# Patient Record
Sex: FEMALE | Race: WHITE | NOT HISPANIC OR LATINO | Employment: OTHER | URBAN - METROPOLITAN AREA
[De-identification: names, ages, dates, MRNs, and addresses within clinical notes are randomized per-mention and may not be internally consistent; named-entity substitution may affect disease eponyms.]

---

## 2017-06-28 ENCOUNTER — HOSPITAL ENCOUNTER (EMERGENCY)
Facility: HOSPITAL | Age: 62
Discharge: HOME | End: 2017-06-28
Attending: EMERGENCY MEDICINE | Admitting: EMERGENCY MEDICINE
Payer: COMMERCIAL

## 2017-06-28 ENCOUNTER — APPOINTMENT (EMERGENCY)
Dept: ULTRASOUND IMAGING | Facility: HOSPITAL | Age: 62
End: 2017-06-28
Payer: COMMERCIAL

## 2017-06-28 VITALS
HEART RATE: 83 BPM | SYSTOLIC BLOOD PRESSURE: 163 MMHG | BODY MASS INDEX: 22.59 KG/M2 | DIASTOLIC BLOOD PRESSURE: 79 MMHG | OXYGEN SATURATION: 98 % | TEMPERATURE: 97.9 F | WEIGHT: 131.6 LBS | RESPIRATION RATE: 18 BRPM

## 2017-06-28 DIAGNOSIS — I77.6 VASCULITIS (HCC): Primary | ICD-10-CM

## 2017-06-28 DIAGNOSIS — D61.818 PANCYTOPENIA (HCC): ICD-10-CM

## 2017-06-28 LAB
ALBUMIN SERPL BCP-MCNC: 3.5 G/DL (ref 3.5–5)
ALP SERPL-CCNC: 104 U/L (ref 46–116)
ALT SERPL W P-5'-P-CCNC: 43 U/L (ref 12–78)
ANION GAP SERPL CALCULATED.3IONS-SCNC: 11 MMOL/L (ref 4–13)
AST SERPL W P-5'-P-CCNC: 89 U/L (ref 5–45)
BASOPHILS # BLD AUTO: 0.02 THOUSANDS/ΜL (ref 0–0.1)
BASOPHILS NFR BLD AUTO: 1 % (ref 0–1)
BILIRUB SERPL-MCNC: 0.6 MG/DL (ref 0.2–1)
BUN SERPL-MCNC: 7 MG/DL (ref 5–25)
CALCIUM SERPL-MCNC: 8.6 MG/DL (ref 8.3–10.1)
CHLORIDE SERPL-SCNC: 97 MMOL/L (ref 100–108)
CO2 SERPL-SCNC: 26 MMOL/L (ref 21–32)
CREAT SERPL-MCNC: 0.71 MG/DL (ref 0.6–1.3)
EOSINOPHIL # BLD AUTO: 0.03 THOUSAND/ΜL (ref 0–0.61)
EOSINOPHIL NFR BLD AUTO: 1 % (ref 0–6)
ERYTHROCYTE [DISTWIDTH] IN BLOOD BY AUTOMATED COUNT: 12.5 % (ref 11.6–15.1)
GFR SERPL CREATININE-BSD FRML MDRD: >60 ML/MIN/1.73SQ M
GLUCOSE SERPL-MCNC: 139 MG/DL (ref 65–140)
HCT VFR BLD AUTO: 33 % (ref 34.8–46.1)
HGB BLD-MCNC: 11.3 G/DL (ref 11.5–15.4)
LYMPHOCYTES # BLD AUTO: 1.87 THOUSANDS/ΜL (ref 0.6–4.47)
LYMPHOCYTES NFR BLD AUTO: 57 % (ref 14–44)
MCH RBC QN AUTO: 31.3 PG (ref 26.8–34.3)
MCHC RBC AUTO-ENTMCNC: 34.2 G/DL (ref 31.4–37.4)
MCV RBC AUTO: 91 FL (ref 82–98)
MONOCYTES # BLD AUTO: 0.24 THOUSAND/ΜL (ref 0.17–1.22)
MONOCYTES NFR BLD AUTO: 8 % (ref 4–12)
NEUTROPHILS # BLD AUTO: 1.05 THOUSANDS/ΜL (ref 1.85–7.62)
NEUTS SEG NFR BLD AUTO: 33 % (ref 43–75)
PLATELET # BLD AUTO: 117 THOUSANDS/UL (ref 149–390)
PMV BLD AUTO: 8.7 FL (ref 8.9–12.7)
POTASSIUM SERPL-SCNC: 3.5 MMOL/L (ref 3.5–5.3)
PROT SERPL-MCNC: 7.7 G/DL (ref 6.4–8.2)
RBC # BLD AUTO: 3.61 MILLION/UL (ref 3.81–5.12)
SODIUM SERPL-SCNC: 134 MMOL/L (ref 136–145)
TROPONIN I SERPL-MCNC: <0.02 NG/ML
WBC # BLD AUTO: 3.21 THOUSAND/UL (ref 4.31–10.16)

## 2017-06-28 PROCEDURE — 99284 EMERGENCY DEPT VISIT MOD MDM: CPT

## 2017-06-28 PROCEDURE — 80053 COMPREHEN METABOLIC PANEL: CPT | Performed by: EMERGENCY MEDICINE

## 2017-06-28 PROCEDURE — 85025 COMPLETE CBC W/AUTO DIFF WBC: CPT | Performed by: EMERGENCY MEDICINE

## 2017-06-28 PROCEDURE — 36415 COLL VENOUS BLD VENIPUNCTURE: CPT | Performed by: EMERGENCY MEDICINE

## 2017-06-28 PROCEDURE — 93005 ELECTROCARDIOGRAM TRACING: CPT | Performed by: EMERGENCY MEDICINE

## 2017-06-28 PROCEDURE — 93971 EXTREMITY STUDY: CPT

## 2017-06-28 PROCEDURE — 84484 ASSAY OF TROPONIN QUANT: CPT | Performed by: EMERGENCY MEDICINE

## 2017-06-28 RX ORDER — METHYLPREDNISOLONE 4 MG/1
TABLET ORAL
Qty: 21 TABLET | Refills: 0 | Status: SHIPPED | OUTPATIENT
Start: 2017-06-28 | End: 2018-11-21 | Stop reason: HOSPADM

## 2017-06-29 LAB
ATRIAL RATE: 83 BPM
P AXIS: 68 DEGREES
PR INTERVAL: 160 MS
QRS AXIS: 68 DEGREES
QRSD INTERVAL: 78 MS
QT INTERVAL: 350 MS
QTC INTERVAL: 411 MS
T WAVE AXIS: 65 DEGREES
VENTRICULAR RATE: 83 BPM

## 2017-06-30 ENCOUNTER — TRANSCRIBE ORDERS (OUTPATIENT)
Dept: LAB | Facility: CLINIC | Age: 62
End: 2017-06-30

## 2017-06-30 ENCOUNTER — APPOINTMENT (OUTPATIENT)
Dept: LAB | Facility: CLINIC | Age: 62
End: 2017-06-30
Payer: COMMERCIAL

## 2017-06-30 DIAGNOSIS — R31.9 HEMATURIA SYNDROME: Primary | ICD-10-CM

## 2017-06-30 LAB
BACTERIA UR QL AUTO: ABNORMAL /HPF
BILIRUB UR QL STRIP: ABNORMAL
CLARITY UR: ABNORMAL
COLOR UR: ABNORMAL
GLUCOSE UR STRIP-MCNC: ABNORMAL MG/DL
HGB UR QL STRIP.AUTO: ABNORMAL
KETONES UR STRIP-MCNC: ABNORMAL MG/DL
LEUKOCYTE ESTERASE UR QL STRIP: ABNORMAL
NITRITE UR QL STRIP: ABNORMAL
NON-SQ EPI CELLS URNS QL MICRO: ABNORMAL /HPF
PROT UR STRIP-MCNC: ABNORMAL MG/DL
RBC #/AREA URNS AUTO: ABNORMAL /HPF
SP GR UR STRIP.AUTO: 1.01 (ref 1–1.03)
UROBILINOGEN UR QL STRIP.AUTO: ABNORMAL E.U./DL
WBC #/AREA URNS AUTO: ABNORMAL /HPF

## 2017-06-30 PROCEDURE — 87077 CULTURE AEROBIC IDENTIFY: CPT

## 2017-06-30 PROCEDURE — 87086 URINE CULTURE/COLONY COUNT: CPT

## 2017-06-30 PROCEDURE — 81001 URINALYSIS AUTO W/SCOPE: CPT

## 2017-06-30 PROCEDURE — 87186 SC STD MICRODIL/AGAR DIL: CPT

## 2017-07-02 LAB — BACTERIA UR CULT: ABNORMAL

## 2017-07-31 ENCOUNTER — TRANSCRIBE ORDERS (OUTPATIENT)
Dept: ADMINISTRATIVE | Facility: HOSPITAL | Age: 62
End: 2017-07-31

## 2017-07-31 DIAGNOSIS — R60.0 LOCALIZED EDEMA: Primary | ICD-10-CM

## 2017-07-31 DIAGNOSIS — R60.9 EDEMA, UNSPECIFIED TYPE: Primary | ICD-10-CM

## 2017-08-07 ENCOUNTER — HOSPITAL ENCOUNTER (OUTPATIENT)
Dept: ULTRASOUND IMAGING | Facility: HOSPITAL | Age: 62
Discharge: HOME | End: 2017-08-07
Payer: COMMERCIAL

## 2017-08-07 DIAGNOSIS — R60.0 LOCALIZED EDEMA: ICD-10-CM

## 2017-08-07 PROCEDURE — 76830 TRANSVAGINAL US NON-OB: CPT

## 2017-08-07 PROCEDURE — 76856 US EXAM PELVIC COMPLETE: CPT

## 2017-08-07 PROCEDURE — 76705 ECHO EXAM OF ABDOMEN: CPT

## 2017-08-09 ENCOUNTER — TRANSCRIBE ORDERS (OUTPATIENT)
Dept: ADMINISTRATIVE | Facility: HOSPITAL | Age: 62
End: 2017-08-09

## 2017-08-09 DIAGNOSIS — K80.50 CALCULUS OF BILE DUCT WITHOUT MENTION OF CHOLECYSTITIS OR OBSTRUCTION: Primary | ICD-10-CM

## 2017-08-18 ENCOUNTER — HOSPITAL ENCOUNTER (OUTPATIENT)
Dept: RADIOLOGY | Facility: HOSPITAL | Age: 62
Discharge: HOME | End: 2017-08-18
Payer: COMMERCIAL

## 2017-08-18 DIAGNOSIS — K80.50 CALCULUS OF BILE DUCT WITHOUT MENTION OF CHOLECYSTITIS OR OBSTRUCTION: ICD-10-CM

## 2017-08-18 PROCEDURE — 74183 MRI ABD W/O CNTR FLWD CNTR: CPT

## 2017-08-18 PROCEDURE — A9585 GADOBUTROL INJECTION: HCPCS | Performed by: RADIOLOGY

## 2017-08-18 RX ADMIN — GADOBUTROL 6 ML: 604.72 INJECTION INTRAVENOUS at 20:14

## 2018-01-30 ENCOUNTER — APPOINTMENT (OUTPATIENT)
Dept: LAB | Facility: CLINIC | Age: 63
End: 2018-01-30
Payer: COMMERCIAL

## 2018-01-30 ENCOUNTER — TRANSCRIBE ORDERS (OUTPATIENT)
Dept: LAB | Facility: CLINIC | Age: 63
End: 2018-01-30

## 2018-01-30 DIAGNOSIS — I10 ESSENTIAL HYPERTENSION, MALIGNANT: ICD-10-CM

## 2018-01-30 DIAGNOSIS — R22.31 MASS OF FINGER OF RIGHT HAND: Primary | ICD-10-CM

## 2018-01-30 LAB
BASOPHILS # BLD AUTO: 0.01 THOUSANDS/ΜL (ref 0–0.1)
BASOPHILS NFR BLD AUTO: 0 % (ref 0–1)
CRP SERPL QL: 23.3 MG/L
EOSINOPHIL # BLD AUTO: 0.04 THOUSAND/ΜL (ref 0–0.61)
EOSINOPHIL NFR BLD AUTO: 1 % (ref 0–6)
ERYTHROCYTE [DISTWIDTH] IN BLOOD BY AUTOMATED COUNT: 12.4 % (ref 11.6–15.1)
ERYTHROCYTE [SEDIMENTATION RATE] IN BLOOD: 34 MM/HOUR (ref 0–20)
HCT VFR BLD AUTO: 33.3 % (ref 34.8–46.1)
HGB BLD-MCNC: 12 G/DL (ref 11.5–15.4)
LYMPHOCYTES # BLD AUTO: 1.93 THOUSANDS/ΜL (ref 0.6–4.47)
LYMPHOCYTES NFR BLD AUTO: 26 % (ref 14–44)
MCH RBC QN AUTO: 31.7 PG (ref 26.8–34.3)
MCHC RBC AUTO-ENTMCNC: 36 G/DL (ref 31.4–37.4)
MCV RBC AUTO: 88 FL (ref 82–98)
MONOCYTES # BLD AUTO: 0.94 THOUSAND/ΜL (ref 0.17–1.22)
MONOCYTES NFR BLD AUTO: 13 % (ref 4–12)
NEUTROPHILS # BLD AUTO: 4.61 THOUSANDS/ΜL (ref 1.85–7.62)
NEUTS SEG NFR BLD AUTO: 60 % (ref 43–75)
NRBC BLD AUTO-RTO: 0 /100 WBCS
PLATELET # BLD AUTO: 203 THOUSANDS/UL (ref 149–390)
PMV BLD AUTO: 9.9 FL (ref 8.9–12.7)
RBC # BLD AUTO: 3.78 MILLION/UL (ref 3.81–5.12)
URATE SERPL-MCNC: 3 MG/DL (ref 2–6.8)
WBC # BLD AUTO: 7.55 THOUSAND/UL (ref 4.31–10.16)

## 2018-01-30 PROCEDURE — 85025 COMPLETE CBC W/AUTO DIFF WBC: CPT

## 2018-01-30 PROCEDURE — 85652 RBC SED RATE AUTOMATED: CPT

## 2018-01-30 PROCEDURE — 36415 COLL VENOUS BLD VENIPUNCTURE: CPT

## 2018-01-30 PROCEDURE — 86430 RHEUMATOID FACTOR TEST QUAL: CPT

## 2018-01-30 PROCEDURE — 86140 C-REACTIVE PROTEIN: CPT

## 2018-01-30 PROCEDURE — 86038 ANTINUCLEAR ANTIBODIES: CPT

## 2018-01-30 PROCEDURE — 86617 LYME DISEASE ANTIBODY: CPT

## 2018-01-30 PROCEDURE — 86618 LYME DISEASE ANTIBODY: CPT

## 2018-01-30 PROCEDURE — 84550 ASSAY OF BLOOD/URIC ACID: CPT

## 2018-01-31 LAB
B BURGDOR IGG SER IA-ACNC: 1.55
B BURGDOR IGM SER IA-ACNC: 0.21
RHEUMATOID FACT SER QL LA: NEGATIVE
RYE IGE QN: NEGATIVE

## 2018-02-02 LAB
B BURGDOR IGG PATRN SER IB-IMP: NEGATIVE
B BURGDOR IGM PATRN SER IB-IMP: NEGATIVE
B BURGDOR18KD IGG SER QL IB: ABNORMAL
B BURGDOR23KD IGG SER QL IB: ABNORMAL
B BURGDOR23KD IGM SER QL IB: PRESENT
B BURGDOR28KD IGG SER QL IB: ABNORMAL
B BURGDOR30KD IGG SER QL IB: ABNORMAL
B BURGDOR39KD IGG SER QL IB: ABNORMAL
B BURGDOR39KD IGM SER QL IB: ABNORMAL
B BURGDOR41KD IGG SER QL IB: PRESENT
B BURGDOR41KD IGM SER QL IB: ABNORMAL
B BURGDOR45KD IGG SER QL IB: PRESENT
B BURGDOR58KD IGG SER QL IB: ABNORMAL
B BURGDOR66KD IGG SER QL IB: ABNORMAL
B BURGDOR93KD IGG SER QL IB: ABNORMAL

## 2018-11-15 ENCOUNTER — HOSPITAL ENCOUNTER (INPATIENT)
Facility: HOSPITAL | Age: 63
LOS: 6 days | Discharge: NURSING, REHAB OR LONG-TERM CARE | DRG: 481 | End: 2018-11-21
Attending: INTERNAL MEDICINE | Admitting: INTERNAL MEDICINE
Payer: COMMERCIAL

## 2018-11-15 ENCOUNTER — APPOINTMENT (EMERGENCY)
Dept: RADIOLOGY | Facility: HOSPITAL | Age: 63
DRG: 481 | End: 2018-11-15
Payer: COMMERCIAL
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
## 2018-11-15 DIAGNOSIS — E87.8 LOW BICARBONATE: ICD-10-CM

## 2018-11-15 DIAGNOSIS — S72.142A CLOSED INTERTROCHANTERIC FRACTURE, LEFT, INITIAL ENCOUNTER (HCC): Primary | ICD-10-CM

## 2018-11-15 DIAGNOSIS — E87.1 HYPONATREMIA: ICD-10-CM

## 2018-11-15 DIAGNOSIS — S72.092A: ICD-10-CM

## 2018-11-15 PROBLEM — I10 ESSENTIAL HYPERTENSION: Chronic | Status: ACTIVE | Noted: 2018-11-15

## 2018-11-15 LAB
ANION GAP SERPL CALCULATED.3IONS-SCNC: 12 MMOL/L (ref 4–13)
APTT PPP: 33 SECONDS (ref 26–38)
BASOPHILS # BLD AUTO: 0.04 THOUSANDS/ΜL (ref 0–0.1)
BASOPHILS NFR BLD AUTO: 1 % (ref 0–1)
BUN SERPL-MCNC: 6 MG/DL (ref 5–25)
CALCIUM SERPL-MCNC: 8.6 MG/DL (ref 8.3–10.1)
CHLORIDE SERPL-SCNC: 91 MMOL/L (ref 100–108)
CO2 SERPL-SCNC: 24 MMOL/L (ref 21–32)
CREAT SERPL-MCNC: 0.68 MG/DL (ref 0.6–1.3)
EOSINOPHIL # BLD AUTO: 0.08 THOUSAND/ΜL (ref 0–0.61)
EOSINOPHIL NFR BLD AUTO: 2 % (ref 0–6)
ERYTHROCYTE [DISTWIDTH] IN BLOOD BY AUTOMATED COUNT: 12.7 % (ref 11.6–15.1)
GFR SERPL CREATININE-BSD FRML MDRD: 93 ML/MIN/1.73SQ M
GLUCOSE SERPL-MCNC: 122 MG/DL (ref 65–140)
HCT VFR BLD AUTO: 31.8 % (ref 34.8–46.1)
HGB BLD-MCNC: 11.1 G/DL (ref 11.5–15.4)
IMM GRANULOCYTES # BLD AUTO: 0.02 THOUSAND/UL (ref 0–0.2)
IMM GRANULOCYTES NFR BLD AUTO: 0 % (ref 0–2)
INR PPP: 1.15 (ref 0.86–1.17)
LYMPHOCYTES # BLD AUTO: 2.55 THOUSANDS/ΜL (ref 0.6–4.47)
LYMPHOCYTES NFR BLD AUTO: 46 % (ref 14–44)
MCH RBC QN AUTO: 30.9 PG (ref 26.8–34.3)
MCHC RBC AUTO-ENTMCNC: 34.9 G/DL (ref 31.4–37.4)
MCV RBC AUTO: 89 FL (ref 82–98)
MONOCYTES # BLD AUTO: 0.36 THOUSAND/ΜL (ref 0.17–1.22)
MONOCYTES NFR BLD AUTO: 7 % (ref 4–12)
NEUTROPHILS # BLD AUTO: 2.38 THOUSANDS/ΜL (ref 1.85–7.62)
NEUTS SEG NFR BLD AUTO: 44 % (ref 43–75)
NRBC BLD AUTO-RTO: 0 /100 WBCS
PLATELET # BLD AUTO: 122 THOUSANDS/UL (ref 149–390)
PLATELET # BLD AUTO: 144 THOUSANDS/UL (ref 149–390)
PMV BLD AUTO: 9.1 FL (ref 8.9–12.7)
PMV BLD AUTO: 9.4 FL (ref 8.9–12.7)
POTASSIUM SERPL-SCNC: 3.6 MMOL/L (ref 3.5–5.3)
PROTHROMBIN TIME: 14.4 SECONDS (ref 11.8–14.2)
RBC # BLD AUTO: 3.59 MILLION/UL (ref 3.81–5.12)
SODIUM SERPL-SCNC: 127 MMOL/L (ref 136–145)
WBC # BLD AUTO: 5.43 THOUSAND/UL (ref 4.31–10.16)

## 2018-11-15 PROCEDURE — 99223 1ST HOSP IP/OBS HIGH 75: CPT | Performed by: PHYSICIAN ASSISTANT

## 2018-11-15 PROCEDURE — 85730 THROMBOPLASTIN TIME PARTIAL: CPT | Performed by: PHYSICIAN ASSISTANT

## 2018-11-15 PROCEDURE — 84300 ASSAY OF URINE SODIUM: CPT | Performed by: PHYSICIAN ASSISTANT

## 2018-11-15 PROCEDURE — 85025 COMPLETE CBC W/AUTO DIFF WBC: CPT | Performed by: PHYSICIAN ASSISTANT

## 2018-11-15 PROCEDURE — 36415 COLL VENOUS BLD VENIPUNCTURE: CPT | Performed by: PHYSICIAN ASSISTANT

## 2018-11-15 PROCEDURE — 96374 THER/PROPH/DIAG INJ IV PUSH: CPT

## 2018-11-15 PROCEDURE — 85610 PROTHROMBIN TIME: CPT | Performed by: PHYSICIAN ASSISTANT

## 2018-11-15 PROCEDURE — 73502 X-RAY EXAM HIP UNI 2-3 VIEWS: CPT

## 2018-11-15 PROCEDURE — 99285 EMERGENCY DEPT VISIT HI MDM: CPT

## 2018-11-15 PROCEDURE — 93005 ELECTROCARDIOGRAM TRACING: CPT

## 2018-11-15 PROCEDURE — 80048 BASIC METABOLIC PNL TOTAL CA: CPT | Performed by: PHYSICIAN ASSISTANT

## 2018-11-15 PROCEDURE — 71045 X-RAY EXAM CHEST 1 VIEW: CPT

## 2018-11-15 PROCEDURE — 85049 AUTOMATED PLATELET COUNT: CPT | Performed by: PHYSICIAN ASSISTANT

## 2018-11-15 PROCEDURE — 83935 ASSAY OF URINE OSMOLALITY: CPT | Performed by: PHYSICIAN ASSISTANT

## 2018-11-15 PROCEDURE — 83930 ASSAY OF BLOOD OSMOLALITY: CPT | Performed by: PHYSICIAN ASSISTANT

## 2018-11-15 RX ORDER — TRAMADOL HYDROCHLORIDE 50 MG/1
50 TABLET ORAL EVERY 6 HOURS PRN
Status: DISCONTINUED | OUTPATIENT
Start: 2018-11-15 | End: 2018-11-21 | Stop reason: HOSPADM

## 2018-11-15 RX ORDER — SODIUM CHLORIDE 9 MG/ML
125 INJECTION, SOLUTION INTRAVENOUS CONTINUOUS
Status: DISCONTINUED | OUTPATIENT
Start: 2018-11-15 | End: 2018-11-16

## 2018-11-15 RX ORDER — FENTANYL CITRATE 50 UG/ML
25 INJECTION, SOLUTION INTRAMUSCULAR; INTRAVENOUS ONCE
Status: COMPLETED | OUTPATIENT
Start: 2018-11-15 | End: 2018-11-15

## 2018-11-15 RX ORDER — MORPHINE SULFATE 4 MG/ML
4 INJECTION, SOLUTION INTRAMUSCULAR; INTRAVENOUS EVERY 4 HOURS PRN
Status: DISCONTINUED | OUTPATIENT
Start: 2018-11-15 | End: 2018-11-21 | Stop reason: HOSPADM

## 2018-11-15 RX ORDER — MORPHINE SULFATE 15 MG/1
15 TABLET ORAL EVERY 4 HOURS PRN
Status: DISCONTINUED | OUTPATIENT
Start: 2018-11-15 | End: 2018-11-21 | Stop reason: HOSPADM

## 2018-11-15 RX ORDER — ONDANSETRON 2 MG/ML
4 INJECTION INTRAMUSCULAR; INTRAVENOUS EVERY 6 HOURS PRN
Status: DISCONTINUED | OUTPATIENT
Start: 2018-11-15 | End: 2018-11-17

## 2018-11-15 RX ORDER — LIDOCAINE 50 MG/G
1 PATCH TOPICAL DAILY
Status: DISCONTINUED | OUTPATIENT
Start: 2018-11-16 | End: 2018-11-21 | Stop reason: HOSPADM

## 2018-11-15 RX ORDER — METOPROLOL TARTRATE 50 MG/1
50 TABLET, FILM COATED ORAL 2 TIMES DAILY
Status: DISCONTINUED | OUTPATIENT
Start: 2018-11-15 | End: 2018-11-21 | Stop reason: HOSPADM

## 2018-11-15 RX ORDER — ACETAMINOPHEN 325 MG/1
650 TABLET ORAL EVERY 8 HOURS SCHEDULED
Status: DISCONTINUED | OUTPATIENT
Start: 2018-11-15 | End: 2018-11-21 | Stop reason: HOSPADM

## 2018-11-15 RX ORDER — THIAMINE MONONITRATE (VIT B1) 100 MG
100 TABLET ORAL DAILY
Status: DISCONTINUED | OUTPATIENT
Start: 2018-11-16 | End: 2018-11-21 | Stop reason: HOSPADM

## 2018-11-15 RX ORDER — METHOCARBAMOL 500 MG/1
500 TABLET, FILM COATED ORAL EVERY 6 HOURS PRN
Status: DISCONTINUED | OUTPATIENT
Start: 2018-11-15 | End: 2018-11-21 | Stop reason: HOSPADM

## 2018-11-15 RX ORDER — LIDOCAINE 50 MG/G
1 PATCH TOPICAL ONCE
Status: COMPLETED | OUTPATIENT
Start: 2018-11-15 | End: 2018-11-16

## 2018-11-15 RX ADMIN — SODIUM CHLORIDE 125 ML/HR: 0.9 INJECTION, SOLUTION INTRAVENOUS at 20:30

## 2018-11-15 RX ADMIN — FENTANYL CITRATE 25 MCG: 50 INJECTION, SOLUTION INTRAMUSCULAR; INTRAVENOUS at 19:10

## 2018-11-15 RX ADMIN — SODIUM CHLORIDE 1000 ML: 0.9 INJECTION, SOLUTION INTRAVENOUS at 19:43

## 2018-11-15 RX ADMIN — MORPHINE SULFATE 15 MG: 15 TABLET ORAL at 21:47

## 2018-11-15 RX ADMIN — LIDOCAINE 1 PATCH: 50 PATCH CUTANEOUS at 19:10

## 2018-11-15 RX ADMIN — METOPROLOL TARTRATE 50 MG: 50 TABLET, FILM COATED ORAL at 21:53

## 2018-11-15 RX ADMIN — FENTANYL CITRATE 25 MCG: 50 INJECTION, SOLUTION INTRAMUSCULAR; INTRAVENOUS at 19:42

## 2018-11-15 RX ADMIN — METHOCARBAMOL TABLETS 500 MG: 500 TABLET, COATED ORAL at 21:48

## 2018-11-15 RX ADMIN — ACETAMINOPHEN 650 MG: 325 TABLET ORAL at 21:47

## 2018-11-16 ENCOUNTER — APPOINTMENT (INPATIENT)
Dept: RADIOLOGY | Facility: HOSPITAL | Age: 63
DRG: 481 | End: 2018-11-16
Payer: COMMERCIAL

## 2018-11-16 ENCOUNTER — ANESTHESIA EVENT (INPATIENT)
Dept: PERIOP | Facility: HOSPITAL | Age: 63
DRG: 481 | End: 2018-11-16
Payer: COMMERCIAL

## 2018-11-16 PROBLEM — E87.8 LOW BICARBONATE: Status: ACTIVE | Noted: 2018-11-16

## 2018-11-16 PROBLEM — D64.9 ANEMIA: Chronic | Status: ACTIVE | Noted: 2018-11-16

## 2018-11-16 PROBLEM — S72.142A CLOSED INTERTROCHANTERIC FRACTURE, LEFT, INITIAL ENCOUNTER (HCC): Status: ACTIVE | Noted: 2018-11-15

## 2018-11-16 LAB
ABO GROUP BLD: NORMAL
ANION GAP SERPL CALCULATED.3IONS-SCNC: 11 MMOL/L (ref 4–13)
ANION GAP SERPL CALCULATED.3IONS-SCNC: 12 MMOL/L (ref 4–13)
ANION GAP SERPL CALCULATED.3IONS-SCNC: 12 MMOL/L (ref 4–13)
ATRIAL RATE: 138 BPM
BACTERIA UR QL AUTO: ABNORMAL /HPF
BILIRUB UR QL STRIP: NEGATIVE
BLD GP AB SCN SERPL QL: NEGATIVE
BUN SERPL-MCNC: 4 MG/DL (ref 5–25)
BUN SERPL-MCNC: 5 MG/DL (ref 5–25)
BUN SERPL-MCNC: 5 MG/DL (ref 5–25)
CALCIUM SERPL-MCNC: 7.8 MG/DL (ref 8.3–10.1)
CALCIUM SERPL-MCNC: 8 MG/DL (ref 8.3–10.1)
CALCIUM SERPL-MCNC: 8 MG/DL (ref 8.3–10.1)
CHLORIDE SERPL-SCNC: 93 MMOL/L (ref 100–108)
CHLORIDE SERPL-SCNC: 94 MMOL/L (ref 100–108)
CHLORIDE SERPL-SCNC: 95 MMOL/L (ref 100–108)
CLARITY UR: ABNORMAL
CO2 SERPL-SCNC: 18 MMOL/L (ref 21–32)
CO2 SERPL-SCNC: 20 MMOL/L (ref 21–32)
CO2 SERPL-SCNC: 24 MMOL/L (ref 21–32)
COLOR UR: ABNORMAL
CREAT SERPL-MCNC: 0.49 MG/DL (ref 0.6–1.3)
CREAT SERPL-MCNC: 0.56 MG/DL (ref 0.6–1.3)
CREAT SERPL-MCNC: 0.59 MG/DL (ref 0.6–1.3)
ERYTHROCYTE [DISTWIDTH] IN BLOOD BY AUTOMATED COUNT: 13 % (ref 11.6–15.1)
GFR SERPL CREATININE-BSD FRML MDRD: 100 ML/MIN/1.73SQ M
GFR SERPL CREATININE-BSD FRML MDRD: 104 ML/MIN/1.73SQ M
GFR SERPL CREATININE-BSD FRML MDRD: 98 ML/MIN/1.73SQ M
GLUCOSE SERPL-MCNC: 82 MG/DL (ref 65–140)
GLUCOSE SERPL-MCNC: 92 MG/DL (ref 65–140)
GLUCOSE SERPL-MCNC: 96 MG/DL (ref 65–140)
GLUCOSE UR STRIP-MCNC: NEGATIVE MG/DL
HCT VFR BLD AUTO: 25.9 % (ref 34.8–46.1)
HCT VFR BLD AUTO: 26.1 % (ref 34.8–46.1)
HGB BLD-MCNC: 9 G/DL (ref 11.5–15.4)
HGB BLD-MCNC: 9.2 G/DL (ref 11.5–15.4)
HGB UR QL STRIP.AUTO: ABNORMAL
KETONES UR STRIP-MCNC: NEGATIVE MG/DL
LEUKOCYTE ESTERASE UR QL STRIP: ABNORMAL
MCH RBC QN AUTO: 31.5 PG (ref 26.8–34.3)
MCHC RBC AUTO-ENTMCNC: 35.2 G/DL (ref 31.4–37.4)
MCV RBC AUTO: 89 FL (ref 82–98)
NITRITE UR QL STRIP: POSITIVE
NON-SQ EPI CELLS URNS QL MICRO: ABNORMAL /HPF
OSMOLALITY UR/SERPL-RTO: 315 MMOL/KG (ref 282–298)
OSMOLALITY UR: 393 MMOL/KG
P AXIS: 71 DEGREES
PH UR STRIP.AUTO: 5.5 [PH] (ref 4.5–8)
PLATELET # BLD AUTO: 115 THOUSANDS/UL (ref 149–390)
PMV BLD AUTO: 9.3 FL (ref 8.9–12.7)
POTASSIUM SERPL-SCNC: 3.7 MMOL/L (ref 3.5–5.3)
POTASSIUM SERPL-SCNC: 3.9 MMOL/L (ref 3.5–5.3)
POTASSIUM SERPL-SCNC: 3.9 MMOL/L (ref 3.5–5.3)
PROT UR STRIP-MCNC: NEGATIVE MG/DL
QRS AXIS: 66 DEGREES
QRSD INTERVAL: 82 MS
QT INTERVAL: 376 MS
QTC INTERVAL: 411 MS
RBC # BLD AUTO: 2.92 MILLION/UL (ref 3.81–5.12)
RBC #/AREA URNS AUTO: ABNORMAL /HPF
RH BLD: POSITIVE
SODIUM 24H UR-SCNC: 17 MOL/L
SODIUM SERPL-SCNC: 125 MMOL/L (ref 136–145)
SODIUM SERPL-SCNC: 125 MMOL/L (ref 136–145)
SODIUM SERPL-SCNC: 129 MMOL/L (ref 136–145)
SP GR UR STRIP.AUTO: 1.01 (ref 1–1.03)
SPECIMEN EXPIRATION DATE: NORMAL
T WAVE AXIS: 64 DEGREES
UROBILINOGEN UR QL STRIP.AUTO: 0.2 E.U./DL
VENTRICULAR RATE: 72 BPM
WBC # BLD AUTO: 4.93 THOUSAND/UL (ref 4.31–10.16)
WBC #/AREA URNS AUTO: ABNORMAL /HPF

## 2018-11-16 PROCEDURE — P9021 RED BLOOD CELLS UNIT: HCPCS

## 2018-11-16 PROCEDURE — 80048 BASIC METABOLIC PNL TOTAL CA: CPT | Performed by: PHYSICIAN ASSISTANT

## 2018-11-16 PROCEDURE — 30233N1 TRANSFUSION OF NONAUTOLOGOUS RED BLOOD CELLS INTO PERIPHERAL VEIN, PERCUTANEOUS APPROACH: ICD-10-PCS | Performed by: ORTHOPAEDIC SURGERY

## 2018-11-16 PROCEDURE — 86900 BLOOD TYPING SEROLOGIC ABO: CPT | Performed by: PHYSICIAN ASSISTANT

## 2018-11-16 PROCEDURE — 86850 RBC ANTIBODY SCREEN: CPT | Performed by: PHYSICIAN ASSISTANT

## 2018-11-16 PROCEDURE — 86920 COMPATIBILITY TEST SPIN: CPT

## 2018-11-16 PROCEDURE — 80048 BASIC METABOLIC PNL TOTAL CA: CPT | Performed by: INTERNAL MEDICINE

## 2018-11-16 PROCEDURE — 93010 ELECTROCARDIOGRAM REPORT: CPT | Performed by: INTERNAL MEDICINE

## 2018-11-16 PROCEDURE — 99254 IP/OBS CNSLTJ NEW/EST MOD 60: CPT | Performed by: INTERNAL MEDICINE

## 2018-11-16 PROCEDURE — 85014 HEMATOCRIT: CPT | Performed by: PHYSICIAN ASSISTANT

## 2018-11-16 PROCEDURE — 85018 HEMOGLOBIN: CPT | Performed by: PHYSICIAN ASSISTANT

## 2018-11-16 PROCEDURE — 73552 X-RAY EXAM OF FEMUR 2/>: CPT

## 2018-11-16 PROCEDURE — 99232 SBSQ HOSP IP/OBS MODERATE 35: CPT | Performed by: INTERNAL MEDICINE

## 2018-11-16 PROCEDURE — 81001 URINALYSIS AUTO W/SCOPE: CPT | Performed by: NURSE PRACTITIONER

## 2018-11-16 PROCEDURE — 99255 IP/OBS CONSLTJ NEW/EST HI 80: CPT | Performed by: ORTHOPAEDIC SURGERY

## 2018-11-16 PROCEDURE — 85027 COMPLETE CBC AUTOMATED: CPT | Performed by: PHYSICIAN ASSISTANT

## 2018-11-16 PROCEDURE — 86901 BLOOD TYPING SEROLOGIC RH(D): CPT | Performed by: PHYSICIAN ASSISTANT

## 2018-11-16 RX ORDER — FUROSEMIDE 10 MG/ML
20 INJECTION INTRAMUSCULAR; INTRAVENOUS ONCE
Status: COMPLETED | OUTPATIENT
Start: 2018-11-16 | End: 2018-11-16

## 2018-11-16 RX ORDER — SODIUM CHLORIDE 1000 MG
2 TABLET, SOLUBLE MISCELLANEOUS ONCE
Status: DISCONTINUED | OUTPATIENT
Start: 2018-11-16 | End: 2018-11-16

## 2018-11-16 RX ORDER — SODIUM BICARBONATE 650 MG/1
1300 TABLET ORAL ONCE
Status: COMPLETED | OUTPATIENT
Start: 2018-11-16 | End: 2018-11-16

## 2018-11-16 RX ADMIN — METOPROLOL TARTRATE 50 MG: 50 TABLET, FILM COATED ORAL at 19:09

## 2018-11-16 RX ADMIN — LIDOCAINE 1 PATCH: 50 PATCH CUTANEOUS at 08:03

## 2018-11-16 RX ADMIN — ONDANSETRON 4 MG: 2 INJECTION INTRAMUSCULAR; INTRAVENOUS at 16:53

## 2018-11-16 RX ADMIN — ACETAMINOPHEN 650 MG: 325 TABLET ORAL at 13:14

## 2018-11-16 RX ADMIN — ACETAMINOPHEN 650 MG: 325 TABLET ORAL at 05:57

## 2018-11-16 RX ADMIN — METHOCARBAMOL TABLETS 500 MG: 500 TABLET, COATED ORAL at 21:10

## 2018-11-16 RX ADMIN — METOPROLOL TARTRATE 50 MG: 50 TABLET, FILM COATED ORAL at 08:03

## 2018-11-16 RX ADMIN — ACETAMINOPHEN 650 MG: 325 TABLET ORAL at 21:10

## 2018-11-16 RX ADMIN — SODIUM BICARBONATE 650 MG TABLET 1300 MG: at 11:14

## 2018-11-16 RX ADMIN — Medication 100 MG: at 08:03

## 2018-11-16 RX ADMIN — MORPHINE SULFATE 4 MG: 4 INJECTION INTRAVENOUS at 08:56

## 2018-11-16 RX ADMIN — MORPHINE SULFATE 15 MG: 15 TABLET ORAL at 16:50

## 2018-11-16 RX ADMIN — FUROSEMIDE 20 MG: 10 INJECTION, SOLUTION INTRAMUSCULAR; INTRAVENOUS at 11:15

## 2018-11-16 RX ADMIN — MORPHINE SULFATE 4 MG: 4 INJECTION INTRAVENOUS at 19:21

## 2018-11-16 NOTE — CONSULTS
Inpatient Consultation - Nephrology   Chuck Toledo 61 y o  female MRN: [id number]  Unit/Bed#: -01 Encounter: [id number]    ASSESSMENT and PLAN:  1  Hyponatremia:  Likely SIADH vs reset Osmostat  Patient is not on any medications that can cause hyponatremia  Sodium level declined slightly with IV fluids  Generally in the low 130s  Although her last outpatient sodium level was 127 which was on 11/13/2018  Kriss Antony Patient follows with Dr Chris Lara from [facility]  · Non hypo osmolar, serum osmolality 315, urine osmolality 393, urine sodium 17  · Patient NPO for possible surgery  · Previous imaging in August shows 8 mm subpleural lung nodule  · According the patient she is on a fluid restriction at home but does not take salt tablets or diuretic  She was on salt tablets at 1 point but became hypertensive  · Plan:  Due to low bicarbonate will give patient a dose of sodium bicarbonate rather than sodium chloride  Will also give 1 dose of Lasix 20 mg IV now since urine osmolality is 393 indicating a diluting defect  Recheck sodium level in 3 hours  2  Left intertrochanteric fracture:  Orthopedics following  Patient will need surgical repair  3  Low bicarbonate: Will give patient 2 tablets of sodium bicarbonate instead of sodium chloride  4  History of hematuria:  Follows with Urology  Previous bladder biopsy showed inflammation  Check urinalysis  5  Hypertension:  Blood pressure acceptable  On metoprolol    HISTORY OF PRESENT ILLNESS:  Requesting Physician: Zeus Reyes MD  Reason for Consult:  Hyponatremia    Chuck Toledo is a 61 y o  female with a history of hyponatremia for the past several years at least who follows with Dr Chris Lara at [facility]  The patient relates that she is on a fluid restriction but does not take salt tablets anymore  She previously took salt tablets which made her hypertensive  She had a sodium level checked several days before admission  It was 127  The patient had a mechanical fall at home injuring and fracturing her left hip  At this time she is NPO  She has had several episodes of vomiting today which she believes is due to having an empty stomach and being on pain medication  She felt better after vomiting  She denies any urinary symptoms such as burning with urination although she states that she urinates on a frequent basis  Her urine is cloudy right now  She has an indwelling Davies catheter in place  She reports no fevers or chills  She does follow with Urology regarding hematuria  On admission sodium level 127 dropping to 125 after fluid administration  A renal consultation is requested today for assistance in the management of hyponatremia  PAST MEDICAL HISTORY:  Past Medical History:   Diagnosis Date    Hypertension     Hyponatremia        PAST SURGICAL HISTORY:  History reviewed  No pertinent surgical history  ALLERGIES:  Allergies   Allergen Reactions    Demerol [Meperidine] Other (See Comments)     hallucinations    Diphenhydramine Other (See Comments)     hallucinations    Prednisone Tremor    Sulfa Antibiotics Other (See Comments)     hallucinations    Percocet [Oxycodone-Acetaminophen] Palpitations       SOCIAL HISTORY:  History   Alcohol Use    0 6 oz/week    1 Cans of beer per week     Comment: 2-3 times per week   Drinks heavier before     History   Drug Use No     History   Smoking Status    Former Smoker   Smokeless Tobacco    Never Used       FAMILY HISTORY:  Family History   Problem Relation Age of Onset    Heart disease Mother     Heart disease Father        MEDICATIONS:    Current Facility-Administered Medications:     acetaminophen (TYLENOL) tablet 650 mg, 650 mg, Oral, Q8H [address], Julio Rutledge PA-C, 650 mg at 11/16/18 0557    enoxaparin (LOVENOX) subcutaneous injection 40 mg, 40 mg, Subcutaneous, Daily, Julio Rutledge PA-C    lidocaine (LIDODERM) 5 % patch 1 patch, 1 patch, Topical, Daily, Julio OROPEZA Dea Carballo PA-C, 1 patch at 11/16/18 0803    methocarbamol (ROBAXIN) tablet 500 mg, 500 mg, Oral, Q6H PRN, NAVJOT Zavala-C, 500 mg at 11/15/18 2148    metoprolol tartrate (LOPRESSOR) tablet 50 mg, 50 mg, Oral, BID, NAVJOT Bronson-C, 50 mg at 11/16/18 0803    morphine (MSIR) IR tablet 15 mg, 15 mg, Oral, Q4H PRN, NAVJOT Zavala-C, 15 mg at 11/15/18 2147    morphine (PF) 4 mg/mL injection 4 mg, 4 mg, Intravenous, Q4H PRN, NAVJOT Bronson-SAWYER, 4 mg at 11/16/18 0856    ondansetron (ZOFRAN) injection 4 mg, 4 mg, Intravenous, Q6H PRN, Tammi Stephenson PA-C    sodium bicarbonate tablet 1,300 mg, 1,300 mg, Oral, Once, Jearlean Bosworth, CRNP    thiamine (VITAMIN B1) tablet 100 mg, 100 mg, Oral, Daily, Julio Rutledge PA-C, 100 mg at 11/16/18 0803    traMADol (ULTRAM) tablet 50 mg, 50 mg, Oral, Q6H PRN, Tammi Stephenson PA-C    REVIEW OF SYSTEMS:  Review of systems negative except for those positives and negatives in HPI  Patient was in her usual state of health before for falling and fracturing her hip  All the systems were reviewed and were negative except as documented on the HPI  PHYSICAL EXAM:  Current Weight: Weight - Scale: 64 8 kg (142 lb 13 7 oz)  First Weight: Weight - Scale: 63 kg (138 lb 14 2 oz)  Vitals:    11/15/18 2028 11/15/18 2153 11/15/18 2213 11/16/18 0704   BP: 126/58 128/60 134/59 129/61   BP Location:   Left arm    Pulse: 92 72 85 61   Resp: 20  18 20   Temp: 97 6 °F (36 4 °C)  98 °F (36 7 °C) 98 6 °F (37 °C)   TempSrc: Oral  Oral Oral   SpO2: 99%  98% 97%   Weight: 64 8 kg (142 lb 13 7 oz)      Height: 5' 4" (1 626 m)          Intake/Output Summary (Last 24 hours) at 11/16/18 0955  Last data filed at 11/16/18 0654   Gross per 24 hour   Intake                0 ml   Output             1050 ml   Net            -1050 ml     Physical Exam   Constitutional: She appears well-developed and well-nourished  No distress  HENT:   Head: Normocephalic and atraumatic     Mouth/Throat: Oropharynx is clear and moist    Eyes: Conjunctivae are normal  Right eye exhibits no discharge  Left eye exhibits no discharge  No scleral icterus  Neck: Neck supple  No JVD present  Cardiovascular: Normal rate, regular rhythm and normal heart sounds  Exam reveals no gallop and no friction rub  No murmur heard  Pulmonary/Chest: Effort normal and breath sounds normal  No respiratory distress  She has no wheezes  She has no rales  Abdominal: Soft  Bowel sounds are normal  She exhibits no distension  There is no tenderness  There is no rebound and no guarding  Musculoskeletal: She exhibits no edema  Skin: She is not diaphoretic  Invasive Devices:   Urethral Catheter Non-latex 16 Fr   (Active)   Site Assessment Clean;Skin intact 11/15/2018  8:04 PM   Collection Container Standard drainage bag 11/15/2018  8:04 PM   Securement Method Securing device (Describe) 11/15/2018  8:04 PM   Output (mL) 350 mL 11/16/2018  6:54 AM     Lab Results:     Results from last 7 days  Lab Units 11/16/18  0621 11/16/18  0259 11/15/18  2101 11/15/18  1907   WBC Thousand/uL 4 93  --   --  5 43   HEMOGLOBIN g/dL 9 2*  --   --  11 1*   HEMATOCRIT % 26 1*  --   --  31 8*   PLATELETS Thousands/uL 115*  --  122* 144*   POTASSIUM mmol/L 3 9 3 9  --  3 6   CHLORIDE mmol/L 95* 93*  --  91*   CO2 mmol/L 18* 20*  --  24   BUN mg/dL 4* 5  --  6   CREATININE mg/dL 0 56* 0 49*  --  0 68   CALCIUM mg/dL 8 0* 7 8*  --  8 6     Other Studies:

## 2018-11-16 NOTE — ASSESSMENT & PLAN NOTE
· From my review of patient's previous CBC, likely chronic  · Drop today may be hemodilution from IV fluids  · No active bleeding at this point  · Monitor  · For further workup management if this worsens significantly

## 2018-11-16 NOTE — UTILIZATION REVIEW
Please Note that Let[facility] would not allow me to access the portal to start this auth  Please use this notification and clinical to start a case for us  Thank you  Notification of Inpatient Admission/Inpatient Authorization Request  This is a Notification of Inpatient Admission/Request for Inpatient Authorization for our facility [address] [place] [address],[address]  Be advised that this patient was admitted to our facility under Inpatient Status  Please contact the Utilization Review Department where the patient is receiving care services for additional admission information  Place of Service Code: 24   [facility]  Presentation Date & Time: 11/15/2018  6:25 PM  Inpatient Admission Date & Time: 11/15/18 1930  Discharge Date & Time: No discharge date for patient encounter  Discharge Disposition (if discharged): Home/Self Care(Disregard as member is still in house)  Attending Physician:  SHIMON Kingston  [facility] Practioner ID- [id number]  [address] [place] [address]  Phone [phone]: [phone]  Fax: [phone]  Admission Orders     Ordered        11/15/18 1930  Inpatient Admission (expected length of stay for this patient is greater than two midnights)  Once               Facility: [address] [place] [address],[address]  Address: Evelina [address] [place] [address]    Phone: [phone]  Tax ID [id number]  NPI [id number]  Medicare: [id number]  [facility]  Utilization Review Department  Phone: [phone]; Fax [phone]  ATTENTION: Please call with any questions or concerns to [phone]  and carefully listen to the prompts so that you are directed to the right person  Send all requests for admission clinical reviews, approved or denied determinations and any other requests to fax [phone]   All voicemails are confidential

## 2018-11-16 NOTE — ASSESSMENT & PLAN NOTE
· POA, secondary to mechanical fall today  Neurovascularly intact at this time  Ortho aware of case   EKG and CXR normal   · Admit patient to med/surg under inpatient status  · Consult ortho for surgical repair  · Will need PT and OT eval status post   · Analgesia  · Tylenol 975 mg PO Q8   · Robaxin 500 mg PO Q6 PRN  · Lidoderm  · Ice  · Tramadol 50 mg PO Q6 PRN moderate pain   · Morphine 15 mg PO Q4 PRN severe pain  · Morphine 4 mg IV Q4 PRN breakthrough pain   · NPO after midnight

## 2018-11-16 NOTE — UTILIZATION REVIEW
Initial Clinical Review    Admission: Date/Time/Statement: 11/15/18 @ 1930     Orders Placed This Encounter   Procedures    Inpatient Admission (expected length of stay for this patient is greater than two midnights)     Standing Status:   Standing     Number of Occurrences:   1     Order Specific Question:   Admitting Physician     Answer:   Polo Sabillon     Order Specific Question:   Level of Care     Answer:   Med Surg [16]     Order Specific Question:   Estimated length of stay     Answer:   More than 2 Midnights     Order Specific Question:   Certification     Answer:   I certify that inpatient services are medically necessary for this patient for a duration of greater than two midnights  See H&P and MD Progress Notes for additional information about the patient's course of treatment  ED: Date/Time/Mode of Arrival:   ED Arrival Information     Expected Arrival Acuity Means of Arrival Escorted By Service Admission Type    - 11/15/2018 18:25 Urgent Ambulance [facility] Hospitalist Urgent    Arrival Complaint    FALL HIP INJURY          Chief Complaint:   Chief Complaint   Patient presents with    Fall     pt brought in by ems, per pt she slipped going in the house after being out in the snow  pt fell to her left side injurying left leg/hip  slight shortening of LLE noted  History of Illness:  61 y o  female presents with a left hip fracture  Patient reports that she was taking off her snow boots today and that she slipped and fell on her left side  She denies hitting her head or losing consciousness  She states that her hip will hurt if she tries to move it or bear weight      ED Vital Signs:   ED Triage Vitals   Temperature Pulse Respirations Blood Pressure SpO2   11/15/18 1832 11/15/18 1900 11/15/18 1900 11/15/18 1832 11/15/18 1900   97 5 °F (36 4 °C) 87 16 (!) 177/81 98 %      Temp Source Heart Rate Source Patient Position - Orthostatic VS BP Location FiO2 (%) 11/15/18 1832 11/15/18 1900 11/15/18 1900 11/15/18 1900 --   Oral Monitor Lying Right arm       Pain Score       11/15/18 2000       6        Wt Readings from Last 1 Encounters:   11/15/18 64 8 kg (142 lb 13 7 oz)       Vital Signs (abnormal):   Date and Time Temp Pulse SpO2 Resp BP   11/15/18 1900 -- 87 98 % 16 157/63     Abnormal Labs/Diagnostic Test Results: Na 127, Cl 91, H/H 11 1/31 8, Platelets 630, Lymphs 46%    Left Hip Xray: Comminuted intratrochanteric fracture of the left femur  ED Treatment:   Medication Administration from 11/15/2018 1825 to 11/15/2018 1959       Date/Time Order Dose Route Action Action by Comments     11/15/2018 1910 fentanyl citrate (PF) 100 MCG/2ML 25 mcg 25 mcg Intravenous Given Otilia Castle RN      11/15/2018 1910 lidocaine (LIDODERM) 5 % patch 1 patch 1 patch Topical Medication Applied Otilia Castle RN      11/15/2018 1943 sodium chloride 0 9 % bolus 1,000 mL 1,000 mL Intravenous New Bag Otilia Castle RN      11/15/2018 1942 fentanyl citrate (PF) 100 MCG/2ML 25 mcg 25 mcg Intravenous Given Otilia Castle RN           Past Medical/Surgical History:   Diagnosis    Hypertension    Hyponatremia       Admitting Diagnosis: Hyponatremia [E87 1]  Hip injury [S79 919A]  Fall, initial encounter [W19  XXXA]  Closed intertrochanteric fracture, left, initial encounter ([facility] ) Terral Essex    Age/Sex: 61 y o  female    Assessment/Plan:     * Closed comminuted fracture of hip, left, initial encounter ([facility] )   Assessment & Plan     · POA, secondary to mechanical fall today  Neurovascularly intact at this time  Ortho aware of case  EKG and CXR normal   ? Admit patient to med/surg under inpatient status  ? Consult ortho for surgical repair  ?  Will need PT and OT eval status post   ? Analgesia  § Tylenol 975 mg PO Q8   § Robaxin 500 mg PO Q6 PRN  § Lidoderm  § Ice  § Tramadol 50 mg PO Q6 PRN moderate pain   § Morphine 15 mg PO Q4 PRN severe pain  § Morphine 4 mg IV Q4 PRN breakthrough pain   ? NPO after midnight      Hyponatremia   Assessment & Plan     · POA, noted to be 127  Unclear etiology, possibly due to poor intake versus SIADH from pain response due to hip fracture  Has been hyponatremic in the past  ? Start fluids  cc/hr  ? Check Na at 0300 and in AM   ? Check urine and serum osmolality   ? Check urine Na+  ? May need to consider nephrology consultation and salt tablets if fails to correct appropriately          VTE Prophylaxis: Enoxaparin (Lovenox)  / sequential compression device   Code Status: Full Code   POLST: POLST form is not discussed and not completed at this time  Discussion with family: Patient will update family      Anticipated Length of Stay:  Patient will be admitted on an Inpatient basis with an anticipated length of stay of  Greater than 2 midnights     Justification for Hospital Stay: Left hip fracture needing surgical repair, hyponatremia      Admission Orders:  Inpatient/MedSurg  Consult Ortho r/e left hip fracture  Consult Renal r/e hyponatremia   Bilateral Sequential Compression Device  Repeat CBC in AM  Pre - Op Hgb less than 10  (9 /25 9)  Transfusion 1 unit PRBCs  OT/PT Evaluations  NPO for Surgery    Scheduled Meds:   Current Facility-Administered Medications:  acetaminophen 650 mg Oral Q8H HAJA   enoxaparin 40 mg Subcutaneous Daily   lidocaine 1 patch Topical Daily   metoprolol tartrate 50 mg Oral BID   thiamine 100 mg Oral Daily     PRN Meds:  Robaxin 500 mg po x 1 thus far  Morphine 15 mg po x 1 thus far  IV Morphine 4 mg x 1 thus far

## 2018-11-16 NOTE — ASSESSMENT & PLAN NOTE
· Patient has acute on chronic hyponatremia  Patient follows with [facility] from [address]  · According to nephrologist, 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  · Patient's sodium further decrease with IV fluids with normal saline  · Nephrologist now on board  · Patient's for fluid restriction  · Lasix 20 mg IV x1 will be given  · Sodium bicarbonate given  · Monitor BMP

## 2018-11-16 NOTE — ASSESSMENT & PLAN NOTE
· POA, secondary to mechanical fall today  Neurovascularly intact at this time  Ortho aware of case  EKG and CXR normal   · Orthopedic surgery on board  · For intramedullary nail fixation  · From my discussion with the patient, she told me that she does not have any history of coronary artery disease, congestive heart failure, or any heart attacks; patient also denies any history of strokes or any history of lung disease such as asthma or COPD  Patient also denies any peripheral arterial disease  From my discussion with the patient, she told me that she is able to walk several blocks without getting short of breath or chest pains  She can also climb flight of stairs without getting any chest pains or being short of breath  Thus, cardiovascular and pulmonary wise, patient is at acceptable risk for the intermediate risk procedure  However, patient needs her sodium and bicarbonate level to be optimized 1st prior to the procedure  Thus, patient needs a preoperative nephrology evaluation 1st prior to the surgery  This was explained to the patient and she is okay with these plans  · Pain control  · Eventual PT/OT evaluation and management

## 2018-11-16 NOTE — CONSULTS
[address] 61 y o  female MRN: [id number]  Unit/Bed#: -01      Chief Complaint:   Left hip pain    HPI:   61 y  o female seen in consultation by orthopedics for evaluation of patient's left hip  Patient states she sustained mechanical fall while taking of her snow boots yesterday  She states she fell onto her left hip  She denies hitting her head or loss of consciousness  She noted severe left hip pain and inability to bear weight on the left lower extremity  She reported to the emergency department where x-rays revealed an intertrochanteric fracture  Currently her pain is well controlled while at rest   Pain is worse with movement  She denies numbness and tingling  No fevers or chills  Prior to her injury she was not utilizing an assistive device to ambulate  Review Of Systems:   · Skin:   See HPI  · Neuro: See HPI  · Musculoskeletal: See HPI  · 14 point review of systems negative except as stated above     Past Medical History:   Past Medical History:   Diagnosis Date    Hypertension     Hyponatremia        Past Surgical History:   History reviewed  No pertinent surgical history  Family History:  Family history reviewed and non-contributory  Family History   Problem Relation Age of Onset    Heart disease Mother     Heart disease Father        Social History:  Social History     Social History    Marital status: /Civil Union     Spouse name: N/A    Number of children: N/A    Years of education: N/A     Social History Main Topics    Smoking status: Former Smoker    Smokeless tobacco: Never Used    Alcohol use 0 6 oz/week     1 Cans of beer per week      Comment: 2-3 times per week  Drinks heavier before    Drug use: No    Sexual activity: Not Asked     Other Topics Concern    None     Social History Narrative    None       Allergies:    Allergies   Allergen Reactions    Demerol [Meperidine] Other (See Comments)     hallucinations    Diphenhydramine Other (See Comments)     hallucinations    Prednisone Tremor    Sulfa Antibiotics Other (See Comments)     hallucinations    Percocet [Oxycodone-Acetaminophen] Palpitations           Labs:    0  Lab Value Date/Time   HCT 26 1 (L) 11/16/2018 0621   HCT 31 8 (L) 11/15/2018 1907   HCT 33 3 (L) 01/30/2018 0945   HGB 9 2 (L) 11/16/2018 0621   HGB 11 1 (L) 11/15/2018 1907   HGB 12 0 01/30/2018 0945   INR 1 15 11/15/2018 1907   WBC 4 93 11/16/2018 0621   WBC 5 43 11/15/2018 1907   WBC 7 55 01/30/2018 0945   ESR 34 (H) 01/30/2018 0945   CRP 23 3 (H) 01/30/2018 0945       Meds:    Current Facility-Administered Medications:     acetaminophen (TYLENOL) tablet 650 mg, 650 mg, Oral, Q8H [facility], Julio Rutledge PA-C, 650 mg at 11/16/18 0557    enoxaparin (LOVENOX) subcutaneous injection 40 mg, 40 mg, Subcutaneous, Daily, Julio Rutledge PA-C    lidocaine (LIDODERM) 5 % patch 1 patch, 1 patch, Topical, Daily, Julio Rutledge PA-C    methocarbamol (ROBAXIN) tablet 500 mg, 500 mg, Oral, Q6H PRN, Dulce Calvillo PA-C, 500 mg at 11/15/18 2148    metoprolol tartrate (LOPRESSOR) tablet 50 mg, 50 mg, Oral, BID, Julio Rutledge PA-C, 50 mg at 11/15/18 2153    morphine (MSIR) IR tablet 15 mg, 15 mg, Oral, Q4H PRN, Dulce Calvillo PA-C, 15 mg at 11/15/18 2147    morphine (PF) 4 mg/mL injection 4 mg, 4 mg, Intravenous, Q4H PRN, Julio Rutledge PA-C    ondansetron (ZOFRAN) injection 4 mg, 4 mg, Intravenous, Q6H PRN, Julio Wall PA-C    thiamine (VITAMIN B1) tablet 100 mg, 100 mg, Oral, Daily, Julio Rutledge PA-C    traMADol (ULTRAM) tablet 50 mg, 50 mg, Oral, Q6H PRN, Dulce Calvillo PA-C    Physical Exam:   /61   Pulse 61   Temp 98 6 °F (37 °C) (Oral)   Resp 20   Ht 5' 4" (1 626 m)   Wt 64 8 kg (142 lb 13 7 oz)   SpO2 97%   BMI 24 52 kg/m²   Gen: Alert and oriented to person, place, time  HEENT: EOMI, eyes clear, moist mucus membranes, hearing intact  Respiratory: Bilateral chest rise   No audible wheezing found  Cardiovascular: Regular Rate and Rhythm  Abdomen: soft nontender/nondistended  Musculoskeletal:  Left hip:  · Skin intact  No erythema ecchymosis or swelling  · Left lower extremity shortened and externally rotated  · Tenderness to palpation greater trochanter  · Positive logroll test   · Motor and sensation intact left lower extremity  · Palpable DP pulse  Radiology:   I personally reviewed the films  X-rays left hip reveals intertrochanteric fracture     _*_*_*_*_*_*_*_*_*_*_*_*_*_*_*_*_*_*_*_*_*_*_*_*_*_*_*_*_*_*_*_*_*_*_*_*_*_*_*_*_*    Assessment:  61 y  o female with left hip intertrochanteric fracture after mechanical fall    Plan:   · Pain Control  · NWB LLE/Bedrest  · Discussed treatment options with the patient including surgical intervention consisting of intramedullary nail fixation  Explained the procedure in detail as well as risks and benefits  She has elected to proceed  · Patient will need medical clearance  Patient is currently hyponatremic  This will need to be corrected prior to surgery  · Type and screen ordered  · NPO for now      Polly Hanna PA-C

## 2018-11-16 NOTE — ED PROVIDER NOTES
History  Chief Complaint   Patient presents with   Jason Thomas Fall     pt brought in by ems, per pt she slipped going in the house after being out in the snow  pt fell to her left side injurying left leg/hip  slight shortening of LLE noted  Thaddeus Lucio is a 61 y o  female with past medical history of hypertension, hyponatremia, and NSTEMI who presents to the ED with complaints of left hip pain status post fall which occurred approximately 30 minutes prior to arrival   Patient states she had just come in from being outside and slipped on her tile floor landing directly on her left hip  Patient states immediately after the accident she cannot weightbear and could not get herself up  Patient called EMS at this time  Patient describes the pain as throbbing and radiating to the left groin  Patient denies numbness, tingling, weakness, erythema, edema, back pain, head injury, loss of consciousness, chest pain, syncope, dizziness, shortness of breath, fever, chills  History provided by:  Patient  Fall   Mechanism of injury: fall    Injury location:  Leg  Leg injury location:  L hip  Incident location:  Home  Time since incident:  30 minutes  Arrived directly from scene: yes    Fall:     Fall occurred:  Standing    Impact surface:  Hard floor    Point of impact: Left Hip  Prior to arrival data:     Loss of consciousness: no      Amnesic to event: no    Associated symptoms: no abdominal pain, no back pain, no blindness, no chest pain, no difficulty breathing, no headaches, no hearing loss, no loss of consciousness, no nausea, no neck pain, no seizures and no vomiting    Risk factors: no anticoagulation therapy        Prior to Admission Medications   Prescriptions Last Dose Informant Patient Reported? Taking? Calcium Carb-Cholecalciferol (CALCIUM + D3 PO)   Yes Yes   Sig: Take 200 Units by mouth daily  Cholecalciferol (VITAMIN D3) 3000 UNITS TABS   Yes Yes   Sig: Take 100 Units by mouth daily     Omega-3 Fatty Acids (FISH OIL) 1,000 mg   Yes Yes   Sig: Take 1,000 mg by mouth daily  magnesium 30 MG tablet   Yes Yes   Sig: Take 800 mg by mouth daily  methylprednisolone (MEDROL) 4 mg tablet   No No   Sig: Dosepack (follow instructions on packaging)  All doses PO, 6 tabs/day1, 5 tabs/day 2, 4 tabs/day3, 3 tabs/day4, 2 tabs/day5, 1 tab/day6   metoprolol tartrate (LOPRESSOR) 25 mg tablet   Yes Yes   Sig: Take 50 mg by mouth 2 (two) times a day     thiamine 100 MG tablet   Yes No   Sig: Take 100 mg by mouth daily  Facility-Administered Medications: None       Past Medical History:   Diagnosis Date    Hypertension     Hyponatremia        History reviewed  No pertinent surgical history  Family History   Problem Relation Age of Onset    Heart disease Mother     Heart disease Father      I have reviewed and agree with the history as documented  Social History   Substance Use Topics    Smoking status: Former Smoker    Smokeless tobacco: Never Used    Alcohol use 0 6 oz/week     1 Cans of beer per week      Comment: 2-3 times per week  Drinks heavier before        Review of Systems   Constitutional: Negative for appetite change, chills, fever and unexpected weight change  HENT: Negative for congestion, drooling, ear pain, hearing loss, rhinorrhea, sore throat, trouble swallowing and voice change  Eyes: Negative for blindness, pain, discharge, redness and visual disturbance  Respiratory: Negative for cough, shortness of breath, wheezing and stridor  Cardiovascular: Negative for chest pain, palpitations and leg swelling  Gastrointestinal: Negative for abdominal pain, blood in stool, constipation, diarrhea, nausea and vomiting  Genitourinary: Negative for dysuria, flank pain, frequency, hematuria and urgency  Musculoskeletal: Positive for arthralgias (Left Hip)  Negative for back pain, gait problem, joint swelling, myalgias, neck pain and neck stiffness  Skin: Negative for color change and rash  Neurological: Negative for dizziness, seizures, loss of consciousness, light-headedness and headaches  Physical Exam  Physical Exam   Constitutional: She is oriented to person, place, and time  Vital signs are normal  She appears well-developed and well-nourished  She is cooperative  Non-toxic appearance  She appears distressed  HENT:   Head: Normocephalic and atraumatic  Nose: Nose normal    Mouth/Throat: Uvula is midline, oropharynx is clear and moist and mucous membranes are normal    Eyes: Pupils are equal, round, and reactive to light  Conjunctivae, EOM and lids are normal    Neck: Trachea normal, normal range of motion, full passive range of motion without pain and phonation normal  Neck supple  No spinous process tenderness and no muscular tenderness present  Cardiovascular: Normal rate, regular rhythm, intact distal pulses and normal pulses  Pulmonary/Chest: Effort normal and breath sounds normal  She has no wheezes  She has no rales  Musculoskeletal:        Left hip: She exhibits decreased range of motion, decreased strength and tenderness  TTP to the lateral left hip, no erythema or edema, no ecchymosis  Pain elicited with flexion and extension of the left hip  LLE appears mildly shortened and in external rotation at rest  Sensation intact  Neurological: She is alert and oriented to person, place, and time  She has normal reflexes  No cranial nerve deficit or sensory deficit  GCS eye subscore is 4  GCS verbal subscore is 5  GCS motor subscore is 6  Skin: Skin is warm and dry  Capillary refill takes less than 2 seconds  Psychiatric: She has a normal mood and affect  Nursing note and vitals reviewed        Vital Signs  ED Triage Vitals   Temperature Pulse Respirations Blood Pressure SpO2   11/15/18 1832 11/15/18 1900 11/15/18 1900 11/15/18 1832 11/15/18 1900   97 5 °F (36 4 °C) 87 16 (!) 177/81 98 %      Temp Source Heart Rate Source Patient Position - Orthostatic VS BP Location FiO2 (%)   11/15/18 1832 11/15/18 1900 11/15/18 1900 11/15/18 1900 --   Oral Monitor Lying Right arm       Pain Score       11/15/18 2000       6           Vitals:    11/15/18 1900 11/15/18 2028 11/15/18 2153 11/15/18 2213   BP: 157/63 126/58 128/60 134/59   Pulse: 87 92 72 85   Patient Position - Orthostatic VS: Lying Lying  Lying       Visual Acuity      ED Medications  Medications   lidocaine (LIDODERM) 5 % patch 1 patch (1 patch Topical Medication Applied 11/15/18 1910)   metoprolol tartrate (LOPRESSOR) tablet 50 mg (50 mg Oral Given 11/15/18 2153)   thiamine (VITAMIN B1) tablet 100 mg (not administered)   sodium chloride 0 9 % infusion (125 mL/hr Intravenous New Bag 11/15/18 2030)   ondansetron (ZOFRAN) injection 4 mg (not administered)   enoxaparin (LOVENOX) subcutaneous injection 40 mg (not administered)   acetaminophen (TYLENOL) tablet 650 mg (650 mg Oral Given 11/15/18 2147)   traMADol (ULTRAM) tablet 50 mg (not administered)   morphine (MSIR) IR tablet 15 mg (15 mg Oral Given 11/15/18 2147)   morphine (PF) 4 mg/mL injection 4 mg (not administered)   lidocaine (LIDODERM) 5 % patch 1 patch (not administered)   methocarbamol (ROBAXIN) tablet 500 mg (500 mg Oral Given 11/15/18 2148)   fentanyl citrate (PF) 100 MCG/2ML 25 mcg (25 mcg Intravenous Given 11/15/18 1910)   sodium chloride 0 9 % bolus 1,000 mL (1,000 mL Intravenous New Bag 11/15/18 1943)   fentanyl citrate (PF) 100 MCG/2ML 25 mcg (25 mcg Intravenous Given 11/15/18 1942)       Diagnostic Studies  Results Reviewed     Procedure Component Value Units Date/Time    Protime-INR [[id number]]  (Abnormal) Collected:  11/15/18 1907    Lab Status:  Final result Specimen:  Blood from Arm, Right Updated:  11/15/18 1925     Protime 14 4 (H) seconds      INR 1 15    APTT [[id number]]  (Normal) Collected:  11/15/18 1907    Lab Status:  Final result Specimen:  Blood from Arm, Right Updated:  11/15/18 1925     PTT 33 seconds     Basic metabolic panel [[id number]] (Abnormal) Collected:  11/15/18 1907    Lab Status:  Final result Specimen:  Blood from Arm, Right Updated:  11/15/18 1922     Sodium 127 (L) mmol/L      Potassium 3 6 mmol/L      Chloride 91 (L) mmol/L      CO2 24 mmol/L      ANION GAP 12 mmol/L      BUN 6 mg/dL      Creatinine 0 68 mg/dL      Glucose 122 mg/dL      Calcium 8 6 mg/dL      eGFR 93 ml/min/1 73sq m     Narrative:         National Kidney Disease Education Program recommendations are as follows:  GFR calculation is accurate only with a steady state creatinine  Chronic Kidney disease less than 60 ml/min/1 73 sq  meters  Kidney failure less than 15 ml/min/1 73 sq  meters  CBC and differential [[id number]]  (Abnormal) Collected:  11/15/18 1907    Lab Status:  Final result Specimen:  Blood from Arm, Right Updated:  11/15/18 1913     WBC 5 43 Thousand/uL      RBC 3 59 (L) Million/uL      Hemoglobin 11 1 (L) g/dL      Hematocrit 31 8 (L) %      MCV 89 fL      MCH 30 9 pg      MCHC 34 9 g/dL      RDW 12 7 %      MPV 9 4 fL      Platelets 195 (L) Thousands/uL      nRBC 0 /100 WBCs      Neutrophils Relative 44 %      Immat GRANS % 0 %      Lymphocytes Relative 46 (H) %      Monocytes Relative 7 %      Eosinophils Relative 2 %      Basophils Relative 1 %      Neutrophils Absolute 2 38 Thousands/µL      Immature Grans Absolute 0 02 Thousand/uL      Lymphocytes Absolute 2 55 Thousands/µL      Monocytes Absolute 0 36 Thousand/µL      Eosinophils Absolute 0 08 Thousand/µL      Basophils Absolute 0 04 Thousands/µL                  XR chest 1 view   Final Result by Veronica Cobb MD (11/15 1905)      No active disease in the chest             Workstation performed: [id number]         XR hip/pelv 2-3 vws left   Final Result by Veronica Cobb MD (11/15 1904)      Comminuted intratrochanteric fracture of the left femur              Workstation performed: [id number]                    Procedures  ECG 12 Lead Documentation  Date/Time: 11/15/2018 7:40 PM  Performed by: Jossie Brar  Authorized by: Jossie Brar     Indications / Diagnosis:  Pre-Operative  ECG reviewed by me, the ED Provider: yes    Patient location:  ED  Previous ECG:     Previous ECG:  Compared to current    Comparison ECG info:  06/24/17    Similarity:  No change    Comparison to cardiac monitor: Yes    Interpretation:     Interpretation: normal    Quality:     Tracing quality:  Limited by artifact  Rate:     ECG rate:  72    ECG rate assessment: normal    Rhythm:     Rhythm: sinus rhythm    Ectopy:     Ectopy: none    QRS:     QRS axis:  Normal    QRS intervals:  Normal  Conduction:     Conduction: normal    ST segments:     ST segments:  Normal  T waves:     T waves: normal    Q waves:     Q waves:  V2 and aVL           Phone Contacts  ED Phone Contact    ED Course  ED Course as of Nov 15 2238   Thu Nov 15, 2018   1913 Paged Orthopedics at this time  5 Spoke with Dr Juan Childs who will be consult  Plan for NPO after midnight  Requested sher catheter placement at this time  1 Spoke with Dr Alex Graves who will accept patient at this time  0570 Patient is agreeable to discharge and sher placement at this time                                   MDM  Number of Diagnoses or Management Options  Closed intertrochanteric fracture, left, initial encounter [facility]): new and does not require workup  Fall, initial encounter: new and does not require workup  Hyponatremia: new and does not require workup  Diagnosis management comments: Differential diagnosis included but not limited to:  Left femur fracture, pelvic fracture, fall, hyponatremia    Patient Progress  Patient progress: improved    CritCare Time    Disposition  Final diagnoses:   Closed intertrochanteric fracture, left, initial encounter ([facility] [address] )   Fall, initial encounter   Hyponatremia     Time reflects when diagnosis was documented in both MDM as applicable and the Disposition within this note     Time User Action Codes Description Comment 11/15/2018  7:10 PM Dane Wallace Add [S72 142A] Closed intertrochanteric fracture, left, initial encounter ([facility] [address] )     11/15/2018  7:10 PM Dane Wallace Add [W19  RQDG] Fall, initial encounter     11/15/2018  7:38 PM Cristina Chand Add [E87 1] Hyponatremia       ED Disposition     ED Disposition Condition Comment    Admit  Case was discussed with Dr Haroon Collins and the patient's admission status was agreed to be Admission Status: inpatient status to the service of Dr Haroon Collins   Follow-up Information    None         Current Discharge Medication List      CONTINUE these medications which have NOT CHANGED    Details   Calcium Carb-Cholecalciferol (CALCIUM + D3 PO) Take 200 Units by mouth daily  Cholecalciferol (VITAMIN D3) 3000 UNITS TABS Take 100 Units by mouth daily  magnesium 30 MG tablet Take 800 mg by mouth daily  metoprolol tartrate (LOPRESSOR) 25 mg tablet Take 50 mg by mouth 2 (two) times a day        Omega-3 Fatty Acids (FISH OIL) 1,000 mg Take 1,000 mg by mouth daily  methylprednisolone (MEDROL) 4 mg tablet Dosepack (follow instructions on packaging)  All doses PO, 6 tabs/day1, 5 tabs/day 2, 4 tabs/day3, 3 tabs/day4, 2 tabs/day5, 1 tab/day6  Qty: 21 tablet, Refills: 0      thiamine 100 MG tablet Take 100 mg by mouth daily  No discharge procedures on file      ED Provider  Electronically Signed by           France Olmedo PA-C  11/15/18 2209

## 2018-11-16 NOTE — PROGRESS NOTES
Progress Note - Husam Enriquez 1955, 61 y o  female MRN: [id number]    Unit/Bed#: -01 Encounter: [id number]    Primary Care Provider: Kin Chance MD   Date and time admitted to hospital: 11/15/2018  6:25 PM        * Closed comminuted fracture of hip, left, initial encounter [facility])   Assessment & Plan    · POA, secondary to mechanical fall today  Neurovascularly intact at this time  Ortho aware of case  EKG and CXR normal   · Orthopedic surgery on board  · For intramedullary nail fixation  · From my discussion with the patient, she told me that she does not have any history of coronary artery disease, congestive heart failure, or any heart attacks; patient also denies any history of strokes or any history of lung disease such as asthma or COPD  Patient also denies any peripheral arterial disease  From my discussion with the patient, she told me that she is able to walk several blocks without getting short of breath or chest pains  She can also climb flight of stairs without getting any chest pains or being short of breath  Thus, cardiovascular and pulmonary wise, patient is at acceptable risk for the intermediate risk procedure  However, patient needs her sodium and bicarbonate level to be optimized 1st prior to the procedure  Thus, patient needs a preoperative nephrology evaluation 1st prior to the surgery  This was explained to the patient and she is okay with these plans  · Pain control  · Eventual PT/OT evaluation and management  Low bicarbonate   Assessment & Plan    · A dose of sodium bicarb was given by Nephrology  · Monitor  Hyponatremia   Assessment & Plan    · Patient has acute on chronic hyponatremia  Patient follows with [facility] from [address]  · According to nephrologist, 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  · Patient's sodium further decrease with IV fluids with normal saline  · Nephrologist now on board  · Patient's for fluid restriction    · Lasix 20 mg IV x1 will be given  · Sodium bicarbonate given  · Monitor BMP  Anemia   Assessment & Plan    · From my review of patient's previous CBC, likely chronic  · Drop today may be hemodilution from IV fluids  · No active bleeding at this point  · Monitor  · For further workup management if this worsens significantly  Essential hypertension   Assessment & Plan    · BP acceptable   · Continue metoprolol            VTE Pharmacologic Prophylaxis:   Pharmacologic: Enoxaparin (Lovenox)  Mechanical VTE Prophylaxis in Place: Yes    Patient Centered Rounds: I have performed bedside rounds with nursing staff today  Discussions with Specialists or Other Care Team Provider:  Case management  Nephrologist     Education and Discussions with Family / Patient:  Patient  I spoke to the patient's stepdaughter and discussed with her our findings and plans  I answered questions and concerns  According to my discussion with the patient's Gabriela Lopez, our physician assistant, patient has chronic history of tremors due to history of alcoholism  Time Spent for Care: 30 minutes  More than 50% of total time spent on counseling and coordination of care as described above  Current Length of Stay: 1 day(s)    Current Patient Status: Inpatient   Certification Statement: The patient will continue to require additional inpatient hospital stay due to Above findings and plans  Discharge Plan:  None yet  Code Status: Level 1 - Full Code      Subjective:   Patient is doing fine  No complaints except for pains on her left hip  No other pains  No shortness of breath  Objective:     Vitals:   Temp (24hrs), Av °F (36 7 °C), Min:97 5 °F (36 4 °C), Max:98 6 °F (37 °C)    Temp:  [97 5 °F (36 4 °C)-98 6 °F (37 °C)] 98 6 °F (37 °C)  HR:  [61-92] 69  Resp:  [16-20] 18  BP: (103-177)/(58-81) 140/68  SpO2:  [97 %-99 %] 98 %  Body mass index is 24 52 kg/m²       Input and Output Summary (last 24 hours): Intake/Output Summary (Last 24 hours) at 11/16/18 1705  Last data filed at 11/16/18 0654   Gross per 24 hour   Intake                0 ml   Output             1050 ml   Net            -1050 ml       Physical Exam:     Physical Exam   Constitutional: No distress  HENT:   Head: Normocephalic and atraumatic  Eyes: Right eye exhibits no discharge  Left eye exhibits no discharge  No scleral icterus  Neck: No JVD present  No tracheal deviation present  Cardiovascular: Normal rate, regular rhythm and normal heart sounds  Exam reveals no gallop and no friction rub  No murmur heard  Pulmonary/Chest: Effort normal and breath sounds normal  No stridor  No respiratory distress  She has no wheezes  She has no rales  Abdominal: Soft  Bowel sounds are normal  She exhibits no distension  There is no tenderness  There is no rebound and no guarding  Musculoskeletal: She exhibits tenderness  She exhibits no edema  Left leg is shortened and externally rotated  Positive for tenderness at the left hip area  No surrounding hematoma or ecchymosis noted  Neurological: She is alert  No cranial nerve deficit  Skin: Skin is warm  No rash noted  She is not diaphoretic  No erythema  No pallor  Psychiatric: She has a normal mood and affect  Her behavior is normal  Thought content normal    Vitals reviewed  Additional Data:     Labs:      Results from last 7 days  Lab Units 11/16/18  1017 11/16/18  0621  11/15/18  1907   WBC Thousand/uL  --  4 93  --  5 43   HEMOGLOBIN g/dL 9 0* 9 2*  --  11 1*   HEMATOCRIT % 25 9* 26 1*  --  31 8*   PLATELETS Thousands/uL  --  115*  < > 144*   NEUTROS PCT %  --   --   --  44   LYMPHS PCT %  --   --   --  46*   MONOS PCT %  --   --   --  7   EOS PCT %  --   --   --  2   < > = values in this interval not displayed      Results from last 7 days  Lab Units 11/16/18  1344   POTASSIUM mmol/L 3 7   CHLORIDE mmol/L 94*   CO2 mmol/L 24   BUN mg/dL 5   CREATININE mg/dL 0 59* CALCIUM mg/dL 8 0*       Results from last 7 days  Lab Units 11/15/18  1907   INR  1 15       * I Have Reviewed All Lab Data Listed Above  * Additional Pertinent Lab Tests Reviewed: Chloe 66 Admission Reviewed    Imaging:    Imaging Reports Reviewed Today Include:  Diagnostic imaging studies that were done on this admission  Imaging Personally Reviewed by Myself Includes:  None  Recent Cultures (last 7 days):           Last 24 Hours Medication List:     Current Facility-Administered Medications:  acetaminophen 650 mg Oral Q8H [address] Julio SANDIP Rutledge, PA-SAWYER   enoxaparin 40 mg Subcutaneous Daily Julio Garcia, PA-C   lidocaine 1 patch Topical Daily Julio Garcia, PA-C   methocarbamol 500 mg Oral Q6H PRN Julio Garcia, PA-C   metoprolol tartrate 50 mg Oral BID Julio Garcia, PA-C   morphine 15 mg Oral Q4H PRN Julio Garcia, PA-C   morphine injection 4 mg Intravenous Q4H PRN NAVJOT Bronson-C   ondansetron 4 mg Intravenous Q6H PRN Julio Garcia, PA-C   thiamine 100 mg Oral Daily Julio Rutledge, PA-C   traMADol 50 mg Oral Q6H PRN NAVJOT Lua-SAWYER        Today, Patient Was Seen By: Michelle Medley MD    ** Please Note: Dragon 360 Dictation voice to text software may have been used in the creation of this document   **

## 2018-11-16 NOTE — H&P
[facility]  H&P- [place] 1955, 61 y o  female MRN: [id number]    Unit/Bed#: -01 Encounter: [id number]    Primary Care Provider: Citlali Epps MD   Date and time admitted to hospital: 11/15/2018  6:25 PM        * Closed comminuted fracture of hip, left, initial encounter [facility])   Assessment & Plan    · POA, secondary to mechanical fall today  Neurovascularly intact at this time  Ortho aware of case  EKG and CXR normal   · Admit patient to med/surg under inpatient status  · Consult ortho for surgical repair  · Will need PT and OT eval status post   · Analgesia  · Tylenol 975 mg PO Q8   · Robaxin 500 mg PO Q6 PRN  · Lidoderm  · Ice  · Tramadol 50 mg PO Q6 PRN moderate pain   · Morphine 15 mg PO Q4 PRN severe pain  · Morphine 4 mg IV Q4 PRN breakthrough pain   · NPO after midnight     Hyponatremia   Assessment & Plan    · POA, noted to be 127  Unclear etiology, possibly due to poor intake versus SIADH from pain response due to hip fracture  Has been hyponatremic in the past  · Start fluids  cc/hr  · Check Na at 0300 and in AM   · Check urine and serum osmolality   · Check urine Na+  · May need to consider nephrology consultation and salt tablets if fails to correct appropriately      Essential hypertension   Assessment & Plan    · BP acceptable   · Continue metoprolol        VTE Prophylaxis: Enoxaparin (Lovenox)  / sequential compression device   Code Status: Full Code   POLST: POLST form is not discussed and not completed at this time  Discussion with family: Patient will update family     Anticipated Length of Stay:  Patient will be admitted on an Inpatient basis with an anticipated length of stay of  Greater than 2 midnights  Justification for Hospital Stay: Left hip fracture needing surgical repair, hyponatremia    Total Time for Visit, including Counseling / Coordination of Care: 1 hour    Greater than 50% of this total time spent on direct patient counseling and coordination of care  Chief Complaint:   Fall    History of Present Illness:    Avery Valdes is a 61 y o  female with a history of HTN, hyponatremia who presents with a left hip fracture  Patient reports that she was taking off her snow boots today and that she slipped and fell on her left side  She denies hitting her head or losing consciousness  She reports that she didn't feel a crack or pop, but states that she was unable to move after it happened  She states that her hip will hurt if she tries to move it or bear weight  She denies numbness, tingling, or weakness  Denies taking blood thinners  Denies recent alcohol consumption  Denies changes in amount of food or fluids she is taking in  Review of Systems:    Review of Systems   Constitutional: Negative for appetite change, chills, diaphoresis, fatigue and fever  HENT: Negative for congestion, rhinorrhea and sore throat  Eyes: Negative for visual disturbance  Respiratory: Negative for cough, chest tightness, shortness of breath and wheezing  Cardiovascular: Negative for chest pain, palpitations and leg swelling  Gastrointestinal: Negative for abdominal pain, constipation, diarrhea, nausea and vomiting  Genitourinary: Negative for dysuria  Musculoskeletal: Positive for arthralgias and gait problem  Negative for myalgias  Neurological: Negative for dizziness, syncope, weakness, light-headedness, numbness and headaches  All other systems reviewed and are negative  Past Medical and Surgical History:     Past Medical History:   Diagnosis Date    Hypertension     Hyponatremia        History reviewed  No pertinent surgical history  Meds/Allergies:    Prior to Admission medications    Medication Sig Start Date End Date Taking? Authorizing Provider   Calcium Carb-Cholecalciferol (CALCIUM + D3 PO) Take 200 Units by mouth daily     Yes Historical Provider, MD   Cholecalciferol (VITAMIN D3) 3000 UNITS TABS Take 100 Units by mouth daily  Yes Historical Provider, MD   magnesium 30 MG tablet Take 800 mg by mouth daily  Yes Historical Provider, MD   metoprolol tartrate (LOPRESSOR) 25 mg tablet Take 50 mg by mouth 2 (two) times a day     Yes Historical Provider, MD   Omega-3 Fatty Acids (FISH OIL) 1,000 mg Take 1,000 mg by mouth daily  Yes Historical Provider, MD   methylprednisolone (MEDROL) 4 mg tablet Dosepack (follow instructions on packaging)  All doses PO, 6 tabs/day1, 5 tabs/day 2, 4 tabs/day3, 3 tabs/day4, 2 tabs/day5, 1 tab/day6 6/28/17   Grant Boucher MD   thiamine 100 MG tablet Take 100 mg by mouth daily  Historical Provider, MD     I have reviewed home medications with patient personally  Allergies: Allergies   Allergen Reactions    Demerol [Meperidine] Other (See Comments)     hallucinations    Diphenhydramine Other (See Comments)     hallucinations    Sulfa Antibiotics Other (See Comments)     hallucinations    Percocet [Oxycodone-Acetaminophen] Palpitations       Social History:     Marital Status: /Civil Union   Occupation: Noncontributory   Patient Pre-hospital Living Situation: Home with   Patient Pre-hospital Level of Mobility: Full  Patient Pre-hospital Diet Restrictions: None  Substance Use History:   History   Alcohol Use    0 6 oz/week    1 Cans of beer per week     Comment: 2-3 times per week   Drinks heavier before     History   Smoking Status    Former Smoker   Smokeless Tobacco    Never Used     History   Drug Use No       Family History:    Family History   Problem Relation Age of Onset    Heart disease Mother     Heart disease Father        Physical Exam:     Vitals:   Blood Pressure: 157/63 (11/15/18 1900)  Pulse: 87 (11/15/18 1900)  Temperature: 97 5 °F (36 4 °C) (11/15/18 1832)  Temp Source: Oral (11/15/18 1832)  Respirations: 16 (11/15/18 1900)  Weight - Scale: 63 kg (138 lb 14 2 oz) (11/15/18 1832)  SpO2: 98 % (11/15/18 1900)    Physical Exam   Constitutional: She is oriented to person, place, and time  Vital signs are normal  She appears well-developed and well-nourished  Non-toxic appearance  She appears distressed (Mild)  HENT:   Head: Normocephalic and atraumatic  Mouth/Throat: Mucous membranes are not dry  Eyes: Pupils are equal, round, and reactive to light  Conjunctivae and EOM are normal  No scleral icterus  Pupils are equal    Neck: Neck supple  Cardiovascular: Normal rate, regular rhythm, S1 normal, S2 normal, normal heart sounds and intact distal pulses  Exam reveals no S3 and no S4  No murmur heard  Pulses:       Dorsalis pedis pulses are 2+ on the right side, and 2+ on the left side  Pulmonary/Chest: Effort normal and breath sounds normal  No accessory muscle usage  No respiratory distress  She has no decreased breath sounds  She has no wheezes  She has no rhonchi  She has no rales  She exhibits no tenderness  Abdominal: Soft  Bowel sounds are normal  She exhibits no distension and no mass  There is no tenderness  There is no rigidity, no rebound and no guarding  Musculoskeletal:        Left hip: She exhibits decreased range of motion and deformity  Left leg is shortened and externally rotated     Neurological: She is alert and oriented to person, place, and time  She is not disoriented  GCS eye subscore is 4  GCS verbal subscore is 5  GCS motor subscore is 6  Skin: Skin is warm and dry  (     Additional Data:     Lab Results: I have personally reviewed pertinent reports          Results from last 7 days  Lab Units 11/15/18  1907   WBC Thousand/uL 5 43   HEMOGLOBIN g/dL 11 1*   HEMATOCRIT % 31 8*   PLATELETS Thousands/uL 144*   NEUTROS PCT % 44   LYMPHS PCT % 46*   MONOS PCT % 7   EOS PCT % 2       Results from last 7 days  Lab Units 11/15/18  1907   SODIUM mmol/L 127*   POTASSIUM mmol/L 3 6   CHLORIDE mmol/L 91*   CO2 mmol/L 24   BUN mg/dL 6   CREATININE mg/dL 0 68   ANION GAP mmol/L 12   CALCIUM mg/dL 8 6   GLUCOSE RANDOM mg/dL 122 Results from last 7 days  Lab Units 11/15/18  1907   INR  1 15                   Imaging: I have personally reviewed pertinent reports  XR chest 1 view   Final Result by Bertha Powell MD (11/15 1905)      No active disease in the chest             Workstation performed: [id number]         XR hip/pelv 2-3 vws left   Final Result by Bertha Powell MD (11/15 1904)      Comminuted intratrochanteric fracture of the left femur  Workstation performed: [id number]             EKG, Pathology, and Other Studies Reviewed on Admission:   · EKG: NSR, 72 BPM  · CXR: No acute pulmonary disease   · XR hip/pelvis left: comminuted intratrochanteric fracture of the left femur     Allscripts / Epic Records Reviewed: Yes     ** Please Note: This note has been constructed using a voice recognition system   **

## 2018-11-16 NOTE — ASSESSMENT & PLAN NOTE
· POA, noted to be 127  Unclear etiology, possibly due to poor intake versus SIADH from pain response due to hip fracture   Has been hyponatremic in the past  · Start fluids  cc/hr  · Check Na at 0300 and in AM   · Check urine and serum osmolality   · Check urine Na+  · May need to consider nephrology consultation and salt tablets if fails to correct appropriately

## 2018-11-17 ENCOUNTER — ANESTHESIA (INPATIENT)
Dept: PERIOP | Facility: HOSPITAL | Age: 63
DRG: 481 | End: 2018-11-17
Payer: COMMERCIAL

## 2018-11-17 ENCOUNTER — APPOINTMENT (INPATIENT)
Dept: RADIOLOGY | Facility: HOSPITAL | Age: 63
DRG: 481 | End: 2018-11-17
Payer: COMMERCIAL

## 2018-11-17 PROBLEM — D69.6 THROMBOCYTOPENIA (HCC): Chronic | Status: ACTIVE | Noted: 2018-11-17

## 2018-11-17 LAB
ABO GROUP BLD BPU: NORMAL
ANION GAP SERPL CALCULATED.3IONS-SCNC: 8 MMOL/L (ref 4–13)
BPU ID: NORMAL
BUN SERPL-MCNC: 6 MG/DL (ref 5–25)
CALCIUM SERPL-MCNC: 8.6 MG/DL (ref 8.3–10.1)
CHLORIDE SERPL-SCNC: 95 MMOL/L (ref 100–108)
CO2 SERPL-SCNC: 28 MMOL/L (ref 21–32)
CREAT SERPL-MCNC: 0.79 MG/DL (ref 0.6–1.3)
CROSSMATCH: NORMAL
ERYTHROCYTE [DISTWIDTH] IN BLOOD BY AUTOMATED COUNT: 14.4 % (ref 11.6–15.1)
GFR SERPL CREATININE-BSD FRML MDRD: 80 ML/MIN/1.73SQ M
GLUCOSE SERPL-MCNC: 89 MG/DL (ref 65–140)
HCT VFR BLD AUTO: 31.9 % (ref 34.8–46.1)
HGB BLD-MCNC: 11.1 G/DL (ref 11.5–15.4)
MCH RBC QN AUTO: 30.4 PG (ref 26.8–34.3)
MCHC RBC AUTO-ENTMCNC: 34.8 G/DL (ref 31.4–37.4)
MCV RBC AUTO: 87 FL (ref 82–98)
PLATELET # BLD AUTO: 106 THOUSANDS/UL (ref 149–390)
PMV BLD AUTO: 9.5 FL (ref 8.9–12.7)
POTASSIUM SERPL-SCNC: 4.3 MMOL/L (ref 3.5–5.3)
RBC # BLD AUTO: 3.65 MILLION/UL (ref 3.81–5.12)
SODIUM SERPL-SCNC: 131 MMOL/L (ref 136–145)
UNIT DISPENSE STATUS: NORMAL
UNIT PRODUCT CODE: NORMAL
UNIT RH: NORMAL
WBC # BLD AUTO: 4.91 THOUSAND/UL (ref 4.31–10.16)

## 2018-11-17 PROCEDURE — C1713 ANCHOR/SCREW BN/BN,TIS/BN: HCPCS | Performed by: SURGERY

## 2018-11-17 PROCEDURE — 73552 X-RAY EXAM OF FEMUR 2/>: CPT

## 2018-11-17 PROCEDURE — C1769 GUIDE WIRE: HCPCS | Performed by: SURGERY

## 2018-11-17 PROCEDURE — 85027 COMPLETE CBC AUTOMATED: CPT | Performed by: INTERNAL MEDICINE

## 2018-11-17 PROCEDURE — 99232 SBSQ HOSP IP/OBS MODERATE 35: CPT | Performed by: INTERNAL MEDICINE

## 2018-11-17 PROCEDURE — 80048 BASIC METABOLIC PNL TOTAL CA: CPT | Performed by: INTERNAL MEDICINE

## 2018-11-17 PROCEDURE — 27245 TREAT THIGH FRACTURE: CPT | Performed by: PHYSICIAN ASSISTANT

## 2018-11-17 PROCEDURE — 27245 TREAT THIGH FRACTURE: CPT | Performed by: SURGERY

## 2018-11-17 PROCEDURE — 0QS736Z REPOSITION LEFT UPPER FEMUR WITH INTRAMEDULLARY INTERNAL FIXATION DEVICE, PERCUTANEOUS APPROACH: ICD-10-PCS | Performed by: SURGERY

## 2018-11-17 DEVICE — 11MM/130 DEG TI CANN TROCH FIXATION NAIL 340MM/LEFT-STER: Type: IMPLANTABLE DEVICE | Site: LEG | Status: FUNCTIONAL

## 2018-11-17 DEVICE — 11.0MM TI HELICAL BLADE 100MM-STERILE: Type: IMPLANTABLE DEVICE | Site: LEG | Status: FUNCTIONAL

## 2018-11-17 RX ORDER — EPHEDRINE SULFATE 50 MG/ML
INJECTION, SOLUTION INTRAVENOUS AS NEEDED
Status: DISCONTINUED | OUTPATIENT
Start: 2018-11-17 | End: 2018-11-17 | Stop reason: SURG

## 2018-11-17 RX ORDER — ROCURONIUM BROMIDE 10 MG/ML
INJECTION, SOLUTION INTRAVENOUS AS NEEDED
Status: DISCONTINUED | OUTPATIENT
Start: 2018-11-17 | End: 2018-11-17 | Stop reason: SURG

## 2018-11-17 RX ORDER — CALCIUM CARBONATE 200(500)MG
1000 TABLET,CHEWABLE ORAL DAILY PRN
Status: DISCONTINUED | OUTPATIENT
Start: 2018-11-17 | End: 2018-11-21 | Stop reason: HOSPADM

## 2018-11-17 RX ORDER — MAGNESIUM HYDROXIDE 1200 MG/15ML
LIQUID ORAL AS NEEDED
Status: DISCONTINUED | OUTPATIENT
Start: 2018-11-17 | End: 2018-11-17 | Stop reason: HOSPADM

## 2018-11-17 RX ORDER — ACETAMINOPHEN 325 MG/1
650 TABLET ORAL EVERY 6 HOURS PRN
Status: DISCONTINUED | OUTPATIENT
Start: 2018-11-17 | End: 2018-11-17 | Stop reason: SDUPTHER

## 2018-11-17 RX ORDER — FENTANYL CITRATE/PF 50 MCG/ML
50 SYRINGE (ML) INJECTION
Status: DISCONTINUED | OUTPATIENT
Start: 2018-11-17 | End: 2018-11-17 | Stop reason: HOSPADM

## 2018-11-17 RX ORDER — ONDANSETRON 2 MG/ML
4 INJECTION INTRAMUSCULAR; INTRAVENOUS EVERY 6 HOURS PRN
Status: DISCONTINUED | OUTPATIENT
Start: 2018-11-17 | End: 2018-11-21 | Stop reason: HOSPADM

## 2018-11-17 RX ORDER — SENNOSIDES 8.6 MG
1 TABLET ORAL DAILY
Status: DISCONTINUED | OUTPATIENT
Start: 2018-11-18 | End: 2018-11-21 | Stop reason: HOSPADM

## 2018-11-17 RX ORDER — CEFAZOLIN SODIUM 1 G/3ML
INJECTION, POWDER, FOR SOLUTION INTRAMUSCULAR; INTRAVENOUS AS NEEDED
Status: DISCONTINUED | OUTPATIENT
Start: 2018-11-17 | End: 2018-11-17 | Stop reason: SURG

## 2018-11-17 RX ORDER — PROMETHAZINE HYDROCHLORIDE 25 MG/ML
6.25 INJECTION, SOLUTION INTRAMUSCULAR; INTRAVENOUS AS NEEDED
Status: DISCONTINUED | OUTPATIENT
Start: 2018-11-17 | End: 2018-11-17 | Stop reason: HOSPADM

## 2018-11-17 RX ORDER — PROPOFOL 10 MG/ML
INJECTION, EMULSION INTRAVENOUS AS NEEDED
Status: DISCONTINUED | OUTPATIENT
Start: 2018-11-17 | End: 2018-11-17 | Stop reason: SURG

## 2018-11-17 RX ORDER — PANTOPRAZOLE SODIUM 40 MG/1
40 TABLET, DELAYED RELEASE ORAL DAILY
Status: DISCONTINUED | OUTPATIENT
Start: 2018-11-18 | End: 2018-11-21 | Stop reason: HOSPADM

## 2018-11-17 RX ORDER — SODIUM CHLORIDE, SODIUM LACTATE, POTASSIUM CHLORIDE, CALCIUM CHLORIDE 600; 310; 30; 20 MG/100ML; MG/100ML; MG/100ML; MG/100ML
INJECTION, SOLUTION INTRAVENOUS CONTINUOUS PRN
Status: DISCONTINUED | OUTPATIENT
Start: 2018-11-17 | End: 2018-11-17 | Stop reason: SURG

## 2018-11-17 RX ORDER — ALBUMIN, HUMAN INJ 5% 5 %
SOLUTION INTRAVENOUS CONTINUOUS PRN
Status: DISCONTINUED | OUTPATIENT
Start: 2018-11-17 | End: 2018-11-17 | Stop reason: SURG

## 2018-11-17 RX ORDER — LIDOCAINE HYDROCHLORIDE 10 MG/ML
INJECTION, SOLUTION INFILTRATION; PERINEURAL AS NEEDED
Status: DISCONTINUED | OUTPATIENT
Start: 2018-11-17 | End: 2018-11-17 | Stop reason: SURG

## 2018-11-17 RX ORDER — SODIUM CHLORIDE, SODIUM LACTATE, POTASSIUM CHLORIDE, CALCIUM CHLORIDE 600; 310; 30; 20 MG/100ML; MG/100ML; MG/100ML; MG/100ML
75 INJECTION, SOLUTION INTRAVENOUS CONTINUOUS
Status: DISCONTINUED | OUTPATIENT
Start: 2018-11-17 | End: 2018-11-18

## 2018-11-17 RX ORDER — ONDANSETRON 2 MG/ML
INJECTION INTRAMUSCULAR; INTRAVENOUS AS NEEDED
Status: DISCONTINUED | OUTPATIENT
Start: 2018-11-17 | End: 2018-11-17 | Stop reason: SURG

## 2018-11-17 RX ORDER — METOCLOPRAMIDE HYDROCHLORIDE 5 MG/ML
10 INJECTION INTRAMUSCULAR; INTRAVENOUS ONCE AS NEEDED
Status: DISCONTINUED | OUTPATIENT
Start: 2018-11-17 | End: 2018-11-17 | Stop reason: HOSPADM

## 2018-11-17 RX ORDER — HYDROMORPHONE HCL/PF 1 MG/ML
0.25 SYRINGE (ML) INJECTION
Status: DISCONTINUED | OUTPATIENT
Start: 2018-11-17 | End: 2018-11-17 | Stop reason: HOSPADM

## 2018-11-17 RX ORDER — FENTANYL CITRATE 50 UG/ML
INJECTION, SOLUTION INTRAMUSCULAR; INTRAVENOUS AS NEEDED
Status: DISCONTINUED | OUTPATIENT
Start: 2018-11-17 | End: 2018-11-17 | Stop reason: SURG

## 2018-11-17 RX ORDER — SODIUM CHLORIDE 9 MG/ML
70 INJECTION, SOLUTION INTRAVENOUS CONTINUOUS
Status: DISCONTINUED | OUTPATIENT
Start: 2018-11-17 | End: 2018-11-18

## 2018-11-17 RX ORDER — DOCUSATE SODIUM 100 MG/1
100 CAPSULE, LIQUID FILLED ORAL 2 TIMES DAILY
Status: DISCONTINUED | OUTPATIENT
Start: 2018-11-17 | End: 2018-11-21 | Stop reason: HOSPADM

## 2018-11-17 RX ORDER — GLYCOPYRROLATE 0.2 MG/ML
INJECTION INTRAMUSCULAR; INTRAVENOUS AS NEEDED
Status: DISCONTINUED | OUTPATIENT
Start: 2018-11-17 | End: 2018-11-17 | Stop reason: SURG

## 2018-11-17 RX ADMIN — EPHEDRINE SULFATE 5 MG: 50 INJECTION, SOLUTION INTRAMUSCULAR; INTRAVENOUS; SUBCUTANEOUS at 17:32

## 2018-11-17 RX ADMIN — LIDOCAINE 1 PATCH: 50 PATCH CUTANEOUS at 08:40

## 2018-11-17 RX ADMIN — NEOSTIGMINE METHYLSULFATE 1 MG: 1 INJECTION, SOLUTION INTRAMUSCULAR; INTRAVENOUS; SUBCUTANEOUS at 19:01

## 2018-11-17 RX ADMIN — EPHEDRINE SULFATE 5 MG: 50 INJECTION, SOLUTION INTRAMUSCULAR; INTRAVENOUS; SUBCUTANEOUS at 18:35

## 2018-11-17 RX ADMIN — SODIUM CHLORIDE 70 ML/HR: 0.9 INJECTION, SOLUTION INTRAVENOUS at 14:02

## 2018-11-17 RX ADMIN — EPHEDRINE SULFATE 5 MG: 50 INJECTION, SOLUTION INTRAMUSCULAR; INTRAVENOUS; SUBCUTANEOUS at 18:57

## 2018-11-17 RX ADMIN — METOPROLOL TARTRATE 50 MG: 50 TABLET, FILM COATED ORAL at 08:40

## 2018-11-17 RX ADMIN — FENTANYL CITRATE 25 MCG: 50 INJECTION INTRAMUSCULAR; INTRAVENOUS at 18:57

## 2018-11-17 RX ADMIN — ONDANSETRON 4 MG: 2 INJECTION INTRAMUSCULAR; INTRAVENOUS at 13:54

## 2018-11-17 RX ADMIN — SODIUM CHLORIDE, SODIUM LACTATE, POTASSIUM CHLORIDE, AND CALCIUM CHLORIDE: .6; .31; .03; .02 INJECTION, SOLUTION INTRAVENOUS at 16:19

## 2018-11-17 RX ADMIN — ROCURONIUM BROMIDE 40 MG: 10 INJECTION INTRAVENOUS at 16:23

## 2018-11-17 RX ADMIN — LIDOCAINE HYDROCHLORIDE 50 MG: 10 INJECTION, SOLUTION INFILTRATION; PERINEURAL at 16:23

## 2018-11-17 RX ADMIN — TRAMADOL HYDROCHLORIDE 50 MG: 50 TABLET, COATED ORAL at 14:43

## 2018-11-17 RX ADMIN — Medication 100 MG: at 08:40

## 2018-11-17 RX ADMIN — ALBUMIN (HUMAN): 12.5 SOLUTION INTRAVENOUS at 18:37

## 2018-11-17 RX ADMIN — SODIUM CHLORIDE, SODIUM LACTATE, POTASSIUM CHLORIDE, AND CALCIUM CHLORIDE: .6; .31; .03; .02 INJECTION, SOLUTION INTRAVENOUS at 18:37

## 2018-11-17 RX ADMIN — FENTANYL CITRATE 25 MCG: 50 INJECTION INTRAMUSCULAR; INTRAVENOUS at 19:01

## 2018-11-17 RX ADMIN — GLYCOPYRROLATE 0.1 MG: 0.2 INJECTION, SOLUTION INTRAMUSCULAR; INTRAVENOUS at 19:01

## 2018-11-17 RX ADMIN — PROPOFOL 120 MG: 10 INJECTION, EMULSION INTRAVENOUS at 16:23

## 2018-11-17 RX ADMIN — MORPHINE SULFATE 4 MG: 4 INJECTION INTRAVENOUS at 13:54

## 2018-11-17 RX ADMIN — ACETAMINOPHEN 650 MG: 325 TABLET ORAL at 13:52

## 2018-11-17 RX ADMIN — DOCUSATE SODIUM 100 MG: 100 CAPSULE, LIQUID FILLED ORAL at 21:20

## 2018-11-17 RX ADMIN — ONDANSETRON 4 MG: 2 INJECTION INTRAMUSCULAR; INTRAVENOUS at 18:54

## 2018-11-17 RX ADMIN — FENTANYL CITRATE 50 MCG: 50 INJECTION INTRAMUSCULAR; INTRAVENOUS at 16:21

## 2018-11-17 RX ADMIN — CEFAZOLIN SODIUM 2000 MG: 1 INJECTION, POWDER, FOR SOLUTION INTRAMUSCULAR; INTRAVENOUS at 16:32

## 2018-11-17 RX ADMIN — ACETAMINOPHEN 650 MG: 325 TABLET ORAL at 21:20

## 2018-11-17 RX ADMIN — EPHEDRINE SULFATE 5 MG: 50 INJECTION, SOLUTION INTRAMUSCULAR; INTRAVENOUS; SUBCUTANEOUS at 18:26

## 2018-11-17 NOTE — ANESTHESIA PREPROCEDURE EVALUATION
Review of Systems/Medical History  Patient summary reviewed  Chart reviewed  No history of anesthetic complications     Cardiovascular  Exercise tolerance (METS): >4,  Hypertension ,    Pulmonary  Negative pulmonary ROS        GI/Hepatic  Negative GI/hepatic ROS          Negative  ROS        Endo/Other    Comment: Chronic hyponatremia; corrected to 131 this am    GYN  Negative gynecology ROS          Hematology  Anemia (received 1 unit RBCs yesterday) ,  Thrombocytopenia,    Musculoskeletal    Comment: Left femur fracture after mechanical fall      Neurology  Negative neurology ROS      Psychology   Negative psychology ROS            Physical Exam    Airway    Mallampati score: II  TM Distance: >3 FB  Neck ROM: full     Dental   No notable dental hx     Cardiovascular      Pulmonary      Other Findings       Lab Results   Component Value Date    WBC 4 91 11/17/2018    HGB 11 1 (L) 11/17/2018     (L) 11/17/2018     Lab Results   Component Value Date    K 4 3 11/17/2018    BUN 6 11/17/2018    CREATININE 0 79 11/17/2018    GLUCOSE 96 03/08/2016    this am    Lab Results   Component Value Date    PTT 33 11/15/2018      Lab Results   Component Value Date    INR 1 15 11/15/2018     Blood type O+/antibody neg  T&C x 1 unit ordered    Lab Results   Component Value Date    HGBA1C 5 4 10/26/2016     Anesthesia Plan  ASA Score- 2     Anesthesia Type- general with ASA Monitors  Additional Monitors:   Airway Plan: ETT  Comment: Possible transfusion discussed  Plan Factors-    Induction- intravenous  Postoperative Plan-     Informed Consent- Anesthetic plan and risks discussed with patient  I personally reviewed this patient with the CRNA  Discussed and agreed on the Anesthesia Plan with the CRNA  Dilip Clark

## 2018-11-17 NOTE — PROGRESS NOTES
NEPHROLOGY PROGRESS NOTE    Janette Booth 61 y o  female MRN: [id number]  Unit/Bed#: -01 Encounter: [id number]  Reason for Consult:  Hyponatremia    The patient is awake and alert feels okay clinically except she has some pain in her hip region  She is anxious about surgery as well but other than that offers no complaints  ASSESSMENT/PLAN:  1  Hyponatremia    The patient apparently has a history of hyponatremia was admitted to the hospital with a left inter trochanteric fracture  Surgery was held for hyponatremia in the level has improved  At this point patient has clearance to undergo surgery and is scheduled for this afternoon  Creatinine is normal     Will start normal saline 75 cc/hour  Monitor urine output and electrolytes  Follow BMP for worsening of hyponatremia if it is related to SIADH  If that is the case will dose with Lasix postoperatively  Stable to undergo surgery from renal standpoint  SUBJECTIVE:  Review of Systems   Constitution: Negative for chills and fever  HENT: Negative  Eyes: Negative  Cardiovascular: Negative  Negative for chest pain, leg swelling and orthopnea  Musculoskeletal:        Pain in left hip region  Gastrointestinal: Negative  Negative for bloating, abdominal pain, diarrhea and vomiting  Genitourinary: Davies catheter in  Neurological: Negative  OBJECTIVE:  Current Weight: Weight - Scale: 64 8 kg (142 lb 13 7 oz)  Vitals:Temp (24hrs), Av 3 °F (36 8 °C), Min:97 9 °F (36 6 °C), Max:98 7 °F (37 1 °C)  Current: Temperature: 98 7 °F (37 1 °C)   Blood pressure 158/72, pulse 74, temperature 98 7 °F (37 1 °C), temperature source Oral, resp  rate 16, height 5' 4" (1 626 m), weight 64 8 kg (142 lb 13 7 oz), SpO2 97 %  , Body mass index is 24 52 kg/m²        Intake/Output Summary (Last 24 hours) at 18 1145  Last data filed at 18 0436   Gross per 24 hour   Intake                0 ml   Output              650 ml   Net -650 ml       Physical Exam: /72 (BP Location: Right arm)   Pulse 74   Temp 98 7 °F (37 1 °C) (Oral)   Resp 16   Ht 5' 4" (1 626 m)   Wt 64 8 kg (142 lb 13 7 oz)   SpO2 97%   BMI 24 52 kg/m²   Physical Exam   Constitutional: She is oriented to person, place, and time  No distress  HENT:   Mouth/Throat: No oropharyngeal exudate  Eyes: No scleral icterus  Neck: Neck supple  No JVD present  Cardiovascular: Normal rate and regular rhythm  Exam reveals no friction rub  Pulmonary/Chest: Effort normal and breath sounds normal  No respiratory distress  She has no wheezes  She has no rales  Abdominal: Soft  Bowel sounds are normal  She exhibits no distension  There is no tenderness  There is no rebound  Neurological: She is alert and oriented to person, place, and time         Medications:    Current Facility-Administered Medications:     acetaminophen (TYLENOL) tablet 650 mg, 650 mg, Oral, Q8H [address], NAVJOT Bronson-C, 650 mg at 11/16/18 2110    enoxaparin (LOVENOX) subcutaneous injection 40 mg, 40 mg, Subcutaneous, Daily, NAVJOT Bronson-C, Stopped at 11/17/18 0842    lidocaine (LIDODERM) 5 % patch 1 patch, 1 patch, Topical, Daily, Julio Rutledge PA-C, 1 patch at 11/17/18 0840    methocarbamol (ROBAXIN) tablet 500 mg, 500 mg, Oral, Q6H PRN, Shant Montes, PA-C, 500 mg at 11/16/18 2110    metoprolol tartrate (LOPRESSOR) tablet 50 mg, 50 mg, Oral, BID, Julio Rutledge PA-C, 50 mg at 11/17/18 0840    morphine (MSIR) IR tablet 15 mg, 15 mg, Oral, Q4H PRN, Julio Rutledge PA-C, 15 mg at 11/16/18 1650    morphine (PF) 4 mg/mL injection 4 mg, 4 mg, Intravenous, Q4H PRN, Julio Rutledge PA-C, 4 mg at 11/16/18 1921    ondansetron (ZOFRAN) injection 4 mg, 4 mg, Intravenous, Q6H PRN, Julio Rutledge PA-C, 4 mg at 11/16/18 1653    thiamine (VITAMIN B1) tablet 100 mg, 100 mg, Oral, Daily, Julio Rutledge PA-C, 100 mg at 11/17/18 0840    traMADol (ULTRAM) tablet 50 mg, 50 mg, Oral, Q6H PRN, Julio OROPEZA Tino Darden PA-C    Laboratory Results:  Lab Results   Component Value Date    WBC 4 91 11/17/2018    HGB 11 1 (L) 11/17/2018    HCT 31 9 (L) 11/17/2018    MCV 87 11/17/2018     (L) 11/17/2018     Lab Results   Component Value Date    GLUCOSE 96 03/08/2016    CALCIUM 8 6 11/17/2018    K 4 3 11/17/2018    CO2 28 11/17/2018    CL 95 (L) 11/17/2018    BUN 6 11/17/2018    CREATININE 0 79 11/17/2018     Lab Results   Component Value Date    CALCIUM 8 6 11/17/2018    PHOS 3 1 03/11/2016     No results found for: LABPROT

## 2018-11-17 NOTE — ASSESSMENT & PLAN NOTE
· Improved significantly (back at her baseline at 131)  · Patient has acute on chronic hyponatremia  Patient follows with [facility], from [address]  · According to nephrologist, 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  · Patient's sodium further decrease with IV fluids with normal saline  · Nephrologist on board  He is okay with the surgery procedure today  · Patient's for fluid restriction  · Lasix 20 mg IV x1 was given  · Sodium bicarbonate given  · Monitor BMP

## 2018-11-17 NOTE — ASSESSMENT & PLAN NOTE
· Improving and stable  · From my review of patient's previous CBC, likely chronic  · Drop today may be hemodilution from IV fluids  · No active bleeding at this point  · Monitor  · For further workup management if this worsens significantly

## 2018-11-17 NOTE — H&P (VIEW-ONLY)
[address] 61 y o  female MRN: [id number]  Unit/Bed#: -01      Chief Complaint:   Left hip pain    HPI:   61 y  o female seen in consultation by orthopedics for evaluation of patient's left hip  Patient states she sustained mechanical fall while taking of her snow boots yesterday  She states she fell onto her left hip  She denies hitting her head or loss of consciousness  She noted severe left hip pain and inability to bear weight on the left lower extremity  She reported to the emergency department where x-rays revealed an intertrochanteric fracture  Currently her pain is well controlled while at rest   Pain is worse with movement  She denies numbness and tingling  No fevers or chills  Prior to her injury she was not utilizing an assistive device to ambulate  Review Of Systems:   · Skin:   See HPI  · Neuro: See HPI  · Musculoskeletal: See HPI  · 14 point review of systems negative except as stated above     Past Medical History:   Past Medical History:   Diagnosis Date    Hypertension     Hyponatremia        Past Surgical History:   History reviewed  No pertinent surgical history  Family History:  Family history reviewed and non-contributory  Family History   Problem Relation Age of Onset    Heart disease Mother     Heart disease Father        Social History:  Social History     Social History    Marital status: /Civil Union     Spouse name: N/A    Number of children: N/A    Years of education: N/A     Social History Main Topics    Smoking status: Former Smoker    Smokeless tobacco: Never Used    Alcohol use 0 6 oz/week     1 Cans of beer per week      Comment: 2-3 times per week  Drinks heavier before    Drug use: No    Sexual activity: Not Asked     Other Topics Concern    None     Social History Narrative    None       Allergies:    Allergies   Allergen Reactions    Demerol [Meperidine] Other (See Comments)     hallucinations    Diphenhydramine Other (See Comments)     hallucinations    Prednisone Tremor    Sulfa Antibiotics Other (See Comments)     hallucinations    Percocet [Oxycodone-Acetaminophen] Palpitations           Labs:    0  Lab Value Date/Time   HCT 26 1 (L) 11/16/2018 0621   HCT 31 8 (L) 11/15/2018 1907   HCT 33 3 (L) 01/30/2018 0945   HGB 9 2 (L) 11/16/2018 0621   HGB 11 1 (L) 11/15/2018 1907   HGB 12 0 01/30/2018 0945   INR 1 15 11/15/2018 1907   WBC 4 93 11/16/2018 0621   WBC 5 43 11/15/2018 1907   WBC 7 55 01/30/2018 0945   ESR 34 (H) 01/30/2018 0945   CRP 23 3 (H) 01/30/2018 0945       Meds:    Current Facility-Administered Medications:     acetaminophen (TYLENOL) tablet 650 mg, 650 mg, Oral, Q8H [address], NAVJOT Bronson-SAWYER, 650 mg at 11/16/18 0557    enoxaparin (LOVENOX) subcutaneous injection 40 mg, 40 mg, Subcutaneous, Daily, Julio Rutledge PA-C    lidocaine (LIDODERM) 5 % patch 1 patch, 1 patch, Topical, Daily, Julio Rutledge PA-C    methocarbamol (ROBAXIN) tablet 500 mg, 500 mg, Oral, Q6H PRN, NAVJOT Stallings-C, 500 mg at 11/15/18 2148    metoprolol tartrate (LOPRESSOR) tablet 50 mg, 50 mg, Oral, BID, NAVJOT Bronson-SAWYER, 50 mg at 11/15/18 2153    morphine (MSIR) IR tablet 15 mg, 15 mg, Oral, Q4H PRN, Zully Balderrama PA-C, 15 mg at 11/15/18 2147    morphine (PF) 4 mg/mL injection 4 mg, 4 mg, Intravenous, Q4H PRN, Julio Rutledge PA-C    ondansetron (ZOFRAN) injection 4 mg, 4 mg, Intravenous, Q6H PRN, Julio Valenzuela PA-C    thiamine (VITAMIN B1) tablet 100 mg, 100 mg, Oral, Daily, Julio Rutledge PA-C    traMADol (ULTRAM) tablet 50 mg, 50 mg, Oral, Q6H PRN, Zully Balderrama PA-C    Physical Exam:   /61   Pulse 61   Temp 98 6 °F (37 °C) (Oral)   Resp 20   Ht 5' 4" (1 626 m)   Wt 64 8 kg (142 lb 13 7 oz)   SpO2 97%   BMI 24 52 kg/m²   Gen: Alert and oriented to person, place, time  HEENT: EOMI, eyes clear, moist mucus membranes, hearing intact  Respiratory: Bilateral chest rise   No audible wheezing found  Cardiovascular: Regular Rate and Rhythm  Abdomen: soft nontender/nondistended  Musculoskeletal:  Left hip:  · Skin intact  No erythema ecchymosis or swelling  · Left lower extremity shortened and externally rotated  · Tenderness to palpation greater trochanter  · Positive logroll test   · Motor and sensation intact left lower extremity  · Palpable DP pulse  Radiology:   I personally reviewed the films  X-rays left hip reveals intertrochanteric fracture     _*_*_*_*_*_*_*_*_*_*_*_*_*_*_*_*_*_*_*_*_*_*_*_*_*_*_*_*_*_*_*_*_*_*_*_*_*_*_*_*_*    Assessment:  61 y  o female with left hip intertrochanteric fracture after mechanical fall    Plan:   · Pain Control  · NWB LLE/Bedrest  · Discussed treatment options with the patient including surgical intervention consisting of intramedullary nail fixation  Explained the procedure in detail as well as risks and benefits  She has elected to proceed  · Patient will need medical clearance  Patient is currently hyponatremic  This will need to be corrected prior to surgery  · Type and screen ordered  · NPO for now      Jorje Romeo PA-C

## 2018-11-17 NOTE — ASSESSMENT & PLAN NOTE
· Likely chronic from a review of patient's previous CBCs  · Likely related to patient's history of alcohol use  · Monitor

## 2018-11-17 NOTE — ASSESSMENT & PLAN NOTE
· POA, secondary to mechanical fall  Neurovascularly intact at this time  Ortho aware of case  EKG and CXR normal   · Orthopedic surgery on board  · For intramedullary nail fixation  · From my discussion with the patient, she told me that she does not have any history of coronary artery disease, congestive heart failure, or any heart attacks; patient also denies any history of strokes or any history of lung disease such as asthma or COPD  Patient also denies any peripheral arterial disease  From my discussion with the patient, she told me that she is able to walk several blocks without getting short of breath or chest pains  She can also climb flight of stairs without getting any chest pains or being short of breath  Thus, cardiovascular and pulmonary wise, patient is at acceptable risk for the intermediate risk procedure  According to the notes by the nephrologist who has been managing patient's hyponatremia and metabolic acidosis, from his standpoint, patient may have surgery today  · Pain control  · Eventual PT/OT evaluation and management

## 2018-11-17 NOTE — PROGRESS NOTES
Progress Note - Chris Fulton 1955, 61 y o  female MRN: [id number]    Unit/Bed#: [facility] Encounter: [id number]    Primary Care Provider: Pascual Lombard, MD   Date and time admitted to hospital: 11/15/2018  6:25 PM        * Closed comminuted fracture of hip, left, initial encounter [facility])   Assessment & Plan    · POA, secondary to mechanical fall  Neurovascularly intact at this time  Ortho aware of case  EKG and CXR normal   · Orthopedic surgery on board  · For intramedullary nail fixation  · From my discussion with the patient, she told me that she does not have any history of coronary artery disease, congestive heart failure, or any heart attacks; patient also denies any history of strokes or any history of lung disease such as asthma or COPD  Patient also denies any peripheral arterial disease  From my discussion with the patient, she told me that she is able to walk several blocks without getting short of breath or chest pains  She can also climb flight of stairs without getting any chest pains or being short of breath  Thus, cardiovascular and pulmonary wise, patient is at acceptable risk for the intermediate risk procedure  According to the notes by the nephrologist who has been managing patient's hyponatremia and metabolic acidosis, from his standpoint, patient may have surgery today  · Pain control  · Eventual PT/OT evaluation and management  Low bicarbonate   Assessment & Plan    · Resolved  · A dose of sodium bicarb was given by Nephrology  · Nephrologist on board  · Monitor  Hyponatremia   Assessment & Plan    · Improved significantly (back at her baseline at 131)  · Patient has acute on chronic hyponatremia  Patient follows with [facility] from [address]  · According to nephrologist, 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  · Patient's sodium further decrease with IV fluids with normal saline  · Nephrologist on board  He is okay with the surgery procedure today    · Patient's for fluid restriction  · Lasix 20 mg IV x1 was given  · Sodium bicarbonate given  · Monitor BMP  Thrombocytopenia ([address] )   Assessment & Plan    · Likely chronic from a review of patient's previous CBCs  · Likely related to patient's history of alcohol use  · Monitor  Anemia   Assessment & Plan    · Improving and stable  · From my review of patient's previous CBC, likely chronic  · Drop today may be hemodilution from IV fluids  · No active bleeding at this point  · Monitor  · For further workup management if this worsens significantly  Essential hypertension   Assessment & Plan    · BP acceptable   · Continue metoprolol            VTE Pharmacologic Prophylaxis:   Pharmacologic: Enoxaparin (Lovenox)  Mechanical VTE Prophylaxis in Place: Yes    Patient Centered Rounds: I have performed bedside rounds with nursing staff today  Discussions with Specialists or Other Care Team Provider:  Case management  Education and Discussions with Family / Patient:  Patient  I offered to call patient's family particularly patient's , but patient declined  Time Spent for Care: 30 minutes  More than 50% of total time spent on counseling and coordination of care as described above  Current Length of Stay: 2 day(s)    Current Patient Status: Inpatient   Certification Statement: The patient will continue to require additional inpatient hospital stay due to Above findings and plans  Discharge Plan:  None yet    Code Status: Level 1 - Full Code      Subjective:   Patient is doing fine and feels better  Patient has occasional pains on the left hip, otherwise no other pains and no other complaints    No shortness of breath      Objective:     Vitals:   Temp (24hrs), Av 8 °F (37 1 °C), Min:98 5 °F (36 9 °C), Max:99 1 °F (37 3 °C)    Temp:  [98 5 °F (36 9 °C)-99 1 °F (37 3 °C)] 99 1 °F (37 3 °C)  HR:  [67-75] 75  Resp:  [16-18] 18  BP: (126-166)/(66-73) 138/66  SpO2:  [97 %-98 %] 98 %  Body mass index is 24 52 kg/m²  Input and Output Summary (last 24 hours): Intake/Output Summary (Last 24 hours) at 11/17/18 1652  Last data filed at 11/17/18 1639   Gross per 24 hour   Intake                0 ml   Output              900 ml   Net             -900 ml       Physical Exam:     Physical Exam   Constitutional: No distress  HENT:   Head: Normocephalic and atraumatic  Eyes: Right eye exhibits no discharge  Left eye exhibits no discharge  No scleral icterus  Neck: No JVD present  No tracheal deviation present  Cardiovascular: Normal rate, regular rhythm and normal heart sounds  Exam reveals no gallop and no friction rub  No murmur heard  Pulmonary/Chest: Effort normal and breath sounds normal  No stridor  No respiratory distress  She has no wheezes  She has no rales  Abdominal: Soft  Bowel sounds are normal  She exhibits no distension  There is no tenderness  There is no rebound and no guarding  Musculoskeletal: She exhibits tenderness  She exhibits no edema  Left leg is shortened and externally rotated  Positive for tenderness at the left hip area  No surrounding hematoma or ecchymosis noted  Neurological: She is alert  No cranial nerve deficit  Skin: Skin is warm  No rash noted  She is not diaphoretic  No erythema  No pallor  Psychiatric: She has a normal mood and affect  Her behavior is normal  Thought content normal    Vitals reviewed  Additional Data:     Labs:      Results from last 7 days  Lab Units 11/17/18  0435  11/15/18  1907   WBC Thousand/uL 4 91  < > 5 43   HEMOGLOBIN g/dL 11 1*  < > 11 1*   HEMATOCRIT % 31 9*  < > 31 8*   PLATELETS Thousands/uL 106*  < > 144*   NEUTROS PCT %  --   --  44   LYMPHS PCT %  --   --  46*   MONOS PCT %  --   --  7   EOS PCT %  --   --  2   < > = values in this interval not displayed      Results from last 7 days  Lab Units 11/17/18  0435   POTASSIUM mmol/L 4 3   CHLORIDE mmol/L 95*   CO2 mmol/L 28   BUN mg/dL 6   CREATININE mg/dL 0 79 CALCIUM mg/dL 8 6       Results from last 7 days  Lab Units 11/15/18  1907   INR  1 15       * I Have Reviewed All Lab Data Listed Above  * Additional Pertinent Lab Tests Reviewed: Chloe 66 Admission Reviewed    Imaging:    Imaging Reports Reviewed Today Include:  Diagnostic imaging studies that were done on this admission  Imaging Personally Reviewed by Myself Includes:  None  Recent Cultures (last 7 days):           Last 24 Hours Medication List:     Current Facility-Administered Medications:  acetaminophen 650 mg Oral Q8H [address] Julio SANDIP Rutledge, PA-C    enoxaparin 40 mg Subcutaneous Daily Julio Armendariz, PA-C    lidocaine 1 patch Topical Daily Julio Armendariz, PA-C    methocarbamol 500 mg Oral Q6H PRN Julio Armendariz, PA-C    metoprolol tartrate 50 mg Oral BID Julio Armendariz, PA-C    morphine 15 mg Oral Q4H PRN Julio Armendariz, PA-C    morphine injection 4 mg Intravenous Q4H PRN Julio Rutledge, PA-C    ondansetron 4 mg Intravenous Q6H PRN Julio Armendariz, PA-C    sodium chloride 70 mL/hr Intravenous Continuous Hermilo Shin MD Last Rate: 70 mL/hr (11/17/18 1402)   thiamine 100 mg Oral Daily Julio Rutledge, PA-C    traMADol 50 mg Oral Q6H PRN Julio Armendariz, PA-C      Facility-Administered Medications Ordered in Other Encounters:  ceFAZolin  Intravenous PRN Velta Mila, CRNA   fentanyl citrate (PF)  Intravenous PRN Velta Mila, CRNA   lactated ringers   Continuous PRN Velta Mila, CRNA   lidocaine   PRN Velta Mila, CRNA   phenlyephrine   PRN Velta Mila, CRNA   propofol  Intravenous PRN Velta Mila, CRNA   rocuronium   PRN Velta Mila, CRNA        Today, Patient Was Seen By: Huber Villarreal MD    ** Please Note: Dragon 360 Dictation voice to text software may have been used in the creation of this document   **

## 2018-11-18 ENCOUNTER — APPOINTMENT (INPATIENT)
Dept: RADIOLOGY | Facility: HOSPITAL | Age: 63
DRG: 481 | End: 2018-11-18
Payer: COMMERCIAL

## 2018-11-18 LAB
ANION GAP SERPL CALCULATED.3IONS-SCNC: 10 MMOL/L (ref 4–13)
BUN SERPL-MCNC: 13 MG/DL (ref 5–25)
CALCIUM SERPL-MCNC: 7.9 MG/DL (ref 8.3–10.1)
CHLORIDE SERPL-SCNC: 93 MMOL/L (ref 100–108)
CO2 SERPL-SCNC: 25 MMOL/L (ref 21–32)
CREAT SERPL-MCNC: 0.99 MG/DL (ref 0.6–1.3)
ERYTHROCYTE [DISTWIDTH] IN BLOOD BY AUTOMATED COUNT: 14.2 % (ref 11.6–15.1)
GFR SERPL CREATININE-BSD FRML MDRD: 61 ML/MIN/1.73SQ M
GLUCOSE SERPL-MCNC: 177 MG/DL (ref 65–140)
HCT VFR BLD AUTO: 21.2 % (ref 34.8–46.1)
HGB BLD-MCNC: 7.2 G/DL (ref 11.5–15.4)
MCH RBC QN AUTO: 30.4 PG (ref 26.8–34.3)
MCHC RBC AUTO-ENTMCNC: 34 G/DL (ref 31.4–37.4)
MCV RBC AUTO: 90 FL (ref 82–98)
OSMOLALITY UR/SERPL-RTO: 275 MMOL/KG (ref 282–298)
PLATELET # BLD AUTO: 126 THOUSANDS/UL (ref 149–390)
PMV BLD AUTO: 10.1 FL (ref 8.9–12.7)
POTASSIUM SERPL-SCNC: 4.7 MMOL/L (ref 3.5–5.3)
RBC # BLD AUTO: 2.37 MILLION/UL (ref 3.81–5.12)
SODIUM SERPL-SCNC: 128 MMOL/L (ref 136–145)
WBC # BLD AUTO: 6.58 THOUSAND/UL (ref 4.31–10.16)

## 2018-11-18 PROCEDURE — 99233 SBSQ HOSP IP/OBS HIGH 50: CPT | Performed by: INTERNAL MEDICINE

## 2018-11-18 PROCEDURE — 73552 X-RAY EXAM OF FEMUR 2/>: CPT

## 2018-11-18 PROCEDURE — P9021 RED BLOOD CELLS UNIT: HCPCS

## 2018-11-18 PROCEDURE — 83930 ASSAY OF BLOOD OSMOLALITY: CPT | Performed by: INTERNAL MEDICINE

## 2018-11-18 PROCEDURE — 97530 THERAPEUTIC ACTIVITIES: CPT

## 2018-11-18 PROCEDURE — G8979 MOBILITY GOAL STATUS: HCPCS

## 2018-11-18 PROCEDURE — 99024 POSTOP FOLLOW-UP VISIT: CPT | Performed by: SURGERY

## 2018-11-18 PROCEDURE — 85027 COMPLETE CBC AUTOMATED: CPT | Performed by: INTERNAL MEDICINE

## 2018-11-18 PROCEDURE — G8978 MOBILITY CURRENT STATUS: HCPCS

## 2018-11-18 PROCEDURE — 80048 BASIC METABOLIC PNL TOTAL CA: CPT | Performed by: INTERNAL MEDICINE

## 2018-11-18 PROCEDURE — 99232 SBSQ HOSP IP/OBS MODERATE 35: CPT | Performed by: INTERNAL MEDICINE

## 2018-11-18 PROCEDURE — 97163 PT EVAL HIGH COMPLEX 45 MIN: CPT

## 2018-11-18 PROCEDURE — 72170 X-RAY EXAM OF PELVIS: CPT

## 2018-11-18 RX ORDER — FUROSEMIDE 10 MG/ML
20 INJECTION INTRAMUSCULAR; INTRAVENOUS ONCE
Status: DISCONTINUED | OUTPATIENT
Start: 2018-11-18 | End: 2018-11-19

## 2018-11-18 RX ADMIN — METOPROLOL TARTRATE 50 MG: 50 TABLET, FILM COATED ORAL at 10:00

## 2018-11-18 RX ADMIN — Medication 100 MG: at 10:00

## 2018-11-18 RX ADMIN — LIDOCAINE 1 PATCH: 50 PATCH CUTANEOUS at 10:00

## 2018-11-18 RX ADMIN — DOCUSATE SODIUM 100 MG: 100 CAPSULE, LIQUID FILLED ORAL at 10:00

## 2018-11-18 RX ADMIN — ENOXAPARIN SODIUM 40 MG: 40 INJECTION SUBCUTANEOUS at 10:00

## 2018-11-18 RX ADMIN — ACETAMINOPHEN 650 MG: 325 TABLET ORAL at 14:30

## 2018-11-18 RX ADMIN — CEFAZOLIN SODIUM 1000 MG: 1 SOLUTION INTRAVENOUS at 00:25

## 2018-11-18 RX ADMIN — PANTOPRAZOLE SODIUM 40 MG: 40 TABLET, DELAYED RELEASE ORAL at 10:00

## 2018-11-18 RX ADMIN — ACETAMINOPHEN 650 MG: 325 TABLET ORAL at 05:53

## 2018-11-18 RX ADMIN — ACETAMINOPHEN 650 MG: 325 TABLET ORAL at 21:25

## 2018-11-18 RX ADMIN — METOPROLOL TARTRATE 50 MG: 50 TABLET, FILM COATED ORAL at 18:12

## 2018-11-18 RX ADMIN — SENNOSIDES 8.6 MG: 8.6 TABLET, FILM COATED ORAL at 10:00

## 2018-11-18 NOTE — ASSESSMENT & PLAN NOTE
· POA, secondary to mechanical fall  Neurovascularly intact at this time  Ortho aware of case  EKG and CXR normal   · Orthopedic surgery on board  · Status post intramedullary nail fixation, 11/17  · Pain control  · PT/OT evaluation and management:  Patient has significant orthostasis during session

## 2018-11-18 NOTE — PROGRESS NOTES
NEPHROLOGY PROGRESS NOTE    Jose Haywood 61 y o  female MRN: [id number]  Unit/Bed#: -01 Encounter: [id number]  Reason for Consult:  Hyponatremia    Patient is awake and alert underwent her hip surgery yesterday and feels relatively well today  ASSESSMENT/PLAN:  1  Hyponatremia    The patient has chronic hyponatremia is followed by another nephrologist in another group  When she presented it had worsened and then with loop diuretic it improved preoperatively  With I so tonic IV fluids during surgery and postoperatively it has decreased again slightly  These findings do suggest SIADH  One thing that is confusing is that she had a serum osmolality that was actually elevated with no real apparent reason  I reviewed some old records and she had low serum osmolality through her other nephrologist so it was likely a lab error here  What I can do is just repeat the serum osmolality to make sure it is truly hypo osmolar hyponatremia  Add on serum osmolality to ensure its hypo osmolality  Lasix 20 mg x1  Discontinue IV fluids  Monitor BMP    2  Status post inter trochanteric fracture status post repair by Orthopedic surgery  SUBJECTIVE:  Review of Systems   Constitution: Negative for chills and fever  HENT: Negative  Eyes: Negative  Cardiovascular: Negative  Negative for chest pain and orthopnea  Respiratory: Negative  Negative for shortness of breath  Gastrointestinal: Negative  Negative for bloating, abdominal pain, diarrhea and vomiting  Genitourinary: Negative  Neurological: Negative  OBJECTIVE:  Current Weight: Weight - Scale: 64 8 kg (142 lb 13 7 oz)  Vitals:Temp (24hrs), Av °F (36 7 °C), Min:97 5 °F (36 4 °C), Max:99 1 °F (37 3 °C)  Current: Temperature: 97 9 °F (36 6 °C)   Blood pressure 146/67, pulse 95, temperature 97 9 °F (36 6 °C), temperature source Oral, resp  rate 18, height 5' 4" (1 626 m), weight 64 8 kg (142 lb 13 7 oz), SpO2 99 %   , Body mass index is 24 52 kg/m²  Intake/Output Summary (Last 24 hours) at 11/18/18 1228  Last data filed at 11/18/18 6134   Gross per 24 hour   Intake           2704 5 ml   Output             1300 ml   Net           1404 5 ml       Physical Exam: /67 (BP Location: Left arm)   Pulse 95   Temp 97 9 °F (36 6 °C) (Oral)   Resp 18   Ht 5' 4" (1 626 m)   Wt 64 8 kg (142 lb 13 7 oz)   SpO2 99%   BMI 24 52 kg/m²   Physical Exam   Constitutional: She is oriented to person, place, and time  She appears well-nourished  No distress  HENT:   Head: Atraumatic  Mouth/Throat: No oropharyngeal exudate  Eyes: No scleral icterus  Neck: Neck supple  No JVD present  Cardiovascular: Normal rate and regular rhythm  Exam reveals no friction rub  Pulmonary/Chest: Effort normal and breath sounds normal  No respiratory distress  She has no wheezes  She has no rales  Abdominal: Soft  Bowel sounds are normal  She exhibits no distension  There is no tenderness  There is no rebound  Neurological: She is alert and oriented to person, place, and time         Medications:    Current Facility-Administered Medications:     acetaminophen (TYLENOL) tablet 650 mg, 650 mg, Oral, Q8H [address], Julio Rutledge PA-C, 650 mg at 11/18/18 0553    calcium carbonate (TUMS) chewable tablet 1,000 mg, 1,000 mg, Oral, Daily PRN, Micky Morin PA-C    docusate sodium (COLACE) capsule 100 mg, 100 mg, Oral, BID, Micky Morin PA-C, 100 mg at 11/18/18 1000    enoxaparin (LOVENOX) subcutaneous injection 40 mg, 40 mg, Subcutaneous, Daily, Julio Rutledge PA-C, 40 mg at 11/18/18 1000    lidocaine (LIDODERM) 5 % patch 1 patch, 1 patch, Topical, Daily, Julio Rutledge PA-C, 1 patch at 11/18/18 1000    methocarbamol (ROBAXIN) tablet 500 mg, 500 mg, Oral, Q6H PRN, Heidi Antony PA-C, 500 mg at 11/16/18 2110    metoprolol tartrate (LOPRESSOR) tablet 50 mg, 50 mg, Oral, BID, Julio Rutledge PA-C, 50 mg at 11/18/18 1000    morphine (MSIR) IR tablet 15 mg, 15 mg, Oral, Q4H PRN, Julio Rutledge, PA-C, 15 mg at 11/16/18 1650    morphine (PF) 4 mg/mL injection 4 mg, 4 mg, Intravenous, Q4H PRN, Julio Rutledge, PA-C, 4 mg at 11/17/18 1354    ondansetron (ZOFRAN) injection 4 mg, 4 mg, Intravenous, Q6H PRN, Justo Renu, PA-C    pantoprazole (PROTONIX) EC tablet 40 mg, 40 mg, Oral, Daily, [place] Shear, PA-C, 40 mg at 11/18/18 1000    senna (SENOKOT) tablet 8 6 mg, 1 tablet, Oral, Daily, [place] Shear, PA-C, 8 6 mg at 11/18/18 1000    thiamine (VITAMIN B1) tablet 100 mg, 100 mg, Oral, Daily, Julio Rutledge, PA-C, 100 mg at 11/18/18 1000    traMADol (ULTRAM) tablet 50 mg, 50 mg, Oral, Q6H PRN, L[place] Sill, PA-C, 50 mg at 11/17/18 1443    Laboratory Results:  Lab Results   Component Value Date    WBC 6 58 11/18/2018    HGB 7 2 (L) 11/18/2018    HCT 21 2 (L) 11/18/2018    MCV 90 11/18/2018     (L) 11/18/2018     Lab Results   Component Value Date    GLUCOSE 96 03/08/2016    CALCIUM 7 9 (L) 11/18/2018    K 4 7 11/18/2018    CO2 25 11/18/2018    CL 93 (L) 11/18/2018    BUN 13 11/18/2018    CREATININE 0 99 11/18/2018     Lab Results   Component Value Date    CALCIUM 7 9 (L) 11/18/2018    PHOS 3 1 03/11/2016     No results found for: LABPROT

## 2018-11-18 NOTE — PLAN OF CARE
Problem: PHYSICAL THERAPY ADULT  Goal: Performs mobility at highest level of function for planned discharge setting  See evaluation for individualized goals  Treatment/Interventions: Functional transfer training, LE strengthening/ROM, Therapeutic exercise, Endurance training, Patient/family training, Equipment eval/education, Bed mobility, Gait training, Compensatory technique education, Spoke to nursing, Spoke to MD, Family  Equipment Recommended: Other (Comment) (Rolling walker)       See flowsheet documentation for full assessment, interventions and recommendations  Prognosis: Excellent  Problem List: Decreased strength, Decreased range of motion, Decreased endurance, Decreased mobility, Orthopedic restrictions, Pain  Assessment:  Pt is a 61 y o  female seen for PT evaluation s/p admit to [address] [place] [address] on 11/15/2018 w/ Closed comminuted fracture of hip, left, initial encounter ([facility] [address] )  Xiomara Harrison was returning in the home after shoveling snow and fell on her left hip  She presented to the ER vis EMS with left intertrochanteric fracture and was surgically repaired after sodium levels stabilized on 11/17/18 for left femur long intramedullary nailing  Now WBAT LLE  Order placed for PT  Prior to admission, pt was independent w/ all functional mobility w/ no device  Upon evaluation: Pt requires maximum assistance for bed mobility and transfers and unable to tolerate sitting due to symptoms of orthostasis, symptoms of orhotostasis began immediately upon change of position from supine to sitting  BP supine 158/67, unable to get a BP in sitting due to symptoms progressing to LOC, returned to supine and BP immediately taken at 134/58; HR supine 90, post episode   Brandon Pickettbhavana   Pt's clinical presentation is currently unstable/unpredictable given the functional mobility deficits above, especially weakness, decreased ROM, decreased endurance, pain, decreased activity tolerance and orthopedic restrictions, coupled with fall risks including impaired balance, and combined with medical complications of hypotension, tachycardia, abnormal renal lab values, abnormal H&H and abnormal sodium values  Pt to benefit from continued skilled PT tx while in hospital and upon DC to address deficits as defined above and maximize level of functional mobility  From PT/mobility standpoint, recommendation at time of d/c would be inpatient rehab vs  Home PT pending progress in order to maximize pt's functional independence and consistency w/ mobility in order to facilitate return to PLOF  Recommend ther ex next 1-2 sessions, OT consult and progressive mobilization to sitting and walking as medically stable             Recommendation: Defer at this time, Other (Comment) (Will continue to assess abilities home vs rehab)          See flowsheet documentation for full assessment

## 2018-11-18 NOTE — PHYSICAL THERAPY NOTE
PHYSICAL THERAPY NOTE          Patient Name: Allen JORDAN Date: 11/18/2018 11/18/18 1446   Pain Assessment   Pain Assessment 0-10   Pain Score 4   Pain Location Incision   Pain Orientation Left   Patient's Stated Pain Goal No pain   Hospital Pain Intervention(s) Medication (See MAR)   Restrictions/Precautions   Weight Bearing Precautions Per Order Yes   LLE Weight Bearing Per Order WBAT   Other Precautions Bed Alarm; Fall Risk;Pain  (Orthostatic)   General   Chart Reviewed Yes   Additional Pertinent History Low BP prior to session   Family/Caregiver Present Yes  (sister)   Cognition   Overall Cognitive Status WFL   Arousal/Participation Alert   Attention Within functional limits   Orientation Level Oriented X4   Memory Within functional limits   Following Commands Follows all commands and directions without difficulty   Subjective   Subjective I ate breakfast and lunch and I feel good  Patient Identifiers: Name and birth date   Bed Mobility   Rolling L 4  Minimal assistance   Additional items Bedrails;Assist x 1;LE management   Supine to Sit 3  Moderate assistance   Additional items Assist x 2; Increased time required;Verbal cues;LE management   Sit to Supine 2  Maximal assistance   Additional items Assist x 2;LE management   Additional Comments Patient remained in bed post sitting attempts with bed alarm active an vena dyne donned on the RLE with all needs in reach     Transfers   Sit to Stand Unable to assess   Additional items Other  (Unable to assess due to low BP and not tolerating sitting )   Ambulation/Elevation   Gait Assistance Not tested   Balance   Static Sitting Fair  (Tolerated sitting 60 seconds prior to dizziness)   Endurance Deficit   Endurance Deficit Yes   Endurance Deficit Description Patient with abnormal BP response to activity with BP supine 148/65 and s/p sitting 60 seconds 101/50   Activity Tolerance   Activity Tolerance Treatment limited secondary to medical complications (Comment)   Nurse Made Aware Yes Young Bolaños RN present for session   Exercises   Ankle Pumps 20 reps   Assessment   Prognosis Excellent   Problem List Decreased strength;Decreased range of motion;Decreased endurance;Decreased mobility;Pain   Assessment Cassidy received at the bedside with visitors with [facility] elevated  She reported she ate both breakfast and lunch and was ready to retry sitting  Performed supine to sit to the right side of the bed to see if functional mobility improved, which she had greater independence of movement of the RLE, but still required Max A to manage the LLE but of only 1  Upon sitting, able tolerate the position longer than this AM, but still became symptomatic after short duration (60 seconds) and was able to get a BP during episode this PM at 101/50  Upon return to supine she required 3 minutes to begin to recover BP to 125/57  Unable to tolerate standing or OOB transfer due to low BP's and significant symptoms  Will follow and progress OOB mobility as patient able to tolerate  Goals   Patient Goals I want to go home  STG Expiration Date 12/01/18   Treatment Day 2   Plan   Treatment/Interventions Functional transfer training; Therapeutic exercise; Endurance training;Patient/family training;Bed mobility   Recommendation   Recommendation Defer at this time   Additional Comments U[place]ble to progress mobility due to low BP     Gar Ala, MPT, GCS

## 2018-11-18 NOTE — ANESTHESIA POSTPROCEDURE EVALUATION
Post-Op Assessment Note      CV Status:  Stable    Mental Status:  Alert and awake    Hydration Status:  Euvolemic    PONV Controlled:  Controlled    Airway Patency:  Patent    Post Op Vitals Reviewed: Yes          Staff: Anesthesiologist, with CRNAs       BP (P) 141/63 (11/17/18 1919)    Temp (P) 98 3 °F (36 8 °C) (11/17/18 1919)    Pulse (P) 86 (11/17/18 1919)   Resp (P) 16 (11/17/18 1919)    SpO2 (P) 100 % (11/17/18 1919)

## 2018-11-18 NOTE — PHYSICAL THERAPY NOTE
PHYSICAL THERAPY EVALUATION  NAME:  Maral Noble  DATE: 11/18/18    AGE:   61 y o  Mrn:   [id number]  ADMIT DX:  Hyponatremia [E87 1]  Hip injury [S79 919A]  Fall, initial encounter [W19  XXXA]  Closed intertrochanteric fracture, left, initial encounter ([facility] [address] ) [S72 142A]     Time In: 8:58 am  Time Out: 9:20 am    Past Medical History:   Diagnosis Date    Hypertension     Hyponatremia      Length Of Stay: 3  Performed at least 2 patient identifiers during session: Name and Birthday    PHYSICAL THERAPY EVALUATION :    11/18/18 0858   Note Type   Note type Eval/Treat   Pain Assessment   Pain Assessment 0-10   Pain Score 1   Pain Location Incision   Patient's Stated Pain Goal No pain   Hospital Pain Intervention(s) Medication (See MAR)   Home Living   Type of [address] [facility] One level;Stairs to enter with rails  (3 JAMIE in the front and kitchen)   Bathroom Shower/Tub Walk-in shower   [address] Other (Comment)  (None)   Prior Function   Level of [place] Independent with ADLs and functional mobility   Lives With Spouse   ADL Assistance Independent   IADLs Independent   Falls in the last 6 months 0   Vocational Retired   Restrictions/Precautions   Wells Nikolas Bearing Precautions Per Order Yes   LLE Weight Bearing Per Order WBAT   Other Precautions Bed Alarm; Fall Risk;Pain;WBS   General   Additional Pertinent History Hgb 7 2   Family/Caregiver Present Yes  ()   Cognition   Overall Cognitive Status WFL   Arousal/Participation Alert   Orientation Level Oriented X4   Memory Within functional limits   Following Commands Follows multistep commands with increased time or repetition   RUE Assessment   RUE Assessment WFL   LUE Assessment   LUE Assessment WFL   RLE Assessment   RLE Assessment WFL   LLE Assessment   LLE Assessment X   LLE Overall PROM   L Hip Flexion Reduced with empty end feel to 80 degrees   L Knee Flexion Reduced with empty endfeel at 40 degrees   L Ankle Dorsiflexion WFL   Strength LLE   LLE Overall Strength 3-/5   Coordination   Movements are Fluid and Coordinated 1   Sensation WFL   Bed Mobility   Rolling L 2  Maximal assistance   Additional items Assist x 1;Verbal cues   Supine to Sit 2  Maximal assistance   Additional items Assist x 2;LE management;Verbal cues   Sit to Supine 2  Maximal assistance   Additional items Assist x 2;Bedrails;LE management;Verbal cues   Transfers   Sit to Stand Unable to assess   Ambulation/Elevation   Gait Assistance Not tested   Balance   Static Sitting Poor   Endurance Deficit   Endurance Deficit Yes   Endurance Deficit Description Patient with demonstrated orthostasis upon change from supine to sitting (BP not obtained in sitting due to symptoms within 15 second of sitting)  (BP supine: 158/67;  Return to supine: 134/58)   Activity Tolerance   Activity Tolerance Treatment limited secondary to medical complications (Comment); Other (Comment)  (Reported feeling very dizzy in sitting with decreased verbal)   Nurse Made Aware Spoke to Stephane Ng RN and made aware of orhtostasis symptoms   Assessment   Prognosis Excellent   Problem List Decreased strength;Decreased range of motion;Decreased endurance;Decreased mobility;Orthopedic restrictions;Pain   Goals   Patient Goals I want to go home not rehab if possible  STG Expiration Date 12/01/18   Treatment Day 1   Plan   Treatment/Interventions Functional transfer training;LE strengthening/ROM; Therapeutic exercise; Endurance training;Patient/family training;Equipment eval/education; Bed mobility;Gait training; Compensatory technique education;Spoke to nursing;Spoke to MD;Family   PT Frequency 7x/wk; Weekend; Twice a day   Recommendation   Recommendation Defer at this time; Other (Comment)  (Will continue to assess abilities home vs rehab)   Equipment Recommended Other (Comment)  (Rolling walker)   Barthel Index   Feeding 10   Bathing 0   Grooming Score 0   Dressing Score 0   Bladder Score 0   Bowels Score 10   Toilet Use Score 0   Transfers (Bed/Chair) Score 5   Mobility (Level Surface) Score 0   Stairs Score 0   Barthel Index Score 25       (Please find full objective findings from PT assessment regarding body systems outlined above)  Assessment: Pt is a 61 y o  female seen for PT evaluation s/p admit to [facility] on 11/15/2018 w/ Closed comminuted fracture of hip, left, initial encounter ([facility] [address] )  Julián Ritchie was returning in the home after shoveling snow and fell on her left hip  She presented to the ER vis EMS with left intertrochanteric fracture and was surgically repaired after sodium levels stabilized on 11/17/18 for left femur long intramedullary nailing  Now WBAT LLE  Order placed for PT  Prior to admission, pt was independent w/ all functional mobility w/ no device  Upon evaluation: Pt requires maximum assistance for bed mobility and transfers and unable to tolerate sitting due to symptoms of orthostasis, symptoms of orhotostasis began immediately upon change of position from supine to sitting  BP supine 158/67, unable to get a BP in sitting due to symptoms progressing to LOC, returned to supine and BP immediately taken at 134/58; HR supine 90, post episode   Stephanie Bertrand Pt's clinical presentation is currently unstable/unpredictable given the functional mobility deficits above, especially weakness, decreased ROM, decreased endurance, pain, decreased activity tolerance and orthopedic restrictions, coupled with fall risks including impaired balance, and combined with medical complications of hypotension, tachycardia, abnormal renal lab values, abnormal H&H and abnormal sodium values  Pt to benefit from continued skilled PT tx while in hospital and upon DC to address deficits as defined above and maximize level of functional mobility   From PT/mobility standpoint, recommendation at time of d/c would be inpatient rehab vs  Home PT pending progress in order to maximize pt's functional independence and consistency w/ mobility in order to facilitate return to PLOF  Recommend ther ex next 1-2 sessions, OT consult and progressive mobilization to sitting and walking as medically stable         Goals: In the next 7-10 days pt will: Perform bed mobility tasks to A Of 1 to decrease burden of care  Perform transfers to A Of 1 to improve ease of transfers  Perform ambulation with rolling wlaker for 20' to  A Of 1  to return to home with JAMIE  Increase Barthel score 60/100; Increase muscle strength in LLE by 1/2 grade; TUG score of 30 seconds with Rolling walker and CG of 1; Increase FIST score to 40/56  The following objective measures were performed on IE: Barthel Index 25/100; Function in Sitting Test: 0/56  Comorbidities affecting pt's physical performance at time of assessment include: HTN and orthopedic surgery  Personal factors affecting pt at time of IE include: steps to enter environment, inability to perform ADLs, inability to ambulate household distances, inability to navigate community distances and recent fall(s)  True Castillo, PT, GCS      Physical Therapy Treatment Session Note    Time In: 9:21 am  Time out: 9:33 am    S: I want to get moving, but I feel dizzy with rolling  O: Rolling with Max A of 2 to the left and right  Bed mobility Max A of 2 to boost   Provided instruction in bedside program with  present to maintain [facility] elevated as much as possible to encourage challenge to cardiopulmonary system due to episode of orthostatic hypotension during evaluation with symptoms immediately upon sitting and with sitting 20 seconds symptoms progressed to patient with decreased alertness and altered responses to verbal questions and immediately returned to supine  Discussed with Marga Gold and her  progression of mobility and what options for rehab would be if return to PLOF is delayed  A: Session limited due to low BP and dizziness   Able to tolerate rolling and sitting with HOB elevated with no symptoms  Remained sitting with HOB elevated at completion of session with vena rioe donned on the RLE, bed alarm activated, and all needs in reach  P: Will be seen this afternoon for BID session to reattempt sitting on EOB      Kip Lofton, MPT, GCS

## 2018-11-18 NOTE — ASSESSMENT & PLAN NOTE
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

## 2018-11-18 NOTE — PROGRESS NOTES
Progress Note - [facility] 1955, 61 y o  female MRN: [id number]    Unit/Bed#: -01 Encounter: [id number]    Primary Care Provider: Elliott Yates MD   Date and time admitted to hospital: 11/15/2018  6:25 PM        Anemia   Assessment & Plan    · Worsened significantly with hemoglobin at 7 2  Likely due to acute blood loss anemia from surgery  · From my review of patient's previous CBC, likely chronic  · Contributory factor:  hemodilution from IV fluids  · No active bleeding at this point  · Monitor  · With significant orthostasis awhile ago during physical therapy, I will transfuse the patient with 1 unit of packed RBCs  * Closed comminuted fracture of hip, left, initial encounter [facility])   Assessment & Plan    · POA, secondary to mechanical fall  Neurovascularly intact at this time  Ortho aware of case  EKG and CXR normal   · Orthopedic surgery on board  · Status post intramedullary nail fixation, 11/17  · Pain control  · PT/OT evaluation and management:  Patient has significant orthostasis during session  Low bicarbonate   Assessment & Plan    · Resolved  · A dose of sodium bicarb was given by Nephrology  · Nephrologist on board  · Monitor  Hyponatremia   Assessment & Plan    · Improving, however, went down again today  · Patient has acute on chronic hyponatremia  Patient follows with Padilla, from [address]  · According to nephrologist, 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  · Patient's sodium further decrease with IV fluids with normal saline  · Nephrologist on board  · Patient's on fluid restriction  · Lasix 20 mg IV x1 was given  · Sodium bicarbonate given  · Monitor BMP  · As per Nephrology, repeat serum osmolality  · IV fluids were discontinued  Patient will be given again a dose of IV Lasix       Thrombocytopenia (HCC)   Assessment & Plan    · Stable and improving    · Likely chronic from a review of patient's previous CBCs  · Likely related to patient's history of alcohol use  · Monitor  Essential hypertension   Assessment & Plan    · BP acceptable   · Continue metoprolol            VTE Pharmacologic Prophylaxis:   Pharmacologic: Enoxaparin (Lovenox)  Mechanical VTE Prophylaxis in Place: Yes    Patient Centered Rounds: I have performed bedside rounds with nursing staff today  Discussions with Specialists or Other Care Team Provider:  Case management  Education and Discussions with Family / Patient:  Patient  Patient's daughter at bedside  I offered to call patient's , but patient declined    Time Spent for Care: 30 minutes  More than 50% of total time spent on counseling and coordination of care as described above  Current Length of Stay: 3 day(s)    Current Patient Status: Inpatient   Certification Statement: The patient will continue to require additional inpatient hospital stay due to Above findings and plans  Discharge Plan:  None yet    Code Status: Level 1 - Full Code      Subjective:   Patient is doing fine  Patient denies any shortness of breath or any chest pains  Patient still has occasional pains on the left hip, but not as bad as before  However, during physical therapy today, patient had significant orthostasis and dizziness  Otherwise no other complaints  Objective:     Vitals:   Temp (24hrs), Av °F (36 7 °C), Min:97 5 °F (36 4 °C), Max:99 1 °F (37 3 °C)    Temp:  [97 5 °F (36 4 °C)-99 1 °F (37 3 °C)] 99 1 °F (37 3 °C)  HR:  [80-95] 81  Resp:  [16-20] 20  BP: ()/(50-76) 125/57  SpO2:  [95 %-100 %] 100 %  Body mass index is 24 52 kg/m²  Input and Output Summary (last 24 hours): Intake/Output Summary (Last 24 hours) at 18 1553  Last data filed at 18 1357   Gross per 24 hour   Intake           2704 5 ml   Output             1550 ml   Net           1154 5 ml       Physical Exam:     Physical Exam   Constitutional: No distress  HENT:   Head: Normocephalic and atraumatic     Eyes: Right eye exhibits no discharge  Left eye exhibits no discharge  No scleral icterus  Neck: No JVD present  No tracheal deviation present  Cardiovascular: Normal rate, regular rhythm and normal heart sounds  Exam reveals no gallop and no friction rub  No murmur heard  Pulmonary/Chest: Effort normal and breath sounds normal  No stridor  No respiratory distress  She has no wheezes  She has no rales  Abdominal: Soft  Bowel sounds are normal  She exhibits no distension  There is no tenderness  There is no rebound and no guarding  Musculoskeletal: She exhibits tenderness  She exhibits no edema or deformity  Positive for tenderness at the left hip area  Wound dressing inspected, no active bleeding; they were clean, dry and intact   Neurological: She is alert  No cranial nerve deficit  Skin: Skin is warm  No rash noted  She is not diaphoretic  No erythema  No pallor  Psychiatric: She has a normal mood and affect  Her behavior is normal  Thought content normal    Vitals reviewed  Additional Data:     Labs:      Results from last 7 days  Lab Units 11/18/18  0619  11/15/18  1907   WBC Thousand/uL 6 58  < > 5 43   HEMOGLOBIN g/dL 7 2*  < > 11 1*   HEMATOCRIT % 21 2*  < > 31 8*   PLATELETS Thousands/uL 126*  < > 144*   NEUTROS PCT %  --   --  44   LYMPHS PCT %  --   --  46*   MONOS PCT %  --   --  7   EOS PCT %  --   --  2   < > = values in this interval not displayed  Results from last 7 days  Lab Units 11/18/18 0619   POTASSIUM mmol/L 4 7   CHLORIDE mmol/L 93*   CO2 mmol/L 25   BUN mg/dL 13   CREATININE mg/dL 0 99   CALCIUM mg/dL 7 9*       Results from last 7 days  Lab Units 11/15/18  1907   INR  1 15       * I Have Reviewed All Lab Data Listed Above  * Additional Pertinent Lab Tests Reviewed: Chloe 66 Admission Reviewed    Imaging:    Imaging Reports Reviewed Today Include:  Diagnostic imaging studies that were done on this admission    Imaging Personally Reviewed by Myself Includes:  None    Recent Cultures (last 7 days):           Last 24 Hours Medication List:     Current Facility-Administered Medications:  acetaminophen 650 mg Oral Q8H [address] Julio SANDIP Rutledge, LIZETTE   calcium carbonate 1,000 mg Oral Daily PRN Alric Prose, PA-C   docusate sodium 100 mg Oral BID Alric Prose, PA-C   enoxaparin 40 mg Subcutaneous Daily Julio Rutledge, PA-C   furosemide 20 mg Intravenous Once Lashaun Álvarez MD   lidocaine 1 patch Topical Daily Julio Didi Crutch, PA-C   methocarbamol 500 mg Oral Q6H PRN Julio Didi Crutch, PA-C   metoprolol tartrate 50 mg Oral BID Julio Didi Crutch, PA-C   morphine 15 mg Oral Q4H PRN Julio Didi Crutch, PA-C   morphine injection 4 mg Intravenous Q4H PRN Julio Didi Crutch, PA-C   ondansetron 4 mg Intravenous Q6H PRN Alric Prose, PA-C   pantoprazole 40 mg Oral Daily Alric Prose, PA-C   senna 1 tablet Oral Daily Alric Prose, PA-C   thiamine 100 mg Oral Daily Julio Rutledge, PA-C   traMADol 50 mg Oral Q6H PRN Julio Tolbert Crgatito, LIZETTE        Today, Patient Was Seen By: Stefany Dave MD    ** Please Note: Dragon 360 Dictation voice to text software may have been used in the creation of this document   **

## 2018-11-18 NOTE — OP NOTE
OPERATIVE REPORT  PATIENT NAME: Marquis Terry  :  1955  MRN: [id number]  Pt Location: [facility] Main OR    SURGERY DATE: 18    Surgeon(s) and Role:     * Rigoberto Kaur MD - Primary     * Rabia Irwin PA-C - Assisting    Pre-Op Diagnosis:  Closed intertrochanteric fracture, left, initial encounter ([facility] ) Santana Reining    Post-Op Diagnosis Codes:     * Closed intertrochanteric fracture, left, initial encounter ([facility] ) Santana Reining    Procedure(s):  Intramedullary nail fixation left hip fracture (Left)    Specimen(s):  * No orders in the log *    Estimated Blood Loss:   600 mL      Anesthesia Type:   General    Operative Indications: The patient has a history of left intertrochanteric fracture  that was Closed and Displaced  The decision was made to bring the patient to the operating room for intramedullary nail fixation  Risks of the procedure were explained which include, but are not limited to bleeding; infection; damage to nerves, arteries,veins, tendons; scar; pain; need for reoperation; failure to give desired result; delayed union, malunion, nonunion; and risks of anaesthesia  All questions were answered to satisfaction and they were willing to proceed  Operative Findings:  Comminution of the greater and lesser trochanter     Complications:   None    Procedure and Technique:  After the patient, site, and procedure were identified, the patient was brought into the operating room  After initiation of general anesthesia,  the patient was placed on a traction table, the well leg was well padded and it was ensured that there was no tension  The operative lower extremity was pre wrapped in webril and placed in a boot and placed in traction  Prior to initiation of the case a reduction was performed using traction and internal rotation  Adequate reduction was noted on orthogonal fluoroscopic images   The  left  lower extremity was then prepped and drapped in a normal, sterile, orthopedic fashion        Next Using a guide wire and the fluoroscopic C-arm, an incision for introducing the guidewire was planned  This incision, four finger breadths immediately superior to the greater trochanter was then made and dissection was carried sharply through the fascia mendel  The interval between gluteus medius was developed digitally  A threaded guidewire was placed against the tip of the greater trochanter  Fluoroscopy was used to determine the ideal starting point in orthogonal views  Th guide wire was then advanced using a drill into the proximal femur to the level of the lesser trochanter  Once the guidewire was determined to be well positioned fluoroscopically, the greater trochanter was opened using the entry reamer  Once the proximal femur had been opened,  ball-tipped guide perez was introduced into the proximal femur and test distally across the fracture site  The depth of the ball point guidewire was evaluated with fluoroscopic images  Sequential  reaming was done starting with an 8 5 mm Reamer and working our way up to a 12 5 mm Reamer which created chatter  Next a measurement was made of the length of the femur which was found to be approximately 340  mm in length  A 340x11 mm nail was inserted and verified on fluoroscopic images  Once the nail was advanced to the desired depth, the guide for the head helical blade was assembled and placed in an appropriate location based on fluoroscopic images  A guidewire was advanced into the femoral head under fluoroscopic guidance in orthogonal views  Once the tip of the guidewire was well centered and deeply advanced, advancement of the guidewire was stopped  The measuring device was used to determine a head screw length of 95 mm       Next the outer cortex was drilled, and the locking helical blade reamed to a 100 mm length   A 81RM  helical blade was then advanced under fluoroscopic guidance until appropriately positioned, and the set screw was tightened  Next we turned our attention to the distal aspect of the nail where utilizing perfect [place] technique the static locking hole was identified  The oblong hole hole was drilled in the static position measured and the sequentially filled with a 44mm screw  The insertional jig was removed and final fluoroscopic images demonstrated a well-aligned fracture with good hardware position  The wound was copiously irrigated  The fascia was closed with 0 Vicryl  The subcutaneous layer was closed with 2-0 Vicryl  Skin was closed with staples  Sterile dressings were applied  All counts were correct at the end of the case  At the completion of the procedure, hemostasis was obtained with cautery and direct pressure  The wounds were copiously irrigated with sterile solution  The subcutaneous layer was closed with 2-0 Vicryl  Skin was closed with staples  Sterile Mepilex dressings were applied  Please note, all sponge, needle, and instrument counts were correct prior to closure  The patient tolerated the procedure well       I was present for the entire procedure, A qualified resident physician was not available and A physician assistant was required during the procedure for retraction tissue handling,dissection and suturing    Patient Disposition:  PACU     SIGNATURE: Luis Diaz MD  DATE: 11/17/18  TIME: 7:28 PM

## 2018-11-18 NOTE — PLAN OF CARE
Problem: PHYSICAL THERAPY ADULT  Goal: Performs mobility at highest level of function for planned discharge setting  See evaluation for individualized goals  Treatment/Interventions: Functional transfer training, LE strengthening/ROM, Therapeutic exercise, Endurance training, Patient/family training, Equipment eval/education, Bed mobility, Gait training, Compensatory technique education, Spoke to nursing, Spoke to MD, Family  Equipment Recommended: Other (Comment) (Rolling walker)       See flowsheet documentation for full assessment, interventions and recommendations  Outcome: Not Progressing  Prognosis: Excellent  Problem List: Decreased strength, Decreased range of motion, Decreased endurance, Decreased mobility, Pain  Assessment: Lg Tucker received at the bedside with visitors with [facility] elevated  She reported she ate both breakfast and lunch and was ready to retry sitting  Performed supine to sit to the right side of the bed to see if functional mobility improved, which she had greater independence of movement of the RLE, but still required Max A to manage the LLE but of only 1  Upon sitting, able tolerate the position longer than this AM, but still became symptomatic after short duration (60 seconds) and was able to get a BP during episode this PM at 101/50  Upon return to supine she required 3 minutes to begin to recover BP to 125/57  Unable to tolerate standing or OOB transfer due to low BP's and significant symptoms  Will follow and progress OOB mobility as patient able to tolerate  Recommendation: Defer at this time          See flowsheet documentation for full assessment

## 2018-11-18 NOTE — PROGRESS NOTES
Progress Note - Orthopedics   Rachel Springer 61 y o  female MRN: [id number]  Unit/Bed#: -01 Encounter: [id number]    Assessment:  71-year-old female postoperative day 1 status post left femur long intramedullary nailing, overall doing well  Some orthostatic hypotension when trying to get up with PT    Plan:  Out of bed with PT  Weightbearing as tolerated  Lovenox 40 subq daily for 30 days  Fully management per primary team    Weight bearing:   Weightbearing as tolerated left lower extremity    VTE Pharmacologic Prophylaxis: Enoxaparin (Lovenox)  VTE Mechanical Prophylaxis: sequential compression device    Subjective:   71-year-old female postop day 1 status post left long intramedullary nailing for intertrochanteric fracture overall doing  Well  No significant events overnight  Pain well controlled  Some orthostatic hypotension will try get up with physical therapy today    Vitals: Blood pressure 115/76, pulse 94, temperature 97 6 °F (36 4 °C), temperature source Oral, resp  rate 16, height 5' 4" (1 626 m), weight 64 8 kg (142 lb 13 7 oz), SpO2 100 %  ,Body mass index is 24 52 kg/m²        Intake/Output Summary (Last 24 hours) at 11/18/18 0932  Last data filed at 11/18/18 9866   Gross per 24 hour   Intake           2704 5 ml   Output             1300 ml   Net           1404 5 ml       Invasive Devices     Peripheral Intravenous Line            Peripheral IV 11/15/18 Right Antecubital 2 days          Drain            Urethral Catheter Non-latex 16 Fr  2 days                Physical Exam: General: well developed and well nourished, alert, oriented times 3 and appears comfortable  Psychiatric: Normal  HEENT: Normocephalic, Atraumatic Trachea Midline, No torticollis  Pulmonary: No audible wheezing or respiratory distress   Cardiovascular: No pitting edema, 2+ radial pulse   Skin:   Mild edema around the surgical site no significant ecchymosis  Neurovascular:   Sensation intact to deep peroneal superficial peroneal and tibial  Musculoskeletal: Normal, except as noted in detailed exam and in HPI  Ortho Exam: Hip    Surgical dressings intact without significant drainage small spotting of sanguinous drainage on the most proximal dressing  Thigh soft without significant swelling  No significant ecchymosis  Positive EHL FHL TIB Ant, Gastoc   SILT DP, SP, Tib nerve   BCR all toes     Lab, Imaging and other studies:   I personally reviewed AP lateral radiographs of the left humerus as well as an AP pelvis which demonstrates a well-reduced intertrochanteric fracture with hardware in good position

## 2018-11-18 NOTE — ASSESSMENT & PLAN NOTE
· Worsened significantly with hemoglobin at 7 2  Likely due to acute blood loss anemia from surgery  · From my review of patient's previous CBC, likely chronic  · Contributory factor:  hemodilution from IV fluids  · No active bleeding at this point  · Monitor  · With significant orthostasis awhile ago during physical therapy, I will transfuse the patient with 1 unit of packed RBCs

## 2018-11-18 NOTE — ASSESSMENT & PLAN NOTE
· Stable and improving  · Likely chronic from a review of patient's previous CBCs  · Likely related to patient's history of alcohol use  · Monitor

## 2018-11-19 ENCOUNTER — APPOINTMENT (INPATIENT)
Dept: RADIOLOGY | Facility: HOSPITAL | Age: 63
DRG: 481 | End: 2018-11-19
Payer: COMMERCIAL

## 2018-11-19 LAB
ABO GROUP BLD BPU: NORMAL
ANION GAP SERPL CALCULATED.3IONS-SCNC: 6 MMOL/L (ref 4–13)
BPU ID: NORMAL
BUN SERPL-MCNC: 10 MG/DL (ref 5–25)
CALCIUM SERPL-MCNC: 7.9 MG/DL (ref 8.3–10.1)
CHLORIDE SERPL-SCNC: 96 MMOL/L (ref 100–108)
CO2 SERPL-SCNC: 27 MMOL/L (ref 21–32)
CREAT SERPL-MCNC: 0.67 MG/DL (ref 0.6–1.3)
CROSSMATCH: NORMAL
ERYTHROCYTE [DISTWIDTH] IN BLOOD BY AUTOMATED COUNT: 14.3 % (ref 11.6–15.1)
GFR SERPL CREATININE-BSD FRML MDRD: 94 ML/MIN/1.73SQ M
GLUCOSE SERPL-MCNC: 117 MG/DL (ref 65–140)
HCT VFR BLD AUTO: 21.6 % (ref 34.8–46.1)
HCT VFR BLD AUTO: 26.1 % (ref 34.8–46.1)
HGB BLD-MCNC: 7.4 G/DL (ref 11.5–15.4)
HGB BLD-MCNC: 9.1 G/DL (ref 11.5–15.4)
MCH RBC QN AUTO: 29.8 PG (ref 26.8–34.3)
MCHC RBC AUTO-ENTMCNC: 34.3 G/DL (ref 31.4–37.4)
MCV RBC AUTO: 87 FL (ref 82–98)
PLATELET # BLD AUTO: 107 THOUSANDS/UL (ref 149–390)
PMV BLD AUTO: 10.1 FL (ref 8.9–12.7)
POTASSIUM SERPL-SCNC: 3.6 MMOL/L (ref 3.5–5.3)
RBC # BLD AUTO: 2.48 MILLION/UL (ref 3.81–5.12)
SODIUM SERPL-SCNC: 129 MMOL/L (ref 136–145)
UNIT DISPENSE STATUS: NORMAL
UNIT PRODUCT CODE: NORMAL
UNIT RH: NORMAL
WBC # BLD AUTO: 5.1 THOUSAND/UL (ref 4.31–10.16)

## 2018-11-19 PROCEDURE — 85014 HEMATOCRIT: CPT | Performed by: INTERNAL MEDICINE

## 2018-11-19 PROCEDURE — 99232 SBSQ HOSP IP/OBS MODERATE 35: CPT | Performed by: INTERNAL MEDICINE

## 2018-11-19 PROCEDURE — 99024 POSTOP FOLLOW-UP VISIT: CPT | Performed by: SURGERY

## 2018-11-19 PROCEDURE — P9021 RED BLOOD CELLS UNIT: HCPCS

## 2018-11-19 PROCEDURE — 80048 BASIC METABOLIC PNL TOTAL CA: CPT | Performed by: INTERNAL MEDICINE

## 2018-11-19 PROCEDURE — 85027 COMPLETE CBC AUTOMATED: CPT | Performed by: INTERNAL MEDICINE

## 2018-11-19 PROCEDURE — 85018 HEMOGLOBIN: CPT | Performed by: INTERNAL MEDICINE

## 2018-11-19 PROCEDURE — 73551 X-RAY EXAM OF FEMUR 1: CPT

## 2018-11-19 RX ORDER — FUROSEMIDE 10 MG/ML
10 INJECTION INTRAMUSCULAR; INTRAVENOUS ONCE
Status: DISCONTINUED | OUTPATIENT
Start: 2018-11-19 | End: 2018-11-19

## 2018-11-19 RX ADMIN — PANTOPRAZOLE SODIUM 40 MG: 40 TABLET, DELAYED RELEASE ORAL at 08:56

## 2018-11-19 RX ADMIN — DOCUSATE SODIUM 100 MG: 100 CAPSULE, LIQUID FILLED ORAL at 08:55

## 2018-11-19 RX ADMIN — ENOXAPARIN SODIUM 40 MG: 40 INJECTION SUBCUTANEOUS at 08:54

## 2018-11-19 RX ADMIN — LIDOCAINE 1 PATCH: 50 PATCH CUTANEOUS at 08:56

## 2018-11-19 RX ADMIN — ACETAMINOPHEN 650 MG: 325 TABLET ORAL at 21:24

## 2018-11-19 RX ADMIN — Medication 100 MG: at 08:56

## 2018-11-19 RX ADMIN — MORPHINE SULFATE 15 MG: 15 TABLET ORAL at 03:53

## 2018-11-19 RX ADMIN — MORPHINE SULFATE 15 MG: 15 TABLET ORAL at 18:10

## 2018-11-19 RX ADMIN — ACETAMINOPHEN 650 MG: 325 TABLET ORAL at 13:21

## 2018-11-19 RX ADMIN — METOPROLOL TARTRATE 50 MG: 50 TABLET, FILM COATED ORAL at 08:55

## 2018-11-19 RX ADMIN — ACETAMINOPHEN 650 MG: 325 TABLET ORAL at 06:40

## 2018-11-19 RX ADMIN — METOPROLOL TARTRATE 50 MG: 50 TABLET, FILM COATED ORAL at 18:56

## 2018-11-19 RX ADMIN — SENNOSIDES 8.6 MG: 8.6 TABLET, FILM COATED ORAL at 08:55

## 2018-11-19 RX ADMIN — METOPROLOL TARTRATE 50 MG: 50 TABLET, FILM COATED ORAL at 09:05

## 2018-11-19 NOTE — PROGRESS NOTES
Progress Note - Cristina Travis 1955, 61 y o  female MRN: [id number]    Unit/Bed#: -01 Encounter: [id number]    Primary Care Provider: Citlali Epps MD   Date and time admitted to hospital: 11/15/2018  6:25 PM        Anemia   Assessment & Plan    · Worsened  · Likely due to acute blood loss anemia from surgery  · From my review of patient's previous CBC, likely history of chronic anemia  · Contributory factor:  hemodilution from IV fluids  · No active bleeding at this point  · Monitor  · With significant orthostasis during physical therapy, patient was transfused with packed RBCs, 11/18 and 11/19  * Closed comminuted fracture of hip, left, initial encounter [facility])   Assessment & Plan    · POA, secondary to mechanical fall  Neurovascularly intact at this time  Ortho aware of case  EKG and CXR normal   · Orthopedic surgery on board  · Status post intramedullary nail fixation, 11/17  · Pain control  · PT/OT evaluation and management  Low bicarbonate   Assessment & Plan    · Resolved  · A dose of sodium bicarb was given by Nephrology  · Nephrologist on board  · Monitor  Hyponatremia   Assessment & Plan    · Improving  · Patient has acute on chronic hyponatremia  Patient [facility], [facility] [address]  · According to nephrologist, 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  · Patient's sodium further decreased with IV fluids with normal saline  · Nephrologist on board  · Patient's on fluid restriction  · Status post Lasix doses  · Sodium bicarbonate given  · Monitor BMP  Thrombocytopenia ([address] )   Assessment & Plan    · Stable  · Likely chronic from a review of patient's previous CBCs  · Likely related to patient's history of alcohol use  · Monitor       Essential hypertension   Assessment & Plan    · BP acceptable   · Continue metoprolol            VTE Pharmacologic Prophylaxis:   Pharmacologic: Enoxaparin (Lovenox)  Mechanical VTE Prophylaxis in Place: Yes    Patient Centered Rounds: I have performed bedside rounds with nursing staff today  Discussions with Specialists or Other Care Team Provider:  Case management  Education and Discussions with Family / Patient:  Patient  I offered to call patient's family/, but patient declined    Time Spent for Care: 30 minutes  More than 50% of total time spent on counseling and coordination of care as described above  Current Length of Stay: 4 day(s)    Current Patient Status: Inpatient   Certification Statement: The patient will continue to require additional inpatient hospital stay due to Above findings and plans  Discharge Plan:  None yet  Code Status: Level 1 - Full Code      Subjective:   Patient is doing fine  Has occasional pains at the surgical site  Otherwise no other complaints  No shortness of breath  No other pains  At this point in time, the patient does not know if she still has dizziness she has not have physical therapy yet (yesterday, patient had significant orthostasis and dizziness while having physical therapy)  Objective:     Vitals:   Temp (24hrs), Av °F (37 2 °C), Min:98 4 °F (36 9 °C), Max:100 6 °F (38 1 °C)    Temp:  [98 4 °F (36 9 °C)-100 6 °F (38 1 °C)] 99 °F (37 2 °C)  HR:  [73-81] 79  Resp:  [18] 18  BP: ()/(55-65) 131/59  SpO2:  [97 %-99 %] 99 %  Body mass index is 24 52 kg/m²  Input and Output Summary (last 24 hours): Intake/Output Summary (Last 24 hours) at 18 2105  Last data filed at 18 1810   Gross per 24 hour   Intake              680 ml   Output              575 ml   Net              105 ml       Physical Exam:     Physical Exam   Constitutional: No distress  HENT:   Head: Normocephalic and atraumatic  Eyes: Right eye exhibits no discharge  Left eye exhibits no discharge  No scleral icterus  Neck: No JVD present  No tracheal deviation present  Cardiovascular: Normal rate, regular rhythm and normal heart sounds    Exam reveals no gallop and no friction rub  No murmur heard  Pulmonary/Chest: Effort normal and breath sounds normal  No stridor  No respiratory distress  She has no wheezes  She has no rales  Abdominal: Soft  She exhibits no distension  There is no tenderness  There is no rebound and no guarding  Musculoskeletal: She exhibits no edema, tenderness or deformity  Positive for wound dressing on patient's surgical wounds; clean, dry and intact, no evidence of an active bleeding  Neurological: She is alert  No cranial nerve deficit  Skin: Skin is warm  No rash noted  She is not diaphoretic  No erythema  No pallor  Psychiatric: She has a normal mood and affect  Her behavior is normal  Thought content normal    Vitals reviewed  Additional Data:     Labs:      Results from last 7 days  Lab Units 11/19/18  1735 11/19/18  0449  11/15/18  1907   WBC Thousand/uL  --  5 10  < > 5 43   HEMOGLOBIN g/dL 9 1* 7 4*  < > 11 1*   HEMATOCRIT % 26 1* 21 6*  < > 31 8*   PLATELETS Thousands/uL  --  107*  < > 144*   NEUTROS PCT %  --   --   --  44   LYMPHS PCT %  --   --   --  46*   MONOS PCT %  --   --   --  7   EOS PCT %  --   --   --  2   < > = values in this interval not displayed  Results from last 7 days  Lab Units 11/19/18  0449   POTASSIUM mmol/L 3 6   CHLORIDE mmol/L 96*   CO2 mmol/L 27   BUN mg/dL 10   CREATININE mg/dL 0 67   CALCIUM mg/dL 7 9*       Results from last 7 days  Lab Units 11/15/18  1907   INR  1 15       * I Have Reviewed All Lab Data Listed Above  * Additional Pertinent Lab Tests Reviewed: Chloe 66 Admission Reviewed    Imaging:    Imaging Reports Reviewed Today Include:  Diagnostic imaging studies that were done on this admission  Imaging Personally Reviewed by Myself Includes:  None      Recent Cultures (last 7 days):           Last 24 Hours Medication List:     Current Facility-Administered Medications:  acetaminophen 650 mg Oral Q8H [facility] Julio Rutledge PA-C   calcium carbonate 1,000 mg Oral Daily PRN [facility], PA-C   docusate sodium 100 mg Oral BID [facility], PA-C   enoxaparin 40 mg Subcutaneous Daily Julio Allie Mini, PA-C   lidocaine 1 patch Topical Daily Julio Allie Minium, PA-C   methocarbamol 500 mg Oral Q6H PRN Julio Allie Minium, PA-C   metoprolol tartrate 50 mg Oral BID Julio Allie Minium, PA-C   morphine 15 mg Oral Q4H PRN Julio Allie Minium, PA-C   morphine injection 4 mg Intravenous Q4H PRN [facility] Allie Minium, PA-C   ondansetron 4 mg Intravenous Q6H PRN [facility], PA-C   pantoprazole 40 mg Oral Daily [facility], PA-C   senna 1 tablet Oral Daily [facility], PA-C   thiamine 100 mg Oral Daily Juliorolan Rutledge, PA-C   traMADol 50 mg Oral Q6H PRN [facility] Mini, PA-C        Today, Patient Was Seen By: Pepito Rodas MD    ** Please Note: Dragon 360 Dictation voice to text software may have been used in the creation of this document   **

## 2018-11-19 NOTE — PLAN OF CARE
Problem: DISCHARGE PLANNING - CARE MANAGEMENT  Goal: Discharge to post-acute care or home with appropriate resources  INTERVENTIONS:  - Conduct assessment to determine patient/family and health care team treatment goals, and need for post-acute services based on payer coverage, community resources, and patient preferences, and barriers to discharge  - Address psychosocial, clinical, and financial barriers to discharge as identified in assessment in conjunction with the patient/family and health care team  - Arrange appropriate level of post-acute services according to patients   needs and preference and payer coverage in collaboration with the physician and health care team  - Communicate with and update the patient/family, physician, and health care team regarding progress on the discharge plan  - Arrange appropriate transportation to post-acute venues  Outcome: Progressing  LOS 4 DAYS  GMLOS 3 DAYS  PATIENT IS NOT A BUNDLE  PATIENT IS NOT A READMISSION  Patient reported to  that she lives in ranch style home with 3 steps into the house  Patient denies VNA or rehab  Patient has reported that her  would make medical decisions for her as needed  Patient reported to  that she drives but not often  Patient denies POA and reported that her  is HCR  Patient currently receiving a unit of blood  Patient will be seen by PT and OT to determined level of care needed at discharge  Patient was provided rehab list and business card  If patient decides to go home with VNA she would prefer [facility]  At this time, patient will need to remain until PT and OT see patient and plan is determined  CM will continue to follow patient through discharge

## 2018-11-19 NOTE — PROGRESS NOTES
[address] [facility] PROGRESS NOTE   Allen Stanley 61 y o  female MRN: [id number]  Unit/Bed#: -01 Encounter: [id number]  Reason for Consult: hyponatremia    ASSESSMENT and PLAN:    27-year-old female with a past medical history of hyponatremia controlled with fluid restriction, was taken off of salt tablets slightly a few months ago due to hypertension, who presents on 11/15 with fall with a fracture of the left hip  Nephrology is consulted for hyponatremia    1) hyponatremia    Hypo osmolar     - patient's sodium level is 77797/19  -continue fluid restriction  -hold salt tablets for now  -patient was given low-dose Lasix 4 days ago  Lasix was ordered yesterday, but patient's parameters were not met and therefore was not given  Therefore sodium level is improving on its own   -fluid restriction  -BMP in the a m   -replete potassium with the lowest further tomorrow    2) renal function-stable    3) anemia    - pt receiving pRBC currently  - give furosemide 20 mg IV if needed    4) hip fracture    - repeat xray pending  Pt heard crack during turning today    5) orthostatic hypotension    - recheck after pRBC    SUBJECTIVE / INTERVAL HISTORY:    Patient denies complaints currently  [place] crack earlier in the left hip site  X-ray pending      OBJECTIVE:  Current Weight: Weight - Scale: 64 8 kg (142 lb 13 7 oz)  Vitals:    11/19/18 0400 11/19/18 0700 11/19/18 1118 11/19/18 1133   BP: 119/59 128/56 98/55 126/56   BP Location: Right arm Left arm Right arm Right arm   Pulse: 75 81 74 73   Resp: 18 18 18 18   Temp: 98 4 °F (36 9 °C) 98 7 °F (37 1 °C) 98 5 °F (36 9 °C) (!) 100 6 °F (38 1 °C)   TempSrc: Oral Oral Oral Oral   SpO2: 97% 97%  99%   Weight:       Height:           Intake/Output Summary (Last 24 hours) at 11/19/18 1536  Last data filed at 11/19/18 1300   Gross per 24 hour   Intake              830 ml   Output              575 ml   Net              255 ml     General: NAD  Skin: no rash  Eyes: anicteric sclera  ENT: moist mucous membrane  Neck: supple  Chest: CTA b/l  CVS: s1s2  Abdomen: soft, nontender  Extremities: LLE hip site non tender, no erythema, 2+ DP pulses  : no sher  Neuro: AAOX3  Psych: normal affect      Medications:    Current Facility-Administered Medications:     acetaminophen (TYLENOL) tablet 650 mg, 650 mg, Oral, Q8H [address], Julio P Rutledge, PA-C, 650 mg at 11/19/18 1321    calcium carbonate (TUMS) chewable tablet 1,000 mg, 1,000 mg, Oral, Daily PRN, Cristy Diss, PA-C    docusate sodium (COLACE) capsule 100 mg, 100 mg, Oral, BID, Cristy Diss, PA-C, 100 mg at 11/19/18 0855    enoxaparin (LOVENOX) subcutaneous injection 40 mg, 40 mg, Subcutaneous, Daily, Julio P Rutledge, PA-C, 40 mg at 11/19/18 0854    furosemide (LASIX) injection 20 mg, 20 mg, Intravenous, Once, Carson Valera MD    lidocaine (LIDODERM) 5 % patch 1 patch, 1 patch, Topical, Daily, Julio OROPEZA Rutledge, PA-C, 1 patch at 11/19/18 0856    methocarbamol (ROBAXIN) tablet 500 mg, 500 mg, Oral, Q6H PRN, Alesia Moder, PA-C, 500 mg at 11/16/18 2110    metoprolol tartrate (LOPRESSOR) tablet 50 mg, 50 mg, Oral, BID, Julio P Rutledge, PA-C, 50 mg at 11/19/18 0905    morphine (MSIR) IR tablet 15 mg, 15 mg, Oral, Q4H PRN, Julio OROPEZA Rutledge, PA-C, 15 mg at 11/19/18 0353    morphine (PF) 4 mg/mL injection 4 mg, 4 mg, Intravenous, Q4H PRN, Julio P Rutledge, PA-C, 4 mg at 11/17/18 1354    ondansetron (ZOFRAN) injection 4 mg, 4 mg, Intravenous, Q6H PRN, Cristy Diss, PA-C    pantoprazole (PROTONIX) EC tablet 40 mg, 40 mg, Oral, Daily, Cristy Diss, PA-C, 40 mg at 11/19/18 0856    senna (SENOKOT) tablet 8 6 mg, 1 tablet, Oral, Daily, Cristy Diss, PA-C, 8 6 mg at 11/19/18 0855    thiamine (VITAMIN B1) tablet 100 mg, 100 mg, Oral, Daily, Julio Rutledge PA-C, 100 mg at 11/19/18 0856    traMADol (ULTRAM) tablet 50 mg, 50 mg, Oral, Q6H PRN, Julio Rutledge PA-C, 50 mg at 11/17/18 1443    Laboratory Results:    Results from last 7 days  Lab Units 11/19/18  0449 11/18/18  0619 11/17/18  0435 11/16/18  1344 11/16/18  1017 11/16/18  0621 11/16/18  0259 11/15/18  2101 11/15/18  1907   WBC Thousand/uL 5 10 6 58 4 91  --   --  4 93  --   --  5 43   HEMOGLOBIN g/dL 7 4* 7 2* 11 1*  --  9 0* 9 2*  --   --  11 1*   HEMATOCRIT % 21 6* 21 2* 31 9*  --  25 9* 26 1*  --   --  31 8*   PLATELETS Thousands/uL 107* 126* 106*  --   --  115*  --  122* 144*   POTASSIUM mmol/L 3 6 4 7 4 3 3 7  --  3 9 3 9  --  3 6   CHLORIDE mmol/L 96* 93* 95* 94*  --  95* 93*  --  91*   CO2 mmol/L 27 25 28 24  --  18* 20*  --  24   BUN mg/dL 10 13 6 5  --  4* 5  --  6   CREATININE mg/dL 0 67 0 99 0 79 0 59*  --  0 56* 0 49*  --  0 68   CALCIUM mg/dL 7 9* 7 9* 8 6 8 0*  --  8 0* 7 8*  --  8 6

## 2018-11-19 NOTE — OCCUPATIONAL THERAPY NOTE
Occupational Therapy Cancel Note        Patient Name: Thaddeus Lucio  YBRDG'D Date: 11/19/2018    OT orders received and chart review completed  Attempted to see pt for OT eval this AM  Per RN, [employer] pt is getting blood and not appropriate for OT eval this AM  Attempted to see pt this afternoon following blood transfusion but pt is pending xray  Will continue to follow pt to complete eval as appropriate and schedule allows      CARLEEN Woody/SUZIE

## 2018-11-19 NOTE — PHYSICAL THERAPY NOTE
Physical Therapy Cancellation Note      Pt cancelled this AM as per RN, pt is currently receiving blood due to low hgb  Will continue to follow and re-attempt this afternoon     Clovis Horta, PT,DPT

## 2018-11-19 NOTE — PROGRESS NOTES
[address] 61 y o  female MRN: [id number]  Unit/Bed#: -01      Subjective:  Pt seen this am  Notes left hip soreness  No numbness/tingling  No fever/chills      Labs:    0  Lab Value Date/Time   HCT 21 6 (L) 11/19/2018 0449   HCT 21 2 (L) 11/18/2018 0619   HCT 31 9 (L) 11/17/2018 0435   HGB 7 4 (L) 11/19/2018 0449   HGB 7 2 (L) 11/18/2018 0619   HGB 11 1 (L) 11/17/2018 0435   INR 1 15 11/15/2018 1907   WBC 5 10 11/19/2018 0449   WBC 6 58 11/18/2018 0619   WBC 4 91 11/17/2018 0435   ESR 34 (H) 01/30/2018 0945   CRP 23 3 (H) 01/30/2018 0945       Meds:    Current Facility-Administered Medications:     acetaminophen (TYLENOL) tablet 650 mg, 650 mg, Oral, Q8H [address], Julio Rutledge PA-C, 650 mg at 11/19/18 0640    calcium carbonate (TUMS) chewable tablet 1,000 mg, 1,000 mg, Oral, Daily PRN, Ron Zafar PA-C    docusate sodium (COLACE) capsule 100 mg, 100 mg, Oral, BID, Ron Zafar PA-C, 100 mg at 11/18/18 1000    enoxaparin (LOVENOX) subcutaneous injection 40 mg, 40 mg, Subcutaneous, Daily, Julio Rutledge PA-C, 40 mg at 11/18/18 1000    furosemide (LASIX) injection 20 mg, 20 mg, Intravenous, Once, Fidel Bustillo MD    lidocaine (LIDODERM) 5 % patch 1 patch, 1 patch, Topical, Daily, Julio Rutledge PA-C, 1 patch at 11/18/18 1000    methocarbamol (ROBAXIN) tablet 500 mg, 500 mg, Oral, Q6H PRN, Jose Tobias PA-C, 500 mg at 11/16/18 2110    metoprolol tartrate (LOPRESSOR) tablet 50 mg, 50 mg, Oral, BID, Julio Rutledge PA-C, 50 mg at 11/18/18 1812    morphine (MSIR) IR tablet 15 mg, 15 mg, Oral, Q4H PRN, Julio Rutledge PA-C, 15 mg at 11/19/18 0353    morphine (PF) 4 mg/mL injection 4 mg, 4 mg, Intravenous, Q4H PRN, Julio Rutledge PA-C, 4 mg at 11/17/18 1354    ondansetron (ZOFRAN) injection 4 mg, 4 mg, Intravenous, Q6H PRN, Ron Zafar PA-C    pantoprazole (PROTONIX) EC tablet 40 mg, 40 mg, Oral, Daily, Ron Zafar PA-C, 40 mg at 11/18/18 1000    senna (SENOKOT) tablet 8 6 mg, 1 tablet, Oral, Daily, Clarice Runner, PA-C, 8 6 mg at 11/18/18 1000    thiamine (VITAMIN B1) tablet 100 mg, 100 mg, Oral, Daily, Julio Rutledge PA-C, 100 mg at 11/18/18 1000    traMADol (ULTRAM) tablet 50 mg, 50 mg, Oral, Q6H PRN, Julio Rutledge PA-C, 50 mg at 11/17/18 1443    Physical exam:  Vitals:    11/19/18 0400   BP: 119/59   Pulse: 75   Resp: 18   Temp: 98 4 °F (36 9 °C)   SpO2: 97%     Left Hip:  · Dressings C/D/I  · Thigh compartments soft  No excessive soft tissue swelling  · Motor/sensation intact LLE  · Palpable DP Pulse    _*_*_*_*_*_*_*_*_*_*_*_*_*_*_*_*_*_*_*_*_*_*_*_*_*_*_*_*_*_*_*_*_*_*_*_*_*_*_*_*_*    Assessment: 61 y  o female POD 2 s/p Left Hip Long TFN for IT fracture    Plan:  Pain Control  WBAT LLE  PT/OT  DVT proph with Lovenox for 30 days post op    ABLA, Hgb 7 4 this am  Transfusion per primary team     Maddy Shi PA-C

## 2018-11-19 NOTE — PHYSICAL THERAPY NOTE
Physical Therapy Cancellation Note      Per chart, pt heard a crack this AM after rolling on her side and is currently pending an X-ray  Will hold PT treatment until X-ray results read      Rose Santiago, PT,DPT

## 2018-11-19 NOTE — SOCIAL WORK
LOS 4 DAYS  GMLOS 3 DAYS  PATIENT IS NOT A BUNDLE  PATIENT IS NOT A READMISSION  Patient reported to CM that she lives in ranch style home with 3 steps into the house  Patient denies VNA or rehab  Patient has reported that her  would make medical decisions for her as needed  Patient reported to CM that she drives but not often  Patient denies POA and reported that her  is HCR  Patient currently receiving a unit of blood  Patient will be seen by PT and OT to determined level of care needed at discharge  Patient was provided rehab list and business card  If patient decides to go home with VNA she would prefer [facility]  At this time, patient will need to remain until PT and OT see patient and plan is determined  CM will continue to follow patient through discharge

## 2018-11-20 PROBLEM — E87.8 LOW BICARBONATE: Status: RESOLVED | Noted: 2018-11-16 | Resolved: 2018-11-20

## 2018-11-20 LAB
ABO GROUP BLD BPU: NORMAL
ANION GAP SERPL CALCULATED.3IONS-SCNC: 8 MMOL/L (ref 4–13)
BPU ID: NORMAL
BUN SERPL-MCNC: 8 MG/DL (ref 5–25)
CALCIUM SERPL-MCNC: 8.6 MG/DL (ref 8.3–10.1)
CHLORIDE SERPL-SCNC: 95 MMOL/L (ref 100–108)
CO2 SERPL-SCNC: 25 MMOL/L (ref 21–32)
CREAT SERPL-MCNC: 0.66 MG/DL (ref 0.6–1.3)
CROSSMATCH: NORMAL
ERYTHROCYTE [DISTWIDTH] IN BLOOD BY AUTOMATED COUNT: 14.6 % (ref 11.6–15.1)
GFR SERPL CREATININE-BSD FRML MDRD: 94 ML/MIN/1.73SQ M
GLUCOSE SERPL-MCNC: 96 MG/DL (ref 65–140)
HCT VFR BLD AUTO: 25.7 % (ref 34.8–46.1)
HGB BLD-MCNC: 9 G/DL (ref 11.5–15.4)
MCH RBC QN AUTO: 30 PG (ref 26.8–34.3)
MCHC RBC AUTO-ENTMCNC: 35 G/DL (ref 31.4–37.4)
MCV RBC AUTO: 86 FL (ref 82–98)
PLATELET # BLD AUTO: 109 THOUSANDS/UL (ref 149–390)
PMV BLD AUTO: 9.2 FL (ref 8.9–12.7)
POTASSIUM SERPL-SCNC: 3.6 MMOL/L (ref 3.5–5.3)
RBC # BLD AUTO: 3 MILLION/UL (ref 3.81–5.12)
SODIUM SERPL-SCNC: 128 MMOL/L (ref 136–145)
UNIT DISPENSE STATUS: NORMAL
UNIT PRODUCT CODE: NORMAL
UNIT RH: NORMAL
WBC # BLD AUTO: 5.22 THOUSAND/UL (ref 4.31–10.16)

## 2018-11-20 PROCEDURE — G8988 SELF CARE GOAL STATUS: HCPCS

## 2018-11-20 PROCEDURE — 85027 COMPLETE CBC AUTOMATED: CPT | Performed by: INTERNAL MEDICINE

## 2018-11-20 PROCEDURE — 97116 GAIT TRAINING THERAPY: CPT

## 2018-11-20 PROCEDURE — 97530 THERAPEUTIC ACTIVITIES: CPT

## 2018-11-20 PROCEDURE — 99232 SBSQ HOSP IP/OBS MODERATE 35: CPT | Performed by: INTERNAL MEDICINE

## 2018-11-20 PROCEDURE — 99024 POSTOP FOLLOW-UP VISIT: CPT | Performed by: PHYSICIAN ASSISTANT

## 2018-11-20 PROCEDURE — G8987 SELF CARE CURRENT STATUS: HCPCS

## 2018-11-20 PROCEDURE — 97110 THERAPEUTIC EXERCISES: CPT

## 2018-11-20 PROCEDURE — 97167 OT EVAL HIGH COMPLEX 60 MIN: CPT

## 2018-11-20 PROCEDURE — 80048 BASIC METABOLIC PNL TOTAL CA: CPT | Performed by: INTERNAL MEDICINE

## 2018-11-20 RX ORDER — TORSEMIDE 10 MG/1
5 TABLET ORAL DAILY
Status: DISCONTINUED | OUTPATIENT
Start: 2018-11-20 | End: 2018-11-20

## 2018-11-20 RX ORDER — TORSEMIDE 10 MG/1
5 TABLET ORAL EVERY OTHER DAY
Status: DISCONTINUED | OUTPATIENT
Start: 2018-11-20 | End: 2018-11-21 | Stop reason: HOSPADM

## 2018-11-20 RX ORDER — POTASSIUM CHLORIDE 20 MEQ/1
20 TABLET, EXTENDED RELEASE ORAL ONCE
Status: COMPLETED | OUTPATIENT
Start: 2018-11-20 | End: 2018-11-20

## 2018-11-20 RX ORDER — SODIUM CHLORIDE 1000 MG
1 TABLET, SOLUBLE MISCELLANEOUS 2 TIMES DAILY WITH MEALS
Status: DISCONTINUED | OUTPATIENT
Start: 2018-11-20 | End: 2018-11-21 | Stop reason: HOSPADM

## 2018-11-20 RX ADMIN — ENOXAPARIN SODIUM 40 MG: 40 INJECTION SUBCUTANEOUS at 09:14

## 2018-11-20 RX ADMIN — ACETAMINOPHEN 650 MG: 325 TABLET ORAL at 13:12

## 2018-11-20 RX ADMIN — Medication 100 MG: at 09:15

## 2018-11-20 RX ADMIN — METOPROLOL TARTRATE 50 MG: 50 TABLET, FILM COATED ORAL at 17:09

## 2018-11-20 RX ADMIN — ACETAMINOPHEN 650 MG: 325 TABLET ORAL at 21:35

## 2018-11-20 RX ADMIN — SODIUM CHLORIDE TAB 1 GM 1 G: 1 TAB at 09:16

## 2018-11-20 RX ADMIN — LIDOCAINE 1 PATCH: 50 PATCH CUTANEOUS at 09:22

## 2018-11-20 RX ADMIN — SODIUM CHLORIDE TAB 1 GM 1 G: 1 TAB at 17:09

## 2018-11-20 RX ADMIN — DOCUSATE SODIUM 100 MG: 100 CAPSULE, LIQUID FILLED ORAL at 17:09

## 2018-11-20 RX ADMIN — SENNOSIDES 8.6 MG: 8.6 TABLET, FILM COATED ORAL at 09:15

## 2018-11-20 RX ADMIN — DOCUSATE SODIUM 100 MG: 100 CAPSULE, LIQUID FILLED ORAL at 09:15

## 2018-11-20 RX ADMIN — PANTOPRAZOLE SODIUM 40 MG: 40 TABLET, DELAYED RELEASE ORAL at 09:16

## 2018-11-20 RX ADMIN — ACETAMINOPHEN 650 MG: 325 TABLET ORAL at 06:04

## 2018-11-20 RX ADMIN — POTASSIUM CHLORIDE 20 MEQ: 1500 TABLET, EXTENDED RELEASE ORAL at 09:16

## 2018-11-20 NOTE — PLAN OF CARE
Problem: PHYSICAL THERAPY ADULT  Goal: Performs mobility at highest level of function for planned discharge setting  See evaluation for individualized goals  Treatment/Interventions: Functional transfer training, LE strengthening/ROM, Therapeutic exercise, Endurance training, Patient/family training, Equipment eval/education, Bed mobility, Gait training, Compensatory technique education, Spoke to nursing, Spoke to MD, Family  Equipment Recommended: Other (Comment) (Rolling walker)       See flowsheet documentation for full assessment, interventions and recommendations  Outcome: Progressing  Prognosis: Good  Problem List: Decreased strength, Decreased range of motion, Decreased endurance, Decreased mobility, Pain  Assessment: Pt tolerated treatment well and is progressing with overall functional mobility  BP remained stable during transfers and ambulation with RW  Pt was able to use bedside commode and ambulate short distances with RW and verbal cues for RW management  Educated on HEP and made recommendations for continued completion throughout the day  Pt reports feeling diaphoretic, however also reports feeling great that she is able to move her legs again  Pt left OOB in chair with all needs met and in reach  Will continue to benefit from ongoing skilled PT to maximize her functional mobility and increase her level of independence  Recommending rehab at time of discharge pending progress  Recommendation: Post acute IP rehab          See flowsheet documentation for full assessment

## 2018-11-20 NOTE — UTILIZATION REVIEW
Continued Stay Review    Date: 11/20/2018    Vital Signs: /54 (BP Location: Right arm)   Pulse 74   Temp 97 7 °F (36 5 °C) (Oral)   Resp 20   Ht 5' 4" (1 626 m)   Wt 64 8 kg (142 lb 13 7 oz)   SpO2 100%   BMI 24 52 kg/m²     Medications:   Scheduled Meds:   Current Facility-Administered Medications:  acetaminophen 650 mg Oral Q8H [facility]   calcium carbonate 1,000 mg Oral Daily PRN   docusate sodium 100 mg Oral BID   enoxaparin 40 mg Subcutaneous Daily   lidocaine 1 patch Topical Daily   methocarbamol 500 mg Oral Q6H PRN   metoprolol tartrate 50 mg Oral BID   morphine 15 mg Oral Q4H PRN   morphine injection 4 mg Intravenous Q4H PRN   ondansetron 4 mg Intravenous Q6H PRN   pantoprazole 40 mg Oral Daily   senna 1 tablet Oral Daily   sodium chloride 1 g Oral BID With Meals   thiamine 100 mg Oral Daily   torsemide 5 mg Oral Every Other Day   traMADol 50 mg Oral Q6H PRN     Continuous Infusions:    PRN Meds:not used  Abnormal Labs/Diagnostic Results:   Na 128  Cl 95  Wbc 5 22, hgb 9, hct 25 7  ( s/p 2 units PRBC on 11/18 and one unit 11/19)    Procedure 11/17- Intramedullary nail fixation left hip fracture (Left)    Age/Sex: 61 y o  female     Assessment/Plan:  61 y  o female POD # s/p Left Hip Long TFN for IT fracture     Plan:  · Pain Control  · WBAT LLE  · PT/OT  · DVT proph with Lovenox for a total of 30 days post op  ABLA, s/p transfusion PRBCs yesterday  Hgb 9 0 this am    Per renal - hyponatremia     Hypo osmolar      - patient's sodium level is slightly lower to 128 on 11/20  -continue fluid restriction  -agree with starting torsemide every other day; salt tab twice daily; K repletion today  -fluid restriction  -BMP in the a m  Discharge Plan: PT recommending IP rehab              [address] Utilization Review Department  Phone: [phone]; Fax [phone]  [email or web address]  ATTENTION: Please call with any questions or concerns to [phone]  and carefully listen to the prompts so that you are directed to the right person  Send all requests for admission clinical reviews, approved or denied determinations and any other requests to fax [phone]   All voicemails are confidential

## 2018-11-20 NOTE — OCCUPATIONAL THERAPY NOTE
[address]  Evaluation     Patient Name: Praful Mullen  FJRSU'X Date: 11/20/2018  Problem List  Patient Active Problem List   Diagnosis    Pneumonia    Closed comminuted fracture of hip, left, initial encounter ([facility] [address] )    Essential hypertension    Hyponatremia    Closed intertrochanteric fracture, left, initial encounter ([facility] [address] )    Low bicarbonate    Anemia    Thrombocytopenia (HCC)     Past Medical History  Past Medical History:   Diagnosis Date    Hypertension     Hyponatremia      Past Surgical History  Past Surgical History:   Procedure Laterality Date    MD OPEN RX FEMUR FX+INTRAMED SHAY Left 11/17/2018    Procedure: Intramedullary nail fixation left hip fracture;  Surgeon: Jc Gray MD;  Location: [facility];  Service: Orthopedics      11/20/18 0845   Note Type   Note type Eval/Treat   Restrictions/Precautions   Weight Bearing Precautions Per Order Yes   LLE Weight Bearing Per Order WBAT   Other Precautions Chair Alarm; Bed Alarm;Pain; Fall Risk;WBS   Pain Assessment   Pain Assessment 0-10   Pain Score 3   Pain Type Acute pain;Surgical pain   Pain Location Leg;Hip   Pain Orientation Left   Effect of Pain on Daily Activities limits standing tolerance and I w/ LB ADL   Patient's Stated Pain Goal No pain   Hospital Pain Intervention(s) Repositioned; Ambulation/increased activity; Emotional support   Response to Interventions tolerated, OOB in chair w/ breakfast tray    Home Living   Type of [address] One level; Other (Comment); Able to live on main level with bedroom/bathroom  (3 JAMIE)   Bathroom Shower/Tub Walk-in shower   Bathroom Toilet Raised   Bathroom Equipment Other (Comment)  (no DME or grab bars)   [address] Other (Comment)  (no AD PTA)   Additional Comments Pt reports living w/ her  in one story ranch house w/ 3 J[address]IE   Prior Function   Level of [place] Independent with ADLs and functional mobility   Lives With Spouse   ADL Assistance Independent   IADLs Independent   Falls in the last 6 months 1 to 4  (pt reports 1)   Vocational Retired   Comments Pt reports I w/ ADL/ IADL PTA and no AD for functional mobility   Lifestyle   Autonomy Pt reports I w/ ADL/ IADl PTA    Reciprocal Relationships Pt reports supportive  who is retired and able to assist as needed   Service to Others Pt reports retired and added that she never worked outside home much   [address] Pt reports enjoying walking her dog   ADL   Eating Assistance 6  Modified independent   Eating Deficit Setup   Grooming Assistance 6  Modified Independent  (seated in chair at tray table)   Grooming Deficit [address] Unable to assess   LB [address] 3  Moderate Assistance   LB Bathing Deficit Supervision/safety;Setup; Increased time to complete   UB Dressing Assistance 5  Supervision/Setup   UB Dressing Deficit Setup; Increased time to complete;Supervision/safety   LB Dressing Assistance 2  Maximal Assistance   LB Dressing Deficit Setup;Don/doff R sock; Don/doff L sock; Increased time to complete;Supervision/safety;Verbal cueing; Requires assistive device for steadying   Toileting Assistance  4  Minimal Assistance   Toileting Deficit Bedside commode;Setup;Supervison/safety; Increased time to complete;Verbal cueing   Bed Mobility   Supine to Sit 3  Moderate assistance   Additional items Assist x 2; Increased time required;HOB elevated;Verbal cues;LE management; Bedrails   Sit to Supine Unable to assess   Additional Comments Pt seated OOB in chair post eval w/ call bell in reach, today's newspaper and PT, Vee Parker present   Transfers   Sit to Stand 3  Moderate assistance   Additional items Assist x 1; Armrests; Increased time required;Verbal cues   Stand to Sit 4  Minimal assistance   Additional items Assist x 1; Increased time required;Armrests; Verbal cues   Toilet transfer 3  Moderate assistance   Additional items Assist x 1; Increased time required;Commode;Verbal cues  (using RW)   Additional Comments Pt requires mod A sit to stand and min A stand to sit  Pt seen w/ PTEstelle this AM due to anticipated level of assistance and monitoring vitals  Functional Mobility   Functional Mobility 3  Moderate assistance  (min <> mod A)   Additional Comments increased time and cues for sequencing, walker mgmt, walker placement   Additional items Rolling walker   Balance   Static Sitting Fair +   Dynamic Sitting Poor +   Static Standing Poor +   Activity Tolerance   Activity Tolerance Patient limited by fatigue;Patient limited by pain   Medical Staff Made Aware spoke to PT, [address] spoke to RNAnita/Kristie   RUMAGGIE Assessment   RUE Assessment WFL   RUE Strength   RUE Overall Strength Within Functional Limits - able to perform ADL tasks with strength   LUE Assessment   LUE Assessment WFL   LUE Strength   LUE Overall Strength Within Functional Limits - able to perform ADL tasks with strength   Hand Function   Gross Motor Coordination Functional   Fine Motor Coordination Functional   Sensation   Light Touch No apparent deficits  (B UE)   Sharp/Dull Not tested   Vision-Basic Assessment   Current Vision Wears glasses all the time   Cognition   Overall Cognitive Status [facility]   Arousal/Participation Alert; Cooperative   Attention Within functional limits   Orientation Level Oriented to person;Oriented to place;Oriented to time;Oriented to situation   Memory Within functional limits   Following Commands Follows multistep commands with increased time or repetition   Comments Identified pt by full name and birthdate  Pt able to participate in conversation and provide social history  Alert and cooperative, benefits from cues for hand placement and walker mgmt during functional transfers   Assessment   Limitation Decreased ADL status; Decreased endurance;Decreased self-care trans;Decreased high-level ADLs   Assessment Pt is a 59yo female [facility] on 11/15/2018  Pt presents w/ closed communited fracture of left hip and signficant PMH impacting her occupational performancei ncluding HTN  Pt presents s/p IM nail 11/17 and WBAT L LE  Pt reports living w/  in [address] PTA and I w/ ADL/ IADL w  out use of AD  Upon evaluation, pt able to participate in conversation appropriately to provide social history  Pt required mod A x 2 to complete bed mobiltiy supine to sit at EOB  Pt required mod A sit to stand and min A stand to sit w/ increased time and cues for hand placement, tech  Pt completed LBD w/ max A, and toileting w/ min A and increased time  Pt presents w/ decreased standing tolerance, decreased standing balance, decreased activity tolerance, increased pain, decreased L LE ROM / strength, decreased sitting balance impacting her I w/ dressing, bathing, functional mobility, functional transfers, food pre / clean up, clothing mgmt, activity engagement,and community mobility  Pt would benefit from OT while in acute care to address deficit  From an OT perspective, pt would benefit from post acute rehab when medically stable for discharge from acute care to return to baseline level of I to return to PLOF w/ spouse  Will continue to follow   Goals   Patient Goals Pt stated that she would like to go home   Plan   Treatment Interventions ADL retraining;Functional transfer training;Patient/family training;Equipment evaluation/education;Continued evaluation; Activityengagement   Goal Expiration Date 11/27/18   OT Frequency 3-5x/wk   Recommendation   OT Discharge Recommendation Other (Comment)  (to post acute rehab)   Equipment Recommended Tub seat with back; Bedside commode   OT - OK to Discharge (to post acute rehab)   Barthel Index   Feeding 10   Bathing 0   Grooming Score 5   Dressing Score 5   Bladder Score 10   Bowels Score 10   Toilet Use Score 5   Transfers (Bed/Chair) Score 10   Mobility (Level Surface) Score 0   Stairs Score 0   Barthel Index Score 55   Modified [place] Scale   Modified [place] Scale 4   Pt goals to be met in 7 days:  1  Pt will complete bed mobiltiy supine <> sit w/ min A x 1 to max I w/ ADL performance  2  Pt will complete functional transfers to bed, chair, and commode using AD w/ S  3  Pt will complete functional transfer to shower using DME as needed and AD w/ S to max I and safety w  Bathing  4  Pt will demonstrate good attention and verbalize understanding of safety precautions during ADL performance  5  Pt will complete UBD w/ mod I after set- up  6  Pt will complete LBD w/ S after set- up using AE as needed to max I w/ ADL performance  7  Pt will demonstrate increased functional standing tolerance for at least 15 minutes using AD w/ S while engaged in oral hygiene standing at sink to max I w/ functional / community mobility to return to [facility] w/  and be able to walk dog  8   Pt will demonstrate good attention and participation in continued evaluation to assess for DME needs to assist in safe discharge planning    Jessica Parra, OTR/L

## 2018-11-20 NOTE — PLAN OF CARE
Problem: OCCUPATIONAL THERAPY ADULT  Goal: Performs self-care activities at highest level of function for planned discharge setting  See evaluation for individualized goals  Treatment Interventions: ADL retraining, Functional transfer training, Patient/family training, Equipment evaluation/education, Continued evaluation, Activityengagement  Equipment Recommended: Tub seat with back, Bedside commode       See flowsheet documentation for full assessment, interventions and recommendations  Limitation: Decreased ADL status, Decreased endurance, Decreased self-care trans, Decreased high-level ADLs     Assessment: Pt is a 59yo female admitted [facility] on 11/15/2018  Pt presents w/ closed communited fracture of left hip and signficant PMH impacting her occupational performancei ncluding HTN  Pt presents s/p IM nail 11/17 and WBAT L LE  Pt reports living w/  in [address] PTA and I w/ ADL/ IADL w  out use of AD  Upon evaluation, pt able to participate in conversation appropriately to provide social history  Pt required mod A x 2 to complete bed mobiltiy supine to sit at EOB  Pt required mod A sit to stand and min A stand to sit w/ increased time and cues for hand placement, tech  Pt completed LBD w/ max A, and toileting w/ min A and increased time  Pt presents w/ decreased standing tolerance, decreased standing balance, decreased activity tolerance, increased pain, decreased L LE ROM / strength, decreased sitting balance impacting her I w/ dressing, bathing, functional mobility, functional transfers, food pre / clean up, clothing mgmt, activity engagement,and community mobility  Pt would benefit from OT while in acute care to address deficit  From an OT perspective, pt would benefit from post acute rehab when medically stable for discharge from acute care to return to baseline level of I to return to PLOF w/ spouse   Will continue to follow     OT Discharge Recommendation: Other (Comment) (to post acute rehab)  OT - OK to Discharge:  (to post acute rehab)

## 2018-11-20 NOTE — PROGRESS NOTES
[address] 61 y o  female MRN: [id number]  Unit/Bed#: -01      Subjective:  Pt seen this am  Notes overall improvement with regards to her left hip pain  No numbness/tingling  No fever/chills    Labs:    0  Lab Value Date/Time   HCT 25 7 (L) 11/20/2018 0645   HCT 26 1 (L) 11/19/2018 1735   HCT 21 6 (L) 11/19/2018 0449   HGB 9 0 (L) 11/20/2018 0645   HGB 9 1 (L) 11/19/2018 1735   HGB 7 4 (L) 11/19/2018 0449   INR 1 15 11/15/2018 1907   WBC 5 22 11/20/2018 0645   WBC 5 10 11/19/2018 0449   WBC 6 58 11/18/2018 0619   ESR 34 (H) 01/30/2018 0945   CRP 23 3 (H) 01/30/2018 0945       Meds:    Current Facility-Administered Medications:     acetaminophen (TYLENOL) tablet 650 mg, 650 mg, Oral, Q8H [address], Julio Rutledge PA-C, 650 mg at 11/20/18 0604    calcium carbonate (TUMS) chewable tablet 1,000 mg, 1,000 mg, Oral, Daily PRN, Wendy Case PA-C    docusate sodium (COLACE) capsule 100 mg, 100 mg, Oral, BID, Wendy Case PA-C, 100 mg at 11/19/18 0855    enoxaparin (LOVENOX) subcutaneous injection 40 mg, 40 mg, Subcutaneous, Daily, Julio Rutledge PA-C, 40 mg at 11/19/18 0854    lidocaine (LIDODERM) 5 % patch 1 patch, 1 patch, Topical, Daily, Julio Rutledge PA-C, 1 patch at 11/19/18 0856    methocarbamol (ROBAXIN) tablet 500 mg, 500 mg, Oral, Q6H PRN, Susan Dumont PA-C, 500 mg at 11/16/18 2110    metoprolol tartrate (LOPRESSOR) tablet 50 mg, 50 mg, Oral, BID, Julio Rutledge PA-C, 50 mg at 11/19/18 1856    morphine (MSIR) IR tablet 15 mg, 15 mg, Oral, Q4H PRN, Julio Rutledge PA-C, 15 mg at 11/19/18 1810    morphine (PF) 4 mg/mL injection 4 mg, 4 mg, Intravenous, Q4H PRN, Julio Rutledge PA-C, 4 mg at 11/17/18 1354    ondansetron (ZOFRAN) injection 4 mg, 4 mg, Intravenous, Q6H PRN, Wendy Case PA-C    pantoprazole (PROTONIX) EC tablet 40 mg, 40 mg, Oral, Daily, Wendy Case PA-C, 40 mg at 11/19/18 0856    senna (SENOKOT) tablet 8 6 mg, 1 tablet, Oral, Daily, Wendy Case LIZETTE, 8 6 mg at 11/19/18 0855    thiamine (VITAMIN B1) tablet 100 mg, 100 mg, Oral, Daily, Julio Rutledge PA-C, 100 mg at 11/19/18 0856    traMADol (ULTRAM) tablet 50 mg, 50 mg, Oral, Q6H PRN, Randell Brown PA-C, 50 mg at 11/17/18 1443    Physical exam:  Vitals:    11/19/18 2219   BP: 118/55   Pulse: 75   Resp: 18   Temp: 98 8 °F (37 1 °C)   SpO2: 97%     Left Hip:  · Dressings C/D/I  · Thigh compartments soft  No excessive soft tissue swelling  · Motor/Sensation intact LLE  · Palpable DP pulse  · X-rays left femur performed yesterday with stable alignment of orthopedic hardware in satisfactory position     _*_*_*_*_*_*_*_*_*_*_*_*_*_*_*_*_*_*_*_*_*_*_*_*_*_*_*_*_*_*_*_*_*_*_*_*_*_*_*_*_*    Assessment: 61 y  o female POD # s/p Left Hip Long TFN for IT fracture    Plan:  · Pain Control  · WBAT LLE  · PT/OT  · DVT proph with Lovenox for a total of 30 days post op  · ABLA, s/p transfusion PRBCs yesterday  Hgb 9 0 this am  Improved  Continue to monitor  · Ortho stable for d/c      Nisha Teixeira PA-C

## 2018-11-20 NOTE — ASSESSMENT & PLAN NOTE
· Stable  · Likely chronic from a review of patient's previous CBCs  · Likely related to patient's history of alcohol use  · Monitor

## 2018-11-20 NOTE — ASSESSMENT & PLAN NOTE
· Has a history of SIADH and follows with Dr Isacc Logan at [facility]  · Continue oral fluid restriction at 1200ml/day  · Started on salt tablets today and plan for torsemide 5 mg every other day per Nephrology

## 2018-11-20 NOTE — PHYSICAL THERAPY NOTE
PHYSICAL THERAPY TREATMENT NOTE    Patient Name: Janette Booth  LNINF'E Date: 11/20/2018 11/20/18 0855   Pain Assessment   Pain Assessment 0-10   Pain Score 3   Pain Type Acute pain   Pain Location Leg;Hip   Pain Orientation Left   Hospital Pain Intervention(s) Ambulation/increased activity;Repositioned   Response to Interventions tolerated   Restrictions/Precautions   Weight Bearing Precautions Per Order Yes   LLE Weight Bearing Per Order WBAT   Other Precautions Chair Alarm; Bed Alarm; Fall Risk;Pain   General   Chart Reviewed Yes   Additional Pertinent History BP stable during session; imaging of LLE (-)   Response to Previous Treatment Patient with no complaints from previous session  Family/Caregiver Present No   Cognition   Overall Cognitive Status WFL   Arousal/Participation Alert; Cooperative   Attention Within functional limits   Subjective   Subjective "I feel like a person again!"   Bed Mobility   Supine to Sit 3  Moderate assistance   Additional items Assist x 2;HOB elevated; Bedrails; Increased time required;Verbal cues;LE management   Sit to Supine Unable to assess  (left OOB in chair post session with alarm intact)   Transfers   Sit to Stand 3  Moderate assistance   Additional items Assist x 1; Increased time required;Verbal cues  (hand placement)   Stand to Sit 4  Minimal assistance   Additional items Assist x 1; Increased time required;Verbal cues  (hand placement)   Stand pivot 3  Moderate assistance   Additional items Assist x 1; Increased time required;Verbal cues   Additional Comments Pt was able to perform SPT to commode with RW  Ambulation/Elevation   Gait pattern Improper Weight shift;Decreased L stance;Decreased foot clearance; Forward Flexion; Antalgic; Short stride; Step to;Excessively slow   Gait Assistance 3  Moderate assist  (min/mod A x1)   Additional items Assist x 1;Verbal cues   Assistive Device Rolling walker   Distance 3` x1 and 6` x1   Balance   Static Sitting Fair + Dynamic Sitting Poor +   Static Standing Poor +   Dynamic Standing Poor +   Ambulatory Poor   Endurance Deficit   Endurance Deficit Yes   Endurance Deficit Description limited ambulation distance  (pt reports feeling "sweaty", BP stable)   Activity Tolerance   Activity Tolerance Patient tolerated treatment well;Patient limited by pain; Patient limited by fatigue   Nurse Made Aware Per RN, pt appropriate to treat   Exercises   Glute Sets Sitting;10 reps;AROM; Bilateral  (x2)   Hip Adduction Sitting;10 reps;AROM; Bilateral  (x2)   Knee AROM Long Arc Quad Sitting;10 reps;AROM; Bilateral  (x2)   Ankle Pumps Sitting;10 reps;AROM; Bilateral  (x2)   Assessment   Prognosis Good   Problem List Decreased strength;Decreased range of motion;Decreased endurance;Decreased mobility;Pain   Assessment Pt tolerated treatment well and is progressing with overall functional mobility  BP remained stable during transfers and ambulation with RW  Pt was able to use bedside commode and ambulate short distances with RW and verbal cues for RW management  Educated on HEP and made recommendations for continued completion throughout the day  Pt reports feeling diaphoretic, however also reports feeling great that she is able to move her legs again  Pt left OOB in chair with all needs met and in reach  Will continue to benefit from ongoing skilled PT to maximize her functional mobility and increase her level of independence  Recommending rehab at time of discharge pending progress  Goals   Patient Goals Pt would like to go home  STG Expiration Date 12/01/18   Treatment Day 3   Plan   Treatment/Interventions Functional transfer training;LE strengthening/ROM; Therapeutic exercise; Endurance training;Patient/family training;Equipment eval/education; Bed mobility;Gait training   Progress Progressing toward goals   PT Frequency 7x/wk; Twice a day   Recommendation   Recommendation Post acute IP rehab   Equipment Recommended [address] [place] [address] PT,DPT

## 2018-11-20 NOTE — ASSESSMENT & PLAN NOTE
· Improving  · Patient has acute on chronic hyponatremia  Patient follows with [facility] from [address]  · According to nephrologist, 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  · Patient's sodium further decreased with IV fluids with normal saline  · Nephrologist on board  · Patient's on fluid restriction  · Status post Lasix doses  · Sodium bicarbonate given  · Monitor BMP

## 2018-11-20 NOTE — ASSESSMENT & PLAN NOTE
· POA, secondary to mechanical fall  · Status post intramedullary nail fixation, 11/17  · Pain control  · Continue PT/OT    Patient is now agreeable to rehab and currently pending placement

## 2018-11-20 NOTE — ASSESSMENT & PLAN NOTE
· Worsened  · Likely due to acute blood loss anemia from surgery  · From my review of patient's previous CBC, likely history of chronic anemia  · Contributory factor:  hemodilution from IV fluids  · No active bleeding at this point  · Monitor  · With significant orthostasis during physical therapy, patient was transfused with packed RBCs, 11/18 and 11/19

## 2018-11-20 NOTE — ASSESSMENT & PLAN NOTE
· POA, secondary to mechanical fall  Neurovascularly intact at this time  Ortho aware of case  EKG and CXR normal   · Orthopedic surgery on board  · Status post intramedullary nail fixation, 11/17  · Pain control  · PT/OT evaluation and management

## 2018-11-20 NOTE — PROGRESS NOTES
Progress Note - Kellie Valiente 1955, 61 y o  female MRN: [id number]    Unit/Bed#: -01 Encounter: [id number]    Primary Care Provider: Tracy Paris MD   Date and time admitted to hospital: 11/15/2018  6:25 PM    * Closed comminuted fracture of hip, left, initial encounter [facility])   Assessment & Plan    · POA, secondary to mechanical fall  · Status post intramedullary nail fixation, 11/17  · Pain control  · Continue PT/OT  Patient is now agreeable to rehab and currently pending placement     Essential hypertension   Assessment & Plan    · BP stable  Continue metoprolol     Anemia   Assessment & Plan    · Secondary to acute blood loss from surgery  · She is status post PRBC transfusion on 11/18 and 11/19  HGB stable at 9 today  · Continue to monitor  No active bleeding at this time     Thrombocytopenia (HCC)   Assessment & Plan    · Chronic and stable     Hyponatremia   Assessment & Plan    · Has a history of SIADH and follows with Dr Cipriano Enriquez at [address]  · Continue oral fluid restriction at 1200ml/day  · Started on salt tablets today and plan for torsemide 5 mg every other day per Nephrology       VTE Pharmacologic Prophylaxis:   Pharmacologic: Enoxaparin (Lovenox)  Mechanical VTE Prophylaxis in Place: Yes    Patient Centered Rounds: I have performed bedside rounds with nursing staff today  Discussions with Specialists or Other Care Team Provider:  Nursing/case management    Education and Discussions with Family / Patient:  Patient    Current Length of Stay: 5 day(s)    Current Patient Status: Inpatient   Certification Statement: The patient will continue to require additional inpatient hospital stay due to Awaiting rehab placement    Discharge Plan:  Anticipate discharge to rehab either later today or tomorrow    Code Status: Level 1 - Full Code      Subjective:   No new complaints  Denies any chest pain, no shortness of breath, no fever or chills  Pleasant   Still having difficulty ambulating and is now agreeable to inpatient rehab  Pain is well controlled  Objective:     Vitals:   Temp (24hrs), Av 7 °F (37 1 °C), Min:97 7 °F (36 5 °C), Max:99 5 °F (37 5 °C)    Temp:  [97 7 °F (36 5 °C)-99 5 °F (37 5 °C)] 99 5 °F (37 5 °C)  HR:  [74-92] 92  Resp:  [18-20] 18  BP: (107-131)/(54-59) 110/57  SpO2:  [97 %-100 %] 99 %  Body mass index is 24 52 kg/m²  Input and Output Summary (last 24 hours): Intake/Output Summary (Last 24 hours) at 18 1713  Last data filed at 18 1524   Gross per 24 hour   Intake             1000 ml   Output             1209 ml   Net             -209 ml       Physical Exam:  General Appearance:    Alert, cooperative, no distress, appropriately responsive    Head:    Normocephalic, without obvious abnormality, atraumatic, mucous membranes moist    Eyes:    Conjunctiva/corneas clear, EOM's intact   Neck:   Supple   Lungs:     Clear to auscultation bilaterally, respirations unlabored, no crackles or wheeze     Heart:    Regular rate and rhythm, S1 and S2    Abdomen:     Soft, non-tender, bowel sounds active all four quadrants,     no masses, no organomegaly   Extremities:   Left thigh surgical incisions intact   Neurologic:   nonfocal         Additional Data:     Labs:      Results from last 7 days  Lab Units 18  0645  11/15/18  1907   WBC Thousand/uL 5 22  < > 5 43   HEMOGLOBIN g/dL 9 0*  < > 11 1*   HEMATOCRIT % 25 7*  < > 31 8*   PLATELETS Thousands/uL 109*  < > 144*   NEUTROS PCT %  --   --  44   LYMPHS PCT %  --   --  46*   MONOS PCT %  --   --  7   EOS PCT %  --   --  2   < > = values in this interval not displayed  Results from last 7 days  Lab Units 18  0645   POTASSIUM mmol/L 3 6   CHLORIDE mmol/L 95*   CO2 mmol/L 25   BUN mg/dL 8   CREATININE mg/dL 0 66   CALCIUM mg/dL 8 6       Results from last 7 days  Lab Units 11/15/18  1907   INR  1 15       * I Have Reviewed All Lab Data Listed Above  * Additional Pertinent Lab Tests Reviewed:  All Labs For Current Hospital Admission Reviewed    Cultures:   Blood Culture:   Lab Results   Component Value Date    BLOODCX No Growth After 5 Days  03/08/2016    BLOODCX No Growth After 5 Days  03/08/2016     Urine Culture:   Lab Results   Component Value Date    URINECX >100,000 cfu/ml Klebsiella oxytoca ESBL (A) 06/30/2017     Sputum Culture: No components found for: SPUTUMCX  Wound Culture: No results found for: WOUNDCULT    Last 24 Hours Medication List:     Current Facility-Administered Medications:  acetaminophen 650 mg Oral Q8H [address] Julio P Rutledge, PA-C   calcium carbonate 1,000 mg Oral Daily PRN Cassi Blare, PA-C   docusate sodium 100 mg Oral BID Cassi Blare, PA-C   enoxaparin 40 mg Subcutaneous Daily Julio Linda Lust, PA-C   lidocaine 1 patch Topical Daily Julio P Rutledge, PA-C   methocarbamol 500 mg Oral Q6H PRN Julio Linda Lust, PA-C   metoprolol tartrate 50 mg Oral BID Julio Linda Lust, PA-C   morphine 15 mg Oral Q4H PRN Julio Linda Lust, PA-C   morphine injection 4 mg Intravenous Q4H PRN Julio P Rutledge, PA-C   ondansetron 4 mg Intravenous Q6H PRN Cassi Blare, PA-C   pantoprazole 40 mg Oral Daily Cassi Blare, PA-C   senna 1 tablet Oral Daily Cassi Blare, PA-C   sodium chloride 1 g Oral BID With Meals AQUILES Garnett   thiamine 100 mg Oral Daily Julio Linda Lust, PA-C   torsemide 5 mg Oral Every Other Day AQUILES Garnett   traMADol 50 mg Oral Q6H PRN Juliorolan Aaronst, PA-C        Today, Patient Was Seen By: Mónica Logan MD    ** Please Note: Dragon 360 Dictation voice to text software may have been used in the creation of this document   **

## 2018-11-20 NOTE — ASSESSMENT & PLAN NOTE
· Secondary to acute blood loss from surgery  · She is status post PRBC transfusion on 11/18 and 11/19  HGB stable at 9 today  · Continue to monitor    No active bleeding at this time

## 2018-11-20 NOTE — PLAN OF CARE
Problem: DISCHARGE PLANNING - CARE MANAGEMENT  Goal: Discharge to post-acute care or home with appropriate resources  INTERVENTIONS:  - Conduct assessment to determine patient/family and health care team treatment goals, and need for post-acute services based on payer coverage, community resources, and patient preferences, and barriers to discharge  - Address psychosocial, clinical, and financial barriers to discharge as identified in assessment in conjunction with the patient/family and health care team  - Arrange appropriate level of post-acute services according to patients   needs and preference and payer coverage in collaboration with the physician and health care team  - Communicate with and update the patient/family, physician, and health care team regarding progress on the discharge plan  - Arrange appropriate transportation to post-acute venues   Outcome: Progressing  Tent d c later today to [facility] pending auth  Agueda C will complete auth and make contact with CM later today  CM spoke with Victorino from [facility] who is working on obtain transport for after 5:45pm this evening  Cm review cost for [facility] with patient who was agreeable to cost  CM will wait for return call and will follow up with patient later today  CM will continue to follow patient through discharge

## 2018-11-20 NOTE — PHYSICAL THERAPY NOTE
Physical Therapy Cancellation Note      Pt approached, however with PCA and just returned to bed  Pt reports increased pain 8/10 and not currently appropriate for further mobility  Will hold PT treatment until 11/21  Pt also reports tentative discharge tonight or tomorrow  Will continue to follow as appropriate     Farhana Padron, PT,DPT

## 2018-11-20 NOTE — PROGRESS NOTES
[facility]   Haresh Wagner 61 y o  female MRN: [id number]  Unit/Bed#: -01 Encounter: [id number]  Reason for Consult:  Hyponatremia    ASSESSMENT and PLAN:  1  Hyponatremia:    · Due to SIADH  Patient follows with Dr Yris Wade from [facility]  · Hypoosmolar- serum osmolality 275, urine osmolality 393, urine sodium 17  · Previous imaging in August shows 8 mm subpleural lung nodule  · Currently on fluid restriction 1200 mL a day  · Sodium level previously improved after administration of diuretic but dropped again postoperatively  Yesterday sodium level 129  Appeared to be improving slowly with fluid restriction  Today sodium level is down to 128  · Plan:  Start salt tablets 1 g b i d  And torsemide 5 mg every other day  Check labs in the a m  Juan M Manifold Continue fluid restriction  K-Dur 20 mEq x1  (Potassium 3 6)  2  Left intertrochanteric fracture:    · Orthopedics following  · Status post repair  3  Low bicarbonate:    · Resolved, stable  4  History of hematuria:    · Follows with Urology  Previous bladder biopsy showed inflammation  · 1-2 RBCs on urinalysis  5  Hypertension:    · Blood pressure acceptable  On metoprolol  · Started salt tablets for hyponatremia  Monitor blood pressure  6  Anemia:  Received 2 units of packed cells during hospitalization  Last unit 11/19 (Hgb 7 4)  Hemoglobin improved up to 9 this morning       SUBJECTIVE / INTERVAL HISTORY:  Pain left hip particularly with movement  No other complaints      OBJECTIVE:  Current Weight: Weight - Scale: 64 8 kg (142 lb 13 7 oz)  Vitals:    11/19/18 1133 11/19/18 1542 11/19/18 1856 11/19/18 2219   BP: 126/56 141/65 131/59 118/55   BP Location: Right arm Right arm  Right arm   Pulse: 73 80 79 75   Resp: 18 18 18   Temp: (!) 100 6 °F (38 1 °C) 99 °F (37 2 °C)  98 8 °F (37 1 °C)   TempSrc: Oral Oral  Oral   SpO2: 99% 99%  97%   Weight:       Height:           Intake/Output Summary (Last 24 hours) at 11/20/18 0830  Last data filed at 11/20/18 1159   Gross per 24 hour   Intake             1040 ml   Output             1109 ml   Net              -69 ml     General:  No acute distress  Skin:  Warm and dry  Eyes:  Sclera clear  ENT:  Oropharynx moist  Neck:  Supple no JVD  Chest:  Clear bilaterally  CVS:  Regular rate and rhythm, no murmur rub or gallop appreciated  Abdomen:  Soft nondistended nontender bowel sounds present  Extremities:  Swelling left thigh    No peripheral edema noted  :  No Davies, status post removal  Neuro:  Alert and oriented x3  Psych:  Appropriate mood and affect  Medications:    Current Facility-Administered Medications:     acetaminophen (TYLENOL) tablet 650 mg, 650 mg, Oral, Q8H [address], NAVJOT Bronson-C, 650 mg at 11/20/18 0604    calcium carbonate (TUMS) chewable tablet 1,000 mg, 1,000 mg, Oral, Daily PRN, Boogie Avila PA-C    docusate sodium (COLACE) capsule 100 mg, 100 mg, Oral, BID, Boogie Avila PA-C, 100 mg at 11/19/18 0855    enoxaparin (LOVENOX) subcutaneous injection 40 mg, 40 mg, Subcutaneous, Daily, Julio Rutledge PA-C, 40 mg at 11/19/18 0854    lidocaine (LIDODERM) 5 % patch 1 patch, 1 patch, Topical, Daily, NAVJOT Bronson-C, 1 patch at 11/19/18 0856    methocarbamol (ROBAXIN) tablet 500 mg, 500 mg, Oral, Q6H PRN, NAVJOT Anna-C, 500 mg at 11/16/18 2110    metoprolol tartrate (LOPRESSOR) tablet 50 mg, 50 mg, Oral, BID, Julio Rutledge PA-C, 50 mg at 11/19/18 1856    morphine (MSIR) IR tablet 15 mg, 15 mg, Oral, Q4H PRN, Julio Rutledge PA-C, 15 mg at 11/19/18 1810    morphine (PF) 4 mg/mL injection 4 mg, 4 mg, Intravenous, Q4H PRN, Julio Rutledge PA-C, 4 mg at 11/17/18 1354    ondansetron (ZOFRAN) injection 4 mg, 4 mg, Intravenous, Q6H PRN, Boogie Avila PA-C    pantoprazole (PROTONIX) EC tablet 40 mg, 40 mg, Oral, Daily, Boogie Avila PA-C, 40 mg at 11/19/18 0856    potassium chloride (K-DUR,KLOR-CON) CR tablet 20 mEq, 20 mEq, Oral, Once, Mike Mack, AQUILES    senna (SENOKOT) tablet 8 6 mg, 1 tablet, Oral, Daily, Danielle Cabrera PA-C, 8 6 mg at 11/19/18 0855    sodium chloride tablet 1 g, 1 g, Oral, BID With Meals, AQUILES Francis    thiamine (VITAMIN B1) tablet 100 mg, 100 mg, Oral, Daily, Julio Rutledge PA-C, 100 mg at 11/19/18 0856    torsemide (DEMADEX) tablet 5 mg, 5 mg, Oral, Every Other Day, AQUIELS Francis    traMADol Nel Carbo) tablet 50 mg, 50 mg, Oral, Q6H PRN, Gregg Leblanc PA-C, 50 mg at 11/17/18 1443    Laboratory Results:    Results from last 7 days  Lab Units 11/20/18  0645 11/19/18  1735 11/19/18  0449 11/18/18  0619 11/17/18  0435 11/16/18  1344 11/16/18  1017 11/16/18  0621 11/16/18  0259 11/15/18  2101 11/15/18  1907   WBC Thousand/uL 5 22  --  5 10 6 58 4 91  --   --  4 93  --   --  5 43   HEMOGLOBIN g/dL 9 0* 9 1* 7 4* 7 2* 11 1*  --  9 0* 9 2*  --   --  11 1*   HEMATOCRIT % 25 7* 26 1* 21 6* 21 2* 31 9*  --  25 9* 26 1*  --   --  31 8*   PLATELETS Thousands/uL 109*  --  107* 126* 106*  --   --  115*  --  122* 144*   POTASSIUM mmol/L 3 6  --  3 6 4 7 4 3 3 7  --  3 9 3 9  --  3 6   CHLORIDE mmol/L 95*  --  96* 93* 95* 94*  --  95* 93*  --  91*   CO2 mmol/L 25  --  27 25 28 24  --  18* 20*  --  24   BUN mg/dL 8  --  10 13 6 5  --  4* 5  --  6   CREATININE mg/dL 0 66  --  0 67 0 99 0 79 0 59*  --  0 56* 0 49*  --  0 68   CALCIUM mg/dL 8 6  --  7 9* 7 9* 8 6 8 0*  --  8 0* 7 8*  --  8 6     Work up:

## 2018-11-20 NOTE — SOCIAL WORK
Tent d c later today to [facility] pending auth  Agueda C will complete auth and make contact with CM later today  CM spoke with Victorino from [facility] who is working on obtain transport for after 5:45pm this evening  Cm review cost for [facility] with patient who was agreeable to cost  CM will wait for return call and will follow up with patient later today  CM will continue to follow patient through discharge

## 2018-11-21 ENCOUNTER — TELEPHONE (OUTPATIENT)
Dept: OTHER | Facility: HOSPITAL | Age: 63
End: 2018-11-21

## 2018-11-21 VITALS
OXYGEN SATURATION: 98 % | SYSTOLIC BLOOD PRESSURE: 114 MMHG | HEART RATE: 85 BPM | HEIGHT: 64 IN | WEIGHT: 142.86 LBS | RESPIRATION RATE: 16 BRPM | BODY MASS INDEX: 24.39 KG/M2 | TEMPERATURE: 97.5 F | DIASTOLIC BLOOD PRESSURE: 58 MMHG

## 2018-11-21 LAB
ANION GAP SERPL CALCULATED.3IONS-SCNC: 10 MMOL/L (ref 4–13)
BUN SERPL-MCNC: 10 MG/DL (ref 5–25)
CALCIUM SERPL-MCNC: 8.8 MG/DL (ref 8.3–10.1)
CHLORIDE SERPL-SCNC: 97 MMOL/L (ref 100–108)
CO2 SERPL-SCNC: 24 MMOL/L (ref 21–32)
CREAT SERPL-MCNC: 0.64 MG/DL (ref 0.6–1.3)
GFR SERPL CREATININE-BSD FRML MDRD: 95 ML/MIN/1.73SQ M
GLUCOSE SERPL-MCNC: 91 MG/DL (ref 65–140)
POTASSIUM SERPL-SCNC: 3.8 MMOL/L (ref 3.5–5.3)
SODIUM SERPL-SCNC: 131 MMOL/L (ref 136–145)

## 2018-11-21 PROCEDURE — 80048 BASIC METABOLIC PNL TOTAL CA: CPT | Performed by: NURSE PRACTITIONER

## 2018-11-21 PROCEDURE — 97530 THERAPEUTIC ACTIVITIES: CPT

## 2018-11-21 PROCEDURE — 99024 POSTOP FOLLOW-UP VISIT: CPT | Performed by: ORTHOPAEDIC SURGERY

## 2018-11-21 PROCEDURE — 99232 SBSQ HOSP IP/OBS MODERATE 35: CPT | Performed by: INTERNAL MEDICINE

## 2018-11-21 PROCEDURE — 97116 GAIT TRAINING THERAPY: CPT

## 2018-11-21 PROCEDURE — 97110 THERAPEUTIC EXERCISES: CPT

## 2018-11-21 PROCEDURE — 99239 HOSP IP/OBS DSCHRG MGMT >30: CPT | Performed by: INTERNAL MEDICINE

## 2018-11-21 RX ORDER — ACETAMINOPHEN 325 MG/1
650 TABLET ORAL EVERY 8 HOURS
Refills: 0
Start: 2018-11-21 | End: 2018-11-26

## 2018-11-21 RX ORDER — TRAMADOL HYDROCHLORIDE 50 MG/1
50 TABLET ORAL EVERY 6 HOURS PRN
Qty: 30 TABLET | Refills: 0 | Status: SHIPPED | OUTPATIENT
Start: 2018-11-21

## 2018-11-21 RX ORDER — SODIUM CHLORIDE 1000 MG
1 TABLET, SOLUBLE MISCELLANEOUS 2 TIMES DAILY WITH MEALS
Refills: 0
Start: 2018-11-21

## 2018-11-21 RX ORDER — LIDOCAINE 50 MG/G
1 PATCH TOPICAL DAILY
Qty: 30 PATCH | Refills: 0 | Status: SHIPPED | OUTPATIENT
Start: 2018-11-22

## 2018-11-21 RX ORDER — SENNOSIDES 8.6 MG
1 TABLET ORAL DAILY
Qty: 120 EACH | Refills: 0 | Status: SHIPPED | OUTPATIENT
Start: 2018-11-22

## 2018-11-21 RX ORDER — MORPHINE SULFATE 15 MG/1
15 TABLET ORAL EVERY 4 HOURS PRN
Qty: 30 TABLET | Refills: 0 | Status: SHIPPED | OUTPATIENT
Start: 2018-11-21

## 2018-11-21 RX ORDER — DOCUSATE SODIUM 100 MG/1
100 CAPSULE, LIQUID FILLED ORAL 2 TIMES DAILY
Refills: 0
Start: 2018-11-21

## 2018-11-21 RX ORDER — TORSEMIDE 5 MG/1
5 TABLET ORAL EVERY OTHER DAY
Refills: 0
Start: 2018-11-22

## 2018-11-21 RX ADMIN — SODIUM CHLORIDE TAB 1 GM 1 G: 1 TAB at 08:13

## 2018-11-21 RX ADMIN — LIDOCAINE 1 PATCH: 50 PATCH CUTANEOUS at 08:19

## 2018-11-21 RX ADMIN — PANTOPRAZOLE SODIUM 40 MG: 40 TABLET, DELAYED RELEASE ORAL at 08:13

## 2018-11-21 RX ADMIN — METOPROLOL TARTRATE 50 MG: 50 TABLET, FILM COATED ORAL at 08:13

## 2018-11-21 RX ADMIN — ENOXAPARIN SODIUM 40 MG: 40 INJECTION SUBCUTANEOUS at 08:13

## 2018-11-21 RX ADMIN — SENNOSIDES 8.6 MG: 8.6 TABLET, FILM COATED ORAL at 08:13

## 2018-11-21 RX ADMIN — DOCUSATE SODIUM 100 MG: 100 CAPSULE, LIQUID FILLED ORAL at 08:13

## 2018-11-21 RX ADMIN — ACETAMINOPHEN 650 MG: 325 TABLET ORAL at 05:18

## 2018-11-21 RX ADMIN — MORPHINE SULFATE 15 MG: 15 TABLET ORAL at 05:18

## 2018-11-21 RX ADMIN — Medication 100 MG: at 08:13

## 2018-11-21 NOTE — PROGRESS NOTES
Orthopedics   Rowan Zhang 61 y o  female MRN: [id number]  Unit/Bed#: -01      Subjective:  61 y  o female post operative day for left femoral intramedullary nail  Pt doing well  Pain controlled      Labs:    0  Lab Value Date/Time   HCT 25 7 (L) 11/20/2018 0645   HCT 26 1 (L) 11/19/2018 1735   HCT 21 6 (L) 11/19/2018 0449   HGB 9 0 (L) 11/20/2018 0645   HGB 9 1 (L) 11/19/2018 1735   HGB 7 4 (L) 11/19/2018 0449   INR 1 15 11/15/2018 1907   WBC 5 22 11/20/2018 0645   WBC 5 10 11/19/2018 0449   WBC 6 58 11/18/2018 0619   ESR 34 (H) 01/30/2018 0945   CRP 23 3 (H) 01/30/2018 0945       Meds:    Current Facility-Administered Medications:     acetaminophen (TYLENOL) tablet 650 mg, 650 mg, Oral, Q8H [facility], NAVJOT Bronson-C, 650 mg at 11/21/18 0518    calcium carbonate (TUMS) chewable tablet 1,000 mg, 1,000 mg, Oral, Daily PRN, Rajat Menendez PA-C    docusate sodium (COLACE) capsule 100 mg, 100 mg, Oral, BID, Alric Prose, PA-C, 100 mg at 11/20/18 1709    enoxaparin (LOVENOX) subcutaneous injection 40 mg, 40 mg, Subcutaneous, Daily, NAVJOT Bronson-C, 40 mg at 11/20/18 0914    lidocaine (LIDODERM) 5 % patch 1 patch, 1 patch, Topical, Daily, NAVJOT Bronson-C, 1 patch at 11/20/18 5748    methocarbamol (ROBAXIN) tablet 500 mg, 500 mg, Oral, Q6H PRN, Renay Salmeron PA-C, 500 mg at 11/16/18 2110    metoprolol tartrate (LOPRESSOR) tablet 50 mg, 50 mg, Oral, BID, NAVJOT Bronson-C, 50 mg at 11/20/18 1709    morphine (MSIR) IR tablet 15 mg, 15 mg, Oral, Q4H PRN, Julio Rutledge PA-C, 15 mg at 11/21/18 0518    morphine (PF) 4 mg/mL injection 4 mg, 4 mg, Intravenous, Q4H PRN, Julio Rutledge PA-C, 4 mg at 11/17/18 1354    ondansetron (ZOFRAN) injection 4 mg, 4 mg, Intravenous, Q6H PRN, Rajat Menendez PA-C    pantoprazole (PROTONIX) EC tablet 40 mg, 40 mg, Oral, Daily, Alkirill Menendez PA-C, 40 mg at 11/20/18 2115    senna (SENOKOT) tablet 8 6 mg, 1 tablet, Oral, Daily, Orlando Griffin PA-C, 8 6 mg at 11/20/18 0915    sodium chloride tablet 1 g, 1 g, Oral, BID With Meals, AQUILES Markham, 1 g at 11/20/18 1709    thiamine (VITAMIN B1) tablet 100 mg, 100 mg, Oral, Daily, Julio Rutledge PA-C, 100 mg at 11/20/18 0915    torsemide (DEMADEX) tablet 5 mg, 5 mg, Oral, Every Other Day, QAUILES Markham    traMADol Dickinson Cieloman) tablet 50 mg, 50 mg, Oral, Q6H PRN, Julio Rutledge PA-C, 50 mg at 11/17/18 1443    Blood Culture:   Lab Results   Component Value Date    BLOODCX No Growth After 5 Days  03/08/2016       Wound Culture:   No results found for: WOUNDCULT    Ins and Outs:  I/O last 24 hours: In: 440 [P O :440]  Out: 525 [Urine:525]          Physical exam:  Vitals:    11/21/18 0001   BP: 143/69   Pulse: 70   Resp: 18   Temp: 99 8 °F (37 7 °C)   SpO2: 97%     left lower extremity  · Dressing clean dry intact  · Sensation intact L1-S1  · Motor intact to knee flexion/extension, EHL/FHL  · 2+ dorsalis pedis pulse    _*_*_*_*_*_*_*_*_*_*_*_*_*_*_*_*_*_*_*_*_*_*_*_*_*_*_*_*_*_*_*_*_*_*_*_*_*_*_*_*_*    Assessment: 61 y  o female post operative day for left femur IMN   Pt doing well    Plan:  · Up and out of bed  · Weightbearing as tolerated  · PT/OT  · DVT prophylaxis  · Analgesics  · Dispo: Saima Onofre for discharge from ortho perspective  · Patient transfused 2 days ago hemoglobin yesterday was 9 0 currently lab not resulted yet    Nathalia Andrade DO

## 2018-11-21 NOTE — PLAN OF CARE
Problem: Potential for Falls  Goal: Patient will remain free of falls  INTERVENTIONS:  - Assess patient frequently for physical needs  -  Identify cognitive and physical deficits and behaviors that affect risk of falls    -  [place] fall precautions as indicated by assessment   - Educate patient/family on patient safety including physical limitations  - Instruct patient to call for assistance with activity based on assessment  - Modify environment to reduce risk of injury  - Consider OT/PT consult to assist with strengthening/mobility   Outcome: Completed Date Met: 11/21/18      Problem: PAIN - ADULT  Goal: Verbalizes/displays adequate comfort level or baseline comfort level  Interventions:  - Encourage patient to monitor pain and request assistance  - Assess pain using appropriate pain scale  - Administer analgesics based on type and severity of pain and evaluate response  - Implement non-pharmacological measures as appropriate and evaluate response  - Consider cultural and social influences on pain and pain management  - Notify physician/advanced practitioner if interventions unsuccessful or patient reports new pain   Outcome: Completed Date Met: 11/21/18      Problem: SAFETY ADULT  Goal: Maintain or return to baseline ADL function  INTERVENTIONS:  -  Assess patient's ability to carry out ADLs; assess patient's baseline for ADL function and identify physical deficits which impact ability to perform ADLs (bathing, care of mouth/teeth, toileting, grooming, dressing, etc )  - Assess/evaluate cause of self-care deficits   - Assess range of motion  - Assess patient's mobility; develop plan if impaired  - Assess patient's need for assistive devices and provide as appropriate  - Encourage maximum independence but intervene and supervise when necessary  ¯ Involve family in performance of ADLs  ¯ Assess for home care needs following discharge   ¯ Request OT consult to assist with ADL evaluation and planning for discharge  ¯ Provide patient education as appropriate   Outcome: Completed Date Met: 11/21/18    Goal: Maintain or return mobility status to optimal level  INTERVENTIONS:  - Assess patient's baseline mobility status (ambulation, transfers, stairs, etc )    - Identify cognitive and physical deficits and behaviors that affect mobility  - Identify mobility aids required to assist with transfers and/or ambulation (gait belt, sit-to-stand, lift, walker, cane, etc )  - [place] fall precautions as indicated by assessment  - Record patient progress and toleration of activity level on Mobility SBAR; progress patient to next Phase/Stage  - Instruct patient to call for assistance with activity based on assessment  - Request Rehabilitation consult to assist with strengthening/weightbearing, etc    Outcome: Completed Date Met: 11/21/18      Problem: DISCHARGE PLANNING  Goal: Discharge to home or other facility with appropriate resources  INTERVENTIONS:  - Identify barriers to discharge w/patient and caregiver  - Arrange for needed discharge resources and transportation as appropriate  - Identify discharge learning needs (meds, wound care, etc )  - Arrange for interpretive services to assist at discharge as needed  - Refer to Case Management Department for coordinating discharge planning if the patient needs post-hospital services based on physician/advanced practitioner order or complex needs related to functional status, cognitive ability, or social support system   Outcome: Completed Date Met: 11/21/18      Problem: Knowledge Deficit  Goal: Patient/family/caregiver demonstrates understanding of disease process, treatment plan, medications, and discharge instructions  Complete learning assessment and assess knowledge base    Interventions:  - Provide teaching at level of understanding  - Provide teaching via preferred learning methods   Outcome: Completed Date Met: 11/21/18      Problem: Prexisting or High Potential for Compromised Skin Integrity  Goal: Skin integrity is maintained or improved  INTERVENTIONS:  - Identify patients at risk for skin breakdown  - Assess and monitor skin integrity  - Assess and monitor nutrition and hydration status  - Monitor labs (i e  albumin)  - Assess for incontinence   - Turn and reposition patient  - Assist with mobility/ambulation  - Relieve pressure over bony prominences  - Avoid friction and shearing  - Provide appropriate hygiene as needed including keeping skin clean and dry  - Evaluate need for skin moisturizer/barrier cream  - Collaborate with interdisciplinary team (i e  Nutrition, Rehabilitation, etc )   - Patient/family teaching   Outcome: Completed Date Met: 11/21/18      Problem: DISCHARGE PLANNING - CARE MANAGEMENT  Goal: Discharge to post-acute care or home with appropriate resources  INTERVENTIONS:  - Conduct assessment to determine patient/family and health care team treatment goals, and need for post-acute services based on payer coverage, community resources, and patient preferences, and barriers to discharge  - Address psychosocial, clinical, and financial barriers to discharge as identified in assessment in conjunction with the patient/family and health care team  - Arrange appropriate level of post-acute services according to patients   needs and preference and payer coverage in collaboration with the physician and health care team  - Communicate with and update the patient/family, physician, and health care team regarding progress on the discharge plan  - Arrange appropriate transportation to post-acute venues   Outcome: Completed Date Met: 11/21/18

## 2018-11-21 NOTE — PLAN OF CARE
Problem: PHYSICAL THERAPY ADULT  Goal: Performs mobility at highest level of function for planned discharge setting  See evaluation for individualized goals  Treatment/Interventions: Functional transfer training, LE strengthening/ROM, Therapeutic exercise, Endurance training, Patient/family training, Equipment eval/education, Bed mobility, Gait training, Compensatory technique education, Spoke to nursing, Spoke to MD, Family  Equipment Recommended: Other (Comment) (Rolling walker)       See flowsheet documentation for full assessment, interventions and recommendations  Outcome: Progressing  Prognosis: Good  Problem List: Decreased strength, Decreased range of motion, Decreased endurance, Decreased mobility, Orthopedic restrictions, Pain  Assessment: Patient motivated and eager to participate in therapy session  Continues to require mod a x 1 for supine to sit transfer with instruction for technique and LE positioning  Patient with consistent min a x1 for sit to stand with verbal instruction for hand placement and LE positioning however requires increased assistance from commode  Demonstrated ability to ambulate increased gait distance with roller walker and min to mod a x 1  Requires assistance for walker mangement and advancement as well as vebral instruciton for stepping sequencing and step length  Requires increased amount of time for all mobility tasks secondary to pain and expressed fear of falling  Participated in seated B LE exercise program with good understadning adn occasional cuing for form  Continue to focus on transfer and gait progression with reinforcement of walker management and techniuqe as appropriate  Recommendation: Post acute IP rehab          See flowsheet documentation for full assessment

## 2018-11-21 NOTE — DISCHARGE SUMMARY
Discharge Summary - [facility] Internal Medicine    Patient Information: Devon Malik 61 y o  female MRN: [id number]  Unit/Bed#: -01 Encounter: [id number]    Discharging Physician / Practitioner: Yasemin Mcgee MD  PCP: Khushi Her MD  Admission Date: 11/15/2018  Discharge Date: 11/21/18    Reason for Admission:  Hip fracture status post fall    Discharge Diagnoses:     Principal Problem:    Closed comminuted fracture of hip, left, initial encounter [facility])  Active Problems:    Essential hypertension    Anemia    Thrombocytopenia ([facility] [address] )    Hyponatremia    Consultations During Hospital Stay:  · Orthopedic surgery  · Nephrology    Procedures Performed:   · Intramedullary nail fixation left hip fracture (Left) on 11/17/18     Significant findings:  · X-ray hip/pelvis - Comminuted intratrochanteric fracture of the left femur  Hospital Course:   Devon Malik is a 61 y o  female patient who originally presented to the hospital on 11/15/2018 due to fall with comminuted intertrochanteric fracture of the left femur  She was also noted to be hyponatremic with sodium levels at 127 at presentation  She was seen by Orthopedic surgery and underwent IMN fixation of left hip fracture on 11/17/18  She did well postop and was recommended for rehab at discharge  Regarding hyponatremia, she was seen by nephrology, maintained on oral fluid restriction and eventually started on salt tabs as well as torsemide every other day  Etiology of hyponatremia was felt likely secondary to SIADH  She is recommended to follow up with the nephrologist at [facility] in 2-3 weeks and to have a BMP in 2 weeks  Condition at Discharge: stable     Discharge Day Visit / Exam:     Subjective:  No new complaints  Feels better today  Ambulated better/longer distance with PT this morning      Vitals: Blood Pressure: 114/58 (11/21/18 0900)  Pulse: 85 (11/21/18 0900)  Temperature: 97 5 °F (36 4 °C) (11/21/18 0900)  Temp Source: Oral (11/21/18 0900)  Respirations: 16 (11/21/18 0900)  Height: 5' 4" (162 6 cm) (11/15/18 2028)  Weight - Scale: 64 8 kg (142 lb 13 7 oz) (11/15/18 2028)  SpO2: 98 % (11/21/18 0900)    General Appearance:    Alert, cooperative, no distress, appropriately responsive    Head:    Normocephalic, without obvious abnormality, atraumatic, mucous membranes moist    Eyes:    Conjunctiva/corneas clear, EOM's intact   Neck:   Supple   Lungs:     Clear to auscultation bilaterally, respirations unlabored, no crackles or wheeze     Heart:    Regular rate and rhythm, S1 and S2    Abdomen:     Soft, non-tender, bowel sounds active all four quadrants,     no masses, no organomegaly   Extremities:   Left thigh surgical incisions intact   Neurologic:  nonfocal      Discharge instructions/Information to patient and family:   See after visit summary for information provided to patient and family  Provisions for Follow-Up Care:  See after visit summary for information related to follow-up care and any pertinent home health orders  Disposition: Short-term rehab at [facility]     Discussed with patient and spouse at the bedside  Updated on clinical status and care plan  All questions answered  Discharge Statement:  I spent >30 minutes discharging the patient  This time was spent on the day of discharge  I had direct contact with the patient on the day of discharge  Greater than 50% of the total time was spent examining patient, answering all patient questions, arranging and discussing plan of care with patient as well as directly providing post-discharge instructions  Additional time then spent on discharge activities  Discharge Medications:  See after visit summary for reconciled discharge medications provided to patient and family  ** Please Note: Dragon 360 Dictation voice to text software may have been used in the creation of this document   **

## 2018-11-21 NOTE — PLAN OF CARE
Problem: Potential for Falls  Goal: Patient will remain free of falls  INTERVENTIONS:  - Assess patient frequently for physical needs  -  Identify cognitive and physical deficits and behaviors that affect risk of falls    -  [place] fall precautions as indicated by assessment   - Educate patient/family on patient safety including physical limitations  - Instruct patient to call for assistance with activity based on assessment  - Modify environment to reduce risk of injury  - Consider OT/PT consult to assist with strengthening/mobility   Outcome: Progressing      Problem: PAIN - ADULT  Goal: Verbalizes/displays adequate comfort level or baseline comfort level  Interventions:  - Encourage patient to monitor pain and request assistance  - Assess pain using appropriate pain scale  - Administer analgesics based on type and severity of pain and evaluate response  - Implement non-pharmacological measures as appropriate and evaluate response  - Consider cultural and social influences on pain and pain management  - Notify physician/advanced practitioner if interventions unsuccessful or patient reports new pain   Outcome: Progressing      Problem: SAFETY ADULT  Goal: Maintain or return to baseline ADL function  INTERVENTIONS:  -  Assess patient's ability to carry out ADLs; assess patient's baseline for ADL function and identify physical deficits which impact ability to perform ADLs (bathing, care of mouth/teeth, toileting, grooming, dressing, etc )  - Assess/evaluate cause of self-care deficits   - Assess range of motion  - Assess patient's mobility; develop plan if impaired  - Assess patient's need for assistive devices and provide as appropriate  - Encourage maximum independence but intervene and supervise when necessary  ¯ Involve family in performance of ADLs  ¯ Assess for home care needs following discharge   ¯ Request OT consult to assist with ADL evaluation and planning for discharge  ¯ Provide patient education as appropriate   Outcome: Progressing    Goal: Maintain or return mobility status to optimal level  INTERVENTIONS:  - Assess patient's baseline mobility status (ambulation, transfers, stairs, etc )    - Identify cognitive and physical deficits and behaviors that affect mobility  - Identify mobility aids required to assist with transfers and/or ambulation (gait belt, sit-to-stand, lift, walker, cane, etc )  - [place] fall precautions as indicated by assessment  - Record patient progress and toleration of activity level on Mobility SBAR; progress patient to next Phase/Stage  - Instruct patient to call for assistance with activity based on assessment  - Request Rehabilitation consult to assist with strengthening/weightbearing, etc    Outcome: Progressing      Problem: DISCHARGE PLANNING  Goal: Discharge to home or other facility with appropriate resources  INTERVENTIONS:  - Identify barriers to discharge w/patient and caregiver  - Arrange for needed discharge resources and transportation as appropriate  - Identify discharge learning needs (meds, wound care, etc )  - Arrange for interpretive services to assist at discharge as needed  - Refer to Case Management Department for coordinating discharge planning if the patient needs post-hospital services based on physician/advanced practitioner order or complex needs related to functional status, cognitive ability, or social support system   Outcome: Progressing      Problem: Knowledge Deficit  Goal: Patient/family/caregiver demonstrates understanding of disease process, treatment plan, medications, and discharge instructions  Complete learning assessment and assess knowledge base    Interventions:  - Provide teaching at level of understanding  - Provide teaching via preferred learning methods   Outcome: Progressing      Problem: Prexisting or High Potential for Compromised Skin Integrity  Goal: Skin integrity is maintained or improved  INTERVENTIONS:  - Identify patients at risk for skin breakdown  - Assess and monitor skin integrity  - Assess and monitor nutrition and hydration status  - Monitor labs (i e  albumin)  - Assess for incontinence   - Turn and reposition patient  - Assist with mobility/ambulation  - Relieve pressure over bony prominences  - Avoid friction and shearing  - Provide appropriate hygiene as needed including keeping skin clean and dry  - Evaluate need for skin moisturizer/barrier cream  - Collaborate with interdisciplinary team (i e  Nutrition, Rehabilitation, etc )   - Patient/family teaching   Outcome: Progressing      Problem: DISCHARGE PLANNING - CARE MANAGEMENT  Goal: Discharge to post-acute care or home with appropriate resources  INTERVENTIONS:  - Conduct assessment to determine patient/family and health care team treatment goals, and need for post-acute services based on payer coverage, community resources, and patient preferences, and barriers to discharge  - Address psychosocial, clinical, and financial barriers to discharge as identified in assessment in conjunction with the patient/family and health care team  - Arrange appropriate level of post-acute services according to patients   needs and preference and payer coverage in collaboration with the physician and health care team  - Communicate with and update the patient/family, physician, and health care team regarding progress on the discharge plan  - Arrange appropriate transportation to post-acute venues   Outcome: Progressing

## 2018-11-21 NOTE — DISCHARGE INSTRUCTIONS
Care after your surgery:  · Ice and elevate the surgical site 3-4 times a day for 15 minutes   · Keep the bandages dry at all times  Remove 3 days after surgery and resume showering if the incision is dry  · Apply a clean bandage daily after showering until the staples are removed  · Ambulate with an assistive device until cleared by the physical therapist   · Weight bearing as tolerated on the operative leg  · Continue Lovenox for 4 weeks from the surgery date to prevent blood clots  · Do not drive until cleared by the surgeon  Call the office at [phone] if you have any of the following:  · Excessive redness or drainage at the surgical site  · Fever above 101 5° F   · Pain unrelieved by medication  · Any numbness, tingling, or excessive swelling of the operative extremity  Follow-Up Appointment:  · 10-14 days after surgery with Dr Kumar Jobs  Also follow up with your nephrologist for a repeat BMP in approximately 2 weeks

## 2018-11-21 NOTE — PHYSICAL THERAPY NOTE
PHYSICAL THERAPY NOTE    Patient Name: Dali Albrecht  WMGIW'L Date: 2018 0825   Pain Assessment   Pain Assessment No/denies pain   Pain Score No Pain  ("I feel nothing")   Pain Type Acute pain   Pain Location Hip;Leg   Pain Orientation Left   Restrictions/Precautions   Weight Bearing Precautions Per Order Yes   LLE Weight Bearing Per Order WBAT   Other Precautions Chair Alarm; Bed Alarm; Fall Risk;Pain;WBS   General   Family/Caregiver Present No   Subjective   Subjective Patient supine in bed and is agreeable to therapy session  Patient identifers obtained from name &   Bed Mobility   Supine to Sit 3  Moderate assistance   Additional items Assist x 1;HOB elevated; Bedrails; Increased time required;Verbal cues;LE management   Sit to Supine Unable to assess   Additional Comments Patient seated OOB in recliner post session with chair alarm engaged, call bell and belongings in reach  Transfers   Sit to Stand 4  Minimal assistance   Additional items Assist x 1; Armrests; Increased time required;Verbal cues   Stand to Sit 4  Minimal assistance   Additional items Assist x 1; Armrests; Increased time required;Verbal cues   Stand pivot 3  Moderate assistance   Additional items Assist x 1; Armrests; Increased time required;Verbal cues   Toilet transfer 3  Moderate assistance   Additional items Assist x 1;Commode; Increased time required;Verbal cues;Armrests  (using roller walker)   Ambulation/Elevation   Gait pattern Improper Weight shift;Decreased L stance;Decreased foot clearance; Forward Flexion; Antalgic; Short stride; Step to;Excessively slow   Gait Assistance 4  Minimal assist  (chair follow)   Additional items Verbal cues; Tactile cues   Assistive Device Rolling walker   Distance 3' x2, 21' x1   Balance   Static Sitting Fair +   Dynamic Sitting Fair -   Static Standing Poor +   Dynamic Standing Poor +   Ambulatory Poor + Endurance Deficit   Endurance Deficit Yes   Endurance Deficit Description limited ambulation distance   Activity Tolerance   Activity Tolerance Patient tolerated treatment well;Patient limited by fatigue;Patient limited by pain   Nurse Made Aware Spoke to GILDA Lua    Exercises   Glute Sets Sitting;10 reps;AROM; Bilateral   Knee AROM Long Arc Quad Sitting;10 reps;AROM; Bilateral   Ankle Pumps Sitting;10 reps;AROM; Bilateral   Assessment   Prognosis Good   Problem List Decreased strength;Decreased range of motion;Decreased endurance;Decreased mobility;Orthopedic restrictions;Pain   Assessment Patient motivated and eager to participate in therapy session  Continues to require mod a x 1 for supine to sit transfer with instruction for technique and LE positioning  Patient with consistent min a x1 for sit to stand with verbal instruction for hand placement and LE positioning however requires increased assistance from commode  Demonstrated ability to ambulate increased gait distance with roller walker and min to mod a x 1  Requires assistance for walker mangement and advancement as well as vebral instruciton for stepping sequencing and step length  Requires increased amount of time for all mobility tasks secondary to pain and expressed fear of falling  Participated in seated B LE exercise program with good understadning adn occasional cuing for form  Continue to focus on transfer and gait progression with reinforcement of walker management and techniuqe as appropriate  Goals   Patient Goals to get to rehab and get home   STG Expiration Date 12/01/18   Treatment Day 4   Plan   Treatment/Interventions Functional transfer training;LE strengthening/ROM; Therapeutic exercise; Endurance training;Patient/family training;Equipment eval/education; Bed mobility;Gait training   Progress Progressing toward goals   PT Frequency 7x/wk; Twice a day   Recommendation   Recommendation Post acute IP rehab   Equipment Recommended Estephania Carmen Marissa Ryder, PTA

## 2018-11-21 NOTE — PROGRESS NOTES
[facility] PROGRESS NOTE   Maral Noble 61 y o  female MRN: [id number]  Unit/Bed#: -01 Encounter: [id number]  Reason for Consult: hyponatremia    ASSESSMENT and PLAN:    79-year-old female with a past medical history of hyponatremia controlled with fluid restriction, was taken off of salt tablets slightly a few months ago due to hypertension, who presents on 11/15 with fall with a fracture of the left hip   Nephrology is consulted for hyponatremia     1) hyponatremia     Hypo osmolar      - patient's sodium level is slightly lower to 128 on 11/20  -continue fluid restriction  -agree with starting torsemide every other day; salt tab twice daily  -fluid restriction  -BMP in AM if still inpatient  - if discharged, pt needs to see her Nephrologist Dr Adam Holland in 2-3 weeks post d/c with BMP 2 weeks prior  - we will follow from [place], please call back if further issues/questions shall arise  Thank you     2) renal function-stable     3) anemia     - s/p pRBC - had low grade temp during infusion; none since        4) hip fracture     - repeat xray unrevealing  - s/p OR     5) subpleural lung nodule - will need outpatient f/u     SUBJECTIVE / INTERVAL HISTORY:    Pt denies complaints    OBJECTIVE:  Current Weight: Weight - Scale: 64 8 kg (142 lb 13 7 oz)  Vitals:    11/20/18 0841 11/20/18 1407 11/21/18 0001 11/21/18 0900   BP: 107/54 110/57 143/69 114/58   BP Location: Right arm Left arm Left arm Right arm   Pulse: 74 92 70 85   Resp: 20 18 18 16   Temp: 97 7 °F (36 5 °C) 99 5 °F (37 5 °C) 99 8 °F (37 7 °C) 97 5 °F (36 4 °C)   TempSrc: Oral Oral Oral Oral   SpO2: 100% 99% 97% 98%   Weight:       Height:           Intake/Output Summary (Last 24 hours) at 11/21/18 4654  Last data filed at 11/21/18 0600   Gross per 24 hour   Intake              320 ml   Output              525 ml   Net             -205 ml     General: NAD  Skin: no rash  Eyes: anicteric sclera  ENT: moist mucous membrane  Neck: supple  Chest: CTA b/l  CVS: s1s2  Abdomen: soft, nontender  Extremities: no edema LE  : no sher  Neuro: AAOX3  Psych: normal affect      Medications:    Current Facility-Administered Medications:     acetaminophen (TYLENOL) tablet 650 mg, 650 mg, Oral, Q8H [address], Julio Rutledge PA-C, 650 mg at 11/21/18 0518    calcium carbonate (TUMS) chewable tablet 1,000 mg, 1,000 mg, Oral, Daily PRN, Rena Quinteros PA-C    docusate sodium (COLACE) capsule 100 mg, 100 mg, Oral, BID, Rena Quinteros PA-C, 100 mg at 11/21/18 0813    enoxaparin (LOVENOX) subcutaneous injection 40 mg, 40 mg, Subcutaneous, Daily, Julio Rutledge PA-C, 40 mg at 11/21/18 0813    lidocaine (LIDODERM) 5 % patch 1 patch, 1 patch, Topical, Daily, Julio Rutledge PA-C, 1 patch at 11/21/18 0819    methocarbamol (ROBAXIN) tablet 500 mg, 500 mg, Oral, Q6H PRN, Sharon Desir PA-C, 500 mg at 11/16/18 2110    metoprolol tartrate (LOPRESSOR) tablet 50 mg, 50 mg, Oral, BID, Julio Rutledge PA-C, 50 mg at 11/21/18 0813    morphine (MSIR) IR tablet 15 mg, 15 mg, Oral, Q4H PRN, Julio Rutledge PA-C, 15 mg at 11/21/18 0518    morphine (PF) 4 mg/mL injection 4 mg, 4 mg, Intravenous, Q4H PRN, Julio Rutledge PA-C, 4 mg at 11/17/18 1354    ondansetron (ZOFRAN) injection 4 mg, 4 mg, Intravenous, Q6H PRN, Rena Quinteros PA-C    pantoprazole (PROTONIX) EC tablet 40 mg, 40 mg, Oral, Daily, Rena Quinteros PA-C, 40 mg at 11/21/18 0813    senna (SENOKOT) tablet 8 6 mg, 1 tablet, Oral, Daily, Rena Quinteros PA-C, 8 6 mg at 11/21/18 0813    sodium chloride tablet 1 g, 1 g, Oral, BID With Meals, AQUILES Ernst, 1 g at 11/21/18 0813    thiamine (VITAMIN B1) tablet 100 mg, 100 mg, Oral, Daily, Julio Rutledge PA-C, 100 mg at 11/21/18 0813    torsemide (DEMADEX) tablet 5 mg, 5 mg, Oral, Every Other Day, AQUILES Ernst    traMADol Marcial Driver) tablet 50 mg, 50 mg, Oral, Q6H PRN, Julio Rutledge PA-C, 50 mg at 11/17/18 1443    Laboratory Results:    Results from last 7 days  Lab Units 11/21/18  0443 11/20/18  0645 11/19/18  1735 11/19/18  0449 11/18/18  0619 11/17/18  0435 11/16/18  1344 11/16/18  1017 11/16/18  0621  11/15/18  2101 11/15/18  1907   WBC Thousand/uL  --  5 22  --  5 10 6 58 4 91  --   --  4 93  --   --  5 43   HEMOGLOBIN g/dL  --  9 0* 9 1* 7 4* 7 2* 11 1*  --  9 0* 9 2*  --   --  11 1*   HEMATOCRIT %  --  25 7* 26 1* 21 6* 21 2* 31 9*  --  25 9* 26 1*  --   --  31 8*   PLATELETS Thousands/uL  --  109*  --  107* 126* 106*  --   --  115*  --  122* 144*   POTASSIUM mmol/L 3 8 3 6  --  3 6 4 7 4 3 3 7  --  3 9  < >  --  3 6   CHLORIDE mmol/L 97* 95*  --  96* 93* 95* 94*  --  95*  < >  --  91*   CO2 mmol/L 24 25  --  27 25 28 24  --  18*  < >  --  24   BUN mg/dL 10 8  --  10 13 6 5  --  4*  < >  --  6   CREATININE mg/dL 0 64 0 66  --  0 67 0 99 0 79 0 59*  --  0 56*  < >  --  0 68   CALCIUM mg/dL 8 8 8 6  --  7 9* 7 9* 8 6 8 0*  --  8 0*  < >  --  8 6   < > = values in this interval not displayed

## 2018-12-05 ENCOUNTER — OFFICE VISIT (OUTPATIENT)
Dept: OBGYN CLINIC | Facility: CLINIC | Age: 63
End: 2018-12-05

## 2018-12-05 ENCOUNTER — APPOINTMENT (OUTPATIENT)
Dept: RADIOLOGY | Facility: CLINIC | Age: 63
End: 2018-12-05
Payer: COMMERCIAL

## 2018-12-05 VITALS
BODY MASS INDEX: 24.52 KG/M2 | HEIGHT: 64 IN | DIASTOLIC BLOOD PRESSURE: 81 MMHG | SYSTOLIC BLOOD PRESSURE: 155 MMHG | HEART RATE: 73 BPM

## 2018-12-05 DIAGNOSIS — Z98.890 S/P ORIF (OPEN REDUCTION INTERNAL FIXATION) FRACTURE: Primary | ICD-10-CM

## 2018-12-05 DIAGNOSIS — Z87.81 S/P ORIF (OPEN REDUCTION INTERNAL FIXATION) FRACTURE: Primary | ICD-10-CM

## 2018-12-05 DIAGNOSIS — S72.092A: ICD-10-CM

## 2018-12-05 PROCEDURE — 99024 POSTOP FOLLOW-UP VISIT: CPT | Performed by: SURGERY

## 2018-12-05 PROCEDURE — 73552 X-RAY EXAM OF FEMUR 2/>: CPT

## 2018-12-05 NOTE — PROGRESS NOTES
Assessment/Plan:  Patient ID: Marylu Paige 61 y o  female   Surgery: Intramedullary nail fixation left hip fracture - Left  Date of Surgery: 11/17/2018    Doing well 1st post-op visit 2 5 weeks s/p ORIF left intertrochanteric fracture  In about 1 week advised to apply skin lotion over her incisions, scar massage  Weight bearing and activities as tolerated  Compression stockings or elevation for swelling control  ICE/heat for comfort  Recommend taking one 325mg tablet once a day for another 10 days for DVT prophylaxis (advised to hold on taking the Aleve while taking the aspirin)  Can take Tylenol  Walker to cane progression  Follow Up:  5  week(s)    To Do Next Visit:    and X-rays of the  left  femur      CHIEF COMPLAINT:  Chief Complaint   Patient presents with    Left Hip - Post-op         SUBJECTIVE:  Marylu Paige is a 61y o  year old female who presents for follow up 1st postop visit 2 5 weeks after Intramedullary nail fixation left hip fracture - Left  Today patient has stiffness and discomfort  She presents with a rolling walker for ambulatory assistance  She has been getting home therapy  PHYSICAL EXAMINATION:  General: well developed and well nourished, alert, oriented times 3 and appears comfortable  Psychiatric: Normal    MUSCULOSKELETAL EXAMINATION:  Incisions: Clean, dry, intact  Surgery Site: Mild diffuse swelling with resolving erythema and ecchymosis at all incision areas  Mild left knee effusion  Range of Motion: As expected and And restricted  Neurovascular status: Neuro intact, good cap refill  Activity Restrictions: As tolerated  WBAT  Walker to cane progression       staples removed and Steri-Strips applied      STUDIES REVIEWED:  The attending physician has personally reviewed the pertinent films in PACS and interpretation is as follows:    Left femur x-rays taken reviewed the office today show:  Excellent reduction intertrochanteric fracture with IM nail and lag screw in acceptable position alignment, callus formation present  Distal locking screw noted  PROCEDURES PERFORMED:  Procedures  No Procedures performed today    Scribe Attestation    I,:   Patrice Allen am acting as a scribe while in the presence of the attending physician :        I,:   Aguedaorrah Hodgkins, MD personally performed the services described in this documentation    as scribed in my presence :          Portions of the record may have been created with voice recognition software   Occasional wrong word or "sound a like" substitutions may have occurred due to the inherent limitations of voice recognition software   Read the chart carefully and recognize, using context, where substitutions have occurred

## 2019-01-09 ENCOUNTER — OFFICE VISIT (OUTPATIENT)
Dept: OBGYN CLINIC | Facility: CLINIC | Age: 64
End: 2019-01-09

## 2019-01-09 ENCOUNTER — APPOINTMENT (OUTPATIENT)
Dept: RADIOLOGY | Facility: CLINIC | Age: 64
End: 2019-01-09
Payer: COMMERCIAL

## 2019-01-09 VITALS
HEART RATE: 66 BPM | BODY MASS INDEX: 24.52 KG/M2 | DIASTOLIC BLOOD PRESSURE: 84 MMHG | SYSTOLIC BLOOD PRESSURE: 158 MMHG | HEIGHT: 64 IN

## 2019-01-09 DIAGNOSIS — Z87.81 S/P ORIF (OPEN REDUCTION INTERNAL FIXATION) FRACTURE: ICD-10-CM

## 2019-01-09 DIAGNOSIS — Z98.890 S/P ORIF (OPEN REDUCTION INTERNAL FIXATION) FRACTURE: ICD-10-CM

## 2019-01-09 DIAGNOSIS — Z98.890 S/P ORIF (OPEN REDUCTION INTERNAL FIXATION) FRACTURE: Primary | ICD-10-CM

## 2019-01-09 DIAGNOSIS — Z87.81 S/P ORIF (OPEN REDUCTION INTERNAL FIXATION) FRACTURE: Primary | ICD-10-CM

## 2019-01-09 PROCEDURE — 99024 POSTOP FOLLOW-UP VISIT: CPT | Performed by: SURGERY

## 2019-01-09 PROCEDURE — 73552 X-RAY EXAM OF FEMUR 2/>: CPT

## 2019-01-09 NOTE — PROGRESS NOTES
Assessment/Plan:  Patient ID: Jose Haywood 61 y o  female   Surgery: Intramedullary nail fixation left hip fracture - Left  Date of Surgery: 11/17/2018    8 weeks s/p left intramedullary nail fixation  Start outpatient PT  We will see her back in 6 weeks time with repeat x-ray's  Follow Up:  6  week(s)    To Do Next Visit:  X-rays of the  left  femur      CHIEF COMPLAINT:  Chief Complaint   Patient presents with    Left Leg - Post-op         SUBJECTIVE:  Jose Haywood is a 61y o  year old female who presents for follow up after Intramedullary nail fixation left hip fracture - Left  Today patient has None and No Complaints  She has transitioned from ambulating with a walker to a cane  Debbie Chacho has finished home therapy         PHYSICAL EXAMINATION:  General: well developed and well nourished, alert, oriented times 3 and appears comfortable  Psychiatric: Normal    MUSCULOSKELETAL EXAMINATION:  Incision: healed  Surgery Site: no signs of infection   Range of Motion: As expected  Neurovascular status: Neuro intact, good cap refill  Activity Restrictions: No restrictions        STUDIES REVIEWED:  Images were reviewd in PACS:   Well-positioned hardware and a healed intertrochanteric hip fracture      PROCEDURES PERFORMED:  Procedures  No Procedures performed today       Scribe Attestation    I,:   Enedina Carlin am acting as a scribe while in the presence of the attending physician :        I,:   Radha Nguyen MD personally performed the services described in this documentation    as scribed in my presence :

## 2019-02-08 ENCOUNTER — EVALUATION (OUTPATIENT)
Dept: PHYSICAL THERAPY | Facility: CLINIC | Age: 64
End: 2019-02-08
Payer: COMMERCIAL

## 2019-02-08 DIAGNOSIS — Z96.7 FIXATION HARDWARE IN LEG: Primary | ICD-10-CM

## 2019-02-08 DIAGNOSIS — R26.9 ABNORMALITY OF GAIT: ICD-10-CM

## 2019-02-08 PROCEDURE — 97162 PT EVAL MOD COMPLEX 30 MIN: CPT | Performed by: PHYSICAL THERAPIST

## 2019-02-08 PROCEDURE — 97110 THERAPEUTIC EXERCISES: CPT | Performed by: PHYSICAL THERAPIST

## 2019-02-08 NOTE — PROGRESS NOTES
PT Evaluation     Today's date: 2019  Patient name: Husam Enriquez  : 1955  MRN: [id number]  Referring provider: Daryl Horne MD  Dx:   Encounter Diagnosis     ICD-10-CM    1  Fixation hardware in leg Z96 7    2  Abnormality of gait R26 9                   Assessment  Assessment details: Patient presents with signs and symptoms consistent with referring diagnosis  She has decreased ROM, weakness, altred gait and decreased functional mobility s/p L hip ORIF  Positive prognostic indicators include: Motivated to improve  Negative prognostic indicators include: (-) She presents with: pain, decreased: ROM, strength and  functional capacity  She requires skilled PT to address these deficits and restore maximal functional capacity  Thank you for this pleasant referral        Impairments: abnormal gait, abnormal or restricted ROM, activity intolerance, impaired balance, impaired physical strength, lacks appropriate home exercise program and pain with function  Understanding of Dx/Px/POC: good   Prognosis: good    Goals  ST-6 weeks  1  Patient to be independent with HEP  2   Decrease pain at least 2 subjective levels  LT-12 weeks  1    Patient to voice comfort with self management of condition  2   75% or > decreased pain  3   75% or > decreased functional deficits  4   Normalize AROM of all deficit planes  5   Normalize strength  7   Patient to voice understanding of activities/positions to avoid    9   Normalize Gait    Plan  Patient would benefit from: skilled PT  Referral necessary: No  Planned modality interventions: cryotherapy  Planned therapy interventions: IADL retraining, joint mobilization, manual therapy, motor coordination training, neuromuscular re-education, patient education, postural training, self care, strengthening, stretching, therapeutic activities, therapeutic exercise, home exercise program, flexibility, ADL training, balance and body mechanics training  Frequency: 2x week  Duration in weeks: 8  Treatment plan discussed with: patient        Subjective Evaluation    History of Present Illness  Date of onset: 11/15/2018  Date of surgery: 2018  Mechanism of injury: Chief Complaint:inability to L sidelye    History:  Pt fell on the ice onto her L hip  She suffered a L hip fracture and had surgery for ORIF 2 days later  She was hospitalized for 4 days then had a 2 week stay at [address] [place] [address]  She had home therapy until last week  She used no AD prior to fall but has been using a SPC for all mobility  She is retired  She lives in a ranch home with 3 or 2 steps to enter and a basement where her laundry  She has been using a step to pattern with stairs  She currently walks up to 1 block       PMH:     Aggravating Factors: Kneeling, sidelying, prolonged activity (> a few hours)  Relieving factors:  Rest, Heat    Functional Deficits:  Steps, Community mobility, sidelying, kneeling    Patient Goals: Walk normal, sidelye    Quality of life: good    Pain  At best pain ratin  At worst pain ratin  Location: L Lateral hip          Objective     Active Range of Motion   Left Hip   Flexion: 105 degrees   Abduction: 45 degrees     Strength/Myotome Testing     Left Hip   Planes of Motion   Flexion: 3+  Extension: 3+  Abduction: 3+  External rotation: 3+  Internal rotation: 3+    Additional Strength Details  Knee MMT 3+/5    General Comments:      Hip Comments   Gait: SPC, short step lengths  Steps: Step to with handrail leading with R LE for ascending  Able to perform reciprocal step up but poor control  (-) TTP  Knee AROM WFL  DL Squat: 80  SL Squat: NT  SLS: < 1 sec  5 Times Sit <--> Stand: 14 5 sec  TUG: SPC 15 5 sec      Flowsheet Rows      Most Recent Value   PT/OT G-Codes   Current Score  54   Projected Score  67   FOTO information reviewed  Yes          Precautions: (-)    Daily Treatment Diary     Manual              Hip flexion/Abd PROM Exercise Diary  2/8            HEP 10'                         Treadmill             Minisquats             SLS             Leg Press SL             Leg press DL             Total Gym Squats             Step up/down/lateral             Update HEP: Coming 1x/week             Standing Hip 3 way             TKE             Lunges                                                                                                            Modalities  2/8            CP prn

## 2019-02-11 ENCOUNTER — OFFICE VISIT (OUTPATIENT)
Dept: PHYSICAL THERAPY | Facility: CLINIC | Age: 64
End: 2019-02-11
Payer: COMMERCIAL

## 2019-02-11 DIAGNOSIS — Z96.7 FIXATION HARDWARE IN LEG: Primary | ICD-10-CM

## 2019-02-11 DIAGNOSIS — R26.9 ABNORMALITY OF GAIT: ICD-10-CM

## 2019-02-11 PROCEDURE — 97110 THERAPEUTIC EXERCISES: CPT

## 2019-02-11 PROCEDURE — 97112 NEUROMUSCULAR REEDUCATION: CPT

## 2019-02-11 PROCEDURE — 97116 GAIT TRAINING THERAPY: CPT

## 2019-02-11 PROCEDURE — 97140 MANUAL THERAPY 1/> REGIONS: CPT

## 2019-02-11 NOTE — PROGRESS NOTES
Daily Note     Today's date: 2019  Patient name: Allen Stanley  : 1955  MRN: [id number]  Referring provider: Jessica Jennings MD  Dx:   Encounter Diagnosis     ICD-10-CM    1  Fixation hardware in leg Z96 7    2  Abnormality of gait R26 9      1:1 with PTA 10:00- 10:55  Subjective: No changes since I E  Reports daily compliance of HEP offering no questions or concerns  Objective: See treatment diary below      Assessment: Tolerated treatment well  Patient demonstrated fatigue post treatment, exhibited good technique with therapeutic exercises and would benefit from continued PT  Introduced gait training on the TM with cues for step length and improved heel strike/push off  Plan: Continue per plan of care         Precautions: (-)     Daily Treatment Diary      Manual                     Hip flexion/Abd PROM- left    10 mins                                                                         Exercise Diary                     HEP 10'                                             Treadmill    5 mins                   Minisquats    2x10                   SLS                       Leg Press SL                       Leg press DL                       Total Gym Squats    L18  2x10                   Step up/down/lateral    6"   10 ea                    Update HEP: Coming 1x/week                       Standing Hip 3 way    2x10  B/L                   TKE                       Lunges                                                                                                                                                     Modalities                     CP prn    declined

## 2019-02-28 ENCOUNTER — APPOINTMENT (OUTPATIENT)
Dept: RADIOLOGY | Facility: CLINIC | Age: 64
End: 2019-02-28
Payer: COMMERCIAL

## 2019-02-28 ENCOUNTER — OFFICE VISIT (OUTPATIENT)
Dept: OBGYN CLINIC | Facility: CLINIC | Age: 64
End: 2019-02-28
Payer: COMMERCIAL

## 2019-02-28 VITALS
HEART RATE: 69 BPM | HEIGHT: 64 IN | DIASTOLIC BLOOD PRESSURE: 72 MMHG | BODY MASS INDEX: 24.52 KG/M2 | SYSTOLIC BLOOD PRESSURE: 120 MMHG

## 2019-02-28 DIAGNOSIS — Z98.890 S/P ORIF (OPEN REDUCTION INTERNAL FIXATION) FRACTURE: Primary | ICD-10-CM

## 2019-02-28 DIAGNOSIS — Z87.81 S/P ORIF (OPEN REDUCTION INTERNAL FIXATION) FRACTURE: Primary | ICD-10-CM

## 2019-02-28 DIAGNOSIS — Z98.890 S/P ORIF (OPEN REDUCTION INTERNAL FIXATION) FRACTURE: ICD-10-CM

## 2019-02-28 DIAGNOSIS — Z87.81 S/P ORIF (OPEN REDUCTION INTERNAL FIXATION) FRACTURE: ICD-10-CM

## 2019-02-28 DIAGNOSIS — M79.672 PAIN IN LEFT FOOT: ICD-10-CM

## 2019-02-28 PROCEDURE — 73552 X-RAY EXAM OF FEMUR 2/>: CPT

## 2019-02-28 PROCEDURE — 99213 OFFICE O/P EST LOW 20 MIN: CPT | Performed by: SURGERY

## 2019-02-28 NOTE — PROGRESS NOTES
ASSESSMENT/PLAN:      61 y o  female 15 weeks s/p left intramedullary nail fixation and left foot pain  Activities as tolerated  Ice rest and elevate left foot for the next few weeks  If her symptoms do not improve in 2 weeks, should should follow up with a sports medicine physician  Continue PT for left hip  Follow up in 6 weeks time with regards to her left hip with repeat left hip x-ray's  The patient verbalized understanding of exam findings and treatment plan  We engaged in the shared decision-making process and treatment options were discussed at length with the patient  Surgical and conservative management discussed today along with risks and benefits  Diagnoses and all orders for this visit:    S/P ORIF (open reduction internal fixation) fracture  -     XR femur 2 vw left; Future  -     Ambulatory referral to Physical Therapy; Future    Pain in left foot        Follow Up:  Return in about 6 months (around 8/28/2019)  To Do Next Visit:  Re-evaluation of current issue and X-rays of the  left  hip    ____________________________________________________________________________________________________________________________________________      CHIEF COMPLAINT:  Chief Complaint   Patient presents with    Left Leg - Post-op       SUBJECTIVE:  Arabella Mak is a 61y o  year old  female who presents to the office today 15 weeks s/p left intramedullary nail fixation performed on 11/17/18  Overall Arvind Goetz is doing well with regards to her left hip  She denies any pain today  She does state that her left foot became painful aprox  2 days ago  She notes her foot is painful with ambulating  She states that she noticed the foot pain after eating a lot of oranges  She has stopped eating oranges and it seems to be improving  Arvind Goetz was recently hospitalized for low sodium  I have personally reviewed all the relevant PMH, PSH, SH, FH, Medications and allergies       PAST MEDICAL HISTORY:  Past Medical History:   Diagnosis Date    Hypertension     Hyponatremia        PAST SURGICAL HISTORY:  Past Surgical History:   Procedure Laterality Date    [facility] OPEN RX FEMUR FX+INTRAMED SHAY Left 11/17/2018    Procedure: Intramedullary nail fixation left hip fracture;  Surgeon: Juana Arvizu MD;  Location: [facility];  Service: Orthopedics       FAMILY HISTORY:  Family History   Problem Relation Age of Onset    Heart disease Mother     Heart disease Father        SOCIAL HISTORY:  Social History     Tobacco Use    Smoking status: Former Smoker    Smokeless tobacco: Never Used   Substance Use Topics    Alcohol use: Yes     Alcohol/week: 0 6 oz     Types: 1 Cans of beer per week     Comment: 2-3 times per week  Drinks heavier before    Drug use: No       MEDICATIONS:    Current Outpatient Medications:     Calcium Carb-Cholecalciferol (CALCIUM + D3 PO), Take 200 Units by mouth daily  , Disp: , Rfl:     Cholecalciferol (VITAMIN D3) 3000 UNITS TABS, Take 100 Units by mouth daily  , Disp: , Rfl:     docusate sodium (COLACE) 100 mg capsule, Take 1 capsule (100 mg total) by mouth 2 (two) times a day, Disp: , Rfl: 0    lidocaine (LIDODERM) 5 %, Apply 1 patch topically daily Remove & Discard patch within 12 hours or as directed by MD, Disp: 30 patch, Rfl: 0    Magnesium 400 MG CAPS, Take 800 mg by mouth daily  , Disp: , Rfl:     metoprolol tartrate (LOPRESSOR) 25 mg tablet, Take 50 mg by mouth 2 (two) times a day  , Disp: , Rfl:     morphine (MSIR) 15 mg tablet, Take 1 tablet (15 mg total) by mouth every 4 (four) hours as needed for severe pain Earliest Fill Date: 11/21/18 Max Daily Amount: 90 mg, Disp: 30 tablet, Rfl: 0    Omega-3 Fatty Acids (FISH OIL) 1,000 mg, Take 1,000 mg by mouth daily  , Disp: , Rfl:     senna (SENOKOT) 8 6 mg, Take 1 tablet (8 6 mg total) by mouth daily, Disp: 120 each, Rfl: 0    sodium chloride 1 g tablet, Take 1 tablet (1 g total) by mouth 2 (two) times a day with meals, Disp: , Rfl: 0   thiamine 100 MG tablet, Take 100 mg by mouth daily  , Disp: , Rfl:     torsemide (DEMADEX) 5 MG tablet, Take 1 tablet (5 mg total) by mouth every other day, Disp: , Rfl: 0    traMADol (ULTRAM) 50 mg tablet, Take 1 tablet (50 mg total) by mouth every 6 (six) hours as needed for moderate pain, Disp: 30 tablet, Rfl: 0    enoxaparin (LOVENOX) 40 mg/0 4 mL, Inject 0 4 mL (40 mg total) under the skin daily for 28 days, Disp: , Rfl: 0    ALLERGIES:  Allergies   Allergen Reactions    Demerol [Meperidine] Other (See Comments)     hallucinations    Diphenhydramine Other (See Comments)     hallucinations    Prednisone Tremor    Sulfa Antibiotics Other (See Comments)     hallucinations    Percocet [Oxycodone-Acetaminophen] Palpitations       REVIEW OF SYSTEMS:  Review of Systems   Constitutional: Negative for chills, fever and unexpected weight change  HENT: Negative for hearing loss, nosebleeds and sore throat  Eyes: Negative for pain, redness and visual disturbance  Respiratory: Negative for cough, shortness of breath and wheezing  Cardiovascular: Negative for chest pain, palpitations and leg swelling  Gastrointestinal: Negative for abdominal pain, nausea and vomiting  Endocrine: Negative for polydipsia and polyuria  Genitourinary: Negative for difficulty urinating and hematuria  Musculoskeletal: Negative for arthralgias, joint swelling and myalgias  Skin: Negative for rash and wound  Neurological: Negative for dizziness, numbness and headaches  Psychiatric/Behavioral: Negative for decreased concentration, dysphoric mood and suicidal ideas  The patient is not nervous/anxious          VITALS:  Vitals:    02/28/19 1020   BP: 120/72   Pulse: 69       LABS:  HgA1c:   Lab Results   Component Value Date    HGBA1C 5 4 10/26/2016     BMP:   Lab Results   Component Value Date    GLUCOSE 96 03/08/2016    CALCIUM 8 8 11/21/2018    K 3 8 11/21/2018    CO2 24 11/21/2018    CL 97 (L) 11/21/2018    BUN 10 11/21/2018    CREATININE 0 64 11/21/2018       _____________________________________________________  PHYSICAL EXAMINATION:  General: well developed and well nourished, alert, oriented times 3 and appears comfortable  Psychiatric: Normal  HEENT: Normocephalic, Atraumatic Trachea Midline, No torticollis  Pulmonary: No audible wheezing or respiratory distress   Cardiovascular: No pitting edema, 2+ radial pulse   Skin: No masses, erythema, lacerations, fluctation, ulcerations  Neurovascular: Sensation Intact to the Median, Ulnar, Radial Nerve, Motor Intact to the Median, Ulnar, Radial Nerve and Pulses Intact  Musculoskeletal: Normal, except as noted in detailed exam and in HPI        MUSCULOSKELETAL EXAMINATION:    Left hip:    Ambulates with the use of a cane  Well healed surgical incision   No erythema  No ecchymosis   No edema     Left foot:    No erythema  Mild edema throughout left foot  Mid foot slightly TTP   Slightly blue toes     ___________________________________________________  STUDIES REVIEWED:  I have personally reviewed AP lateral and oblique radiographs of left femur demonstrates a healed intertrochanteric fracture with hardware in good position          PROCEDURES PERFORMED:  Procedures  No Procedures performed today    _____________________________________________________      Alexis Alan    I,:   Diana Carlin am acting as a scribe while in the presence of the attending physician :        I,:   Renaldo Estevez MD personally performed the services described in this documentation    as scribed in my presence :

## 2019-08-28 ENCOUNTER — OFFICE VISIT (OUTPATIENT)
Dept: OBGYN CLINIC | Facility: CLINIC | Age: 64
End: 2019-08-28
Payer: COMMERCIAL

## 2019-08-28 ENCOUNTER — APPOINTMENT (OUTPATIENT)
Dept: RADIOLOGY | Facility: AMBULARY SURGERY CENTER | Age: 64
End: 2019-08-28
Attending: SURGERY
Payer: COMMERCIAL

## 2019-08-28 VITALS
HEIGHT: 64 IN | BODY MASS INDEX: 22.02 KG/M2 | SYSTOLIC BLOOD PRESSURE: 169 MMHG | WEIGHT: 129 LBS | DIASTOLIC BLOOD PRESSURE: 80 MMHG | HEART RATE: 66 BPM

## 2019-08-28 DIAGNOSIS — Z87.81 S/P ORIF (OPEN REDUCTION INTERNAL FIXATION) FRACTURE: Primary | ICD-10-CM

## 2019-08-28 DIAGNOSIS — Z98.890 S/P ORIF (OPEN REDUCTION INTERNAL FIXATION) FRACTURE: ICD-10-CM

## 2019-08-28 DIAGNOSIS — Z98.890 S/P ORIF (OPEN REDUCTION INTERNAL FIXATION) FRACTURE: Primary | ICD-10-CM

## 2019-08-28 DIAGNOSIS — Z87.81 S/P ORIF (OPEN REDUCTION INTERNAL FIXATION) FRACTURE: ICD-10-CM

## 2019-08-28 PROCEDURE — 73502 X-RAY EXAM HIP UNI 2-3 VIEWS: CPT

## 2019-08-28 PROCEDURE — 99213 OFFICE O/P EST LOW 20 MIN: CPT | Performed by: SURGERY

## 2019-08-28 NOTE — PROGRESS NOTES
ASSESSMENT/PLAN:      59 y o  female 9 months s p left hip IM nail performed on 11/17/18  PT was ordered today for ROM, stretching and strengthening exercises  Diclofenac gel was prescribed today, can be used up to 4x a day with 5 refills available  I will see her back in 6 months time with repeat left hip x-ray's  The patient verbalized understanding of exam findings and treatment plan  We engaged in the shared decision-making process and treatment options were discussed at length with the patient  Surgical and conservative management discussed today along with risks and benefits  Diagnoses and all orders for this visit:    S/P ORIF (open reduction internal fixation) fracture  -     XR hip/pelv 2-3 vws left if performed; Future  -     Ambulatory referral to Physical Therapy; Future    Other orders  -     diclofenac sodium (VOLTAREN) 1 %; Apply 2 g topically 4 (four) times a day      Follow Up:  Return in about 6 months (around 2/28/2020)  To Do Next Visit:  Re-evaluation of current issue and X-rays of the  left  hip    ____________________________________________________________________________________________________________________________________________      CHIEF COMPLAINT:  Chief Complaint   Patient presents with    Left Hip - Follow-up       SUBJECTIVE:  Augustin Brennan is a 59y o  year old  female who presents to the office today for a follow up regarding a left hip IM nail that was performed on 11/17/18  Overall Viktor Lewis is doing well  She notes a clicking sensation when walking  Viktoralexx Lewis has been walking with the use of a cane, without difficulty  She notes busing/skin changes to the lateral aspect of her hip  I have personally reviewed all the relevant PMH, PSH, SH, FH, Medications and allergies       PAST MEDICAL HISTORY:  Past Medical History:   Diagnosis Date    Hypertension     Hyponatremia        PAST SURGICAL HISTORY:  Past Surgical History:   Procedure Laterality Date    [facility] OPEN RX FEMUR FX+INTRAMED SHAY Left 11/17/2018    Procedure: Intramedullary nail fixation left hip fracture;  Surgeon: Yaima Crooks MD;  Location: [facility];  Service: Orthopedics       FAMILY HISTORY:  Family History   Problem Relation Age of Onset    Heart disease Mother     Heart disease Father        SOCIAL HISTORY:  Social History     Tobacco Use    Smoking status: Former Smoker    Smokeless tobacco: Never Used   Substance Use Topics    Alcohol use: Yes     Alcohol/week: 1 0 standard drinks     Types: 1 Cans of beer per week     Comment: 2-3 times per week  Drinks heavier before    Drug use: No       MEDICATIONS:    Current Outpatient Medications:     Calcium Carb-Cholecalciferol (CALCIUM + D3 PO), Take 200 Units by mouth daily  , Disp: , Rfl:     Cholecalciferol (VITAMIN D3) 3000 UNITS TABS, Take 100 Units by mouth daily  , Disp: , Rfl:     docusate sodium (COLACE) 100 mg capsule, Take 1 capsule (100 mg total) by mouth 2 (two) times a day, Disp: , Rfl: 0    lidocaine (LIDODERM) 5 %, Apply 1 patch topically daily Remove & Discard patch within 12 hours or as directed by MD, Disp: 30 patch, Rfl: 0    Magnesium 400 MG CAPS, Take 800 mg by mouth daily  , Disp: , Rfl:     metoprolol tartrate (LOPRESSOR) 25 mg tablet, Take 50 mg by mouth 2 (two) times a day  , Disp: , Rfl:     morphine (MSIR) 15 mg tablet, Take 1 tablet (15 mg total) by mouth every 4 (four) hours as needed for severe pain Earliest Fill Date: 11/21/18 Max Daily Amount: 90 mg, Disp: 30 tablet, Rfl: 0    Omega-3 Fatty Acids (FISH OIL) 1,000 mg, Take 1,000 mg by mouth daily  , Disp: , Rfl:     senna (SENOKOT) 8 6 mg, Take 1 tablet (8 6 mg total) by mouth daily, Disp: 120 each, Rfl: 0    sodium chloride 1 g tablet, Take 1 tablet (1 g total) by mouth 2 (two) times a day with meals, Disp: , Rfl: 0    thiamine 100 MG tablet, Take 100 mg by mouth daily  , Disp: , Rfl:     torsemide (DEMADEX) 5 MG tablet, Take 1 tablet (5 mg total) by mouth every other day, Disp: , Rfl: 0    traMADol (ULTRAM) 50 mg tablet, Take 1 tablet (50 mg total) by mouth every 6 (six) hours as needed for moderate pain, Disp: 30 tablet, Rfl: 0    enoxaparin (LOVENOX) 40 mg/0 4 mL, Inject 0 4 mL (40 mg total) under the skin daily for 28 days, Disp: , Rfl: 0    ALLERGIES:  Allergies   Allergen Reactions    Demerol [Meperidine] Other (See Comments)     hallucinations    Diphenhydramine Other (See Comments)     hallucinations    Prednisone Tremor    Sulfa Antibiotics Other (See Comments)     hallucinations    Percocet [Oxycodone-Acetaminophen] Palpitations       REVIEW OF SYSTEMS:  Review of Systems   Constitutional: Negative for chills, fever and unexpected weight change  HENT: Negative for hearing loss, nosebleeds and sore throat  Eyes: Negative for pain, redness and visual disturbance  Respiratory: Negative for cough, shortness of breath and wheezing  Cardiovascular: Negative for chest pain, palpitations and leg swelling  Gastrointestinal: Negative for abdominal pain, nausea and vomiting  Endocrine: Negative for polydipsia and polyuria  Genitourinary: Negative for difficulty urinating and hematuria  Musculoskeletal: Negative for arthralgias, joint swelling and myalgias  Skin: Negative for rash and wound  Neurological: Negative for dizziness, numbness and headaches  Psychiatric/Behavioral: Negative for decreased concentration, dysphoric mood and suicidal ideas  The patient is not nervous/anxious          VITALS:  Vitals:    08/28/19 1010   BP: 169/80   Pulse: 66       LABS:  HgA1c:   Lab Results   Component Value Date    HGBA1C 5 4 10/26/2016     BMP:   Lab Results   Component Value Date    GLUCOSE 96 03/08/2016    CALCIUM 8 8 11/21/2018    K 3 8 11/21/2018    CO2 24 11/21/2018    CL 97 (L) 11/21/2018    BUN 10 11/21/2018    CREATININE 0 64 11/21/2018       _____________________________________________________  PHYSICAL EXAMINATION:  General: well developed and well nourished, alert, oriented times 3 and appears comfortable  Psychiatric: Normal  HEENT: Normocephalic, Atraumatic Trachea Midline, No torticollis  Pulmonary: No audible wheezing or respiratory distress   Cardiovascular: No pitting edema, 2+ radial pulse   Skin: No masses, erythema, lacerations, fluctation, ulcerations  Neurovascular: Sensation Intact to the Median, Ulnar, Radial Nerve, Motor Intact to the Median, Ulnar, Radial Nerve and Pulses Intact  Musculoskeletal: Normal, except as noted in detailed exam and in HPI        MUSCULOSKELETAL EXAMINATION:    Left hip:     Ambulates with the use of a cane  Well healed surgical incision  Limited ROM  No tenderness well-healed intertrochanteric hip fracture  Sensation intact   Skin warm and well perfused     ___________________________________________________  STUDIES REVIEWED:  I have personally reviewed AP lateral and oblique radiographs of left femur which demonstrates well-healed intertrochanteric hip fracture with stable hardware          PROCEDURES PERFORMED:  Procedures  No Procedures performed today    _____________________________________________________      Luis Loveless    I,:   Isaac Carlin am acting as a scribe while in the presence of the attending physician :        I,:   Shweta Marcos MD personally performed the services described in this documentation    as scribed in my presence :

## 2020-03-04 ENCOUNTER — OFFICE VISIT (OUTPATIENT)
Dept: OBGYN CLINIC | Facility: CLINIC | Age: 65
End: 2020-03-04
Payer: MEDICARE

## 2020-03-04 ENCOUNTER — APPOINTMENT (OUTPATIENT)
Dept: RADIOLOGY | Facility: AMBULARY SURGERY CENTER | Age: 65
End: 2020-03-04
Attending: SURGERY
Payer: MEDICARE

## 2020-03-04 VITALS
DIASTOLIC BLOOD PRESSURE: 78 MMHG | SYSTOLIC BLOOD PRESSURE: 166 MMHG | HEIGHT: 64 IN | HEART RATE: 83 BPM | WEIGHT: 129 LBS | BODY MASS INDEX: 22.02 KG/M2

## 2020-03-04 DIAGNOSIS — Z87.81 S/P ORIF (OPEN REDUCTION INTERNAL FIXATION) FRACTURE: ICD-10-CM

## 2020-03-04 DIAGNOSIS — Z98.890 S/P ORIF (OPEN REDUCTION INTERNAL FIXATION) FRACTURE: Primary | ICD-10-CM

## 2020-03-04 DIAGNOSIS — Z87.81 S/P ORIF (OPEN REDUCTION INTERNAL FIXATION) FRACTURE: Primary | ICD-10-CM

## 2020-03-04 DIAGNOSIS — Z98.890 S/P ORIF (OPEN REDUCTION INTERNAL FIXATION) FRACTURE: ICD-10-CM

## 2020-03-04 PROCEDURE — 73502 X-RAY EXAM HIP UNI 2-3 VIEWS: CPT

## 2020-03-04 PROCEDURE — 99213 OFFICE O/P EST LOW 20 MIN: CPT | Performed by: SURGERY

## 2020-03-04 NOTE — PROGRESS NOTES
ASSESSMENT/PLAN:      17-year-old female status post left hip IM nail 11/17/2018  I am happy to see yrn is doing well  We reviewed her x-rays today in the office which demonstrate well aligned hardware  Patient and I discussed that she will likely have residual pain to her left hip especially when lying on that side  She may continue to use Voltaren gel, for prescription for 12 refills was provided to her in the office today  She may follow up with me on an as needed basis  The patient verbalized understanding of exam findings and treatment plan  We engaged in the shared decision-making process and treatment options were discussed at length with the patient  Surgical and conservative management discussed today along with risks and benefits  Diagnoses and all orders for this visit:    S/P ORIF (open reduction internal fixation) fracture  -     XR hip/pelv 2-3 vws left if performed; Future  -     diclofenac sodium (VOLTAREN) 1 %; Apply 2 g topically 4 (four) times a day              Follow Up:  Return if symptoms worsen or fail to improve  To Do Next Visit:       ____________________________________________________________________________________________________________________________________________      CHIEF COMPLAINT:  Chief Complaint   Patient presents with    Left Hip - Follow-up       SUBJECTIVE:  Kannan Schwab is a 59y o  year old  female who presents the today status post left hip IM nail 11/17/2018  She states she is overall doing well  She does ambulate with the assistance of a cane out in public  She notes she does pain at the left hip specifically when lying on left side  She has been using Voltaren gel which provides her with significant relief  She denies any new injuries  She denies any distal paresthesias  I have personally reviewed all the relevant PMH, PSH, SH, FH, Medications and allergies       PAST MEDICAL HISTORY:  Past Medical History:   Diagnosis Date    Hypertension     Hyponatremia        PAST SURGICAL HISTORY:  Past Surgical History:   Procedure Laterality Date    MI OPEN RX FEMUR FX+INTRAMED SHAY Left 11/17/2018    Procedure: Intramedullary nail fixation left hip fracture;  Surgeon: Areli Koenig MD;  [facility];  Service: Orthopedics       FAMILY HISTORY:  Family History   Problem Relation Age of Onset    Heart disease Mother     Heart disease Father        SOCIAL HISTORY:  Social History     Tobacco Use    Smoking status: Former Smoker    Smokeless tobacco: Never Used   Substance Use Topics    Alcohol use: Yes     Alcohol/week: 1 0 standard drinks     Types: 1 Cans of beer per week     Comment: 2-3 times per week  Drinks heavier before    Drug use: No       MEDICATIONS:    Current Outpatient Medications:     Calcium Carb-Cholecalciferol (CALCIUM + D3 PO), Take 200 Units by mouth daily  , Disp: , Rfl:     Cholecalciferol (VITAMIN D3) 3000 UNITS TABS, Take 100 Units by mouth daily  , Disp: , Rfl:     diclofenac sodium (VOLTAREN) 1 %, Apply 2 g topically 4 (four) times a day, Disp: 1 Tube, Rfl: 12    docusate sodium (COLACE) 100 mg capsule, Take 1 capsule (100 mg total) by mouth 2 (two) times a day, Disp: , Rfl: 0    lidocaine (LIDODERM) 5 %, Apply 1 patch topically daily Remove & Discard patch within 12 hours or as directed by MD, Disp: 30 patch, Rfl: 0    Magnesium 400 MG CAPS, Take 800 mg by mouth daily  , Disp: , Rfl:     metoprolol tartrate (LOPRESSOR) 25 mg tablet, Take 50 mg by mouth 2 (two) times a day  , Disp: , Rfl:     morphine (MSIR) 15 mg tablet, Take 1 tablet (15 mg total) by mouth every 4 (four) hours as needed for severe pain Earliest Fill Date: 11/21/18 Max Daily Amount: 90 mg, Disp: 30 tablet, Rfl: 0    Omega-3 Fatty Acids (FISH OIL) 1,000 mg, Take 1,000 mg by mouth daily  , Disp: , Rfl:     senna (SENOKOT) 8 6 mg, Take 1 tablet (8 6 mg total) by mouth daily, Disp: 120 each, Rfl: 0    sodium chloride 1 g tablet, Take 1 tablet (1 g total) by mouth 2 (two) times a day with meals, Disp: , Rfl: 0    thiamine 100 MG tablet, Take 100 mg by mouth daily  , Disp: , Rfl:     torsemide (DEMADEX) 5 MG tablet, Take 1 tablet (5 mg total) by mouth every other day, Disp: , Rfl: 0    traMADol (ULTRAM) 50 mg tablet, Take 1 tablet (50 mg total) by mouth every 6 (six) hours as needed for moderate pain, Disp: 30 tablet, Rfl: 0    enoxaparin (LOVENOX) 40 mg/0 4 mL, Inject 0 4 mL (40 mg total) under the skin daily for 28 days, Disp: , Rfl: 0    ALLERGIES:  Allergies   Allergen Reactions    Demerol [Meperidine] Other (See Comments)     hallucinations    Diphenhydramine Other (See Comments)     hallucinations    Prednisone Tremor    Sulfa Antibiotics Other (See Comments)     hallucinations    Percocet [Oxycodone-Acetaminophen] Palpitations       REVIEW OF SYSTEMS:  Review of Systems   Constitutional: Negative for chills, fever and unexpected weight change  HENT: Negative for hearing loss, nosebleeds and sore throat  Eyes: Negative for pain, redness and visual disturbance  Respiratory: Negative for cough, shortness of breath and wheezing  Cardiovascular: Negative for chest pain, palpitations and leg swelling  Gastrointestinal: Negative for abdominal pain, nausea and vomiting  Endocrine: Negative for polydipsia and polyuria  Genitourinary: Negative for dyspareunia and hematuria  Musculoskeletal: Positive for myalgias  Negative for arthralgias and joint swelling  Skin: Negative for rash and wound  Neurological: Negative for dizziness, numbness and headaches  Psychiatric/Behavioral: Negative for decreased concentration and suicidal ideas  The patient is not nervous/anxious          VITALS:  Vitals:    03/04/20 0953   BP: 166/78   Pulse: 83       LABS:  HgA1c:   Lab Results   Component Value Date    HGBA1C 5 4 10/26/2016     BMP:   Lab Results   Component Value Date    GLUCOSE 96 03/08/2016    CALCIUM 8 8 11/21/2018    K 3 8 11/21/2018    CO2 24 11/21/2018    CL 97 (L) 11/21/2018    BUN 10 11/21/2018    CREATININE 0 64 11/21/2018       _____________________________________________________  PHYSICAL EXAMINATION:  General: well developed and well nourished, alert, oriented times 3 and appears comfortable  Psychiatric: Normal  HEENT: Normocephalic, Atraumatic Trachea Midline, No torticollis  Pulmonary: No audible wheezing or respiratory distress   Cardiovascular: No pitting edema, 2+ radial pulse   Skin: No masses, erythema, lacerations, fluctation, ulcerations  Neurovascular: Sensation Intact to the Median, Ulnar, Radial Nerve, Motor Intact to the Median, Ulnar, Radial Nerve and Pulses Intact  Musculoskeletal: Normal, except as noted in detailed exam and in HPI  MUSCULOSKELETAL EXAMINATION:  Left hip  Skin intact, well-healed incisions, hyper pigmentation noted   Mildly tender to palpation   Able to flex, abduct and adduct  Sensation intact     ___________________________________________________  STUDIES REVIEWED:  I have personally reviewed AP lateral and oblique radiographs of left hip which demonstrate well-aligned orthopedic hardware with no signs of failure and well healed fracture sites             PROCEDURES PERFORMED:  Procedures  No Procedures performed today    _____________________________________________________      Scribe Attestation    I,:   Darren Moss MA am acting as a scribe while in the presence of the attending physician :        I,:   Solis Maza MD personally performed the services described in this documentation    as scribed in my presence :

## 2020-07-22 ENCOUNTER — TELEPHONE (OUTPATIENT)
Dept: OBGYN CLINIC | Facility: CLINIC | Age: 65
End: 2020-07-22

## 2020-07-22 DIAGNOSIS — Z87.81 S/P ORIF (OPEN REDUCTION INTERNAL FIXATION) FRACTURE: ICD-10-CM

## 2020-07-22 DIAGNOSIS — Z98.890 S/P ORIF (OPEN REDUCTION INTERNAL FIXATION) FRACTURE: ICD-10-CM

## 2020-07-22 NOTE — TELEPHONE ENCOUNTER
Patient called into the office today for a refill on her medication:       Diclofenac Sodium 1%  Please send to [facility] in [facility]   This is on file       Thank You!       Please call patient once completed

## 2020-08-05 DIAGNOSIS — Z98.890 S/P ORIF (OPEN REDUCTION INTERNAL FIXATION) FRACTURE: ICD-10-CM

## 2020-08-05 DIAGNOSIS — Z87.81 S/P ORIF (OPEN REDUCTION INTERNAL FIXATION) FRACTURE: ICD-10-CM

## 2021-02-17 ENCOUNTER — PATIENT OUTREACH (OUTPATIENT)
Dept: CASE MANAGEMENT | Facility: OTHER | Age: 66
End: 2021-02-17

## 2021-02-17 NOTE — PROGRESS NOTES
Patient was referred for complex care management  They do not meet the criteria for eligibility at this time, but request assistance setting up MyChart  MyChart setup, no further needs at this time

## 2021-03-10 DIAGNOSIS — Z23 ENCOUNTER FOR IMMUNIZATION: ICD-10-CM

## 2021-03-19 ENCOUNTER — IMMUNIZATIONS (OUTPATIENT)
Dept: FAMILY MEDICINE CLINIC | Facility: HOSPITAL | Age: 66
End: 2021-03-19

## 2021-03-19 DIAGNOSIS — Z23 ENCOUNTER FOR IMMUNIZATION: Primary | ICD-10-CM

## 2021-03-19 PROCEDURE — 0001A SARS-COV-2 / COVID-19 MRNA VACCINE (PFIZER-BIONTECH) 30 MCG: CPT

## 2021-03-19 PROCEDURE — 91300 SARS-COV-2 / COVID-19 MRNA VACCINE (PFIZER-BIONTECH) 30 MCG: CPT

## 2021-04-11 ENCOUNTER — IMMUNIZATIONS (OUTPATIENT)
Dept: FAMILY MEDICINE CLINIC | Facility: HOSPITAL | Age: 66
End: 2021-04-11

## 2021-04-11 DIAGNOSIS — Z23 ENCOUNTER FOR IMMUNIZATION: Primary | ICD-10-CM

## 2021-04-11 PROCEDURE — 91300 SARS-COV-2 / COVID-19 MRNA VACCINE (PFIZER-BIONTECH) 30 MCG: CPT

## 2021-04-11 PROCEDURE — 0002A SARS-COV-2 / COVID-19 MRNA VACCINE (PFIZER-BIONTECH) 30 MCG: CPT

## 2022-08-05 ENCOUNTER — TELEPHONE (OUTPATIENT)
Dept: OBGYN CLINIC | Facility: OTHER | Age: 67
End: 2022-08-05

## 2022-08-05 NOTE — TELEPHONE ENCOUNTER
Patient called in she has been noticing some swelling to her hip to her knee for a couple months   She thinks its arthritis   She wanted to come in and see you   I wasn't sure if you wanted to see her or refer to someone else  You performed ORIF on her 11- and last saw her 08-      C/b [phone]    Thank you

## 2022-08-05 NOTE — TELEPHONE ENCOUNTER
At this point I would probably have her see either Dr Vidal Mejia or Dr Shania Villanueva, as they both do joints and are trauma docs

## 2022-08-27 DIAGNOSIS — M25.552 LEFT HIP PAIN: Primary | ICD-10-CM

## 2022-09-02 ENCOUNTER — OFFICE VISIT (OUTPATIENT)
Dept: OBGYN CLINIC | Facility: HOSPITAL | Age: 67
End: 2022-09-02
Payer: MEDICARE

## 2022-09-02 ENCOUNTER — HOSPITAL ENCOUNTER (OUTPATIENT)
Dept: RADIOLOGY | Facility: HOSPITAL | Age: 67
Discharge: HOME | End: 2022-09-02
Attending: ORTHOPAEDIC SURGERY
Payer: MEDICARE

## 2022-09-02 VITALS
DIASTOLIC BLOOD PRESSURE: 87 MMHG | SYSTOLIC BLOOD PRESSURE: 210 MMHG | WEIGHT: 132.8 LBS | HEART RATE: 67 BPM | BODY MASS INDEX: 22.67 KG/M2 | HEIGHT: 64 IN

## 2022-09-02 DIAGNOSIS — M25.552 LEFT HIP PAIN: ICD-10-CM

## 2022-09-02 DIAGNOSIS — M25.552 LEFT HIP PAIN: Primary | ICD-10-CM

## 2022-09-02 PROCEDURE — 72170 X-RAY EXAM OF PELVIS: CPT

## 2022-09-02 PROCEDURE — 99213 OFFICE O/P EST LOW 20 MIN: CPT | Performed by: ORTHOPAEDIC SURGERY

## 2022-09-02 PROCEDURE — 73552 X-RAY EXAM OF FEMUR 2/>: CPT

## 2022-09-02 NOTE — PROGRESS NOTES
Orthopaedics Office Visit - New Patient Visit    ASSESSMENT/PLAN:    Assessment:   s/p left hip IM nail by Dr Maine Brar 11/17/18  Soft tissue swelling - anterolateral thigh    Plan:   Discussed lateral swelling is normal post operative appearance, nothing to be concerned about  Activities as tolerated  Followup PRN      _____________________________________________________  CHIEF COMPLAINT:  Chief Complaint   Patient presents with    Left Hip - Pain         SUBJECTIVE:  Leticia Bloom is a 79 y o  female who presents today for evaluation of left hip pain  Patient is s/p left hip IM nail by Dr Maine Brar 11/17/18  Patient notes lateral hip swelling with occasional pain with prolonged activities  Mild hip pain but mostly has minimal tenderness  Patient denies any significant increase in size of swelling area recently  PAST MEDICAL HISTORY:  Past Medical History:   Diagnosis Date    Hypertension     Hyponatremia        PAST SURGICAL HISTORY:  Past Surgical History:   Procedure Laterality Date    IA OPEN RX FEMUR FX+INTRAMED SHAY Left 11/17/2018    Procedure: Intramedullary nail fixation left hip fracture;  Surgeon: Ginger Michael MD;  [facility];  Service: Orthopedics       FAMILY HISTORY:  Family History   Problem Relation Age of Onset    Heart disease Mother     Heart disease Father        SOCIAL HISTORY:  Social History     Tobacco Use    Smoking status: Former Smoker    Smokeless tobacco: Never Used   Substance Use Topics    Alcohol use: Yes     Alcohol/week: 1 0 standard drink     Types: 1 Cans of beer per week     Comment: 2-3 times per week  Drinks heavier before    Drug use: No       MEDICATIONS:    Current Outpatient Medications:     Calcium Carb-Cholecalciferol (CALCIUM + D3 PO), Take 200 Units by mouth daily  , Disp: , Rfl:     Cholecalciferol (VITAMIN D3) 3000 UNITS TABS, Take 100 Units by mouth daily  , Disp: , Rfl:     diclofenac sodium (VOLTAREN) 1 %, APPLY TWO GRAMS TOPICALLY 4 (FOUR) TIMES A DAY, Disp: 100 g, Rfl: 4    docusate sodium (COLACE) 100 mg capsule, Take 1 capsule (100 mg total) by mouth 2 (two) times a day, Disp: , Rfl: 0    lidocaine (LIDODERM) 5 %, Apply 1 patch topically daily Remove & Discard patch within 12 hours or as directed by MD, Disp: 30 patch, Rfl: 0    Magnesium 400 MG CAPS, Take 800 mg by mouth daily  , Disp: , Rfl:     metoprolol tartrate (LOPRESSOR) 25 mg tablet, Take 50 mg by mouth 2 (two) times a day  , Disp: , Rfl:     morphine (MSIR) 15 mg tablet, Take 1 tablet (15 mg total) by mouth every 4 (four) hours as needed for severe pain Earliest Fill Date: 11/21/18 Max Daily Amount: 90 mg, Disp: 30 tablet, Rfl: 0    Omega-3 Fatty Acids (FISH OIL) 1,000 mg, Take 1,000 mg by mouth daily  , Disp: , Rfl:     senna (SENOKOT) 8 6 mg, Take 1 tablet (8 6 mg total) by mouth daily, Disp: 120 each, Rfl: 0    sodium chloride 1 g tablet, Take 1 tablet (1 g total) by mouth 2 (two) times a day with meals, Disp: , Rfl: 0    thiamine 100 MG tablet, Take 100 mg by mouth daily  , Disp: , Rfl:     torsemide (DEMADEX) 5 MG tablet, Take 1 tablet (5 mg total) by mouth every other day, Disp: , Rfl: 0    traMADol (ULTRAM) 50 mg tablet, Take 1 tablet (50 mg total) by mouth every 6 (six) hours as needed for moderate pain, Disp: 30 tablet, Rfl: 0    enoxaparin (LOVENOX) 40 mg/0 4 mL, Inject 0 4 mL (40 mg total) under the skin daily for 28 days, Disp: , Rfl: 0    ALLERGIES:  Allergies   Allergen Reactions    Demerol [Meperidine] Other (See Comments)     hallucinations    Diphenhydramine Other (See Comments)     hallucinations    Prednisone Tremor    Sulfa Antibiotics Other (See Comments)     hallucinations    Percocet [Oxycodone-Acetaminophen] Palpitations       REVIEW OF SYSTEMS:  MSK: see below  Neuro: NVI  Pertinent items are otherwise noted in HPI  A comprehensive review of systems was otherwise negative      LABS:  HgA1c:   Lab Results   Component Value Date HGBA1C 5 5 05/19/2022     BMP:   Lab Results   Component Value Date    GLUCOSE 96 03/08/2016    CALCIUM 8 8 11/21/2018    K 3 8 11/21/2018    CO2 24 11/21/2018    CL 97 (L) 11/21/2018    BUN 10 11/21/2018    CREATININE 0 64 11/21/2018     CBC: No components found for: CBC    _____________________________________________________  PHYSICAL EXAMINATION:  Vital signs: BP (!) 210/87   Pulse 67   Ht 5' 4" (1 626 m)   Wt 60 2 kg (132 lb 12 8 oz)   BMI 22 80 kg/m²   General: No acute distress, awake and alert  Psychiatric: Mood and affect appear appropriate  HEENT: Trachea Midline, No torticollis, no apparent facial trauma  Cardiovascular: No audible murmurs; Extremities appear perfused  Pulmonary: No audible wheezing or stridor  Skin: No open lesions; see further details (if any) below    MUSCULOSKELETAL EXAMINATION:    Left Hip Exam  Alignment / Posture:  Normal resting hip posture  Inspection:  lateral swelling  No edema  No erythema  No ecchymosis  Palpation:  No tenderness  ROM:  Normal hip ROM  Strength:  5/5 hip flexors and abductors  Stability:  No objective hip instability  Tests:  No pertinent positive or negative tests  Neurovascular:  Sensation intact in DP/SP/Leon/Sa/T nerve distributions  2+ DP & PT pulses  Gait:  Steady with cane     _____________________________________________________  STUDIES REVIEWED:  I personally reviewed the images and interpretation is as follows: Left hip x-rays demonstrates healed fracture, hardware in acceptable alignment and position, moderated degenerative changes         PROCEDURES PERFORMED:  Procedures        Scribe Attestation    I,:  Skipper Child am acting as a scribe while in the presence of the attending physician :       I,:  Mando Cormier MD personally performed the services described in this documentation    as scribed in my presence :

## 2023-07-24 ENCOUNTER — APPOINTMENT (INPATIENT)
Dept: CT IMAGING | Facility: HOSPITAL | Age: 68
DRG: 378 | End: 2023-07-24
Payer: MEDICARE

## 2023-07-24 ENCOUNTER — HOSPITAL ENCOUNTER (INPATIENT)
Facility: HOSPITAL | Age: 68
LOS: 2 days | Discharge: HOME | DRG: 378 | End: 2023-07-26
Attending: EMERGENCY MEDICINE | Admitting: INTERNAL MEDICINE
Payer: MEDICARE

## 2023-07-24 DIAGNOSIS — E87.1 HYPONATREMIA: ICD-10-CM

## 2023-07-24 DIAGNOSIS — R19.7 DIARRHEA: ICD-10-CM

## 2023-07-24 DIAGNOSIS — K62.5 BLEEDING PER RECTUM: Primary | ICD-10-CM

## 2023-07-24 DIAGNOSIS — D62 ACUTE BLOOD LOSS ANEMIA (ABLA): ICD-10-CM

## 2023-07-24 DIAGNOSIS — K37 APPENDICITIS: ICD-10-CM

## 2023-07-24 PROBLEM — K92.2 ACUTE LOWER GI BLEEDING: Status: ACTIVE | Noted: 2023-07-24

## 2023-07-24 PROBLEM — R33.9 URINARY RETENTION: Status: ACTIVE | Noted: 2023-07-24

## 2023-07-24 LAB
ABO GROUP BLD: NORMAL
ALBUMIN SERPL BCP-MCNC: 3.7 G/DL (ref 3.5–5)
ALP SERPL-CCNC: 67 U/L (ref 34–104)
ALT SERPL W P-5'-P-CCNC: 47 U/L (ref 7–52)
ANION GAP SERPL CALCULATED.3IONS-SCNC: 11 MMOL/L
APTT PPP: 31 SECONDS (ref 23–37)
AST SERPL W P-5'-P-CCNC: 98 U/L (ref 13–39)
BASOPHILS # BLD AUTO: 0.02 THOUSANDS/ÂΜL (ref 0–0.1)
BASOPHILS NFR BLD AUTO: 1 % (ref 0–1)
BILIRUB SERPL-MCNC: 1.71 MG/DL (ref 0.2–1)
BILIRUB UR QL STRIP: NEGATIVE
BLD GP AB SCN SERPL QL: NEGATIVE
BUN SERPL-MCNC: 13 MG/DL (ref 5–25)
CALCIUM SERPL-MCNC: 9 MG/DL (ref 8.4–10.2)
CHLORIDE SERPL-SCNC: 98 MMOL/L (ref 96–108)
CLARITY UR: CLEAR
CO2 SERPL-SCNC: 25 MMOL/L (ref 21–32)
COLOR UR: ABNORMAL
CREAT SERPL-MCNC: 0.77 MG/DL (ref 0.6–1.3)
EOSINOPHIL # BLD AUTO: 0.07 THOUSAND/ÂΜL (ref 0–0.61)
EOSINOPHIL NFR BLD AUTO: 2 % (ref 0–6)
ERYTHROCYTE [DISTWIDTH] IN BLOOD BY AUTOMATED COUNT: 13.1 % (ref 11.6–15.1)
GFR SERPL CREATININE-BSD FRML MDRD: 79 ML/MIN/1.73SQ M
GLUCOSE SERPL-MCNC: 136 MG/DL (ref 65–140)
GLUCOSE UR STRIP-MCNC: NEGATIVE MG/DL
HCT VFR BLD AUTO: 21.5 % (ref 34.8–46.1)
HGB BLD-MCNC: 7 G/DL (ref 11.5–15.4)
HGB UR QL STRIP.AUTO: NEGATIVE
HOLD SPECIMEN: NORMAL
IMM GRANULOCYTES # BLD AUTO: 0.03 THOUSAND/UL (ref 0–0.2)
IMM GRANULOCYTES NFR BLD AUTO: 1 % (ref 0–2)
INR PPP: 1.2 (ref 0.84–1.19)
KETONES UR STRIP-MCNC: NEGATIVE MG/DL
LEUKOCYTE ESTERASE UR QL STRIP: ABNORMAL
LIPASE SERPL-CCNC: 350 U/L (ref 11–82)
LYMPHOCYTES # BLD AUTO: 0.96 THOUSANDS/ÂΜL (ref 0.6–4.47)
LYMPHOCYTES NFR BLD AUTO: 31 % (ref 14–44)
MCH RBC QN AUTO: 34.3 PG (ref 26.8–34.3)
MCHC RBC AUTO-ENTMCNC: 32.6 G/DL (ref 31.4–37.4)
MCV RBC AUTO: 105 FL (ref 82–98)
MONOCYTES # BLD AUTO: 0.23 THOUSAND/ÂΜL (ref 0.17–1.22)
MONOCYTES NFR BLD AUTO: 7 % (ref 4–12)
NEUTROPHILS # BLD AUTO: 1.79 THOUSANDS/ÂΜL (ref 1.85–7.62)
NEUTS SEG NFR BLD AUTO: 58 % (ref 43–75)
NITRITE UR QL STRIP: NEGATIVE
NRBC BLD AUTO-RTO: 0 /100 WBCS
PH UR STRIP.AUTO: 8 [PH]
PLATELET # BLD AUTO: 106 THOUSANDS/UL (ref 149–390)
PMV BLD AUTO: 10.9 FL (ref 8.9–12.7)
POTASSIUM SERPL-SCNC: 3.5 MMOL/L (ref 3.5–5.3)
PROT SERPL-MCNC: 6.9 G/DL (ref 6.4–8.4)
PROT UR STRIP-MCNC: ABNORMAL MG/DL
PROTHROMBIN TIME: 15.5 SECONDS (ref 11.6–14.5)
RBC # BLD AUTO: 2.04 MILLION/UL (ref 3.81–5.12)
RH BLD: POSITIVE
SODIUM SERPL-SCNC: 134 MMOL/L (ref 135–147)
SP GR UR STRIP.AUTO: 1.04 (ref 1–1.03)
SPECIMEN EXPIRATION DATE: NORMAL
UROBILINOGEN UR STRIP-ACNC: <2 MG/DL
WBC # BLD AUTO: 3.1 THOUSAND/UL (ref 4.31–10.16)

## 2023-07-24 PROCEDURE — 86923 COMPATIBILITY TEST ELECTRIC: CPT

## 2023-07-24 PROCEDURE — 83540 ASSAY OF IRON: CPT | Performed by: PHYSICIAN ASSISTANT

## 2023-07-24 PROCEDURE — 87186 SC STD MICRODIL/AGAR DIL: CPT | Performed by: PHYSICIAN ASSISTANT

## 2023-07-24 PROCEDURE — 87493 C DIFF AMPLIFIED PROBE: CPT | Performed by: PHYSICIAN ASSISTANT

## 2023-07-24 PROCEDURE — 86900 BLOOD TYPING SEROLOGIC ABO: CPT | Performed by: EMERGENCY MEDICINE

## 2023-07-24 PROCEDURE — 82728 ASSAY OF FERRITIN: CPT | Performed by: PHYSICIAN ASSISTANT

## 2023-07-24 PROCEDURE — 93005 ELECTROCARDIOGRAM TRACING: CPT

## 2023-07-24 PROCEDURE — 99223 1ST HOSP IP/OBS HIGH 75: CPT | Performed by: PHYSICIAN ASSISTANT

## 2023-07-24 PROCEDURE — 96374 THER/PROPH/DIAG INJ IV PUSH: CPT

## 2023-07-24 PROCEDURE — G1004 CDSM NDSC: HCPCS

## 2023-07-24 PROCEDURE — 80053 COMPREHEN METABOLIC PANEL: CPT | Performed by: EMERGENCY MEDICINE

## 2023-07-24 PROCEDURE — 85730 THROMBOPLASTIN TIME PARTIAL: CPT

## 2023-07-24 PROCEDURE — 83550 IRON BINDING TEST: CPT | Performed by: PHYSICIAN ASSISTANT

## 2023-07-24 PROCEDURE — 87086 URINE CULTURE/COLONY COUNT: CPT | Performed by: PHYSICIAN ASSISTANT

## 2023-07-24 PROCEDURE — 83690 ASSAY OF LIPASE: CPT | Performed by: EMERGENCY MEDICINE

## 2023-07-24 PROCEDURE — P9016 RBC LEUKOCYTES REDUCED: HCPCS

## 2023-07-24 PROCEDURE — 99222 1ST HOSP IP/OBS MODERATE 55: CPT | Performed by: PHYSICIAN ASSISTANT

## 2023-07-24 PROCEDURE — 85025 COMPLETE CBC W/AUTO DIFF WBC: CPT | Performed by: EMERGENCY MEDICINE

## 2023-07-24 PROCEDURE — 85610 PROTHROMBIN TIME: CPT | Performed by: EMERGENCY MEDICINE

## 2023-07-24 PROCEDURE — 74177 CT ABD & PELVIS W/CONTRAST: CPT

## 2023-07-24 PROCEDURE — 30233N1 TRANSFUSION OF NONAUTOLOGOUS RED BLOOD CELLS INTO PERIPHERAL VEIN, PERCUTANEOUS APPROACH: ICD-10-PCS | Performed by: HOSPITALIST

## 2023-07-24 PROCEDURE — C9113 INJ PANTOPRAZOLE SODIUM, VIA: HCPCS

## 2023-07-24 PROCEDURE — 86850 RBC ANTIBODY SCREEN: CPT | Performed by: EMERGENCY MEDICINE

## 2023-07-24 PROCEDURE — 81001 URINALYSIS AUTO W/SCOPE: CPT | Performed by: PHYSICIAN ASSISTANT

## 2023-07-24 PROCEDURE — 87505 NFCT AGENT DETECTION GI: CPT | Performed by: PHYSICIAN ASSISTANT

## 2023-07-24 PROCEDURE — 96361 HYDRATE IV INFUSION ADD-ON: CPT

## 2023-07-24 PROCEDURE — 99285 EMERGENCY DEPT VISIT HI MDM: CPT

## 2023-07-24 PROCEDURE — 87077 CULTURE AEROBIC IDENTIFY: CPT | Performed by: PHYSICIAN ASSISTANT

## 2023-07-24 PROCEDURE — 86901 BLOOD TYPING SEROLOGIC RH(D): CPT | Performed by: EMERGENCY MEDICINE

## 2023-07-24 PROCEDURE — 36415 COLL VENOUS BLD VENIPUNCTURE: CPT | Performed by: EMERGENCY MEDICINE

## 2023-07-24 PROCEDURE — 99223 1ST HOSP IP/OBS HIGH 75: CPT | Performed by: INTERNAL MEDICINE

## 2023-07-24 RX ORDER — LANOLIN ALCOHOL/MO/W.PET/CERES
100 CREAM (GRAM) TOPICAL DAILY
Status: DISCONTINUED | OUTPATIENT
Start: 2023-07-25 | End: 2023-07-26 | Stop reason: HOSPADM

## 2023-07-24 RX ORDER — PANTOPRAZOLE SODIUM 40 MG/10ML
40 INJECTION, POWDER, LYOPHILIZED, FOR SOLUTION INTRAVENOUS EVERY 12 HOURS
Status: DISCONTINUED | OUTPATIENT
Start: 2023-07-25 | End: 2023-07-25

## 2023-07-24 RX ORDER — TRAMADOL HYDROCHLORIDE 50 MG/1
50 TABLET ORAL EVERY 6 HOURS PRN
Status: DISCONTINUED | OUTPATIENT
Start: 2023-07-24 | End: 2023-07-26 | Stop reason: HOSPADM

## 2023-07-24 RX ORDER — PANTOPRAZOLE SODIUM 40 MG/10ML
40 INJECTION, POWDER, LYOPHILIZED, FOR SOLUTION INTRAVENOUS ONCE
Status: COMPLETED | OUTPATIENT
Start: 2023-07-24 | End: 2023-07-24

## 2023-07-24 RX ORDER — MORPHINE SULFATE 15 MG/1
15 TABLET ORAL EVERY 4 HOURS PRN
Status: DISCONTINUED | OUTPATIENT
Start: 2023-07-24 | End: 2023-07-26 | Stop reason: HOSPADM

## 2023-07-24 RX ORDER — ACETAMINOPHEN 325 MG/1
650 TABLET ORAL EVERY 6 HOURS PRN
Status: DISCONTINUED | OUTPATIENT
Start: 2023-07-24 | End: 2023-07-26 | Stop reason: HOSPADM

## 2023-07-24 RX ORDER — METOPROLOL TARTRATE 50 MG/1
50 TABLET, FILM COATED ORAL EVERY 12 HOURS SCHEDULED
Status: DISCONTINUED | OUTPATIENT
Start: 2023-07-24 | End: 2023-07-26 | Stop reason: HOSPADM

## 2023-07-24 RX ORDER — ONDANSETRON 2 MG/ML
4 INJECTION INTRAMUSCULAR; INTRAVENOUS EVERY 6 HOURS PRN
Status: DISCONTINUED | OUTPATIENT
Start: 2023-07-24 | End: 2023-07-26 | Stop reason: HOSPADM

## 2023-07-24 RX ORDER — LOSARTAN POTASSIUM 25 MG/1
25 TABLET ORAL DAILY
COMMUNITY

## 2023-07-24 RX ORDER — SODIUM CHLORIDE 9 MG/ML
125 INJECTION, SOLUTION INTRAVENOUS CONTINUOUS
Status: DISCONTINUED | OUTPATIENT
Start: 2023-07-24 | End: 2023-07-25

## 2023-07-24 RX ADMIN — SODIUM CHLORIDE 1000 ML: 0.9 INJECTION, SOLUTION INTRAVENOUS at 12:37

## 2023-07-24 RX ADMIN — PANTOPRAZOLE SODIUM 40 MG: 40 INJECTION, POWDER, FOR SOLUTION INTRAVENOUS at 12:36

## 2023-07-24 RX ADMIN — POLYETHYLENE GLYCOL 3350, SODIUM SULFATE ANHYDROUS, SODIUM BICARBONATE, SODIUM CHLORIDE, POTASSIUM CHLORIDE 4000 ML: 236; 22.74; 6.74; 5.86; 2.97 POWDER, FOR SOLUTION ORAL at 19:00

## 2023-07-24 RX ADMIN — PANTOPRAZOLE SODIUM 40 MG: 40 INJECTION, POWDER, FOR SOLUTION INTRAVENOUS at 23:22

## 2023-07-24 RX ADMIN — IOHEXOL 100 ML: 350 INJECTION, SOLUTION INTRAVENOUS at 13:32

## 2023-07-24 RX ADMIN — METOPROLOL TARTRATE 50 MG: 50 TABLET, FILM COATED ORAL at 21:21

## 2023-07-24 RX ADMIN — SODIUM CHLORIDE 125 ML/HR: 0.9 INJECTION, SOLUTION INTRAVENOUS at 23:27

## 2023-07-24 NOTE — ASSESSMENT & PLAN NOTE
· Chronic, on torsemide and sodium chloride tablets at home. · Holding upon admission  · In the setting of GI losses we will start with gentle hydration and repeat BMP tomorrow.

## 2023-07-24 NOTE — H&P
[id number]  H&P  Name: Pili Asif 76 y.o. female I MRN: [id number]  Unit/Bed#: ED-03 I Date of Admission: 7/24/2023   Date of Service: 7/24/2023 I Hospital Day: 0      Assessment/Plan   * Acute lower GI bleeding  Assessment & Plan  • Presents with approximately 3 days worth of bloody diarrhea. No abdominal pain, fevers, chills, recent travel, antibiotic use. History of colonoscopy 9 years ago which showed polyps, reports not immediately available. • Hemoglobin: 7.0 on admit, prior baseline closer to 9-10  o Monitor H&H.  • CT scan: Pending read  • Keep NPO at MN. IV fluids. • Consult Gastroenterology. Acute blood loss anemia (ABLA)  Assessment & Plan  · Acute blood loss anemia secondary to lower GI bleeding. Hemoglobin on admission is 7.0.  · Was given 1 unit PRBC in the ER, transfuse to keep greater than 7.0.  · Monitor serial H&H. · Check iron panel    Thrombocytopenia (HCC)  Assessment & Plan  · Chronic, platelets around baseline. · Monitor CBC with differential    Hyponatremia  Assessment & Plan  · Chronic, on torsemide and sodium chloride tablets at home. · Holding upon admission  · In the setting of GI losses we will start with gentle hydration and repeat BMP tomorrow. VTE Pharmacologic Prophylaxis: VTE Score: 2 Low Risk (Score 0-2) - Encourage Ambulation. Code Status: full code  Discussion with family: Patient declined call to . Anticipated Length of Stay: Patient will be admitted on an inpatient basis with an anticipated length of stay of greater than 2 midnights secondary to Acute blood loss anemia secondary to GI bleeding. Total Time for Visit, including Counseling / Coordination of Care: 75 minutes Greater than 50% of this total time spent on direct patient counseling and coordination of care.     Chief Complaint: GI bleeding    History of Present Illness:  Pili Asif is a 76 y.o. female presenting with approximately 3 days worth of bright red blood per rectum. Patient reports bleeding is painless. She noticed that the blood started as bright red, now has turned darker. Is going anywhere from 6-8 times per day. No recent fevers, chills, sick contacts, travel, recent antibiotic use. She had a colonoscopy about 9 years ago which showed polyps per patient. Patient receiving 1 unit PRBC in the ER at this time. Review of Systems:  Review of Systems   Constitutional: Negative for activity change, appetite change, chills, fatigue and fever. HENT: Negative for congestion, rhinorrhea, sinus pressure and sore throat. Eyes: Negative for photophobia, pain and visual disturbance. Respiratory: Negative for cough, shortness of breath and wheezing. Cardiovascular: Negative for chest pain, palpitations and leg swelling. Gastrointestinal: Positive for blood in stool. Negative for abdominal distention, abdominal pain, constipation, diarrhea, nausea and vomiting. Endocrine: Negative for cold intolerance, heat intolerance, polydipsia and polyuria. Genitourinary: Negative for difficulty urinating, dysuria, flank pain, frequency and hematuria. Musculoskeletal: Negative for arthralgias, back pain and joint swelling. Skin: Negative for color change, pallor and rash. Allergic/Immunologic: Negative. Neurological: Negative for dizziness, syncope, weakness, light-headedness and headaches. Hematological: Negative. Psychiatric/Behavioral: Negative.         Past Medical and Surgical History:   Past Medical History:   Diagnosis Date   • Hypertension    • Hyponatremia        Past Surgical History:   Procedure Laterality Date   • EYE SURGERY     • NC OPTX FEM SHFT FX W/INSJ IMED IMPLT W/WO SCREW Left 11/17/2018    Procedure: Intramedullary nail fixation left hip fracture;  Surgeon: Bebeto Malcolm MD;  [facility];  Service: Orthopedics       Meds/Allergies:  Prior to Admission medications    Medication Sig Start Date End Date Taking? Authorizing Provider   Calcium Carb-Cholecalciferol (CALCIUM + D3 PO) Take 200 Units by mouth daily. Historical Provider, MD   Cholecalciferol (VITAMIN D3) 3000 UNITS TABS Take 100 Units by mouth daily. Historical Provider, MD   diclofenac sodium (VOLTAREN) 1 % APPLY TWO GRAMS TOPICALLY 4 (FOUR) TIMES A DAY 8/5/20   Rowan Braxton MD   docusate sodium (COLACE) 100 mg capsule Take 1 capsule (100 mg total) by mouth 2 (two) times a day 11/21/18   Caleb Arce MD   enoxaparin (LOVENOX) 40 mg/0.4 mL Inject 0.4 mL (40 mg total) under the skin daily for 28 days 11/22/18 12/20/18  Caleb Arce MD   lidocaine (LIDODERM) 5 % Apply 1 patch topically daily Remove & Discard patch within 12 hours or as directed by MD 11/22/18   Caleb Arce MD   Magnesium 400 MG CAPS Take 800 mg by mouth daily. Historical Provider, MD   metoprolol tartrate (LOPRESSOR) 25 mg tablet Take 50 mg by mouth 2 (two) times a day      Historical Provider, MD   morphine (MSIR) 15 mg tablet Take 1 tablet (15 mg total) by mouth every 4 (four) hours as needed for severe pain Earliest Fill Date: 11/21/18 Max Daily Amount: 90 mg 11/21/18   Caleb Arce MD   Omega-3 Fatty Acids (FISH OIL) 1,000 mg Take 1,000 mg by mouth daily. Historical Provider, MD   senna (SENOKOT) 8.6 mg Take 1 tablet (8.6 mg total) by mouth daily 11/22/18   Caleb Arce MD   sodium chloride 1 g tablet Take 1 tablet (1 g total) by mouth 2 (two) times a day with meals 11/21/18   Caleb Arce MD   thiamine 100 MG tablet Take 100 mg by mouth daily.     Historical Provider, MD   torsemide (DEMADEX) 5 MG tablet Take 1 tablet (5 mg total) by mouth every other day 11/22/18   Caleb Arce MD   traMADol (ULTRAM) 50 mg tablet Take 1 tablet (50 mg total) by mouth every 6 (six) hours as needed for moderate pain 11/21/18   Caleb Arce MD     I have reviewed home medications with patient personally. Allergies: Allergies   Allergen Reactions   • Demerol [Meperidine] Other (See Comments)     hallucinations   • Diphenhydramine Other (See Comments)     hallucinations   • Prednisone Tremor   • Sulfa Antibiotics Other (See Comments)     hallucinations   • Percocet [Oxycodone-Acetaminophen] Palpitations       Social History:  Marital Status: /Civil Union   Occupation: non-contrib  Patient Pre-hospital Living Situation: Home  Patient Pre-hospital Level of Mobility: walks  Patient Pre-hospital Diet Restrictions: 2L FR  Substance Use History:   Social History     Substance and Sexual Activity   Alcohol Use Not Currently   • Alcohol/week: 1.0 standard drink of alcohol   • Types: 1 Cans of beer per week    Comment: 2-3 times per week. Drinks heavier before     Social History     Tobacco Use   Smoking Status Former   Smokeless Tobacco Never     Social History     Substance and Sexual Activity   Drug Use No       Family History:  Family History   Problem Relation Age of Onset   • Heart disease Mother    • Heart disease Father        Physical Exam:     Vitals:   Blood Pressure: 143/63 (07/24/23 1300)  Pulse: 67 (07/24/23 1300)  Temperature: 98.1 °F (36.7 °C) (07/24/23 0952)  Temp Source: Oral (07/24/23 0952)  Respirations: 18 (07/24/23 1300)  Weight - Scale: 65 kg (143 lb 4.8 oz) (07/24/23 0950)  SpO2: 100 % (07/24/23 1300)    Physical Exam  Vitals and nursing note reviewed. Constitutional:       General: She is not in acute distress. Appearance: Normal appearance. HENT:      Head: Normocephalic and atraumatic. Mouth/Throat:      Mouth: Mucous membranes are moist.   Eyes:      General: No scleral icterus. Extraocular Movements: Extraocular movements intact. Pupils: Pupils are equal, round, and reactive to light. Cardiovascular:      Rate and Rhythm: Normal rate and regular rhythm. Heart sounds: Normal heart sounds. No murmur heard. No friction rub.    Pulmonary: Effort: Pulmonary effort is normal.      Breath sounds: Normal breath sounds. No wheezing or rhonchi. Abdominal:      General: Bowel sounds are normal. There is no distension. Palpations: Abdomen is soft. Tenderness: There is no abdominal tenderness. There is no guarding. Musculoskeletal:         General: No swelling. Cervical back: Neck supple. Right lower leg: No edema. Left lower leg: No edema. Skin:     General: Skin is warm and dry. Capillary Refill: Capillary refill takes less than 2 seconds. Coloration: Skin is not jaundiced or pale. Neurological:      General: No focal deficit present. Mental Status: She is alert and oriented to person, place, and time. Mental status is at baseline. Additional Data:     Lab Results:  Results from last 7 days   Lab Units 07/24/23  1016   WBC Thousand/uL 3.10*   HEMOGLOBIN g/dL 7.0*   HEMATOCRIT % 21.5*   PLATELETS Thousands/uL 106*   NEUTROS PCT % 58   LYMPHS PCT % 31   MONOS PCT % 7   EOS PCT % 2     Results from last 7 days   Lab Units 07/24/23  1016   SODIUM mmol/L 134*   POTASSIUM mmol/L 3.5   CHLORIDE mmol/L 98   CO2 mmol/L 25   BUN mg/dL 13   CREATININE mg/dL 0.77   ANION GAP mmol/L 11   CALCIUM mg/dL 9.0   ALBUMIN g/dL 3.7   TOTAL BILIRUBIN mg/dL 1.71*   ALK PHOS U/L 67   ALT U/L 47   AST U/L 98*   GLUCOSE RANDOM mg/dL 136     Results from last 7 days   Lab Units 07/24/23  1016   INR  1.20*                   Imaging: No pertinent imaging reviewed. CT abdomen pelvis with contrast    (Results Pending)       EKG and Other Studies Reviewed on Admission:   · Prior pertinent studies and records reviewed in Epic/Care Everywhere. ** Please Note: This note has been constructed using a voice recognition system.  **

## 2023-07-24 NOTE — CONSULTS
Consultation -General Surgery  Henretta Fleischer 76 y.o. female MRN: [id number]  Unit/Bed#: ED-03 Encounter: [id number]    ASSESSMENT:  75 yo female with pmhx of anemia, pancreatitis, hyponatremia, and urinary retention presenting with 3 days of BRBPR, incidental finding of 6 mm dilated appendix with mild surrounding fat stranding on CT. Afebrile, VSS  Pancytopenia:   -WBC 3  -Hbg 7  -plt 106    Plan:  -No acute surgical intervention warranted at this time, low suspicion for acute appendicitis as patient does not have abdominal pain is is non-tender on exam.   -Agree with GI consult for BRBPR and pancytopenia  -From surgical stand point okay for a diet, but would defer to GI at this time  -Will continue to follow along   -Rest of care per primary team  -Discussed with Mer Rodas      Reason for Consult / Principal Problem:    HPI: Henretta Fleischer is a 76y.o. year old female with pmhx of anemia, pancreatitis, hyponatremia, urinary retention who presents with Acute lower GI bleeding. Patient reports bright red blood in her stools beginning Friday, bowel movements at first were bright red blood in the stool and toilet bowl, she denies blood on toilet tissue. She denies any painful bowel movements. Bloody bowel movements persisted throughout the weekend, yesterday she reported having 8 bowel movements, and the blood began to lighten up at the end of the day. She reports the toilet bowl would have a very small amount of light pink blood. She then began to feel lightheaded and almost passed out, so she presented to the ED. Patient denies an previous episodes of similar episodes of bleeding, she denies constipation or straining with bowel movements, although she does take Iron supplements so her stools are normally dark in color.    Reports prior colonoscopy 9 years ago (report not available) and an at home Cologuard test 2 years ago which was normal.   Patient denies any abdominal pain, nausea, vomiting, recent sick contacts, fevers or chills. She reports a decreased appetite starting this summer but tolerated dinner and ice cream last night and had a banana for breakfast today. Past surgical history notable for open cholecystectomy "many years ago". Review of Systems   Constitutional: Positive for appetite change. Negative for activity change, chills and fever. Gastrointestinal: Positive for blood in stool. Negative for abdominal pain, constipation, diarrhea, nausea, rectal pain and vomiting. Genitourinary: Positive for difficulty urinating. Negative for urgency. Neurological: Positive for dizziness and light-headedness. Historical Information   Past Medical History:   Diagnosis Date   • Hypertension    • Hyponatremia      Past Surgical History:   Procedure Laterality Date   • EYE SURGERY     • [place] OPTX FEM SHFT FX W/INSJ IMED IMPLT W/WO SCREW Left 11/17/2018    Procedure: Intramedullary nail fixation left hip fracture;  Surgeon: Jennifer Alvarez MD;  Location: [facility];  Service: Orthopedics     Social History   Social History     Substance and Sexual Activity   Alcohol Use Not Currently   • Alcohol/week: 1.0 standard drink of alcohol   • Types: 1 Cans of beer per week    Comment: 2-3 times per week. Drinks heavier before     Social History     Substance and Sexual Activity   Drug Use No     Social History     Tobacco Use   Smoking Status Former   Smokeless Tobacco Never     Family History   Problem Relation Age of Onset   • Heart disease Mother    • Heart disease Father        Meds/Allergies     (Not in a hospital admission)    No current facility-administered medications for this encounter.        Allergies   Allergen Reactions   • Demerol [Meperidine] Other (See Comments)     hallucinations   • Diphenhydramine Other (See Comments)     hallucinations   • Prednisone Tremor   • Sulfa Antibiotics Other (See Comments)     hallucinations   • Percocet [Oxycodone-Acetaminophen] Palpitations       Objective Blood pressure 143/63, pulse 67, temperature 98.1 °F (36.7 °C), temperature source Oral, resp. rate 18, weight 65 kg (143 lb 4.8 oz), SpO2 100 %. No intake or output data in the 24 hours ending 07/24/23 1515    PHYSICAL EXAM  Physical Exam  Vitals reviewed. Constitutional:       General: She is not in acute distress. Appearance: Normal appearance. She is not ill-appearing or toxic-appearing. Cardiovascular:      Rate and Rhythm: Normal rate. Pulmonary:      Effort: Pulmonary effort is normal. No respiratory distress. Abdominal:      General: Abdomen is flat. There is no distension. Palpations: Abdomen is soft. Tenderness: There is no abdominal tenderness. There is no guarding or rebound. Comments: Open cholecystectomy scar in RUQ   Skin:     General: Skin is warm and dry. Neurological:      General: No focal deficit present. Mental Status: She is alert.    Psychiatric:         Mood and Affect: Mood normal.         Behavior: Behavior normal.           Lab Results:   CBC:   Lab Results   Component Value Date    WBC 3.10 (L) 07/24/2023    HGB 7.0 (L) 07/24/2023    HCT 21.5 (L) 07/24/2023     (H) 07/24/2023     (L) 07/24/2023    RBC 2.04 (L) 07/24/2023    MCH 34.3 07/24/2023    MCHC 32.6 07/24/2023    RDW 13.1 07/24/2023    MPV 10.9 07/24/2023    NRBC 0 07/24/2023   , CMP:   Lab Results   Component Value Date    SODIUM 134 (L) 07/24/2023    K 3.5 07/24/2023    CL 98 07/24/2023    CO2 25 07/24/2023    BUN 13 07/24/2023    CREATININE 0.77 07/24/2023    CALCIUM 9.0 07/24/2023    AST 98 (H) 07/24/2023    ALT 47 07/24/2023    ALKPHOS 67 07/24/2023    EGFR 79 07/24/2023   , Coagulation:   Lab Results   Component Value Date    INR 1.20 (H) 07/24/2023   , Urinalysis: No results found for: "COLORU", "CLARITYU", "SPECGRAV", "PHUR", "LEUKOCYTESUR", "NITRITE", "PROTEINUA", "GLUCOSEU", "Chad Gander", "BILIRUBINUR", "BLOODU", Amylase: No results found for: "AMYLASE", Lipase:   Lab Results   Component Value Date    LIPASE 350 (H) 07/24/2023     Imaging: I have personally reviewed pertinent reports. 7/24/23 CT AP:  1. Top normal caliber appendix with mild surrounding fat stranding, noting that there is mild stranding along the right retroperitoneum/paracolic gutter and not only adjacent to the appendix. Findings may be within normal limits; however, correlation   with right lower quadrant pain for early/mild acute appendicitis is recommended.     2. Underdistended urinary bladder with possible mild diffuse wall thickening and surrounding fat stranding. Correlate with urinalysis.     3. Left ovarian cyst measuring 5.3 cm. Outpatient pelvic ultrasound is recommended for further evaluation.     4. Sequela of chronic pancreatitis again seen without findings of acute pancreatitis.     5. Hepatic steatosis. EKG, Pathology, and Other Studies: I have personally reviewed pertinent reports. Counseling / Coordination of Care  Total time spent today  20 minutes. Greater than 50% of total time was spent with the patient and / or family counseling and / or coordination of care.          LIZETTE Sosa  7/24/2023 3:15 PM

## 2023-07-24 NOTE — ED ATTENDING ATTESTATION
7/24/2023  ISteff MD, saw and evaluated the patient. I have discussed the patient with the resident/non-physician practitioner and agree with the resident's/non-physician practitioner's findings, Plan of Care, and MDM as documented in the resident's/non-physician practitioner's note, except where noted. All available labs and Radiology studies were reviewed. I was present for key portions of any procedure(s) performed by the resident/non-physician practitioner and I was immediately available to provide assistance. At this point I agree with the current assessment done in the Emergency Department. I have conducted an independent evaluation of this patient a history and physical is as follows:    77 yo female with h/o HTN, thrombocytopenia noted in past presents with diarrhea 3 days prior, initially noted watery diarrhea 6-8 times a day, noted blood mixed with diarrhea on Saturday with clots noted today and "muddy" appearing stool. Denies belly pain, reports feeling light headed with ambulation. No f/c/n/v/cp/sob. No other abnormal/bleeding or bruising.           Past Medical History:   Diagnosis Date   • Hypertension    • Hyponatremia          ED Course  ED Course as of 07/24/23 1157   Mon Jul 24, 2023   1124 CBC and differential(!)  Pancytopenia, hgb 9-->7 last lab value available 4 years ago, plts [facility], minimally low neutrophils   1125 Lipase(!): 350   1125 Comprehensive metabolic panel(!)  Minimal hyponatremia, AST minimally elevated/T.bili elevated         Critical Care Time  Procedures with contrast  IMPRESSION:     1. Top normal caliber appendix with mild surrounding fat stranding, noting that there is mild stranding along the right retroperitoneum/paracolic gutter and not only adjacent to the appendix. Findings may be within normal limits; however, correlation   with right lower quadrant pain for early/mild acute appendicitis is recommended.     2. Underdistended urinary bladder with possible mild diffuse wall thickening and surrounding fat stranding. Correlate with urinalysis.     3. Left ovarian cyst measuring 5.3 cm. Outpatient pelvic ultrasound is recommended for further evaluation.     4. Sequela of chronic pancreatitis again seen without findings of acute pancreatitis.     5. Hepatic steatosis.     The study was marked in [facility] for immediate notification.     Workstation performed: [id number]      1520 Dr. Layla Irizarry Note - No focal TTP of abdomen, not clinically consistent with acute appendicitis at this point.           Critical Care Time  CriticalCare Time    Date/Time: 7/24/2023 5:07 PM    Performed by: Hina Bustillo MD  Authorized by: Hina Bustillo MD    Critical care provider statement:     Critical care time (minutes):  45    Critical care time was exclusive of:  Separately billable procedures and treating other patients and teaching time    Critical care was necessary to treat or prevent imminent or life-threatening deterioration of the following conditions:  Circulatory failure    Critical care was time spent personally by me on the following activities:  Blood draw for specimens, obtaining history from patient or surrogate, development of treatment plan with patient or surrogate, discussions with consultants, evaluation of patient's response to treatment, examination of patient, review of old charts, re-evaluation of patient's condition, ordering and review of radiographic studies, ordering and review of laboratory studies and ordering and performing treatments and interventions    I assumed direction of critical care for this patient from another provider in my specialty: no

## 2023-07-24 NOTE — ASSESSMENT & PLAN NOTE
• Presents with approximately 3 days worth of bloody diarrhea. No abdominal pain, fevers, chills, recent travel, antibiotic use. History of colonoscopy 9 years ago which showed polyps, reports not immediately available. • Hemoglobin: 7.0 on admit, prior baseline closer to 9-10  o Monitor H&H. • Check stool enteric/c. diff  • Keep NPO  • Consult Gastroenterology.   • EGD/colonoscopy today

## 2023-07-24 NOTE — ASSESSMENT & PLAN NOTE
· Chronic, typically patient self catheterizes  · Due to frequent diarrhea and risk for UTI we will place Davies catheter

## 2023-07-24 NOTE — CONSULTS
Consultation - [facility]) [facility]  Berny Kramer 76 y.o. female MRN: [id number]  Unit/Bed#: S -01 Encounter: [id number]        Inpatient consult to gastroenterology  Consult performed by: Sheela Nascimento PA-C  Consult ordered by: Casper Falcon PA-C        Reason for Consult / Principal Problem: hematochezia, elevated lipase    ASSESSMENT and PLAN:      80-year-old with history of acute on chronic pancreatitis presented to the emergency room due to bloody diarrhea for few days found to have hemoglobin of 7, previously 11 several months ago. CT was unremarkable for abnormal findings of the colon however were notable for a possible appendicitis and chronic findings within the pancreas. 1.  Dark stools with acute anemia  Patient reports symptoms occurred starting 3 days ago. Reports 8 bowel movements overnight prompting her to come in. Reports very dark in the setting of iron however appears more "muddy."  Occasional NSAID use. Unsure of any prior EGD. Possibly in the setting of upper GI bleed from peptic ulcer disease versus colonic AVM, rule out lesion.    -We will plan for both EGD and colonoscopy tomorrow. - Continue clear liquid diet. - Bowel prep tonight.  - N.p.o. at midnight.  -Monitor hemoglobin closely and transfuse as needed per primary team.  - Monitor stool output.  - Recommend still sending stool studies to rule out infection due to severity of diarrhea.  -Start PPI twice daily.  -Patient will be getting blood transfusion now    2. Chronic pancreatitis  Prior history of alcohol/gallstones use which appeared to be the source of her pancreatitis. She reports no alcohol x2 years. 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 She reports taking Creon before meals. Mild elevation of lipase however patient denies any abdominal pain no findings of acute pancreatitis on imaging.    -Recommend continuing Creon prior to meals.   May need to consider increasing dose as patient reports persistent loose stools after eating.  - Monitor abdominal exam.  -Previous history of pancreatic duct stent due to history of stricture. If patient will continue to follow with Sharonda PINZON, consider repeat MRI/MRCP in the outpatient setting      -------------------------------------------------------------------------------------------------------------------    HPI:     Adan Seaman is a 80-year-old female with history of acute on chronic pancreatitis, hypertension who presented to the emergency room few hours ago for bloody diarrhea for few days. Lab work on arrival was notable for hemoglobin of 7, previously 11 5 months ago. Also pancytopenic with white count of 3 and platelets of 382. CMP showing mildly low sodium of 134. Mild elevation of AST at 89 and total bilirubin of 1.71. Lipase also mildly elevated at 350. INR elevated at 1.2. VSS. CT with contrast was performed showing hepatic steatosis, history of cholecystectomy, pancreatic body and tail atrophy with diffuse pancreatic ductal dilatation measuring 8 mm secondary to obstructing calculus within the pancreatic head secondary to chronic pancreatitis, no findings of acute pancreatitis noted. Also found to have top normal appendix with mild surrounding fat stranding. Bowel was unremarkable. Patient reports Friday night having urged to go to the bathroom and reports passing a few bowel movements that appeared very dark and bloody. She denies any obvious bright red blood but reports possibly a clot and small amount of pink tinge in the bottom of the toilet. This was confirmed when I spoke with her  as well. She reports this happened a few times on Saturday as well. She decided to come to the hospital as this occurred 8 times on Sunday night. She reports very dark stool and very minimal if any red blood. She denies having any abdominal pain with this. No nausea or vomiting. No fevers at home.   Reports getting antibiotics a month ago with doxycycline. Reports recently her appetite has been slightly poor. Often having loose stools after eating but reports taking Creon. Reports occasional Aleve use. Reports last bowel movement was earlier this morning and reports that they were small and dark appearing. Patient reports history of alcohol use however quit few years ago. She was drinking around 2-3 shots and beer a day. Denies any tobacco use. Reports prior colonoscopy 9 years ago and reports having polyps. She then underwent Cologuard testing 2/2022 which was normal.  She is unsure if she ever had an EGD. REVIEW OF SYSTEMS:    CONSTITUTIONAL: Denies any fever, chills, or rigors. +decreased appetite  HEENT: No earache or tinnitus. Denies hearing loss or visual disturbances. CARDIOVASCULAR: No chest pain or palpitations. RESPIRATORY: Denies any cough, hemoptysis, shortness of breath or dyspnea on exertion. GASTROINTESTINAL: As noted in the History of Present Illness. GENITOURINARY: +pain with urination  NEUROLOGIC: No dizziness or vertigo, denies headaches. MUSCULOSKELETAL: Denies any muscle or joint pain. SKIN: Denies skin rashes or itching. ENDOCRINE: Denies excessive thirst. Denies intolerance to heat or cold. PSYCHOSOCIAL: Denies depression or anxiety. Denies any recent memory loss. Historical Information   Past Medical History:   Diagnosis Date   • Hypertension    • Hyponatremia      Past Surgical History:   Procedure Laterality Date   • EYE SURGERY     • [place] OPTX FEM SHFT FX W/INSJ IMED IMPLT W/WO SCREW Left 11/17/2018    Procedure: Intramedullary nail fixation left hip fracture;  Surgeon: Maribel Lopez MD;  [facility];  Service: Orthopedics     Social History   Social History     Substance and Sexual Activity   Alcohol Use Not Currently   • Alcohol/week: 1.0 standard drink of alcohol   • Types: 1 Cans of beer per week    Comment: 2-3 times per week.  Drinks heavier before Social History     Substance and Sexual Activity   Drug Use No     Social History     Tobacco Use   Smoking Status Former   Smokeless Tobacco Never     Family History   Problem Relation Age of Onset   • Heart disease Mother    • Heart disease Father        Meds/Allergies     Medications Prior to Admission   Medication   • Calcium Carb-Cholecalciferol (CALCIUM + D3 PO)   • Cholecalciferol (VITAMIN D3) 3000 UNITS TABS   • diclofenac sodium (VOLTAREN) 1 %   • docusate sodium (COLACE) 100 mg capsule   • enoxaparin (LOVENOX) 40 mg/0.4 mL   • lidocaine (LIDODERM) 5 %   • Magnesium 400 MG CAPS   • metoprolol tartrate (LOPRESSOR) 25 mg tablet   • morphine (MSIR) 15 mg tablet   • Omega-3 Fatty Acids (FISH OIL) 1,000 mg   • senna (SENOKOT) 8.6 mg   • sodium chloride 1 g tablet   • thiamine 100 MG tablet   • torsemide (DEMADEX) 5 MG tablet   • traMADol (ULTRAM) 50 mg tablet     No current facility-administered medications for this encounter. Allergies   Allergen Reactions   • Demerol [Meperidine] Other (See Comments)     hallucinations   • Diphenhydramine Other (See Comments)     hallucinations   • Prednisone Tremor   • Sulfa Antibiotics Other (See Comments)     hallucinations   • Percocet [Oxycodone-Acetaminophen] Palpitations           Objective     Blood pressure 165/69, pulse 66, temperature 98.6 °F (37 °C), resp. rate 18, weight 65 kg (143 lb 4.8 oz), SpO2 100 %. No intake or output data in the 24 hours ending 07/24/23 1558      PHYSICAL EXAM:      General Appearance:   Alert, cooperative, no distress, appears stated age. Comfortable in bed. HEENT:   Normocephalic, atraumatic, anicteric. Neck:  Supple, symmetrical, trachea midline, no adenopathy. Lungs:   Clear to auscultation bilaterally; no rales, rhonchi or wheezing; respirations unlabored    Heart[de-identified]   S1 and S2 normal; regular rate and rhythm; no murmur, rub, or gallop.    Abdomen:   Soft, non-tender, non-distended; normal bowel sounds; no masses, no organomegaly. Benign abdomen    Genitalia:   Deferred    Rectal:   Deferred    Extremities:  No cyanosis, clubbing or edema    Pulses:  2+ and symmetric all extremities    Skin:  Skin color, texture, turgor normal, no rashes or lesions    Lymph nodes:  No palpable cervical, axillary or inguinal lymphadenopathy        Lab Results:   Results from last 7 days   Lab Units 07/24/23  1016   WBC Thousand/uL 3.10*   HEMOGLOBIN g/dL 7.0*   HEMATOCRIT % 21.5*   PLATELETS Thousands/uL 106*   NEUTROS PCT % 58   LYMPHS PCT % 31   MONOS PCT % 7   EOS PCT % 2     Results from last 7 days   Lab Units 07/24/23  1016   POTASSIUM mmol/L 3.5   CHLORIDE mmol/L 98   CO2 mmol/L 25   BUN mg/dL 13   CREATININE mg/dL 0.77   CALCIUM mg/dL 9.0   ALK PHOS U/L 67   ALT U/L 47   AST U/L 98*     Results from last 7 days   Lab Units 07/24/23  1016   INR  1.20*     Results from last 7 days   Lab Units 07/24/23  1016   LIPASE u/L 350*       Imaging Studies: I have personally reviewed pertinent imaging studies. CT abdomen pelvis with contrast    Result Date: 7/24/2023  Impression: 1. Top normal caliber appendix with mild surrounding fat stranding, noting that there is mild stranding along the right retroperitoneum/paracolic gutter and not only adjacent to the appendix. Findings may be within normal limits; however, correlation with right lower quadrant pain for early/mild acute appendicitis is recommended. 2. Underdistended urinary bladder with possible mild diffuse wall thickening and surrounding fat stranding. Correlate with urinalysis. 3. Left ovarian cyst measuring 5.3 cm. Outpatient pelvic ultrasound is recommended for further evaluation. 4. Sequela of chronic pancreatitis again seen without findings of acute pancreatitis. 5. Hepatic steatosis. The study was marked in [place] [facility] for immediate notification. Workstation performed: [id number]           Patient was seen and examined by Dr. Jennifer Leos. All key medical decisions were made by Dr. Jennifer Leos. Thank you for allowing us to participate in the care of this present patient. We will follow-up with you closely.

## 2023-07-24 NOTE — ASSESSMENT & PLAN NOTE
· Acute blood loss anemia secondary to lower GI bleeding. Hemoglobin on admission is 7.0.  · Was given 1 unit PRBC in the ER, transfuse to keep greater than 7.0.  · Monitor serial H&H.   · Check iron panel

## 2023-07-24 NOTE — ED PROVIDER NOTES
History  Chief Complaint   Patient presents with   • Diarrhea     Pt repots diarrhea for the past few days. Had abdominal pain a few days ago but that went away. No N/V. Reports stools are very dark but does take iron pills. 78-year-old female, past medical history of thrombocytopenia, acute GI bleed presented for evaluation of bloody diarrhea for past  3 days. Patient reports history of abdominal pain 3 days ago that subsided followed by dark stool, headache and diarrhea . Patient reports 8 episodes of watery diarrhea daily since Friday. Today, patient reports diarrhea followed by clumps of blood . she reports 1 episode of light headedness upon standing from a sitting position. Patient denies shortness of breath, chest pain, fever, nausea, vomiting,  or any constitutional symptoms. Patient is seen at bedside in no acute distress accompanied by her . She reported feeling weak. Prior to Admission Medications   Prescriptions Last Dose Informant Patient Reported? Taking? Calcium Carb-Cholecalciferol (CALCIUM + D3 PO)   Yes No   Sig: Take 200 Units by mouth daily. Cholecalciferol (VITAMIN D3) 3000 UNITS TABS   Yes No   Sig: Take 100 Units by mouth daily. Magnesium 400 MG CAPS   Yes No   Sig: Take 800 mg by mouth daily. Omega-3 Fatty Acids (FISH OIL) 1,000 mg   Yes No   Sig: Take 1,000 mg by mouth daily.    diclofenac sodium (VOLTAREN) 1 %   No No   Sig: APPLY TWO GRAMS TOPICALLY 4 (FOUR) TIMES A DAY   docusate sodium (COLACE) 100 mg capsule   No No   Sig: Take 1 capsule (100 mg total) by mouth 2 (two) times a day   enoxaparin (LOVENOX) 40 mg/0.4 mL   No No   Sig: Inject 0.4 mL (40 mg total) under the skin daily for 28 days   lidocaine (LIDODERM) 5 %   No No   Sig: Apply 1 patch topically daily Remove & Discard patch within 12 hours or as directed by MD   losartan (COZAAR) 25 mg tablet   Yes Yes   Sig: Take 25 mg by mouth daily   metoprolol tartrate (LOPRESSOR) 25 mg tablet   Yes No Sig: Take 50 mg by mouth 2 (two) times a day     morphine (MSIR) 15 mg tablet   No No   Sig: Take 1 tablet (15 mg total) by mouth every 4 (four) hours as needed for severe pain Earliest Fill Date: 11/21/18 Max Daily Amount: 90 mg   senna (SENOKOT) 8.6 mg   No No   Sig: Take 1 tablet (8.6 mg total) by mouth daily   sodium chloride 1 g tablet   No No   Sig: Take 1 tablet (1 g total) by mouth 2 (two) times a day with meals   thiamine 100 MG tablet   Yes No   Sig: Take 100 mg by mouth daily. torsemide (DEMADEX) 5 MG tablet   No No   Sig: Take 1 tablet (5 mg total) by mouth every other day   traMADol (ULTRAM) 50 mg tablet   No No   Sig: Take 1 tablet (50 mg total) by mouth every 6 (six) hours as needed for moderate pain      Facility-Administered Medications: None       Past Medical History:   Diagnosis Date   • Hypertension    • Hyponatremia        Past Surgical History:   Procedure Laterality Date   • EYE SURGERY     • [place] OPTX FEM SHFT FX W/INSJ IMED IMPLT W/WO SCREW Left 11/17/2018    Procedure: Intramedullary nail fixation left hip fracture;  Surgeon: Maribel Lopez MD;  Location: [facility];  Service: Orthopedics       Family History   Problem Relation Age of Onset   • Heart disease Mother    • Heart disease Father      I have reviewed and agree with the history as documented. E-Cigarette/Vaping   • E-Cigarette Use Never User      E-Cigarette/Vaping Substances     Social History     Tobacco Use   • Smoking status: Former   • Smokeless tobacco: Never   Vaping Use   • Vaping Use: Never used   Substance Use Topics   • Alcohol use: Not Currently     Alcohol/week: 1.0 standard drink of alcohol     Types: 1 Cans of beer per week     Comment: 2-3 times per week. Drinks heavier before   • Drug use: No        Review of Systems   Constitutional: Negative for chills and fever. HENT: Negative for ear pain and sore throat. Eyes: Negative for pain and visual disturbance.    Respiratory: Negative for cough and shortness of breath. Cardiovascular: Negative for chest pain and palpitations. Gastrointestinal: Positive for abdominal pain (Resolved), blood in stool and diarrhea. Negative for nausea and vomiting. Genitourinary: Positive for difficulty urinating. Negative for dysuria, hematuria and vaginal bleeding. Musculoskeletal: Negative for arthralgias and back pain. Skin: Negative for color change and rash. Neurological: Positive for light-headedness (Upon standing). Negative for seizures and syncope. Hematological: Does not bruise/bleed easily. All other systems reviewed and are negative. Physical Exam  ED Triage Vitals   Temperature Pulse Respirations Blood Pressure SpO2   07/24/23 0952 07/24/23 0950 07/24/23 0950 07/24/23 0950 07/24/23 0950   98.1 °F (36.7 °C) 77 18 135/60 98 %      Temp Source Heart Rate Source Patient Position - Orthostatic VS BP Location FiO2 (%)   07/24/23 0952 07/24/23 0950 07/24/23 1548 07/24/23 0950 --   Oral Monitor Lying Right arm       Pain Score       07/24/23 0950       No Pain             Orthostatic Vital Signs  Vitals:    07/24/23 1300 07/24/23 1548 07/24/23 1807 07/24/23 1822   BP: 143/63 165/69 137/66 163/66   Pulse: 67 66 71 64   Patient Position - Orthostatic VS:  Lying         Physical Exam  Vitals and nursing note reviewed. Constitutional:       General: She is not in acute distress. Appearance: She is well-developed. HENT:      Head: Normocephalic and atraumatic. Eyes:      Conjunctiva/sclera: Conjunctivae normal.   Cardiovascular:      Rate and Rhythm: Normal rate and regular rhythm. Heart sounds: No murmur heard. Pulmonary:      Effort: Pulmonary effort is normal. No respiratory distress. Breath sounds: Normal breath sounds. Abdominal:      Palpations: Abdomen is soft. Tenderness: There is no abdominal tenderness. Musculoskeletal:         General: No swelling. Cervical back: Neck supple.    Skin:     General: Skin is warm and dry.      Capillary Refill: Capillary refill takes less than 2 seconds. Neurological:      Mental Status: She is alert. Psychiatric:         Mood and Affect: Mood normal.         ED Medications  Medications   thiamine tablet 100 mg (has no administration in time range)   morphine (MSIR) IR tablet 15 mg (has no administration in time range)   metoprolol tartrate (LOPRESSOR) tablet 50 mg (has no administration in time range)   traMADol (ULTRAM) tablet 50 mg (has no administration in time range)   sodium chloride 0.9 % infusion (has no administration in time range)   ondansetron (ZOFRAN) injection 4 mg (has no administration in time range)   acetaminophen (TYLENOL) tablet 650 mg (has no administration in time range)   pantoprazole (PROTONIX) injection 40 mg (has no administration in time range)   pancrelipase (Lip-Prot-Amyl) (CREON) delayed release capsule 24,000 Units (has no administration in time range)   polyethylene glycol (GOLYTELY) bowel prep 4,000 mL (has no administration in time range)   pantoprazole (PROTONIX) injection 40 mg (40 mg Intravenous Given 7/24/23 1236)   sodium chloride 0.9 % bolus 1,000 mL (1,000 mL Intravenous New Bag 7/24/23 1237)   iohexol (OMNIPAQUE) 350 MG/ML injection (SINGLE-DOSE) 100 mL (100 mL Intravenous Given 7/24/23 1332)       Diagnostic Studies  Results Reviewed     Procedure Component Value Units Date/Time    Iron Saturation % [[id number]] Collected: 07/24/23 1016    Lab Status: In process Specimen: Blood Updated: 07/24/23 1741    Ferritin [[id number]] Collected: 07/24/23 1016    Lab Status:  In process Specimen: Blood Updated: 07/24/23 1741    UA w Reflex to Microscopic w Reflex to Culture [[id number]]     Lab Status: No result Specimen: Urine     Clostridium difficile toxin by PCR with EIA [[id number]]     Lab Status: No result Specimen: Stool     Stool Enteric Bacterial Panel by PCR [[id number]]     Lab Status: No result Specimen: Stool     APTT [[id number]]  (Normal) Collected: 07/24/23 1016    Lab Status: Final result Specimen: Blood from Arm, Right Updated: 07/24/23 1231     PTT 31 seconds     [place] draw [[id number]] Collected: 07/24/23 1016    Lab Status: In process Specimen: Blood from Arm, Right Updated: 07/24/23 1201    Narrative: The following orders were created for panel order [place] draw. Procedure                               Abnormality         Status                     ---------                               -----------         ------                     Evone Jorge Top on HFTV[[id number]]                           Final result               Gold top on AFUQ[[id number]]                                 Final result               Green / Black tube on FBBA[[id number]]                       Final result               Lavender Top 7ml on WGUS[[id number]]                         In process                   Please view results for these tests on the individual orders.     Protime-INR [[id number]]  (Abnormal) Collected: 07/24/23 1016    Lab Status: Final result Specimen: Blood from Arm, Right Updated: 07/24/23 1200     Protime 15.5 seconds      INR 1.20    CBC and differential [[id number]]  (Abnormal) Collected: 07/24/23 1016    Lab Status: Final result Specimen: Blood from Arm, Right Updated: 07/24/23 1142     WBC 3.10 Thousand/uL      RBC 2.04 Million/uL      Hemoglobin 7.0 g/dL      Hematocrit 21.5 %       fL      MCH 34.3 pg      MCHC 32.6 g/dL      RDW 13.1 %      MPV 10.9 fL      Platelets 560 Thousands/uL      nRBC 0 /100 WBCs      Neutrophils Relative 58 %      Immat GRANS % 1 %      Lymphocytes Relative 31 %      Monocytes Relative 7 %      Eosinophils Relative 2 %      Basophils Relative 1 %      Neutrophils Absolute 1.79 Thousands/µL      Immature Grans Absolute 0.03 Thousand/uL      Lymphocytes Absolute 0.96 Thousands/µL      Monocytes Absolute 0.23 Thousand/µL      Eosinophils Absolute 0.07 Thousand/µL      Basophils Absolute 0.02 Thousands/µL     Comprehensive metabolic panel [[id number]]  (Abnormal) Collected: 07/24/23 1016    Lab Status: Final result Specimen: Blood from Arm, Right Updated: 07/24/23 1043     Sodium 134 mmol/L      Potassium 3.5 mmol/L      Chloride 98 mmol/L      CO2 25 mmol/L      ANION GAP 11 mmol/L      BUN 13 mg/dL      Creatinine 0.77 mg/dL      Glucose 136 mg/dL      Calcium 9.0 mg/dL      AST 98 U/L      ALT 47 U/L      Alkaline Phosphatase 67 U/L      Total Protein 6.9 g/dL      Albumin 3.7 g/dL      Total Bilirubin 1.71 mg/dL      eGFR 79 ml/min/1.73sq m     Narrative:      [place] guidelines for Chronic Kidney Disease (CKD):   •  Stage 1 with normal or high GFR (GFR > 90 mL/min/1.73 square meters)  •  Stage 2 Mild CKD (GFR = 60-89 mL/min/1.73 square meters)  •  Stage 3A Moderate CKD (GFR = 45-59 mL/min/1.73 square meters)  •  Stage 3B Moderate CKD (GFR = 30-44 mL/min/1.73 square meters)  •  Stage 4 Severe CKD (GFR = 15-29 mL/min/1.73 square meters)  •  Stage 5 End Stage CKD (GFR <15 mL/min/1.73 square meters)  Note: GFR calculation is accurate only with a steady state creatinine    Lipase [[id number]]  (Abnormal) Collected: 07/24/23 1016    Lab Status: Final result Specimen: Blood from Arm, Right Updated: 07/24/23 1043     Lipase 350 u/L                  CT abdomen pelvis with contrast   Final Result by Emili Moon MD (07/24 6114)      1. Top normal caliber appendix with mild surrounding fat stranding, noting that there is mild stranding along the right retroperitoneum/paracolic gutter and not only adjacent to the appendix. Findings may be within normal limits; however, correlation    with right lower quadrant pain for early/mild acute appendicitis is recommended. 2. Underdistended urinary bladder with possible mild diffuse wall thickening and surrounding fat stranding. Correlate with urinalysis. 3. Left ovarian cyst measuring 5.3 cm. Outpatient pelvic ultrasound is recommended for further evaluation. 4. Sequela of chronic pancreatitis again seen without findings of acute pancreatitis. 5. Hepatic steatosis. The study was marked in [facility] for immediate notification. Workstation performed: [id number]               Procedures  ECG 12 Lead Documentation Only    Date/Time: 7/24/2023 6:32 PM    Performed by: Torsten Oconnor MD  Authorized by: Torsten Oconnor MD    Indications / Diagnosis:  Diarrhea  ECG reviewed by me, the ED Provider: yes    Patient location:  ED  Previous ECG:     Previous ECG:  Compared to current    Comparison ECG info:  November 15, 2018    Similarity:  Changes noted    Comparison to cardiac monitor: Yes    Interpretation:     Interpretation: normal    Rate:     ECG rate:  69 bpm    ECG rate assessment: normal    Rhythm:     Rhythm: junctional    Ectopy:     Ectopy: none    QRS:     QRS axis:  Normal  Conduction:     Conduction: abnormal    ST segments:     ST segments:  Non-specific    Elevation:  II, III and aVF  T waves:     T waves: non-specific    Q waves:     Q waves:  II, III and aVF  Comments:      Ventricular rate 69 bpm, QRS duration 78 , QTc 450  There is evidence of junctional rhythm with low voltage QRS. There is evidence of nonspecific ST and T wave abnormality. ED Course  ED Course as of 07/24/23 Gilson Ly Jul 24, 2023   69 60-year-old female presented for evaluation of bloody diarrhea for the last 3 days. 1227 Patient given Protonix, fluid, order units of blood and CT scan of the abdomen pelvis with contrast    156 60-year-old female presents for evaluation of 3-day history of diarrhea and bleeding per her rectum. 1241 CBC and differential(!)  Low white count, hemoglobin of 7 trending down. Will transfuse with 1 unit of blood. [address](!): 15.5   1312 Patient case discussed with Slim patient admitted under Dr. Noble Lara for further management.    1439 CT abdomen pelvis with contrast  Left ovarian cyst measuring 5.3 cm. Outpatient pelvic ultrasound is recommended for further evaluation.     1443 On physical exam patient is not tender in the right lower quadrant. There is no suspicion of acute appendicitis. 1823 Case discussed with inpatient medicine, patient was admitted under the care of Dr. Fadumo Villa for further management. SBIRT 22yo+    Flowsheet Row Most Recent Value   Initial Alcohol Screen: US AUDIT-C     1. How often do you have a drink containing alcohol? 0 Filed at: 07/24/2023 1034   2. How many drinks containing alcohol do you have on a typical day you are drinking? 0 Filed at: 07/24/2023 1034   3b. FEMALE Any Age, or MALE 65+: How often do you have 4 or more drinks on one occassion? 0 Filed at: 07/24/2023 1034   Audit-C Score 0 Filed at: 07/24/2023 1034   HUSAM: How many times in the past year have you. .. Used an illegal drug or used a prescription medication for non-medical reasons? Never Filed at: 07/24/2023 1034                Medical Decision Making  57-year-old female presented for evaluation of bloody diarrhea for the last 3 days. Patient given Protonix, fluid, order units of blood and CT scan of the abdomen pelvis with contrast   CBC and differential(!Low white count, hemoglobin of 7 trending down. Will transfuse with 1 unit of blood. PROTIME(!): 15.5 elevated     CT abdomen pelvis with contrast remarkable for Left ovarian cyst measuring 5.3 cm. Outpatient pelvic ultrasound is recommended for further evaluation. On physical exam patient is not tender in the right lower quadrant. There is no suspicion of acute appendicitis. Differential diagnosis include peptic ulcer disease, anemia of chronic disease, hemorrhoid bleed, malignancy among others. Patient case discussed with Slim patient admitted under Dr. Brittni Shetty for further management.       Bleeding per rectum: acute illness or injury  Diarrhea: acute illness or injury  Amount and/or Complexity of Data Reviewed  Independent Historian: spouse  Labs: ordered. Decision-making details documented in ED Course. Radiology: ordered. Decision-making details documented in ED Course. Risk  Prescription drug management. Decision regarding hospitalization. Disposition  Final diagnoses:   Bleeding per rectum   Diarrhea     Time reflects when diagnosis was documented in both MDM as applicable and the Disposition within this note     Time User Action Codes Description Comment    7/24/2023  1:11 PM Greer Jameel-Aziz Candido Add [K62.5] Bleeding per rectum     7/24/2023  1:11 PM Emma Butterfieldul-Aziz Candido Add [R19.7] Diarrhea     7/24/2023  2:37 PM Nina Lantigua Add Wells Alyssa Appendicitis     7/24/2023  5:02 PM Nathanael Ott Add [D62] Acute blood loss anemia (ABLA)       ED Disposition     ED Disposition   Admit    Condition   Stable    Date/Time   Mon Jul 24, 2023  1:11 PM    Comment   Case was discussed with Patric and the patient's admission status was agreed to be Admission Status: inpatient status to the service of Dr. Kamran Heard. Follow-up Information    None         Current Discharge Medication List      CONTINUE these medications which have NOT CHANGED    Details   losartan (COZAAR) 25 mg tablet Take 25 mg by mouth daily      Calcium Carb-Cholecalciferol (CALCIUM + D3 PO) Take 200 Units by mouth daily. Cholecalciferol (VITAMIN D3) 3000 UNITS TABS Take 100 Units by mouth daily.       diclofenac sodium (VOLTAREN) 1 % APPLY TWO GRAMS TOPICALLY 4 (FOUR) TIMES A DAY  Qty: 100 g, Refills: 4    Associated Diagnoses: S/P ORIF (open reduction internal fixation) fracture      docusate sodium (COLACE) 100 mg capsule Take 1 capsule (100 mg total) by mouth 2 (two) times a day  Refills: 0    Associated Diagnoses: Closed comminuted fracture of hip, left, initial encounter ([facility])      enoxaparin (LOVENOX) 40 mg/0.4 mL Inject 0.4 mL (40 mg total) under the skin daily for 28 days  Refills: 0 Associated Diagnoses: Closed comminuted fracture of hip, left, initial encounter ([facility])      lidocaine (LIDODERM) 5 % Apply 1 patch topically daily Remove & Discard patch within 12 hours or as directed by MD  Qty: 30 patch, Refills: 0    Associated Diagnoses: Closed comminuted fracture of hip, left, initial encounter ([facility])      Magnesium 400 MG CAPS Take 800 mg by mouth daily. metoprolol tartrate (LOPRESSOR) 25 mg tablet Take 50 mg by mouth 2 (two) times a day        morphine (MSIR) 15 mg tablet Take 1 tablet (15 mg total) by mouth every 4 (four) hours as needed for severe pain Earliest Fill Date: 11/21/18 Max Daily Amount: 90 mg  Qty: 30 tablet, Refills: 0    Associated Diagnoses: Closed comminuted fracture of hip, left, initial encounter ([facility])      Omega-3 Fatty Acids (FISH OIL) 1,000 mg Take 1,000 mg by mouth daily. senna (SENOKOT) 8.6 mg Take 1 tablet (8.6 mg total) by mouth daily  Qty: 120 each, Refills: 0    Associated Diagnoses: Closed comminuted fracture of hip, left, initial encounter ([facility])      sodium chloride 1 g tablet Take 1 tablet (1 g total) by mouth 2 (two) times a day with meals  Refills: 0    Associated Diagnoses: Hyponatremia      thiamine 100 MG tablet Take 100 mg by mouth daily. torsemide (DEMADEX) 5 MG tablet Take 1 tablet (5 mg total) by mouth every other day  Refills: 0    Associated Diagnoses: Hyponatremia      traMADol (ULTRAM) 50 mg tablet Take 1 tablet (50 mg total) by mouth every 6 (six) hours as needed for moderate pain  Qty: 30 tablet, Refills: 0    Associated Diagnoses: Closed comminuted fracture of hip, left, initial encounter ([address])           No discharge procedures on file. PDMP Review     None           ED Provider  Attending physically available and evaluated Berny Kramer. I managed the patient along with the ED Attending.     Electronically Signed by         Helen Wallace MD  07/24/23 9830

## 2023-07-24 NOTE — ASSESSMENT & PLAN NOTE
• Presents with approximately 3 days worth of bloody diarrhea. No abdominal pain, fevers, chills, recent travel, antibiotic use. History of colonoscopy 9 years ago which showed polyps, reports not immediately available. • Hemoglobin: 7.0 on admit, prior baseline closer to 9-10  o Monitor H&H.  • CT scan: Pending read  • Keep NPO at MN. IV fluids. • Consult Gastroenterology.

## 2023-07-25 ENCOUNTER — ANESTHESIA EVENT (INPATIENT)
Dept: GASTROENTEROLOGY | Facility: HOSPITAL | Age: 68
DRG: 378 | End: 2023-07-25
Payer: MEDICARE

## 2023-07-25 ENCOUNTER — ANESTHESIA EVENT (OUTPATIENT)
Dept: ANESTHESIOLOGY | Facility: HOSPITAL | Age: 68
End: 2023-07-25

## 2023-07-25 ENCOUNTER — ANESTHESIA (INPATIENT)
Dept: GASTROENTEROLOGY | Facility: HOSPITAL | Age: 68
DRG: 378 | End: 2023-07-25
Payer: MEDICARE

## 2023-07-25 ENCOUNTER — APPOINTMENT (INPATIENT)
Dept: GASTROENTEROLOGY | Facility: HOSPITAL | Age: 68
DRG: 378 | End: 2023-07-25
Payer: MEDICARE

## 2023-07-25 ENCOUNTER — ANESTHESIA (OUTPATIENT)
Dept: ANESTHESIOLOGY | Facility: HOSPITAL | Age: 68
End: 2023-07-25

## 2023-07-25 LAB
ABO GROUP BLD BPU: NORMAL
ANION GAP SERPL CALCULATED.3IONS-SCNC: 7 MMOL/L
ATRIAL RATE: 208 BPM
BACTERIA UR QL AUTO: ABNORMAL /HPF
BASOPHILS # BLD AUTO: 0.01 THOUSANDS/ÂΜL (ref 0–0.1)
BASOPHILS NFR BLD AUTO: 0 % (ref 0–1)
BPU ID: NORMAL
BUN SERPL-MCNC: 9 MG/DL (ref 5–25)
C DIFF TOX GENS STL QL NAA+PROBE: NEGATIVE
CALCIUM SERPL-MCNC: 8 MG/DL (ref 8.4–10.2)
CAMPYLOBACTER DNA SPEC NAA+PROBE: NORMAL
CHLORIDE SERPL-SCNC: 98 MMOL/L (ref 96–108)
CO2 SERPL-SCNC: 27 MMOL/L (ref 21–32)
CREAT SERPL-MCNC: 0.59 MG/DL (ref 0.6–1.3)
CROSSMATCH: NORMAL
EOSINOPHIL # BLD AUTO: 0.07 THOUSAND/ÂΜL (ref 0–0.61)
EOSINOPHIL NFR BLD AUTO: 3 % (ref 0–6)
ERYTHROCYTE [DISTWIDTH] IN BLOOD BY AUTOMATED COUNT: 17 % (ref 11.6–15.1)
FERRITIN SERPL-MCNC: 2093 NG/ML (ref 11–307)
GFR SERPL CREATININE-BSD FRML MDRD: 94 ML/MIN/1.73SQ M
GLUCOSE SERPL-MCNC: 90 MG/DL (ref 65–140)
HCT VFR BLD AUTO: 21.5 % (ref 34.8–46.1)
HGB BLD-MCNC: 7.2 G/DL (ref 11.5–15.4)
HGB BLD-MCNC: 7.3 G/DL (ref 11.5–15.4)
HGB BLD-MCNC: 7.5 G/DL (ref 11.5–15.4)
HGB BLD-MCNC: 8.4 G/DL (ref 11.5–15.4)
IMM GRANULOCYTES # BLD AUTO: 0.01 THOUSAND/UL (ref 0–0.2)
IMM GRANULOCYTES NFR BLD AUTO: 0 % (ref 0–2)
IRON SATN MFR SERPL: 64 % (ref 15–50)
IRON SERPL-MCNC: 145 UG/DL (ref 50–170)
LYMPHOCYTES # BLD AUTO: 1 THOUSANDS/ÂΜL (ref 0.6–4.47)
LYMPHOCYTES NFR BLD AUTO: 42 % (ref 14–44)
MCH RBC QN AUTO: 32.9 PG (ref 26.8–34.3)
MCHC RBC AUTO-ENTMCNC: 33.5 G/DL (ref 31.4–37.4)
MCV RBC AUTO: 98 FL (ref 82–98)
MONOCYTES # BLD AUTO: 0.22 THOUSAND/ÂΜL (ref 0.17–1.22)
MONOCYTES NFR BLD AUTO: 9 % (ref 4–12)
NEUTROPHILS # BLD AUTO: 1.09 THOUSANDS/ÂΜL (ref 1.85–7.62)
NEUTS SEG NFR BLD AUTO: 46 % (ref 43–75)
NON-SQ EPI CELLS URNS QL MICRO: ABNORMAL /HPF
NRBC BLD AUTO-RTO: 0 /100 WBCS
PLATELET # BLD AUTO: 80 THOUSANDS/UL (ref 149–390)
PMV BLD AUTO: 10.8 FL (ref 8.9–12.7)
POTASSIUM SERPL-SCNC: 3.1 MMOL/L (ref 3.5–5.3)
QRS AXIS: 41 DEGREES
QRSD INTERVAL: 78 MS
QT INTERVAL: 420 MS
QTC INTERVAL: 450 MS
RBC # BLD AUTO: 2.19 MILLION/UL (ref 3.81–5.12)
RBC #/AREA URNS AUTO: ABNORMAL /HPF
SALMONELLA DNA SPEC QL NAA+PROBE: NORMAL
SHIGA TOXIN STX GENE SPEC NAA+PROBE: NORMAL
SHIGELLA DNA SPEC QL NAA+PROBE: NORMAL
SODIUM SERPL-SCNC: 132 MMOL/L (ref 135–147)
T WAVE AXIS: 39 DEGREES
TIBC SERPL-MCNC: 227 UG/DL (ref 250–450)
UNIT DISPENSE STATUS: NORMAL
UNIT PRODUCT CODE: NORMAL
UNIT PRODUCT VOLUME: 350 ML
UNIT RH: NORMAL
VENTRICULAR RATE: 69 BPM
WBC # BLD AUTO: 2.4 THOUSAND/UL (ref 4.31–10.16)
WBC #/AREA URNS AUTO: ABNORMAL /HPF

## 2023-07-25 PROCEDURE — 0DJD8ZZ INSPECTION OF LOWER INTESTINAL TRACT, VIA NATURAL OR ARTIFICIAL OPENING ENDOSCOPIC: ICD-10-PCS | Performed by: INTERNAL MEDICINE

## 2023-07-25 PROCEDURE — 99232 SBSQ HOSP IP/OBS MODERATE 35: CPT | Performed by: SURGERY

## 2023-07-25 PROCEDURE — 0DB48ZX EXCISION OF ESOPHAGOGASTRIC JUNCTION, VIA NATURAL OR ARTIFICIAL OPENING ENDOSCOPIC, DIAGNOSTIC: ICD-10-PCS | Performed by: INTERNAL MEDICINE

## 2023-07-25 PROCEDURE — 99232 SBSQ HOSP IP/OBS MODERATE 35: CPT | Performed by: PHYSICIAN ASSISTANT

## 2023-07-25 PROCEDURE — 85025 COMPLETE CBC W/AUTO DIFF WBC: CPT | Performed by: PHYSICIAN ASSISTANT

## 2023-07-25 PROCEDURE — 88305 TISSUE EXAM BY PATHOLOGIST: CPT | Performed by: STUDENT IN AN ORGANIZED HEALTH CARE EDUCATION/TRAINING PROGRAM

## 2023-07-25 PROCEDURE — 88342 IMHCHEM/IMCYTCHM 1ST ANTB: CPT | Performed by: STUDENT IN AN ORGANIZED HEALTH CARE EDUCATION/TRAINING PROGRAM

## 2023-07-25 PROCEDURE — 85018 HEMOGLOBIN: CPT | Performed by: PHYSICIAN ASSISTANT

## 2023-07-25 PROCEDURE — C9113 INJ PANTOPRAZOLE SODIUM, VIA: HCPCS | Performed by: PHYSICIAN ASSISTANT

## 2023-07-25 PROCEDURE — 43239 EGD BIOPSY SINGLE/MULTIPLE: CPT | Performed by: INTERNAL MEDICINE

## 2023-07-25 PROCEDURE — 80048 BASIC METABOLIC PNL TOTAL CA: CPT | Performed by: PHYSICIAN ASSISTANT

## 2023-07-25 PROCEDURE — 45378 DIAGNOSTIC COLONOSCOPY: CPT | Performed by: INTERNAL MEDICINE

## 2023-07-25 PROCEDURE — 93010 ELECTROCARDIOGRAM REPORT: CPT | Performed by: INTERNAL MEDICINE

## 2023-07-25 PROCEDURE — 0DB98ZX EXCISION OF DUODENUM, VIA NATURAL OR ARTIFICIAL OPENING ENDOSCOPIC, DIAGNOSTIC: ICD-10-PCS | Performed by: INTERNAL MEDICINE

## 2023-07-25 PROCEDURE — 0DB78ZX EXCISION OF STOMACH, PYLORUS, VIA NATURAL OR ARTIFICIAL OPENING ENDOSCOPIC, DIAGNOSTIC: ICD-10-PCS | Performed by: INTERNAL MEDICINE

## 2023-07-25 RX ORDER — POTASSIUM CHLORIDE 20 MEQ/1
40 TABLET, EXTENDED RELEASE ORAL ONCE
Status: COMPLETED | OUTPATIENT
Start: 2023-07-25 | End: 2023-07-25

## 2023-07-25 RX ORDER — PANTOPRAZOLE SODIUM 40 MG/10ML
40 INJECTION, POWDER, LYOPHILIZED, FOR SOLUTION INTRAVENOUS EVERY 12 HOURS
Status: DISCONTINUED | OUTPATIENT
Start: 2023-07-26 | End: 2023-07-26

## 2023-07-25 RX ORDER — TORSEMIDE 10 MG/1
5 TABLET ORAL DAILY
Status: DISCONTINUED | OUTPATIENT
Start: 2023-07-26 | End: 2023-07-26 | Stop reason: HOSPADM

## 2023-07-25 RX ORDER — LIDOCAINE HYDROCHLORIDE 10 MG/ML
INJECTION, SOLUTION EPIDURAL; INFILTRATION; INTRACAUDAL; PERINEURAL AS NEEDED
Status: DISCONTINUED | OUTPATIENT
Start: 2023-07-25 | End: 2023-07-25

## 2023-07-25 RX ORDER — POTASSIUM CHLORIDE 14.9 MG/ML
20 INJECTION INTRAVENOUS ONCE
Status: COMPLETED | OUTPATIENT
Start: 2023-07-25 | End: 2023-07-26

## 2023-07-25 RX ORDER — SODIUM CHLORIDE 1 G/1
1 TABLET ORAL 2 TIMES DAILY WITH MEALS
Status: DISCONTINUED | OUTPATIENT
Start: 2023-07-25 | End: 2023-07-26 | Stop reason: HOSPADM

## 2023-07-25 RX ORDER — PROPOFOL 10 MG/ML
INJECTION, EMULSION INTRAVENOUS AS NEEDED
Status: DISCONTINUED | OUTPATIENT
Start: 2023-07-25 | End: 2023-07-25

## 2023-07-25 RX ORDER — SODIUM CHLORIDE, SODIUM LACTATE, POTASSIUM CHLORIDE, CALCIUM CHLORIDE 600; 310; 30; 20 MG/100ML; MG/100ML; MG/100ML; MG/100ML
INJECTION, SOLUTION INTRAVENOUS CONTINUOUS PRN
Status: DISCONTINUED | OUTPATIENT
Start: 2023-07-25 | End: 2023-07-25

## 2023-07-25 RX ADMIN — POTASSIUM CHLORIDE 40 MEQ: 1500 TABLET, EXTENDED RELEASE ORAL at 12:34

## 2023-07-25 RX ADMIN — PROPOFOL 100 MG: 10 INJECTION, EMULSION INTRAVENOUS at 17:10

## 2023-07-25 RX ADMIN — Medication 100 MG: at 08:31

## 2023-07-25 RX ADMIN — POTASSIUM CHLORIDE 20 MEQ: 14.9 INJECTION, SOLUTION INTRAVENOUS at 12:22

## 2023-07-25 RX ADMIN — SODIUM CHLORIDE, SODIUM LACTATE, POTASSIUM CHLORIDE, AND CALCIUM CHLORIDE: .6; .31; .03; .02 INJECTION, SOLUTION INTRAVENOUS at 17:04

## 2023-07-25 RX ADMIN — PROPOFOL 30 MG: 10 INJECTION, EMULSION INTRAVENOUS at 17:13

## 2023-07-25 RX ADMIN — PANCRELIPASE 24000 UNITS: 24000; 76000; 120000 CAPSULE, DELAYED RELEASE PELLETS ORAL at 18:32

## 2023-07-25 RX ADMIN — SODIUM CHLORIDE 1 G: 1 TABLET ORAL at 18:32

## 2023-07-25 RX ADMIN — PROPOFOL 40 MG: 10 INJECTION, EMULSION INTRAVENOUS at 17:16

## 2023-07-25 RX ADMIN — PANTOPRAZOLE SODIUM 40 MG: 40 INJECTION, POWDER, FOR SOLUTION INTRAVENOUS at 21:34

## 2023-07-25 RX ADMIN — METOPROLOL TARTRATE 50 MG: 50 TABLET, FILM COATED ORAL at 21:34

## 2023-07-25 RX ADMIN — PANCRELIPASE 24000 UNITS: 24000; 76000; 120000 CAPSULE, DELAYED RELEASE PELLETS ORAL at 12:35

## 2023-07-25 RX ADMIN — LIDOCAINE HYDROCHLORIDE 100 MG: 10 INJECTION, SOLUTION EPIDURAL; INFILTRATION; INTRACAUDAL; PERINEURAL at 17:10

## 2023-07-25 RX ADMIN — PROPOFOL 40 MG: 10 INJECTION, EMULSION INTRAVENOUS at 17:22

## 2023-07-25 RX ADMIN — PROPOFOL 30 MG: 10 INJECTION, EMULSION INTRAVENOUS at 17:12

## 2023-07-25 RX ADMIN — PANCRELIPASE 24000 UNITS: 24000; 76000; 120000 CAPSULE, DELAYED RELEASE PELLETS ORAL at 08:31

## 2023-07-25 RX ADMIN — PANTOPRAZOLE SODIUM 40 MG: 40 INJECTION, POWDER, FOR SOLUTION INTRAVENOUS at 11:36

## 2023-07-25 RX ADMIN — METOPROLOL TARTRATE 50 MG: 50 TABLET, FILM COATED ORAL at 08:31

## 2023-07-25 RX ADMIN — PROPOFOL 60 MG: 10 INJECTION, EMULSION INTRAVENOUS at 17:25

## 2023-07-25 NOTE — CASE MANAGEMENT
Case Management Assessment    Patient name Oleksandr Posey  Location S /S -08 MRN [id number]  : 1955 Date 2023       Current Admission Date: 2023  Current Admission Diagnosis:Acute lower GI bleeding   Patient Active Problem List    Diagnosis Date Noted   • Acute blood loss anemia (ABLA) 2023   • Acute lower GI bleeding 2023   • Urinary retention 2023   • Left hip pain 2022   • Thrombocytopenia ([address]) 2018   • Anemia 2018   • Closed comminuted fracture of hip, left, initial encounter ([address]) 11/15/2018   • Essential hypertension 11/15/2018   • Hyponatremia 11/15/2018   • Closed intertrochanteric fracture, left, initial encounter ([address]) 11/15/2018   • Pneumonia 2016      LOS (days): 1  Geometric Mean LOS (GMLOS) (days): 3.00  Days to GMLOS:1.9     OBJECTIVE:    Risk of Unplanned Readmission Score: 16.38         Current admission status: Inpatient       Preferred Pharmacy:   [address], [place], [address] [address]  Phone: [phone] Fax: [phone]    [id number] [place] - [address] [facility]  [address] [facility] [address]  Phone: [phone] Fax: [address] , [address] [address]  Phone: [phone] Fax: [phone]    Primary Care Provider: Sally Aguayo MD    Primary Insurance: MEDICARE  Secondary Insurance: Anjum Nuzhat:  [employer]     [address] [facility], [address] [facility] Representative - Spouse   Primary Phone: [phone] (Mobile)  Home Phone: [phone]                              Patient Information  Admitted from[de-identified] Home  Mental Status: Alert  During Assessment patient was accompanied by: Not accompanied during assessment  Assessment information provided by[de-identified] Spouse  Primary Caregiver: Self  Support Systems: Family members  [facility] Residence: [facility] [address] [facility] do you live in?: [facility] entry access options. Select all that apply.: Stairs  Number of steps to enter home.: 4  Do the steps have railings?: Yes  Type of Current Residence: [place]  In the last 12 months, was there a time when you were not able to pay the mortgage or rent on time?: No  In the last 12 months, was there a time when you did not have a steady place to sleep or slept in a shelter (including now)?: No  Homeless/housing insecurity resource given?: N/A  Living Arrangements: Lives w/ Spouse/significant other    Activities of Daily Living Prior to Admission  Functional Status: Independent  Completes ADLs independently?: Yes  Ambulates independently?: Yes  Does patient use assisted devices?: Yes  Assisted Devices (DME) used: Straight Cane  Does patient currently own DME?: Yes  What DME does the patient currently own?: Straight Cane, Other (Comment) (transport chair)  Does patient have a history of Outpatient Therapy (PT/OT)?: Yes  Does the patient have a history of Short-Term Rehab?: Yes ([facility] ([address]))  Does patient have a history of HHC?: No  Does patient currently have [facility]?: No         Patient Information Continued  Within the past 12 months, you worried that your food would run out before you got the money to buy more.: Never true  Within the past 12 months, the food you bought just didn't last and you didn't have money to get more.: Never true  Food insecurity resource given?: N/A         Means of Transportation  Means of Transport to Appts[de-identified] Family transport  In the past 12 months, has lack of transportation kept you from medical appointments or from getting medications?: No  In the past 12 months, has lack of transportation kept you from meetings, work, or from getting things needed for daily living?: No  Was application for public transport provided?: N/A    Patient currently JESUSITA at time of assessment.  CM contacted pt's  via phone re: assessment info. Pt lives with spouse in ranch home, 3-4 [address]AMIE. Pt independent with ADLS, utilizes cane in community/ no dme used within home, has hx of STR with NPA and hx of OPPT. Pt is able to drive self to appointments but  primarily provides transport. Confirmed PCP ([address] [place] [address]) and preferred pharmacy ([facility]).  to provide pt transport home when ready for d/c. No CM d/c needs identified at this time, CM remains available.

## 2023-07-25 NOTE — ASSESSMENT & PLAN NOTE
· Chronic, on torsemide and sodium chloride tablets at home. · Holding upon admission, resume once able to eat  · In the setting of GI losses we will start with gentle hydration and repeat BMP tomorrow.

## 2023-07-25 NOTE — ANESTHESIA PREPROCEDURE EVALUATION
Procedure:  COLONOSCOPY  EGD    Relevant Problems   CARDIO   (+) Essential hypertension      GI/HEPATIC   (+) Acute lower GI bleeding      HEMATOLOGY   (+) Acute blood loss anemia (ABLA)   (+) Anemia   (+) Thrombocytopenia (HCC)      PULMONARY   (+) Pneumonia        Physical Exam    Airway    Mallampati score: II  TM Distance: >3 FB  Neck ROM: full     Dental   No notable dental hx     Cardiovascular  Cardiovascular exam normal    Pulmonary  Pulmonary exam normal     Other Findings    lower GI bleed    Chronic pancreatitis    Anesthesia Plan  ASA Score- 3     Anesthesia Type- IV sedation with anesthesia with ASA Monitors. Additional Monitors:   Airway Plan:           Plan Factors-Exercise tolerance (METS): >4 METS. Chart reviewed. EKG reviewed. Imaging results reviewed. Existing labs reviewed. Patient summary reviewed. Patient is not a current smoker. Patient not instructed to abstain from smoking on day of procedure. Patient did not smoke on day of surgery. Obstructive sleep apnea risk education given perioperatively. Induction- intravenous. Postoperative Plan-     Informed Consent- Anesthetic plan and risks discussed with patient. I personally reviewed this patient with the CRNA. Discussed and agreed on the Anesthesia Plan with the CRNA. .          SR, NSSTT chsnges    HB 7.5, PLTS 80

## 2023-07-25 NOTE — PROGRESS NOTES
[facility]  Progress Note  Name: Joselin Sheppard  MRN: [id number]  Unit/Bed#: S -15 I Date of Admission: 7/24/2023   Date of Service: 7/25/2023 I Hospital Day: 1    Assessment/Plan   * Acute lower GI bleeding  Assessment & Plan  • Presents with approximately 3 days worth of bloody diarrhea. No abdominal pain, fevers, chills, recent travel, antibiotic use. History of colonoscopy 9 years ago which showed polyps, reports not immediately available. • Hemoglobin: 7.0 on admit, prior baseline closer to 9-10  o Monitor H&H. • Check stool enteric/c. diff  • Keep NPO  • Consult Gastroenterology. • EGD/colonoscopy today    Urinary retention  Assessment & Plan  · Chronic, typically patient self catheterizes  · Due to frequent diarrhea and risk for UTI we will place Davies catheter    Acute blood loss anemia (ABLA)  Assessment & Plan  · Acute blood loss anemia secondary to lower GI bleeding. Hemoglobin on admission is 7.0.  · Was given 1 unit PRBC in the ER, transfuse to keep greater than 7.0.  · Monitor serial H&H. Thrombocytopenia (HCC)  Assessment & Plan  · Chronic, platelets around baseline. · Monitor CBC with differential    Hyponatremia  Assessment & Plan  · Chronic, on torsemide and sodium chloride tablets at home. · Holding upon admission, resume once able to eat  · In the setting of GI losses we will start with gentle hydration and repeat BMP tomorrow. Essential hypertension  Assessment & Plan  · Continue metoprolol  · Monitor BP           VTE Pharmacologic Prophylaxis: VTE Score: 2 Low Risk (Score 0-2) - Encourage Ambulation. Patient Centered Rounds: I performed bedside rounds with nursing staff today. Discussions with Specialists or Other Care Team Provider: chuck, rn    Education and Discussions with Family / Patient: Patient declined call to . Time Spent for Care: 45 minutes.  More than 50% of total time spent on counseling and coordination of care as described above. Current Length of Stay: 1 day(s)  Current Patient Status: Inpatient   Certification Statement: The patient will continue to require additional inpatient hospital stay due to ABLA due to GI bleed needing scopes today  Discharge Plan: Anticipate discharge in 24-48 hrs to home. Code Status: Level 1 - Full Code    Subjective:   Patient completed prep overnight. Stool is clear. No overnight events per nursing. Objective:     Vitals:   Temp (24hrs), Av.5 °F (36.9 °C), Min:98.2 °F (36.8 °C), Max:98.8 °F (37.1 °C)    Temp:  [98.2 °F (36.8 °C)-98.8 °F (37.1 °C)] 98.8 °F (37.1 °C)  HR:  [60-76] 60  Resp:  [16-18] 16  BP: (124-165)/(55-74) 126/55  SpO2:  [97 %-100 %] 98 %  Body mass index is 24.6 kg/m². Input and Output Summary (last 24 hours): Intake/Output Summary (Last 24 hours) at 2023 1039  Last data filed at 2023 2210  Gross per 24 hour   Intake 427.5 ml   Output 500 ml   Net -72.5 ml       Physical Exam:   Physical Exam  Vitals and nursing note reviewed. Constitutional:       General: She is not in acute distress. Appearance: Normal appearance. HENT:      Head: Normocephalic. Mouth/Throat:      Mouth: Mucous membranes are moist.   Eyes:      General: No scleral icterus. Pupils: Pupils are equal, round, and reactive to light. Cardiovascular:      Rate and Rhythm: Normal rate and regular rhythm. Heart sounds: No murmur heard. Pulmonary:      Effort: Pulmonary effort is normal. No respiratory distress. Breath sounds: Normal breath sounds. No wheezing, rhonchi or rales. Abdominal:      General: Bowel sounds are normal. There is no distension. Palpations: Abdomen is soft. Tenderness: There is no abdominal tenderness. Musculoskeletal:         General: No swelling. Right lower leg: No edema. Left lower leg: No edema. Skin:     Capillary Refill: Capillary refill takes less than 2 seconds.    Neurological:      General: No focal deficit present. Mental Status: She is alert and oriented to person, place, and time. Mental status is at baseline. Additional Data:     Labs:  Results from last 7 days   Lab Units 07/25/23  0823 07/25/23  0501   WBC Thousand/uL  --  2.40*   HEMOGLOBIN g/dL 7.5* 7.2*   HEMATOCRIT %  --  21.5*   PLATELETS Thousands/uL  --  80*   NEUTROS PCT %  --  46   LYMPHS PCT %  --  42   MONOS PCT %  --  9   EOS PCT %  --  3     Results from last 7 days   Lab Units 07/25/23  0501 07/24/23  1016   SODIUM mmol/L 132* 134*   POTASSIUM mmol/L 3.1* 3.5   CHLORIDE mmol/L 98 98   CO2 mmol/L 27 25   BUN mg/dL 9 13   CREATININE mg/dL 0.59* 0.77   ANION GAP mmol/L 7 11   CALCIUM mg/dL 8.0* 9.0   ALBUMIN g/dL  --  3.7   TOTAL BILIRUBIN mg/dL  --  1.71*   ALK PHOS U/L  --  67   ALT U/L  --  47   AST U/L  --  98*   GLUCOSE RANDOM mg/dL 90 136     Results from last 7 days   Lab Units 07/24/23  1016   INR  1.20*                   Lines/Drains:  Invasive Devices     Peripheral Intravenous Line  Duration           Peripheral IV 07/24/23 Left;Proximal;Ventral (anterior) Forearm <1 day          Drain  Duration           Urethral Catheter 16 Fr. <1 day              Urinary Catheter:  Goal for removal: Plan to remove POD#1               Imaging: No pertinent imaging reviewed.     Recent Cultures (last 7 days):         Last 24 Hours Medication List:   Current Facility-Administered Medications   Medication Dose Route Frequency Provider Last Rate   • acetaminophen  650 mg Oral Q6H PRN Winston Quintero PA-C     • metoprolol tartrate  50 mg Oral Q12H [address] Winston Quintero PA-C     • morphine  15 mg Oral Q4H PRN Winston Quintero PA-C     • ondansetron  4 mg Intravenous Q6H PRN Winston Quintero PA-C     • pancrelipase (Lip-Prot-Amyl)  24,000 Units Oral TID With Meals Allyssa Arvizu MD     • pantoprazole  40 mg Intravenous Q12H Winston Quintero PA-C     • sodium chloride  125 mL/hr Intravenous Continuous Winston Quintero PA-C 125 mL/hr (07/24/23 8619)   • thiamine  100 mg Oral Daily Sidra Jack PA-C     • traMADol  50 mg Oral Q6H PRN Sidra Jack PA-C          Today, Patient Was Seen By: Meka Cano PA-C    **Please Note: This note may have been constructed using a voice recognition system. **

## 2023-07-25 NOTE — PLAN OF CARE
Problem: Potential for Falls  Goal: Patient will remain free of falls  Description: INTERVENTIONS:  - Educate patient/family on patient safety including physical limitations  - Instruct patient to call for assistance with activity   - Consult OT/PT to assist with strengthening/mobility   - Keep Call bell within reach  - Keep bed low and locked with side rails adjusted as appropriate  - Keep care items and personal belongings within reach  - Initiate and maintain comfort rounds  - Make Fall Risk Sign visible to staff  - Offer Toileting every  Hours, in advance of need  - Initiate/Maintain alarm  - Obtain necessary fall risk management equipment:   - Apply yellow socks and bracelet for high fall risk patients  - Consider moving patient to room near nurses station  Outcome: Progressing     Problem: MOBILITY - ADULT  Goal: Maintain or return to baseline ADL function  Description: INTERVENTIONS:  -  Assess patient's ability to carry out ADLs; assess patient's baseline for ADL function and identify physical deficits which impact ability to perform ADLs (bathing, care of mouth/teeth, toileting, grooming, dressing, etc.)  - Assess/evaluate cause of self-care deficits   - Assess range of motion  - Assess patient's mobility; develop plan if impaired  - Assess patient's need for assistive devices and provide as appropriate  - Encourage maximum independence but intervene and supervise when necessary  - Involve family in performance of ADLs  - Assess for home care needs following discharge   - Consider OT consult to assist with ADL evaluation and planning for discharge  - Provide patient education as appropriate  Outcome: Progressing  Goal: Maintains/Returns to pre admission functional level  Description: INTERVENTIONS:  - Perform BMAT or MOVE assessment daily.   - Set and communicate daily mobility goal to care team and patient/family/caregiver.    - Collaborate with rehabilitation services on mobility goals if consulted  - Perform Range of Motion  times a day. - Reposition patient every  hours.   - Dangle patient  times a day  - Stand patient  times a day  - Ambulate patient  times a day  - Out of bed to chair  times a day   - Out of bed for meals  times a day  - Out of bed for toileting  - Record patient progress and toleration of activity level   Outcome: Progressing

## 2023-07-25 NOTE — APP STUDENT NOTE
[facility] STUDENT  Inpatient Progress Note for TRAINING ONLY  Not Part of Legal Medical Record     Progress Note - Cortes Orourke 76 y.o. female MRN: [id number]  Unit/Bed#: S -01 Encounter: [id number]    Acute lower GI bleeding  Assessment & Plan  • Patient presents with bloody diarrhea x3 days. • Patient denied abd pain, fevers, chills, recent travel, or antibiotic use   • Hx of colonoscopy 9 years ago that showed polyps, unable to obtain results   • Hgb 7.0 on admission, prior Hgb 9-10   • Monitor H&H  • Check stool enteric/cdiff  • NPO   • Appreciate GI input   • EGD/colonoscopy today      Urinary retention  Assessment & Plan  • Chronic, patient self catheterizes   • Davies catheter to be placed due to diarrhea and UTI risk      Acute blood loss anemia (ABLA)  Assessment & Plan  • 1 unit PRBC given in ER as Hgb was 7  • Hgb 7.5 this AM  • Transfuse if hgb <7  • Monitor H&H     Thrombocytopenia (HCC)  Assessment & Plan  • Chronic, platelets around baseline level 80  • Follow CBCd     Hyponatremia  Assessment & Plan  • Chronic, patient on torsemide and sodium chloride tablets at home   • Currently holding on admission, will resume when NPO lifted  • Gentle hydration due to GI loss  • Repeat BMP tomorrow     Essential hypertension  Assessment & Plan  · Continue metoprolol   · Continue to monitor BP    VTE Pharmacologic Prophylaxis: ambulation    Discussions with Specialists or Other Care Team Provider: GI consulted    Time Spent for Care: 30 minutes. More than 50% of total time spent on counseling and coordination of care as described above. Current Length of Stay: 1 day(s)    Current Patient Status: Inpatient   Certification Statement: The patient will continue to require additional inpatient hospital stay due to acute blood loss anemia due to GI bleed, EGD today     Code Status: Level 1 - Full Code    Subjective:   Patient reports no events overnight.  She completed her prep and denies any blood in the stool today. She states the stool is clear. No abdominal pain, fevers, or chills     Objective:     Vitals:   Temp (24hrs), Av.5 °F (36.9 °C), Min:98.2 °F (36.8 °C), Max:98.8 °F (37.1 °C)    Temp:  [98.2 °F (36.8 °C)-98.8 °F (37.1 °C)] 98.8 °F (37.1 °C)  HR:  [60-76] 60  Resp:  [16-18] 16  BP: (124-165)/(55-74) 126/55  SpO2:  [97 %-100 %] 98 %  Body mass index is 24.6 kg/m². Input and Output Summary (last 24 hours): Intake/Output Summary (Last 24 hours) at 2023 1041  Last data filed at 2023 2210  Gross per 24 hour   Intake 427.5 ml   Output 500 ml   Net -72.5 ml       Physical Exam:     Physical Exam  Constitutional:       General: She is not in acute distress. Appearance: Normal appearance. She is not ill-appearing, toxic-appearing or diaphoretic. HENT:      Head: Normocephalic and atraumatic. Nose: Nose normal.   Eyes:      Extraocular Movements: Extraocular movements intact. Conjunctiva/sclera: Conjunctivae normal.   Cardiovascular:      Rate and Rhythm: Normal rate. Heart sounds: No murmur heard. No friction rub. No gallop. Pulmonary:      Effort: Pulmonary effort is normal. No respiratory distress. Breath sounds: No wheezing, rhonchi or rales. Abdominal:      Palpations: Abdomen is soft. Tenderness: There is no abdominal tenderness. Skin:     General: Skin is warm and dry. Neurological:      General: No focal deficit present. Mental Status: Mental status is at baseline.    Psychiatric:         Mood and Affect: Mood normal.         Behavior: Behavior normal.       Historical Information   Past Medical History:   Diagnosis Date   • Hypertension    • Hyponatremia      Past Surgical History:   Procedure Laterality Date   • EYE SURGERY     • DE OPTX FEM SHFT FX W/INSJ IMED IMPLT W/WO SCREW Left 2018    Procedure: Intramedullary nail fixation left hip fracture;  Surgeon: Patrice Parra MD;  [facility]: [facility];  Service: Orthopedics Social History   Social History     Substance and Sexual Activity   Alcohol Use Not Currently   • Alcohol/week: 1.0 standard drink of alcohol   • Types: 1 Cans of beer per week    Comment: 2-3 times per week. Drinks heavier before     Social History     Substance and Sexual Activity   Drug Use No     Social History     Tobacco Use   Smoking Status Former   Smokeless Tobacco Never     Meds/Allergies   all medications and allergies reviewed  Allergies   Allergen Reactions   • Demerol [Meperidine] Other (See Comments)     hallucinations   • Diphenhydramine Other (See Comments)     hallucinations   • Prednisone Tremor   • Sulfa Antibiotics Other (See Comments)     hallucinations   • Percocet [Oxycodone-Acetaminophen] Palpitations       Additional Data:     Labs:    Results from last 7 days   Lab Units 07/25/23  0823 07/25/23  0501   WBC Thousand/uL  --  2.40*   HEMOGLOBIN g/dL 7.5* 7.2*   HEMATOCRIT %  --  21.5*   PLATELETS Thousands/uL  --  80*   NEUTROS PCT %  --  46   LYMPHS PCT %  --  42   MONOS PCT %  --  9   EOS PCT %  --  3     Results from last 7 days   Lab Units 07/25/23  0501 07/24/23  1016   SODIUM mmol/L 132* 134*   POTASSIUM mmol/L 3.1* 3.5   CHLORIDE mmol/L 98 98   CO2 mmol/L 27 25   BUN mg/dL 9 13   CREATININE mg/dL 0.59* 0.77   ANION GAP mmol/L 7 11   CALCIUM mg/dL 8.0* 9.0   ALBUMIN g/dL  --  3.7   TOTAL BILIRUBIN mg/dL  --  1.71*   ALK PHOS U/L  --  67   ALT U/L  --  47   AST U/L  --  98*   GLUCOSE RANDOM mg/dL 90 136     Results from last 7 days   Lab Units 07/24/23  1016   INR  1.20*     * I Have Reviewed All Lab Data Listed Above. * Additional Pertinent Lab Tests Reviewed:  [address] Admission Reviewed    Last 24 Hours Medication List:   Current Facility-Administered Medications   Medication Dose Route Frequency Provider Last Rate   • acetaminophen  650 mg Oral Q6H PRN Dodie Neumann PA-C     • metoprolol tartrate  50 mg Oral Q12H [address] Dodie Neumann PA-C     • morphine 15 mg Oral Q4H PRN Beatris Greener, PA-C     • ondansetron  4 mg Intravenous Q6H PRN Beatris Greener, PA-C     • pancrelipase (Lip-Prot-Amyl)  24,000 Units Oral TID With Meals Travon Ward MD     • pantoprazole  40 mg Intravenous Q12H Beatris Greener, PA-C     • sodium chloride  125 mL/hr Intravenous Continuous Beatris Greener, PA-C 125 mL/hr (07/24/23 8716)   • thiamine  100 mg Oral Daily Beatris Greenhugo PAJoyC     • traMADol  50 mg Oral Q6H PRN Beatris Greenhugo, PAJoyC          Today, Patient Was Seen By: Giana Poster    ** Please Note: Dictation voice to text software may have been used in the creation of this document.  **

## 2023-07-25 NOTE — ANESTHESIA PREPROCEDURE EVALUATION
Procedure:  PRE-OP ONLY    Relevant Problems   CARDIO   (+) Essential hypertension      GI/HEPATIC   (+) Acute lower GI bleeding      HEMATOLOGY   (+) Acute blood loss anemia (ABLA)   (+) Anemia   (+) Thrombocytopenia (HCC)      PULMONARY   (+) Pneumonia        Physical Exam    Airway    Mallampati score: II  TM Distance: >3 FB  Neck ROM: full     Dental   No notable dental hx     Cardiovascular  Cardiovascular exam normal    Pulmonary  Pulmonary exam normal     Other Findings    lower GI bleed    Chronic pancreatitis    Anesthesia Plan  ASA Score- 3     Anesthesia Type- IV sedation with anesthesia with ASA Monitors. Additional Monitors:   Airway Plan:           Plan Factors-Exercise tolerance (METS): >4 METS. Chart reviewed. EKG reviewed. Imaging results reviewed. Existing labs reviewed. Patient summary reviewed. Patient is not a current smoker. Patient not instructed to abstain from smoking on day of procedure. Patient did not smoke on day of surgery. Obstructive sleep apnea risk education given perioperatively. Induction- intravenous. Postoperative Plan-     Informed Consent- Anesthetic plan and risks discussed with patient. I personally reviewed this patient with the CRNA. Discussed and agreed on the Anesthesia Plan with the CRNA. .          SR, NSSTT chsnges    HB 7.5, PLTS 80

## 2023-07-25 NOTE — ASSESSMENT & PLAN NOTE
· Acute blood loss anemia secondary to lower GI bleeding.   Hemoglobin on admission is 7.0.  · Was given 1 unit PRBC in the ER, transfuse to keep greater than 7.0.  · Monitor serial H&H.

## 2023-07-26 VITALS
DIASTOLIC BLOOD PRESSURE: 68 MMHG | OXYGEN SATURATION: 99 % | TEMPERATURE: 97.9 F | SYSTOLIC BLOOD PRESSURE: 120 MMHG | BODY MASS INDEX: 24.46 KG/M2 | WEIGHT: 143.3 LBS | HEART RATE: 72 BPM | RESPIRATION RATE: 18 BRPM | HEIGHT: 64 IN

## 2023-07-26 PROBLEM — K22.9 ESOPHAGEAL ABNORMALITY: Status: ACTIVE | Noted: 2023-07-26

## 2023-07-26 LAB
ANION GAP SERPL CALCULATED.3IONS-SCNC: 6 MMOL/L
BASOPHILS # BLD AUTO: 0.01 THOUSANDS/ÂΜL (ref 0–0.1)
BASOPHILS NFR BLD AUTO: 0 % (ref 0–1)
BUN SERPL-MCNC: 8 MG/DL (ref 5–25)
CALCIUM SERPL-MCNC: 8 MG/DL (ref 8.4–10.2)
CHLORIDE SERPL-SCNC: 100 MMOL/L (ref 96–108)
CO2 SERPL-SCNC: 26 MMOL/L (ref 21–32)
CREAT SERPL-MCNC: 0.57 MG/DL (ref 0.6–1.3)
EOSINOPHIL # BLD AUTO: 0.05 THOUSAND/ÂΜL (ref 0–0.61)
EOSINOPHIL NFR BLD AUTO: 2 % (ref 0–6)
ERYTHROCYTE [DISTWIDTH] IN BLOOD BY AUTOMATED COUNT: 16.9 % (ref 11.6–15.1)
GFR SERPL CREATININE-BSD FRML MDRD: 95 ML/MIN/1.73SQ M
GLUCOSE SERPL-MCNC: 91 MG/DL (ref 65–140)
HCT VFR BLD AUTO: 21.9 % (ref 34.8–46.1)
HGB BLD-MCNC: 7 G/DL (ref 11.5–15.4)
HGB BLD-MCNC: 7.5 G/DL (ref 11.5–15.4)
IMM GRANULOCYTES # BLD AUTO: 0.01 THOUSAND/UL (ref 0–0.2)
IMM GRANULOCYTES NFR BLD AUTO: 0 % (ref 0–2)
LYMPHOCYTES # BLD AUTO: 0.98 THOUSANDS/ÂΜL (ref 0.6–4.47)
LYMPHOCYTES NFR BLD AUTO: 36 % (ref 14–44)
MCH RBC QN AUTO: 33.8 PG (ref 26.8–34.3)
MCHC RBC AUTO-ENTMCNC: 34.2 G/DL (ref 31.4–37.4)
MCV RBC AUTO: 99 FL (ref 82–98)
MONOCYTES # BLD AUTO: 0.29 THOUSAND/ÂΜL (ref 0.17–1.22)
MONOCYTES NFR BLD AUTO: 11 % (ref 4–12)
NEUTROPHILS # BLD AUTO: 1.37 THOUSANDS/ÂΜL (ref 1.85–7.62)
NEUTS SEG NFR BLD AUTO: 51 % (ref 43–75)
NRBC BLD AUTO-RTO: 0 /100 WBCS
PLATELET # BLD AUTO: 91 THOUSANDS/UL (ref 149–390)
PMV BLD AUTO: 10.7 FL (ref 8.9–12.7)
POTASSIUM SERPL-SCNC: 3.6 MMOL/L (ref 3.5–5.3)
RBC # BLD AUTO: 2.22 MILLION/UL (ref 3.81–5.12)
SODIUM SERPL-SCNC: 132 MMOL/L (ref 135–147)
WBC # BLD AUTO: 2.71 THOUSAND/UL (ref 4.31–10.16)

## 2023-07-26 PROCEDURE — 80048 BASIC METABOLIC PNL TOTAL CA: CPT | Performed by: PHYSICIAN ASSISTANT

## 2023-07-26 PROCEDURE — 85025 COMPLETE CBC W/AUTO DIFF WBC: CPT | Performed by: PHYSICIAN ASSISTANT

## 2023-07-26 PROCEDURE — 85018 HEMOGLOBIN: CPT | Performed by: PHYSICIAN ASSISTANT

## 2023-07-26 PROCEDURE — 99239 HOSP IP/OBS DSCHRG MGMT >30: CPT | Performed by: PHYSICIAN ASSISTANT

## 2023-07-26 RX ORDER — PANTOPRAZOLE SODIUM 40 MG/1
40 TABLET, DELAYED RELEASE ORAL
Status: DISCONTINUED | OUTPATIENT
Start: 2023-07-27 | End: 2023-07-26 | Stop reason: HOSPADM

## 2023-07-26 RX ORDER — PANTOPRAZOLE SODIUM 40 MG/1
40 TABLET, DELAYED RELEASE ORAL
Qty: 90 TABLET | Refills: 0 | Status: SHIPPED | OUTPATIENT
Start: 2023-07-27 | End: 2023-10-25

## 2023-07-26 RX ADMIN — SODIUM CHLORIDE 1 G: 1 TABLET ORAL at 10:25

## 2023-07-26 RX ADMIN — METOPROLOL TARTRATE 50 MG: 50 TABLET, FILM COATED ORAL at 10:25

## 2023-07-26 RX ADMIN — Medication 100 MG: at 10:25

## 2023-07-26 RX ADMIN — PANCRELIPASE 24000 UNITS: 24000; 76000; 120000 CAPSULE, DELAYED RELEASE PELLETS ORAL at 10:25

## 2023-07-26 RX ADMIN — TORSEMIDE 5 MG: 10 TABLET ORAL at 10:25

## 2023-07-26 NOTE — APP STUDENT NOTE
TALA STUDENT  Inpatient Progress Note for TRAINING ONLY  Not Part of Legal Medical Record   [address]  Discharge- Zane Blow 1955, 76 y.o. female MRN: [id number]  Unit/Bed#: S -[facility] Encounter: [id number]  Primary Care Provider: Sandra Phillips MD   Date and time admitted to hospital: 7/24/2023  9:44 AM     Acute lower GI bleeding  Assessment & Plan  • Patient presented with 3 days of bloody diarrhea, denied abdominal pain, fevers, chills, recent travel and antibiotic use. She had a colonoscopy 9 years ago which showed polyps, results not available   • Hgb was 7.0 on admission, baseline 9-10  • Stool enteric and c diff negative   • GI following   • EGD showed no bleeding, Colonoscopy showed diverticulosis      Esophageal abnormality  Assessment & Plan  • EGD showed abnormal mucosa in esophagus.  "Islands of pink squamocolumnar tissue noted at GE junction"   • Follow up in bx outpatient   • Start PPI     Urinary retention  Assessment & Plan  · Chronic, patient self catheterizes  · Stable     Acute blood loss anemia (ABLA)  Assessment & Plan  • Acute blood loss anemia due to lower GI bleeding   • Hgb 7.0 on admission   • 7.5 today     Thrombocytopenia (HCC)  Assessment & Plan  • Chronic, platelets baseline   • Monitor CBCd      Hyponatremia  Assessment & Plan  • Chronic, on torsemide and sodium tablets at home   • Resume   • BMP in one week      Essential hypertension  Assessment & Plan  • Continue metoprolol   • Monitor BP          Medical Problems      Resolved Problems  Date Reviewed: 7/25/2023   None         Discharging Physician / Practitioner: Denver Pop PA-C  PCP: Sandra Phillips MD  Admission Date:       Admission Orders (From admission, onward)       Ordered         07/24/23 1312   INPATIENT ADMISSION  Once                         Discharge Date: 07/26/23     Consultations During Hospital Stay:  • GI     Procedures Performed:   • EGD: "Abnormal mucosa in the esophagus. Islands of pink squamocolumnar tissue were noted at the GE junction, no other abnormalities"  • Colonoscopy: "Extensive diverticulosis of moderate severity containing stool and causing moderate luminal narrowing in the sigmoid colon. Medium, protruding hemorrhoids"     Significant Findings / Test Results:   • Hgb 7 on admission, improved to 7.5  • Na 132  • Ferritin      Incidental Findings:   • none     Test Results Pending at Discharge (will require follow up): • Esophageal biopsy     Outpatient Tests Requested:  • BMP 1 week     Complications:  none     Reason for Admission: GI bleeding     Hospital Course:   Berny Kramer is a 76 y.o. female patient who presented to hospital on 2023 with acute lower GI bleeding. She reported 3 days of bloody diarrhea POA. She came to the ED for evaluation and was seen by GI who suspected diverticular bleeding. She reported melena as well so upper GI bleeding could not be ruled out. Hgb was 7 on admission and she received 1 unit of PRBCs, and Hgb improved to 7.5 for discharge. No recurrent bleeding, and she tolerated prep. The colonoscopy revealed diverticulosis and hemorrhoids. Her EGD noted abnormalities in the esophagus and biopsies were taken. It was recommended she start PPI. Sodium on discharge is 132, and a BMP is recommended in one week. Patient restarted torsemide and NaCl tablets.      Condition at Discharge: stable     Subjective:   Patient reports feeling well today. She denies any bloody stool, melena, abd pain, nausea, vomiting, or diarrhea. She feels she is ready to go home     Objective:     Vitals:   Temp (24hrs), Av.6 °F (36.4 °C), Min:97.4 °F (36.3 °C), Max:97.9 °F (36.6 °C)    Temp:  [97.4 °F (36.3 °C)-97.9 °F (36.6 °C)] 97.9 °F (36.6 °C)  HR:  [56-88] 56  Resp:  [17-18] 18  BP: (119-194)/(65-85) 137/65  SpO2:  [95 %-99 %] 99 %  Body mass index is 24.6 kg/m².      Physical Exam:     Physical Exam  Constitutional:       General: She is not in acute distress. Appearance: Normal appearance. She is normal weight. She is not ill-appearing, toxic-appearing or diaphoretic. HENT:      Head: Normocephalic and atraumatic. Mouth/Throat:      Mouth: Mucous membranes are moist.   Eyes:      Extraocular Movements: Extraocular movements intact. Conjunctiva/sclera: Conjunctivae normal.   Cardiovascular:      Rate and Rhythm: Normal rate. Heart sounds: No murmur heard. No friction rub. No gallop. Pulmonary:      Effort: Pulmonary effort is normal. No respiratory distress. Breath sounds: Normal breath sounds. No wheezing, rhonchi or rales. Abdominal:      Palpations: Abdomen is soft. There is no mass. Tenderness: There is no abdominal tenderness. There is no guarding. Musculoskeletal:         General: Normal range of motion. Skin:     General: Skin is warm and dry. Neurological:      General: No focal deficit present. Mental Status: She is alert. Mental status is at baseline. Gait: Gait normal.   Psychiatric:         Mood and Affect: Mood normal.         Behavior: Behavior normal.       Discussion with Family: Patient declined call to .      Discharge instructions/Information to patient and family:   See after visit summary for information provided to patient and family.       Provisions for Follow-Up Care:  See after visit summary for information related to follow-up care and any pertinent home health orders. Disposition:   Home    Planned Readmission: none     Discharge Statement:  I spent 30 minutes discharging the patient. This time was spent on the day of discharge. I had direct contact with the patient on the day of discharge. Greater than 50% of the total time was spent examining patient, answering all patient questions, arranging and discussing plan of care with patient as well as directly providing post-discharge instructions.   Additional time then spent on discharge activities.     Discharge Medications:  See after visit summary for reconciled discharge medications provided to patient and/or family.          Today, Patient Was Seen By: Dat Cedeño    ** Please Note: Dictation voice to text software may have been used in the creation of this document.  **

## 2023-07-26 NOTE — ASSESSMENT & PLAN NOTE
· Acute blood loss anemia secondary to lower GI bleeding.   Hemoglobin on admission is 7.0.  · Was given 1 unit PRBC in the ER  · Hgb improved to 7.5

## 2023-07-26 NOTE — PLAN OF CARE
Problem: Potential for Falls  Goal: Patient will remain free of falls  Description: INTERVENTIONS:  - Educate patient/family on patient safety including physical limitations  - Instruct patient to call for assistance with activity   - Consult OT/PT to assist with strengthening/mobility   - Keep Call bell within reach  - Keep bed low and locked with side rails adjusted as appropriate  - Keep care items and personal belongings within reach  - Initiate and maintain comfort rounds  - Make Fall Risk Sign visible to staff  - Offer Toileting every 2 Hours, in advance of need  - Initiate/Maintain bed and chair alarm  - Obtain necessary fall risk management equipment:   - Apply yellow socks and bracelet for high fall risk patients  - Consider moving patient to room near nurses station  Outcome: Progressing     Problem: MOBILITY - ADULT  Goal: Maintain or return to baseline ADL function  Description: INTERVENTIONS:  -  Assess patient's ability to carry out ADLs; assess patient's baseline for ADL function and identify physical deficits which impact ability to perform ADLs (bathing, care of mouth/teeth, toileting, grooming, dressing, etc.)  - Assess/evaluate cause of self-care deficits   - Assess range of motion  - Assess patient's mobility; develop plan if impaired  - Assess patient's need for assistive devices and provide as appropriate  - Encourage maximum independence but intervene and supervise when necessary  - Involve family in performance of ADLs  - Assess for home care needs following discharge   - Consider OT consult to assist with ADL evaluation and planning for discharge  - Provide patient education as appropriate  Outcome: Progressing  Goal: Maintains/Returns to pre admission functional level  Description: INTERVENTIONS:  - Perform BMAT or MOVE assessment daily.   - Set and communicate daily mobility goal to care team and patient/family/caregiver.    - Collaborate with rehabilitation services on mobility goals if consulted  - Perform Range of Motion 3 times a day. - Reposition patient every 2 hours. - Dangle patient 3 times a day  - Stand patient 3 times a day  - Ambulate patient 3 times a day  - Out of bed to chair 3 times a day   - Out of bed for meals 3 times a day  - Out of bed for toileting  - Record patient progress and toleration of activity level   Outcome: Progressing     Problem: Nutrition/Hydration-ADULT  Goal: Nutrient/Hydration intake appropriate for improving, restoring or maintaining nutritional needs  Description: Monitor and assess patient's nutrition/hydration status for malnutrition. Collaborate with interdisciplinary team and initiate plan and interventions as ordered. Monitor patient's weight and dietary intake as ordered or per policy. Utilize nutrition screening tool and intervene as necessary. Determine patient's food preferences and provide high-protein, high-caloric foods as appropriate.      INTERVENTIONS:  - Monitor oral intake, urinary output, labs, and treatment plans  - Assess nutrition and hydration status and recommend course of action  - Evaluate amount of meals eaten  - Assist patient with eating if necessary   - Allow adequate time for meals  - Recommend/ encourage appropriate diets, oral nutritional supplements, and vitamin/mineral supplements  - Order, calculate, and assess calorie counts as needed  - Recommend, monitor, and adjust tube feedings and TPN based on assessed needs  - Assess need for intravenous fluids  - Provide  nutrition/hydration education as appropriate  - Include patient/family/caregiver in decisions related to nutrition  Outcome: Progressing

## 2023-07-26 NOTE — DISCHARGE SUMMARY
[address] [facility]  Discharge- [facility] 1955, 76 y.o. female MRN: [id number]  Unit/Bed#: S -[facility]omen[facility] Encounter: [id number]  Primary Care Provider: Wilfredo Vu MD   Date and time admitted to hospital: 7/24/2023  9:44 AM    * Acute lower GI bleeding  Assessment & Plan  • Presents with approximately 3 days worth of bloody diarrhea. No abdominal pain, fevers, chills, recent travel, antibiotic use. History of colonoscopy 9 years ago which showed polyps, reports not immediately available. • Hemoglobin: 7.0 on admit, prior baseline closer to 9-10  • Stool enteric/c. Diff negative  • GI following  • EGD no bleeding  • Colonoscopy with diverticulosis    Esophageal abnormality  Assessment & Plan  • EGD with Abnormal mucosa in the esophagus. Islands of pink squamocolumnar tissue were noted at the GE junction  • Follow up biopsy as an outpatient  • Start PPI    Urinary retention  Assessment & Plan  · Chronic, typically patient self catheterizes    Acute blood loss anemia (ABLA)  Assessment & Plan  · Acute blood loss anemia secondary to lower GI bleeding. Hemoglobin on admission is 7.0.  · Was given 1 unit PRBC in the ER  · Hgb improved to 7.5    Thrombocytopenia (HCC)  Assessment & Plan  · Chronic, platelets around baseline. · Monitor CBC with differential    Hyponatremia  Assessment & Plan  · Chronic, on torsemide and sodium chloride tablets at home.   · Resume   · BMP 1 week    Essential hypertension  Assessment & Plan  · Continue metoprolol  · Monitor BP    Medical Problems     Resolved Problems  Date Reviewed: 7/25/2023   None       Discharging Physician / Practitioner: Vivian Joseph PA-C  PCP: Wilfredo Vu MD  Admission Date:   Admission Orders (From admission, onward)     Ordered        07/24/23 1312  INPATIENT ADMISSION  Once                      Discharge Date: 07/26/23    Consultations During Hospital Stay:  · GI    Procedures Performed:   · EGD: Abnormal mucosa in the esophagus. Islands of pink squamocolumnar tissue were noted at the GE junction, no other abnormalities  • Colonoscopy: Extensive diverticulosis of moderate severity containing stool and causing moderate luminal narrowing in the sigmoid colon. Medium, protruding hemorrhoids    Significant Findings / Test Results:   · Hbg 7.0 on admit, improved to 7.5  · Na 132 on dc    Incidental Findings:   · none     Test Results Pending at Discharge (will require follow up): · Esophageal biopsy     Outpatient Tests Requested:  · BMP 1 week    Complications:  none    Reason for Admission: GI bleeding    Hospital Course:   Nargis Ray is a 76 y.o. female patient who originally presented to the hospital on 7/24/2023 due to acute lower GI bleeding. She had 2 to 3 days worth of bloody diarrhea prior to admission. Came to the ER for evaluation. Was seen in consultation by GI who suspected diverticular bleeding but cannot rule out upper GI bleeding due to also reports of melena. Hemoglobin was 7.0 at admission and she received 1 unit PRBCs. Subsequent improvement of hemoglobin up to 7.5. No recurrent bleeding, she tolerated prep. Found to have diverticulosis and hemorrhoids. Also notably her EGD had abnormalities in the esophagus for which biopsies were taken and she is recommended to start PPI. Sodium upon discharge is 132, recommended for BMP in 1 week. Torsemide and NaCl tabs resumed. Please see above list of diagnoses and related plan for additional information. Condition at Discharge: stable    Discharge Day Visit / Exam:   Subjective: No overnight events reported. Patient is doing well, tolerating diet. No further bleeding. Wants to go home today.   Vitals: Blood Pressure: 137/65 (07/26/23 0700)  Pulse: 56 (07/26/23 0700)  Temperature: 97.9 °F (36.6 °C) (07/26/23 0700)  Temp Source: Oral (07/26/23 0700)  Respirations: 18 (07/26/23 0700)  Height: 5' 4" (162.6 cm) (last ht assessment) (07/25/23 1419)  Weight - Scale: 65 kg (143 lb 4.8 oz) (07/24/23 0950)  SpO2: 99 % (07/26/23 0700)  Exam:   Physical Exam  Vitals and nursing note reviewed. Constitutional:       General: She is not in acute distress. Appearance: Normal appearance. HENT:      Head: Normocephalic. Mouth/Throat:      Mouth: Mucous membranes are moist.   Eyes:      General: No scleral icterus. Pupils: Pupils are equal, round, and reactive to light. Cardiovascular:      Rate and Rhythm: Normal rate and regular rhythm. Heart sounds: No murmur heard. Pulmonary:      Effort: Pulmonary effort is normal. No respiratory distress. Breath sounds: Normal breath sounds. No wheezing, rhonchi or rales. Abdominal:      General: Bowel sounds are normal. There is no distension. Palpations: Abdomen is soft. Tenderness: There is no abdominal tenderness. Musculoskeletal:         General: No swelling. Right lower leg: No edema. Left lower leg: No edema. Skin:     Capillary Refill: Capillary refill takes less than 2 seconds. Neurological:      General: No focal deficit present. Mental Status: She is alert and oriented to person, place, and time. Mental status is at baseline. Discussion with Family: Patient declined call to . Discharge instructions/Information to patient and family:   See after visit summary for information provided to patient and family. Provisions for Follow-Up Care:  See after visit summary for information related to follow-up care and any pertinent home health orders. Disposition:   Home    Planned Readmission: none     Discharge Statement:  I spent 45 minutes discharging the patient. This time was spent on the day of discharge. I had direct contact with the patient on the day of discharge.  Greater than 50% of the total time was spent examining patient, answering all patient questions, arranging and discussing plan of care with patient as well as directly providing post-discharge instructions. Additional time then spent on discharge activities. Discharge Medications:  See after visit summary for reconciled discharge medications provided to patient and/or family.       **Please Note: This note may have been constructed using a voice recognition system**

## 2023-07-26 NOTE — ASSESSMENT & PLAN NOTE
• Presents with approximately 3 days worth of bloody diarrhea. No abdominal pain, fevers, chills, recent travel, antibiotic use. History of colonoscopy 9 years ago which showed polyps, reports not immediately available. • Hemoglobin: 7.0 on admit, prior baseline closer to 9-10  • Stool enteric/c.  Diff negative  • GI following  • EGD no bleeding  • Colonoscopy with diverticulosis

## 2023-07-26 NOTE — DISCHARGE INSTR - AVS FIRST PAGE
Dear Josue Coronado,     It was our pleasure to care for you here at [facility]. It is our hope that we were always able to exceed the expected standards for your care during your stay. You were hospitalized due to rectal bleeding. You were cared for on the 3rd floor by Bonnie Che PA-C under the service of Junaid Lopez MD with the [facility] who covers for your primary care physician (PCP), Pablo Jaimes MD, while you were hospitalized. If you have any questions or concerns related to this hospitalization, you may contact us at [phone]. For follow up as well as any medication refills, we recommend that you follow up with your primary care physician. A registered nurse will reach out to you by phone within a few days after your discharge to answer any additional questions that you may have after going home. However, at this time we provide for you here, the most important instructions / recommendations at discharge:     Notable Medication Adjustments -   Please start taking Protonix daily in the morning. Testing Required after Discharge -   I recommend rechecking your sodium level in 1 week. ** Please contact your PCP to request testing orders for any of the testing recommended here **  Important follow up information -   You will be called in 1-2 weeks regarding your biopsy results. Other Instructions -   Return to ER with any bleeding, severe pain, dizziness. Please review this entire after visit summary as additional general instructions including medication list, appointments, activity, diet, any pertinent wound care, and other additional recommendations from your care team that may be provided for you.       Sincerely,     Bonnie Che PA-C

## 2023-07-26 NOTE — ASSESSMENT & PLAN NOTE
• EGD with Abnormal mucosa in the esophagus.  Islands of pink squamocolumnar tissue were noted at the GE junction  • Follow up biopsy as an outpatient  • Start PPI

## 2023-07-27 LAB
BACTERIA UR CULT: ABNORMAL
BACTERIA UR CULT: ABNORMAL

## 2023-07-31 PROCEDURE — 88305 TISSUE EXAM BY PATHOLOGIST: CPT | Performed by: STUDENT IN AN ORGANIZED HEALTH CARE EDUCATION/TRAINING PROGRAM

## 2023-07-31 PROCEDURE — 88342 IMHCHEM/IMCYTCHM 1ST ANTB: CPT | Performed by: STUDENT IN AN ORGANIZED HEALTH CARE EDUCATION/TRAINING PROGRAM

## 2023-08-02 ENCOUNTER — APPOINTMENT (OUTPATIENT)
Dept: LAB | Facility: CLINIC | Age: 68
End: 2023-08-02
Payer: MEDICARE

## 2023-08-02 DIAGNOSIS — E87.1 HYPONATREMIA: ICD-10-CM

## 2023-08-02 LAB
ANION GAP SERPL CALCULATED.3IONS-SCNC: 10 MMOL/L
BUN SERPL-MCNC: 7 MG/DL (ref 5–25)
CALCIUM SERPL-MCNC: 9.2 MG/DL (ref 8.4–10.2)
CHLORIDE SERPL-SCNC: 99 MMOL/L (ref 96–108)
CO2 SERPL-SCNC: 24 MMOL/L (ref 21–32)
CREAT SERPL-MCNC: 0.64 MG/DL (ref 0.6–1.3)
GFR SERPL CREATININE-BSD FRML MDRD: 91 ML/MIN/1.73SQ M
GLUCOSE P FAST SERPL-MCNC: 103 MG/DL (ref 65–99)
POTASSIUM SERPL-SCNC: 4.7 MMOL/L (ref 3.5–5.3)
SODIUM SERPL-SCNC: 133 MMOL/L (ref 135–147)

## 2023-08-02 PROCEDURE — 36415 COLL VENOUS BLD VENIPUNCTURE: CPT

## 2023-08-02 PROCEDURE — 80048 BASIC METABOLIC PNL TOTAL CA: CPT

## 2023-10-25 ENCOUNTER — HOSPITAL ENCOUNTER (INPATIENT)
Facility: HOSPITAL | Age: 68
LOS: 3 days | Discharge: HOME | DRG: 432 | End: 2023-10-29
Attending: EMERGENCY MEDICINE | Admitting: INTERNAL MEDICINE
Payer: MEDICARE

## 2023-10-25 ENCOUNTER — APPOINTMENT (EMERGENCY)
Dept: RADIOLOGY | Facility: HOSPITAL | Age: 68
DRG: 432 | End: 2023-10-25
Payer: MEDICARE

## 2023-10-25 ENCOUNTER — APPOINTMENT (EMERGENCY)
Dept: CT IMAGING | Facility: HOSPITAL | Age: 68
DRG: 432 | End: 2023-10-25
Payer: MEDICARE

## 2023-10-25 DIAGNOSIS — R60.0 BILATERAL LEG EDEMA: ICD-10-CM

## 2023-10-25 DIAGNOSIS — R60.0 BILATERAL LOWER EXTREMITY EDEMA: ICD-10-CM

## 2023-10-25 DIAGNOSIS — K86.1 OTHER CHRONIC PANCREATITIS (HCC): ICD-10-CM

## 2023-10-25 DIAGNOSIS — E83.42 HYPOMAGNESEMIA: ICD-10-CM

## 2023-10-25 DIAGNOSIS — R79.89 ABNORMAL LFTS: ICD-10-CM

## 2023-10-25 DIAGNOSIS — R17 JAUNDICE: Primary | ICD-10-CM

## 2023-10-25 DIAGNOSIS — D61.818 PANCYTOPENIA (HCC): ICD-10-CM

## 2023-10-25 DIAGNOSIS — D69.6 THROMBOCYTOPENIA (HCC): ICD-10-CM

## 2023-10-25 LAB
ALBUMIN SERPL BCP-MCNC: 3 G/DL (ref 3.5–5)
ALP SERPL-CCNC: 73 U/L (ref 34–104)
ALT SERPL W P-5'-P-CCNC: 44 U/L (ref 7–52)
AMMONIA PLAS-SCNC: 46 UMOL/L (ref 18–72)
ANION GAP SERPL CALCULATED.3IONS-SCNC: 8 MMOL/L
APTT PPP: 37 SECONDS (ref 23–37)
AST SERPL W P-5'-P-CCNC: 145 U/L (ref 13–39)
BASOPHILS # BLD AUTO: 0.02 THOUSANDS/ÂΜL (ref 0–0.1)
BASOPHILS NFR BLD AUTO: 1 % (ref 0–1)
BILIRUB DIRECT SERPL-MCNC: 2.49 MG/DL (ref 0–0.2)
BILIRUB SERPL-MCNC: 7.34 MG/DL (ref 0.2–1)
BNP SERPL-MCNC: 433 PG/ML (ref 0–100)
BUN SERPL-MCNC: 14 MG/DL (ref 5–25)
CALCIUM SERPL-MCNC: 8.1 MG/DL (ref 8.4–10.2)
CARDIAC TROPONIN I PNL SERPL HS: 8 NG/L
CHLORIDE SERPL-SCNC: 100 MMOL/L (ref 96–108)
CO2 SERPL-SCNC: 23 MMOL/L (ref 21–32)
CREAT SERPL-MCNC: 0.91 MG/DL (ref 0.6–1.3)
EOSINOPHIL # BLD AUTO: 0.06 THOUSAND/ÂΜL (ref 0–0.61)
EOSINOPHIL NFR BLD AUTO: 2 % (ref 0–6)
ERYTHROCYTE [DISTWIDTH] IN BLOOD BY AUTOMATED COUNT: 15.1 % (ref 11.6–15.1)
EXT FECAL OCCULT BLOOD SCREEN: NEGATIVE
EXT. CONTROL: NORMAL
GFR SERPL CREATININE-BSD FRML MDRD: 65 ML/MIN/1.73SQ M
GLUCOSE SERPL-MCNC: 106 MG/DL (ref 65–140)
HCT VFR BLD AUTO: 23.4 % (ref 34.8–46.1)
HGB BLD-MCNC: 8 G/DL (ref 11.5–15.4)
IMM GRANULOCYTES # BLD AUTO: 0.02 THOUSAND/UL (ref 0–0.2)
IMM GRANULOCYTES NFR BLD AUTO: 1 % (ref 0–2)
INR PPP: 1.54 (ref 0.84–1.19)
LIPASE SERPL-CCNC: 38 U/L (ref 11–82)
LYMPHOCYTES # BLD AUTO: 0.95 THOUSANDS/ÂΜL (ref 0.6–4.47)
LYMPHOCYTES NFR BLD AUTO: 32 % (ref 14–44)
MAGNESIUM SERPL-MCNC: 0.9 MG/DL (ref 1.9–2.7)
MCH RBC QN AUTO: 34.5 PG (ref 26.8–34.3)
MCHC RBC AUTO-ENTMCNC: 34.2 G/DL (ref 31.4–37.4)
MCV RBC AUTO: 101 FL (ref 82–98)
MONOCYTES # BLD AUTO: 0.35 THOUSAND/ÂΜL (ref 0.17–1.22)
MONOCYTES NFR BLD AUTO: 12 % (ref 4–12)
NEUTROPHILS # BLD AUTO: 1.62 THOUSANDS/ÂΜL (ref 1.85–7.62)
NEUTS SEG NFR BLD AUTO: 52 % (ref 43–75)
NRBC BLD AUTO-RTO: 0 /100 WBCS
PHOSPHATE SERPL-MCNC: 2.8 MG/DL (ref 2.3–4.1)
PLATELET # BLD AUTO: 71 THOUSANDS/UL (ref 149–390)
PMV BLD AUTO: 11.4 FL (ref 8.9–12.7)
POTASSIUM SERPL-SCNC: 4.5 MMOL/L (ref 3.5–5.3)
PROT SERPL-MCNC: 6.3 G/DL (ref 6.4–8.4)
PROTHROMBIN TIME: 19.2 SECONDS (ref 11.6–14.5)
RBC # BLD AUTO: 2.32 MILLION/UL (ref 3.81–5.12)
SODIUM SERPL-SCNC: 131 MMOL/L (ref 135–147)
WBC # BLD AUTO: 3.02 THOUSAND/UL (ref 4.31–10.16)

## 2023-10-25 PROCEDURE — 99285 EMERGENCY DEPT VISIT HI MDM: CPT

## 2023-10-25 PROCEDURE — 1124F ACP DISCUSS-NO DSCNMKR DOCD: CPT

## 2023-10-25 PROCEDURE — 85025 COMPLETE CBC W/AUTO DIFF WBC: CPT

## 2023-10-25 PROCEDURE — 96365 THER/PROPH/DIAG IV INF INIT: CPT

## 2023-10-25 PROCEDURE — 82140 ASSAY OF AMMONIA: CPT

## 2023-10-25 PROCEDURE — 80076 HEPATIC FUNCTION PANEL: CPT

## 2023-10-25 PROCEDURE — 84484 ASSAY OF TROPONIN QUANT: CPT

## 2023-10-25 PROCEDURE — 96368 THER/DIAG CONCURRENT INF: CPT

## 2023-10-25 PROCEDURE — 83735 ASSAY OF MAGNESIUM: CPT

## 2023-10-25 PROCEDURE — 71046 X-RAY EXAM CHEST 2 VIEWS: CPT

## 2023-10-25 PROCEDURE — 85730 THROMBOPLASTIN TIME PARTIAL: CPT

## 2023-10-25 PROCEDURE — 96366 THER/PROPH/DIAG IV INF ADDON: CPT

## 2023-10-25 PROCEDURE — 80143 DRUG ASSAY ACETAMINOPHEN: CPT

## 2023-10-25 PROCEDURE — 80048 BASIC METABOLIC PNL TOTAL CA: CPT

## 2023-10-25 PROCEDURE — 83690 ASSAY OF LIPASE: CPT

## 2023-10-25 PROCEDURE — 85610 PROTHROMBIN TIME: CPT

## 2023-10-25 PROCEDURE — G1004 CDSM NDSC: HCPCS

## 2023-10-25 PROCEDURE — 83880 ASSAY OF NATRIURETIC PEPTIDE: CPT

## 2023-10-25 PROCEDURE — 84100 ASSAY OF PHOSPHORUS: CPT

## 2023-10-25 PROCEDURE — 93005 ELECTROCARDIOGRAM TRACING: CPT

## 2023-10-25 PROCEDURE — 74177 CT ABD & PELVIS W/CONTRAST: CPT

## 2023-10-25 PROCEDURE — 36415 COLL VENOUS BLD VENIPUNCTURE: CPT

## 2023-10-25 RX ORDER — MAGNESIUM SULFATE HEPTAHYDRATE 40 MG/ML
2 INJECTION, SOLUTION INTRAVENOUS ONCE
Status: COMPLETED | OUTPATIENT
Start: 2023-10-25 | End: 2023-10-26

## 2023-10-25 RX ADMIN — MAGNESIUM SULFATE HEPTAHYDRATE 2 G: 40 INJECTION, SOLUTION INTRAVENOUS at 23:09

## 2023-10-25 RX ADMIN — SODIUM CHLORIDE, SODIUM LACTATE, POTASSIUM CHLORIDE, AND CALCIUM CHLORIDE 1000 ML: .6; .31; .03; .02 INJECTION, SOLUTION INTRAVENOUS at 22:42

## 2023-10-25 NOTE — Clinical Note
Case was discussed with TALA Britt and the patient's admission status was agreed to be Admission Status: inpatient status to the service of Dr. Adiel Bose .

## 2023-10-26 ENCOUNTER — APPOINTMENT (INPATIENT)
Dept: ULTRASOUND IMAGING | Facility: HOSPITAL | Age: 68
DRG: 432 | End: 2023-10-26
Payer: MEDICARE

## 2023-10-26 ENCOUNTER — APPOINTMENT (INPATIENT)
Dept: NON INVASIVE DIAGNOSTICS | Facility: HOSPITAL | Age: 68
DRG: 432 | End: 2023-10-26
Payer: MEDICARE

## 2023-10-26 PROBLEM — R19.7 DIARRHEA: Status: ACTIVE | Noted: 2023-10-26

## 2023-10-26 PROBLEM — R60.0 BILATERAL LOWER EXTREMITY EDEMA: Status: ACTIVE | Noted: 2023-10-26

## 2023-10-26 PROBLEM — E83.42 HYPOMAGNESEMIA: Status: ACTIVE | Noted: 2023-10-26

## 2023-10-26 PROBLEM — K86.1 CHRONIC PANCREATITIS (HCC): Status: ACTIVE | Noted: 2023-10-26

## 2023-10-26 LAB
2HR DELTA HS TROPONIN: 0 NG/L
4HR DELTA HS TROPONIN: 0 NG/L
ALBUMIN SERPL BCP-MCNC: 2.5 G/DL (ref 3.5–5)
ALP SERPL-CCNC: 75 U/L (ref 34–104)
ALT SERPL W P-5'-P-CCNC: 36 U/L (ref 7–52)
ANION GAP SERPL CALCULATED.3IONS-SCNC: 9 MMOL/L
AORTIC ROOT: 3.1 CM
APAP SERPL-MCNC: <10 UG/ML (ref 10–20)
APICAL FOUR CHAMBER EJECTION FRACTION: 63 %
ASCENDING AORTA: 2.9 CM
AST SERPL W P-5'-P-CCNC: 111 U/L (ref 13–39)
ATRIAL RATE: 81 BPM
BACTERIA UR QL AUTO: ABNORMAL /HPF
BILIRUB DIRECT SERPL-MCNC: 3.84 MG/DL (ref 0–0.2)
BILIRUB SERPL-MCNC: 7.43 MG/DL (ref 0.2–1)
BILIRUB UR QL STRIP: ABNORMAL
BUN SERPL-MCNC: 12 MG/DL (ref 5–25)
CALCIUM ALBUM COR SERPL-MCNC: 9.2 MG/DL (ref 8.3–10.1)
CALCIUM SERPL-MCNC: 8 MG/DL (ref 8.4–10.2)
CARDIAC TROPONIN I PNL SERPL HS: 8 NG/L
CARDIAC TROPONIN I PNL SERPL HS: 8 NG/L
CHLORIDE SERPL-SCNC: 102 MMOL/L (ref 96–108)
CLARITY UR: ABNORMAL
CO2 SERPL-SCNC: 22 MMOL/L (ref 21–32)
COLOR UR: YELLOW
CREAT SERPL-MCNC: 0.72 MG/DL (ref 0.6–1.3)
E WAVE DECELERATION TIME: 238 MS
E/A RATIO: 1.06
ERYTHROCYTE [DISTWIDTH] IN BLOOD BY AUTOMATED COUNT: 15.2 % (ref 11.6–15.1)
ETHANOL SERPL-MCNC: <10 MG/DL
FOLATE SERPL-MCNC: 4.2 NG/ML
FRACTIONAL SHORTENING: 29 (ref 28–44)
GFR SERPL CREATININE-BSD FRML MDRD: 86 ML/MIN/1.73SQ M
GLUCOSE SERPL-MCNC: 94 MG/DL (ref 65–140)
GLUCOSE UR STRIP-MCNC: NEGATIVE MG/DL
HAV IGM SER QL: NORMAL
HBV CORE IGM SER QL: NORMAL
HBV SURFACE AG SER QL: NORMAL
HCT VFR BLD AUTO: 22 % (ref 34.8–46.1)
HCV AB SER QL: NORMAL
HGB BLD-MCNC: 7.4 G/DL (ref 11.5–15.4)
HGB UR QL STRIP.AUTO: NEGATIVE
HYALINE CASTS #/AREA URNS LPF: ABNORMAL /LPF
INR PPP: 1.38 (ref 0.84–1.19)
INTERVENTRICULAR SEPTUM IN DIASTOLE (PARASTERNAL SHORT AXIS VIEW): 1.1 CM
INTERVENTRICULAR SEPTUM: 1.1 CM (ref 0.6–1.1)
KETONES UR STRIP-MCNC: NEGATIVE MG/DL
LAAS-AP2: 19.6 CM2
LAAS-AP4: 19.9 CM2
LEFT ATRIUM SIZE: 3.7 CM
LEFT ATRIUM VOLUME (MOD BIPLANE): 54 ML
LEFT ATRIUM VOLUME INDEX (MOD BIPLANE): 34.4 ML/M2
LEFT INTERNAL DIMENSION IN SYSTOLE: 3 CM (ref 2.1–4)
LEFT VENTRICULAR INTERNAL DIMENSION IN DIASTOLE: 4.2 CM (ref 3.5–6)
LEFT VENTRICULAR POSTERIOR WALL IN END DIASTOLE: 1.1 CM
LEFT VENTRICULAR STROKE VOLUME: 46 ML
LEUKOCYTE ESTERASE UR QL STRIP: ABNORMAL
LVSV (TEICH): 46 ML
MCH RBC QN AUTO: 33.8 PG (ref 26.8–34.3)
MCHC RBC AUTO-ENTMCNC: 33.6 G/DL (ref 31.4–37.4)
MCV RBC AUTO: 101 FL (ref 82–98)
MV E'TISSUE VEL-SEP: 8 CM/S
MV PEAK A VEL: 0.79 M/S
MV PEAK E VEL: 84 CM/S
MV STENOSIS PRESSURE HALF TIME: 69 MS
MV VALVE AREA P 1/2 METHOD: 3.19
NITRITE UR QL STRIP: NEGATIVE
NON-SQ EPI CELLS URNS QL MICRO: ABNORMAL /HPF
P AXIS: 70 DEGREES
PH UR STRIP.AUTO: 6.5 [PH]
PLATELET # BLD AUTO: 74 THOUSANDS/UL (ref 149–390)
PMV BLD AUTO: 11.5 FL (ref 8.9–12.7)
POTASSIUM SERPL-SCNC: 3.1 MMOL/L (ref 3.5–5.3)
PR INTERVAL: 154 MS
PROT SERPL-MCNC: 5.7 G/DL (ref 6.4–8.4)
PROT UR STRIP-MCNC: NEGATIVE MG/DL
PROTHROMBIN TIME: 17.6 SECONDS (ref 11.6–14.5)
QRS AXIS: 53 DEGREES
QRSD INTERVAL: 86 MS
QT INTERVAL: 406 MS
QTC INTERVAL: 471 MS
RA PRESSURE ESTIMATED: 5 MMHG
RBC # BLD AUTO: 2.19 MILLION/UL (ref 3.81–5.12)
RBC #/AREA URNS AUTO: ABNORMAL /HPF
RIGHT ATRIAL 2D VOLUME: 37 ML
RIGHT ATRIUM AREA SYSTOLE A4C: 15 CM2
RIGHT VENTRICLE ID DIMENSION: 3.7 CM
RV PSP: 33 MMHG
SL CV LEFT ATRIUM LENGTH A2C: 5 CM
SL CV LV EF: 70
SL CV PED ECHO LEFT VENTRICLE DIASTOLIC VOLUME (MOD BIPLANE) 2D: 81 ML
SL CV PED ECHO LEFT VENTRICLE SYSTOLIC VOLUME (MOD BIPLANE) 2D: 35 ML
SODIUM SERPL-SCNC: 133 MMOL/L (ref 135–147)
SP GR UR STRIP.AUTO: 1.01 (ref 1–1.03)
T WAVE AXIS: 53 DEGREES
TR MAX PG: 28 MMHG
TR PEAK VELOCITY: 2.6 M/S
TRICUSPID ANNULAR PLANE SYSTOLIC EXCURSION: 2.1 CM
TRICUSPID VALVE PEAK REGURGITATION VELOCITY: 2.63 M/S
UROBILINOGEN UR STRIP-ACNC: 3 MG/DL
VENTRICULAR RATE: 81 BPM
VIT B12 SERPL-MCNC: 814 PG/ML (ref 180–914)
WBC # BLD AUTO: 2.64 THOUSAND/UL (ref 4.31–10.16)
WBC #/AREA URNS AUTO: ABNORMAL /HPF

## 2023-10-26 PROCEDURE — 36415 COLL VENOUS BLD VENIPUNCTURE: CPT

## 2023-10-26 PROCEDURE — 82746 ASSAY OF FOLIC ACID SERUM: CPT

## 2023-10-26 PROCEDURE — 82248 BILIRUBIN DIRECT: CPT

## 2023-10-26 PROCEDURE — 85610 PROTHROMBIN TIME: CPT

## 2023-10-26 PROCEDURE — 82077 ASSAY SPEC XCP UR&BREATH IA: CPT

## 2023-10-26 PROCEDURE — 83550 IRON BINDING TEST: CPT | Performed by: PHYSICIAN ASSISTANT

## 2023-10-26 PROCEDURE — 87493 C DIFF AMPLIFIED PROBE: CPT | Performed by: PHYSICIAN ASSISTANT

## 2023-10-26 PROCEDURE — 84484 ASSAY OF TROPONIN QUANT: CPT

## 2023-10-26 PROCEDURE — 99222 1ST HOSP IP/OBS MODERATE 55: CPT | Performed by: INTERNAL MEDICINE

## 2023-10-26 PROCEDURE — 87046 STOOL CULTR AEROBIC BACT EA: CPT | Performed by: PHYSICIAN ASSISTANT

## 2023-10-26 PROCEDURE — 93010 ELECTROCARDIOGRAM REPORT: CPT | Performed by: INTERNAL MEDICINE

## 2023-10-26 PROCEDURE — 93306 TTE W/DOPPLER COMPLETE: CPT

## 2023-10-26 PROCEDURE — 80053 COMPREHEN METABOLIC PANEL: CPT

## 2023-10-26 PROCEDURE — 85027 COMPLETE CBC AUTOMATED: CPT

## 2023-10-26 PROCEDURE — 76705 ECHO EXAM OF ABDOMEN: CPT

## 2023-10-26 PROCEDURE — 87077 CULTURE AEROBIC IDENTIFY: CPT

## 2023-10-26 PROCEDURE — 83540 ASSAY OF IRON: CPT | Performed by: PHYSICIAN ASSISTANT

## 2023-10-26 PROCEDURE — 80074 ACUTE HEPATITIS PANEL: CPT

## 2023-10-26 PROCEDURE — 82728 ASSAY OF FERRITIN: CPT | Performed by: PHYSICIAN ASSISTANT

## 2023-10-26 PROCEDURE — 87186 SC STD MICRODIL/AGAR DIL: CPT

## 2023-10-26 PROCEDURE — 87086 URINE CULTURE/COLONY COUNT: CPT

## 2023-10-26 PROCEDURE — 93306 TTE W/DOPPLER COMPLETE: CPT | Performed by: INTERNAL MEDICINE

## 2023-10-26 PROCEDURE — 81001 URINALYSIS AUTO W/SCOPE: CPT

## 2023-10-26 PROCEDURE — 82607 VITAMIN B-12: CPT

## 2023-10-26 PROCEDURE — 99223 1ST HOSP IP/OBS HIGH 75: CPT | Performed by: INTERNAL MEDICINE

## 2023-10-26 RX ORDER — FUROSEMIDE 10 MG/ML
40 INJECTION INTRAMUSCULAR; INTRAVENOUS DAILY
Status: DISCONTINUED | OUTPATIENT
Start: 2023-10-27 | End: 2023-10-29 | Stop reason: HOSPADM

## 2023-10-26 RX ORDER — LANOLIN ALCOHOL/MO/W.PET/CERES
100 CREAM (GRAM) TOPICAL DAILY
Status: DISCONTINUED | OUTPATIENT
Start: 2023-10-26 | End: 2023-10-29 | Stop reason: HOSPADM

## 2023-10-26 RX ORDER — METOPROLOL TARTRATE 50 MG/1
100 TABLET, FILM COATED ORAL 2 TIMES DAILY
Status: DISCONTINUED | OUTPATIENT
Start: 2023-10-26 | End: 2023-10-29 | Stop reason: HOSPADM

## 2023-10-26 RX ORDER — SODIUM CHLORIDE 1 G/1
1 TABLET ORAL DAILY
Status: DISCONTINUED | OUTPATIENT
Start: 2023-10-26 | End: 2023-10-29

## 2023-10-26 RX ORDER — MAGNESIUM SULFATE HEPTAHYDRATE 40 MG/ML
2 INJECTION, SOLUTION INTRAVENOUS ONCE
Status: COMPLETED | OUTPATIENT
Start: 2023-10-26 | End: 2023-10-26

## 2023-10-26 RX ORDER — POTASSIUM CHLORIDE 20 MEQ/1
40 TABLET, EXTENDED RELEASE ORAL ONCE
Status: COMPLETED | OUTPATIENT
Start: 2023-10-26 | End: 2023-10-26

## 2023-10-26 RX ORDER — MELATONIN
2000 2 TIMES DAILY
Status: DISCONTINUED | OUTPATIENT
Start: 2023-10-26 | End: 2023-10-29 | Stop reason: HOSPADM

## 2023-10-26 RX ORDER — LOSARTAN POTASSIUM 50 MG/1
100 TABLET ORAL DAILY
Status: DISCONTINUED | OUTPATIENT
Start: 2023-10-26 | End: 2023-10-29 | Stop reason: HOSPADM

## 2023-10-26 RX ORDER — FERROUS SULFATE 325(65) MG
325 TABLET ORAL
COMMUNITY

## 2023-10-26 RX ORDER — FUROSEMIDE 10 MG/ML
40 INJECTION INTRAMUSCULAR; INTRAVENOUS ONCE
Status: COMPLETED | OUTPATIENT
Start: 2023-10-26 | End: 2023-10-26

## 2023-10-26 RX ORDER — MAGNESIUM SULFATE HEPTAHYDRATE 40 MG/ML
4 INJECTION, SOLUTION INTRAVENOUS ONCE
Status: COMPLETED | OUTPATIENT
Start: 2023-10-26 | End: 2023-10-26

## 2023-10-26 RX ORDER — PANTOPRAZOLE SODIUM 40 MG/1
40 TABLET, DELAYED RELEASE ORAL
Status: DISCONTINUED | OUTPATIENT
Start: 2023-10-26 | End: 2023-10-29 | Stop reason: HOSPADM

## 2023-10-26 RX ORDER — FERROUS SULFATE 325(65) MG
325 TABLET ORAL
Status: DISCONTINUED | OUTPATIENT
Start: 2023-10-26 | End: 2023-10-29 | Stop reason: HOSPADM

## 2023-10-26 RX ORDER — GLUCOSAMINE HCL 500 MG
100 TABLET ORAL DAILY
Status: DISCONTINUED | OUTPATIENT
Start: 2023-10-26 | End: 2023-10-26 | Stop reason: DRUGHIGH

## 2023-10-26 RX ORDER — UREA 10 %
500 LOTION (ML) TOPICAL
Status: DISCONTINUED | OUTPATIENT
Start: 2023-10-26 | End: 2023-10-29

## 2023-10-26 RX ORDER — ENOXAPARIN SODIUM 100 MG/ML
40 INJECTION SUBCUTANEOUS DAILY
Status: DISCONTINUED | OUTPATIENT
Start: 2023-10-26 | End: 2023-10-29 | Stop reason: HOSPADM

## 2023-10-26 RX ADMIN — PANCRELIPASE 24000 UNITS: 24000; 76000; 120000 CAPSULE, DELAYED RELEASE PELLETS ORAL at 16:47

## 2023-10-26 RX ADMIN — IOHEXOL 80 ML: 350 INJECTION, SOLUTION INTRAVENOUS at 00:25

## 2023-10-26 RX ADMIN — Medication 2000 UNITS: at 08:24

## 2023-10-26 RX ADMIN — MAGNESIUM SULFATE HEPTAHYDRATE 2 G: 40 INJECTION, SOLUTION INTRAVENOUS at 16:45

## 2023-10-26 RX ADMIN — POTASSIUM CHLORIDE 40 MEQ: 1500 TABLET, EXTENDED RELEASE ORAL at 12:16

## 2023-10-26 RX ADMIN — SODIUM CHLORIDE 1 G: 1 TABLET ORAL at 08:23

## 2023-10-26 RX ADMIN — Medication 100 MG: at 08:23

## 2023-10-26 RX ADMIN — PANCRELIPASE 24000 UNITS: 24000; 76000; 120000 CAPSULE, DELAYED RELEASE PELLETS ORAL at 08:38

## 2023-10-26 RX ADMIN — Medication 500 MG: at 06:07

## 2023-10-26 RX ADMIN — POTASSIUM CHLORIDE 40 MEQ: 1500 TABLET, EXTENDED RELEASE ORAL at 08:31

## 2023-10-26 RX ADMIN — Medication 2000 UNITS: at 20:56

## 2023-10-26 RX ADMIN — METOPROLOL TARTRATE 100 MG: 50 TABLET, FILM COATED ORAL at 17:56

## 2023-10-26 RX ADMIN — Medication 500 MG: at 16:45

## 2023-10-26 RX ADMIN — ENOXAPARIN SODIUM 40 MG: 40 INJECTION SUBCUTANEOUS at 08:24

## 2023-10-26 RX ADMIN — FERROUS SULFATE TAB 325 MG (65 MG ELEMENTAL FE) 325 MG: 325 (65 FE) TAB at 08:24

## 2023-10-26 RX ADMIN — PANCRELIPASE 24000 UNITS: 24000; 76000; 120000 CAPSULE, DELAYED RELEASE PELLETS ORAL at 12:16

## 2023-10-26 RX ADMIN — MAGNESIUM SULFATE HEPTAHYDRATE 4 G: 40 INJECTION, SOLUTION INTRAVENOUS at 06:06

## 2023-10-26 RX ADMIN — LOSARTAN POTASSIUM 100 MG: 50 TABLET, FILM COATED ORAL at 08:23

## 2023-10-26 RX ADMIN — PANTOPRAZOLE SODIUM 40 MG: 40 TABLET, DELAYED RELEASE ORAL at 06:07

## 2023-10-26 RX ADMIN — METOPROLOL TARTRATE 100 MG: 50 TABLET, FILM COATED ORAL at 08:24

## 2023-10-26 RX ADMIN — FUROSEMIDE 40 MG: 10 INJECTION, SOLUTION INTRAMUSCULAR; INTRAVENOUS at 08:24

## 2023-10-26 NOTE — ASSESSMENT & PLAN NOTE
Recent Labs     10/25/23  2207   PLT 71*     Chronic thrombocytopenia AND Pancytopenia, likely due to OCAMPO/possible cirrhosis, complicated by gastroenteritis, a/e/b wbc (3.02-2.64), hbg (8.0-7.4), platelet count (50-77), treated with lab monitoring, liver studies, assessing for infection/bruising/bleeding. Plan:  Continue to monitor CBC.

## 2023-10-26 NOTE — ASSESSMENT & PLAN NOTE
POA with progressively worsening bilateral lower extremity swelling for 1.5 weeks. Labs: Sodium 131, , T. bili 7.34, direct bilirubin 2.49, albumin 3.0, INR 1.54, Mg 0.9, ammonia 46,  Tropes negative x4,   CT abdomen pelvis with contrast: Mild ascites, pleural effusion (R>L), possible cystitis, nonspecific colitis. Echo 2018: EF 60% with mild diastolic dysfunction. Suspect new onset fluid overload secondary to liver disease and hypoalbuminemia vs. new onset heart failure. In the ED: Received 2 g of magnesium sulfate, 1L bolus lactated Ringer. Plan:   Started patient on IV Lasix 20 mg  Monitor BMP, magnesium; Goal Mg > 2 and K > 4; Replete prn  HOB > 30°, Daily standing weights, Measure I/O  Follow-up hepatitis panel and blood alcohol levels  Follow-up on RUQ U/S  Follow-up on echo results. Will consider consult to gastroenterology pending further work-up.

## 2023-10-26 NOTE — H&P
[facility] [facility]  H&P  Name: Yinka Uribe 76 y.o. female I MRN: [id number]  Unit/Bed#: W -01 I Date of Admission: 10/25/2023   Date of Service: 10/26/2023 I Hospital Day: 0      Assessment/Plan   * Bilateral lower extremity edema  Assessment & Plan  POA with progressively worsening bilateral lower extremity swelling for 1.5 weeks. Labs: Sodium 131, , T. bili 7.34, direct bilirubin 2.49, albumin 3.0, INR 1.54, Mg 0.9, ammonia 46,  Tropes negative x4,   CT abdomen pelvis with contrast: Mild ascites, pleural effusion (R>L), possible cystitis, nonspecific colitis. Echo 2018: EF 60% with mild diastolic dysfunction. Suspect new onset fluid overload secondary to liver disease and hypoalbuminemia vs. new onset heart failure. In the ED: Received 2 g of magnesium sulfate, 1L bolus lactated Ringer. Plan:   Started patient on IV Lasix 20 mg  Monitor BMP, magnesium; Goal Mg > 2 and K > 4; Replete prn  HOB > 30°, Daily standing weights, Measure I/O  Follow-up hepatitis panel and blood alcohol levels  Follow-up on RUQ U/S  Follow-up on echo results. Will consider consult to gastroenterology pending further work-up. Diarrhea  Assessment & Plan  POA with 10+ low-volume loose stools with increased urge to defecate. 1 episode of red blood stool yesterday. Denies any fever, chills, no leukocytosis. Etiology likely enterotoxic gastroenteritis versus invasive. Plan:  Hold off on antidiarrheals for now. Follow-up on stool enteric panel. Acute blood loss anemia (ABLA)  Assessment & Plan  Recent Labs     10/25/23  2207   HGB 8.0*       7/24: Admitted for acute lower GI bleed due to diverticulosis. 7/24: Colonoscopy revealed extensive diverticula, internal hemorrhoids. EGD revealed abnormal esophagus, biopsy revealed inflammation but benign. Iron panel done earlier this year revealed anemia of chronic disease.  1 episode of bloody stool yesterday likely lower GI bleed, diverticulosis versus hemorrhoids. Per ED note, patient with neon orange stool on rectal exam. Hemoccult negative    Plan:   Transfusion threshold Hgb <7. Monitor CBC. Follow-up on B12 and folate levels. Thrombocytopenia [facility])  Assessment & Plan  Recent Labs     10/25/23  2207   PLT 71*     Chronic thrombocytopenia. Etiology unclear. Plan:  Continue to monitor CBC. Chronic hyponatremia  Assessment & Plan  Recent Labs     10/25/23  2207   SODIUM 131*       10/3: Nephrology decrease sodium chloride tablets to 1 g daily, increase losartan 100 mg daily. Usual serum sodium for her fluctuates 128-133. Plan:   Continue sodium 1g tablet, daily. Essential hypertension  Assessment & Plan  Home medication: Losartan 100 mg, daily, Metoprolol 100 mg twice daily. Plan:   Resume home medications: Losartan and Metoprolol. Urinary retention  Assessment & Plan  Straight cath at baseline bladder spasms. Denies any dysuria, urinary frequency/urgency, suprapubic tenderness. UA pyuria with occasional bacteria. Urine cultures in the past have grown E. coli and Klebsiella. Plan:  Hold off on starting antibiotics for now. Follow-up on urine cultures. Hypomagnesemia  Assessment & Plan  Recent Labs     10/25/23  2207   MG 0.9*       Home dose: Magnesium gluconate 500 mg twice daily before meals. In the ED patient was given 2 g of magnesium sulfate. Plan:  Ordered 4 g of magnesium sulfate. Resumed magnesium gluconate. Follow-up on magnesium levels in the afternoon. Chronic pancreatitis [facility])  Assessment & Plan  CT reveled sequela of chronic pancreatitis. Plan:  Continue Creon. VTE Pharmacologic Prophylaxis: VTE Score: 3 Moderate Risk (Score 3-4) - Pharmacological DVT Prophylaxis Ordered: enoxaparin (Lovenox). Code Status: Level 1 - Full Code   Discussion with family: Attempted to update  () via phone. Unable to contact.     Anticipated Length of Stay: Patient will be admitted on an inpatient basis with an anticipated length of stay of greater than 2 midnights secondary to lower extremity edema. Chief Complaint: Progressive worsening bilateral lower extremity swelling and diarrhea. History of Present Illness:  Lo Virk is a 76 y.o. female with a PMH of HTN, chronic hyponatremia, diverticulosis, anxiety who presents with evaluation for progressively worsening bilateral lower extremity swelling for 1.5 weeks and 4 days of diarrhea with stomachache. She notes 10+ low-volume loose stools with increased urge to defecate. Yesterday afternoon, she noted bright red blood in the stool. No recent travel, change in eating habits or antibiotic use. She denies frequent use of Tylenol and drinking alcohol. She did report she used to drink 4+ drink when she was younger for about 10 years. She reports that she has been straight cathing herself for the past year due to bladder spasms. She denies any fever, chills, chest pain, shortness of breath, abdominal pain, nausea, vomiting. Review of Systems:  Review of Systems   Constitutional:  Negative for chills and fever. HENT:  Negative for sore throat. Eyes:  Negative for visual disturbance. Respiratory:  Negative for cough and shortness of breath. Cardiovascular:  Positive for leg swelling. Negative for chest pain. Gastrointestinal:  Positive for abdominal distention, blood in stool and diarrhea. Negative for abdominal pain, constipation, nausea and vomiting. Genitourinary:  Negative for dysuria and hematuria. Skin:  Positive for color change (jaundiced). Neurological:  Positive for tremors. Negative for headaches.        Past Medical and Surgical History:   Past Medical History:   Diagnosis Date    Hypertension     Hyponatremia        Past Surgical History:   Procedure Laterality Date    EYE SURGERY      IN OPTX FEM SHFT FX W/INSJ IMED IMPLT W/WO SCREW Left 11/17/2018    Procedure: Intramedullary nail fixation left hip fracture;  Surgeon: Anabelle Evans MD;  Location: [facility];  Service: Orthopedics       Meds/Allergies:  Prior to Admission medications    Medication Sig Start Date End Date Taking? Authorizing Provider   Calcium Carb-Cholecalciferol (CALCIUM + D3 PO) Take 200 Units by mouth daily. Historical Provider, MD   Cholecalciferol (VITAMIN D3) 3000 UNITS TABS Take 100 Units by mouth daily. Historical Provider, MD   diclofenac sodium (VOLTAREN) 1 % APPLY TWO GRAMS TOPICALLY 4 (FOUR) TIMES A DAY 8/5/20   Anabelle Evans MD   docusate sodium (COLACE) 100 mg capsule Take 1 capsule (100 mg total) by mouth 2 (two) times a day 11/21/18   Kylah Townsend MD   lidocaine (LIDODERM) 5 % Apply 1 patch topically daily Remove & Discard patch within 12 hours or as directed by MD 11/22/18   Kylah Townsend MD   losartan (COZAAR) 25 mg tablet Take 25 mg by mouth daily    Historical Provider, MD   Magnesium 400 MG CAPS Take 800 mg by mouth daily. Historical Provider, MD   metoprolol tartrate (LOPRESSOR) 25 mg tablet Take 50 mg by mouth 2 (two) times a day      Historical Provider, MD   morphine (MSIR) 15 mg tablet Take 1 tablet (15 mg total) by mouth every 4 (four) hours as needed for severe pain Earliest Fill Date: 11/21/18 Max Daily Amount: 90 mg 11/21/18   Kylah Townsend MD   Omega-3 Fatty Acids (FISH OIL) 1,000 mg Take 1,000 mg by mouth daily. Historical Provider, MD   pantoprazole (PROTONIX) 40 mg tablet Take 1 tablet (40 mg total) by mouth daily in the early morning Do not start before July 27, 2023. 7/27/23 10/25/23  Jamie Durant PA-C   senna (SENOKOT) 8.6 mg Take 1 tablet (8.6 mg total) by mouth daily 11/22/18   Kylah Townsend MD   sodium chloride 1 g tablet Take 1 tablet (1 g total) by mouth 2 (two) times a day with meals 11/21/18   Kylah Townsend MD   thiamine 100 MG tablet Take 100 mg by mouth daily.     Historical Provider, MD   traMADol [place]) 50 mg tablet Take 1 tablet (50 mg total) by mouth every 6 (six) hours as needed for moderate pain 11/21/18   Thaddeus Ordonez MD     I have reviewed home medications using recent Epic encounter. Allergies: Allergies   Allergen Reactions    Demerol [Meperidine] Other (See Comments)     hallucinations    Diphenhydramine Other (See Comments)     hallucinations    Prednisone Tremor    Sulfa Antibiotics Other (See Comments)     hallucinations    Percocet [Oxycodone-Acetaminophen] Palpitations       Social History:  Marital Status: /Civil Union   Occupation:   Patient Pre-hospital Living Situation: Home  Patient Pre-hospital Level of Mobility: walks with cane  Patient Pre-hospital Diet Restrictions:   Substance Use History:   Social History     Substance and Sexual Activity   Alcohol Use Not Currently    Alcohol/week: 1.0 standard drink of alcohol    Types: 1 Cans of beer per week    Comment: 2-3 times per week. Drinks heavier before     Social History     Tobacco Use   Smoking Status Former   Smokeless Tobacco Never     Social History     Substance and Sexual Activity   Drug Use No       Family History:  Family History   Problem Relation Age of Onset    Heart disease Mother     Heart disease Father        Physical Exam:     Vitals:   Blood Pressure: 157/75 (10/26/23 0300)  Pulse: 72 (10/26/23 0300)  Temperature: 98.4 °F (36.9 °C) (10/26/23 0300)  Temp Source: Oral (10/26/23 0300)  Respirations: 18 (10/26/23 0300)  Height: 5' 4" (162.6 cm) (10/26/23 0300)  Weight - Scale: 54.7 kg (120 lb 9.5 oz) (10/26/23 0300)  SpO2: 100 % (10/26/23 0300)    Physical Exam  Constitutional:       General: She is not in acute distress. Appearance: She is ill-appearing. HENT:      Head: Normocephalic and atraumatic. Mouth/Throat:      Mouth: Mucous membranes are moist.      Pharynx: Oropharynx is clear. Eyes:      General: Scleral icterus present. Extraocular Movements: Extraocular movements intact. Pupils: Pupils are equal, round, and reactive to light. Cardiovascular:      Rate and Rhythm: Normal rate and regular rhythm. Pulmonary:      Effort: Pulmonary effort is normal.      Breath sounds: Rales present. Abdominal:      General: There is distension. Palpations: Abdomen is soft. Tenderness: There is no abdominal tenderness. There is no guarding or rebound. Musculoskeletal:      Right lower leg: Edema (2+) present. Left lower leg: Edema (2+) present. Skin:     General: Skin is warm and dry. Capillary Refill: Capillary refill takes less than 2 seconds. Coloration: Skin is jaundiced. Neurological:      General: No focal deficit present. Mental Status: She is alert and oriented to person, place, and time. Additional Data:     Lab Results:  Results from last 7 days   Lab Units 10/25/23  2207   WBC Thousand/uL 3.02*   HEMOGLOBIN g/dL 8.0*   HEMATOCRIT % 23.4*   PLATELETS Thousands/uL 71*   NEUTROS PCT % 52   LYMPHS PCT % 32   MONOS PCT % 12   EOS PCT % 2     Results from last 7 days   Lab Units 10/25/23  2207   SODIUM mmol/L 131*   POTASSIUM mmol/L 4.5   CHLORIDE mmol/L 100   CO2 mmol/L 23   BUN mg/dL 14   CREATININE mg/dL 0.91   ANION GAP mmol/L 8   CALCIUM mg/dL 8.1*   ALBUMIN g/dL 3.0*   TOTAL BILIRUBIN mg/dL 7.34*   ALK PHOS U/L 73   ALT U/L 44   AST U/L 145*   GLUCOSE RANDOM mg/dL 106     Results from last 7 days   Lab Units 10/25/23  2207   INR  1.54*                   Lines/Drains:  Invasive Devices       Peripheral Intravenous Line  Duration             Peripheral IV 10/25/23 Proximal;Right;Ventral (anterior) Forearm <1 day              Drain  Duration             Urethral Catheter 16 Fr. 93 days                  Urinary Catheter:  Goal for removal: N/A - Chronic Davies             Imaging: Reviewed radiology reports from this admission including: abdominal/pelvic CT  CT abdomen pelvis with contrast   Final Result by Viola Alonzo MD (10/26 0124)      1. Mild diffuse thickening of the colon which may be due to nonspecific colitis. Diverticulosis without focal inflammatory change to suggest diverticulitis. 2.  Gas is seen within the urinary bladder, correlate for recent instrumentation or urinalysis for cystitis. 3.  Hepatic steatosis. Sequela of chronic pancreatitis. Mild ascites increased since prior exam.   4.  Stable 5.2 cm left ovarian cystic lesion. 5.  New small pleural effusions right greater than left. Workstation performed: [id number]         XR chest 2 views    (Results Pending)   US right upper quadrant    (Results Pending)       EKG and Other Studies Reviewed on Admission:   EKG: NSR. HR 81.    ** Please Note: This note has been constructed using a voice recognition system.  **

## 2023-10-26 NOTE — CONSULTS
Consultation - [address] [place] [address] [facility]  [facility] 76 y.o. female MRN: [id number]  Unit/Bed#: W -01 Encounter: [id number]    ASSESSMENT/PLAN:     #1.  Acute diarrhea with CT evidence of colitis, patient had colonoscopy and EGD recently 3 months ago; most likely infectious process    -Check stool infectious studies including enteric panel, C. difficile, will also include vibrio studies and check acute hepatitis panel in case of hepatitis A    -May have low residue diet if tolerated, informally would recommend lactose restriction    -Continue with Creon      #2. Elevated liver enzymes with elevated AST to ALT ratio and hyperbilirubinemia, appears consistent with alcoholic hepatitis although patient denies alcohol use, may be minimizing however. Could also be related to acute infection as outlined above. She is also thrombocytopenic, raising concern for underlying cirrhosis of the liver    -We will check right upper quadrant ultrasound with liver Dopplers, can also consider elastography in the future    -Check hepatitis panel, iron panel, KEI/AMA/ASMA    -Monitor INR closely, will hold off on oral steroids for now as clinical picture of alcoholic hepatitis is unclear and infection is being excluded      #3.   Appearance of chronic pancreatitis on CT scan, likely related to past alcohol use    -Nonurgently would recommend MRI of pancreas with MRCP for further evaluation; again may continue with Creon    Consults    Reason for Consult / Principal Problem: Bilateral lower extremity edema, elevated liver enzymes, chronic pancreatitis    HPI: [facility] is a 76y.o. year old female with history of alcohol abuse (she reported drinking 4+ drinks a day for about a 10-year timeframe when she was younger; to me she tells me she has not had any alcohol whatsoever for the last 3 years) who presented to the hospital early this morning with complaint of progressively worsening lower extremity swelling over the last approximately 10 days, and diarrhea over the last 4 days. She had noted having about 10+ small bowel movements a day, she said that yesterday she noted bright red blood mixed in with the stool, she has had 3 bowel movements today which have been diarrheal and nonbloody. Her CT scan showed nonspecific evidence of colitis, her bilirubin is elevated at 7.43 with a direct of 3.84, AST of 111 disproportionately elevated to ALT 36. She denies starting any new medications recently, though notes that earlier this month her losartan dose was increased by her nephrologist.  She says she did eat some shrimp 2 weeks ago, otherwise denies eating any shellfish raw or otherwise. No known sick contacts or travel recently. REVIEW OF SYSTEMS:    CONSTITUTIONAL: Denies any fever, chills, or rigors. Good appetite, and no recent weight loss. HEENT: No earache or tinnitus. Denies hearing loss or visual disturbances. CARDIOVASCULAR: No chest pain or palpitations. RESPIRATORY: Denies any cough, hemoptysis, shortness of breath or dyspnea on exertion. GASTROINTESTINAL: As noted in the History of Present Illness. GENITOURINARY: No problems with urination. Denies any hematuria or dysuria. NEUROLOGIC: No dizziness or vertigo, denies headaches. MUSCULOSKELETAL: Denies any muscle or joint pain. SKIN: Denies skin rashes or itching. ENDOCRINE: Denies excessive thirst. Denies intolerance to heat or cold. PSYCHOSOCIAL: Denies depression or anxiety. Denies any recent memory loss.        Historical Information   Past Medical History:   Diagnosis Date    Hypertension     Hyponatremia      Past Surgical History:   Procedure Laterality Date    EYE SURGERY      ME OPTX FEM SHFT FX W/INSJ IMED IMPLT W/WO SCREW Left 11/17/2018    Procedure: Intramedullary nail fixation left hip fracture;  Surgeon: Anabelle Evans MD;  Location: [facility];  Service: Orthopedics     Social History   Social History     Substance and Sexual Activity   Alcohol Use Not Currently    Alcohol/week: 1.0 standard drink of alcohol    Types: 1 Cans of beer per week    Comment: 2-3 times per week.  Drinks heavier before     Social History     Substance and Sexual Activity   Drug Use No     Social History     Tobacco Use   Smoking Status Former   Smokeless Tobacco Never     Family History   Problem Relation Age of Onset    Heart disease Mother     Heart disease Father        Meds/Allergies     Medications Prior to Admission   Medication    Amylase-Lipase-Protease (PANCRELIPASE 4500 PO)    Cholecalciferol (VITAMIN D3) 3000 UNITS TABS    ferrous sulfate 325 (65 Fe) mg tablet    losartan (COZAAR) 50 mg tablet    Magnesium 500 MG TABS    metoprolol tartrate (LOPRESSOR) 25 mg tablet    pantoprazole (PROTONIX) 40 mg tablet    sodium chloride 1 g tablet    thiamine 100 MG tablet    Calcium Carb-Cholecalciferol (CALCIUM + D3 PO)    diclofenac sodium (VOLTAREN) 1 %    docusate sodium (COLACE) 100 mg capsule    lidocaine (LIDODERM) 5 %    Omega-3 Fatty Acids (FISH OIL) 1,000 mg    senna (SENOKOT) 8.6 mg    traMADol (ULTRAM) 50 mg tablet     Current Facility-Administered Medications   Medication Dose Route Frequency    cholecalciferol (VITAMIN D3) tablet 2,000 Units  2,000 Units Oral BID    enoxaparin (LOVENOX) subcutaneous injection 40 mg  40 mg Subcutaneous Daily    ferrous sulfate tablet 325 mg  325 mg Oral Daily With Breakfast    [START ON 10/27/2023] furosemide (LASIX) injection 40 mg  40 mg Intravenous Daily    losartan (COZAAR) tablet 100 mg  100 mg Oral Daily    magnesium gluconate (MAGONATE) tablet 500 mg  500 mg Oral BID AC    magnesium sulfate 2 g/50 mL IVPB (premix) 2 g  2 g Intravenous Once    metoprolol tartrate (LOPRESSOR) tablet 100 mg  100 mg Oral BID    pancrelipase (Lip-Prot-Amyl) (CREON) delayed release capsule 24,000 Units  24,000 Units Oral TID With Meals    pantoprazole (PROTONIX) EC tablet 40 mg  40 mg Oral Early Morning    sodium chloride tablet 1 g 1 g Oral Daily    thiamine tablet 100 mg  100 mg Oral Daily       Allergies   Allergen Reactions    Demerol [Meperidine] Other (See Comments)     hallucinations    Diphenhydramine Other (See Comments)     hallucinations    Prednisone Tremor    Sulfa Antibiotics Other (See Comments)     hallucinations    Percocet [Oxycodone-Acetaminophen] Palpitations           Objective     Blood pressure 117/60, pulse 66, temperature 98.5 °F (36.9 °C), resp. rate 16, height 5' 4" (1.626 m), weight 54.4 kg (119 lb 14.9 oz), SpO2 98 %. Intake/Output Summary (Last 24 hours) at 10/26/2023 1610  Last data filed at 10/26/2023 1301  Gross per 24 hour   Intake 1050 ml   Output 1050 ml   Net 0 ml         PHYSICAL EXAM     General Appearance:   Alert, chronically ill-appearing, poor memory, somewhat anxious appearing, jaundiced, cooperative, no distress, appears stated age    HEENT:   Normocephalic, atraumatic, anicteric. Neck:  Supple, symmetrical, trachea midline, no adenopathy;    thyroid: no enlargement/tenderness/nodules; no carotid  bruit or JVD    Lungs:   Clear to auscultation bilaterally; no rales, rhonchi or wheezing; respirations unlabored    Heart[de-identified]   S1 and S2 normal; regular rate and rhythm; no murmur, rub, or gallop.    Abdomen:   Soft, non-tender, non-distended; normal bowel sounds; no masses, no organomegaly    Genitalia:   Deferred    Rectal:   Deferred    Extremities:  No cyanosis, clubbing or edema    Pulses:  2+ and symmetric all extremities    Skin:  Skin texture, turgor normal, no rashes or lesions    Lymph nodes:  No palpable cervical, axillary or inguinal lymphadenopathy        Lab Results:   Admission on 10/25/2023   Component Date Value    Sodium 10/25/2023 131 (L)     Potassium 10/25/2023 4.5     Chloride 10/25/2023 100     CO2 10/25/2023 23     ANION GAP 10/25/2023 8     BUN 10/25/2023 14     Creatinine 10/25/2023 0.91     Glucose 10/25/2023 106     Calcium 10/25/2023 8.1 (L)     eGFR 10/25/2023 65 Total Bilirubin 10/25/2023 7.34 (H)     Bilirubin, Direct 10/25/2023 2.49 (H)     Alkaline Phosphatase 10/25/2023 73     AST 10/25/2023 145 (H)     ALT 10/25/2023 44     Total Protein 10/25/2023 6.3 (L)     Albumin 10/25/2023 3.0 (L)     Ammonia 10/25/2023 46     Magnesium 10/25/2023 0.9 (LL)     Phosphorus 10/25/2023 2.8     Lipase 10/25/2023 38     hs TnI 0hr 10/25/2023 8     BNP 10/25/2023 433 (H)     WBC 10/25/2023 3.02 (L)     RBC 10/25/2023 2.32 (L)     Hemoglobin 10/25/2023 8.0 (L)     Hematocrit 10/25/2023 23.4 (L)     MCV 10/25/2023 101 (H)     MCH 10/25/2023 34.5 (H)     MCHC 10/25/2023 34.2     RDW 10/25/2023 15.1     MPV 10/25/2023 11.4     Platelets 07/04/4721 71 (L)     nRBC 10/25/2023 0     Neutrophils Relative 10/25/2023 52     Immat GRANS % 10/25/2023 1     Lymphocytes Relative 10/25/2023 32     Monocytes Relative 10/25/2023 12     Eosinophils Relative 10/25/2023 2     Basophils Relative 10/25/2023 1     Neutrophils Absolute 10/25/2023 1.62 (L)     Immature Grans Absolute 10/25/2023 0.02     Lymphocytes Absolute 10/25/2023 0.95     Monocytes Absolute 10/25/2023 0.35     Eosinophils Absolute 10/25/2023 0.06     Basophils Absolute 10/25/2023 0.02     Protime 10/25/2023 19.2 (H)     INR 10/25/2023 1.54 (H)     PTT 10/25/2023 37     Color, UA 10/26/2023 Yellow     Clarity, UA 10/26/2023 Turbid     Specific Gravity, UA 10/26/2023 1.013     pH, UA 10/26/2023 6.5     Leukocytes, UA 10/26/2023 Large (A)     Nitrite, UA 10/26/2023 Negative     Protein, UA 10/26/2023 Negative     Glucose, UA 10/26/2023 Negative     Ketones, UA 10/26/2023 Negative     Urobilinogen, UA 10/26/2023 3.0 (A)     Bilirubin, UA 10/26/2023 Small (A)     Occult Blood, UA 10/26/2023 Negative     Ventricular Rate 10/25/2023 81     Atrial Rate 10/25/2023 81     DC Interval 10/25/2023 154     QRSD Interval 10/25/2023 86     QT Interval 10/25/2023 406     QTC Interval 10/25/2023 471     P Axis 10/25/2023 70     QRS [place] 10/25/2023 53     T Wave [place] 10/25/2023 53     EXT Fecal Occult Blood 10/25/2023 Negative     Control 10/25/2023 Valid     hs TnI 2hr 10/26/2023 8     Delta 2hr hsTnI 10/26/2023 0     hs TnI 4hr 10/26/2023 8     Delta 4hr hsTnI 10/26/2023 0     Acetaminophen Level 10/25/2023 <10 (L)     RBC, UA 10/26/2023 None Seen     WBC, UA 10/26/2023 30-50 (A)     Epithelial Cells 10/26/2023 Occasional     Bacteria, UA 10/26/2023 Occasional     Hyaline Casts, UA 10/26/2023 0-3 (A)     Ethanol Lvl 10/26/2023 <10     Triscuspid Valve Regurgi* 10/26/2023 28.0     RAA A4C 10/26/2023 15     LA Volume Index (BP) 10/26/2023 34.4     MV Peak A [place] 10/26/2023 0.79     MV stenosis pressure 1/2* 10/26/2023 69     MV Peak E [place] 10/26/2023 84     E wave deceleration time 10/26/2023 238     E/A ratio 10/26/2023 1.06     MV valve area p 1/2 meth* 10/26/2023 3.19     RA 2D Volume 10/26/2023 37.0     TR Peak [place] 10/26/2023 2.6     RVID d 10/26/2023 3.7     A4C EF 10/26/2023 63     Tricuspid valve peak reg* 10/26/2023 2.63     Left ventricular stroke * 10/26/2023 46.00     IVSd 10/26/2023 1.10     Tricuspid annular plane * 10/26/2023 2.10     Ao root 10/26/2023 3.10     LVPWd 10/26/2023 1.10     LA size 10/26/2023 3.7     Asc Ao 10/26/2023 2.9     LA volume (BP) 10/26/2023 54     FS 10/26/2023 29     LVIDS 10/26/2023 3.00     IVS 10/26/2023 1.1     LVIDd 10/26/2023 4.20     LA length (A2C) 10/26/2023 5.00     LEFT VENTRICLE SYSTOLIC * 13/69/3757 35     LV DIASTOLIC VOLUME (MOD* 23/69/5886 81     Left Atrium Area-systoli* 10/26/2023 19.9     Left Atrium Area-systoli* 10/26/2023 19.6     MV E' Tissue Velocity Se* 10/26/2023 8     LVSV, 2D 10/26/2023 46     LV EF 10/26/2023 70     Est. RA pres 10/26/2023 5.0     Right Ventricular Peak S* 10/26/2023 33.00     Sodium 10/26/2023 133 (L)     Potassium 10/26/2023 3.1 (L)     Chloride 10/26/2023 102     CO2 10/26/2023 22     ANION GAP 10/26/2023 9     BUN 10/26/2023 12     Creatinine 10/26/2023 0.72     Glucose 10/26/2023 94     Calcium 10/26/2023 8.0 (L)     Corrected Calcium 10/26/2023 9.2     AST 10/26/2023 111 (H)     ALT 10/26/2023 36     Alkaline Phosphatase 10/26/2023 75     Total Protein 10/26/2023 5.7 (L)     Albumin 10/26/2023 2.5 (L)     Total Bilirubin 10/26/2023 7.43 (H)     eGFR 10/26/2023 86     WBC 10/26/2023 2.64 (L)     RBC 10/26/2023 2.19 (L)     Hemoglobin 10/26/2023 7.4 (L)     Hematocrit 10/26/2023 22.0 (L)     MCV 10/26/2023 101 (H)     MCH 10/26/2023 33.8     MCHC 10/26/2023 33.6     RDW 10/26/2023 15.2 (H)     Platelets 87/71/9623 74 (L)     MPV 10/26/2023 11.5     Folate 10/26/2023 4.2 (L)     Vitamin B-12 10/26/2023 814     Hepatitis B Surface Ag 10/26/2023 Non-reactive     Hep A IgM 10/26/2023 Non-reactive     Hepatitis C Ab 10/26/2023 Non-reactive     Hep B C IgM 10/26/2023 Non-reactive     Bilirubin, Direct 10/26/2023 3.84 (H)      Narrative & Impression   CT ABDOMEN AND PELVIS WITH IV CONTRAST     INDICATION:   Diarrhea. COMPARISON: CT of the abdomen pelvis on July 24, 2023. TECHNIQUE:  CT examination of the abdomen and pelvis was performed. Multiplanar 2D reformatted images were created from the source data. This examination, like all CT scans performed in the [facility], was performed utilizing techniques to minimize radiation dose exposure, including the use of iterative reconstruction and automated exposure control. Radiation dose length   product (DLP) for this visit:  535 mGy-cm     IV Contrast:  80 mL of iohexol (OMNIPAQUE)  Enteric Contrast:  Enteric contrast was not administered. FINDINGS:     ABDOMEN     LOWER CHEST: Right greater than left small pleural effusions. LIVER/BILIARY TREE: Hepatic steatosis. GALLBLADDER:  Gallbladder is surgically absent. SPLEEN:  Unremarkable. PANCREAS: Redemonstrated dilatation of the main pancreatic duct measuring up to 8 mm. There is a small amount of gas within the main pancreatic duct (series 301, image 46).  3 mm calculus within the pancreatic head is again seen. Additional calcifications   of the pancreas are seen in keeping with sequela of chronic pancreatitis. ADRENAL GLANDS:  Unremarkable. KIDNEYS/URETERS:  Unremarkable. No hydronephrosis. STOMACH AND BOWEL: Mild thickening of the ascending, descending, and rectosigmoid colon. Colonic diverticulosis without focal inflammatory change to suggest diverticulitis. APPENDIX:  No findings to suggest appendicitis. ABDOMINOPELVIC CAVITY: Mild ascites. No pneumoperitoneum. No lymphadenopathy. VESSELS:  Unremarkable for patient's age. PELVIS     REPRODUCTIVE ORGANS: There is a 5.2 cm left ovarian cystic lesion similar in size to prior examination. URINARY BLADDER: Gas is seen within the urinary bladder wall. ABDOMINAL WALL/INGUINAL REGIONS:  Unremarkable. OSSEOUS STRUCTURES: Internal fixation of the left femur. Avascular necrosis of the femoral head similar to prior exam. Degenerative changes of the osseous structures. IMPRESSION:     1. Mild diffuse thickening of the colon which may be due to nonspecific colitis. Diverticulosis without focal inflammatory change to suggest diverticulitis. 2.  Gas is seen within the urinary bladder, correlate for recent instrumentation or urinalysis for cystitis. 3.  Hepatic steatosis. Sequela of chronic pancreatitis. Mild ascites increased since prior exam.  4.  Stable 5.2 cm left ovarian cystic lesion. 5.  New small pleural effusions right greater than left. Imaging Studies: I have personally reviewed pertinent reports. The patient was seen and examined by Dr. Merlinda Kempf, all michelle medical decisions were made with Dr. Merlinda Kempf. Thank you for allowing us to participate in the care of this pleasant patient. We will follow up with you closely.

## 2023-10-26 NOTE — ASSESSMENT & PLAN NOTE
Home medication: Losartan 100 mg, daily, Metoprolol 100 mg twice daily. Plan:   Resume home medications: Losartan and Metoprolol.

## 2023-10-26 NOTE — ASSESSMENT & PLAN NOTE
POA with 10+ low-volume loose stools with increased urge to defecate. 1 episode of red blood stool yesterday. Denies any fever, chills, no leukocytosis. Etiology likely enterotoxic gastroenteritis versus invasive. Today, patient reports no longer having diarrhea but rather solid stools.  revealed that she has been taking tums daily for the past couple of weeks prior to admission. Plan:  Hold off on antidiarrheals for now. C. Diff pcr positive with toxin EIA negative  No active C. Diff infection.

## 2023-10-26 NOTE — ASSESSMENT & PLAN NOTE
>>ASSESSMENT AND PLAN FOR ANEMIA WRITTEN ON 10/26/2023  6:11 AM BY NICOLASA VAUGHN, DO    Recent Labs     10/25/23  2207   HGB 8.0*     7/24: Admitted for acute lower GI bleed due to diverticulosis. 7/24: Colonoscopy revealed extensive diverticula, internal hemorrhoids. EGD revealed abnormal esophagus, biopsy revealed inflammation but benign. Iron panel done earlier this year revealed anemia of chronic disease. 1 episode of bloody stool yesterday likely lower GI bleed, diverticulosis versus hemorrhoids. Per ED note, patient with neon orange stool on rectal exam. Hemoccult negative    Plan:   Transfusion threshold Hgb <7. Monitor CBC. Follow-up on B12 and folate levels.

## 2023-10-26 NOTE — ASSESSMENT & PLAN NOTE
Recent Labs     10/25/23  2207   MG 0.9*       Home dose: Magnesium gluconate 500 mg twice daily before meals. In the ED patient was given 2 g of magnesium sulfate. Plan:  Magnesium 2g completed  Resumed magnesium gluconate.   Follow-up on magnesium levels

## 2023-10-26 NOTE — ASSESSMENT & PLAN NOTE
POA with 10+ low-volume loose stools with increased urge to defecate. 1 episode of red blood stool yesterday. Denies any fever, chills, no leukocytosis. Etiology likely enterotoxic gastroenteritis versus invasive. Plan:  Hold off on antidiarrheals for now. Follow-up on stool enteric panel.

## 2023-10-26 NOTE — ASSESSMENT & PLAN NOTE
Recent Labs     10/25/23  2207   HGB 8.0*     7/24: Admitted for acute lower GI bleed due to diverticulosis. 7/24: Colonoscopy revealed extensive diverticula, internal hemorrhoids. EGD revealed abnormal esophagus, biopsy revealed inflammation but benign. Iron panel done earlier this year revealed anemia of chronic disease. 1 episode of bloody stool yesterday likely lower GI bleed, diverticulosis versus hemorrhoids. Per ED note, patient with neon orange stool on rectal exam. Hemoccult negative    Plan:   Transfusion threshold Hgb <7. Monitor CBC. Follow-up on B12 and folate levels.

## 2023-10-26 NOTE — ASSESSMENT & PLAN NOTE
POA with progressively worsening bilateral lower extremity swelling for 1.5 weeks. CT abdomen pelvis with contrast: Mild ascites, new pleural effusion (R>L), possible cystitis, nonspecific colitis. Echo 2018: EF 60% with mild diastolic dysfunction. Suspect new onset fluid overload secondary to liver disease and hypoalbuminemia vs. new onset heart failure. In the ED: Received 2 g of magnesium sulfate, 1L bolus lactated Ringer. Today:  Urine culture grew gram negative E. Coli, pt is assymptomatic  Labs: Sodium 133, , T. bili 6.5, direct bilirubin 3.36, albumin 2.5, INR 1.48, Mg 1.7, ammonia 46  Hepatitis panel negative  C. Diff PCR positive with negative toxin EIA. Patient noted that she has been taking a medication not listed for rosacea  Doxycycline (hepatotoxic)   has confirmed that she is mentally at baseline. Plan:   Continue patient on IV Lasix 20 mg  Monitor BMP, magnesium; Goal Mg > 2 and K > 4; Replete prn  HOB > 30°, Daily standing weights, Measure I/O  Follow-up on [facility] U/S  GI consulted and opinions appreciated  Patient's MELD Score is: 21  From yesterday's 22  MELD Score 90-day mortality   ?9 1.9%   10-19 6.0%   20-29 19.6%   30-39 52.6%   ? 40 71.3%     MELD Score Component Values:  Component Value Date   Creatinine: 0.72 mg/dL 10/27/2023   Dialysis at least twice   in the past week  Or CVVHD for ? 24 hours   in the past week:     No    Bilirubin, total: 6.5 mg/dL 10/27/2023   INR: 1.48 10/27/2023   Sodium: 133 mmol/L 10/27/2023

## 2023-10-26 NOTE — ASSESSMENT & PLAN NOTE
Recent Labs     10/25/23  2207   PLT 71*   Chronic thrombocytopenia. Etiology unclear. Plan:  Continue to monitor CBC.

## 2023-10-26 NOTE — ASSESSMENT & PLAN NOTE
Straight cath at baseline bladder spasms. Denies any dysuria, urinary frequency/urgency, suprapubic tenderness. UA pyuria with occasional bacteria. Urine cultures in the past have grown E. coli and Klebsiella. Plan:  Hold off on starting antibiotics for now. Follow-up on urine cultures.

## 2023-10-26 NOTE — ASSESSMENT & PLAN NOTE
Recent Labs     10/25/23  2207   SODIUM 131*       10/3: Nephrology decrease sodium chloride tablets to 1 g daily, increase losartan 100 mg daily. Usual serum sodium for her fluctuates 128-133. Plan:   Continue sodium 1g tablet, daily.

## 2023-10-26 NOTE — ASSESSMENT & PLAN NOTE
>>ASSESSMENT AND PLAN FOR ANEMIA WRITTEN ON 10/27/2023  2:56 PM BY TIMBO ABRAHAM    Recent Labs     10/25/23  2207   HGB 8.0*       7/24: Admitted for acute lower GI bleed due to diverticulosis. 7/24: Colonoscopy revealed extensive diverticula, internal hemorrhoids. EGD revealed abnormal esophagus, biopsy revealed inflammation but benign. 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 Plan:   Replete folate PO  Monitor CBC.

## 2023-10-26 NOTE — ASSESSMENT & PLAN NOTE
Recent Labs     10/25/23  2207   MG 0.9*     Home dose: Magnesium gluconate 500 mg twice daily before meals. In the ED patient was given 2 g of magnesium sulfate. Plan:  Ordered 4 g of magnesium sulfate. Resumed magnesium gluconate. Follow-up on magnesium levels in the afternoon.

## 2023-10-26 NOTE — ED PROVIDER NOTES
History  Chief Complaint   Patient presents with    Diarrhea     Patient here for eval of diarrhea x4 days, pt reports blood in stools. +b/l leg swelling/pain. +light headed . Pt states typically uses a cane to walk, unable to ambulate at this time, states she's been more "shaky". Patient is a 55-year-old female with PMH of HTN, hyponatremia, and diverticulosis presenting for evaluation of 1.5 weeks of bilateral leg swelling and 4 days of diarrhea. The patient notes starting 1.5 weeks ago with bilateral lower leg swelling. She notes the swelling has been progressing for which she purchased over-the-counter Diurex (pamabrom). She notes despite taking this medication her legs remain swollen. The patient also notes over the last 4 days having approximately 10+ stools every day with increased urge to defecate. She notes the stools have been small in volume, yellow, loose, and with associated intermittent "stomach ache". She notes starting this afternoon/early evening with blood in the stool that is bright red for which she presents for further evaluation. She has lightheadedness on exertion but denies headaches, vision changes, dizziness, and CP/SOB. She denies associated fevers or chills. She notes having to straight cath approximately 4-6 times per day for chronic bladder spasms and incomplete emptying. She denies new symptoms of dysuria, urinary urgency/frequency, foul-smelling, and hematuria. History provided by:  Patient   used: No        Prior to Admission Medications   Prescriptions Last Dose Informant Patient Reported? Taking? Calcium Carb-Cholecalciferol (CALCIUM + D3 PO)   Yes No   Sig: Take 200 Units by mouth daily. Cholecalciferol (VITAMIN D3) 3000 UNITS TABS   Yes No   Sig: Take 100 Units by mouth daily. Magnesium 400 MG CAPS   Yes No   Sig: Take 800 mg by mouth daily. Omega-3 Fatty Acids (FISH OIL) 1,000 mg   Yes No   Sig: Take 1,000 mg by mouth daily. diclofenac sodium (VOLTAREN) 1 %   No No   Sig: APPLY TWO GRAMS TOPICALLY 4 (FOUR) TIMES A DAY   docusate sodium (COLACE) 100 mg capsule   No No   Sig: Take 1 capsule (100 mg total) by mouth 2 (two) times a day   lidocaine (LIDODERM) 5 %   No No   Sig: Apply 1 patch topically daily Remove & Discard patch within 12 hours or as directed by MD   losartan (COZAAR) 25 mg tablet   Yes No   Sig: Take 25 mg by mouth daily   metoprolol tartrate (LOPRESSOR) 25 mg tablet   Yes No   Sig: Take 50 mg by mouth 2 (two) times a day     morphine (MSIR) 15 mg tablet   No No   Sig: Take 1 tablet (15 mg total) by mouth every 4 (four) hours as needed for severe pain Earliest Fill Date: 11/21/18 Max Daily Amount: 90 mg   pantoprazole (PROTONIX) 40 mg tablet   No No   Sig: Take 1 tablet (40 mg total) by mouth daily in the early morning Do not start before July 27, 2023. senna (SENOKOT) 8.6 mg   No No   Sig: Take 1 tablet (8.6 mg total) by mouth daily   sodium chloride 1 g tablet   No No   Sig: Take 1 tablet (1 g total) by mouth 2 (two) times a day with meals   thiamine 100 MG tablet   Yes No   Sig: Take 100 mg by mouth daily. traMADol (ULTRAM) 50 mg tablet   No No   Sig: Take 1 tablet (50 mg total) by mouth every 6 (six) hours as needed for moderate pain      Facility-Administered Medications: None       Past Medical History:   Diagnosis Date    Hypertension     Hyponatremia        Past Surgical History:   Procedure Laterality Date    EYE SURGERY      [place] OPTX FEM SHFT FX W/INSJ IMED IMPLT W/WO SCREW Left 11/17/2018    Procedure: Intramedullary nail fixation left hip fracture;  Surgeon: Kalee Horta MD;  Location: [facility];  Service: Orthopedics       Family History   Problem Relation Age of Onset    Heart disease Mother     Heart disease Father      I have reviewed and agree with the history as documented.     E-Cigarette/Vaping    E-Cigarette Use Never User      E-Cigarette/Vaping Substances     Social History     Tobacco Use    Smoking status: Former    Smokeless tobacco: Never   Vaping Use    Vaping Use: Never used   Substance Use Topics    Alcohol use: Not Currently     Alcohol/week: 1.0 standard drink of alcohol     Types: 1 Cans of beer per week     Comment: 2-3 times per week. Drinks heavier before    Drug use: No       Review of Systems   Constitutional:  Positive for fatigue. Negative for chills and fever. Eyes:  Negative for pain and visual disturbance. Respiratory:  Negative for cough and shortness of breath. Cardiovascular:  Positive for leg swelling (b/l LE, x1.5 weeks). Negative for chest pain and palpitations. Gastrointestinal:  Positive for abdominal distention (Bloating for 2 days per ), abdominal pain ("Stomach ache", worse when urge to defecate, Relieved with Stooling), anal bleeding, blood in stool (X2 to 3 hours) and diarrhea. Negative for constipation, nausea, rectal pain and vomiting. Genitourinary: Negative. Musculoskeletal:  Negative for back pain, gait problem, joint swelling, neck pain and neck stiffness. Skin:  Positive for color change (More yellow per ). Negative for pallor, rash and wound. Allergic/Immunologic: Negative for immunocompromised state. Neurological:  Positive for light-headedness (On exertion). Negative for dizziness, tremors, seizures, syncope, facial asymmetry, speech difficulty, weakness, numbness and headaches. All other systems reviewed and are negative. Physical Exam  Physical Exam  Vitals and nursing note reviewed. Exam conducted with a chaperone present (pt's ). Constitutional:       General: She is awake. She is not in acute distress (Evidencing mild discomfort, in no acute distress). Appearance: Normal appearance. She is well-developed. She is ill-appearing. She is not toxic-appearing or diaphoretic. HENT:      Head: Normocephalic and atraumatic. Jaw: There is normal jaw occlusion.       Nose: Nose normal. Mouth/Throat:      Lips: Pink. No lesions. Mouth: Mucous membranes are dry. Pharynx: Oropharynx is clear. Uvula midline. Eyes:      General: Lids are normal. Vision grossly intact. Gaze aligned appropriately. Scleral icterus present. Extraocular Movements: Extraocular movements intact. Conjunctiva/sclera: Conjunctivae normal.      Pupils: Pupils are equal, round, and reactive to light. Neck:      Trachea: Phonation normal. No abnormal tracheal secretions. Cardiovascular:      Rate and Rhythm: Normal rate and regular rhythm. Pulses:           Radial pulses are 2+ on the right side and 2+ on the left side. Dorsalis pedis pulses are 2+ on the right side and 2+ on the left side. Posterior tibial pulses are 2+ on the right side and 2+ on the left side. Heart sounds: Normal heart sounds, S1 normal and S2 normal. No murmur heard. Pulmonary:      Effort: Pulmonary effort is normal. No tachypnea or respiratory distress. Breath sounds: Normal breath sounds and air entry. No stridor, decreased air movement or transmitted upper airway sounds. No decreased breath sounds. Abdominal:      General: There is no distension. Palpations: Abdomen is soft. Tenderness: There is no abdominal tenderness. There is no guarding or rebound. Genitourinary:     Rectum: Guaiac result negative. No mass, tenderness, anal fissure, external hemorrhoid or internal hemorrhoid. Normal anal tone. Musculoskeletal:         General: Normal range of motion. Cervical back: Full passive range of motion without pain, normal range of motion and neck supple. Right lower leg: 3+ Pitting Edema present. Left lower leg: 3+ Pitting Edema present. Comments: NAQVI, 5/5 strength throughout, sensation intact. Skin:     General: Skin is warm and dry. Capillary Refill: Capillary refill takes 2 to 3 seconds. Coloration: Skin is jaundiced.       Findings: No petechiae, rash or wound.   Neurological:      General: No focal deficit present. Mental Status: She is alert and oriented to person, place, and time. Mental status is at baseline. GCS: GCS eye subscore is 4. GCS verbal subscore is 5. GCS motor subscore is 6. Cranial Nerves: Cranial nerves 2-12 are intact. Sensory: Sensation is intact. Motor: Motor function is intact. Gait: Gait is intact. Psychiatric:         Behavior: Behavior is cooperative. Vital Signs  ED Triage Vitals   Temperature Pulse Respirations Blood Pressure SpO2   10/25/23 2108 10/25/23 2108 10/25/23 2108 10/25/23 2108 10/25/23 2108   98.4 °F (36.9 °C) 89 18 121/65 98 %      Temp Source Heart Rate Source Patient Position - Orthostatic VS BP Location FiO2 (%)   10/25/23 2108 10/25/23 2108 10/25/23 2108 10/25/23 2108 --   Oral Monitor Sitting Right arm       Pain Score       10/26/23 0300       No Pain           Vitals:    10/25/23 2108 10/26/23 0130 10/26/23 0200 10/26/23 0300   BP: 121/65 151/65 135/65 157/75   Pulse: 89 70 69 72   Patient Position - Orthostatic VS: Sitting            Visual Acuity      ED Medications  Medications   lactated ringers bolus 1,000 mL (0 mL Intravenous Stopped 10/26/23 0015)   magnesium sulfate 2 g/50 mL IVPB (premix) 2 g (0 g Intravenous Stopped 10/26/23 0114)   iohexol (OMNIPAQUE) 350 MG/ML injection (MULTI-DOSE) 80 mL (80 mL Intravenous Given 10/26/23 0025)       Diagnostic Studies  Results Reviewed       Procedure Component Value Units Date/Time    HS Troponin I 4hr [[id number]] Collected: 10/26/23 0258    Lab Status:  In process Specimen: Blood from Arm, Right Updated: 10/26/23 0303    Urine Microscopic [[id number]]  (Abnormal) Collected: 10/26/23 0052    Lab Status: Final result Specimen: Urine, Clean Catch Updated: 10/26/23 0140     RBC, UA None Seen /hpf      WBC, UA 30-50 /hpf      Epithelial Cells Occasional /hpf      Bacteria, UA Occasional /hpf      Hyaline Casts, UA 0-3 /lpf     Urine culture [[id number]] Collected: 10/26/23 0052    Lab Status: In process Specimen: Urine, Clean Catch Updated: 10/26/23 0140    HS Troponin I 2hr [[id number]]  (Normal) Collected: 10/26/23 0020    Lab Status: Final result Specimen: Blood from Arm, Right Updated: 10/26/23 0127     hs TnI 2hr 8 ng/L      Delta 2hr hsTnI 0 ng/L     UA w Reflex to Microscopic w Reflex to Culture [[id number]]  (Abnormal) Collected: 10/26/23 0052    Lab Status: Final result Specimen: Urine, Clean Catch Updated: 10/26/23 0111     Color, UA Yellow     Clarity, UA Turbid     Specific Gravity, UA 1.013     pH, UA 6.5     Leukocytes, UA Large     Nitrite, UA Negative     Protein, UA Negative mg/dl      Glucose, UA Negative mg/dl      Ketones, UA Negative mg/dl      Urobilinogen, UA 3.0 mg/dl      Bilirubin, UA Small     Occult Blood, UA Negative    Acetaminophen level-If concentration is detectable, please discuss with medical  on call.  [[id number]]  (Abnormal) Collected: 10/25/23 2207    Lab Status: Final result Specimen: Blood from Arm, Right Updated: 10/26/23 0032     Acetaminophen Level <10 ug/mL     Magnesium [[id number]]  (Abnormal) Collected: 10/25/23 2207    Lab Status: Final result Specimen: Blood from Arm, Right Updated: 10/25/23 2252     Magnesium 0.9 mg/dL     HS Troponin 0hr (reflex protocol) [[id number]]  (Normal) Collected: 10/25/23 2207    Lab Status: Final result Specimen: Blood from Arm, Right Updated: 10/25/23 2252     hs TnI 0hr 8 ng/L     B-Type Natriuretic Peptide(BNP) [[id number]]  (Abnormal) Collected: 10/25/23 2207    Lab Status: Final result Specimen: Blood from Arm, Right Updated: 10/25/23 2251      pg/mL     Basic metabolic panel [[id number]]  (Abnormal) Collected: 10/25/23 2207    Lab Status: Final result Specimen: Blood from Arm, Right Updated: 10/25/23 2250     Sodium 131 mmol/L      Potassium 4.5 mmol/L      Chloride 100 mmol/L      CO2 23 mmol/L      ANION GAP 8 mmol/L      BUN 14 mg/dL      Creatinine 0.91 mg/dL      Glucose 106 mg/dL      Calcium 8.1 mg/dL      eGFR 65 ml/min/1.73sq m     Narrative:      [facility] guidelines for Chronic Kidney Disease (CKD):     Stage 1 with normal or high GFR (GFR > 90 mL/min/1.73 square meters)    Stage 2 Mild CKD (GFR = 60-89 mL/min/1.73 square meters)    Stage 3A Moderate CKD (GFR = 45-59 mL/min/1.73 square meters)    Stage 3B Moderate CKD (GFR = 30-44 mL/min/1.73 square meters)    Stage 4 Severe CKD (GFR = 15-29 mL/min/1.73 square meters)    Stage 5 End Stage CKD (GFR <15 mL/min/1.73 square meters)  Note: GFR calculation is accurate only with a steady state creatinine    Hepatic function panel [[id number]]  (Abnormal) Collected: 10/25/23 2207    Lab Status: Final result Specimen: Blood from Arm, Right Updated: 10/25/23 2250     Total Bilirubin 7.34 mg/dL      Bilirubin, Direct 2.49 mg/dL      Alkaline Phosphatase 73 U/L       U/L      ALT 44 U/L      Total Protein 6.3 g/dL      Albumin 3.0 g/dL     Phosphorus [[id number]]  (Normal) Collected: 10/25/23 2207    Lab Status: Final result Specimen: Blood from Arm, Right Updated: 10/25/23 2250     Phosphorus 2.8 mg/dL     Lipase [[id number]]  (Normal) Collected: 10/25/23 2207    Lab Status: Final result Specimen: Blood from Arm, Right Updated: 10/25/23 2250     Lipase 38 u/L     POCT occult blood stool [[id number]]  (Normal) Collected: 10/25/23 2249    Lab Status: In process Specimen: Stool Updated: 10/25/23 2250     EXT Fecal Occult Blood Negative     Control Valid    Ammonia [[id number]]  (Normal) Collected: 10/25/23 2207    Lab Status: Final result Specimen: Blood from Arm, Right Updated: 10/25/23 2243     Ammonia 46 umol/L     Narrative:      Specimen Icteric.     Protime-INR [[id number]]  (Abnormal) Collected: 10/25/23 2207    Lab Status: Final result Specimen: Blood from Arm, Right Updated: 10/25/23 2241     Protime 19.2 seconds      INR 1.54    APTT [[id number]]  (Normal) Collected: 10/25/23 4887    Lab Status: Final result Specimen: Blood from Arm, Right Updated: 10/25/23 2241     PTT 37 seconds     CBC and differential [[id number]]  (Abnormal) Collected: 10/25/23 2207    Lab Status: Final result Specimen: Blood from Arm, Right Updated: 10/25/23 2230     WBC 3.02 Thousand/uL      RBC 2.32 Million/uL      Hemoglobin 8.0 g/dL      Hematocrit 23.4 %       fL      MCH 34.5 pg      MCHC 34.2 g/dL      RDW 15.1 %      MPV 11.4 fL      Platelets 71 Thousands/uL      nRBC 0 /100 WBCs      Neutrophils Relative 52 %      Immat GRANS % 1 %      Lymphocytes Relative 32 %      Monocytes Relative 12 %      Eosinophils Relative 2 %      Basophils Relative 1 %      Neutrophils Absolute 1.62 Thousands/µL      Immature Grans Absolute 0.02 Thousand/uL      Lymphocytes Absolute 0.95 Thousands/µL      Monocytes Absolute 0.35 Thousand/µL      Eosinophils Absolute 0.06 Thousand/µL      Basophils Absolute 0.02 Thousands/µL                    CT abdomen pelvis with contrast   Final Result by Taylor Cardenas MD (10/26 0124)      1. Mild diffuse thickening of the colon which may be due to nonspecific colitis. Diverticulosis without focal inflammatory change to suggest diverticulitis. 2.  Gas is seen within the urinary bladder, correlate for recent instrumentation or urinalysis for cystitis. 3.  Hepatic steatosis. Sequela of chronic pancreatitis. Mild ascites increased since prior exam.   4.  Stable 5.2 cm left ovarian cystic lesion. 5.  New small pleural effusions right greater than left. Workstation performed: [id number]         XR chest 2 views    (Results Pending)              Procedures  ECG 12 Lead Documentation Only    Date/Time: 10/25/2023 10:05 PM    Performed by: Holland Guaman, [address] [place] [address]  Authorized by:  AQUILES Quintana    Indications / Diagnosis:  Leg swelling  ECG reviewed by me, the ED Provider: yes    Patient location:  ED  Previous ECG:     Previous ECG:  Compared to current    Comparison ECG info:  July 24, 2023    Comparison to previous ECG: Previous ECG with poor tracing, difficult to compare. Comparison to cardiac monitor: Yes    Interpretation:     Interpretation: non-specific    Rate:     ECG rate:  81    ECG rate assessment: normal    Rhythm:     Rhythm: sinus rhythm    Ectopy:     Ectopy: none    QRS:     QRS axis:  Normal    QRS intervals:  Normal  Conduction:     Conduction: normal    ST segments:     ST segments:  Normal  T waves:     T waves: non-specific and inverted      Inverted:  V1, aVR and V2  Comments:      Sinus rhythm, normal axis, normal intervals, nonspecific T wave abnormalities, no acute ischemic changes read by me           ED Course  ED Course as of 10/26/23 0311   Wed Oct 25, 2023   2119 Triage vital signs within normal limits and stable   2234 WBC(!): 3.02  Noted leukopenia   2234 Hemoglobin(!): 8.0  Increased 0.5 points from last measure 3 months ago. Hemoccult negative. 2234 Platelet Count(!): 71  Downtrending platelet count, notable thrombocytopenia   2242 POCT INR(!): 1.54  Increased from 1.2 when last checked 3 months ago   2242 PROTIME(!): 19.2  Increased from 15.5 with last measure 3 months ago   2242 PTT: 37  WNL   2243 Ammonia: 46  WNL   5488 Basic metabolic panel(!)  Mild hyponatremia, no other acute electrolyte derangement, mild hypocalcemia, no PRETTY, normal random glucose   2253 TOTAL BILIRUBIN(!): 7.34  Notably increased from 1.71 last measure 3 months ago   2254 AST(!): 145  Trending upwards from last measure 3 months ago   2254 BNP(!): 433  Noted elevated BNP   2254 Lipase: 38  Acute pancreatitis less likely. Patient does have history and notes this does not feel similar. 2254 Phosphorus: 2.8  WNL   2254 hs TnI 0hr: 8  No chest pain or palpitations or shortness of breath, will delta following ACS protocol   2254 Magnesium(!!): 0.9  Noted critical result, will give IV mag sulfate   Thu Oct 26, 2023   0015 Patient updated on results thus far, she notes no pain currently. She notes taking 3 total doses of 650 mg of acetaminophen (Tylenol) this past week. She denies frequent use or overuse of Tylenol. We will send acetaminophen level given acutely worsening liver function testing. 0036 ACETAMINOPHEN LEVEL(!): <10  Suspicion very low for Tylenol as source of patient's acutely worsening liver function test   0111 UA w Reflex to Microscopic w Reflex to Culture(!)  Will await urine micro, patient with history of need to straight cath, denies UTI like symptoms of urgency, frequency, foul smell, hematuria, and dysuria   0127 CT abdomen pelvis with contrast  IMPRESSION:     1. Mild diffuse thickening of the colon which may be due to nonspecific colitis. Diverticulosis without focal inflammatory change to suggest diverticulitis. 2.  Gas is seen within the urinary bladder, correlate for recent instrumentation or urinalysis for cystitis. 3.  Hepatic steatosis. Sequela of chronic pancreatitis. Mild ascites increased since prior exam.  4.  Stable 5.2 cm left ovarian cystic lesion. 5.  New small pleural effusions right greater than left. 0130 hs TnI 2hr: 8  Negative delta trop   0137 SLIM messaged regarding pt case   0222 WBC, UA(!): 30-50  No UTI sx, no leukocytosis, no fever; will await urine culture             HEART Risk Score      Flowsheet Row Most Recent Value   Heart Score Risk Calculator    History 0 Filed at: 10/26/2023 0257   ECG 1 Filed at: 10/26/2023 0257   Age 2 Filed at: 10/26/2023 0257   Risk Factors 1 Filed at: 10/26/2023 0257   Troponin 0 Filed at: 10/26/2023 0257   HEART Score 4 Filed at: 10/26/2023 0257                                        Medical Decision Making  DDX including but not limited to: Cirrhosis, jaundice, hepatitis, obstructive hepatitis, chronic pancreatitis, drug toxicity, hemolytic anemia, infectious etiology including colitis, diverticulitis,    #Leg swelling  -Patient with 1.5 weeks of bilateral lower leg edema that has been progressive.   The patient's  also notes that his legs appears more bloated and distended.  -Exam with clear breath sounds with crackles, 3+ pitting edema to bilateral lower legs  -BNP elevated at 433. Bilateral lower lobe pleural effusions. Ascites on CT.  -Suspect new evidence of fluid overload secondary to hypoalbuminemia and liver disease; less suspicious of new onset heart failure    #Diarrhea, ?bloody diarrhea  -Patient with neon orange stool on rectal exam.  Hemoccult negative.  -Abdomen soft, rounded, not focally tender.  -Labs acutely concerning for progression of liver disease was recently demonstrated on last admission. -CT without acute infectious etiology.  -Patient denies drinking alcohol, use of Tylenol.  -We will admit to [facility] for GI consult and need for further management and work-up of progressive liver disease. Problems Addressed:  Abnormal LFTs: acute illness or injury  Bilateral leg edema: acute illness or injury  Hypomagnesemia: acute illness or injury  Jaundice: acute illness or injury    Amount and/or Complexity of Data Reviewed  Labs: ordered. Decision-making details documented in ED Course. Radiology: ordered. Decision-making details documented in ED Course. ECG/medicine tests: ordered and independent interpretation performed. Risk  OTC drugs. Prescription drug management. Decision regarding hospitalization.              Disposition  Final diagnoses:   Jaundice   Abnormal LFTs   Hypomagnesemia   Bilateral leg edema     Time reflects when diagnosis was documented in both MDM as applicable and the Disposition within this note       Time User Action Codes Description Comment    10/26/2023  2:20 AM Joya Alcala Add [R17] Jaundice     10/26/2023  2:20 AM Cami Ag Add [R79.89] Abnormal LFTs     10/26/2023  2:21 AM Cami Lake Add [E83.42] Hypomagnesemia     10/26/2023  2:21 AM Cami [place] Add [R60.0] Bilateral leg edema           ED Disposition       ED Disposition   Admit Condition   Stable    Date/Time   u Oct 26, 2023 0237    Comment   Case was discussed with TALA Britt and the patient's admission status was agreed to be Admission Status: inpatient status to the service of Dr. Natalie Haskins. Follow-up Information    None         Patient's Medications   Discharge Prescriptions    No medications on file       No discharge procedures on file.     PDMP Review       None            ED Provider  Electronically Signed by             Joya Alcala, [address] [place] [address]  10/26/23 7453

## 2023-10-26 NOTE — ASSESSMENT & PLAN NOTE
CT reveled sequela of chronic pancreatitis. RUQ US revealed similar findings of chronic pancreatitis. Plan:  Continue Creon.

## 2023-10-26 NOTE — ASSESSMENT & PLAN NOTE
Recent Labs     10/25/23  2207   HGB 8.0*       7/24: Admitted for acute lower GI bleed due to diverticulosis. 7/24: Colonoscopy revealed extensive diverticula, internal hemorrhoids. EGD revealed abnormal esophagus, biopsy revealed inflammation but benign. 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 Plan:   Replete folate PO  Monitor CBC.

## 2023-10-27 PROBLEM — D61.818 PANCYTOPENIA (HCC): Status: ACTIVE | Noted: 2018-11-17

## 2023-10-27 LAB
ALBUMIN SERPL BCP-MCNC: 2.5 G/DL (ref 3.5–5)
ALP SERPL-CCNC: 75 U/L (ref 34–104)
ALT SERPL W P-5'-P-CCNC: 34 U/L (ref 7–52)
ANA SER QL IA: NEGATIVE
ANION GAP SERPL CALCULATED.3IONS-SCNC: 7 MMOL/L
AST SERPL W P-5'-P-CCNC: 101 U/L (ref 13–39)
BASOPHILS # BLD AUTO: 0.02 THOUSANDS/ÂΜL (ref 0–0.1)
BASOPHILS NFR BLD AUTO: 1 % (ref 0–1)
BILIRUB DIRECT SERPL-MCNC: 3.36 MG/DL (ref 0–0.2)
BILIRUB SERPL-MCNC: 6.5 MG/DL (ref 0.2–1)
BUN SERPL-MCNC: 11 MG/DL (ref 5–25)
C DIFF TOX A+B STL QL IA: NEGATIVE
C DIFF TOX GENS STL QL NAA+PROBE: POSITIVE
CALCIUM ALBUM COR SERPL-MCNC: 9.4 MG/DL (ref 8.3–10.1)
CALCIUM SERPL-MCNC: 8.2 MG/DL (ref 8.4–10.2)
CHLORIDE SERPL-SCNC: 103 MMOL/L (ref 96–108)
CO2 SERPL-SCNC: 23 MMOL/L (ref 21–32)
CREAT SERPL-MCNC: 0.72 MG/DL (ref 0.6–1.3)
EOSINOPHIL # BLD AUTO: 0.07 THOUSAND/ÂΜL (ref 0–0.61)
EOSINOPHIL NFR BLD AUTO: 3 % (ref 0–6)
ERYTHROCYTE [DISTWIDTH] IN BLOOD BY AUTOMATED COUNT: 15.5 % (ref 11.6–15.1)
FERRITIN SERPL-MCNC: 2518 NG/ML (ref 11–307)
GFR SERPL CREATININE-BSD FRML MDRD: 86 ML/MIN/1.73SQ M
GLUCOSE SERPL-MCNC: 92 MG/DL (ref 65–140)
HCT VFR BLD AUTO: 20.8 % (ref 34.8–46.1)
HCT VFR BLD AUTO: 24 % (ref 34.8–46.1)
HGB BLD-MCNC: 7.1 G/DL (ref 11.5–15.4)
HGB BLD-MCNC: 8.3 G/DL (ref 11.5–15.4)
IMM GRANULOCYTES # BLD AUTO: 0 THOUSAND/UL (ref 0–0.2)
IMM GRANULOCYTES NFR BLD AUTO: 0 % (ref 0–2)
INR PPP: 1.48 (ref 0.84–1.19)
IRON SERPL-MCNC: 87 UG/DL (ref 50–212)
LYMPHOCYTES # BLD AUTO: 0.98 THOUSANDS/ÂΜL (ref 0.6–4.47)
LYMPHOCYTES NFR BLD AUTO: 35 % (ref 14–44)
MAGNESIUM SERPL-MCNC: 1.7 MG/DL (ref 1.9–2.7)
MCH RBC QN AUTO: 34.1 PG (ref 26.8–34.3)
MCHC RBC AUTO-ENTMCNC: 34.1 G/DL (ref 31.4–37.4)
MCV RBC AUTO: 100 FL (ref 82–98)
MONOCYTES # BLD AUTO: 0.23 THOUSAND/ÂΜL (ref 0.17–1.22)
MONOCYTES NFR BLD AUTO: 8 % (ref 4–12)
NEUTROPHILS # BLD AUTO: 1.54 THOUSANDS/ÂΜL (ref 1.85–7.62)
NEUTS SEG NFR BLD AUTO: 53 % (ref 43–75)
NRBC BLD AUTO-RTO: 0 /100 WBCS
PLATELET # BLD AUTO: 74 THOUSANDS/UL (ref 149–390)
PMV BLD AUTO: 11.5 FL (ref 8.9–12.7)
POTASSIUM SERPL-SCNC: 3.4 MMOL/L (ref 3.5–5.3)
PROT SERPL-MCNC: 5.5 G/DL (ref 6.4–8.4)
PROTHROMBIN TIME: 18.7 SECONDS (ref 11.6–14.5)
RBC # BLD AUTO: 2.08 MILLION/UL (ref 3.81–5.12)
SODIUM SERPL-SCNC: 133 MMOL/L (ref 135–147)
TIBC SERPL-MCNC: <142 UG/DL (ref 250–450)
UIBC SERPL-MCNC: <55 UG/DL (ref 155–355)
WBC # BLD AUTO: 2.84 THOUSAND/UL (ref 4.31–10.16)

## 2023-10-27 PROCEDURE — 86381 MITOCHONDRIAL ANTIBODY EACH: CPT | Performed by: PHYSICIAN ASSISTANT

## 2023-10-27 PROCEDURE — 85018 HEMOGLOBIN: CPT

## 2023-10-27 PROCEDURE — 86301 IMMUNOASSAY TUMOR CA 19-9: CPT

## 2023-10-27 PROCEDURE — 80053 COMPREHEN METABOLIC PANEL: CPT

## 2023-10-27 PROCEDURE — 83735 ASSAY OF MAGNESIUM: CPT

## 2023-10-27 PROCEDURE — 85025 COMPLETE CBC W/AUTO DIFF WBC: CPT

## 2023-10-27 PROCEDURE — 99232 SBSQ HOSP IP/OBS MODERATE 35: CPT | Performed by: INTERNAL MEDICINE

## 2023-10-27 PROCEDURE — 82248 BILIRUBIN DIRECT: CPT

## 2023-10-27 PROCEDURE — 85014 HEMATOCRIT: CPT

## 2023-10-27 PROCEDURE — 86038 ANTINUCLEAR ANTIBODIES: CPT | Performed by: PHYSICIAN ASSISTANT

## 2023-10-27 PROCEDURE — 86015 ACTIN ANTIBODY EACH: CPT | Performed by: PHYSICIAN ASSISTANT

## 2023-10-27 PROCEDURE — 85610 PROTHROMBIN TIME: CPT | Performed by: PHYSICIAN ASSISTANT

## 2023-10-27 RX ORDER — POTASSIUM CHLORIDE 20 MEQ/1
40 TABLET, EXTENDED RELEASE ORAL ONCE
Status: COMPLETED | OUTPATIENT
Start: 2023-10-27 | End: 2023-10-27

## 2023-10-27 RX ORDER — POTASSIUM CHLORIDE 20 MEQ/1
20 TABLET, EXTENDED RELEASE ORAL ONCE
Status: COMPLETED | OUTPATIENT
Start: 2023-10-27 | End: 2023-10-27

## 2023-10-27 RX ORDER — FOLIC ACID 1 MG/1
1 TABLET ORAL DAILY
Status: DISCONTINUED | OUTPATIENT
Start: 2023-10-27 | End: 2023-10-29 | Stop reason: HOSPADM

## 2023-10-27 RX ORDER — MAGNESIUM SULFATE HEPTAHYDRATE 40 MG/ML
2 INJECTION, SOLUTION INTRAVENOUS ONCE
Status: COMPLETED | OUTPATIENT
Start: 2023-10-27 | End: 2023-10-27

## 2023-10-27 RX ADMIN — SODIUM CHLORIDE 1 G: 1 TABLET ORAL at 08:12

## 2023-10-27 RX ADMIN — Medication 500 MG: at 06:39

## 2023-10-27 RX ADMIN — Medication 2000 UNITS: at 08:12

## 2023-10-27 RX ADMIN — FUROSEMIDE 40 MG: 10 INJECTION, SOLUTION INTRAMUSCULAR; INTRAVENOUS at 08:12

## 2023-10-27 RX ADMIN — Medication 500 MG: at 17:19

## 2023-10-27 RX ADMIN — POTASSIUM CHLORIDE 40 MEQ: 1500 TABLET, EXTENDED RELEASE ORAL at 08:05

## 2023-10-27 RX ADMIN — LOSARTAN POTASSIUM 100 MG: 50 TABLET, FILM COATED ORAL at 08:12

## 2023-10-27 RX ADMIN — FERROUS SULFATE TAB 325 MG (65 MG ELEMENTAL FE) 325 MG: 325 (65 FE) TAB at 06:39

## 2023-10-27 RX ADMIN — METOPROLOL TARTRATE 100 MG: 50 TABLET, FILM COATED ORAL at 08:12

## 2023-10-27 RX ADMIN — METOPROLOL TARTRATE 100 MG: 50 TABLET, FILM COATED ORAL at 17:17

## 2023-10-27 RX ADMIN — FOLIC ACID 1 MG: 1 TABLET ORAL at 08:12

## 2023-10-27 RX ADMIN — Medication 100 MG: at 08:12

## 2023-10-27 RX ADMIN — ENOXAPARIN SODIUM 40 MG: 40 INJECTION SUBCUTANEOUS at 08:12

## 2023-10-27 RX ADMIN — POTASSIUM CHLORIDE 20 MEQ: 1500 TABLET, EXTENDED RELEASE ORAL at 12:42

## 2023-10-27 RX ADMIN — Medication 2000 UNITS: at 21:14

## 2023-10-27 RX ADMIN — PANCRELIPASE 24000 UNITS: 24000; 76000; 120000 CAPSULE, DELAYED RELEASE PELLETS ORAL at 17:17

## 2023-10-27 RX ADMIN — MAGNESIUM SULFATE HEPTAHYDRATE 2 G: 40 INJECTION, SOLUTION INTRAVENOUS at 08:04

## 2023-10-27 RX ADMIN — PANCRELIPASE 24000 UNITS: 24000; 76000; 120000 CAPSULE, DELAYED RELEASE PELLETS ORAL at 06:40

## 2023-10-27 RX ADMIN — PANTOPRAZOLE SODIUM 40 MG: 40 TABLET, DELAYED RELEASE ORAL at 06:39

## 2023-10-27 RX ADMIN — PANCRELIPASE 24000 UNITS: 24000; 76000; 120000 CAPSULE, DELAYED RELEASE PELLETS ORAL at 12:42

## 2023-10-27 NOTE — ASSESSMENT & PLAN NOTE
>>ASSESSMENT AND PLAN FOR ANEMIA WRITTEN ON 10/28/2023 10:25 AM  [address], MD    Recent Labs     10/26/23  4645 10/27/23  0528 10/27/23  0922   HGB 7.4* 7.1* 8.3*     7/24: Admitted for acute lower GI bleed due to diverticulosis. 7/24: Colonoscopy revealed extensive diverticula, internal hemorrhoids. EGD revealed abnormal esophagus, biopsy revealed inflammation but benign. 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 Plan:   Replete folate PO  Monitor CBC.

## 2023-10-27 NOTE — PROGRESS NOTES
Progress Note - Melinda Salguero 76 y.o. female MRN: [id number]    Unit/Bed#: W -01 Encounter: [id number]      Assessment/Plan:    #1. Elevated LFTs, broad differential at this time but possibly related to decompensation of chronic liver disease in the setting of acute gastrointestinal infection. Appears likely to have underlying cirrhosis with thrombocytopenia, folate deficiency, macrocytosis, MRI findings of chronic pancreatitis; elevated LFTs could also be related to recent medications, alcohol.      -Follow-up on stool infectious studies; positive C. difficile with negative EIA was noted, we will hold off on treatment now as her diarrhea appears to be improving, appears more likely at this point that she is colonized rather than infected. If she does continue with significant diarrhea however then would recommend starting oral vancomycin 125 mg 4 times daily for 10 to 14 days    -Monitor LFTs and PT/INR closely    -Follow up on RUQ ultrasound with dopplers    -If liver enzymes worsen with no apparent cause on other work-up, consider liver biopsy, can otherwise pursue this or potentially elastography as outpatient    -Would also recommend MRI of pancreas with MRCP nonurgently, can be pursued either inpatient or outpatient depending on her clinical course    -Patient reports the antibiotic for her rosacea mentioned yesterday was topical minocycline, unlikely this is related to her elevated liver enzymes. She tells me there was another oral medication for rosacea recently but she is very vague about this, she does not think she will be able to find out what this was    -Will check hemochromatosis mutation (elevated ferritin and diminished TIBC noted)    - Follow up on KEI/AMA/ASMA levels    Subjective:   Patient says she is feeling generally better today, she says she actually had a formed bowel movement as well.     Objective:     Vitals: Blood pressure 125/57, pulse 66, temperature 98.7 °F (37.1 °C), resp. rate 16, height 5' 4" (1.626 m), weight 55.2 kg (121 lb 11.1 oz), SpO2 93 %. ,Body mass index is 20.89 kg/m². Intake/Output Summary (Last 24 hours) at 10/27/2023 0853  Last data filed at 10/26/2023 2059  Gross per 24 hour   Intake 480 ml   Output 1100 ml   Net -620 ml       Physical Exam:   General appearance: alert, jaundiced, chronically ill-appearing, appears stated age and cooperative  Lungs: clear to auscultation bilaterally, no labored breathing/accessory muscle use  Heart: regular rate and rhythm, S1, S2 normal, no murmur, click, rub or gallop  Abdomen: soft, non-tender; bowel sounds normal; no masses,  no organomegaly  Extremities: Pitting edema bilateral lower extremities    Invasive Devices       Peripheral Intravenous Line  Duration             Peripheral IV 10/25/23 Proximal;Right;Ventral (anterior) Forearm 1 day              Drain  Duration             Urethral Catheter 16 Fr. 94 days                    Lab, Imaging and other studies: I have personally reviewed pertinent reports. No results displayed because visit has over 200 results.          Latest Reference Range & Units 10/26/23 06:11 10/26/23 16:56 10/27/23 05:28   Sodium 135 - 147 mmol/L 133 (L)  133 (L)   Potassium 3.5 - 5.3 mmol/L 3.1 (L)  3.4 (L)   Chloride 96 - 108 mmol/L 102  103   CO2 21 - 32 mmol/L 22  23   Anion Gap mmol/L 9  7   BUN 5 - 25 mg/dL 12  11   Creatinine 0.60 - 1.30 mg/dL 0.72  0.72   Glucose, Random 65 - 140 mg/dL 94  92   Calcium 8.4 - 10.2 mg/dL 8.0 (L)  8.2 (L)   CORRECTED CALCIUM 8.3 - 10.1 mg/dL 9.2  9.4   AST 13 - 39 U/L 111 (H)  101 (H)   ALT 7 - 52 U/L 36  34   Alkaline Phosphatase 34 - 104 U/L 75  75   Total Protein 6.4 - 8.4 g/dL 5.7 (L)  5.5 (L)   Albumin 3.5 - 5.0 g/dL 2.5 (L)  2.5 (L)   TOTAL BILIRUBIN 0.20 - 1.00 mg/dL 7.43 (H)  6.50 (H)   eGFR ml/min/1.73sq m 86  86   Magnesium 1.9 - 2.7 mg/dL   1.7 (L)   BILIRUBIN DIRECT 0.00 - 0.20 mg/dL 3.84 (H)  3.36 (H)   Iron 50 - 212 ug/dL 87     Ferritin 11 - 307 ng/mL 2,518 (H)     Iron Saturation  See Comment     TIBC 250 - 450 ug/dL <142 (L)     UIBC 155 - 355 ug/dL <55 (L)     Folate >5.9 ng/mL 4.2 (L)     Vitamin B-12 180 - 914 pg/mL 814     WBC 4.31 - 10.16 Thousand/uL 2.64 (L)  2.84 (L)   Red Blood Cell Count 3.81 - 5.12 Million/uL 2.19 (L)  2.08 (L)   Hemoglobin 11.5 - 15.4 g/dL 7.4 (L)  7.1 (L)   HCT 34.8 - 46.1 % 22.0 (L)  20.8 (L)   MCV 82 - 98 fL 101 (H)  100 (H)   MCH 26.8 - 34.3 pg 33.8  34.1   MCHC 31.4 - 37.4 g/dL 33.6  34.1   RDW 11.6 - 15.1 % 15.2 (H)  15.5 (H)   Platelet Count 531 - 390 Thousands/uL 74 (L)  74 (L)   MPV 8.9 - 12.7 fL 11.5  11.5   nRBC /100 WBCs   0   Neutrophils % 43 - 75 %   53   Immat GRANS % 0 - 2 %   0   Lymphocytes Relative 14 - 44 %   35   Monocytes Relative 4 - 12 %   8   Eosinophils 0 - 6 %   3   Basophils Relative 0 - 1 %   1   Immature Grans Absolute 0.00 - 0.20 Thousand/uL   0.00   Absolute Neutrophils 1.85 - 7.62 Thousands/µL   1.54 (L)   Lymphocytes Absolute 0.60 - 4.47 Thousands/µL   0.98   Absolute Monocytes 0.17 - 1.22 Thousand/µL   0.23   Absolute Eosinophils 0.00 - 0.61 Thousand/µL   0.07   Basophils Absolute 0.00 - 0.10 Thousands/µL   0.02   PROTIME 11.6 - 14.5 seconds  17.6 (H) 18.7 (H)   POCT INR 0.84 - 1.19   1.38 (H) 1.48 (H)   (L): Data is abnormally low  (H): Data is abnormally high

## 2023-10-27 NOTE — ASSESSMENT & PLAN NOTE
Recent Labs     10/25/23  2207 10/26/23  0611 10/27/23  0528   PLT 71* 74* 74*   Chronic thrombocytopenia AND Pancytopenia, likely due to OCAMPO/possible cirrhosis, complicated by gastroenteritis, a/e/b wbc (3.02-2.64), hbg (8.0-7.4), platelet count (62-82), treated with lab monitoring, liver studies, assessing for infection/bruising/bleeding. Plan:  Continue to monitor CBC.

## 2023-10-27 NOTE — ASSESSMENT & PLAN NOTE
Recent Labs     10/26/23  0611 10/27/23  0528 10/27/23  0922   HGB 7.4* 7.1* 8.3*     7/24: Admitted for acute lower GI bleed due to diverticulosis. 7/24: Colonoscopy revealed extensive diverticula, internal hemorrhoids. EGD revealed abnormal esophagus, biopsy revealed inflammation but benign. 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 Plan:   Replete folate PO  Monitor CBC.

## 2023-10-27 NOTE — ASSESSMENT & PLAN NOTE
Recent Labs     10/26/23  0611 10/27/23  0528 10/28/23  0535   SODIUM 133* 133* 132*     10/3: Nephrology decrease sodium chloride tablets to 1 g daily, increase losartan 100 mg daily. Usual serum sodium for her fluctuates 128-133. Plan:   Continue sodium 1g tablet, daily.

## 2023-10-27 NOTE — ASSESSMENT & PLAN NOTE
Straight cath at baseline bladder spasms. Denies any dysuria, urinary frequency/urgency, suprapubic tenderness. UA pyuria with occasional bacteria. Urine cultures in the past have grown E. coli and Klebsiella. Urine cultures inpatient grew E. coli  No urinary symptoms in the hospital    Plan:  Hold off on starting antibiotics for now as patient is asymptomatic.

## 2023-10-27 NOTE — PROGRESS NOTES
[facility]  Progress Note  Name: Mathew Jimenez  MRN: [id number]  Unit/Bed#: W -01 I Date of Admission: 10/25/2023   Date of Service: 10/27/2023 I Hospital Day: 1    Assessment/Plan   * Bilateral lower extremity edema  Assessment & Plan  POA with progressively worsening bilateral lower extremity swelling for 1.5 weeks. CT abdomen pelvis with contrast: Mild ascites, new pleural effusion (R>L), possible cystitis, nonspecific colitis. Echo 2018: EF 60% with mild diastolic dysfunction. Suspect new onset fluid overload secondary to liver disease and hypoalbuminemia vs. new onset heart failure. In the ED: Received 2 g of magnesium sulfate, 1L bolus lactated Ringer. Today:  Urine culture grew gram negative E. Coli, pt is assymptomatic  Labs: Sodium 133, , T. bili 6.5, direct bilirubin 3.36, albumin 2.5, INR 1.48, Mg 1.7, ammonia 46  Hepatitis panel negative  C. Diff PCR positive with negative toxin EIA. Patient noted that she has been taking a medication not listed for rosacea  Doxycycline (hepatotoxic)   has confirmed that she is mentally at baseline. Plan:   Continue patient on IV Lasix 20 mg  Monitor BMP, magnesium; Goal Mg > 2 and K > 4; Replete prn  HOB > 30°, Daily standing weights, Measure I/O  Follow-up on RUQ U/S  GI consulted and opinions appreciated  Patient's MELD Score is: 21  From yesterday's 22  MELD Score 90-day mortality   ?9 1.9%   10-19 6.0%   20-29 19.6%   30-39 52.6%   ? 40 71.3%     MELD Score Component Values:  Component Value Date   Creatinine: 0.72 mg/dL 10/27/2023   Dialysis at least twice   in the past week  Or CVVHD for ? 24 hours   in the past week:     No    Bilirubin, total: 6.5 mg/dL 10/27/2023   INR: 1.48 10/27/2023   Sodium: 133 mmol/L 10/27/2023        Chronic pancreatitis ([address])  Assessment & Plan  CT reveled sequela of chronic pancreatitis. RUQ US revealed similar findings of chronic pancreatitis.       Plan:  Continue Creon.    Diarrhea  Assessment & Plan  POA with 10+ low-volume loose stools with increased urge to defecate. 1 episode of red blood stool yesterday. Denies any fever, chills, no leukocytosis. Etiology likely enterotoxic gastroenteritis versus invasive. Today, patient reports no longer having diarrhea but rather solid stools.  revealed that she has been taking tums daily for the past couple of weeks prior to admission. Plan:  Hold off on antidiarrheals for now. C. Diff pcr positive with toxin EIA negative  No active C. Diff infection. Urinary retention  Assessment & Plan  Straight cath at baseline bladder spasms. Denies any dysuria, urinary frequency/urgency, suprapubic tenderness. UA pyuria with occasional bacteria. Urine cultures in the past have grown E. coli and Klebsiella. Plan:  Hold off on starting antibiotics for now. Follow-up on urine cultures. Anemia  Assessment & Plan  Recent Labs     10/26/23  0611 10/27/23  0528 10/27/23  0922   HGB 7.4* 7.1* 8.3*     7/24: Admitted for acute lower GI bleed due to diverticulosis. 7/24: Colonoscopy revealed extensive diverticula, internal hemorrhoids. EGD revealed abnormal esophagus, biopsy revealed inflammation but benign. 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 Plan:   Replete folate PO  Monitor CBC. Pancytopenia [facility])  Assessment & Plan  Recent Labs     10/25/23  2207 10/26/23  0611 10/27/23  0528   PLT 71* 74* 74*     Chronic thrombocytopenia AND Pancytopenia, likely due to OCAMPO/possible cirrhosis, complicated by gastroenteritis, a/e/b wbc (3.02-2.64), hbg (8.0-7.4), platelet count (79-46), treated with lab monitoring, liver studies, assessing for infection/bruising/bleeding. Plan:  Continue to monitor CBC.       Chronic hyponatremia  Assessment & Plan  Recent Labs     10/25/23  2207 10/26/23  0611 10/27/23  0528   SODIUM 131* 133* 133*     10/3: Nephrology decrease sodium chloride tablets to 1 g daily, increase losartan 100 mg daily. Usual serum sodium for her fluctuates 128-133. Plan:   Continue sodium 1g tablet, daily. Essential hypertension  Assessment & Plan  Home medication: Losartan 100 mg, daily, Metoprolol 100 mg twice daily. Plan:   Resume home medications: Losartan and Metoprolol. Hypomagnesemia  Assessment & Plan  Recent Labs     10/25/23  2207 10/27/23  0528   MG 0.9* 1.7*     Home dose: Magnesium gluconate 500 mg twice daily before meals. In the ED patient was given 2 g of magnesium sulfate. Plan:  Magnesium 2g completed  Resumed magnesium gluconate. Follow-up on magnesium levels           VTE Pharmacologic Prophylaxis: VTE Score: 3 Moderate Risk (Score 3-4) - Pharmacological DVT Prophylaxis Ordered: enoxaparin (Lovenox). Patient Centered Rounds: I performed bedside rounds with nursing staff today. Discussions with Specialists or Other Care Team Provider: Gastroenterology    Education and Discussions with Family / Patient: Updated  () via phone. Current Length of Stay: 1 day(s)  Current Patient Status: Inpatient   Discharge Plan: Anticipate discharge in 24-48 hrs to home. Code Status: Level 1 - Full Code    Subjective:   Patient seen at bedside this morning. She is doing better overall. She claims to have had 2 normal bowel movements last night. They were described as solid stools. She denies them being watery, loose, or bloody. She was able to eat three separate times yesterday. Per the , she has been taking tums for the past couple of weeks prior to admission. He thinks that this may contribute to the diarrhea she has been experiencing. Patient denies any subjective fevers, headaches, chills, night sweats, nausea, vomiting.      Objective:     Vitals:   Temp (24hrs), Av.1 °F (37.3 °C), Min:98.7 °F (37.1 °C), Max:99.5 °F (37.5 °C)    Temp:  [98.7 °F (37.1 °C)-99.5 °F (37.5 °C)] 98.7 °F (37.1 °C)  HR:  [62-71] 66  Resp:  [16] 16  BP: (118-125)/(56-60) 125/57  SpO2:  [93 %-94 %] 93 %  Body mass index is 20.89 kg/m². Input and Output Summary (last 24 hours): Intake/Output Summary (Last 24 hours) at 10/27/2023 1523  Last data filed at 10/26/2023 2059  Gross per 24 hour   Intake 480 ml   Output 400 ml   Net 80 ml       Physical Exam:   Physical Exam  Constitutional:       General: She is not in acute distress. Appearance: Normal appearance. She is normal weight. She is ill-appearing. HENT:      Head: Normocephalic and atraumatic. Right Ear: External ear normal.      Left Ear: External ear normal.      Nose: Nose normal.      Mouth/Throat:      Mouth: Mucous membranes are moist.      Pharynx: Oropharynx is clear. Eyes:      General: Scleral icterus present. Extraocular Movements: Extraocular movements intact. Conjunctiva/sclera: Conjunctivae normal.      Pupils: Pupils are equal, round, and reactive to light. Cardiovascular:      Rate and Rhythm: Normal rate and regular rhythm. Pulses: Normal pulses. Heart sounds: Normal heart sounds. Pulmonary:      Effort: Pulmonary effort is normal.      Breath sounds: Normal breath sounds. Abdominal:      General: Abdomen is flat. Bowel sounds are normal. There is distension. Palpations: Abdomen is soft. Tenderness: There is no abdominal tenderness. There is no guarding or rebound. Musculoskeletal:         General: Swelling present. Cervical back: Normal range of motion. Right lower leg: Edema present. Left lower leg: Edema present. Skin:     General: Skin is warm and dry. Capillary Refill: Capillary refill takes less than 2 seconds. Coloration: Skin is jaundiced. Neurological:      General: No focal deficit present.       Mental Status: She is alert and oriented to person, place, and time. Mental status is at baseline. Comments: Baseline confirmed with .    Psychiatric:         Mood and Affect: Mood normal.         Behavior: Behavior normal.         Additional Data:     Labs:  Results from last 7 days   Lab Units 10/27/23  0922 10/27/23  0528   WBC Thousand/uL  --  2.84*   HEMOGLOBIN g/dL 8.3* 7.1*   HEMATOCRIT % 24.0* 20.8*   PLATELETS Thousands/uL  --  74*   NEUTROS PCT %  --  53   LYMPHS PCT %  --  35   MONOS PCT %  --  8   EOS PCT %  --  3     Results from last 7 days   Lab Units 10/27/23  0528   SODIUM mmol/L 133*   POTASSIUM mmol/L 3.4*   CHLORIDE mmol/L 103   CO2 mmol/L 23   BUN mg/dL 11   CREATININE mg/dL 0.72   ANION GAP mmol/L 7   CALCIUM mg/dL 8.2*   ALBUMIN g/dL 2.5*   TOTAL BILIRUBIN mg/dL 6.50*   ALK PHOS U/L 75   ALT U/L 34   AST U/L 101*   GLUCOSE RANDOM mg/dL 92     Results from last 7 days   Lab Units 10/27/23  0528   INR  1.48*                   Lines/Drains:  Invasive Devices       Peripheral Intravenous Line  Duration             Peripheral IV 10/25/23 Proximal;Right;Ventral (anterior) Forearm 1 day              Drain  Duration             Urethral Catheter 16 Fr. 95 days                  Urinary Catheter:  Goal for removal:  N/A               Imaging: Reviewed radiology reports from this admission including: abdominal/pelvic CT and ultrasound(s)    Recent Cultures (last 7 days):   Results from last 7 days   Lab Units 10/26/23  1900 10/26/23  0052   URINE CULTURE   --  >100,000 cfu/ml Escherichia coli*   C DIFF TOXIN B BY PCR  Positive*  --        Last 24 Hours Medication List:   Current Facility-Administered Medications   Medication Dose Route Frequency Provider Last Rate    cholecalciferol  2,000 Units Oral BID Basanta Pramod, DO      enoxaparin  40 mg Subcutaneous Daily Basanta Pramod, DO      ferrous sulfate  325 mg Oral Daily With Breakfast Basanta Pramod, DO      folic acid  1 mg Oral Daily Tessie Snyder MD      furosemide  40 mg Intravenous Daily Gloria Rosenthal MD      losartan  100 mg Oral Daily Basanta Pramod, DO      magnesium gluconate  500 mg Oral BID AC Basanta Pramod, DO      metoprolol tartrate  100 mg Oral BID Basanta Pramod, DO      pancrelipase (Lip-Prot-Amyl)  24,000 Units Oral TID With Meals Basanta Pramod, DO      pantoprazole  40 mg Oral Early Morning Basanta Pramod, DO      sodium chloride  1 g Oral Daily Basanta Pramod, DO      thiamine  100 mg Oral Daily Basanta Pramod, DO          Today, Patient Was Seen By: Abigail Still MD    **Please Note: This note may have been constructed using a voice recognition system. **

## 2023-10-27 NOTE — ASSESSMENT & PLAN NOTE
Recent Labs     10/25/23  2207 10/27/23  0528 10/28/23  0535   MG 0.9* 1.7* 1.3*     Home dose: Magnesium gluconate 500 mg twice daily before meals. In the ED patient was given 2 g of magnesium sulfate. Magnesium downtrend from yesterday. Plan:  Magnesium 2g completed  Increase dose on magnesium gluconate. Follow up on magnesium levels.

## 2023-10-27 NOTE — ASSESSMENT & PLAN NOTE
POA with progressively worsening bilateral lower extremity swelling for 1.5 weeks. CT abdomen pelvis with contrast: Mild ascites, new pleural effusion (R>L), possible cystitis, nonspecific colitis. Echo 2018: EF 60% with mild diastolic dysfunction. Suspect new onset fluid overload secondary to liver disease and hypoalbuminemia vs. new onset heart failure. In the ED: Received 2 g of magnesium sulfate, 1L bolus lactated Ringer. Urine culture grew gram negative E. Coli, pt is assymptomatic  Labs: Sodium 133, , T. bili 6.5, direct bilirubin 3.36, albumin 2.5, INR 1.48, Mg 1.7, ammonia 46  Hepatitis panel negative  C. Diff PCR positive with negative toxin EIA. Most likely colonized with no active infection  Patient noted that she has been taking a medication not listed for rosacea  Doxycycline (hepatotoxic)  Last dose months ago.  has confirmed that she is mentally at baseline. Plan:   Continue patient on IV Lasix 20 mg  Continue home dose creon  Monitor BMP, magnesium; Goal Mg > 2 and K > 4; Replete prn  HOB > 30°, Daily standing weights, Measure I/O  GI consulted and opinions appreciated  Patient's MELD Score is: 22  From yesterday's 21  MELD Score 90-day mortality   ?9 1.9%   10-19 6.0%   20-29 19.6%   30-39 52.6%   ? 40 71.3%     MELD Score Component Values:  Component Value Date   Creatinine: 0.68 mg/dL 10/28/2023   Dialysis at least twice   in the past week  Or CVVHD for ? 24 hours   in the past week:     No    Bilirubin, total: 6.1 mg/dL 10/28/2023   INR: 1.55 10/28/2023   Sodium: 132 mmol/L 10/28/2023

## 2023-10-28 PROBLEM — R19.7 DIARRHEA: Status: RESOLVED | Noted: 2023-10-26 | Resolved: 2023-10-28

## 2023-10-28 LAB
ACTIN IGG SERPL-ACNC: 7 UNITS (ref 0–19)
ALBUMIN SERPL BCP-MCNC: 2.3 G/DL (ref 3.5–5)
ALP SERPL-CCNC: 69 U/L (ref 34–104)
ALT SERPL W P-5'-P-CCNC: 33 U/L (ref 7–52)
ANION GAP SERPL CALCULATED.3IONS-SCNC: 6 MMOL/L
AST SERPL W P-5'-P-CCNC: 99 U/L (ref 13–39)
BACTERIA UR CULT: ABNORMAL
BILIRUB SERPL-MCNC: 6.1 MG/DL (ref 0.2–1)
BUN SERPL-MCNC: 10 MG/DL (ref 5–25)
CALCIUM ALBUM COR SERPL-MCNC: 9.5 MG/DL (ref 8.3–10.1)
CALCIUM SERPL-MCNC: 8.1 MG/DL (ref 8.4–10.2)
CANCER AG19-9 SERPL-ACNC: <2 U/ML (ref 0–35)
CHLORIDE SERPL-SCNC: 103 MMOL/L (ref 96–108)
CO2 SERPL-SCNC: 23 MMOL/L (ref 21–32)
CREAT SERPL-MCNC: 0.68 MG/DL (ref 0.6–1.3)
GFR SERPL CREATININE-BSD FRML MDRD: 90 ML/MIN/1.73SQ M
GLUCOSE SERPL-MCNC: 99 MG/DL (ref 65–140)
INR PPP: 1.55 (ref 0.84–1.19)
MAGNESIUM SERPL-MCNC: 1.3 MG/DL (ref 1.9–2.7)
MITOCHONDRIA M2 IGG SER-ACNC: <20 UNITS (ref 0–20)
POTASSIUM SERPL-SCNC: 3.3 MMOL/L (ref 3.5–5.3)
PROT SERPL-MCNC: 5.4 G/DL (ref 6.4–8.4)
PROTHROMBIN TIME: 19.3 SECONDS (ref 11.6–14.5)
SODIUM SERPL-SCNC: 132 MMOL/L (ref 135–147)

## 2023-10-28 PROCEDURE — 80053 COMPREHEN METABOLIC PANEL: CPT | Performed by: PHYSICIAN ASSISTANT

## 2023-10-28 PROCEDURE — 99232 SBSQ HOSP IP/OBS MODERATE 35: CPT | Performed by: INTERNAL MEDICINE

## 2023-10-28 PROCEDURE — 83735 ASSAY OF MAGNESIUM: CPT

## 2023-10-28 PROCEDURE — 81256 HFE GENE: CPT | Performed by: PHYSICIAN ASSISTANT

## 2023-10-28 PROCEDURE — 85610 PROTHROMBIN TIME: CPT | Performed by: PHYSICIAN ASSISTANT

## 2023-10-28 RX ORDER — POTASSIUM CHLORIDE 20 MEQ/1
40 TABLET, EXTENDED RELEASE ORAL EVERY 6 HOURS
Status: COMPLETED | OUTPATIENT
Start: 2023-10-28 | End: 2023-10-28

## 2023-10-28 RX ORDER — MAGNESIUM SULFATE HEPTAHYDRATE 40 MG/ML
2 INJECTION, SOLUTION INTRAVENOUS ONCE
Status: COMPLETED | OUTPATIENT
Start: 2023-10-28 | End: 2023-10-29

## 2023-10-28 RX ORDER — MAGNESIUM SULFATE HEPTAHYDRATE 40 MG/ML
2 INJECTION, SOLUTION INTRAVENOUS ONCE
Status: COMPLETED | OUTPATIENT
Start: 2023-10-28 | End: 2023-10-28

## 2023-10-28 RX ADMIN — POTASSIUM CHLORIDE 40 MEQ: 1500 TABLET, EXTENDED RELEASE ORAL at 17:28

## 2023-10-28 RX ADMIN — FERROUS SULFATE TAB 325 MG (65 MG ELEMENTAL FE) 325 MG: 325 (65 FE) TAB at 08:57

## 2023-10-28 RX ADMIN — METOPROLOL TARTRATE 100 MG: 50 TABLET, FILM COATED ORAL at 08:57

## 2023-10-28 RX ADMIN — FUROSEMIDE 40 MG: 10 INJECTION, SOLUTION INTRAMUSCULAR; INTRAVENOUS at 08:57

## 2023-10-28 RX ADMIN — Medication 2000 UNITS: at 08:57

## 2023-10-28 RX ADMIN — MAGNESIUM SULFATE HEPTAHYDRATE 2 G: 40 INJECTION, SOLUTION INTRAVENOUS at 12:39

## 2023-10-28 RX ADMIN — SODIUM CHLORIDE 1 G: 1 TABLET ORAL at 08:57

## 2023-10-28 RX ADMIN — Medication 500 MG: at 17:32

## 2023-10-28 RX ADMIN — Medication 2000 UNITS: at 21:34

## 2023-10-28 RX ADMIN — LOSARTAN POTASSIUM 100 MG: 50 TABLET, FILM COATED ORAL at 08:57

## 2023-10-28 RX ADMIN — PANCRELIPASE 24000 UNITS: 24000; 76000; 120000 CAPSULE, DELAYED RELEASE PELLETS ORAL at 08:59

## 2023-10-28 RX ADMIN — PANTOPRAZOLE SODIUM 40 MG: 40 TABLET, DELAYED RELEASE ORAL at 05:00

## 2023-10-28 RX ADMIN — Medication 500 MG: at 05:00

## 2023-10-28 RX ADMIN — ENOXAPARIN SODIUM 40 MG: 40 INJECTION SUBCUTANEOUS at 08:57

## 2023-10-28 RX ADMIN — Medication 100 MG: at 08:57

## 2023-10-28 RX ADMIN — POTASSIUM CHLORIDE 40 MEQ: 1500 TABLET, EXTENDED RELEASE ORAL at 21:34

## 2023-10-28 RX ADMIN — FOLIC ACID 1 MG: 1 TABLET ORAL at 08:57

## 2023-10-28 RX ADMIN — PANCRELIPASE 24000 UNITS: 24000; 76000; 120000 CAPSULE, DELAYED RELEASE PELLETS ORAL at 12:40

## 2023-10-28 RX ADMIN — MAGNESIUM SULFATE HEPTAHYDRATE 2 G: 40 INJECTION, SOLUTION INTRAVENOUS at 08:59

## 2023-10-28 RX ADMIN — METOPROLOL TARTRATE 100 MG: 50 TABLET, FILM COATED ORAL at 17:28

## 2023-10-28 RX ADMIN — PANCRELIPASE 24000 UNITS: 24000; 76000; 120000 CAPSULE, DELAYED RELEASE PELLETS ORAL at 17:32

## 2023-10-28 NOTE — PROGRESS NOTES
[facility]  Progress Note  Name: Ana Reynolds  MRN: [id number]  Unit/Bed#: W -01 I Date of Admission: 10/25/2023   Date of Service: 10/28/2023 I Hospital Day: 2    Assessment/Plan   * Bilateral lower extremity edema  Assessment & Plan  POA with progressively worsening bilateral lower extremity swelling for 1.5 weeks. CT abdomen pelvis with contrast: Mild ascites, new pleural effusion (R>L), possible cystitis, nonspecific colitis. Echo 2018: EF 60% with mild diastolic dysfunction. Suspect new onset fluid overload secondary to liver disease and hypoalbuminemia vs. new onset heart failure. In the ED: Received 2 g of magnesium sulfate, 1L bolus lactated Ringer. Urine culture grew gram negative E. Coli, pt is assymptomatic  Labs: Sodium 133, , T. bili 6.5, direct bilirubin 3.36, albumin 2.5, INR 1.48, Mg 1.7, ammonia 46  Hepatitis panel negative  C. Diff PCR positive with negative toxin EIA. Most likely colonized with no active infection  Patient noted that she has been taking a medication not listed for rosacea  Doxycycline (hepatotoxic)  Last dose months ago.  has confirmed that she is mentally at baseline. Plan:   Continue patient on IV Lasix 20 mg  Continue home dose creon  Monitor BMP, magnesium; Goal Mg > 2 and K > 4; Replete prn  HOB > 30°, Daily standing weights, Measure I/O  GI consulted and opinions appreciated  Patient's MELD Score is: 22  From yesterday's 21  MELD Score 90-day mortality   ?9 1.9%   10-19 6.0%   20-29 19.6%   30-39 52.6%   ? 40 71.3%     MELD Score Component Values:  Component Value Date   Creatinine: 0.68 mg/dL 10/28/2023   Dialysis at least twice   in the past week  Or CVVHD for ? 24 hours   in the past week:     No    Bilirubin, total: 6.1 mg/dL 10/28/2023   INR: 1.55 10/28/2023   Sodium: 132 mmol/L 10/28/2023        Chronic pancreatitis ([address])  Assessment & Plan  CT reveled sequela of chronic pancreatitis.    RUQ US revealed similar findings of chronic pancreatitis. Plan:  Continue Creon. Diarrhea-resolved as of 10/28/2023  Assessment & Plan  POA with 10+ low-volume loose stools with increased urge to defecate. 1 episode of red blood stool yesterday. Denies any fever, chills, no leukocytosis. Etiology likely enterotoxic gastroenteritis versus invasive. Today, patient reports no longer having diarrhea but rather solid stools.  revealed that she has been taking tums daily for the past couple of weeks prior to admission. Plan:  Hold off on antidiarrheals for now. C. Diff pcr positive with toxin EIA negative  No active C. Diff infection. Urinary retention  Assessment & Plan  Straight cath at baseline bladder spasms. Denies any dysuria, urinary frequency/urgency, suprapubic tenderness. UA pyuria with occasional bacteria. Urine cultures in the past have grown E. coli and Klebsiella. Urine cultures inpatient grew E. coli  No urinary symptoms in the hospital    Plan:  Hold off on starting antibiotics for now as patient is asymptomatic. Anemia  Assessment & Plan  Recent Labs     10/26/23  0611 10/27/23  0528 10/27/23  0922   HGB 7.4* 7.1* 8.3*     7/24: Admitted for acute lower GI bleed due to diverticulosis. 7/24: Colonoscopy revealed extensive diverticula, internal hemorrhoids. EGD revealed abnormal esophagus, biopsy revealed inflammation but benign. 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 Plan:   Replete folate PO  Monitor CBC.     Pancytopenia [facility])  Assessment & Plan  Recent Labs     10/25/23  2207 10/26/23  0611 10/27/23  0528   PLT 71* 74* 74*   Chronic thrombocytopenia AND Pancytopenia, likely due to OCAMPO/possible cirrhosis, complicated by gastroenteritis, a/e/b wbc (3.02-2.64), hbg (8.0-7.4), platelet count (14-54), treated with lab monitoring, liver studies, assessing for infection/bruising/bleeding. Plan:  Continue to monitor CBC. Chronic hyponatremia  Assessment & Plan  Recent Labs     10/26/23  0611 10/27/23  0528 10/28/23  0535   SODIUM 133* 133* 132*     10/3: Nephrology decrease sodium chloride tablets to 1 g daily, increase losartan 100 mg daily. Usual serum sodium for her fluctuates 128-133. Plan:   Continue sodium 1g tablet, daily. Essential hypertension  Assessment & Plan  Home medication: Losartan 100 mg, daily, Metoprolol 100 mg twice daily. Plan:   Resume home medications: Losartan and Metoprolol. Hypomagnesemia  Assessment & Plan  Recent Labs     10/25/23  2207 10/27/23  0528 10/28/23  0535   MG 0.9* 1.7* 1.3*     Home dose: Magnesium gluconate 500 mg twice daily before meals. In the ED patient was given 2 g of magnesium sulfate. Magnesium downtrend from yesterday. Plan:  Magnesium 2g completed  Increase dose on magnesium gluconate. Follow up on magnesium levels. VTE Pharmacologic Prophylaxis: VTE Score: 3 Moderate Risk (Score 3-4) - Pharmacological DVT Prophylaxis Ordered: enoxaparin (Lovenox). Patient Centered Rounds: I performed bedside rounds with nursing staff today. Discussions with Specialists or Other Care Team Provider: Gastroenterology    Education and Discussions with Family / Patient: Updated  () via phone. Current Length of Stay: 2 day(s)  Current Patient Status: Inpatient   Discharge Plan: Anticipate discharge in 24-48 hrs to home. Code Status: Level 1 - Full Code    Subjective:   Patient was seen at bedside this morning. She is doing better overall. She was able to form bowel movements at least 3 separate times yesterday. Patient noted that there was no blood and was not watery. The patient continues to not have abdominal pain or tenderness. PO intake is good and consistent.  Patient has no urinary complaints given positive urine cultures. Patient denies any subjective fevers, chills, night sweats, nausea, vomiting, dysuria, diarrhea, abdominal pain, headaches    Objective:     Vitals:   Temp (24hrs), Av.5 °F (36.9 °C), Min:97.9 °F (36.6 °C), Max:99 °F (37.2 °C)    Temp:  [97.9 °F (36.6 °C)-99 °F (37.2 °C)] 97.9 °F (36.6 °C)  HR:  [67-74] 74  Resp:  [17-18] 17  BP: (118-125)/(58-61) 118/61  SpO2:  [94 %-98 %] 95 %  Body mass index is 20.96 kg/m². Input and Output Summary (last 24 hours): Intake/Output Summary (Last 24 hours) at 10/28/2023 1026  Last data filed at 10/28/2023 0736  Gross per 24 hour   Intake 120 ml   Output --   Net 120 ml       Physical Exam:   Physical Exam  Constitutional:       Appearance: Normal appearance. HENT:      Head: Normocephalic. Right Ear: External ear normal.      Left Ear: External ear normal.      Nose: Nose normal.      Mouth/Throat:      Mouth: Mucous membranes are moist.      Pharynx: Oropharynx is clear. Eyes:      General: Scleral icterus present. Extraocular Movements: Extraocular movements intact. Pupils: Pupils are equal, round, and reactive to light. Cardiovascular:      Rate and Rhythm: Normal rate and regular rhythm. Pulses: Normal pulses. Heart sounds: Normal heart sounds. Pulmonary:      Effort: Pulmonary effort is normal.      Breath sounds: Normal breath sounds. Abdominal:      General: Abdomen is flat. Bowel sounds are normal.      Palpations: Abdomen is soft. Tenderness: There is no right CVA tenderness, left CVA tenderness, guarding or rebound. Musculoskeletal:         General: Normal range of motion. Right lower leg: Edema present. Left lower leg: Edema present. Comments: RLE Edema > LLE Edema   Skin:     General: Skin is warm and dry. Capillary Refill: Capillary refill takes less than 2 seconds. Coloration: Skin is jaundiced. Neurological:      General: No focal deficit present.       Mental Status: She is alert and oriented to person, place, and time. Comments: Per , not completely at baseline. Psychiatric:         Mood and Affect: Mood normal.         Behavior: Behavior normal.         Thought Content:  Thought content normal.         Judgment: Judgment normal.          Additional Data:     Labs:  Results from last 7 days   Lab Units 10/27/23  0922 10/27/23  0528   WBC Thousand/uL  --  2.84*   HEMOGLOBIN g/dL 8.3* 7.1*   HEMATOCRIT % 24.0* 20.8*   PLATELETS Thousands/uL  --  74*   NEUTROS PCT %  --  53   LYMPHS PCT %  --  35   MONOS PCT %  --  8   EOS PCT %  --  3     Results from last 7 days   Lab Units 10/28/23  0535   SODIUM mmol/L 132*   POTASSIUM mmol/L 3.3*   CHLORIDE mmol/L 103   CO2 mmol/L 23   BUN mg/dL 10   CREATININE mg/dL 0.68   ANION GAP mmol/L 6   CALCIUM mg/dL 8.1*   ALBUMIN g/dL 2.3*   TOTAL BILIRUBIN mg/dL 6.10*   ALK PHOS U/L 69   ALT U/L 33   AST U/L 99*   GLUCOSE RANDOM mg/dL 99     Results from last 7 days   Lab Units 10/28/23  0535   INR  1.55*                   Lines/Drains:  Invasive Devices       Peripheral Intravenous Line  Duration             Peripheral IV 10/25/23 Proximal;Right;Ventral (anterior) Forearm 2 days              Drain  Duration             Urethral Catheter 16 Fr. 95 days                  Urinary Catheter:  Goal for removal:  N/A               Imaging: Reviewed radiology reports from this admission including: ultrasound(s)    Recent Cultures (last 7 days):   Results from last 7 days   Lab Units 10/26/23  1900 10/26/23  0052   URINE CULTURE   --  >100,000 cfu/ml Escherichia coli*   C DIFF TOXIN B BY PCR  Positive*  --        Last 24 Hours Medication List:   Current Facility-Administered Medications   Medication Dose Route Frequency Provider Last Rate    cholecalciferol  2,000 Units Oral BID Basanta Pramod, DO      enoxaparin  40 mg Subcutaneous Daily Basanta Pramod, DO      ferrous sulfate  325 mg Oral Daily With Breakfast Basanta Pramod, DO      folic acid  1 mg Oral Daily Gray Thompson MD      furosemide  40 mg Intravenous Daily So Toledo MD      losartan  100 mg Oral Daily Basanta Pramod, DO      magnesium gluconate  500 mg Oral BID AC Basanta Pramod, DO      magnesium sulfate  2 g Intravenous Once Gray Thompson MD 2 g (10/28/23 8950)    metoprolol tartrate  100 mg Oral BID Basanta Pramod, DO      pancrelipase (Lip-Prot-Amyl)  24,000 Units Oral TID With Meals Basanta Pramod, DO      pantoprazole  40 mg Oral Early Morning Basanta Pramod, DO      sodium chloride  1 g Oral Daily Basanta Pramod, DO      thiamine  100 mg Oral Daily Basanta Pramod, DO          Today, Patient Was Seen By: Gray Thompson MD    **Please Note: This note may have been constructed using a voice recognition system. **

## 2023-10-28 NOTE — PROGRESS NOTES
Progress Note - Erik Situ 76 y.o. female MRN: [id number]    Unit/Bed#: W -01 Encounter: [id number]      Assessment/Plan:    #1. Suspicion for liver cirrhosis with decompensation; noted with elevated liver enzymes including bilirubin, elevated AST to ALT ratio, folate deficiency, elevated ferritin level, macrocytosis, thrombocytopenia; all would be consistent with alcoholic hepatitis although patient vehemently denies alcohol intake; differential also includes drug-induced liver injury, or liver decompensation in the setting of acute infectious enterocolitis, she did present with acute diarrhea and CT findings suggestive of enterocolitis    -Continue to monitor liver enzymes, particularly including PT/INR and bilirubin    -Encourage patient to keep close outpatient follow-up with her primary gastroenterologist and hepatologist, she says she follows somebody at [facility]    -Encourage oral intake    -Advised patient about importance of absolute avoidance of alcohol    -May also continue diuretic therapy for edema, kidney function appears stable    -Pending trend of her liver enzymes moving forward can consider liver biopsy, this can be discussed at outpatient follow-up depending on her clinical course    Subjective:   Patient reports to be feeling well and denies any abdominal pain, nausea or vomiting, any fevers or chills, any shortness of breath, dizziness or lightheadedness. Objective:     Vitals: Blood pressure 118/61, pulse 74, temperature 97.9 °F (36.6 °C), resp. rate 17, height 5' 4" (1.626 m), weight 55.4 kg (122 lb 2.2 oz), SpO2 95 %. ,Body mass index is 20.96 kg/m².       Intake/Output Summary (Last 24 hours) at 10/28/2023 1212  Last data filed at 10/28/2023 0736  Gross per 24 hour   Intake 120 ml   Output --   Net 120 ml       Physical Exam:   General appearance: alert, jaundiced, chronically ill-appearing, appears stated age and cooperative  Lungs: clear to auscultation bilaterally, no labored breathing/accessory muscle use  Heart: regular rate and rhythm, S1, S2 normal, no murmur, click, rub or gallop  Abdomen: soft, non-tender; bowel sounds normal; no masses,  no organomegaly  Extremities: no edema    Invasive Devices       Peripheral Intravenous Line  Duration             Peripheral IV 10/25/23 Proximal;Right;Ventral (anterior) Forearm 2 days              Drain  Duration             Urethral Catheter 16 Fr. 95 days                    Lab, Imaging and other studies: I have personally reviewed pertinent reports. No results displayed because visit has over 200 results.           MELD 3.0: 24 at 10/28/2023  5:35 AM  MELD-Na: 22 at 10/28/2023  5:35 AM  Calculated from:  Serum Creatinine: 0.68 mg/dL (Using min of 1 mg/dL) at 10/28/2023  5:35 AM  Serum Sodium: 132 mmol/L at 10/28/2023  5:35 AM  Total Bilirubin: 6.10 mg/dL at 10/28/2023  5:35 AM  Serum Albumin: 2.3 g/dL at 10/28/2023  5:35 AM  INR(ratio): 1.55 at 10/28/2023  5:35 AM  Age at listing (hypothetical): 76 years  Sex: Female at 10/28/2023  5:35 AM

## 2023-10-29 VITALS
HEART RATE: 64 BPM | DIASTOLIC BLOOD PRESSURE: 62 MMHG | TEMPERATURE: 98.3 F | WEIGHT: 136.69 LBS | SYSTOLIC BLOOD PRESSURE: 127 MMHG | BODY MASS INDEX: 23.34 KG/M2 | OXYGEN SATURATION: 97 % | RESPIRATION RATE: 14 BRPM | HEIGHT: 64 IN

## 2023-10-29 PROBLEM — E80.6 HYPERBILIRUBINEMIA: Status: ACTIVE | Noted: 2023-10-29

## 2023-10-29 PROBLEM — R82.71 ASYMPTOMATIC BACTERIURIA: Status: ACTIVE | Noted: 2023-07-24

## 2023-10-29 LAB
ALBUMIN SERPL BCP-MCNC: 2.5 G/DL (ref 3.5–5)
ALP SERPL-CCNC: 74 U/L (ref 34–104)
ALT SERPL W P-5'-P-CCNC: 37 U/L (ref 7–52)
ANION GAP SERPL CALCULATED.3IONS-SCNC: 5 MMOL/L
AST SERPL W P-5'-P-CCNC: 104 U/L (ref 13–39)
BILIRUB SERPL-MCNC: 5.89 MG/DL (ref 0.2–1)
BUN SERPL-MCNC: 10 MG/DL (ref 5–25)
CALCIUM ALBUM COR SERPL-MCNC: 9.6 MG/DL (ref 8.3–10.1)
CALCIUM SERPL-MCNC: 8.4 MG/DL (ref 8.4–10.2)
CHLORIDE SERPL-SCNC: 100 MMOL/L (ref 96–108)
CO2 SERPL-SCNC: 25 MMOL/L (ref 21–32)
CREAT SERPL-MCNC: 0.68 MG/DL (ref 0.6–1.3)
ERYTHROCYTE [DISTWIDTH] IN BLOOD BY AUTOMATED COUNT: 16.6 % (ref 11.6–15.1)
GFR SERPL CREATININE-BSD FRML MDRD: 90 ML/MIN/1.73SQ M
GLUCOSE SERPL-MCNC: 86 MG/DL (ref 65–140)
HCT VFR BLD AUTO: 21.5 % (ref 34.8–46.1)
HGB BLD-MCNC: 7.2 G/DL (ref 11.5–15.4)
INR PPP: 1.53 (ref 0.84–1.19)
MAGNESIUM SERPL-MCNC: 1.1 MG/DL (ref 1.9–2.7)
MAGNESIUM SERPL-MCNC: 2 MG/DL (ref 1.9–2.7)
MCH RBC QN AUTO: 34 PG (ref 26.8–34.3)
MCHC RBC AUTO-ENTMCNC: 33.5 G/DL (ref 31.4–37.4)
MCV RBC AUTO: 101 FL (ref 82–98)
PLATELET # BLD AUTO: 94 THOUSANDS/UL (ref 149–390)
PMV BLD AUTO: 11 FL (ref 8.9–12.7)
POTASSIUM SERPL-SCNC: 4.1 MMOL/L (ref 3.5–5.3)
PROT SERPL-MCNC: 5.6 G/DL (ref 6.4–8.4)
PROTHROMBIN TIME: 19.2 SECONDS (ref 11.6–14.5)
RBC # BLD AUTO: 2.12 MILLION/UL (ref 3.81–5.12)
SODIUM SERPL-SCNC: 130 MMOL/L (ref 135–147)
VIBRIO STL CULT: NORMAL
WBC # BLD AUTO: 3.28 THOUSAND/UL (ref 4.31–10.16)

## 2023-10-29 PROCEDURE — 83735 ASSAY OF MAGNESIUM: CPT

## 2023-10-29 PROCEDURE — 85610 PROTHROMBIN TIME: CPT

## 2023-10-29 PROCEDURE — 99239 HOSP IP/OBS DSCHRG MGMT >30: CPT | Performed by: INTERNAL MEDICINE

## 2023-10-29 PROCEDURE — 85027 COMPLETE CBC AUTOMATED: CPT

## 2023-10-29 PROCEDURE — 83735 ASSAY OF MAGNESIUM: CPT | Performed by: INTERNAL MEDICINE

## 2023-10-29 PROCEDURE — 80053 COMPREHEN METABOLIC PANEL: CPT | Performed by: INTERNAL MEDICINE

## 2023-10-29 RX ORDER — SODIUM CHLORIDE 1 G/1
1 TABLET ORAL 2 TIMES DAILY WITH MEALS
Status: DISCONTINUED | OUTPATIENT
Start: 2023-10-29 | End: 2023-10-29 | Stop reason: HOSPADM

## 2023-10-29 RX ORDER — UREA 10 %
1000 LOTION (ML) TOPICAL 2 TIMES DAILY
Qty: 120 TABLET | Refills: 0 | Status: SHIPPED | OUTPATIENT
Start: 2023-10-29 | End: 2023-11-28

## 2023-10-29 RX ORDER — UREA 10 %
1000 LOTION (ML) TOPICAL
Status: DISCONTINUED | OUTPATIENT
Start: 2023-10-29 | End: 2023-10-29 | Stop reason: HOSPADM

## 2023-10-29 RX ORDER — FOLIC ACID 1 MG/1
1 TABLET ORAL DAILY
Qty: 30 TABLET | Refills: 0 | Status: SHIPPED | OUTPATIENT
Start: 2023-10-30 | End: 2023-11-29

## 2023-10-29 RX ORDER — MAGNESIUM SULFATE HEPTAHYDRATE 40 MG/ML
4 INJECTION, SOLUTION INTRAVENOUS ONCE
Status: COMPLETED | OUTPATIENT
Start: 2023-10-29 | End: 2023-10-29

## 2023-10-29 RX ADMIN — FUROSEMIDE 40 MG: 10 INJECTION, SOLUTION INTRAMUSCULAR; INTRAVENOUS at 08:06

## 2023-10-29 RX ADMIN — Medication 100 MG: at 08:05

## 2023-10-29 RX ADMIN — SODIUM CHLORIDE 1 G: 1 TABLET ORAL at 08:06

## 2023-10-29 RX ADMIN — ENOXAPARIN SODIUM 40 MG: 40 INJECTION SUBCUTANEOUS at 08:06

## 2023-10-29 RX ADMIN — MAGNESIUM SULFATE HEPTAHYDRATE 4 G: 40 INJECTION, SOLUTION INTRAVENOUS at 06:39

## 2023-10-29 RX ADMIN — LOSARTAN POTASSIUM 100 MG: 50 TABLET, FILM COATED ORAL at 08:05

## 2023-10-29 RX ADMIN — PANCRELIPASE 24000 UNITS: 24000; 76000; 120000 CAPSULE, DELAYED RELEASE PELLETS ORAL at 12:03

## 2023-10-29 RX ADMIN — METOPROLOL TARTRATE 100 MG: 50 TABLET, FILM COATED ORAL at 08:06

## 2023-10-29 RX ADMIN — PANCRELIPASE 24000 UNITS: 24000; 76000; 120000 CAPSULE, DELAYED RELEASE PELLETS ORAL at 08:08

## 2023-10-29 RX ADMIN — FERROUS SULFATE TAB 325 MG (65 MG ELEMENTAL FE) 325 MG: 325 (65 FE) TAB at 08:05

## 2023-10-29 RX ADMIN — PANTOPRAZOLE SODIUM 40 MG: 40 TABLET, DELAYED RELEASE ORAL at 05:08

## 2023-10-29 RX ADMIN — FOLIC ACID 1 MG: 1 TABLET ORAL at 08:05

## 2023-10-29 RX ADMIN — Medication 500 MG: at 05:08

## 2023-10-29 RX ADMIN — Medication 2000 UNITS: at 08:06

## 2023-10-29 NOTE — ASSESSMENT & PLAN NOTE
Blood Pressure: 127/62  Home medication: Losartan 100 mg, daily, Metoprolol 100 mg twice daily.     Plan:   Stable follow-up with primary care provider within 1 week

## 2023-10-29 NOTE — DISCHARGE SUMMARY
[address] [facility]  Discharge- [facility] 1955, 76 y.o. female MRN: [id number]  Unit/Bed#: W -01 Encounter: [id number]  Primary Care Provider: Christian Garsia MD   Date and time admitted to hospital: 10/25/2023  9:16 PM    * Ascites/anasarca  Assessment & Plan  POA with progressively worsening bilateral lower extremity swelling for 1.5 weeks. CT abdomen pelvis with contrast: Mild ascites, new pleural effusion (R>L), possible cystitis, nonspecific colitis. Echo 2018: EF 60% with mild diastolic dysfunction. Noted with hyperbilirubinemia, transaminases   Concerning for decompensated liver cirrhosis unclear etiology at this time doubt heart failure given unremarkable echocardiogram on this admission  MELD lab Labs  Acute hepatitis panel negative  Patient noted that she has been taking a medication not listed for rosacea  Doxycycline (hepatotoxic)  Last dose months ago. Plan:   Continue with p.o. Lasix 40 mg once a day  Follow-up with gastroenterology on the outpatient setting make an appointment  Follow-up with primary care provider within 1 week again appointment  Continue holding doxycycline follow-up with primary care provider and/or dermatologist for further management evaluation for rosacea      Hyperbilirubinemia  Assessment & Plan  Noted with hyperbilirubinemia upon admission associated transaminases  Unclear etiology at this time  CT abdominal pelvis did dilation of pancreatic duct with associated 3 mm calculus  GI to evaluate patient on this admission  Extensive evaluation with KEI/anti-smooth, antimitochondrial unremarkable acute hepatitis panel unremarkable as well  Unclear etiology at this time possibly secondary to restriction medication with doxycycline although this is very less likely at this time.   Hyperbilirubinemia stable trending down  Plan  Follow-up with gastroenterology on the outpatient setting for further management evaluation    Chronic pancreatitis [facility])  Assessment & Plan  CT reveled sequela of chronic pancreatitis. RUQ US revealed similar findings of chronic pancreatitis. Plan:  Continue Creon. Chronic hyponatremia  Assessment & Plan  Recent Labs     10/27/23  0528 10/28/23  0535 10/29/23  0545   SODIUM 133* 132* 130*     10/3: Nephrology decrease sodium chloride tablets to 1 g daily, increase losartan 100 mg daily. Usual serum sodium for her fluctuates 128-133. Plan:   Continue sodium 1g tablet, increased to twice daily    Pancytopenia [facility])  Assessment & Plan  >>ASSESSMENT AND PLAN FOR ANEMIA WRITTEN ON 10/28/2023 10:25 AM  [address], MD    Recent Labs     10/27/23  0528 10/27/23  0922 10/29/23  0545   HGB 7.1* 8.3* 7.2*     7/24: Admitted for acute lower GI bleed due to diverticulosis. 7/24: Colonoscopy revealed extensive diverticula, internal hemorrhoids. EGD revealed abnormal esophagus, biopsy revealed inflammation but benign. 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 Plan:   Continue folic acid 1 mg tablet daily  Follow-up with primary care provider within 1 week    Asymptomatic bacteriuria  Assessment & Plan  Straight cath at baseline bladder spasms. Denies any dysuria, urinary frequency/urgency, suprapubic tenderness. UA pyuria with occasional bacteria. Urine cultures in the past have grown E. coli and Klebsiella. Urine cultures inpatient grew E. coli  No urinary symptoms in the hospital    Plan:  Start off antibiotics likely in the setting of colonization    Hypomagnesemia  Assessment & Plan  Recent Labs     10/28/23  0535 10/29/23  0545 10/29/23  1420   MG 1.3* 1.1* 2.0     Home dose: Magnesium gluconate 500 mg twice daily before meals. In the ED patient was given 2 g of magnesium sulfate.     Magnesium downtrend from yesterday. Plan:  Increase dose on magnesium gluconate. 1000 mg twice daily  Repeat magnesium levels by Wednesday no later than Thursday      Essential hypertension  Assessment & Plan  Blood Pressure: 127/62  Home medication: Losartan 100 mg, daily, Metoprolol 100 mg twice daily. Plan:   Stable follow-up with primary care provider within 1 week            Medical Problems       Resolved Problems  Date Reviewed: 10/29/2023            Resolved    Diarrhea 10/28/2023     Resolved by  Tanvi Calvo        Discharging Resident: Kassi Maher MD  Discharging Attending: Winston Izquierdo DO  PCP: Dolly Logan MD  Admission Date:   Admission Orders (From admission, onward)       Ordered        10/26/23 0238  INPATIENT ADMISSION  Once                          Discharge Date: 10/29/23    Consultations During Hospital Stay:  Gastroenterology    Procedures Performed:   None    Significant Findings / Test Results:   US right upper quadrant with liver dopplers   Final Result by Kimberlee Chung MD (10/27 7153)      1. Pancreatic head ductal dilation to 8 mm with associated pancreatic head calculus measuring 0.6 cm which appears intraductal, in keeping with patient's known sequelae of chronic pancreatitis. 2. Marked hepatic steatosis. Normal liver Doppler. No biliary ductal dilation. 3. Small volume perihepatic ascites. Resident: Annalee Otto, the attending radiologist, have reviewed the images and agree with the final report above. Workstation performed: [id number]         CT abdomen pelvis with contrast   Final Result by Coby Herrera MD (10/26 0124)      1. Mild diffuse thickening of the colon which may be due to nonspecific colitis. Diverticulosis without focal inflammatory change to suggest diverticulitis. 2.  Gas is seen within the urinary bladder, correlate for recent instrumentation or urinalysis for cystitis. 3.  Hepatic steatosis. Sequela of chronic pancreatitis.  Mild ascites increased since prior exam.   4.  Stable 5.2 cm left ovarian cystic lesion. 5.  New small pleural effusions right greater than left. Workstation performed: [id number]         XR chest 2 views   Final Result by Jose Carlos Troncoso MD (10/26 7500)      Small bilateral pleural effusions. Workstation performed: [id number]         Anti-smooth muscle antibody IgG, antimitochondrial antibody, KEI screen titer sent pattern unremarkable  CA 19-9 unremarkable  Consider individual PCR positive with EIA negative  Iron panel ferritin noted 2500, TIBC 142, iron 87, UIBC less than 55  Vitamin B12 unremarkable, folate decreased to 4.2  Ethanol levels unremarkable  Urine analysis urine culture 100,000 colonies E. coli    Incidental Findings:   None      Test Results Pending at Discharge (will require follow up): Hemochromatosis mutation     Outpatient Tests Requested:  CMP  Magnesium    Complications: None    Reason for Admission: Ascites/lower extremity edema    Hospital Course:   Shan Birmingham is a 76 y.o. female patient who originally presented to the hospital on 10/25/2023 due to worsening lower extremity edema and diarrhea. Upon evaluation patient was noted with significant elevated hyperbilirubinemia/elevated LFTs on unclear etiology. Upon admission patient had a CAT scan which did showed ascites without associated pancreatic duct up to 8 mm dilation and a small amount of gas with 3 mm calculus in the pancreatic head as well as additional calcifications with a history of chronic prostatitis. Liver evidence of hepatic steatosis however no cirrhotic evidence noted. Oncology was consulted given concern of possible undiagnosed cirrhosis/decompensated liver disease as well as a potential hepatotoxicity to medications the patient was taken for rosacea. Extensive logic evaluation was performed however at this point has been noted unremarkable. Liver function panel/test has been stable downtrending. Regards for lower extremity edema and ascites patient was started on IV diuretics with stable improvement. In regards to diarrhea patient was not taking her home Creon for which this was started, had a C. difficile PCR for which was noted positive for PCR however negative for toxin for which was not a monitor off antibiotics. Vibrio stool studies were requested by gastroenterology team these were negative. Patient also had electrolyte derangement specifically potassium and magnesium which were repleted consistently. Today patient was deemed stable for discharge expected to follow-up on the outpatient setting with gastroenterology within 1 to 2 weeks for further management evaluation about hyperbilirubinemia/transaminases specifically possible etiology on the outpatient setting. Patient will follow-up with primary care provider within 1 week. Patient was asked to recheck a CMP and magnesium in the late in the Thursday was counseled to ask primary care provider to order this test.        Please see above list of diagnoses and related plan for additional information. Condition at Discharge: fair    Discharge Day Visit / Exam:   Subjective: No acute overnight events reported by nursing team..  Patient seen at bedside denies any complaint. Endorse improvement of lower extremity edema. Denies any recurrence of loose stools her stools has been as regular and formed. No fevers, chills, abdominal pain as well as no urinary symptoms. She has been tolerating diet without difficulties. Vitals: Blood Pressure: 127/62 (10/29/23 0811)  Pulse: 64 (10/29/23 0811)  Temperature: 98.3 °F (36.8 °C) (10/28/23 2137)  Temp Source: Oral (10/26/23 0300)  Respirations: 14 (10/28/23 2137)  Height: 5' 4" (162.6 cm) (10/26/23 1330)  Weight - Scale: 62 kg (136 lb 11 oz) (10/29/23 0544)  SpO2: 97 % (10/29/23 0811)  Exam:   Physical Exam  Vitals and nursing note reviewed.    Constitutional:       General: She is not in acute distress. Appearance: She is well-developed. She is not ill-appearing. HENT:      Head: Normocephalic and atraumatic. Right Ear: External ear normal.      Left Ear: External ear normal.      Nose: Nose normal.      Mouth/Throat:      Mouth: Mucous membranes are moist.   Eyes:      General: Scleral icterus present. Extraocular Movements: Extraocular movements intact. Conjunctiva/sclera: Conjunctivae normal.      Pupils: Pupils are equal, round, and reactive to light. Cardiovascular:      Rate and Rhythm: Normal rate and regular rhythm. Pulses: Normal pulses. Heart sounds: Normal heart sounds. No murmur heard. Pulmonary:      Effort: Pulmonary effort is normal. No respiratory distress. Breath sounds: Normal breath sounds. Abdominal:      General: Bowel sounds are normal. There is no distension. Palpations: Abdomen is soft. Tenderness: There is no abdominal tenderness. Musculoskeletal:         General: No swelling. Cervical back: Normal range of motion and neck supple. Right lower leg: Edema present. Left lower leg: Edema present. Comments: Bilateral lower extremity edema +1   Skin:     General: Skin is warm and dry. Capillary Refill: Capillary refill takes less than 2 seconds. Coloration: Skin is jaundiced. Neurological:      General: No focal deficit present. Mental Status: She is alert. Psychiatric:         Mood and Affect: Mood normal.          Discussion with Family: Updated  () via phone. Discharge instructions/Information to patient and family:   See after visit summary for information provided to patient and family. Provisions for Follow-Up Care:  See after visit summary for information related to follow-up care and any pertinent home health orders.        Disposition:   Home    Planned Readmission: None    Discharge Medications:  See after visit summary for reconciled discharge medications provided to patient and/or family.       **Please Note: This note may have been constructed using a voice recognition system**

## 2023-10-29 NOTE — NURSING NOTE
Patient d/c'd to home. IV removed and all d/c paperwork given and explained. All belongings taken with patient and spouse.

## 2023-10-29 NOTE — ASSESSMENT & PLAN NOTE
>>ASSESSMENT AND PLAN FOR ANEMIA WRITTEN ON 10/29/2023  5:49 AM BY Reba Gary MD    Recent Labs     10/26/23  3626 10/27/23  0528 10/27/23  0922   HGB 7.4* 7.1* 8.3*       7/24: Admitted for acute lower GI bleed due to diverticulosis. 7/24: Colonoscopy revealed extensive diverticula, internal hemorrhoids. EGD revealed abnormal esophagus, biopsy revealed inflammation but benign. 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 Plan:   Replete folate PO  Monitor CBC.

## 2023-10-29 NOTE — DISCHARGE INSTR - AVS FIRST PAGE
Dear Erik Corley,     It was our pleasure to care for you here at [facility]. It is our hope that we were always able to exceed the expected standards for your care during your stay. You were hospitalized due to hyperbilirubinemia/anasarca. You were cared for on the fourth floor by Kip Starks MD under the service of Chauncey Mckeon DO with the [facility] who covers for your primary care physician (PCP), Gabriela Palma MD, while you were hospitalized. If you have any questions or concerns related to this hospitalization, you may contact us at [phone]. For follow up as well as any medication refills, we recommend that you follow up with your primary care physician. A registered nurse will reach out to you by phone within a few days after your discharge to answer any additional questions that you may have after going home. However, at this time we provide for you here, the most important instructions / recommendations at discharge:     Notable Medication Adjustments -   Increase sodium chloride tablets 1 g twice a day  Continue with Creon pancreatic lipase 3 times a day with meals  Increase magnesium 1000 mg twice a day  Start Lasix 40 mg once a day on 10/30/2023  Stop Doxycyline    Testing Required after Discharge -   CMP and magnesium check no later than Thursday, November 2, 2023  ** Please contact your PCP to request testing orders for any of the testing recommended here **  Important follow up information -   Follow-up with primary care provider within 1 week  Follow-up with gastroenterology on the outpatient setting at [facility] make an appointment within 1 to 2 weeks.   Other Instructions -   None  Please review this entire after visit summary as additional general instructions including medication list, appointments, activity, diet, any pertinent wound care, and other additional recommendations from your care team that may be provided for you.       Sincerely,     Magali Espinoza MD

## 2023-10-29 NOTE — ASSESSMENT & PLAN NOTE
Recent Labs     10/27/23  0528 10/28/23  0535 10/29/23  0545   SODIUM 133* 132* 130*     10/3: Nephrology decrease sodium chloride tablets to 1 g daily, increase losartan 100 mg daily. Usual serum sodium for her fluctuates 128-133.      Plan:   Continue sodium 1g tablet, increased to twice daily

## 2023-10-29 NOTE — ASSESSMENT & PLAN NOTE
Noted with hyperbilirubinemia upon admission associated transaminases  Unclear etiology at this time  CT abdominal pelvis did dilation of pancreatic duct with associated 3 mm calculus  GI to evaluate patient on this admission  Extensive evaluation with KEI/anti-smooth, antimitochondrial unremarkable acute hepatitis panel unremarkable as well  Unclear etiology at this time possibly secondary to restriction medication with doxycycline although this is very less likely at this time.   Hyperbilirubinemia stable trending down  Plan  Follow-up with gastroenterology on the outpatient setting for further management evaluation

## 2023-10-29 NOTE — ASSESSMENT & PLAN NOTE
Straight cath at baseline bladder spasms. Denies any dysuria, urinary frequency/urgency, suprapubic tenderness. UA pyuria with occasional bacteria. Urine cultures in the past have grown E. coli and Klebsiella.     Urine cultures inpatient grew E. coli  No urinary symptoms in the hospital    Plan:  Start off antibiotics likely in the setting of colonization

## 2023-10-29 NOTE — ASSESSMENT & PLAN NOTE
Recent Labs     10/28/23  0535 10/29/23  0545 10/29/23  1420   MG 1.3* 1.1* 2.0     Home dose: Magnesium gluconate 500 mg twice daily before meals. In the ED patient was given 2 g of magnesium sulfate. Magnesium downtrend from yesterday. Plan:  Increase dose on magnesium gluconate.   1000 mg twice daily  Repeat magnesium levels by Wednesday no later than Thursday

## 2023-10-29 NOTE — ASSESSMENT & PLAN NOTE
>>ASSESSMENT AND PLAN FOR ANEMIA WRITTEN ON 10/28/2023 10:25 AM [facility] MD [place]    Recent Labs     10/27/23  0528 10/27/23  0922 10/29/23  0545   HGB 7.1* 8.3* 7.2*     7/24: Admitted for acute lower GI bleed due to diverticulosis. 7/24: Colonoscopy revealed extensive diverticula, internal hemorrhoids. EGD revealed abnormal esophagus, biopsy revealed inflammation but benign. 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     Plan:   Continue folic acid 1 mg tablet daily  Follow-up with primary care provider within 1 week

## 2023-10-29 NOTE — ASSESSMENT & PLAN NOTE
POA with progressively worsening bilateral lower extremity swelling for 1.5 weeks. CT abdomen pelvis with contrast: Mild ascites, new pleural effusion (R>L), possible cystitis, nonspecific colitis. Echo 2018: EF 60% with mild diastolic dysfunction. Noted with hyperbilirubinemia, transaminases   Concerning for decompensated liver cirrhosis unclear etiology at this time doubt heart failure given unremarkable echocardiogram on this admission  MELD lab Labs  Acute hepatitis panel negative  Patient noted that she has been taking a medication not listed for rosacea  Doxycycline (hepatotoxic)  Last dose months ago. Plan:   Continue with p.o.  Lasix 40 mg once a day  Follow-up with gastroenterology on the outpatient setting make an appointment  Follow-up with primary care provider within 1 week again appointment  Continue holding doxycycline follow-up with primary care provider and/or dermatologist for further management evaluation for rosacea

## 2023-10-30 RX ORDER — FUROSEMIDE 40 MG/1
40 TABLET ORAL DAILY
Qty: 30 TABLET | Refills: 0 | Status: SHIPPED | OUTPATIENT
Start: 2023-10-30

## 2023-10-30 RX ORDER — FUROSEMIDE 20 MG/1
40 TABLET ORAL DAILY
Qty: 60 TABLET | Refills: 0 | Status: SHIPPED | OUTPATIENT
Start: 2023-10-30 | End: 2023-10-30 | Stop reason: SDUPTHER

## 2023-11-03 LAB
HFE GENE MUT ANL BLD/T: NORMAL
Lab: NORMAL

## 2023-11-17 ENCOUNTER — APPOINTMENT (EMERGENCY)
Dept: ULTRASOUND IMAGING | Facility: HOSPITAL | Age: 68
DRG: 433 | End: 2023-11-17
Payer: MEDICARE

## 2023-11-17 ENCOUNTER — HOSPITAL ENCOUNTER (INPATIENT)
Facility: HOSPITAL | Age: 68
LOS: 4 days | Discharge: HOME | DRG: 433 | End: 2023-11-21
Attending: EMERGENCY MEDICINE | Admitting: INTERNAL MEDICINE
Payer: MEDICARE

## 2023-11-17 DIAGNOSIS — N17.9 AKI (ACUTE KIDNEY INJURY) (HCC): Primary | ICD-10-CM

## 2023-11-17 DIAGNOSIS — D64.9 ANEMIA: ICD-10-CM

## 2023-11-17 DIAGNOSIS — E87.1 HYPONATREMIA: ICD-10-CM

## 2023-11-17 DIAGNOSIS — E80.6 HYPERBILIRUBINEMIA: ICD-10-CM

## 2023-11-17 DIAGNOSIS — R17 JAUNDICE: ICD-10-CM

## 2023-11-17 DIAGNOSIS — I10 ESSENTIAL HYPERTENSION: Chronic | ICD-10-CM

## 2023-11-17 DIAGNOSIS — K86.1 CHRONIC PANCREATITIS, UNSPECIFIED PANCREATITIS TYPE (HCC): ICD-10-CM

## 2023-11-17 DIAGNOSIS — K74.69 DECOMPENSATED LIVER DISEASE (HCC): ICD-10-CM

## 2023-11-17 DIAGNOSIS — K86.1 OTHER CHRONIC PANCREATITIS (HCC): ICD-10-CM

## 2023-11-17 LAB
ALBUMIN SERPL BCP-MCNC: 2.8 G/DL (ref 3.5–5)
ALP SERPL-CCNC: 113 U/L (ref 34–104)
ALT SERPL W P-5'-P-CCNC: 55 U/L (ref 7–52)
ANION GAP SERPL CALCULATED.3IONS-SCNC: 12 MMOL/L
APTT PPP: 44 SECONDS (ref 23–37)
AST SERPL W P-5'-P-CCNC: 122 U/L (ref 13–39)
BASOPHILS # BLD AUTO: 0.03 THOUSANDS/ÂΜL (ref 0–0.1)
BASOPHILS NFR BLD AUTO: 0 % (ref 0–1)
BILIRUB DIRECT SERPL-MCNC: 5 MG/DL (ref 0–0.2)
BILIRUB SERPL-MCNC: 9.19 MG/DL (ref 0.2–1)
BUN SERPL-MCNC: 43 MG/DL (ref 5–25)
CALCIUM ALBUM COR SERPL-MCNC: 9.6 MG/DL (ref 8.3–10.1)
CALCIUM SERPL-MCNC: 8.6 MG/DL (ref 8.4–10.2)
CHLORIDE SERPL-SCNC: 98 MMOL/L (ref 96–108)
CO2 SERPL-SCNC: 20 MMOL/L (ref 21–32)
CREAT SERPL-MCNC: 1.92 MG/DL (ref 0.6–1.3)
EOSINOPHIL # BLD AUTO: 0.2 THOUSAND/ÂΜL (ref 0–0.61)
EOSINOPHIL NFR BLD AUTO: 3 % (ref 0–6)
ERYTHROCYTE [DISTWIDTH] IN BLOOD BY AUTOMATED COUNT: 15.7 % (ref 11.6–15.1)
GFR SERPL CREATININE-BSD FRML MDRD: 26 ML/MIN/1.73SQ M
GLUCOSE SERPL-MCNC: 139 MG/DL (ref 65–140)
HCT VFR BLD AUTO: 25.2 % (ref 34.8–46.1)
HGB BLD-MCNC: 8.5 G/DL (ref 11.5–15.4)
IMM GRANULOCYTES # BLD AUTO: 0.03 THOUSAND/UL (ref 0–0.2)
IMM GRANULOCYTES NFR BLD AUTO: 0 % (ref 0–2)
INR PPP: 1.69 (ref 0.84–1.19)
LIPASE SERPL-CCNC: 37 U/L (ref 11–82)
LYMPHOCYTES # BLD AUTO: 1.37 THOUSANDS/ÂΜL (ref 0.6–4.47)
LYMPHOCYTES NFR BLD AUTO: 18 % (ref 14–44)
MCH RBC QN AUTO: 35.7 PG (ref 26.8–34.3)
MCHC RBC AUTO-ENTMCNC: 33.7 G/DL (ref 31.4–37.4)
MCV RBC AUTO: 106 FL (ref 82–98)
MONOCYTES # BLD AUTO: 0.52 THOUSAND/ÂΜL (ref 0.17–1.22)
MONOCYTES NFR BLD AUTO: 7 % (ref 4–12)
NEUTROPHILS # BLD AUTO: 5.28 THOUSANDS/ÂΜL (ref 1.85–7.62)
NEUTS SEG NFR BLD AUTO: 72 % (ref 43–75)
NRBC BLD AUTO-RTO: 0 /100 WBCS
PLATELET # BLD AUTO: 128 THOUSANDS/UL (ref 149–390)
PMV BLD AUTO: 11.2 FL (ref 8.9–12.7)
POTASSIUM SERPL-SCNC: 3.2 MMOL/L (ref 3.5–5.3)
PROT SERPL-MCNC: 6.4 G/DL (ref 6.4–8.4)
PROTHROMBIN TIME: 20.6 SECONDS (ref 11.6–14.5)
RBC # BLD AUTO: 2.38 MILLION/UL (ref 3.81–5.12)
SODIUM SERPL-SCNC: 130 MMOL/L (ref 135–147)
WBC # BLD AUTO: 7.43 THOUSAND/UL (ref 4.31–10.16)

## 2023-11-17 PROCEDURE — 76700 US EXAM ABDOM COMPLETE: CPT

## 2023-11-17 PROCEDURE — 83690 ASSAY OF LIPASE: CPT

## 2023-11-17 PROCEDURE — 85025 COMPLETE CBC W/AUTO DIFF WBC: CPT

## 2023-11-17 PROCEDURE — 85730 THROMBOPLASTIN TIME PARTIAL: CPT

## 2023-11-17 PROCEDURE — 83735 ASSAY OF MAGNESIUM: CPT

## 2023-11-17 PROCEDURE — 85610 PROTHROMBIN TIME: CPT

## 2023-11-17 PROCEDURE — 36415 COLL VENOUS BLD VENIPUNCTURE: CPT

## 2023-11-17 PROCEDURE — 99285 EMERGENCY DEPT VISIT HI MDM: CPT

## 2023-11-17 PROCEDURE — 80053 COMPREHEN METABOLIC PANEL: CPT

## 2023-11-17 PROCEDURE — 82248 BILIRUBIN DIRECT: CPT

## 2023-11-17 RX ORDER — ACETAMINOPHEN 325 MG/1
650 TABLET ORAL EVERY 6 HOURS PRN
Status: DISCONTINUED | OUTPATIENT
Start: 2023-11-17 | End: 2023-11-21 | Stop reason: HOSPADM

## 2023-11-17 RX ORDER — SENNOSIDES 8.6 MG
1 TABLET ORAL 2 TIMES DAILY
Status: DISCONTINUED | OUTPATIENT
Start: 2023-11-18 | End: 2023-11-21 | Stop reason: HOSPADM

## 2023-11-17 RX ORDER — ENOXAPARIN SODIUM 100 MG/ML
40 INJECTION SUBCUTANEOUS DAILY
Status: DISCONTINUED | OUTPATIENT
Start: 2023-11-18 | End: 2023-11-18 | Stop reason: SDUPTHER

## 2023-11-17 RX ORDER — POTASSIUM CHLORIDE 20 MEQ/1
40 TABLET, EXTENDED RELEASE ORAL ONCE
Status: COMPLETED | OUTPATIENT
Start: 2023-11-17 | End: 2023-11-17

## 2023-11-17 RX ADMIN — POTASSIUM CHLORIDE 40 MEQ: 1500 TABLET, EXTENDED RELEASE ORAL at 20:08

## 2023-11-17 NOTE — LETTER
Thank you for allowing us to participate in the care of your patient, Gabbi Senior, who was hospitalized from [unfilled] through 11/21/2023 with the admitting diagnosis of hyperbilirubinemia and PRETTY. Her Lasix was stopped for a few days while her creatinine came down. She also underwent MRI and MRCP which showed stable chronic changes. She will be following up with Dr. Sofía Rivera in hepatology after discharge. Her Lasix was decreased to 20 mg daily. She will be taking Aldactone 50 mg and lactulose daily. If you have any additional questions or would like to discuss further, please feel free to contact me.     Genna Ford, [address] [place] [address] Internal Medicine, Hospitalist  [phone]

## 2023-11-17 NOTE — ED PROVIDER NOTES
History  Chief Complaint   Patient presents with    Weakness - Generalized     Pt arrives for increased weakness and jaundice color. States she has also been having constipation. Abnormal Lab     Was seen recently for decreased NA and K. General Rivera is a 76year old female with an extensive PMHx presenting to the ED for hyponatremia, hypokalemia on outpatient labs but also generalized weakness and worsening jaundice than baseline. Notes she has a lot of itching as well since the jaundice has worsened, is using a steroid cream. New medication in the beginning of the month, Lasix, from her admission due to anasarca. Now with PRETTY on outpatient labs. 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 Is also noticing constipation since she began taking new medications after being discharged, but notes she has struggled with constipation for a while now. Feels fatigued and a little dizzy when she moves her head. Chills that are "internal." Had an episode of diarrhea but isn't concerned about it, unsure exactly when that was, no blood with it. No fever or chest pain. Tolerating food and liquids but currently on fluid restriction to 40 oz per day. Feels brice lately and not eating as well, feels her stomach is growing as well. Will only take a few bites of food and feels stuffed. Prior to Admission Medications   Prescriptions Last Dose Informant Patient Reported? Taking? Calcium Carb-Cholecalciferol (CALCIUM + D3 PO) 11/17/2023  Yes Yes   Sig: Take 200 Units by mouth daily. Cholecalciferol (VITAMIN D3) 3000 UNITS TABS 11/17/2023  Yes Yes   Sig: Take 100 Units by mouth daily. Omega-3 Fatty Acids (FISH OIL) 1,000 mg   Yes No   Sig: Take 1,000 mg by mouth daily.    diclofenac sodium (VOLTAREN) 1 % Unknown  No No   Sig: APPLY TWO GRAMS TOPICALLY 4 (FOUR) TIMES A DAY   docusate sodium (COLACE) 100 mg capsule Unknown  No No   Sig: Take 1 capsule (100 mg total) by mouth 2 (two) times a day   ferrous sulfate 325 (65 Fe) mg tablet 11/17/2023  Yes Yes   Sig: Take 325 mg by mouth daily with breakfast   folic acid (FOLVITE) 1 mg tablet 11/17/2023  No Yes   Sig: Take 1 tablet (1 mg total) by mouth daily Do not start before October 30, 2023.   furosemide (Lasix) 40 mg tablet 11/17/2023  No Yes   Sig: Take 1 tablet (40 mg total) by mouth daily   lidocaine (LIDODERM) 5 %   No No   Sig: Apply 1 patch topically daily Remove & Discard patch within 12 hours or as directed by MD   losartan (COZAAR) 50 mg tablet 11/17/2023  Yes Yes   Sig: Take 100 mg by mouth daily   magnesium gluconate (MAGONATE) 500 mg tablet 11/17/2023  No Yes   Sig: Take 2 tablets (1,000 mg total) by mouth 2 (two) times a day   metoprolol tartrate (LOPRESSOR) 25 mg tablet 11/17/2023  Yes Yes   Sig: Take 100 mg by mouth 2 (two) times a day   pancrelipase, Lip-Prot-Amyl, (CREON) 24,000 units 11/17/2023  No Yes   Sig: Take 1 capsule (24,000 Units total) by mouth 3 (three) times a day with meals   pantoprazole (PROTONIX) 40 mg tablet   No No   Sig: Take 1 tablet (40 mg total) by mouth daily in the early morning Do not start before July 27, 2023. senna (SENOKOT) 8.6 mg Unknown  No No   Sig: Take 1 tablet (8.6 mg total) by mouth daily   sodium chloride 1 g tablet 11/17/2023  No Yes   Sig: Take 1 tablet (1 g total) by mouth 2 (two) times a day with meals   thiamine 100 MG tablet   Yes No   Sig: Take 100 mg by mouth daily.    traMADol (ULTRAM) 50 mg tablet Unknown  No No   Sig: Take 1 tablet (50 mg total) by mouth every 6 (six) hours as needed for moderate pain      Facility-Administered Medications: None       Past Medical History:   Diagnosis Date    Diarrhea 10/26/2023    Hypertension     Hyponatremia        Past Surgical History:   Procedure Laterality Date    EYE SURGERY      SD OPTX FEM SHFT FX W/INSJ IMED IMPLT W/WO SCREW Left 11/17/2018    Procedure: Intramedullary nail fixation left hip fracture; Surgeon: Junior Jones MD;  Location: [facility];  Service: Orthopedics       Family History   Problem Relation Age of Onset    Heart disease Mother     Heart disease Father      I have reviewed and agree with the history as documented. E-Cigarette/Vaping    E-Cigarette Use Never User      E-Cigarette/Vaping Substances     Social History     Tobacco Use    Smoking status: Former    Smokeless tobacco: Never   Vaping Use    Vaping Use: Never used   Substance Use Topics    Alcohol use: Not Currently     Alcohol/week: 1.0 standard drink of alcohol     Types: 1 Cans of beer per week     Comment: 2-3 times per week. Drinks heavier before    Drug use: No       Review of Systems   Constitutional:  Positive for appetite change, chills and fatigue. Negative for fever. Eyes:  Negative for photophobia and visual disturbance. Respiratory:  Negative for cough and shortness of breath. Cardiovascular:  Positive for leg swelling. Negative for chest pain and palpitations. Gastrointestinal:  Positive for abdominal pain, constipation and diarrhea. Negative for nausea and vomiting. Musculoskeletal:  Positive for gait problem and myalgias. Skin:  Positive for color change. Negative for rash. Neurological:  Positive for dizziness, weakness and light-headedness. Negative for headaches. Physical Exam  Physical Exam  Constitutional:       General: She is not in acute distress. Appearance: She is ill-appearing. She is not toxic-appearing or diaphoretic. HENT:      Head: Normocephalic and atraumatic. Right Ear: External ear normal.      Left Ear: External ear normal.      Nose: Nose normal.      Mouth/Throat:      Mouth: Mucous membranes are moist.      Pharynx: Oropharynx is clear. No oropharyngeal exudate or posterior oropharyngeal erythema. Eyes:      General: Scleral icterus present. Right eye: No discharge. Left eye: No discharge.       Extraocular Movements: Extraocular movements intact. Cardiovascular:      Rate and Rhythm: Normal rate and regular rhythm. Pulses: Normal pulses. Heart sounds: Normal heart sounds. Pulmonary:      Effort: Pulmonary effort is normal. No respiratory distress. Breath sounds: Normal breath sounds. Abdominal:      Palpations: Abdomen is soft. Tenderness: There is abdominal tenderness in the right upper quadrant. There is no guarding or rebound. Negative signs include Gomez's sign. Musculoskeletal:         General: Normal range of motion. Cervical back: Normal range of motion and neck supple. No rigidity or tenderness. Lymphadenopathy:      Cervical: No cervical adenopathy. Skin:     Capillary Refill: Capillary refill takes less than 2 seconds. Coloration: Skin is jaundiced. Neurological:      General: No focal deficit present. Mental Status: She is alert. Sensory: Sensation is intact. Motor: Motor function is intact.    Psychiatric:         Mood and Affect: Mood normal.         Behavior: Behavior normal.         Vital Signs  ED Triage Vitals   Temperature Pulse Respirations Blood Pressure SpO2   11/17/23 1652 11/17/23 1652 11/17/23 1652 11/17/23 1652 11/17/23 1652   97.7 °F (36.5 °C) 66 18 129/57 100 %      Temp Source Heart Rate Source Patient Position - Orthostatic VS BP Location FiO2 (%)   11/17/23 1652 11/17/23 1652 11/17/23 1652 11/17/23 1652 --   Oral Monitor Sitting Right arm       Pain Score       11/17/23 2146       No Pain           Vitals:    11/18/23 0802 11/18/23 0804 11/18/23 0808 11/18/23 0810   BP: 105/53 (!) 100/45 105/53 111/60   Pulse: 61 60 61 59   Patient Position - Orthostatic VS:             Visual Acuity      ED Medications  Medications   acetaminophen (TYLENOL) tablet 650 mg (has no administration in time range)   senna (SENOKOT) tablet 8.6 mg (8.6 mg Oral Given 11/18/23 0802)   multi-electrolyte (PLASMALYTE-A/ISOLYTE-S PH 7.4) IV solution (75 mL/hr Intravenous New Bag 11/18/23 0129)   calcium carbonate (OYSTER SHELL,OSCAL) 500 mg tablet 1 tablet (1 tablet Oral Given 11/18/23 0812)   cholecalciferol (VITAMIN D3) tablet 1,000 Units (1,000 Units Oral Given 11/18/23 0802)   ferrous sulfate tablet 325 mg (325 mg Oral Given 64/21/50 1540)   folic acid (FOLVITE) tablet 1 mg (1 mg Oral Given 11/18/23 0802)   losartan (COZAAR) tablet 100 mg (100 mg Oral Given 11/18/23 0802)   magnesium gluconate (MAGONATE) tablet 1,000 mg (1,000 mg Oral Given 11/18/23 0802)   metoprolol tartrate (LOPRESSOR) tablet 100 mg (100 mg Oral Not Given 11/18/23 0802)   pancrelipase (Lip-Prot-Amyl) (CREON) delayed release capsule 24,000 Units (24,000 Units Oral Given 11/18/23 1200)   pantoprazole (PROTONIX) EC tablet 40 mg (40 mg Oral Given 11/18/23 0451)   sodium chloride tablet 1 g (1 g Oral Given 11/18/23 0802)   thiamine tablet 100 mg (100 mg Oral Given 11/18/23 0802)   enoxaparin (LOVENOX) subcutaneous injection 30 mg (30 mg Subcutaneous Given 11/18/23 0802)   multi-electrolyte (PLASMALYTE-A/ISOLYTE-S PH 7.4) IV solution (has no administration in time range)   potassium chloride (K-DUR,KLOR-CON) CR tablet 40 mEq (40 mEq Oral Given 11/17/23 2008)   magnesium sulfate 4 g/100 mL IVPB (premix) 4 g (4 g Intravenous New Bag 11/18/23 0528)       Diagnostic Studies  Results Reviewed       Procedure Component Value Units Date/Time    Magnesium [[id number]]  (Abnormal) Collected: 11/17/23 1933    Lab Status: Final result Specimen: Blood from Arm, Right Updated: 11/18/23 0251     Magnesium 1.3 mg/dL     Narrative:      Specimen Icteric. Lipase [[id number]]  (Normal) Collected: 11/17/23 1933    Lab Status: Final result Specimen: Blood from Arm, Right Updated: 11/17/23 2001     Lipase 37 u/L     Narrative:      Specimen Icteric.     Bilirubin, direct [[id number]]  (Abnormal) Collected: 11/17/23 1933    Lab Status: Final result Specimen: Blood from Arm, Right Updated: 11/17/23 2001     Bilirubin, Direct 5.00 mg/dL     Narrative: Specimen Icteric. Comprehensive metabolic panel [[id number]]  (Abnormal) Collected: 11/17/23 1933    Lab Status: Final result Specimen: Blood from Arm, Right Updated: 11/17/23 2001     Sodium 130 mmol/L      Potassium 3.2 mmol/L      Chloride 98 mmol/L      CO2 20 mmol/L      ANION GAP 12 mmol/L      BUN 43 mg/dL      Creatinine 1.92 mg/dL      Glucose 139 mg/dL      Calcium 8.6 mg/dL      Corrected Calcium 9.6 mg/dL       U/L      ALT 55 U/L      Alkaline Phosphatase 113 U/L      Total Protein 6.4 g/dL      Albumin 2.8 g/dL      Total Bilirubin 9.19 mg/dL      eGFR 26 ml/min/1.73sq m     Narrative:      Specimen Icteric.   [facility] for Chronic Kidney Disease (CKD):     Stage 1 with normal or high GFR (GFR > 90 mL/min/1.73 square meters)    Stage 2 Mild CKD (GFR = 60-89 mL/min/1.73 square meters)    Stage 3A Moderate CKD (GFR = 45-59 mL/min/1.73 square meters)    Stage 3B Moderate CKD (GFR = 30-44 mL/min/1.73 square meters)    Stage 4 Severe CKD (GFR = 15-29 mL/min/1.73 square meters)    Stage 5 End Stage CKD (GFR <15 mL/min/1.73 square meters)  Note: GFR calculation is accurate only with a steady state creatinine    Protime-INR [[id number]]  (Abnormal) Collected: 11/17/23 1933    Lab Status: Final result Specimen: Blood from Arm, Right Updated: 11/17/23 1958     Protime 20.6 seconds      INR 1.69    APTT [[id number]]  (Abnormal) Collected: 11/17/23 1933    Lab Status: Final result Specimen: Blood from Arm, Right Updated: 11/17/23 1958     PTT 44 seconds     CBC and differential [[id number]]  (Abnormal) Collected: 11/17/23 1933    Lab Status: Final result Specimen: Blood from Arm, Right Updated: 11/17/23 1940     WBC 7.43 Thousand/uL      RBC 2.38 Million/uL      Hemoglobin 8.5 g/dL      Hematocrit 25.2 %       fL      MCH 35.7 pg      MCHC 33.7 g/dL      RDW 15.7 %      MPV 11.2 fL      Platelets 674 Thousands/uL      nRBC 0 /100 WBCs      Neutrophils Relative 72 % Immat GRANS % 0 %      Lymphocytes Relative 18 %      Monocytes Relative 7 %      Eosinophils Relative 3 %      Basophils Relative 0 %      Neutrophils Absolute 5.28 Thousands/µL      Immature Grans Absolute 0.03 Thousand/uL      Lymphocytes Absolute 1.37 Thousands/µL      Monocytes Absolute 0.52 Thousand/µL      Eosinophils Absolute 0.20 Thousand/µL      Basophils Absolute 0.03 Thousands/µL                    US abdomen complete   Final Result by Julissa Arevalo MD (11/17 2228)      Hepatic steatosis. The surface contour of the liver is somewhat nodular. Clinical and laboratory correlation regarding the possibility of cirrhosis is suggested. Sequelae of chronic pancreatitis. The pancreatic duct measures approximately 7 mm. There is a small amount of ascites. Workstation performed: [id number]         MRI inpatient order    (Results Pending)              Procedures  Procedures         ED Course  ED Course as of 11/18/23 1204   Fri Nov 17, 2023 2002 Hemoglobin(!): 8.5   2002 HCT(!): 25.2   2002 Sodium(!): 130   2002 Potassium(!): 3.2   2002 Creatinine(!): 1.92   2002 BUN(!): 43   2002 BILIRUBIN DIRECT(!): 5.00                                             Medical Decision Making  17-year-old female presenting to the ED for hyponatremia, hypokalemia on outpatient labs today. Patient states she also has associated jaundice that is worsening than her baseline and weakness. Patient is very jaundiced on exam.  CBC with anemia which is chronic per chart review, no leukocytosis. CMP with hyponatremia and hypokalemia, fluids held off at this time as she is chronically hyponatremic and due to patient's history of anasarca will not aggressively hydrate in the ED. BUN and Cr worsened from two weeks ago, meeting PRETTY criteria. GFR only 26 today. Lipase unremarkable, bilirubin direct elevated at 5.00 which is worsened from mid 3's two weeks ago.  Pending abdominal U/S to seek other causes of PRETTY but suspect it is due to the addition of Lasix this month. Also pending U/S to determine the cause of the jaundice, pt with chronic problems of stone in the pancreatic duct. Spoke with [facility] about admission, pt would like admission to determine the root cause of her symptoms. [facility] accepted for inpatient admission. Pt stable at time of admission, vital signs reviewed, questions answered. Problems Addressed:  PRETTY (acute kidney injury) ([address]): acute illness or injury  Anemia: chronic illness or injury  Hyperbilirubinemia: acute illness or injury  Jaundice: acute illness or injury    Amount and/or Complexity of Data Reviewed  External Data Reviewed: labs, radiology and notes. Details: Reviewed patient's admission notes, recent labs and admission labs, recent radiology and admission radiology. Labs: ordered. Decision-making details documented in ED Course. Radiology: ordered. Discussion of management or test interpretation with external provider(s): Discussed case with [facility] resident who accepted her for admission to the hospital.    Risk  Prescription drug management. Decision regarding hospitalization.              Disposition  Final diagnoses:   PRETTY (acute kidney injury) ([address])   Jaundice   Hyperbilirubinemia   Anemia     Time reflects when diagnosis was documented in both MDM as applicable and the Disposition within this note       Time User Action Codes Description Comment    11/17/2023  8:48 PM Samantha Farr Add [N17.9] PRETTY (acute kidney injury) ([address])     11/17/2023  8:48 PM Rollo Jeans Add [R17] Jaundice     11/17/2023  8:48 PM Samantha Lopez Add [E80.6] Hyperbilirubinemia     11/17/2023  8:48 PM Rollo Jeans Add [D64.9] Anemia     11/17/2023 11:26 PM [address] [place], [place] [address]Mode [K86.1] Chronic pancreatitis, unspecified pancreatitis type [facility])           ED Disposition       ED Disposition   Admit    Condition   Stable    Date/Time   Fri Nov 17, 2023  8:48 PM    Comment   Case was discussed with [facility] and the patient's admission status was agreed to be Admission Status: inpatient status to the service of Dr. Linette Latham . Follow-up Information    None         Current Discharge Medication List        CONTINUE these medications which have NOT CHANGED    Details   Calcium Carb-Cholecalciferol (CALCIUM + D3 PO) Take 200 Units by mouth daily. Cholecalciferol (VITAMIN D3) 3000 UNITS TABS Take 100 Units by mouth daily. ferrous sulfate 325 (65 Fe) mg tablet Take 325 mg by mouth daily with breakfast      folic acid (FOLVITE) 1 mg tablet Take 1 tablet (1 mg total) by mouth daily Do not start before October 30, 2023.   Qty: 30 tablet, Refills: 0    Associated Diagnoses: Pancytopenia ([address])      furosemide (Lasix) 40 mg tablet Take 1 tablet (40 mg total) by mouth daily  Qty: 30 tablet, Refills: 0    Associated Diagnoses: Bilateral leg edema; Bilateral lower extremity edema      losartan (COZAAR) 50 mg tablet Take 100 mg by mouth daily      magnesium gluconate (MAGONATE) 500 mg tablet Take 2 tablets (1,000 mg total) by mouth 2 (two) times a day  Qty: 120 tablet, Refills: 0    Associated Diagnoses: Hypomagnesemia      metoprolol tartrate (LOPRESSOR) 25 mg tablet Take 100 mg by mouth 2 (two) times a day      pancrelipase, Lip-Prot-Amyl, (CREON) 24,000 units Take 1 capsule (24,000 Units total) by mouth 3 (three) times a day with meals  Qty: 90 capsule, Refills: 0    Associated Diagnoses: Other chronic pancreatitis (HCC)      sodium chloride 1 g tablet Take 1 tablet (1 g total) by mouth 2 (two) times a day with meals  Refills: 0    Associated Diagnoses: Hyponatremia      diclofenac sodium (VOLTAREN) 1 % APPLY TWO GRAMS TOPICALLY 4 (FOUR) TIMES A DAY  Qty: 100 g, Refills: 4    Associated Diagnoses: S/P ORIF (open reduction internal fixation) fracture      docusate sodium (COLACE) 100 mg capsule Take 1 capsule (100 mg total) by mouth 2 (two) times a day  Refills: 0    Associated Diagnoses: Closed comminuted fracture of hip, left, initial encounter ([address])      lidocaine (LIDODERM) 5 % Apply 1 patch topically daily Remove & Discard patch within 12 hours or as directed by MD  Qty: 30 patch, Refills: 0    Associated Diagnoses: Closed comminuted fracture of hip, left, initial encounter ([facility])      Omega-3 Fatty Acids (FISH OIL) 1,000 mg Take 1,000 mg by mouth daily. pantoprazole (PROTONIX) 40 mg tablet Take 1 tablet (40 mg total) by mouth daily in the early morning Do not start before July 27, 2023. Qty: 90 tablet, Refills: 0    Associated Diagnoses: Bleeding per rectum      senna (SENOKOT) 8.6 mg Take 1 tablet (8.6 mg total) by mouth daily  Qty: 120 each, Refills: 0    Associated Diagnoses: Closed comminuted fracture of hip, left, initial encounter ([facility])      thiamine 100 MG tablet Take 100 mg by mouth daily. traMADol (ULTRAM) 50 mg tablet Take 1 tablet (50 mg total) by mouth every 6 (six) hours as needed for moderate pain  Qty: 30 tablet, Refills: 0    Associated Diagnoses: Closed comminuted fracture of hip, left, initial encounter ([address])             No discharge procedures on file.     PDMP Review       None            ED Provider  Electronically Signed by             Felice Trinh PA-C  11/18/23 3736

## 2023-11-18 ENCOUNTER — APPOINTMENT (INPATIENT)
Dept: RADIOLOGY | Facility: HOSPITAL | Age: 68
DRG: 433 | End: 2023-11-18
Payer: MEDICARE

## 2023-11-18 PROBLEM — E87.6 HYPOKALEMIA: Status: ACTIVE | Noted: 2023-11-18

## 2023-11-18 PROBLEM — R74.8 ABNORMAL TRANSAMINASES: Status: ACTIVE | Noted: 2023-11-18

## 2023-11-18 PROBLEM — N17.9 AKI (ACUTE KIDNEY INJURY) (HCC): Status: ACTIVE | Noted: 2023-11-18

## 2023-11-18 LAB
ALBUMIN SERPL BCP-MCNC: 2.3 G/DL (ref 3.5–5)
ALP SERPL-CCNC: 81 U/L (ref 34–104)
ALT SERPL W P-5'-P-CCNC: 43 U/L (ref 7–52)
ANION GAP SERPL CALCULATED.3IONS-SCNC: 8 MMOL/L
AST SERPL W P-5'-P-CCNC: 95 U/L (ref 13–39)
BACTERIA UR QL AUTO: ABNORMAL /HPF
BILIRUB DIRECT SERPL-MCNC: 4.01 MG/DL (ref 0–0.2)
BILIRUB SERPL-MCNC: 7.24 MG/DL (ref 0.2–1)
BILIRUB UR QL STRIP: NEGATIVE
BUN SERPL-MCNC: 42 MG/DL (ref 5–25)
CALCIUM SERPL-MCNC: 8 MG/DL (ref 8.4–10.2)
CHLORIDE SERPL-SCNC: 102 MMOL/L (ref 96–108)
CLARITY UR: ABNORMAL
CO2 SERPL-SCNC: 21 MMOL/L (ref 21–32)
COLOR UR: YELLOW
CREAT SERPL-MCNC: 1.57 MG/DL (ref 0.6–1.3)
ERYTHROCYTE [DISTWIDTH] IN BLOOD BY AUTOMATED COUNT: 15.6 % (ref 11.6–15.1)
GFR SERPL CREATININE-BSD FRML MDRD: 33 ML/MIN/1.73SQ M
GLUCOSE SERPL-MCNC: 85 MG/DL (ref 65–140)
GLUCOSE UR STRIP-MCNC: NEGATIVE MG/DL
HCT VFR BLD AUTO: 20.8 % (ref 34.8–46.1)
HGB BLD-MCNC: 7 G/DL (ref 11.5–15.4)
HGB UR QL STRIP.AUTO: NEGATIVE
INR PPP: 1.81 (ref 0.84–1.19)
KETONES UR STRIP-MCNC: NEGATIVE MG/DL
LEUKOCYTE ESTERASE UR QL STRIP: ABNORMAL
MAGNESIUM SERPL-MCNC: 1.3 MG/DL (ref 1.9–2.7)
MAGNESIUM SERPL-MCNC: 2.3 MG/DL (ref 1.9–2.7)
MCH RBC QN AUTO: 35.2 PG (ref 26.8–34.3)
MCHC RBC AUTO-ENTMCNC: 33.7 G/DL (ref 31.4–37.4)
MCV RBC AUTO: 105 FL (ref 82–98)
NITRITE UR QL STRIP: NEGATIVE
NON-SQ EPI CELLS URNS QL MICRO: ABNORMAL /HPF
PH UR STRIP.AUTO: 6 [PH]
PLATELET # BLD AUTO: 108 THOUSANDS/UL (ref 149–390)
PMV BLD AUTO: 11.7 FL (ref 8.9–12.7)
POTASSIUM SERPL-SCNC: 3.7 MMOL/L (ref 3.5–5.3)
PROT SERPL-MCNC: 5.2 G/DL (ref 6.4–8.4)
PROT UR STRIP-MCNC: NEGATIVE MG/DL
PROTHROMBIN TIME: 21.7 SECONDS (ref 11.6–14.5)
RBC # BLD AUTO: 1.99 MILLION/UL (ref 3.81–5.12)
RBC #/AREA URNS AUTO: ABNORMAL /HPF
SODIUM 24H UR-SCNC: 35 MOL/L
SODIUM SERPL-SCNC: 131 MMOL/L (ref 135–147)
SP GR UR STRIP.AUTO: 1.01 (ref 1–1.03)
UROBILINOGEN UR STRIP-ACNC: 3 MG/DL
WBC # BLD AUTO: 6.53 THOUSAND/UL (ref 4.31–10.16)
WBC #/AREA URNS AUTO: ABNORMAL /HPF
WBC CLUMPS # UR AUTO: PRESENT /UL

## 2023-11-18 PROCEDURE — 83735 ASSAY OF MAGNESIUM: CPT

## 2023-11-18 PROCEDURE — 81001 URINALYSIS AUTO W/SCOPE: CPT

## 2023-11-18 PROCEDURE — 80321 ALCOHOLS BIOMARKERS 1OR 2: CPT | Performed by: NURSE PRACTITIONER

## 2023-11-18 PROCEDURE — 99223 1ST HOSP IP/OBS HIGH 75: CPT | Performed by: INTERNAL MEDICINE

## 2023-11-18 PROCEDURE — 80076 HEPATIC FUNCTION PANEL: CPT

## 2023-11-18 PROCEDURE — 84300 ASSAY OF URINE SODIUM: CPT | Performed by: NURSE PRACTITIONER

## 2023-11-18 PROCEDURE — 71045 X-RAY EXAM CHEST 1 VIEW: CPT

## 2023-11-18 PROCEDURE — 87040 BLOOD CULTURE FOR BACTERIA: CPT | Performed by: NURSE PRACTITIONER

## 2023-11-18 PROCEDURE — 80048 BASIC METABOLIC PNL TOTAL CA: CPT

## 2023-11-18 PROCEDURE — 85610 PROTHROMBIN TIME: CPT

## 2023-11-18 PROCEDURE — 85027 COMPLETE CBC AUTOMATED: CPT

## 2023-11-18 RX ORDER — PANTOPRAZOLE SODIUM 40 MG/1
40 TABLET, DELAYED RELEASE ORAL
Status: DISCONTINUED | OUTPATIENT
Start: 2023-11-18 | End: 2023-11-21 | Stop reason: HOSPADM

## 2023-11-18 RX ORDER — LACTULOSE 10 G/15ML
20 SOLUTION ORAL DAILY
Status: DISCONTINUED | OUTPATIENT
Start: 2023-11-19 | End: 2023-11-21 | Stop reason: HOSPADM

## 2023-11-18 RX ORDER — ENOXAPARIN SODIUM 100 MG/ML
30 INJECTION SUBCUTANEOUS
Status: DISCONTINUED | OUTPATIENT
Start: 2023-11-18 | End: 2023-11-21 | Stop reason: HOSPADM

## 2023-11-18 RX ORDER — MAGNESIUM SULFATE HEPTAHYDRATE 40 MG/ML
4 INJECTION, SOLUTION INTRAVENOUS ONCE
Status: COMPLETED | OUTPATIENT
Start: 2023-11-18 | End: 2023-11-18

## 2023-11-18 RX ORDER — LANOLIN ALCOHOL/MO/W.PET/CERES
100 CREAM (GRAM) TOPICAL DAILY
Status: DISCONTINUED | OUTPATIENT
Start: 2023-11-18 | End: 2023-11-21 | Stop reason: HOSPADM

## 2023-11-18 RX ORDER — MELATONIN
1000 DAILY
Status: DISCONTINUED | OUTPATIENT
Start: 2023-11-18 | End: 2023-11-21 | Stop reason: HOSPADM

## 2023-11-18 RX ORDER — FERROUS SULFATE 325(65) MG
325 TABLET ORAL
Status: DISCONTINUED | OUTPATIENT
Start: 2023-11-18 | End: 2023-11-21 | Stop reason: HOSPADM

## 2023-11-18 RX ORDER — SENNOSIDES 8.6 MG
1 TABLET ORAL DAILY
Status: DISCONTINUED | OUTPATIENT
Start: 2023-11-18 | End: 2023-11-18 | Stop reason: SDUPTHER

## 2023-11-18 RX ORDER — UREA 10 %
1000 LOTION (ML) TOPICAL
Status: DISCONTINUED | OUTPATIENT
Start: 2023-11-18 | End: 2023-11-21 | Stop reason: HOSPADM

## 2023-11-18 RX ORDER — CALCIUM CARBONATE 500(1250)
1 TABLET ORAL
Status: DISCONTINUED | OUTPATIENT
Start: 2023-11-18 | End: 2023-11-21 | Stop reason: HOSPADM

## 2023-11-18 RX ORDER — SODIUM CHLORIDE, SODIUM GLUCONATE, SODIUM ACETATE, POTASSIUM CHLORIDE, MAGNESIUM CHLORIDE, SODIUM PHOSPHATE, DIBASIC, AND POTASSIUM PHOSPHATE .53; .5; .37; .037; .03; .012; .00082 G/100ML; G/100ML; G/100ML; G/100ML; G/100ML; G/100ML; G/100ML
50 INJECTION, SOLUTION INTRAVENOUS CONTINUOUS
Status: DISCONTINUED | OUTPATIENT
Start: 2023-11-18 | End: 2023-11-19

## 2023-11-18 RX ORDER — LOSARTAN POTASSIUM 50 MG/1
100 TABLET ORAL DAILY
Status: DISCONTINUED | OUTPATIENT
Start: 2023-11-18 | End: 2023-11-21 | Stop reason: HOSPADM

## 2023-11-18 RX ORDER — FOLIC ACID 1 MG/1
1 TABLET ORAL DAILY
Status: DISCONTINUED | OUTPATIENT
Start: 2023-11-18 | End: 2023-11-21 | Stop reason: HOSPADM

## 2023-11-18 RX ORDER — METOPROLOL TARTRATE 100 MG/1
100 TABLET ORAL 2 TIMES DAILY
Status: DISCONTINUED | OUTPATIENT
Start: 2023-11-18 | End: 2023-11-21 | Stop reason: HOSPADM

## 2023-11-18 RX ORDER — SODIUM CHLORIDE, SODIUM GLUCONATE, SODIUM ACETATE, POTASSIUM CHLORIDE, MAGNESIUM CHLORIDE, SODIUM PHOSPHATE, DIBASIC, AND POTASSIUM PHOSPHATE .53; .5; .37; .037; .03; .012; .00082 G/100ML; G/100ML; G/100ML; G/100ML; G/100ML; G/100ML; G/100ML
75 INJECTION, SOLUTION INTRAVENOUS CONTINUOUS
Status: DISPENSED | OUTPATIENT
Start: 2023-11-18 | End: 2023-11-18

## 2023-11-18 RX ORDER — SODIUM CHLORIDE 1 G/1
1 TABLET ORAL 2 TIMES DAILY WITH MEALS
Status: DISCONTINUED | OUTPATIENT
Start: 2023-11-18 | End: 2023-11-21 | Stop reason: HOSPADM

## 2023-11-18 RX ADMIN — ENOXAPARIN SODIUM 30 MG: 30 INJECTION SUBCUTANEOUS at 08:02

## 2023-11-18 RX ADMIN — SODIUM CHLORIDE, SODIUM GLUCONATE, SODIUM ACETATE, POTASSIUM CHLORIDE, MAGNESIUM CHLORIDE, SODIUM PHOSPHATE, DIBASIC, AND POTASSIUM PHOSPHATE 75 ML/HR: .53; .5; .37; .037; .03; .012; .00082 INJECTION, SOLUTION INTRAVENOUS at 01:29

## 2023-11-18 RX ADMIN — SODIUM CHLORIDE, SODIUM GLUCONATE, SODIUM ACETATE, POTASSIUM CHLORIDE, MAGNESIUM CHLORIDE, SODIUM PHOSPHATE, DIBASIC, AND POTASSIUM PHOSPHATE 50 ML/HR: .53; .5; .37; .037; .03; .012; .00082 INJECTION, SOLUTION INTRAVENOUS at 12:45

## 2023-11-18 RX ADMIN — PANTOPRAZOLE SODIUM 40 MG: 40 TABLET, DELAYED RELEASE ORAL at 04:51

## 2023-11-18 RX ADMIN — FERROUS SULFATE TAB 325 MG (65 MG ELEMENTAL FE) 325 MG: 325 (65 FE) TAB at 08:02

## 2023-11-18 RX ADMIN — Medication 1 TABLET: at 08:12

## 2023-11-18 RX ADMIN — Medication 1000 UNITS: at 08:02

## 2023-11-18 RX ADMIN — SENNOSIDES 8.6 MG: 8.6 TABLET, FILM COATED ORAL at 18:31

## 2023-11-18 RX ADMIN — PANCRELIPASE 24000 UNITS: 24000; 76000; 120000 CAPSULE, DELAYED RELEASE PELLETS ORAL at 12:00

## 2023-11-18 RX ADMIN — SENNOSIDES 8.6 MG: 8.6 TABLET, FILM COATED ORAL at 08:02

## 2023-11-18 RX ADMIN — FOLIC ACID 1 MG: 1 TABLET ORAL at 08:02

## 2023-11-18 RX ADMIN — LOSARTAN POTASSIUM 100 MG: 50 TABLET, FILM COATED ORAL at 08:02

## 2023-11-18 RX ADMIN — Medication 100 MG: at 08:02

## 2023-11-18 RX ADMIN — MAGNESIUM SULFATE HEPTAHYDRATE 4 G: 40 INJECTION, SOLUTION INTRAVENOUS at 05:28

## 2023-11-18 RX ADMIN — Medication 1000 MG: at 08:02

## 2023-11-18 RX ADMIN — SODIUM CHLORIDE 1 G: 1 TABLET ORAL at 18:31

## 2023-11-18 RX ADMIN — SODIUM CHLORIDE 1 G: 1 TABLET ORAL at 08:02

## 2023-11-18 NOTE — ASSESSMENT & PLAN NOTE
Lab Results   Component Value Date    MG 1.9 11/19/2023    MG 2.3 11/18/2023    MG 1.3 (L) 11/17/2023        History hypomagnesemia PTA on magnesium gluconate 1000 mg twice daily  Recheck magnesium  Replace magnesium as needed

## 2023-11-18 NOTE — CONSULTS
Consultation - [facility] ([facility]) [facility]  Caitlyn Mary 76 y.o. female MRN: [id number]  Unit/Bed#: W -55 Encounter: [id number]        Inpatient consult to gastroenterology  Consult performed by: AQUILES Daniels  Consult ordered by: Myra Iyer MD          Reason for Consult / Principal Problem:     Jaundice, chronic pancreatitis      ASSESSMENT AND PLAN:      76 y.o. 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 #1 Decompensated alcoholic liver disease with PRETTY and worsening jaundice  -MELD 3.0: 30, Maddrey's DF: 41.9  Pt with prior history of alcohol abuse now with imaging suggestive of cirrhosis with lobulated hepatic contour and labs significant for thrombocytopenia, elevated INR, and hypoalbuminemia suggestive of synthetic dysfunction of the liver. AST>ALT with Bilirubin 9.19 on admission up from prior admission when Bilirubin 5-6. BUN 43/Cr 1.92 previously normal. Lasix started last admission for fluid overload now discontinued and renal function trending down. She doesn't have any LE edema/ascites currently. Reports chronic epigastric pain and recent early satiety. She is forgetful, +asterixis on exam. Worsening jaundice likely secondary to decompensated liver disease. Patient adamantly denies alcohol use x5 years but labs suggestive of continued use.      -KEI, AMA, ASMA, hemorchomatosis mutation, acute hepatitis panel, ethanol level negative 10/26  -Obtain MRI abdomen w/ w/o contrast MRCP  -Check AFP, PETH level  -Evaluate for any underlying infection-blood cultures, UA, CXR ordered    -Continue to hold lasix  -Check urine sodium  -US abdomen with small ascites, not enough for paracentesis  -Consider renal consultation if renal function worsens    -Start Lactulose 20g daily for HE, monitor mental status & stool output. Titrate to 2-3 softly formed BM daily    -Last EGD 7/2023 showed no evidence of varices or pHTN gastropathy    -Maximize nutrition, supplements ordered TID    -Check MELD labs daily-CBC, CMP, PT/INR  -Avoid hepatotoxic medications/alcohol     2. Chronic pancreatitis  Prior history of alcohol/gallstones use which appeared to be the source of her pancreatitis. She reports no alcohol x5 years. 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 She reports taking Creon before meals. Reports epigastric pain and early satiety. Her CA 19-9 was <2 last admission. Lipase WNL on admission.     -Recommend continuing Creon prior to meals.  - Monitor abdominal exam.  -Previous history of pancreatic duct stent due to history of stricture. Will assess further with MRI/MRCP    Thank you for the consultation. Case discussed with Dr. Cindy Tillman  ______________________________________________________________________    HPI:  Ailyn Alva is a 76 y.o. 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 She was recently admitted 10/25 through 10/29 with lower extremity edema and diarrhea, found to have enterocolitis along with mild ascites and new pleural effusions R>L, hyponatremia, and elevated LFTs with jaundice. She was started on Lasix 40 mg daily and evaluated by GI and nephrology at that time. Elevated LFTs were suspected secondary to cirrhosis with decompensation +/- alcoholic hepatitis (pt denies use) vs drug-induced liver injury or decompensation due to infection.      Pt states she doesn't eat/drink much at home due to early satiety and also has epigastric discomfort. She is on Creon d/t chronic pancreatitis with diarrhea. She needed PD stent in the past due to stricture. Prior admission-  KEI, AMA, ASMA, hemorchomatosis mutation, acute hepatitis panel, ethanol level negative 10/26  C.diff colonization , Vibrio negative  CA 19-9 <2    Endoscopic history-  EGD/Colon 7/25/23 d/t GI bleed w/ bloody diarrhea  EGD: abnormal mucosa esophagus, otherwise normal. Dudoenum-chronic peptic duodenitis type changes, no villous blunting or intraepithelial lymphocytes. Stomach-mild to moderate chronic inactive gastritis, no H.pylori. EG junction-chronic inactive inflammation and reactive changes, negative for eosinophils and intestinal metaplasia  Colonoscopy: extensive diverticulosis or moderate severity containing stool and causing moderate luminal narrowing in the sigmoid colon, medium protruding hemorrhoids. Repeat colon in 3y            REVIEW OF SYSTEMS:    CONSTITUTIONAL: Denies any fever, chills, rigors, and weight loss. HEENT: No earache or tinnitus. Denies hearing loss or visual disturbances. CARDIOVASCULAR: No chest pain or palpitations. RESPIRATORY: Denies any cough, hemoptysis, shortness of breath or dyspnea on exertion. GASTROINTESTINAL: As noted in the History of Present Illness. GENITOURINARY: No problems with urination. Denies any hematuria or dysuria. NEUROLOGIC: No dizziness or vertigo, denies headaches. MUSCULOSKELETAL: Denies any muscle or joint pain. SKIN: Denies skin rashes or itching. ENDOCRINE: Denies excessive thirst. Denies intolerance to heat or cold. PSYCHOSOCIAL: Denies depression or anxiety. Denies any recent memory loss.        Historical Information   Past Medical History:   Diagnosis Date    Diarrhea 10/26/2023    Hypertension     Hyponatremia      Past Surgical History:   Procedure Laterality Date    EYE SURGERY      SC OPTX FEM SHFT FX W/INSJ IMED IMPLT W/WO SCREW Left 11/17/2018    Procedure: Intramedullary nail fixation left hip fracture;  Surgeon: Alan Monte MD;  Location: [facility];  Service: Orthopedics     Social History   Social History     Substance and Sexual Activity   Alcohol Use Not Currently    Alcohol/week: 1.0 standard drink of alcohol    Types: 1 Cans of beer per week    Comment: 2-3 times per week.  Drinks heavier before     Social History     Substance and Sexual Activity   Drug Use No     Social History     Tobacco Use   Smoking Status Former   Smokeless Tobacco Never     Family History   Problem Relation Age of Onset    Heart disease Mother     Heart disease Father        Meds/Allergies     Medications Prior to Admission   Medication    Calcium Carb-Cholecalciferol (CALCIUM + D3 PO)    Cholecalciferol (VITAMIN D3) 3000 UNITS TABS    ferrous sulfate 325 (65 Fe) mg tablet    folic acid (FOLVITE) 1 mg tablet    furosemide (Lasix) 40 mg tablet    losartan (COZAAR) 50 mg tablet    magnesium gluconate (MAGONATE) 500 mg tablet    metoprolol tartrate (LOPRESSOR) 25 mg tablet    pancrelipase, Lip-Prot-Amyl, (CREON) 24,000 units    sodium chloride 1 g tablet    diclofenac sodium (VOLTAREN) 1 %    docusate sodium (COLACE) 100 mg capsule    lidocaine (LIDODERM) 5 %    Omega-3 Fatty Acids (FISH OIL) 1,000 mg    pantoprazole (PROTONIX) 40 mg tablet    senna (SENOKOT) 8.6 mg    thiamine 100 MG tablet    traMADol (ULTRAM) 50 mg tablet     Current Facility-Administered Medications   Medication Dose Route Frequency    acetaminophen (TYLENOL) tablet 650 mg  650 mg Oral Q6H PRN    calcium carbonate (OYSTER SHELL,OSCAL) 500 mg tablet 1 tablet  1 tablet Oral Daily With Breakfast    cholecalciferol (VITAMIN D3) tablet 1,000 Units  1,000 Units Oral Daily    enoxaparin (LOVENOX) subcutaneous injection 30 mg  30 mg Subcutaneous Q24H HAJA    ferrous sulfate tablet 325 mg  325 mg Oral Daily With Breakfast    folic acid (FOLVITE) tablet 1 mg  1 mg Oral Daily    losartan (COZAAR) tablet 100 mg  100 mg Oral Daily    magnesium gluconate (MAGONATE) tablet 1,000 mg  1,000 mg Oral BID AC    magnesium sulfate 4 g/100 mL IVPB (premix) 4 g  4 g Intravenous Once    metoprolol tartrate (LOPRESSOR) tablet 100 mg  100 mg Oral BID    multi-electrolyte (PLASMALYTE-A/ISOLYTE-S PH 7.4) IV solution  75 mL/hr Intravenous Continuous    pancrelipase (Lip-Prot-Amyl) (CREON) delayed release capsule 24,000 Units  24,000 Units Oral TID With Meals    pantoprazole (PROTONIX) EC tablet 40 mg  40 mg Oral Early Morning    senna (SENOKOT) tablet 8.6 mg  1 tablet Oral BID    sodium chloride tablet 1 g  1 g Oral BID With Meals    thiamine tablet 100 mg  100 mg Oral Daily       Allergies   Allergen Reactions    Demerol [Meperidine] Other (See Comments)     hallucinations    Diphenhydramine Other (See Comments)     hallucinations    Prednisone Tremor    Sulfa Antibiotics Other (See Comments)     hallucinations    Percocet [Oxycodone-Acetaminophen] Palpitations           Objective     Blood pressure 111/60, pulse 59, temperature (!) 97.4 °F (36.3 °C), resp. rate 18, weight 59.1 kg (130 lb 6.4 oz), SpO2 99 %. Body mass index is 22.38 kg/m². Intake/Output Summary (Last 24 hours) at 11/18/2023 0857  Last data filed at 11/18/2023 0731  Gross per 24 hour   Intake --   Output 200 ml   Net -200 ml         PHYSICAL EXAM:      General Appearance:   Alert, cooperative, no distress; Forgetful +mild asterixis   HEENT:   Normocephalic, atraumatic, + scleral icterus. Neck:  Supple, symmetrical, trachea midline   Lungs:   Clear to auscultation bilaterally; no rales, rhonchi or wheezing; respirations unlabored    Heart[de-identified]   Regular rate and rhythm; no murmur, rub, or gallop.    Abdomen:   Soft, non-tender, non-distended; normal bowel sounds; no masses, no organomegaly    Genitalia:   Deferred    Rectal:   Deferred    Extremities:  No cyanosis, clubbing or edema    Pulses:  2+ and symmetric all extremities    Skin:  rashes, or lesions ; +jaundice   Lymph nodes:  No palpable cervical lymphadenopathy        Lab Results:   Admission on 11/17/2023   Component Date Value    WBC 11/17/2023 7.43     RBC 11/17/2023 2.38 (L)     Hemoglobin 11/17/2023 8.5 (L)     Hematocrit 11/17/2023 25.2 (L)     MCV 11/17/2023 106 (H)     MCH 11/17/2023 35.7 (H)     MCHC 11/17/2023 33.7     RDW 11/17/2023 15.7 (H)     MPV 11/17/2023 11.2     Platelets 46/11/5878 128 (L)     nRBC 11/17/2023 0     Neutrophils Relative 11/17/2023 72     Immat GRANS % 11/17/2023 0     Lymphocytes Relative 11/17/2023 18     Monocytes Relative 11/17/2023 7     Eosinophils Relative 11/17/2023 3     Basophils Relative 11/17/2023 0     Neutrophils Absolute 11/17/2023 5.28     Immature Grans Absolute 11/17/2023 0.03     Lymphocytes Absolute 11/17/2023 1.37     Monocytes Absolute 11/17/2023 0.52     Eosinophils Absolute 11/17/2023 0.20     Basophils Absolute 11/17/2023 0.03     Sodium 11/17/2023 130 (L)     Potassium 11/17/2023 3.2 (L)     Chloride 11/17/2023 98     CO2 11/17/2023 20 (L)     ANION GAP 11/17/2023 12     BUN 11/17/2023 43 (H)     Creatinine 11/17/2023 1.92 (H)     Glucose 11/17/2023 139     Calcium 11/17/2023 8.6     Corrected Calcium 11/17/2023 9.6     AST 11/17/2023 122 (H)     ALT 11/17/2023 55 (H)     Alkaline Phosphatase 11/17/2023 113 (H)     Total Protein 11/17/2023 6.4     Albumin 11/17/2023 2.8 (L)     Total Bilirubin 11/17/2023 9.19 (H)     eGFR 11/17/2023 26     Lipase 11/17/2023 37     Bilirubin, Direct 11/17/2023 5.00 (H)     Protime 11/17/2023 20.6 (H)     INR 11/17/2023 1.69 (H)     PTT 11/17/2023 44 (H)     Magnesium 11/17/2023 1.3 (L)     Color, UA 11/18/2023 Yellow     Clarity, UA 11/18/2023 Turbid     Specific Gravity, UA 11/18/2023 1.009     pH, UA 11/18/2023 6.0     Leukocytes, UA 11/18/2023 Large (A)     Nitrite, UA 11/18/2023 Negative     Protein, UA 11/18/2023 Negative     Glucose, UA 11/18/2023 Negative     Ketones, UA 11/18/2023 Negative Urobilinogen, UA 11/18/2023 3.0 (A)     Bilirubin, UA 11/18/2023 Negative     Occult Blood, UA 11/18/2023 Negative     RBC, UA 11/18/2023 1-2     WBC, UA 11/18/2023 Innumerable (A)     Epithelial Cells 11/18/2023 Occasional     Bacteria, UA 11/18/2023 Occasional     WBC Clumps 11/18/2023 Present     Total Bilirubin 11/18/2023 7.24 (H)     Bilirubin, Direct 11/18/2023 4.01 (H)     Alkaline Phosphatase 11/18/2023 81     AST 11/18/2023 95 (H)     ALT 11/18/2023 43     Total Protein 11/18/2023 5.2 (L)     Albumin 11/18/2023 2.3 (L)     Protime 11/18/2023 21.7 (H)     INR 11/18/2023 1.81 (H)     WBC 11/18/2023 6.53     RBC 11/18/2023 1.99 (L)     Hemoglobin 11/18/2023 7.0 (L)     Hematocrit 11/18/2023 20.8 (L)     MCV 11/18/2023 105 (H)     MCH 11/18/2023 35.2 (H)     MCHC 11/18/2023 33.7     RDW 11/18/2023 15.6 (H)     Platelets 65/47/4929 108 (L)     MPV 11/18/2023 11.7     Sodium 11/18/2023 131 (L)     Potassium 11/18/2023 3.7     Chloride 11/18/2023 102     CO2 11/18/2023 21     ANION GAP 11/18/2023 8     BUN 11/18/2023 42 (H)     Creatinine 11/18/2023 1.57 (H)     Glucose 11/18/2023 85     Calcium 11/18/2023 8.0 (L)     eGFR 11/18/2023 33        Imaging Studies: I have personally reviewed pertinent imaging studies.

## 2023-11-18 NOTE — PLAN OF CARE
Problem: PAIN - ADULT  Goal: Verbalizes/displays adequate comfort level or baseline comfort level  Description: Interventions:  - Encourage patient to monitor pain and request assistance  - Assess pain using appropriate pain scale  - Administer analgesics based on type and severity of pain and evaluate response  - Implement non-pharmacological measures as appropriate and evaluate response  - Consider cultural and social influences on pain and pain management  - Notify physician/advanced practitioner if interventions unsuccessful or patient reports new pain  Outcome: Progressing     Problem: INFECTION - ADULT  Goal: Absence or prevention of progression during hospitalization  Description: INTERVENTIONS:  - Assess and monitor for signs and symptoms of infection  - Monitor lab/diagnostic results  - Monitor all insertion sites, i.e. indwelling lines, tubes, and drains  - Monitor endotracheal if appropriate and nasal secretions for changes in amount and color  - [place] appropriate cooling/warming therapies per order  - Administer medications as ordered  - Instruct and encourage patient and family to use good hand hygiene technique  - Identify and instruct in appropriate isolation precautions for identified infection/condition  Outcome: Progressing     Problem: SAFETY ADULT  Goal: Patient will remain free of falls  Description: INTERVENTIONS:  - Educate patient/family on patient safety including physical limitations  - Instruct patient to call for assistance with activity   - Consult OT/PT to assist with strengthening/mobility   - Keep Call bell within reach  - Keep bed low and locked with side rails adjusted as appropriate  - Keep care items and personal belongings within reach  - Initiate and maintain comfort rounds  - Make Fall Risk Sign visible to staff  - Offer Toileting every 2 Hours, in advance of need  - Initiate/Maintain bed alarm  - Obtain necessary fall risk management equipment  - Apply yellow socks and bracelet for high fall risk patients  - Consider moving patient to room near nurses station  Outcome: Progressing     Problem: DISCHARGE PLANNING  Goal: Discharge to home or other facility with appropriate resources  Description: INTERVENTIONS:  - Identify barriers to discharge w/patient and caregiver  - Arrange for needed discharge resources and transportation as appropriate  - Identify discharge learning needs (meds, wound care, etc.)  - Arrange for interpretive services to assist at discharge as needed  - Refer to Case Management Department for coordinating discharge planning if the patient needs post-hospital services based on physician/advanced practitioner order or complex needs related to functional status, cognitive ability, or social support system  Outcome: Progressing     Problem: Knowledge Deficit  Goal: Patient/family/caregiver demonstrates understanding of disease process, treatment plan, medications, and discharge instructions  Description: Complete learning assessment and assess knowledge base.   Interventions:  - Provide teaching at level of understanding  - Provide teaching via preferred learning methods  Outcome: Progressing     Problem: METABOLIC, FLUID AND ELECTROLYTES - ADULT  Goal: Electrolytes maintained within normal limits  Description: INTERVENTIONS:  - Monitor labs and assess patient for signs and symptoms of electrolyte imbalances  - Administer electrolyte replacement as ordered  - Monitor response to electrolyte replacements, including repeat lab results as appropriate  - Instruct patient on fluid and nutrition as appropriate  Outcome: Progressing  Goal: Fluid balance maintained  Description: INTERVENTIONS:  - Monitor labs   - Monitor I/O and WT  - Instruct patient on fluid and nutrition as appropriate  - Assess for signs & symptoms of volume excess or deficit  Outcome: Progressing

## 2023-11-18 NOTE — ASSESSMENT & PLAN NOTE
Blood Pressure: 120/51  Continue PTA losartan 100 mg once a day, metoprolol tartrate 100 mg once a day  We will check orthostasis given patient was endorsing lightheadedness upon standing

## 2023-11-18 NOTE — ASSESSMENT & PLAN NOTE
POA noted with creatinine 1.90 baseline seems to be 0.7-0.9  Patient was discharged on last admission of Lasix 40 mg once a day in the setting of anasarca  Was seen by PCP on the outpatient setting with repeat blood work for which was recommended to present to the emergency department  Baseline the patient does follow fluid restriction approximately 1800 cc given chronic hyponatremia  Likely in the setting of diuretics with fluid restriction at baseline versus postrenal given patient at baseline has history of urinary retention for which does require self-catheterization.     Plan  Hold Lasix for now  Gentle hydration for 8 hours and liberalize fluids in the meantime  Monitor BMP daily  Urinary retention protocol  Avoid nephrotoxin

## 2023-11-18 NOTE — ASSESSMENT & PLAN NOTE
History of chronic hyponatremia baseline seems to be from 1 30-1 33  At baseline patient follows fluid restriction 1800 cc, sodium chloride 1 g twice daily  Currently at baseline  BMP a.m.

## 2023-11-18 NOTE — H&P
[address] [facility]  H&P  Name: rEik Corley 76 y.o. female I MRN: [id number]  Unit/Bed#: W -01 I Date of Admission: 11/17/2023   Date of Service: 11/18/2023 I Hospital Day: 1      Assessment/Plan   * Hyperbilirubinemia  Assessment & Plan  POA presented with worsening hyperbilirubinemia noted with follow-up bilirubin 9.19 with direct bilirubin of 5.0. Recent admission in October 2023 presented with hyperbilirubinemia/jaundice with decreased bilirubin levels without acute intervention unclear etiology at that time was thought to be likely secondary to medication induced? Patient does have history of alcohol abuse. Approximately 5 years ago, chronic pancreatitis s  10/28 CT abdominal pelvis on last admission noted with pancreatic duct dilation 8 mm with associated with 3 mm calculus noted in pancreatic head. Last admission extensive evaluation with KEI/anti-smooth, antimitochondrial/acute hepatitis panel, Hemochromatosis mutation unremarkbable, acute viral panel negative,   11/17 RUQ US hepatic steatosis, pancreatinine duct dilation 7 mm  Hyperbilirubinemia unclear etiology at this time possibly related to pancreatic duct dilation although no hepatobiliary duct involvement on CT scan on prior admission, unlikely alcohol-induced given patient has been off alcohol use over the past 5 years versus acute liver injury really unclear etiology at this time given no new medications, no recent illnesses.     Plan  Follow-up hepatic function panel  Gastroenterology consulted for possible MRCP/ERCP patient was not able to secure an appointment upon discharge on last admission    Abnormal transaminases  Assessment & Plan  Likely in the setting of decompensated liver cirrhosis of unclear etiology possibly alcohol induced at baseline unclear etiology decompensation at this time  Monitor hepatic function test  MELD lab 30 POA  Viral Panel negative on last admission, extensive workup thus far unremarkable  GI Consulted appreciate recommendations     PRETTY (acute kidney injury) ([address])  Assessment & Plan  POA noted with creatinine 1.90 baseline seems to be 0.7-0.9  Patient was discharged on last admission of Lasix 40 mg once a day in the setting of anasarca  Was seen by PCP on the outpatient setting with repeat blood work for which was recommended to present to the emergency department  Baseline the patient does follow fluid restriction approximately 1800 cc given chronic hyponatremia  Likely in the setting of diuretics with fluid restriction at baseline versus postrenal given patient at baseline has history of urinary retention for which does require self-catheterization. Plan  Hold Lasix for now  Gentle hydration for 8 hours and liberalize fluids in the meantime  Monitor BMP daily  Urinary retention protocol  Avoid nephrotoxin    Chronic pancreatitis [facility])  Assessment & Plan  History of chronic pancreatitis  Continue home Creon  GI consulted appreciate recommendations    Chronic hyponatremia  Assessment & Plan  History of chronic hyponatremia baseline seems to be from 1 30-1 33  At baseline patient follows fluid restriction 1800 cc, sodium chloride 1 g twice daily  Currently at baseline  BMP a.m. Pancytopenia ([address])  Assessment & Plan  History of pancytopenia in the setting of liver cirrhosis? With associated microcytic anemia due to folate deficiency  POA platelets noted 723, hemoglobin 8.5   Cbc with plts    Essential hypertension  Assessment & Plan  Blood Pressure: 120/51  Continue PTA losartan 100 mg once a day, metoprolol tartrate 100 mg once a day  We will check orthostasis given patient was endorsing lightheadedness upon standing           VTE Pharmacologic Prophylaxis: VTE Score: 2 Moderate Risk (Score 3-4) - Pharmacological DVT Prophylaxis Ordered: enoxaparin (Lovenox). Code Status: Level 1 - Full Code Patient  Discussion with family: Patient declined call to .      Anticipated Length of Stay: Patient will be admitted on an inpatient basis with an anticipated length of stay of greater than 2 midnights secondary to decompensated liver cirrhosis. Chief Complaint: Generalized weakness/ abnormal outpatient blood work    History of Present Illness:  Leana Diaz is a 76 y.o. female with a PMH of alcohol abuse, abstinent over the past 5 years, liver cirrhosis, chronic pancreatitis, hypertension, anemia, chronic hyponatremia secondary to SIADH,  who presents after outpatient blood work. Patient recently seen by PCP got outpatient blood work which noted worsening hyperbilirubinemia, elevated creatinine for which was recommended to present to emergency department for further management evaluation. Patient was recently discharged from [facility] on 10/29 for which during that admission was admitted due to ascites/anasarca, hyperbilirubinemia extensive work-up by GI without etiology for elevated transaminases and hyperbilirubinemia although during that admission patient had acute diarrhea and was thought to be on doxycycline for rosacea for which possibility of medication induced acute liver injury was not ruled out. At the ED noted noted with hypomagnesemia 1.4, BUN and creatinine 43/1.90, /ALT 55,     Upon my evaluation patient endorsed has been having generalized weakness, fatigue. Has been compliant with medications since discharge. Has been noted that her Jell-O skins has worsened without associated itchiness. Has not had any recent traveling unfortunately he was not able to follow-up with gastroenterology on the outpatient setting given unable to secure an appointment until December 2023. Patient denies any fevers, chills, chest pain, endorses abdominal pain although stable from baseline mostly on the epigastric region 3/10 not associated with nausea, vomiting or diarrhea patient has not had any changes in his stools such as acolia.   At baseline patient does have history of urinary retention for which he performs self-catheterization however it has not been doing so over the past 4 days. Endorsed that with her diuretics has been urinating and responded well. Lower extremity edema has responded since last admission. Patient will be admitted to [facility] for further management evaluation    Review of Systems:  Review of Systems   Constitutional:  Positive for fatigue. Negative for chills, fever and unexpected weight change. HENT:  Negative for ear pain and sore throat. Eyes:  Negative for pain and visual disturbance. Respiratory:  Negative for cough and shortness of breath. Cardiovascular:  Negative for chest pain and palpitations. Gastrointestinal:  Negative for abdominal distention, abdominal pain, constipation, diarrhea, nausea and vomiting. Genitourinary:  Negative for difficulty urinating, dyspareunia, dysuria, flank pain, hematuria and urgency. Musculoskeletal:  Negative for arthralgias and back pain. Skin:  Positive for color change. Negative for rash. Neurological:  Positive for light-headedness. Negative for dizziness, seizures and syncope. All other systems reviewed and are negative. Past Medical and Surgical History:   Past Medical History:   Diagnosis Date    Diarrhea 10/26/2023    Hypertension     Hyponatremia        Past Surgical History:   Procedure Laterality Date    EYE SURGERY      MD OPTX FEM SHFT FX W/INSJ IMED IMPLT W/WO SCREW Left 11/17/2018    Procedure: Intramedullary nail fixation left hip fracture;  Surgeon: Brynn Monique MD;  Location: [facility];  Service: Orthopedics       Meds/Allergies:  Prior to Admission medications    Medication Sig Start Date End Date Taking? Authorizing Provider   Calcium Carb-Cholecalciferol (CALCIUM + D3 PO) Take 200 Units by mouth daily. Yes Historical Provider, MD   Cholecalciferol (VITAMIN D3) 3000 UNITS TABS Take 100 Units by mouth daily.    Yes Historical Provider, MD ferrous sulfate 325 (65 Fe) mg tablet Take 325 mg by mouth daily with breakfast   Yes Historical Provider, MD   folic acid (FOLVITE) 1 mg tablet Take 1 tablet (1 mg total) by mouth daily Do not start before October 30, 2023. 10/30/23 11/29/23 Yes Tyler Loco MD   furosemide (Lasix) 40 mg tablet Take 1 tablet (40 mg total) by mouth daily 10/30/23  Yes Sandra Bass DO   losartan (COZAAR) 50 mg tablet Take 100 mg by mouth daily   Yes Historical Provider, MD   magnesium gluconate (MAGONATE) 500 mg tablet Take 2 tablets (1,000 mg total) by mouth 2 (two) times a day 10/29/23 11/28/23 Yes Tyler Loco MD   metoprolol tartrate (LOPRESSOR) 25 mg tablet Take 100 mg by mouth 2 (two) times a day   Yes Historical Provider, MD   pancrelipase, Lip-Prot-Amyl, (CREON) 24,000 units Take 1 capsule (24,000 Units total) by mouth 3 (three) times a day with meals 10/29/23 11/28/23 Yes Tyler Loco MD   sodium chloride 1 g tablet Take 1 tablet (1 g total) by mouth 2 (two) times a day with meals 11/21/18  Yes Alida Mei MD   diclofenac sodium (VOLTAREN) 1 % APPLY TWO GRAMS TOPICALLY 4 (FOUR) TIMES A DAY 8/5/20   Diogo Michelle MD   docusate sodium (COLACE) 100 mg capsule Take 1 capsule (100 mg total) by mouth 2 (two) times a day 11/21/18   Alida Mei MD   lidocaine (LIDODERM) 5 % Apply 1 patch topically daily Remove & Discard patch within 12 hours or as directed by MD 11/22/18   Alida Mei MD   Omega-3 Fatty Acids (FISH OIL) 1,000 mg Take 1,000 mg by mouth daily. Historical Provider, MD   pantoprazole (PROTONIX) 40 mg tablet Take 1 tablet (40 mg total) by mouth daily in the early morning Do not start before July 27, 2023. 7/27/23 10/26/23  Ria Regalado PA-C   senna (SENOKOT) 8.6 mg Take 1 tablet (8.6 mg total) by mouth daily 11/22/18   Alida Mei MD   thiamine 100 MG tablet Take 100 mg by mouth daily.     Historical Provider, MD traMADol (ULTRAM) 50 mg tablet Take 1 tablet (50 mg total) by mouth every 6 (six) hours as needed for moderate pain 11/21/18   Christianne Banks MD     I have reviewed home medications with patient personally. Allergies: Allergies   Allergen Reactions    Demerol [Meperidine] Other (See Comments)     hallucinations    Diphenhydramine Other (See Comments)     hallucinations    Prednisone Tremor    Sulfa Antibiotics Other (See Comments)     hallucinations    Percocet [Oxycodone-Acetaminophen] Palpitations       Social History:  Marital Status: /Civil Union   Occupation: Retired  Patient Pre-hospital Living Situation: With spouse  Patient Pre-hospital Level of Mobility: walks with cane  Patient Pre-hospital Diet Restrictions: Fluid restriction 1800 cc  Substance Use History:   Social History     Substance and Sexual Activity   Alcohol Use Not Currently    Alcohol/week: 1.0 standard drink of alcohol    Types: 1 Cans of beer per week    Comment: 2-3 times per week. Drinks heavier before     Social History     Tobacco Use   Smoking Status Former   Smokeless Tobacco Never     Social History     Substance and Sexual Activity   Drug Use No       Family History:  Family History   Problem Relation Age of Onset    Heart disease Mother     Heart disease Father        Physical Exam:     Vitals:   Blood Pressure: 120/51 (11/17/23 2153)  Pulse: 62 (11/17/23 2153)  Temperature: 97.6 °F (36.4 °C) (11/17/23 2153)  Temp Source: Oral (11/17/23 2153)  Respirations: 18 (11/17/23 2153)  Weight - Scale: 59.1 kg (130 lb 6.4 oz) (11/17/23 2329)  SpO2: 99 % (11/17/23 2153)    Physical Exam  Vitals and nursing note reviewed. Constitutional:       General: She is not in acute distress. Appearance: She is well-developed. She is not ill-appearing. Comments: Frail, chronically ill-appearing. Sarcopenia noted   HENT:      Head: Normocephalic and atraumatic.       Right Ear: External ear normal.      Left Ear: External ear normal.      Nose: Nose normal.      Mouth/Throat:      Mouth: Mucous membranes are moist.   Eyes:      General: Scleral icterus present. Extraocular Movements: Extraocular movements intact. Conjunctiva/sclera: Conjunctivae normal.      Pupils: Pupils are equal, round, and reactive to light. Cardiovascular:      Rate and Rhythm: Normal rate and regular rhythm. Pulses: Normal pulses. Heart sounds: Normal heart sounds. No murmur heard. Pulmonary:      Effort: Pulmonary effort is normal. No respiratory distress. Breath sounds: Normal breath sounds. Abdominal:      General: Bowel sounds are normal. There is no distension. Palpations: Abdomen is soft. Tenderness: There is abdominal tenderness. Comments: Periumbilical tenderness   Musculoskeletal:         General: No swelling. Cervical back: Normal range of motion and neck supple. Right lower leg: Edema present. Left lower leg: Edema present. Comments: Trace bilateral pedal edema   Skin:     General: Skin is warm and dry. Capillary Refill: Capillary refill takes less than 2 seconds. Coloration: Skin is jaundiced. Neurological:      General: No focal deficit present. Mental Status: She is alert.    Psychiatric:         Mood and Affect: Mood normal.          Additional Data:     Lab Results:  Results from last 7 days   Lab Units 11/17/23 1933   WBC Thousand/uL 7.43   HEMOGLOBIN g/dL 8.5*   HEMATOCRIT % 25.2*   PLATELETS Thousands/uL 128*   NEUTROS PCT % 72   LYMPHS PCT % 18   MONOS PCT % 7   EOS PCT % 3     Results from last 7 days   Lab Units 11/17/23 1933   SODIUM mmol/L 130*   POTASSIUM mmol/L 3.2*   CHLORIDE mmol/L 98   CO2 mmol/L 20*   BUN mg/dL 43*   CREATININE mg/dL 1.92*   ANION GAP mmol/L 12   CALCIUM mg/dL 8.6   ALBUMIN g/dL 2.8*   TOTAL BILIRUBIN mg/dL 9.19*   ALK PHOS U/L 113*   ALT U/L 55*   AST U/L 122*   GLUCOSE RANDOM mg/dL 139     Results from last 7 days   Lab Units 11/17/23  1933   INR  1.69*         Results from last 7 days   Lab Units 11/17/23  0821   HEMOGLOBIN A1C % 4.4           Lines/Drains:  Invasive Devices       Peripheral Intravenous Line  Duration             Peripheral IV 11/17/23 Right Antecubital <1 day                        Imaging: Reviewed radiology reports from this admission including: ultrasound(s)  US abdomen complete   Final Result by Benjie Polk MD (11/17 2228)      Hepatic steatosis. The surface contour of the liver is somewhat nodular. Clinical and laboratory correlation regarding the possibility of cirrhosis is suggested. Sequelae of chronic pancreatitis. The pancreatic duct measures approximately 7 mm. There is a small amount of ascites. Workstation performed: [id number]             EKG and Other Studies Reviewed on Admission:   EKG: No EKG obtained. ** Please Note: This note has been constructed using a voice recognition system.  **

## 2023-11-18 NOTE — ASSESSMENT & PLAN NOTE
POA presented with worsening hyperbilirubinemia noted with follow-up bilirubin 9.19 with direct bilirubin of 5.0. Recent admission in October 2023 presented with hyperbilirubinemia/jaundice with decreased bilirubin levels without acute intervention unclear etiology at that time was thought to be likely secondary to medication induced? Patient does have history of alcohol abuse. Approximately 5 years ago, chronic pancreatitis s  10/28 CT abdominal pelvis on last admission noted with pancreatic duct dilation 8 mm with associated with 3 mm calculus noted in pancreatic head. Last admission extensive evaluation with KEI/anti-smooth, antimitochondrial/acute hepatitis panel, Hemochromatosis mutation unremarkbable, acute viral panel negative,   11/17 RUQ US hepatic steatosis, pancreatinine duct dilation 7 mm  Hyperbilirubinemia unclear etiology at this time possibly related to pancreatic duct dilation although no hepatobiliary duct involvement on CT scan on prior admission, unlikely alcohol-induced given patient has been off alcohol use over the past 5 years versus acute liver injury really unclear etiology at this time given no new medications, no recent illnesses.     Plan  Follow-up hepatic function panel  Gastroenterology consulted for possible MRCP/ERCP patient was not able to secure an appointment upon discharge on last admission

## 2023-11-18 NOTE — ASSESSMENT & PLAN NOTE
Likely in the setting of decompensated liver cirrhosis of unclear etiology possibly alcohol induced at baseline unclear etiology decompensation at this time  Monitor hepatic function test  MELD lab 30 POA  Viral Panel negative on last admission, extensive workup thus far unremarkable  GI Consulted appreciate recommendations

## 2023-11-18 NOTE — ASSESSMENT & PLAN NOTE
History of pancytopenia in the setting of liver cirrhosis?   With associated microcytic anemia due to folate deficiency  POA platelets noted 874, hemoglobin 8.5   Cbc with plts

## 2023-11-19 ENCOUNTER — APPOINTMENT (INPATIENT)
Dept: MRI IMAGING | Facility: HOSPITAL | Age: 68
DRG: 433 | End: 2023-11-19
Payer: MEDICARE

## 2023-11-19 LAB
AFP-TM SERPL-MCNC: 6.52 NG/ML (ref 0–9)
ALBUMIN SERPL BCP-MCNC: 2.3 G/DL (ref 3.5–5)
ALP SERPL-CCNC: 106 U/L (ref 34–104)
ALT SERPL W P-5'-P-CCNC: 48 U/L (ref 7–52)
ANION GAP SERPL CALCULATED.3IONS-SCNC: 8 MMOL/L
AST SERPL W P-5'-P-CCNC: 114 U/L (ref 13–39)
BILIRUB SERPL-MCNC: 6.92 MG/DL (ref 0.2–1)
BUN SERPL-MCNC: 35 MG/DL (ref 5–25)
CALCIUM ALBUM COR SERPL-MCNC: 10 MG/DL (ref 8.3–10.1)
CALCIUM SERPL-MCNC: 8.6 MG/DL (ref 8.4–10.2)
CHLORIDE SERPL-SCNC: 101 MMOL/L (ref 96–108)
CO2 SERPL-SCNC: 21 MMOL/L (ref 21–32)
CREAT SERPL-MCNC: 1.29 MG/DL (ref 0.6–1.3)
ERYTHROCYTE [DISTWIDTH] IN BLOOD BY AUTOMATED COUNT: 15.7 % (ref 11.6–15.1)
GFR SERPL CREATININE-BSD FRML MDRD: 42 ML/MIN/1.73SQ M
GLUCOSE SERPL-MCNC: 126 MG/DL (ref 65–140)
HCT VFR BLD AUTO: 22.8 % (ref 34.8–46.1)
HGB BLD-MCNC: 8 G/DL (ref 11.5–15.4)
INR PPP: 1.67 (ref 0.84–1.19)
MAGNESIUM SERPL-MCNC: 1.9 MG/DL (ref 1.9–2.7)
MCH RBC QN AUTO: 36.9 PG (ref 26.8–34.3)
MCHC RBC AUTO-ENTMCNC: 35.1 G/DL (ref 31.4–37.4)
MCV RBC AUTO: 105 FL (ref 82–98)
PLATELET # BLD AUTO: 120 THOUSANDS/UL (ref 149–390)
PMV BLD AUTO: 11.8 FL (ref 8.9–12.7)
POTASSIUM SERPL-SCNC: 3.6 MMOL/L (ref 3.5–5.3)
PROT SERPL-MCNC: 5.6 G/DL (ref 6.4–8.4)
PROTHROMBIN TIME: 20.5 SECONDS (ref 11.6–14.5)
RBC # BLD AUTO: 2.17 MILLION/UL (ref 3.81–5.12)
SODIUM SERPL-SCNC: 130 MMOL/L (ref 135–147)
WBC # BLD AUTO: 8.37 THOUSAND/UL (ref 4.31–10.16)

## 2023-11-19 PROCEDURE — 99232 SBSQ HOSP IP/OBS MODERATE 35: CPT | Performed by: INTERNAL MEDICINE

## 2023-11-19 PROCEDURE — 80053 COMPREHEN METABOLIC PANEL: CPT

## 2023-11-19 PROCEDURE — 82105 ALPHA-FETOPROTEIN SERUM: CPT | Performed by: NURSE PRACTITIONER

## 2023-11-19 PROCEDURE — A9585 GADOBUTROL INJECTION: HCPCS | Performed by: INTERNAL MEDICINE

## 2023-11-19 PROCEDURE — 74183 MRI ABD W/O CNTR FLWD CNTR: CPT

## 2023-11-19 PROCEDURE — 83735 ASSAY OF MAGNESIUM: CPT

## 2023-11-19 PROCEDURE — 85027 COMPLETE CBC AUTOMATED: CPT

## 2023-11-19 PROCEDURE — 85610 PROTHROMBIN TIME: CPT

## 2023-11-19 RX ORDER — DIPHENHYDRAMINE HYDROCHLORIDE 50 MG/ML
25 INJECTION INTRAMUSCULAR; INTRAVENOUS EVERY 6 HOURS PRN
Status: DISCONTINUED | OUTPATIENT
Start: 2023-11-19 | End: 2023-11-21 | Stop reason: HOSPADM

## 2023-11-19 RX ORDER — GADOBUTROL 604.72 MG/ML
6 INJECTION INTRAVENOUS
Status: COMPLETED | OUTPATIENT
Start: 2023-11-19 | End: 2023-11-19

## 2023-11-19 RX ADMIN — METOPROLOL TARTRATE 100 MG: 100 TABLET, FILM COATED ORAL at 08:31

## 2023-11-19 RX ADMIN — Medication 1000 MG: at 06:08

## 2023-11-19 RX ADMIN — FOLIC ACID 1 MG: 1 TABLET ORAL at 08:30

## 2023-11-19 RX ADMIN — Medication 1000 MG: at 17:01

## 2023-11-19 RX ADMIN — FERROUS SULFATE TAB 325 MG (65 MG ELEMENTAL FE) 325 MG: 325 (65 FE) TAB at 07:45

## 2023-11-19 RX ADMIN — PANTOPRAZOLE SODIUM 40 MG: 40 TABLET, DELAYED RELEASE ORAL at 06:08

## 2023-11-19 RX ADMIN — LACTULOSE 20 G: 20 SOLUTION ORAL at 08:30

## 2023-11-19 RX ADMIN — SODIUM CHLORIDE 1 G: 1 TABLET ORAL at 17:01

## 2023-11-19 RX ADMIN — SENNOSIDES 8.6 MG: 8.6 TABLET, FILM COATED ORAL at 08:31

## 2023-11-19 RX ADMIN — SODIUM CHLORIDE 1 G: 1 TABLET ORAL at 07:45

## 2023-11-19 RX ADMIN — GADOBUTROL 6 ML: 604.72 INJECTION INTRAVENOUS at 13:35

## 2023-11-19 RX ADMIN — Medication 100 MG: at 08:31

## 2023-11-19 RX ADMIN — PANCRELIPASE 24000 UNITS: 24000; 76000; 120000 CAPSULE, DELAYED RELEASE PELLETS ORAL at 17:01

## 2023-11-19 RX ADMIN — Medication 1000 UNITS: at 08:31

## 2023-11-19 RX ADMIN — ENOXAPARIN SODIUM 30 MG: 30 INJECTION SUBCUTANEOUS at 08:30

## 2023-11-19 RX ADMIN — Medication 1 TABLET: at 07:45

## 2023-11-19 RX ADMIN — PANCRELIPASE 24000 UNITS: 24000; 76000; 120000 CAPSULE, DELAYED RELEASE PELLETS ORAL at 07:44

## 2023-11-19 RX ADMIN — SENNOSIDES 8.6 MG: 8.6 TABLET, FILM COATED ORAL at 17:01

## 2023-11-19 RX ADMIN — LOSARTAN POTASSIUM 100 MG: 50 TABLET, FILM COATED ORAL at 08:30

## 2023-11-19 NOTE — ASSESSMENT & PLAN NOTE
POA presented with worsening hyperbilirubinemia noted with follow-up bilirubin 9.19 with direct bilirubin of 5.0. Recent admission in October 2023 presented with hyperbilirubinemia/jaundice with decreased bilirubin levels without acute intervention unclear etiology at that time was thought to be likely secondary to medication induced? Patient does have history of alcohol abuse. Approximately 5 years ago, chronic pancreatitis s  10/28 CT abdominal pelvis on last admission noted with pancreatic duct dilation 8 mm with associated with 3 mm calculus noted in pancreatic head. Last admission extensive evaluation with KEI/anti-smooth, antimitochondrial/acute hepatitis panel, Hemochromatosis mutation unremarkbable, acute viral panel negative,   11/17 RUQ US hepatic steatosis, pancreatinine duct dilation 7 mm  Hyperbilirubinemia unclear etiology at this time possibly related to pancreatic duct dilation although no hepatobiliary duct involvement on CT scan on prior admission, unlikely alcohol-induced given patient has been off alcohol use over the past 5 years versus acute liver injury really unclear etiology at this time given no new medications, no recent illnesses. Plan  Follow-up hepatic function panel  Gastroenterology consulted and recommendations appreciated  MRCP done on 11/19.   Follow-up MRCP report

## 2023-11-19 NOTE — ASSESSMENT & PLAN NOTE
Blood Pressure: 110/56  Continue PTA losartan 100 mg once a day, metoprolol tartrate 100 mg once a day  Check orthostatic blood pressure

## 2023-11-19 NOTE — ASSESSMENT & PLAN NOTE
POA noted with creatinine 1.90 baseline seems to be 0.7-0.9  Patient was discharged on last admission of Lasix 40 mg once a day in the setting of anasarca  Was seen by PCP on the outpatient setting with repeat blood work for which was recommended to present to the emergency department  Baseline the patient does follow fluid restriction approximately 1800 cc given chronic hyponatremia  Likely in the setting of diuretics with fluid restriction at baseline versus postrenal given patient at baseline has history of urinary retention for which does require self-catheterization.     Lab Results   Component Value Date    CREATININE 1.29 11/19/2023    CREATININE 1.57 (H) 11/18/2023    CREATININE 1.92 (H) 11/17/2023    CREATININE 0.68 10/29/2023    CREATININE 0.68 10/28/2023      Plan  Hold Lasix for now  Gentle hydration for 8 hours and liberalize fluids in the meantime  Monitor BMP daily  Urinary retention protocol  Avoid nephrotoxin

## 2023-11-19 NOTE — ASSESSMENT & PLAN NOTE
Lab Results   Component Value Date    SODIUM 130 (L) 11/19/2023    SODIUM 131 (L) 11/18/2023    SODIUM 130 (L) 11/17/2023    SODIUM 130 (L) 10/29/2023      History of chronic hyponatremia baseline seems to be from 130-1 33  At baseline patient follows fluid restriction 1800 cc, sodium chloride 1 g twice daily  Currently at baseline  BMP a.m.

## 2023-11-19 NOTE — ASSESSMENT & PLAN NOTE
Lab Results   Component Value Date    K 3.6 11/19/2023    K 3.7 11/18/2023    K 3.2 (L) 11/17/2023      POA 3.2    received KDUR  BMP

## 2023-11-19 NOTE — ASSESSMENT & PLAN NOTE
Likely in the setting of decompensated liver cirrhosis of unclear etiology possibly alcohol induced at baseline unclear etiology decompensation at this time  Monitor hepatic function test  MELD lab 30 POA  Viral Panel negative on last admission, extensive workup thus far unremarkable  GI Consulted appreciate recommendations     MELD 3.0: 28 at 11/19/2023  5:16 AM  MELD-Na: 26 at 11/19/2023  5:16 AM  Calculated from:  Serum Creatinine: 1.29 mg/dL at 11/19/2023  5:16 AM  Serum Sodium: 130 mmol/L at 11/19/2023  5:16 AM  Total Bilirubin: 6.92 mg/dL at 11/19/2023  5:16 AM  Serum Albumin: 2.3 g/dL at 11/19/2023  5:16 AM  INR(ratio): 1.67 at 11/19/2023  5:16 AM  Age at listing (hypothetical): 68 years  Sex: Female at 11/19/2023  5:16 AM

## 2023-11-19 NOTE — PLAN OF CARE
Problem: PAIN - ADULT  Goal: Verbalizes/displays adequate comfort level or baseline comfort level  Description: Interventions:  - Encourage patient to monitor pain and request assistance  - Assess pain using appropriate pain scale  - Administer analgesics based on type and severity of pain and evaluate response  - Implement non-pharmacological measures as appropriate and evaluate response  - Consider cultural and social influences on pain and pain management  - Notify physician/advanced practitioner if interventions unsuccessful or patient reports new pain  Outcome: Progressing Please follow up with physician at the rehab. Please continue with medications as prescribed.

## 2023-11-19 NOTE — PLAN OF CARE
Problem: PAIN - ADULT  Goal: Verbalizes/displays adequate comfort level or baseline comfort level  Description: Interventions:  - Encourage patient to monitor pain and request assistance  - Assess pain using appropriate pain scale  - Administer analgesics based on type and severity of pain and evaluate response  - Implement non-pharmacological measures as appropriate and evaluate response  - Consider cultural and social influences on pain and pain management  - Notify physician/advanced practitioner if interventions unsuccessful or patient reports new pain  Outcome: Progressing     Problem: INFECTION - ADULT  Goal: Absence or prevention of progression during hospitalization  Description: INTERVENTIONS:  - Assess and monitor for signs and symptoms of infection  - Monitor lab/diagnostic results  - Monitor all insertion sites, i.e. indwelling lines, tubes, and drains  - Monitor endotracheal if appropriate and nasal secretions for changes in amount and color  - Brandon appropriate cooling/warming therapies per order  - Administer medications as ordered  - Instruct and encourage patient and family to use good hand hygiene technique  - Identify and instruct in appropriate isolation precautions for identified infection/condition  Outcome: Progressing     Problem: SAFETY ADULT  Goal: Patient will remain free of falls  Description: INTERVENTIONS:  - Educate patient/family on patient safety including physical limitations  - Instruct patient to call for assistance with activity   - Consult OT/PT to assist with strengthening/mobility   - Keep Call bell within reach  - Keep bed low and locked with side rails adjusted as appropriate  - Keep care items and personal belongings within reach  - Initiate and maintain comfort rounds  - Apply yellow socks and bracelet for high fall risk patients  - Consider moving patient to room near nurses station  Outcome: Progressing     Problem: DISCHARGE PLANNING  Goal: Discharge to home or other facility with appropriate resources  Description: INTERVENTIONS:  - Identify barriers to discharge w/patient and caregiver  - Arrange for needed discharge resources and transportation as appropriate  - Identify discharge learning needs (meds, wound care, etc.)  - Arrange for interpretive services to assist at discharge as needed  - Refer to Case Management Department for coordinating discharge planning if the patient needs post-hospital services based on physician/advanced practitioner order or complex needs related to functional status, cognitive ability, or social support system  Outcome: Progressing

## 2023-11-19 NOTE — PROGRESS NOTES
[facility]  Progress Note  Name: Hermilo Bee  MRN: [id number]  Unit/Bed#: W -01 I Date of Admission: 11/17/2023   Date of Service: 11/19/2023 I Hospital Day: 2    Assessment/Plan   * Hyperbilirubinemia  Assessment & Plan  POA presented with worsening hyperbilirubinemia noted with follow-up bilirubin 9.19 with direct bilirubin of 5.0. Recent admission in October 2023 presented with hyperbilirubinemia/jaundice with decreased bilirubin levels without acute intervention unclear etiology at that time was thought to be likely secondary to medication induced? Patient does have history of alcohol abuse. Approximately 5 years ago, chronic pancreatitis s  10/28 CT abdominal pelvis on last admission noted with pancreatic duct dilation 8 mm with associated with 3 mm calculus noted in pancreatic head. Last admission extensive evaluation with KEI/anti-smooth, antimitochondrial/acute hepatitis panel, Hemochromatosis mutation unremarkbable, acute viral panel negative,   11/17 RUQ US hepatic steatosis, pancreatinine duct dilation 7 mm  Hyperbilirubinemia unclear etiology at this time possibly related to pancreatic duct dilation although no hepatobiliary duct involvement on CT scan on prior admission, unlikely alcohol-induced given patient has been off alcohol use over the past 5 years versus acute liver injury really unclear etiology at this time given no new medications, no recent illnesses. Plan  Follow-up hepatic function panel  Gastroenterology consulted and recommendations appreciated  MRCP done on 11/19.   Follow-up MRCP report    PRETTY (acute kidney injury) ([address])  Assessment & Plan  POA noted with creatinine 1.90 baseline seems to be 0.7-0.9  Patient was discharged on last admission of Lasix 40 mg once a day in the setting of anasarca  Was seen by PCP on the outpatient setting with repeat blood work for which was recommended to present to the emergency department  Baseline the patient does follow fluid restriction approximately 1800 cc given chronic hyponatremia  Likely in the setting of diuretics with fluid restriction at baseline versus postrenal given patient at baseline has history of urinary retention for which does require self-catheterization.     Lab Results   Component Value Date    CREATININE 1.29 11/19/2023    CREATININE 1.57 (H) 11/18/2023    CREATININE 1.92 (H) 11/17/2023    CREATININE 0.68 10/29/2023    CREATININE 0.68 10/28/2023      Plan  Hold Lasix for now  Gentle hydration for 8 hours and liberalize fluids in the meantime  Monitor BMP daily  Urinary retention protocol  Avoid nephrotoxin    Hypokalemia  Assessment & Plan  Lab Results   Component Value Date    K 3.6 11/19/2023    K 3.7 11/18/2023    K 3.2 (L) 11/17/2023      POA 3.2    received KDUR  BMP    Abnormal transaminases  Assessment & Plan  Likely in the setting of decompensated liver cirrhosis of unclear etiology possibly alcohol induced at baseline unclear etiology decompensation at this time  Monitor hepatic function test  MELD lab 30 POA  Viral Panel negative on last admission, extensive workup thus far unremarkable  GI Consulted appreciate recommendations     MELD 3.0: 28 at 11/19/2023  5:16 AM  MELD-Na: 26 at 11/19/2023  5:16 AM  Calculated from:  Serum Creatinine: 1.29 mg/dL at 11/19/2023  5:16 AM  Serum Sodium: 130 mmol/L at 11/19/2023  5:16 AM  Total Bilirubin: 6.92 mg/dL at 11/19/2023  5:16 AM  Serum Albumin: 2.3 g/dL at 11/19/2023  5:16 AM  INR(ratio): 1.67 at 11/19/2023  5:16 AM  Age at listing (hypothetical): 76 years  Sex: Female at 11/19/2023  5:16 AM       Chronic pancreatitis ([address])  Assessment & Plan  History of chronic pancreatitis  Continue home Creon  GI consulted appreciate recommendations    Hypomagnesemia  Assessment & Plan  Lab Results   Component Value Date    MG 1.9 11/19/2023    MG 2.3 11/18/2023    MG 1.3 (L) 11/17/2023        History hypomagnesemia PTA on magnesium gluconate 1000 mg twice daily  Recheck magnesium  Replace magnesium as needed    Pancytopenia ([address])  Assessment & Plan  History of pancytopenia in the setting of liver cirrhosis? With associated macrocytic anemia due to folate deficiency  POA platelets noted 344, hemoglobin 8.5   Check CBC daily. Chronic hyponatremia  Assessment & Plan    Lab Results   Component Value Date    SODIUM 130 (L) 11/19/2023    SODIUM 131 (L) 11/18/2023    SODIUM 130 (L) 11/17/2023    SODIUM 130 (L) 10/29/2023      History of chronic hyponatremia baseline seems to be from 130-1 33  At baseline patient follows fluid restriction 1800 cc, sodium chloride 1 g twice daily  Currently at baseline  BMP a.m. Essential hypertension  Assessment & Plan  Blood Pressure: 110/56  Continue PTA losartan 100 mg once a day, metoprolol tartrate 100 mg once a day  Check orthostatic blood pressure           VTE Pharmacologic Prophylaxis: VTE Score: 2 Moderate Risk (Score 3-4) - Pharmacological DVT Prophylaxis Ordered: enoxaparin (Lovenox). Mobility:   Basic Mobility Inpatient Raw Score: 20  JH-HLM Goal: 6: Walk 10 steps or more  JH-HLM Achieved: 6: Walk 10 steps or more  HLM Goal achieved. Continue to encourage appropriate mobility. Patient Centered Rounds: I performed bedside rounds with nursing staff today. Discussions with Specialists or Other Care Team Provider: GI    Education and Discussions with Family / Patient: Updated  () via phone. Current Length of Stay: 2 day(s)  Current Patient Status: Inpatient   Discharge Plan: Anticipate discharge in 24-48 hrs to home. Code Status: Level 1 - Full Code    Subjective:     Patient was seen and examined at bedside this morning. She was lying comfortably in her bed. She endorsed that her  had noticed her yellow discoloration of the skin 2 weeks ago. She has itchiness of the skin. Denied any fever, chills, nausea, vomiting, abdominal pain.   She knows that she has a blockage in the pancreatic duct. She is willing to proceed with MRI of the abdomen. No significant overnight event. Objective:     Vitals:   Temp (24hrs), Av.7 °F (36.5 °C), Min:97.6 °F (36.4 °C), Max:97.8 °F (36.6 °C)    Temp:  [97.6 °F (36.4 °C)-97.8 °F (36.6 °C)] 97.7 °F (36.5 °C)  HR:  [64-72] 67  Resp:  [16-18] 18  BP: (107-114)/(46-59) 110/56  SpO2:  [96 %-99 %] 98 %  Body mass index is 23.67 kg/m². Input and Output Summary (last 24 hours): Intake/Output Summary (Last 24 hours) at 2023 1508  Last data filed at 2023 3435  Gross per 24 hour   Intake 1691.67 ml   Output 100 ml   Net 1591.67 ml       Physical Exam:   Physical Exam  Vitals and nursing note reviewed. Constitutional:       General: She is not in acute distress. Appearance: She is well-developed. HENT:      Head: Normocephalic and atraumatic. Mouth/Throat:      Mouth: Mucous membranes are moist.      Pharynx: Oropharynx is clear. Eyes:      Extraocular Movements: Extraocular movements intact. Conjunctiva/sclera: Conjunctivae normal.      Pupils: Pupils are equal, round, and reactive to light. Cardiovascular:      Rate and Rhythm: Normal rate and regular rhythm. Heart sounds: Normal heart sounds. No murmur heard. Pulmonary:      Effort: Pulmonary effort is normal. No respiratory distress. Breath sounds: Normal breath sounds. No wheezing or rales. Abdominal:      General: Bowel sounds are normal. There is no distension. Palpations: Abdomen is soft. Tenderness: There is no abdominal tenderness. Musculoskeletal:         General: No swelling. Cervical back: Neck supple. Right lower leg: No edema. Left lower leg: No edema. Skin:     General: Skin is warm and dry. Capillary Refill: Capillary refill takes less than 2 seconds. Coloration: Skin is jaundiced. Neurological:      Mental Status: She is alert and oriented to person, place, and time.    Psychiatric:         Mood and Affect: Mood normal.          Additional Data:     Labs:  Results from last 7 days   Lab Units 11/19/23  0516 11/18/23  0441 11/17/23  1933   WBC Thousand/uL 8.37   < > 7.43   HEMOGLOBIN g/dL 8.0*   < > 8.5*   HEMATOCRIT % 22.8*   < > 25.2*   PLATELETS Thousands/uL 120*   < > 128*   NEUTROS PCT %  --   --  72   LYMPHS PCT %  --   --  18   MONOS PCT %  --   --  7   EOS PCT %  --   --  3    < > = values in this interval not displayed. Results from last 7 days   Lab Units 11/19/23  0516   SODIUM mmol/L 130*   POTASSIUM mmol/L 3.6   CHLORIDE mmol/L 101   CO2 mmol/L 21   BUN mg/dL 35*   CREATININE mg/dL 1.29   ANION GAP mmol/L 8   CALCIUM mg/dL 8.6   ALBUMIN g/dL 2.3*   TOTAL BILIRUBIN mg/dL 6.92*   ALK PHOS U/L 106*   ALT U/L 48   AST U/L 114*   GLUCOSE RANDOM mg/dL 126     Results from last 7 days   Lab Units 11/19/23  0516   INR  1.67*         Results from last 7 days   Lab Units 11/17/23  0821   HEMOGLOBIN A1C % 4.4           Lines/Drains:  Invasive Devices       Peripheral Intravenous Line  Duration             Peripheral IV 11/17/23 Right Antecubital 1 day    Peripheral IV 11/18/23 Right;Ventral (anterior) Forearm 1 day                          Imaging: Reviewed radiology reports from this admission including: ultrasound(s)  US abdomen complete   Final Result by Pam Lee MD (11/17 2228)      Hepatic steatosis. The surface contour of the liver is somewhat nodular. Clinical and laboratory correlation regarding the possibility of cirrhosis is suggested. Sequelae of chronic pancreatitis. The pancreatic duct measures approximately 7 mm. There is a small amount of ascites. Workstation performed: [id number]         XR chest portable    (Results Pending)   MRI abdomen w wo contrast and mrcp    (Results Pending)      Recent Cultures (last 7 days):   Results from last 7 days   Lab Units 11/18/23  1446 11/18/23  1415   BLOOD CULTURE  Received in Microbiology Lab.  Culture in Progress. Received in Microbiology Lab. Culture in Progress.        Last 24 Hours Medication List:   Current Facility-Administered Medications   Medication Dose Route Frequency Provider Last Rate    acetaminophen  650 mg Oral Q6H PRN Josh Lanza MD      calcium carbonate  1 tablet Oral Daily With [address] MD      cholecalciferol  1,000 Units Oral Daily Josh Lanza MD      enoxaparin  30 mg Subcutaneous Q24H [address] [facility] Laz Magallon MD      ferrous sulfate  325 mg Oral Daily With [address], MD      folic acid  1 mg Oral Daily Josh Lanza MD      lactulose  20 g Oral Daily AQUILES Henson      losartan  100 mg Oral Daily Josh Lanza MD      magnesium gluconate  1,000 mg Oral BID [address] [facility] Laz Magallon MD      metoprolol tartrate  100 mg Oral BID Josh Lanza MD      pancrelipase (Lip-Prot-Amyl)  24,000 Units Oral TID With [address] MD Sherita      pantoprazole  40 mg Oral Early Morning Josh Lanza MD      senna  1 tablet Oral BID Josh Lanza MD      sodium chloride  1 g Oral BID With Meals Josh Lanza MD      thiamine  100 mg Oral Daily Josh Lanza MD       Nutrition Assessment and Intervention:     Reviewed food recall journal      Physical Activity Assessment and Intervention:    Activity journal reviewed    Activity journal completion recommended      Emotional and Mental Well-being, Sleep, Connectedness Assessment and Intervention:    Sleep/stress assessment performed    Depression and anxiety screening performed and reviewed      Tobacco and Toxic Substance Assessment and Intervention:     Tobacco use screening performed    Alcohol and drug use screening performed      Therapeutic Lifestyle Change Visit:     One-on-one comprehensive counseling, coaching, and health behavior change visit completed          Today, Patient Was Seen By: Deja Lezama MD    **Please Note: This note may have been constructed using a voice recognition system. **

## 2023-11-19 NOTE — ASSESSMENT & PLAN NOTE
History of pancytopenia in the setting of liver cirrhosis? With associated macrocytic anemia due to folate deficiency  POA platelets noted 723, hemoglobin 8.5   Check CBC daily.

## 2023-11-20 ENCOUNTER — TELEPHONE (OUTPATIENT)
Dept: GASTROENTEROLOGY | Facility: CLINIC | Age: 68
End: 2023-11-20

## 2023-11-20 PROBLEM — K74.69 DECOMPENSATED LIVER DISEASE (HCC): Status: ACTIVE | Noted: 2023-10-29

## 2023-11-20 PROBLEM — E87.6 HYPOKALEMIA: Status: RESOLVED | Noted: 2023-11-18 | Resolved: 2023-11-20

## 2023-11-20 PROBLEM — K74.60 DECOMPENSATED LIVER DISEASE (HCC): Status: ACTIVE | Noted: 2023-10-29

## 2023-11-20 PROBLEM — K72.90 DECOMPENSATED LIVER DISEASE (HCC): Status: ACTIVE | Noted: 2023-10-29

## 2023-11-20 PROBLEM — N17.9 AKI (ACUTE KIDNEY INJURY) (HCC): Status: RESOLVED | Noted: 2023-11-18 | Resolved: 2023-11-20

## 2023-11-20 LAB
ALBUMIN SERPL BCP-MCNC: 2.2 G/DL (ref 3.5–5)
ALP SERPL-CCNC: 105 U/L (ref 34–104)
ALT SERPL W P-5'-P-CCNC: 43 U/L (ref 7–52)
ANION GAP SERPL CALCULATED.3IONS-SCNC: 7 MMOL/L
AST SERPL W P-5'-P-CCNC: 100 U/L (ref 13–39)
BILIRUB SERPL-MCNC: 7.01 MG/DL (ref 0.2–1)
BUN SERPL-MCNC: 30 MG/DL (ref 5–25)
CALCIUM ALBUM COR SERPL-MCNC: 10 MG/DL (ref 8.3–10.1)
CALCIUM SERPL-MCNC: 8.6 MG/DL (ref 8.4–10.2)
CHLORIDE SERPL-SCNC: 104 MMOL/L (ref 96–108)
CO2 SERPL-SCNC: 21 MMOL/L (ref 21–32)
CREAT SERPL-MCNC: 1.06 MG/DL (ref 0.6–1.3)
ERYTHROCYTE [DISTWIDTH] IN BLOOD BY AUTOMATED COUNT: 15.3 % (ref 11.6–15.1)
GFR SERPL CREATININE-BSD FRML MDRD: 54 ML/MIN/1.73SQ M
GLUCOSE SERPL-MCNC: 129 MG/DL (ref 65–140)
HCT VFR BLD AUTO: 23.2 % (ref 34.8–46.1)
HGB BLD-MCNC: 7.6 G/DL (ref 11.5–15.4)
INR PPP: 1.78 (ref 0.84–1.19)
MCH RBC QN AUTO: 35.2 PG (ref 26.8–34.3)
MCHC RBC AUTO-ENTMCNC: 32.8 G/DL (ref 31.4–37.4)
MCV RBC AUTO: 107 FL (ref 82–98)
PLATELET # BLD AUTO: 116 THOUSANDS/UL (ref 149–390)
PMV BLD AUTO: 11.7 FL (ref 8.9–12.7)
POTASSIUM SERPL-SCNC: 3.8 MMOL/L (ref 3.5–5.3)
PROT SERPL-MCNC: 5.2 G/DL (ref 6.4–8.4)
PROTHROMBIN TIME: 21.5 SECONDS (ref 11.6–14.5)
RBC # BLD AUTO: 2.16 MILLION/UL (ref 3.81–5.12)
SODIUM SERPL-SCNC: 132 MMOL/L (ref 135–147)
WBC # BLD AUTO: 7.06 THOUSAND/UL (ref 4.31–10.16)

## 2023-11-20 PROCEDURE — 85027 COMPLETE CBC AUTOMATED: CPT

## 2023-11-20 PROCEDURE — 99232 SBSQ HOSP IP/OBS MODERATE 35: CPT | Performed by: INTERNAL MEDICINE

## 2023-11-20 PROCEDURE — 85610 PROTHROMBIN TIME: CPT

## 2023-11-20 PROCEDURE — 80053 COMPREHEN METABOLIC PANEL: CPT

## 2023-11-20 RX ORDER — FUROSEMIDE 20 MG/1
20 TABLET ORAL DAILY
Status: DISCONTINUED | OUTPATIENT
Start: 2023-11-20 | End: 2023-11-21 | Stop reason: HOSPADM

## 2023-11-20 RX ADMIN — PANCRELIPASE 24000 UNITS: 24000; 76000; 120000 CAPSULE, DELAYED RELEASE PELLETS ORAL at 12:13

## 2023-11-20 RX ADMIN — PANCRELIPASE 24000 UNITS: 24000; 76000; 120000 CAPSULE, DELAYED RELEASE PELLETS ORAL at 07:57

## 2023-11-20 RX ADMIN — PANTOPRAZOLE SODIUM 40 MG: 40 TABLET, DELAYED RELEASE ORAL at 06:38

## 2023-11-20 RX ADMIN — FOLIC ACID 1 MG: 1 TABLET ORAL at 08:01

## 2023-11-20 RX ADMIN — SODIUM CHLORIDE 1 G: 1 TABLET ORAL at 07:57

## 2023-11-20 RX ADMIN — Medication 1000 MG: at 06:38

## 2023-11-20 RX ADMIN — PANCRELIPASE 24000 UNITS: 24000; 76000; 120000 CAPSULE, DELAYED RELEASE PELLETS ORAL at 16:47

## 2023-11-20 RX ADMIN — SODIUM CHLORIDE 1 G: 1 TABLET ORAL at 16:47

## 2023-11-20 RX ADMIN — Medication 1000 UNITS: at 08:03

## 2023-11-20 RX ADMIN — METOPROLOL TARTRATE 100 MG: 100 TABLET, FILM COATED ORAL at 21:53

## 2023-11-20 RX ADMIN — Medication 1000 MG: at 16:47

## 2023-11-20 RX ADMIN — METOPROLOL TARTRATE 100 MG: 100 TABLET, FILM COATED ORAL at 08:01

## 2023-11-20 RX ADMIN — FERROUS SULFATE TAB 325 MG (65 MG ELEMENTAL FE) 325 MG: 325 (65 FE) TAB at 07:57

## 2023-11-20 RX ADMIN — ENOXAPARIN SODIUM 30 MG: 30 INJECTION SUBCUTANEOUS at 08:00

## 2023-11-20 RX ADMIN — LOSARTAN POTASSIUM 100 MG: 50 TABLET, FILM COATED ORAL at 08:01

## 2023-11-20 RX ADMIN — Medication 1 TABLET: at 07:57

## 2023-11-20 RX ADMIN — FUROSEMIDE 20 MG: 20 TABLET ORAL at 12:13

## 2023-11-20 RX ADMIN — Medication 100 MG: at 08:01

## 2023-11-20 NOTE — PROGRESS NOTES
Progress Note- Azael Gallagher 76 y.o. female MRN: [id number]    Unit/Bed#: W -01 Encounter: [id number]      Assessment and Plan:    Decompensated liver cirrhosis    History of alcohol abuse now with imaging suggestive of cirrhosis with lobulated hepatic contour and labs significant for thrombocytopenia, elevated INR, hyperbilirubinemia suggestive of synthetic dysfunction of the liver. AST>>43  KEI, AMA, ASMA, hemochromatosis mutation, acute hepatitis panel unremarkable  AFP tumor marker negative  Phosphatidyl ethanol in process  Urine sodium 35  Renal function stable and downtrending PRETTY seems resolved HRS unlikely   Infectious work up thus far unremarkable  Patient afebrile, no leucocytosis on CBC  BC no growth  UA Large Leucocytes, WBC no urine culture   CXR pending final read whoever suggestive of pleural effysion R>L  11/19 MRI abdomen with and without contrast and MRCP  Questionable micronodularity along the anterior left hepatic lobe suggestive possibly early cirrhosis. Two subcentimeter probable hemangiomas  Plan  Continue Lactulose 20g daily for HE, as below patient educated need for medicated and monitor response for goal of 2-3 soft but formed bowel movements. Daily Meld Labs CBC, CMP, PT/INR  Maximize nutrition nutrition supplement, supplements ordered 3 times daily patient counseled reasoning behind protein supplementation. Avoid Hepatotoxic medications/counseled provided alcohol abstinence  Recommend patient to establish care with Hepatologist Dr. Neda Almanza on the outpatient setting GI team will set up appointment. Hyperbilirubinemia    POA total bilirubin 9.19 down trended to 7.01  11/19 moderate abdomen with and without contrast and MRCP known intraductal calculus in the pancreas head however not well seen by MRI.     Plan:  As above    Hepatic encephalopathy    Likely in the setting of decompensated liver cirrhosis as above  Asterixis +  Currently on Lactulose endorse 6 total bowel movements although small in volume yesterday no loose stools. This morning abdominal cramp in nature past like consistency no bloody no loose stools. Refuse lactulose this morning     Plan  Continue lactulose 20 g daily for hepatic encephalopathy counseled provided for a goal of 2-3 soft/form bowel movements. Monitor stool output    Chronic pancreatitis    Hx Alcohol abuse/recurrent gallstones which appear source of pancreatitis. Lipase unremarkable  CT imaging/ MRI suggestuve if known pancreatic duct dilation and known intraductal Kokolus pancreatic head. No evidence of acute pancreatitis  Ca19-9 <2 on last admission  Plan  Serial abdominal exams  Continue Creon prior to meals      ______________________________________________________________________    Subjective:     No acute overnight events reported by nursing team.  Patient seen at bedside denies any significant acute complaint at this time. Endorse t had a total of 6 bowel movements since yesterday although no large in volume. Described it as "pudding" consistency, yellow in color no blood. Had cramping abdominal pain in the morning relief by bowel movement. Tolerating diet well with protein supplements. Review of Systems   Constitutional:  Negative for chills and fever. HENT:  Negative for ear pain and sore throat. Eyes:  Negative for pain and visual disturbance. Respiratory:  Negative for cough and shortness of breath. Cardiovascular:  Positive for leg swelling. Negative for chest pain and palpitations. Gastrointestinal:  Positive for abdominal pain. Negative for abdominal distention, blood in stool, constipation, diarrhea, nausea and vomiting. Cramping abdominal pain now resolved. Soft pudding consistency BM   Genitourinary:  Negative for dysuria and hematuria. Musculoskeletal:  Negative for arthralgias and back pain. Skin:  Negative for color change and rash. Neurological:  Negative for seizures and syncope.    All other systems reviewed and are negative.        Medication Administration - last 24 hours from 11/19/2023 0949 to 11/20/2023 0949         Date/Time Order Dose Route Action Action by     11/20/2023 0801 EST senna (SENOKOT) tablet 8.6 mg 8.6 mg Oral Not Given Moise Gallardo LPN     74/06/4252 9104 EST senna (SENOKOT) tablet 8.6 mg 8.6 mg Oral Given Scotty Cerna RN     11/20/2023 0757 EST calcium carbonate (OYSTER SHELL,OSCAL) 500 mg tablet 1 tablet 1 tablet Oral Given Moise Gallardo LPN     40/49/5226 6676 EST cholecalciferol (VITAMIN D3) tablet 1,000 Units 1,000 Units Oral Given Moise Gallardo LPN     08/15/2516 5318 EST ferrous sulfate tablet 325 mg 325 mg Oral Given Moise Gallardo LPN     81/63/2387 0310 EST folic acid (FOLVITE) tablet 1 mg 1 mg Oral Given Moise Gallardo LPN     12/92/0616 7023 EST losartan (COZAAR) tablet 100 mg 100 mg Oral Given Moise Gallardo LPN     20/28/2264 8861 EST magnesium gluconate (MAGONATE) tablet 1,000 mg 1,000 mg Oral Given Jorge L Perry RN     11/19/2023 1701 EST magnesium gluconate (MAGONATE) tablet 1,000 mg 1,000 mg Oral Given Scotty Cerna RN     11/20/2023 0801 EST metoprolol tartrate (LOPRESSOR) tablet 100 mg 100 mg Oral Given Moise Gallardo LPN     75/09/2720 6274 EST metoprolol tartrate (LOPRESSOR) tablet 100 mg 100 mg Oral Not Given Jorge L Perry RN     11/20/2023 0757 EST pancrelipase (Lip-Prot-Amyl) (CREON) delayed release capsule 24,000 Units 24,000 Units Oral Given Moise Gallardo LPN     23/78/0042 0285 EST pancrelipase (Lip-Prot-Amyl) (CREON) delayed release capsule 24,000 Units 24,000 Units Oral Given Scotty Cerna RN     11/19/2023 1349 EST pancrelipase (Lip-Prot-Amyl) (CREON) delayed release capsule 24,000 Units 24,000 Units Oral Not Given Scotty Cerna, GILDA     11/20/2023 7270 EST pantoprazole (PROTONIX) EC tablet 40 mg 40 mg Oral Given Jorge L Perry RN     11/20/2023 0757 EST sodium chloride tablet 1 g 1 g Oral Given Moise Gallardo LPN     38/69/1542 2964 EST sodium chloride tablet 1 g 1 g Oral Given Douglas Pelayo, GILDA     11/20/2023 0801 EST thiamine tablet 100 mg 100 mg Oral Given Betty Hernandez LPN     68/48/4738 0589 EST enoxaparin (LOVENOX) subcutaneous injection 30 mg 30 mg Subcutaneous Given Betty Hernandez LPN     29/80/3687 1536 EST lactulose (CHRONULAC) oral solution 20 g 20 g Oral Not Given Betty Hernandez LPN     91/21/7633 3634 EST Gadobutrol injection (SINGLE-DOSE) SOLN 6 mL 6 mL Intravenous Given Claudio Fabian            Objective:     Vitals: Blood pressure 112/54, pulse 67, temperature (!) 97.3 °F (36.3 °C), resp. rate 14, weight 62.4 kg (137 lb 8 oz), SpO2 97 %. ,Body mass index is 23.6 kg/m². No intake or output data in the 24 hours ending 11/20/23 0949    Physical Exam  Vitals and nursing note reviewed. Constitutional:       General: She is not in acute distress. Appearance: She is well-developed. She is not ill-appearing. Comments: Frail, temporal wasting, sarcopenic   HENT:      Head: Normocephalic and atraumatic. Right Ear: External ear normal.      Left Ear: External ear normal.      Mouth/Throat:      Mouth: Mucous membranes are moist.   Eyes:      General: Scleral icterus present. Extraocular Movements: Extraocular movements intact. Conjunctiva/sclera: Conjunctivae normal.      Pupils: Pupils are equal, round, and reactive to light. Cardiovascular:      Rate and Rhythm: Normal rate and regular rhythm. Pulses: Normal pulses. Heart sounds: Normal heart sounds. No murmur heard. Pulmonary:      Effort: Pulmonary effort is normal. No respiratory distress. Breath sounds: Normal breath sounds. Abdominal:      General: Bowel sounds are normal. There is distension. Palpations: Abdomen is soft. Tenderness: There is no abdominal tenderness. There is no guarding or rebound. Musculoskeletal:         General: No swelling. Cervical back: Normal range of motion and neck supple.       Right lower leg: Edema present. Left lower leg: Edema present. Skin:     General: Skin is warm and dry. Capillary Refill: Capillary refill takes less than 2 seconds. Coloration: Skin is jaundiced. Neurological:      Mental Status: She is alert and oriented to person, place, and time.       Comments: Asterixis present   Psychiatric:         Mood and Affect: Mood normal.          Invasive Devices       Peripheral Intravenous Line  Duration             Peripheral IV 11/17/23 Right Antecubital 2 days    Peripheral IV 11/18/23 Right;Ventral (anterior) Forearm 2 days                    Lab Results:  Admission on 11/17/2023   Component Date Value    WBC 11/17/2023 7.43     RBC 11/17/2023 2.38 (L)     Hemoglobin 11/17/2023 8.5 (L)     Hematocrit 11/17/2023 25.2 (L)     MCV 11/17/2023 106 (H)     MCH 11/17/2023 35.7 (H)     MCHC 11/17/2023 33.7     RDW 11/17/2023 15.7 (H)     MPV 11/17/2023 11.2     Platelets 22/88/7318 128 (L)     nRBC 11/17/2023 0     Neutrophils Relative 11/17/2023 72     Immat GRANS % 11/17/2023 0     Lymphocytes Relative 11/17/2023 18     Monocytes Relative 11/17/2023 7     Eosinophils Relative 11/17/2023 3     Basophils Relative 11/17/2023 0     Neutrophils Absolute 11/17/2023 5.28     Immature Grans Absolute 11/17/2023 0.03     Lymphocytes Absolute 11/17/2023 1.37     Monocytes Absolute 11/17/2023 0.52     Eosinophils Absolute 11/17/2023 0.20     Basophils Absolute 11/17/2023 0.03     Sodium 11/17/2023 130 (L)     Potassium 11/17/2023 3.2 (L)     Chloride 11/17/2023 98     CO2 11/17/2023 20 (L)     ANION GAP 11/17/2023 12     BUN 11/17/2023 43 (H)     Creatinine 11/17/2023 1.92 (H)     Glucose 11/17/2023 139     Calcium 11/17/2023 8.6     Corrected Calcium 11/17/2023 9.6     AST 11/17/2023 122 (H)     ALT 11/17/2023 55 (H)     Alkaline Phosphatase 11/17/2023 113 (H)     Total Protein 11/17/2023 6.4     Albumin 11/17/2023 2.8 (L)     Total Bilirubin 11/17/2023 9.19 (H)     eGFR 11/17/2023 26     Lipase 11/17/2023 37     Bilirubin, Direct 11/17/2023 5.00 (H)     Protime 11/17/2023 20.6 (H)     INR 11/17/2023 1.69 (H)     PTT 11/17/2023 44 (H)     Magnesium 11/17/2023 1.3 (L)     Color, UA 11/18/2023 Yellow     Clarity, UA 11/18/2023 Turbid     Specific Gravity, UA 11/18/2023 1.009     pH, UA 11/18/2023 6.0     Leukocytes, UA 11/18/2023 Large (A)     Nitrite, UA 11/18/2023 Negative     Protein, UA 11/18/2023 Negative     Glucose, UA 11/18/2023 Negative     Ketones, UA 11/18/2023 Negative     Urobilinogen, UA 11/18/2023 3.0 (A)     Bilirubin, UA 11/18/2023 Negative     Occult Blood, UA 11/18/2023 Negative     RBC, UA 11/18/2023 1-2     WBC, UA 11/18/2023 Innumerable (A)     Epithelial Cells 11/18/2023 Occasional     Bacteria, UA 11/18/2023 Occasional     WBC Clumps 11/18/2023 Present     Total Bilirubin 11/18/2023 7.24 (H)     Bilirubin, Direct 11/18/2023 4.01 (H)     Alkaline Phosphatase 11/18/2023 81     AST 11/18/2023 95 (H)     ALT 11/18/2023 43     Total Protein 11/18/2023 5.2 (L)     Albumin 11/18/2023 2.3 (L)     Protime 11/18/2023 21.7 (H)     INR 11/18/2023 1.81 (H)     WBC 11/18/2023 6.53     RBC 11/18/2023 1.99 (L)     Hemoglobin 11/18/2023 7.0 (L)     Hematocrit 11/18/2023 20.8 (L)     MCV 11/18/2023 105 (H)     MCH 11/18/2023 35.2 (H)     MCHC 11/18/2023 33.7     RDW 11/18/2023 15.6 (H)     Platelets 13/27/5159 108 (L)     MPV 11/18/2023 11.7     Sodium 11/18/2023 131 (L)     Potassium 11/18/2023 3.7     Chloride 11/18/2023 102     CO2 11/18/2023 21     ANION GAP 11/18/2023 8     BUN 11/18/2023 42 (H)     Creatinine 11/18/2023 1.57 (H)     Glucose 11/18/2023 85     Calcium 11/18/2023 8.0 (L)     eGFR 11/18/2023 33     Magnesium 11/18/2023 2.3     Sodium, Ur 11/18/2023 35     Blood Culture 11/18/2023 No Growth at 24 hrs. Blood Culture 11/18/2023 No Growth at 24 hrs.      AFP TUMOR MARKER 11/19/2023 6.52     WBC 11/19/2023 8.37     RBC 11/19/2023 2.17 (L)     Hemoglobin 11/19/2023 8.0 (L)     Hematocrit 11/19/2023 22.8 (L)     MCV 11/19/2023 105 (H)     MCH 11/19/2023 36.9 (H)     MCHC 11/19/2023 35.1     RDW 11/19/2023 15.7 (H)     Platelets 02/42/0461 120 (L)     MPV 11/19/2023 11.8     Sodium 11/19/2023 130 (L)     Potassium 11/19/2023 3.6     Chloride 11/19/2023 101     CO2 11/19/2023 21     ANION GAP 11/19/2023 8     BUN 11/19/2023 35 (H)     Creatinine 11/19/2023 1.29     Glucose 11/19/2023 126     Calcium 11/19/2023 8.6     Corrected Calcium 11/19/2023 10.0     AST 11/19/2023 114 (H)     ALT 11/19/2023 48     Alkaline Phosphatase 11/19/2023 106 (H)     Total Protein 11/19/2023 5.6 (L)     Albumin 11/19/2023 2.3 (L)     Total Bilirubin 11/19/2023 6.92 (H)     eGFR 11/19/2023 42     Protime 11/19/2023 20.5 (H)     INR 11/19/2023 1.67 (H)     Magnesium 11/19/2023 1.9     WBC 11/20/2023 7.06     RBC 11/20/2023 2.16 (L)     Hemoglobin 11/20/2023 7.6 (L)     Hematocrit 11/20/2023 23.2 (L)     MCV 11/20/2023 107 (H)     MCH 11/20/2023 35.2 (H)     MCHC 11/20/2023 32.8     RDW 11/20/2023 15.3 (H)     Platelets 34/62/3689 116 (L)     MPV 11/20/2023 11.7     Sodium 11/20/2023 132 (L)     Potassium 11/20/2023 3.8     Chloride 11/20/2023 104     CO2 11/20/2023 21     ANION GAP 11/20/2023 7     BUN 11/20/2023 30 (H)     Creatinine 11/20/2023 1.06     Glucose 11/20/2023 129     Calcium 11/20/2023 8.6     Corrected Calcium 11/20/2023 10.0     AST 11/20/2023 100 (H)     ALT 11/20/2023 43     Alkaline Phosphatase 11/20/2023 105 (H)     Total Protein 11/20/2023 5.2 (L)     Albumin 11/20/2023 2.2 (L)     Total Bilirubin 11/20/2023 7.01 (H)     eGFR 11/20/2023 54     Protime 11/20/2023 21.5 (H)     INR 11/20/2023 1.78 (H)        Imaging Studies: I have personally reviewed pertinent imaging studies.

## 2023-11-20 NOTE — PLAN OF CARE
Problem: PAIN - ADULT  Goal: Verbalizes/displays adequate comfort level or baseline comfort level  Description: Interventions:  - Encourage patient to monitor pain and request assistance  - Assess pain using appropriate pain scale  - Administer analgesics based on type and severity of pain and evaluate response  - Implement non-pharmacological measures as appropriate and evaluate response  - Consider cultural and social influences on pain and pain management  - Notify physician/advanced practitioner if interventions unsuccessful or patient reports new pain  Outcome: Progressing     Problem: INFECTION - ADULT  Goal: Absence or prevention of progression during hospitalization  Description: INTERVENTIONS:  - Assess and monitor for signs and symptoms of infection  - Monitor lab/diagnostic results  - Monitor all insertion sites, i.e. indwelling lines, tubes, and drains  - Monitor endotracheal if appropriate and nasal secretions for changes in amount and color  - [place] appropriate cooling/warming therapies per order  - Administer medications as ordered  - Instruct and encourage patient and family to use good hand hygiene technique  - Identify and instruct in appropriate isolation precautions for identified infection/condition  Outcome: Progressing     Problem: SAFETY ADULT  Goal: Patient will remain free of falls  Description: INTERVENTIONS:  - Educate patient/family on patient safety including physical limitations  - Instruct patient to call for assistance with activity   - Consult OT/PT to assist with strengthening/mobility   - Keep Call bell within reach  - Keep bed low and locked with side rails adjusted as appropriate  - Keep care items and personal belongings within reach  - Initiate and maintain comfort rounds  - Make Fall Risk Sign visible to staff  - Apply yellow socks and bracelet for high fall risk patients  - Consider moving patient to room near nurses station  Outcome: Progressing     Problem: DISCHARGE PLANNING  Goal: Discharge to home or other facility with appropriate resources  Description: INTERVENTIONS:  - Identify barriers to discharge w/patient and caregiver  - Arrange for needed discharge resources and transportation as appropriate  - Identify discharge learning needs (meds, wound care, etc.)  - Arrange for interpretive services to assist at discharge as needed  - Refer to Case Management Department for coordinating discharge planning if the patient needs post-hospital services based on physician/advanced practitioner order or complex needs related to functional status, cognitive ability, or social support system  Outcome: Progressing     Problem: Knowledge Deficit  Goal: Patient/family/caregiver demonstrates understanding of disease process, treatment plan, medications, and discharge instructions  Description: Complete learning assessment and assess knowledge base.   Interventions:  - Provide teaching at level of understanding  - Provide teaching via preferred learning methods  Outcome: Progressing     Problem: METABOLIC, FLUID AND ELECTROLYTES - ADULT  Goal: Electrolytes maintained within normal limits  Description: INTERVENTIONS:  - Monitor labs and assess patient for signs and symptoms of electrolyte imbalances  - Administer electrolyte replacement as ordered  - Monitor response to electrolyte replacements, including repeat lab results as appropriate  - Instruct patient on fluid and nutrition as appropriate  Outcome: Progressing  Goal: Fluid balance maintained  Description: INTERVENTIONS:  - Monitor labs   - Monitor I/O and WT  - Instruct patient on fluid and nutrition as appropriate  - Assess for signs & symptoms of volume excess or deficit  Outcome: Progressing     Problem: Prexisting or High Potential for Compromised Skin Integrity  Goal: Skin integrity is maintained or improved  Description: INTERVENTIONS:  - Identify patients at risk for skin breakdown  - Assess and monitor skin integrity  - Assess and monitor nutrition and hydration status  - Monitor labs   - Assess for incontinence   - Turn and reposition patient  - Assist with mobility/ambulation  - Relieve pressure over bony prominences  - Avoid friction and shearing  - Provide appropriate hygiene as needed including keeping skin clean and dry  - Evaluate need for skin moisturizer/barrier cream  - Collaborate with interdisciplinary team   - Patient/family teaching  - Consider wound care consult   Outcome: Progressing

## 2023-11-20 NOTE — ASSESSMENT & PLAN NOTE
Likely in the setting of decompensated liver cirrhosis of unclear etiology possibly alcohol induced at baseline unclear etiology decompensation at this time  Monitor hepatic function test  MELD lab 30 POA  Viral Panel negative on last admission, extensive workup thus far unremarkable  GI Consulted appreciate recommendations     MELD 3.0: 27 at 11/20/2023  4:45 AM  MELD-Na: 24 at 11/20/2023  4:45 AM  Calculated from:  Serum Creatinine: 1.06 mg/dL at 11/20/2023  4:45 AM  Serum Sodium: 132 mmol/L at 11/20/2023  4:45 AM  Total Bilirubin: 7.01 mg/dL at 11/20/2023  4:45 AM  Serum Albumin: 2.2 g/dL at 11/20/2023  4:45 AM  INR(ratio): 1.78 at 11/20/2023  4:45 AM  Age at listing (hypothetical): 76 years  Sex: Female at 11/20/2023  4:45 AM

## 2023-11-20 NOTE — ASSESSMENT & PLAN NOTE
Lab Results   Component Value Date    MG 1.9 11/19/2023    MG 2.3 11/18/2023    MG 1.3 (L) 11/17/2023        History hypomagnesemia PTA on magnesium gluconate 1000 mg twice daily

## 2023-11-20 NOTE — ASSESSMENT & PLAN NOTE
History of chronic pancreatitis s/s  alcohol/gallstones   No alcohol x5 years. Chronic pancreatic duct stones noted on CT. CA 19-9 was <2 last admission. Lipase WNL on admission.   Continue home Creon  GI consulted

## 2023-11-20 NOTE — ASSESSMENT & PLAN NOTE
Blood Pressure: 115/54  Continue PTA losartan 100 mg once a day, metoprolol tartrate 100 mg once a day

## 2023-11-20 NOTE — ASSESSMENT & PLAN NOTE
Lab Results   Component Value Date    SODIUM 132 (L) 11/20/2023    SODIUM 130 (L) 11/19/2023    SODIUM 131 (L) 11/18/2023    SODIUM 130 (L) 11/17/2023      History of chronic hyponatremia baseline seems to be from 130-1 33  At baseline patient follows fluid restriction 1800 cc, sodium chloride 1 g twice daily  Currently at baseline

## 2023-11-20 NOTE — ASSESSMENT & PLAN NOTE
POA presented with worsening hyperbilirubinemia noted with follow-up bilirubin 9.19 with direct bilirubin of 5.0. Recent admission in October 2023 presented with hyperbilirubinemia/jaundice with decreased bilirubin levels without acute intervention unclear etiology at that time was thought to be likely secondary to medication induced? Patient does have history of alcohol abuse. Approximately 5 years ago, chronic pancreatitis s  10/28 CT abdominal pelvis on last admission noted with pancreatic duct dilation 8 mm with associated with 3 mm calculus noted in pancreatic head. Last admission extensive evaluation with KEI/anti-smooth, antimitochondrial/acute hepatitis panel, Hemochromatosis mutation unremarkbable, acute viral panel negative,   11/17 RUQ US hepatic steatosis, pancreatinine duct dilation 7 mm  Hyperbilirubinemia unclear etiology at this time possibly related to pancreatic duct dilation although no hepatobiliary duct involvement on CT scan on prior admission, unlikely alcohol-induced given patient has been off alcohol use over the past 5 years versus acute liver injury really unclear etiology at this time given no new medications, no recent illnesses.   Most likely Decompensated alcoholic liver disease     Plan:   Gastroenterology consulted   Aldactone 50mg daily  Lactulose daily  Follow up with Dr. Shelia Fulton outpatient

## 2023-11-20 NOTE — ASSESSMENT & PLAN NOTE
POA noted with creatinine 1.90 baseline seems to be 0.7-0.9  Patient was discharged on last admission of Lasix 40 mg once a day in the setting of anasarca  Was seen by PCP on the outpatient setting with repeat blood work for which was recommended to present to the emergency department  Baseline the patient does follow fluid restriction approximately 1800 cc given chronic hyponatremia  Likely in the setting of diuretics with fluid restriction at baseline versus postrenal given patient at baseline has history of urinary retention for which does require self-catheterization.     Lab Results   Component Value Date    CREATININE 1.06 11/20/2023    CREATININE 1.29 11/19/2023    CREATININE 1.57 (H) 11/18/2023    CREATININE 1.92 (H) 11/17/2023    CREATININE 0.68 10/29/2023      Hold Lasix for now  Monitor BMP daily  Urinary retention protocol  Avoid nephrotoxin

## 2023-11-20 NOTE — PLAN OF CARE
Problem: PAIN - ADULT  Goal: Verbalizes/displays adequate comfort level or baseline comfort level  Description: Interventions:  - Encourage patient to monitor pain and request assistance  - Assess pain using appropriate pain scale  - Administer analgesics based on type and severity of pain and evaluate response  - Implement non-pharmacological measures as appropriate and evaluate response  - Consider cultural and social influences on pain and pain management  - Notify physician/advanced practitioner if interventions unsuccessful or patient reports new pain  Outcome: Progressing     Problem: INFECTION - ADULT  Goal: Absence or prevention of progression during hospitalization  Description: INTERVENTIONS:  - Assess and monitor for signs and symptoms of infection  - Monitor lab/diagnostic results  - Monitor all insertion sites, i.e. indwelling lines, tubes, and drains  - Monitor endotracheal if appropriate and nasal secretions for changes in amount and color  - [place] appropriate cooling/warming therapies per order  - Administer medications as ordered  - Instruct and encourage patient and family to use good hand hygiene technique  - Identify and instruct in appropriate isolation precautions for identified infection/condition  Outcome: Progressing     Problem: SAFETY ADULT  Goal: Patient will remain free of falls  Description: INTERVENTIONS:  - Educate patient/family on patient safety including physical limitations  - Instruct patient to call for assistance with activity   - Consult OT/PT to assist with strengthening/mobility   - Keep Call bell within reach  - Keep bed low and locked with side rails adjusted as appropriate  - Keep care items and personal belongings within reach  - Initiate and maintain comfort rounds  - Make Fall Risk Sign visible to staff  - Offer Toileting every  Hours, in advance of need  - Initiate/Maintain alarm  - Obtain necessary fall risk management equipment:  - Apply yellow socks and bracelet for high fall risk patients  - Consider moving patient to room near nurses station  Outcome: Progressing     Problem: DISCHARGE PLANNING  Goal: Discharge to home or other facility with appropriate resources  Description: INTERVENTIONS:  - Identify barriers to discharge w/patient and caregiver  - Arrange for needed discharge resources and transportation as appropriate  - Identify discharge learning needs (meds, wound care, etc.)  - Arrange for interpretive services to assist at discharge as needed  - Refer to Case Management Department for coordinating discharge planning if the patient needs post-hospital services based on physician/advanced practitioner order or complex needs related to functional status, cognitive ability, or social support system  Outcome: Progressing     Problem: Knowledge Deficit  Goal: Patient/family/caregiver demonstrates understanding of disease process, treatment plan, medications, and discharge instructions  Description: Complete learning assessment and assess knowledge base.   Interventions:  - Provide teaching at level of understanding  - Provide teaching via preferred learning methods  Outcome: Progressing     Problem: METABOLIC, FLUID AND ELECTROLYTES - ADULT  Goal: Electrolytes maintained within normal limits  Description: INTERVENTIONS:  - Monitor labs and assess patient for signs and symptoms of electrolyte imbalances  - Administer electrolyte replacement as ordered  - Monitor response to electrolyte replacements, including repeat lab results as appropriate  - Instruct patient on fluid and nutrition as appropriate  Outcome: Progressing  Goal: Fluid balance maintained  Description: INTERVENTIONS:  - Monitor labs   - Monitor I/O and WT  - Instruct patient on fluid and nutrition as appropriate  - Assess for signs & symptoms of volume excess or deficit  Outcome: Progressing     Problem: Prexisting or High Potential for Compromised Skin Integrity  Goal: Skin integrity is maintained or improved  Description: INTERVENTIONS:  - Identify patients at risk for skin breakdown  - Assess and monitor skin integrity  - Assess and monitor nutrition and hydration status  - Monitor labs   - Assess for incontinence   - Turn and reposition patient  - Assist with mobility/ambulation  - Relieve pressure over bony prominences  - Avoid friction and shearing  - Provide appropriate hygiene as needed including keeping skin clean and dry  - Evaluate need for skin moisturizer/barrier cream  - Collaborate with interdisciplinary team   - Patient/family teaching  - Consider wound care consult   Outcome: Progressing

## 2023-11-20 NOTE — ASSESSMENT & PLAN NOTE
Lab Results   Component Value Date    K 3.8 11/20/2023    K 3.6 11/19/2023    K 3.7 11/18/2023      POA 3.2    received KDUR  BMP

## 2023-11-20 NOTE — PROGRESS NOTES
[facility]  Progress Note  Name: Hermilo Bee  MRN: [id number]  Unit/Bed#: W -01 I Date of Admission: 11/17/2023   Date of Service: 11/20/2023 I Hospital Day: 3    Assessment/Plan   * Decompensated liver disease [facility])  Assessment & Plan  POA presented with worsening hyperbilirubinemia noted with follow-up bilirubin 9.19 with direct bilirubin of 5.0. Recent admission in October 2023 presented with hyperbilirubinemia/jaundice with decreased bilirubin levels without acute intervention unclear etiology at that time was thought to be likely secondary to medication induced? Patient does have history of alcohol abuse. Approximately 5 years ago, chronic pancreatitis s  10/28 CT abdominal pelvis on last admission noted with pancreatic duct dilation 8 mm with associated with 3 mm calculus noted in pancreatic head. Last admission extensive evaluation with KEI/anti-smooth, antimitochondrial/acute hepatitis panel, Hemochromatosis mutation unremarkbable, acute viral panel negative,   11/17 RUQ US hepatic steatosis, pancreatinine duct dilation 7 mm  Hyperbilirubinemia unclear etiology at this time possibly related to pancreatic duct dilation although no hepatobiliary duct involvement on CT scan on prior admission, unlikely alcohol-induced given patient has been off alcohol use over the past 5 years versus acute liver injury really unclear etiology at this time given no new medications, no recent illnesses. Most likely Decompensated alcoholic liver disease     Plan:   Gastroenterology consulted and recommendations appreciated  MRCP done on 11/19.   Follow-up MRCP report    Abnormal transaminases  Assessment & Plan  Likely in the setting of decompensated liver cirrhosis of unclear etiology possibly alcohol induced at baseline unclear etiology decompensation at this time  Monitor hepatic function test  MELD lab 30 POA  Viral Panel negative on last admission, extensive workup thus far unremarkable  Awaiting MRCP read  GI Consulted appreciate recommendations     MELD 3.0: 27 at 11/20/2023  4:45 AM  MELD-Na: 24 at 11/20/2023  4:45 AM  Calculated from:  Serum Creatinine: 1.06 mg/dL at 11/20/2023  4:45 AM  Serum Sodium: 132 mmol/L at 11/20/2023  4:45 AM  Total Bilirubin: 7.01 mg/dL at 11/20/2023  4:45 AM  Serum Albumin: 2.2 g/dL at 11/20/2023  4:45 AM  INR(ratio): 1.78 at 11/20/2023  4:45 AM  Age at listing (hypothetical): 76 years  Sex: Female at 11/20/2023  4:45 AM       Chronic pancreatitis ([address])  Assessment & Plan  History of chronic pancreatitis s/s  alcohol/gallstones   No alcohol x5 years. Chronic pancreatic duct stones noted on CT. CA 19-9 was <2 last admission. Lipase WNL on admission. Continue home Creon  GI consulted appreciate recommendations    Hypomagnesemia  Assessment & Plan  Lab Results   Component Value Date    MG 1.9 11/19/2023    MG 2.3 11/18/2023    MG 1.3 (L) 11/17/2023        History hypomagnesemia PTA on magnesium gluconate 1000 mg twice daily  Recheck magnesium  Replace magnesium as needed    Pancytopenia ([address])  Assessment & Plan  History of pancytopenia in the setting of liver cirrhosis? With associated macrocytic anemia due to folate deficiency  POA platelets noted 000, hemoglobin 8.5       Plan:   - Hgb 7.6; continue to monitor   - Platelets 273  - CBC in AM    Chronic hyponatremia  Assessment & Plan    Lab Results   Component Value Date    SODIUM 132 (L) 11/20/2023    SODIUM 130 (L) 11/19/2023    SODIUM 131 (L) 11/18/2023    SODIUM 130 (L) 11/17/2023      History of chronic hyponatremia baseline seems to be from 130-1 33  At baseline patient follows fluid restriction 1800 cc, sodium chloride 1 g twice daily  Currently at baseline  BMP a.m.     Essential hypertension  Assessment & Plan  Blood Pressure: 115/54  Continue PTA losartan 100 mg once a day, metoprolol tartrate 100 mg once a day    Hypokalemia-resolved as of 11/20/2023  Assessment & Plan  Lab Results Component Value Date    K 3.8 2023    K 3.6 2023    K 3.7 2023      POA 3.2    received KDUR  BMP    PRETTY (acute kidney injury) (HCC)-resolved as of 2023  Assessment & Plan  POA noted with creatinine 1.90 baseline seems to be 0.7-0.9  Patient was discharged on last admission of Lasix 40 mg once a day in the setting of anasarca  Was seen by PCP on the outpatient setting with repeat blood work for which was recommended to present to the emergency department  Baseline the patient does follow fluid restriction approximately 1800 cc given chronic hyponatremia  Likely in the setting of diuretics with fluid restriction at baseline versus postrenal given patient at baseline has history of urinary retention for which does require self-catheterization. Lab Results   Component Value Date    CREATININE 1.06 2023    CREATININE 1.29 2023    CREATININE 1.57 (H) 2023    CREATININE 1.92 (H) 2023    CREATININE 0.68 10/29/2023      Hold Lasix for now  Monitor BMP daily  Urinary retention protocol  Avoid nephrotoxin           VTE Pharmacologic Prophylaxis: VTE Score: 2 Moderate Risk (Score 3-4) - Pharmacological DVT Prophylaxis Ordered: enoxaparin (Lovenox). Patient Centered Rounds: I performed bedside rounds with nursing staff today. Discussions with Specialists or Other Care Team Provider: GI    Education and Discussions with Family / Patient: Updated  () via phone. Current Length of Stay: 3 day(s)  Current Patient Status: Inpatient   Discharge Plan: Anticipate discharge in 24-48 hrs to home. Code Status: Level 1 - Full Code    Subjective:   Complaining of a few episodes of cramping and diarrhea in last 24 hours which has resolved. No other complaints this AM. Denies headache, chest pain SOB, abd pain, nausea, vomiting.      Objective:     Vitals:   Temp (24hrs), Av.4 °F (36.3 °C), Min:97.2 °F (36.2 °C), Max:97.6 °F (36.4 °C)    Temp:  [97.2 °F (36.2 °C)-97.6 °F (36.4 °C)] 97.3 °F (36.3 °C)  HR:  [60-67] 67  Resp:  [12-14] 14  BP: (102-121)/(51-55) 112/54  SpO2:  [97 %-100 %] 97 %  Body mass index is 23.6 kg/m². Input and Output Summary (last 24 hours):   No intake or output data in the 24 hours ending 11/20/23 0837      Physical Exam:   Physical Exam  Vitals and nursing note reviewed. Constitutional:       General: She is not in acute distress. Appearance: She is well-developed. HENT:      Head: Normocephalic and atraumatic. Mouth/Throat:      Mouth: Mucous membranes are moist.      Pharynx: Oropharynx is clear. Eyes:      Extraocular Movements: Extraocular movements intact. Conjunctiva/sclera: Conjunctivae normal.      Pupils: Pupils are equal, round, and reactive to light. Cardiovascular:      Rate and Rhythm: Normal rate and regular rhythm. Heart sounds: Normal heart sounds. No murmur heard. Pulmonary:      Effort: Pulmonary effort is normal. No respiratory distress. Breath sounds: Normal breath sounds. No wheezing or rales. Abdominal:      General: Bowel sounds are normal. There is no distension. Palpations: Abdomen is soft. Tenderness: There is no abdominal tenderness. Musculoskeletal:         General: No swelling. Cervical back: Neck supple. Right lower leg: No edema. Left lower leg: No edema. Skin:     General: Skin is warm and dry. Capillary Refill: Capillary refill takes less than 2 seconds. Coloration: Skin is jaundiced. Neurological:      Mental Status: She is alert and oriented to person, place, and time.    Psychiatric:         Mood and Affect: Mood normal.          Additional Data:     Labs:  Results from last 7 days   Lab Units 11/20/23  0445 11/18/23  0441 11/17/23  1933   WBC Thousand/uL 7.06   < > 7.43   HEMOGLOBIN g/dL 7.6*   < > 8.5*   HEMATOCRIT % 23.2*   < > 25.2*   PLATELETS Thousands/uL 116*   < > 128*   NEUTROS PCT %  --   --  72   LYMPHS PCT %  --   -- 18   MONOS PCT %  --   --  7   EOS PCT %  --   --  3    < > = values in this interval not displayed. Results from last 7 days   Lab Units 11/20/23  0445   SODIUM mmol/L 132*   POTASSIUM mmol/L 3.8   CHLORIDE mmol/L 104   CO2 mmol/L 21   BUN mg/dL 30*   CREATININE mg/dL 1.06   ANION GAP mmol/L 7   CALCIUM mg/dL 8.6   ALBUMIN g/dL 2.2*   TOTAL BILIRUBIN mg/dL 7.01*   ALK PHOS U/L 105*   ALT U/L 43   AST U/L 100*   GLUCOSE RANDOM mg/dL 129     Results from last 7 days   Lab Units 11/20/23  0445   INR  1.78*         Results from last 7 days   Lab Units 11/17/23  0821   HEMOGLOBIN A1C % 4.4           Lines/Drains:  Invasive Devices       Peripheral Intravenous Line  Duration             Peripheral IV 11/17/23 Right Antecubital 2 days    Peripheral IV 11/18/23 Right;Ventral (anterior) Forearm 2 days                          Imaging: Reviewed radiology reports from this admission including: ultrasound(s)  US abdomen complete   Final Result by Francis Martin MD (11/17 2228)      Hepatic steatosis. The surface contour of the liver is somewhat nodular. Clinical and laboratory correlation regarding the possibility of cirrhosis is suggested. Sequelae of chronic pancreatitis. The pancreatic duct measures approximately 7 mm. There is a small amount of ascites. Workstation performed: [id number]         XR chest portable    (Results Pending)   MRI abdomen w wo contrast and mrcp    (Results Pending)      Recent Cultures (last 7 days):   Results from last 7 days   Lab Units 11/18/23  1446 11/18/23  1415   BLOOD CULTURE  No Growth at 24 hrs. No Growth at 24 hrs.        Last 24 Hours Medication List:   Current Facility-Administered Medications   Medication Dose Route Frequency Provider Last Rate    acetaminophen  650 mg Oral Q6H PRN Cassy Rawls MD      calcium carbonate  1 tablet Oral Daily With [address] MD      cholecalciferol  1,000 Units Oral Daily Tyler Loco MD      diphenhydrAMINE  25 mg Intravenous Q6H PRN May Michelle Ocasio MD      enoxaparin  30 mg Subcutaneous Q24H [facility] MD Sherita      ferrous sulfate  325 mg Oral Daily With [address] [facility], MD      folic acid  1 mg Oral Daily Tyler Loco MD      lactulose  20 g Oral Daily AQUILES Hernandez      losartan  100 mg Oral Daily Tyler Loco MD      magnesium gluconate  1,000 mg Oral BID [address] MD Sherita      metoprolol tartrate  100 mg Oral BID Tyler Loco MD      pancrelipase (Lip-Prot-Amyl)  24,000 Units Oral TID With [address] MD Sherita      pantoprazole  40 mg Oral Early Morning Tyler Loco MD      senna  1 tablet Oral BID Tyler Loco MD      sodium chloride  1 g Oral BID With Meals Tyler Loco MD      thiamine  100 mg Oral Daily Tyler Loco MD             Today, Patient Was Seen By: Herminia Erazo DO    **Please Note: This note may have been constructed using a voice recognition system. **

## 2023-11-21 VITALS
BODY MASS INDEX: 23.55 KG/M2 | WEIGHT: 137.2 LBS | HEART RATE: 71 BPM | SYSTOLIC BLOOD PRESSURE: 121 MMHG | DIASTOLIC BLOOD PRESSURE: 56 MMHG | OXYGEN SATURATION: 98 % | RESPIRATION RATE: 16 BRPM | TEMPERATURE: 98.8 F

## 2023-11-21 LAB
ALBUMIN SERPL BCP-MCNC: 2.1 G/DL (ref 3.5–5)
ALP SERPL-CCNC: 119 U/L (ref 34–104)
ALT SERPL W P-5'-P-CCNC: 40 U/L (ref 7–52)
ANION GAP SERPL CALCULATED.3IONS-SCNC: 6 MMOL/L
AST SERPL W P-5'-P-CCNC: 97 U/L (ref 13–39)
BASOPHILS # BLD AUTO: 0.03 THOUSANDS/ÂΜL (ref 0–0.1)
BASOPHILS NFR BLD AUTO: 0 % (ref 0–1)
BILIRUB SERPL-MCNC: 6.08 MG/DL (ref 0.2–1)
BUN SERPL-MCNC: 32 MG/DL (ref 5–25)
CALCIUM ALBUM COR SERPL-MCNC: 10.2 MG/DL (ref 8.3–10.1)
CALCIUM SERPL-MCNC: 8.7 MG/DL (ref 8.4–10.2)
CHLORIDE SERPL-SCNC: 105 MMOL/L (ref 96–108)
CO2 SERPL-SCNC: 22 MMOL/L (ref 21–32)
CREAT SERPL-MCNC: 1.02 MG/DL (ref 0.6–1.3)
EOSINOPHIL # BLD AUTO: 0.2 THOUSAND/ÂΜL (ref 0–0.61)
EOSINOPHIL NFR BLD AUTO: 3 % (ref 0–6)
ERYTHROCYTE [DISTWIDTH] IN BLOOD BY AUTOMATED COUNT: 15.4 % (ref 11.6–15.1)
GFR SERPL CREATININE-BSD FRML MDRD: 56 ML/MIN/1.73SQ M
GLUCOSE SERPL-MCNC: 126 MG/DL (ref 65–140)
HCT VFR BLD AUTO: 22.6 % (ref 34.8–46.1)
HGB BLD-MCNC: 7.4 G/DL (ref 11.5–15.4)
IMM GRANULOCYTES # BLD AUTO: 0.05 THOUSAND/UL (ref 0–0.2)
IMM GRANULOCYTES NFR BLD AUTO: 1 % (ref 0–2)
INR PPP: 1.65 (ref 0.84–1.19)
LYMPHOCYTES # BLD AUTO: 1.48 THOUSANDS/ÂΜL (ref 0.6–4.47)
LYMPHOCYTES NFR BLD AUTO: 20 % (ref 14–44)
MCH RBC QN AUTO: 36.1 PG (ref 26.8–34.3)
MCHC RBC AUTO-ENTMCNC: 32.7 G/DL (ref 31.4–37.4)
MCV RBC AUTO: 110 FL (ref 82–98)
MONOCYTES # BLD AUTO: 0.65 THOUSAND/ÂΜL (ref 0.17–1.22)
MONOCYTES NFR BLD AUTO: 9 % (ref 4–12)
NEUTROPHILS # BLD AUTO: 5.18 THOUSANDS/ÂΜL (ref 1.85–7.62)
NEUTS SEG NFR BLD AUTO: 67 % (ref 43–75)
NRBC BLD AUTO-RTO: 0 /100 WBCS
PLATELET # BLD AUTO: 99 THOUSANDS/UL (ref 149–390)
PLATELET BLD QL SMEAR: ABNORMAL
PMV BLD AUTO: 11.2 FL (ref 8.9–12.7)
POTASSIUM SERPL-SCNC: 4.1 MMOL/L (ref 3.5–5.3)
PROT SERPL-MCNC: 5.2 G/DL (ref 6.4–8.4)
PROTHROMBIN TIME: 20.3 SECONDS (ref 11.6–14.5)
RBC # BLD AUTO: 2.05 MILLION/UL (ref 3.81–5.12)
RBC MORPH BLD: NORMAL
SODIUM SERPL-SCNC: 133 MMOL/L (ref 135–147)
WBC # BLD AUTO: 7.59 THOUSAND/UL (ref 4.31–10.16)

## 2023-11-21 PROCEDURE — 80053 COMPREHEN METABOLIC PANEL: CPT

## 2023-11-21 PROCEDURE — 99232 SBSQ HOSP IP/OBS MODERATE 35: CPT | Performed by: INTERNAL MEDICINE

## 2023-11-21 PROCEDURE — 85610 PROTHROMBIN TIME: CPT

## 2023-11-21 PROCEDURE — 99239 HOSP IP/OBS DSCHRG MGMT >30: CPT | Performed by: INTERNAL MEDICINE

## 2023-11-21 PROCEDURE — 85025 COMPLETE CBC W/AUTO DIFF WBC: CPT

## 2023-11-21 RX ORDER — SODIUM CHLORIDE 1 G/1
1 TABLET ORAL 2 TIMES DAILY WITH MEALS
Qty: 60 TABLET | Refills: 0 | Status: SHIPPED | OUTPATIENT
Start: 2023-11-21

## 2023-11-21 RX ORDER — FUROSEMIDE 20 MG/1
20 TABLET ORAL DAILY
Qty: 30 TABLET | Refills: 0 | Status: SHIPPED | OUTPATIENT
Start: 2023-11-21

## 2023-11-21 RX ORDER — LACTULOSE 10 G/15ML
20 SOLUTION ORAL DAILY
Qty: 240 ML | Refills: 0 | Status: SHIPPED | OUTPATIENT
Start: 2023-11-21

## 2023-11-21 RX ORDER — SPIRONOLACTONE 25 MG/1
50 TABLET ORAL DAILY
Status: DISCONTINUED | OUTPATIENT
Start: 2023-11-21 | End: 2023-11-21 | Stop reason: HOSPADM

## 2023-11-21 RX ORDER — SPIRONOLACTONE 25 MG/1
50 TABLET ORAL DAILY
Status: DISCONTINUED | OUTPATIENT
Start: 2023-11-21 | End: 2023-11-21

## 2023-11-21 RX ORDER — METOPROLOL TARTRATE 100 MG/1
100 TABLET ORAL 2 TIMES DAILY
Qty: 30 TABLET | Refills: 0 | Status: SHIPPED | OUTPATIENT
Start: 2023-11-21

## 2023-11-21 RX ORDER — SPIRONOLACTONE 50 MG/1
50 TABLET, FILM COATED ORAL DAILY
Qty: 30 TABLET | Refills: 0 | Status: SHIPPED | OUTPATIENT
Start: 2023-11-22

## 2023-11-21 RX ADMIN — PANCRELIPASE 24000 UNITS: 24000; 76000; 120000 CAPSULE, DELAYED RELEASE PELLETS ORAL at 11:57

## 2023-11-21 RX ADMIN — SODIUM CHLORIDE 1 G: 1 TABLET ORAL at 07:47

## 2023-11-21 RX ADMIN — LACTULOSE 20 G: 20 SOLUTION ORAL at 08:23

## 2023-11-21 RX ADMIN — PANCRELIPASE 24000 UNITS: 24000; 76000; 120000 CAPSULE, DELAYED RELEASE PELLETS ORAL at 07:47

## 2023-11-21 RX ADMIN — METOPROLOL TARTRATE 100 MG: 100 TABLET, FILM COATED ORAL at 08:22

## 2023-11-21 RX ADMIN — Medication 1000 UNITS: at 08:23

## 2023-11-21 RX ADMIN — Medication 100 MG: at 08:23

## 2023-11-21 RX ADMIN — FUROSEMIDE 20 MG: 20 TABLET ORAL at 08:23

## 2023-11-21 RX ADMIN — FERROUS SULFATE TAB 325 MG (65 MG ELEMENTAL FE) 325 MG: 325 (65 FE) TAB at 07:47

## 2023-11-21 RX ADMIN — SENNOSIDES 8.6 MG: 8.6 TABLET, FILM COATED ORAL at 08:23

## 2023-11-21 RX ADMIN — LOSARTAN POTASSIUM 100 MG: 50 TABLET, FILM COATED ORAL at 08:22

## 2023-11-21 RX ADMIN — Medication 1 TABLET: at 07:47

## 2023-11-21 RX ADMIN — FOLIC ACID 1 MG: 1 TABLET ORAL at 08:22

## 2023-11-21 RX ADMIN — PANTOPRAZOLE SODIUM 40 MG: 40 TABLET, DELAYED RELEASE ORAL at 05:06

## 2023-11-21 RX ADMIN — SPIRONOLACTONE 50 MG: 25 TABLET ORAL at 09:49

## 2023-11-21 RX ADMIN — ENOXAPARIN SODIUM 30 MG: 30 INJECTION SUBCUTANEOUS at 08:24

## 2023-11-21 RX ADMIN — Medication 1000 MG: at 07:47

## 2023-11-21 NOTE — DISCHARGE SUMMARY
[address] [facility]  Discharge- Yinka Uribe 1955, 76 y.o. female MRN: [id number]  Unit/Bed#: W -78 Encounter: [id number]  Primary Care Provider: Bharathi Benson MD   Date and time admitted to hospital: 11/17/2023  4:55 PM    * Decompensated liver disease [facility])  Assessment & Plan  POA presented with worsening hyperbilirubinemia noted with follow-up bilirubin 9.19 with direct bilirubin of 5.0. Recent admission in October 2023 presented with hyperbilirubinemia/jaundice with decreased bilirubin levels without acute intervention unclear etiology at that time was thought to be likely secondary to medication induced? Patient does have history of alcohol abuse. Approximately 5 years ago, chronic pancreatitis s  10/28 CT abdominal pelvis on last admission noted with pancreatic duct dilation 8 mm with associated with 3 mm calculus noted in pancreatic head. Last admission extensive evaluation with KEI/anti-smooth, antimitochondrial/acute hepatitis panel, Hemochromatosis mutation unremarkbable, acute viral panel negative,   11/17 RUQ US hepatic steatosis, pancreatinine duct dilation 7 mm  Hyperbilirubinemia unclear etiology at this time possibly related to pancreatic duct dilation although no hepatobiliary duct involvement on CT scan on prior admission, unlikely alcohol-induced given patient has been off alcohol use over the past 5 years versus acute liver injury really unclear etiology at this time given no new medications, no recent illnesses.   Most likely Decompensated alcoholic liver disease     Plan:   Gastroenterology consulted   Aldactone 50mg daily  Lactulose daily  Follow up with Dr. Katherine Mcelroy outpatient    Abnormal transaminases  Assessment & Plan  Likely in the setting of decompensated liver cirrhosis of unclear etiology possibly alcohol induced at baseline unclear etiology decompensation at this time  Monitor hepatic function test  MELD lab 30 POA  Viral Panel negative on last admission, extensive workup thus far unremarkable  GI Consulted appreciate recommendations     MELD 3.0: 27 at 11/20/2023  4:45 AM  MELD-Na: 24 at 11/20/2023  4:45 AM  Calculated from:  Serum Creatinine: 1.06 mg/dL at 11/20/2023  4:45 AM  Serum Sodium: 132 mmol/L at 11/20/2023  4:45 AM  Total Bilirubin: 7.01 mg/dL at 11/20/2023  4:45 AM  Serum Albumin: 2.2 g/dL at 11/20/2023  4:45 AM  INR(ratio): 1.78 at 11/20/2023  4:45 AM  Age at listing (hypothetical): 76 years  Sex: Female at 11/20/2023  4:45 AM       Chronic pancreatitis ([address])  Assessment & Plan  History of chronic pancreatitis s/s  alcohol/gallstones   No alcohol x5 years. Chronic pancreatic duct stones noted on CT. CA 19-9 was <2 last admission. Lipase WNL on admission. Continue home Creon  GI consulted    Hypomagnesemia  Assessment & Plan  Lab Results   Component Value Date    MG 1.9 11/19/2023    MG 2.3 11/18/2023    MG 1.3 (L) 11/17/2023        History hypomagnesemia PTA on magnesium gluconate 1000 mg twice daily    Pancytopenia ([address])  Assessment & Plan  History of pancytopenia in the setting of liver cirrhosis?   With associated macrocytic anemia due to folate deficiency  POA platelets noted 818, hemoglobin 8.5       Plan:   - Hgb 7.6; continue to monitor   - Platelets 634  - On folic acid and thiamine outpatient    Chronic hyponatremia  Assessment & Plan    Lab Results   Component Value Date    SODIUM 132 (L) 11/20/2023    SODIUM 130 (L) 11/19/2023    SODIUM 131 (L) 11/18/2023    SODIUM 130 (L) 11/17/2023      History of chronic hyponatremia baseline seems to be from 130-1 33  At baseline patient follows fluid restriction 1800 cc, sodium chloride 1 g twice daily  Currently at baseline    Essential hypertension  Assessment & Plan  Blood Pressure: 115/54  Continue PTA losartan 100 mg once a day, metoprolol tartrate 100 mg once a day    Hypokalemia-resolved as of 11/20/2023  Assessment & Plan  Lab Results   Component Value Date    K 3.8 11/20/2023 K 3.6 11/19/2023    K 3.7 11/18/2023      POA 3.2    received KDUR  BMP    PRETTY (acute kidney injury) (HCC)-resolved as of 11/20/2023  Assessment & Plan  POA noted with creatinine 1.90 baseline seems to be 0.7-0.9  Patient was discharged on last admission of Lasix 40 mg once a day in the setting of anasarca  Was seen by PCP on the outpatient setting with repeat blood work for which was recommended to present to the emergency department  Baseline the patient does follow fluid restriction approximately 1800 cc given chronic hyponatremia  Likely in the setting of diuretics with fluid restriction at baseline versus postrenal given patient at baseline has history of urinary retention for which does require self-catheterization. Lab Results   Component Value Date    CREATININE 1.06 11/20/2023    CREATININE 1.29 11/19/2023    CREATININE 1.57 (H) 11/18/2023    CREATININE 1.92 (H) 11/17/2023    CREATININE 0.68 10/29/2023      Hold Lasix for now  Monitor BMP daily  Urinary retention protocol  Avoid nephrotoxin      Medical Problems       Resolved Problems  Date Reviewed: 11/19/2023            Resolved    PRETTY (acute kidney injury) ([address]) 11/20/2023     Resolved by  Herminia Erazo DO    Hypokalemia 11/20/2023     Resolved by  Herminia Erazo DO        Discharging Resident: Herminia Erazo DO  Discharging Attending: Ro Cardenas MD  PCP: Ray Mauro MD  Admission Date:   Admission Orders (From admission, onward)       Ordered        11/17/23 2049  INPATIENT ADMISSION  Once                          Discharge Date: 11/21/23    Consultations During Hospital Stay:  GI     Procedures Performed:   MRCP    Significant Findings / Test Results:   MRI abdomen w wo contrast and mrcp   Final Result by Collette Bramble, MD (11/20 6996)      1.  Questionable very subtle micronodularity along the anterior left hepatic lobe, which may be artifact related to patient motion or represent very early cirrhosis, noting no additional morphologic features of cirrhosis. Two subcentimeter probable    hemangiomas. 2. Sequela of chronic pancreatitis again noted with main pancreatic duct dilatation. Known intraductal calculus in the pancreatic head is not well seen by MRI. No evidence of acute pancreatitis. 3. Moderate volume ascites, moderate right and small left pleural effusions, moderate body wall edema and mild periportal edema, likely related to fluid overloaded state. Workstation performed: [id number]         XR chest portable   Final Result by Srikanth Braden MD (11/20 3094)      Hazy opacity at the right lung base with silhouetting of the hemidiaphragm may represent a combination of layered effusion and parenchymal opacity. Small left effusion. Bibasilar reticular opacity may represent atelectasis, pneumonitis, edema. Workstation performed: [id number]         US abdomen complete   Final Result by Francis Martin MD (11/17 2228)      Hepatic steatosis. The surface contour of the liver is somewhat nodular. Clinical and laboratory correlation regarding the possibility of cirrhosis is suggested. Sequelae of chronic pancreatitis. The pancreatic duct measures approximately 7 mm. There is a small amount of ascites. Workstation performed: [id number]               Incidental Findings:   None       Test Results Pending at Discharge (will require follow up): None     Outpatient Tests Requested:  BMP    Complications:  None    Reason for Admission: Hyperbilirubinemia    Hospital Course:   Tye Bunn is a 76 y.o. female patient who originally presented to the hospital on 11/17/2023 due to hyperbilirubinemia. Patient presented after outpatient blood work revealing worsening hyperbilirubinemia and elevated creatinine. Her Lasix was held and creatinine improved. GI evaluated patient - MRI and MRCP were done which showed chronic stable changes.  Aldactone and lactulose were added. Her lasix dose was decreased to 20mg daily. She will follow up with hepatology/Dr. Darryle Humphrey outpatient and is discharged home. Please see above list of diagnoses and related plan for additional information. Condition at Discharge: stable    Discharge Day Visit / Exam:   Subjective:  No acute events overnight. No complaints this morning. Denies headache, chest pain, shortness of breath, abdominal pain, nausea,. Still complains of stable diarrhea. Vitals: Blood Pressure: 121/56 (11/21/23 0702)  Pulse: 71 (11/21/23 0702)  Temperature: 98.8 °F (37.1 °C) (11/21/23 0702)  Temp Source: Oral (11/17/23 2153)  Respirations: 16 (11/21/23 0702)  Weight - Scale: 62.2 kg (137 lb 3.2 oz) (11/21/23 0600)  SpO2: 98 % (11/21/23 2932)  Exam:   Physical Exam  Vitals and nursing note reviewed. Constitutional:       General: She is not in acute distress. Appearance: She is well-developed. HENT:      Head: Normocephalic and atraumatic. Mouth/Throat:      Mouth: Mucous membranes are moist.      Pharynx: Oropharynx is clear. Eyes:      Extraocular Movements: Extraocular movements intact. Conjunctiva/sclera: Conjunctivae normal.      Pupils: Pupils are equal, round, and reactive to light. Cardiovascular:      Rate and Rhythm: Normal rate and regular rhythm. Heart sounds: Normal heart sounds. No murmur heard. Pulmonary:      Effort: Pulmonary effort is normal. No respiratory distress. Breath sounds: Normal breath sounds. No wheezing or rales. Abdominal:      General: Bowel sounds are normal. There is no distension. Palpations: Abdomen is soft. Tenderness: There is no abdominal tenderness. Musculoskeletal:         General: No swelling. Cervical back: Neck supple. Right lower leg: No edema. Left lower leg: No edema. Skin:     General: Skin is warm and dry. Capillary Refill: Capillary refill takes less than 2 seconds. Coloration: Skin is jaundiced. Neurological:      Mental Status: She is alert and oriented to person, place, and time. Psychiatric:         Mood and Affect: Mood normal.          Discussion with Family: Updated  () at bedside. Discharge instructions/Information to patient and family:   See after visit summary for information provided to patient and family. Provisions for Follow-Up Care:  See after visit summary for information related to follow-up care and any pertinent home health orders. Mobility at time of Discharge:   Basic Mobility Inpatient Raw Score: 20  JH-HLM Goal: 6: Walk 10 steps or more  JH-HLM Achieved: 7: Walk 25 feet or more  HLM Goal achieved. Continue to encourage appropriate mobility. Disposition:   Home    Planned Readmission: none    Discharge Medications:  See after visit summary for reconciled discharge medications provided to patient and/or family.       **Please Note: This note may have been constructed using a voice recognition system**

## 2023-11-21 NOTE — PLAN OF CARE
Problem: PAIN - ADULT  Goal: Verbalizes/displays adequate comfort level or baseline comfort level  Description: Interventions:  - Encourage patient to monitor pain and request assistance  - Assess pain using appropriate pain scale  - Administer analgesics based on type and severity of pain and evaluate response  - Implement non-pharmacological measures as appropriate and evaluate response  - Consider cultural and social influences on pain and pain management  - Notify physician/advanced practitioner if interventions unsuccessful or patient reports new pain  Outcome: Progressing     Problem: INFECTION - ADULT  Goal: Absence or prevention of progression during hospitalization  Description: INTERVENTIONS:  - Assess and monitor for signs and symptoms of infection  - Monitor lab/diagnostic results  - Monitor all insertion sites, i.e. indwelling lines, tubes, and drains  - Monitor endotracheal if appropriate and nasal secretions for changes in amount and color  - Dickinson appropriate cooling/warming therapies per order  - Administer medications as ordered  - Instruct and encourage patient and family to use good hand hygiene technique  - Identify and instruct in appropriate isolation precautions for identified infection/condition  Outcome: Progressing     Problem: SAFETY ADULT  Goal: Patient will remain free of falls  Description: INTERVENTIONS:  - Educate patient/family on patient safety including physical limitations  - Instruct patient to call for assistance with activity   - Consult OT/PT to assist with strengthening/mobility   - Keep Call bell within reach  - Keep bed low and locked with side rails adjusted as appropriate  - Keep care items and personal belongings within reach  - Initiate and maintain comfort rounds  - Make Fall Risk Sign visible to staff  - Offer Toileting every 2 Hours, in advance of need  - Initiate/Maintain alarm  - Obtain necessary fall risk management equipment  - Apply yellow socks and bracelet for high fall risk patients  - Consider moving patient to room near nurses station  Outcome: Progressing     Problem: DISCHARGE PLANNING  Goal: Discharge to home or other facility with appropriate resources  Description: INTERVENTIONS:  - Identify barriers to discharge w/patient and caregiver  - Arrange for needed discharge resources and transportation as appropriate  - Identify discharge learning needs (meds, wound care, etc.)  - Arrange for interpretive services to assist at discharge as needed  - Refer to Case Management Department for coordinating discharge planning if the patient needs post-hospital services based on physician/advanced practitioner order or complex needs related to functional status, cognitive ability, or social support system  Outcome: Progressing     Problem: Knowledge Deficit  Goal: Patient/family/caregiver demonstrates understanding of disease process, treatment plan, medications, and discharge instructions  Description: Complete learning assessment and assess knowledge base.   Interventions:  - Provide teaching at level of understanding  - Provide teaching via preferred learning methods  Outcome: Progressing     Problem: METABOLIC, FLUID AND ELECTROLYTES - ADULT  Goal: Electrolytes maintained within normal limits  Description: INTERVENTIONS:  - Monitor labs and assess patient for signs and symptoms of electrolyte imbalances  - Administer electrolyte replacement as ordered  - Monitor response to electrolyte replacements, including repeat lab results as appropriate  - Instruct patient on fluid and nutrition as appropriate  Outcome: Progressing  Goal: Fluid balance maintained  Description: INTERVENTIONS:  - Monitor labs   - Monitor I/O and WT  - Instruct patient on fluid and nutrition as appropriate  - Assess for signs & symptoms of volume excess or deficit  Outcome: Progressing     Problem: Prexisting or High Potential for Compromised Skin Integrity  Goal: Skin integrity is maintained or improved  Description: INTERVENTIONS:  - Identify patients at risk for skin breakdown  - Assess and monitor skin integrity  - Assess and monitor nutrition and hydration status  - Monitor labs   - Assess for incontinence   - Turn and reposition patient  - Assist with mobility/ambulation  - Relieve pressure over bony prominences  - Avoid friction and shearing  - Provide appropriate hygiene as needed including keeping skin clean and dry  - Evaluate need for skin moisturizer/barrier cream  - Collaborate with interdisciplinary team   - Patient/family teaching  - Consider wound care consult   Outcome: Progressing

## 2023-11-21 NOTE — ANESTHESIA POSTPROCEDURE EVALUATION
Post-Op Assessment Note    CV Status:  Stable    Pain management: adequate     Mental Status:  Alert and awake   Hydration Status:  Euvolemic   PONV Controlled:  Controlled   Airway Patency:  Patent      Post Op Vitals Reviewed: Yes      Staff: Anesthesiologist, CRNA         No notable events documented.     BP  148/73   Temp  97.2   Pulse  69   Resp   14   SpO2   99
show

## 2023-11-21 NOTE — DISCHARGE INSTR - AVS FIRST PAGE
Dear Maximo Pressley,     It was our pleasure to care for you here at [facility]. It is our hope that we were always able to exceed the expected standards for your care during your stay. You were hospitalized due to ***. You were cared for on the *** floor by Kalyan Espino DO under the service of Lewis Morfin MD with the [facility] who covers for your primary care physician (PCP), Ganesh Huerta MD, while you were hospitalized. If you have any questions or concerns related to this hospitalization, you may contact us at [phone]. For follow up as well as any medication refills, we recommend that you follow up with your primary care physician. A registered nurse will reach out to you by phone within a few days after your discharge to answer any additional questions that you may have after going home. However, at this time we provide for you here, the most important instructions / recommendations at discharge:     Notable Medication Adjustments -   Lasix 20mg daily  Lactulose daily   Aldactone 50mg daily   Continue all other medications as prescribed  Testing Required after Discharge -   BMP  Important follow up information -   Please follow up with Dr. Constantino Colunga in Hepatology   Please follow up with your primary care physician after discharge  Other Instructions -   Should your symptoms return, or you develop new concerning symptoms, please call your doctor and/or go to your nearest Emergency Department. Please review this entire after visit summary as additional general instructions including medication list, appointments, activity, diet, any pertinent wound care, and other additional recommendations from your care team that may be provided for you.       Sincerely,     Kalyan Espino DO

## 2023-11-21 NOTE — PLAN OF CARE
Problem: PAIN - ADULT  Goal: Verbalizes/displays adequate comfort level or baseline comfort level  Description: Interventions:  - Encourage patient to monitor pain and request assistance  - Assess pain using appropriate pain scale  - Administer analgesics based on type and severity of pain and evaluate response  - Implement non-pharmacological measures as appropriate and evaluate response  - Consider cultural and social influences on pain and pain management  - Notify physician/advanced practitioner if interventions unsuccessful or patient reports new pain  Outcome: Progressing     Problem: INFECTION - ADULT  Goal: Absence or prevention of progression during hospitalization  Description: INTERVENTIONS:  - Assess and monitor for signs and symptoms of infection  - Monitor lab/diagnostic results  - Monitor all insertion sites, i.e. indwelling lines, tubes, and drains  - Monitor endotracheal if appropriate and nasal secretions for changes in amount and color  - Eldred appropriate cooling/warming therapies per order  - Administer medications as ordered  - Instruct and encourage patient and family to use good hand hygiene technique  - Identify and instruct in appropriate isolation precautions for identified infection/condition  Outcome: Progressing     Problem: SAFETY ADULT  Goal: Patient will remain free of falls  Description: INTERVENTIONS:  - Educate patient/family on patient safety including physical limitations  - Instruct patient to call for assistance with activity   - Consult OT/PT to assist with strengthening/mobility   - Keep Call bell within reach  - Keep bed low and locked with side rails adjusted as appropriate  - Keep care items and personal belongings within reach  - Initiate and maintain comfort rounds  - Make Fall Risk Sign visible to staff  - Offer Toileting every  Hours, in advance of need  - Initiate/Maintain alarm  - Obtain necessary fall risk management equipment:  - Apply yellow socks and bracelet for high fall risk patients  - Consider moving patient to room near nurses station  Outcome: Progressing     Problem: DISCHARGE PLANNING  Goal: Discharge to home or other facility with appropriate resources  Description: INTERVENTIONS:  - Identify barriers to discharge w/patient and caregiver  - Arrange for needed discharge resources and transportation as appropriate  - Identify discharge learning needs (meds, wound care, etc.)  - Arrange for interpretive services to assist at discharge as needed  - Refer to Case Management Department for coordinating discharge planning if the patient needs post-hospital services based on physician/advanced practitioner order or complex needs related to functional status, cognitive ability, or social support system  Outcome: Progressing     Problem: Knowledge Deficit  Goal: Patient/family/caregiver demonstrates understanding of disease process, treatment plan, medications, and discharge instructions  Description: Complete learning assessment and assess knowledge base.   Interventions:  - Provide teaching at level of understanding  - Provide teaching via preferred learning methods  Outcome: Progressing     Problem: METABOLIC, FLUID AND ELECTROLYTES - ADULT  Goal: Electrolytes maintained within normal limits  Description: INTERVENTIONS:  - Monitor labs and assess patient for signs and symptoms of electrolyte imbalances  - Administer electrolyte replacement as ordered  - Monitor response to electrolyte replacements, including repeat lab results as appropriate  - Instruct patient on fluid and nutrition as appropriate  Outcome: Progressing  Goal: Fluid balance maintained  Description: INTERVENTIONS:  - Monitor labs   - Monitor I/O and WT  - Instruct patient on fluid and nutrition as appropriate  - Assess for signs & symptoms of volume excess or deficit  Outcome: Progressing     Problem: Prexisting or High Potential for Compromised Skin Integrity  Goal: Skin integrity is maintained or improved  Description: INTERVENTIONS:  - Identify patients at risk for skin breakdown  - Assess and monitor skin integrity  - Assess and monitor nutrition and hydration status  - Monitor labs   - Assess for incontinence   - Turn and reposition patient  - Assist with mobility/ambulation  - Relieve pressure over bony prominences  - Avoid friction and shearing  - Provide appropriate hygiene as needed including keeping skin clean and dry  - Evaluate need for skin moisturizer/barrier cream  - Collaborate with interdisciplinary team   - Patient/family teaching  - Consider wound care consult   Outcome: Progressing

## 2023-11-21 NOTE — PROGRESS NOTES
Progress Note- Nia Andrea 76 y.o. female MRN: [id number]    Unit/Bed#: W -01 Encounter: [id number]      Assessment and Plan:    Decompensated liver cirrhosis and HE     History of alcohol abuse now with imaging suggestive of cirrhosis with lobulated hepatic contour and labs significant for thrombocytopenia, elevated INR, hyperbilirubinemia suggestive of synthetic dysfunction of the liver. On exam sarcopenic, jaundice, scleral icterus present. Asterixis+, LE edema+1  AST>ALT 97/40  KEI, AMA, ASMA, hemochromatosis mutation, acute hepatitis panel unremarkable  AFP tumor marker negative  Phosphatidyl ethanol in process  Urine sodium 35  Renal function stable and downtrending PRETTY seems resolved HRS unlikely   Infectious work up thus far unremarkable  Patient afebrile, no leucocytosis on CBC  BC no growth  UA Large Leucocytes, WBC no urine culture   CXR pending final read however suggestive of pleural effysion R>L  11/19 MRI abdomen with and without contrast and MRCP  "Questionable micronodularity along the anterior left hepatic lobe suggestive possibly early cirrhosis. Two subcentimeter probable hemangiomas"  Plan  Continue Lactulose 20g daily for HE, goal 2-3 soft but formed BM daily. Start Aldactone 50 mg once a day  Continue Lasix 20 mg once a day  Repeat BMP in one week. Recommend patient to establish care with Hepatologist Dr. Yoko Dang on the outpatient setting GI team will set up appointment. Maximize nutrition nutrition supplement, supplements ordered 3 times daily. Avoid Hepatotoxic medications/counseled provided continue alcohol remission. Hyperbilirubinemia     POA total bilirubin 9.19 ---7.01-->6.08 downtrending without intervention  11/19 moderate abdomen with and without contrast and MRCP known intraductal calculus in the pancreas head however not well seen by MRI.   Likely 2/2 decompensated liver cirrhosis excaerbated by PRETTY no obstructions seen on imagine to suggest extrahepatic involvement. Plan:  As Above     3. Chronic pancreatitis     Hx Alcohol abuse/recurrent gallstones which appear source of pancreatitis. Lipase unremarkable  CT imaging/ MRI suggestuve if known pancreatic duct dilation and known intraductal Kokolus pancreatic head. No evidence of acute pancreatitis  Ca19-9 <2 on last admission  Plan  Serial abdominal exams  Continue Creon prior to meals     GI will sign off, thank you for the consultation please reach out via TT if questions/concerns arise.   ______________________________________________________________________    Subjective:     No acute overnight events. Patient seen at bedside as follow up, denies any acute complain at this time. Review of Systems   Constitutional:  Positive for fatigue. Negative for chills and fever. HENT:  Negative for ear pain and sore throat. Eyes:  Negative for pain and visual disturbance. Respiratory:  Negative for cough and shortness of breath. Cardiovascular:  Negative for chest pain and palpitations. Gastrointestinal:  Positive for abdominal distention. Negative for abdominal pain, constipation, diarrhea, nausea and vomiting. Genitourinary:  Negative for dysuria and hematuria. Musculoskeletal:  Negative for arthralgias and back pain. Skin:  Positive for color change. Negative for rash. Neurological:  Negative for seizures and syncope. All other systems reviewed and are negative.      Medication Administration - last 24 hours from 11/20/2023 0845 to 11/21/2023 0845         Date/Time Order Dose Route Action Action by     11/21/2023 0823 EST senna (SENOKOT) tablet 8.6 mg 8.6 mg Oral Given Ulysess Fish, LPN     35/81/4826 9696 EST senna (SENOKOT) tablet 8.6 mg 8.6 mg Oral Not Given Ulysess Fish, LPN     19/98/3637 1446 EST calcium carbonate (OYSTER SHELL,OSCAL) 500 mg tablet 1 tablet 1 tablet Oral Given Ulysess Fish, LPN     46/77/5700 9353 EST cholecalciferol (VITAMIN D3) tablet 1,000 Units 1,000 Units Oral Given Pamela Pretty, LPN     72/47/2699 7392 EST ferrous sulfate tablet 325 mg 325 mg Oral Given Pamela Pretty, LPN     70/04/7897 1606 EST folic acid (FOLVITE) tablet 1 mg 1 mg Oral Given Pamela Pretty, LPN     74/52/6317 3084 EST losartan (COZAAR) tablet 100 mg 100 mg Oral Given Pamela Pretty, LPN     13/03/6615 1772 EST magnesium gluconate (MAGONATE) tablet 1,000 mg 1,000 mg Oral Given Pamela Pretty, LPN     81/03/9526 5096 EST magnesium gluconate (MAGONATE) tablet 1,000 mg 1,000 mg Oral Given Pamela Pretty, LPN     43/63/9292 6306 EST metoprolol tartrate (LOPRESSOR) tablet 100 mg 100 mg Oral Given Pamela Pretty, LPN     74/13/5106 0574 EST metoprolol tartrate (LOPRESSOR) tablet 100 mg 100 mg Oral Given Mone Regina     11/21/2023 0747 EST pancrelipase (Lip-Prot-Amyl) (CREON) delayed release capsule 24,000 Units 24,000 Units Oral Given Pamela Pretty, LPN     56/89/6792 0217 EST pancrelipase (Lip-Prot-Amyl) (CREON) delayed release capsule 24,000 Units 24,000 Units Oral Given Pamela Pretty, LPN     40/18/6748 9588 EST pancrelipase (Lip-Prot-Amyl) (CREON) delayed release capsule 24,000 Units 24,000 Units Oral Given Pamela Pretty, LPN     59/90/0193 6970 EST pantoprazole (PROTONIX) EC tablet 40 mg 40 mg Oral Given Madelon Rias, LPN     41/87/7889 5792 EST sodium chloride tablet 1 g 1 g Oral Given Pamela Pretty, LPN     47/99/7408 8875 EST sodium chloride tablet 1 g 1 g Oral Given Pamela Pretty, LPN     66/24/6220 0696 EST thiamine tablet 100 mg 100 mg Oral Given Pamela Pretty, LPN     27/71/5442 9564 EST enoxaparin (LOVENOX) subcutaneous injection 30 mg 30 mg Subcutaneous Given Pamela Pretty, LPN     14/99/1477 2294 EST lactulose (CHRONULAC) oral solution 20 g 20 g Oral Given Pamela Pretty, LPN     28/20/5491 2779 EST furosemide (LASIX) tablet 20 mg 20 mg Oral Given Pamela Pretty, LPN     84/05/0947 5328 EST furosemide (LASIX) tablet 20 mg 20 mg Oral Given Pamela Pretty, LPN            Objective:     Vitals: Blood pressure 121/56, pulse 71, temperature 98.8 °F (37.1 °C), resp. rate 16, weight 62.2 kg (137 lb 3.2 oz), SpO2 98 %. ,Body mass index is 23.55 kg/m². Intake/Output Summary (Last 24 hours) at 11/21/2023 0845  Last data filed at 11/20/2023 1651  Gross per 24 hour   Intake 461 ml   Output --   Net 461 ml       Physical Exam  Vitals and nursing note reviewed. Constitutional:       General: She is not in acute distress. Appearance: She is well-developed. Comments: Temporal wasting, sarcopenia   HENT:      Head: Normocephalic and atraumatic. Right Ear: External ear normal.      Left Ear: External ear normal.      Nose: Nose normal.      Mouth/Throat:      Mouth: Mucous membranes are moist.   Eyes:      General: Scleral icterus present. Extraocular Movements: Extraocular movements intact. Conjunctiva/sclera: Conjunctivae normal.      Pupils: Pupils are equal, round, and reactive to light. Cardiovascular:      Rate and Rhythm: Normal rate and regular rhythm. Pulses: Normal pulses. Heart sounds: Normal heart sounds. No murmur heard. Pulmonary:      Effort: Pulmonary effort is normal. No respiratory distress. Breath sounds: Normal breath sounds. Abdominal:      General: Bowel sounds are normal. There is distension. Palpations: Abdomen is soft. Tenderness: There is no abdominal tenderness. Comments: Abdomen soft, bowel sounds present. Mild di stented abdomen. Not tender negative for rebound, guarding    Musculoskeletal:         General: No swelling. Normal range of motion. Cervical back: Normal range of motion and neck supple. Right lower leg: No edema. Left lower leg: No edema. Comments: Trace pitting edema BLE   Skin:     General: Skin is warm and dry. Capillary Refill: Capillary refill takes less than 2 seconds. Coloration: Skin is jaundiced. Neurological:      Mental Status: She is alert.       Comments: Asterixis   Psychiatric: Mood and Affect: Mood normal.          Invasive Devices       Peripheral Intravenous Line  Duration             Peripheral IV 11/17/23 Right Antecubital 3 days    Peripheral IV 11/18/23 Right;Ventral (anterior) Forearm 3 days                    Lab Results:  Admission on 11/17/2023   Component Date Value    WBC 11/17/2023 7.43     RBC 11/17/2023 2.38 (L)     Hemoglobin 11/17/2023 8.5 (L)     Hematocrit 11/17/2023 25.2 (L)     MCV 11/17/2023 106 (H)     MCH 11/17/2023 35.7 (H)     MCHC 11/17/2023 33.7     RDW 11/17/2023 15.7 (H)     MPV 11/17/2023 11.2     Platelets 69/58/6233 128 (L)     nRBC 11/17/2023 0     Neutrophils Relative 11/17/2023 72     Immat GRANS % 11/17/2023 0     Lymphocytes Relative 11/17/2023 18     Monocytes Relative 11/17/2023 7     Eosinophils Relative 11/17/2023 3     Basophils Relative 11/17/2023 0     Neutrophils Absolute 11/17/2023 5.28     Immature Grans Absolute 11/17/2023 0.03     Lymphocytes Absolute 11/17/2023 1.37     Monocytes Absolute 11/17/2023 0.52     Eosinophils Absolute 11/17/2023 0.20     Basophils Absolute 11/17/2023 0.03     Sodium 11/17/2023 130 (L)     Potassium 11/17/2023 3.2 (L)     Chloride 11/17/2023 98     CO2 11/17/2023 20 (L)     ANION GAP 11/17/2023 12     BUN 11/17/2023 43 (H)     Creatinine 11/17/2023 1.92 (H)     Glucose 11/17/2023 139     Calcium 11/17/2023 8.6     Corrected Calcium 11/17/2023 9.6     AST 11/17/2023 122 (H)     ALT 11/17/2023 55 (H)     Alkaline Phosphatase 11/17/2023 113 (H)     Total Protein 11/17/2023 6.4     Albumin 11/17/2023 2.8 (L)     Total Bilirubin 11/17/2023 9.19 (H)     eGFR 11/17/2023 26     Lipase 11/17/2023 37     Bilirubin, Direct 11/17/2023 5.00 (H)     Protime 11/17/2023 20.6 (H)     INR 11/17/2023 1.69 (H)     PTT 11/17/2023 44 (H)     Magnesium 11/17/2023 1.3 (L)     Color, UA 11/18/2023 Yellow     Clarity, UA 11/18/2023 Turbid     Specific Gravity, UA 11/18/2023 1.009     pH, UA 11/18/2023 6.0     Leukocytes, UA 11/18/2023 Large (A)     Nitrite, UA 11/18/2023 Negative     Protein, UA 11/18/2023 Negative     Glucose, UA 11/18/2023 Negative     Ketones, UA 11/18/2023 Negative     Urobilinogen, UA 11/18/2023 3.0 (A)     Bilirubin, UA 11/18/2023 Negative     Occult Blood, UA 11/18/2023 Negative     RBC, UA 11/18/2023 1-2     WBC, UA 11/18/2023 Innumerable (A)     Epithelial Cells 11/18/2023 Occasional     Bacteria, UA 11/18/2023 Occasional     WBC Clumps 11/18/2023 Present     Total Bilirubin 11/18/2023 7.24 (H)     Bilirubin, Direct 11/18/2023 4.01 (H)     Alkaline Phosphatase 11/18/2023 81     AST 11/18/2023 95 (H)     ALT 11/18/2023 43     Total Protein 11/18/2023 5.2 (L)     Albumin 11/18/2023 2.3 (L)     Protime 11/18/2023 21.7 (H)     INR 11/18/2023 1.81 (H)     WBC 11/18/2023 6.53     RBC 11/18/2023 1.99 (L)     Hemoglobin 11/18/2023 7.0 (L)     Hematocrit 11/18/2023 20.8 (L)     MCV 11/18/2023 105 (H)     MCH 11/18/2023 35.2 (H)     MCHC 11/18/2023 33.7     RDW 11/18/2023 15.6 (H)     Platelets 27/59/6277 108 (L)     MPV 11/18/2023 11.7     Sodium 11/18/2023 131 (L)     Potassium 11/18/2023 3.7     Chloride 11/18/2023 102     CO2 11/18/2023 21     ANION GAP 11/18/2023 8     BUN 11/18/2023 42 (H)     Creatinine 11/18/2023 1.57 (H)     Glucose 11/18/2023 85     Calcium 11/18/2023 8.0 (L)     eGFR 11/18/2023 33     Magnesium 11/18/2023 2.3     Sodium, Ur 11/18/2023 35     Blood Culture 11/18/2023 No Growth at 48 hrs. Blood Culture 11/18/2023 No Growth at 48 hrs.      AFP TUMOR MARKER 11/19/2023 6.52     WBC 11/19/2023 8.37     RBC 11/19/2023 2.17 (L)     Hemoglobin 11/19/2023 8.0 (L)     Hematocrit 11/19/2023 22.8 (L)     MCV 11/19/2023 105 (H)     MCH 11/19/2023 36.9 (H)     MCHC 11/19/2023 35.1     RDW 11/19/2023 15.7 (H)     Platelets 98/17/5328 120 (L)     MPV 11/19/2023 11.8     Sodium 11/19/2023 130 (L)     Potassium 11/19/2023 3.6     Chloride 11/19/2023 101     CO2 11/19/2023 21     ANION GAP 11/19/2023 8     BUN 11/19/2023 35 (H)     Creatinine 11/19/2023 1.29     Glucose 11/19/2023 126     Calcium 11/19/2023 8.6     Corrected Calcium 11/19/2023 10.0     AST 11/19/2023 114 (H)     ALT 11/19/2023 48     Alkaline Phosphatase 11/19/2023 106 (H)     Total Protein 11/19/2023 5.6 (L)     Albumin 11/19/2023 2.3 (L)     Total Bilirubin 11/19/2023 6.92 (H)     eGFR 11/19/2023 42     Protime 11/19/2023 20.5 (H)     INR 11/19/2023 1.67 (H)     Magnesium 11/19/2023 1.9     WBC 11/20/2023 7.06     RBC 11/20/2023 2.16 (L)     Hemoglobin 11/20/2023 7.6 (L)     Hematocrit 11/20/2023 23.2 (L)     MCV 11/20/2023 107 (H)     MCH 11/20/2023 35.2 (H)     MCHC 11/20/2023 32.8     RDW 11/20/2023 15.3 (H)     Platelets 36/74/4302 116 (L)     MPV 11/20/2023 11.7     Sodium 11/20/2023 132 (L)     Potassium 11/20/2023 3.8     Chloride 11/20/2023 104     CO2 11/20/2023 21     ANION GAP 11/20/2023 7     BUN 11/20/2023 30 (H)     Creatinine 11/20/2023 1.06     Glucose 11/20/2023 129     Calcium 11/20/2023 8.6     Corrected Calcium 11/20/2023 10.0     AST 11/20/2023 100 (H)     ALT 11/20/2023 43     Alkaline Phosphatase 11/20/2023 105 (H)     Total Protein 11/20/2023 5.2 (L)     Albumin 11/20/2023 2.2 (L)     Total Bilirubin 11/20/2023 7.01 (H)     eGFR 11/20/2023 54     Protime 11/20/2023 21.5 (H)     INR 11/20/2023 1.78 (H)     Sodium 11/21/2023 133 (L)     Potassium 11/21/2023 4.1     Chloride 11/21/2023 105     CO2 11/21/2023 22     ANION GAP 11/21/2023 6     BUN 11/21/2023 32 (H)     Creatinine 11/21/2023 1.02     Glucose 11/21/2023 126     Calcium 11/21/2023 8.7     Corrected Calcium 11/21/2023 10.2 (H)     AST 11/21/2023 97 (H)     ALT 11/21/2023 40     Alkaline Phosphatase 11/21/2023 119 (H)     Total Protein 11/21/2023 5.2 (L)     Albumin 11/21/2023 2.1 (L)     Total Bilirubin 11/21/2023 6.08 (H)     eGFR 11/21/2023 56     WBC 11/21/2023 7.59     RBC 11/21/2023 2.05 (L)     Hemoglobin 11/21/2023 7.4 (L)     Hematocrit 11/21/2023 22.6 (L)     MCV 11/21/2023 110 (H)     MCH 11/21/2023 36.1 (H)     MCHC 11/21/2023 32.7     RDW 11/21/2023 15.4 (H)     MPV 11/21/2023 11.2     Platelets 27/91/9351 99 (L)     nRBC 11/21/2023 0     Neutrophils Relative 11/21/2023 67     Immat GRANS % 11/21/2023 1     Lymphocytes Relative 11/21/2023 20     Monocytes Relative 11/21/2023 9     Eosinophils Relative 11/21/2023 3     Basophils Relative 11/21/2023 0     Neutrophils Absolute 11/21/2023 5.18     Immature Grans Absolute 11/21/2023 0.05     Lymphocytes Absolute 11/21/2023 1.48     Monocytes Absolute 11/21/2023 0.65     Eosinophils Absolute 11/21/2023 0.20     Basophils Absolute 11/21/2023 0.03     Protime 11/21/2023 20.3 (H)     INR 11/21/2023 1.65 (H)     RBC Morphology 11/21/2023 Normal     Platelet Estimate 00/01/1758 Borderline (A)        Imaging Studies: I have personally reviewed pertinent imaging studies.

## 2023-11-21 NOTE — CASE MANAGEMENT
Case Management Discharge Planning Note    Patient name Yinka Uribe  Location W /W -17 MRN [id number]  : 1955 Date 2023       Current Admission Date: 2023  Current Admission Diagnosis:Decompensated liver disease [facility])   Patient Active Problem List    Diagnosis Date Noted    Abnormal transaminases 2023    Decompensated liver disease ([address]) 10/29/2023    Ascites/anasarca 10/26/2023    Hypomagnesemia 10/26/2023    Chronic pancreatitis ([address]) 10/26/2023    Esophageal abnormality 2023    Acute lower GI bleeding 2023    Asymptomatic bacteriuria 2023    Left hip pain 2022    Pancytopenia ([address]) 2018    Closed comminuted fracture of hip, left, initial encounter ([address]) 11/15/2018    Essential hypertension 11/15/2018    Chronic hyponatremia 11/15/2018    Closed intertrochanteric fracture, left, initial encounter ([address]) 11/15/2018    Pneumonia 2016      LOS (days): 4  Geometric Mean LOS (GMLOS) (days): 3.30  Days to GMLOS:-0.4     OBJECTIVE:  Risk of Unplanned Readmission Score: 25.19         Current admission status: Inpatient   Preferred Pharmacy:   [facility]/pharmacy [address], [address] [place] [address]  Phone: [phone] Fax: [address] , [address] [address]  Phone: [phone] Fax: [phone]    Primary Care Provider: Bharathi Benson MD    Primary Insurance: MEDICARE  Secondary Insurance: Juan Jose Ward DETAILS:    IMM Given (Date):: 23  IMM Given to[de-identified] Patient (copy provided)  Family notified[de-identified]

## 2023-11-21 NOTE — CASE MANAGEMENT
Case Management Assessment & Discharge Planning Note    Patient name Anshu Cancel  Location W /W -31 MRN [id number]  : 1955 Date 2023       Current Admission Date: 2023  Current Admission Diagnosis:Decompensated liver disease [facility])   Patient Active Problem List    Diagnosis Date Noted    Abnormal transaminases 2023    Decompensated liver disease ([address]) 10/29/2023    Ascites/anasarca 10/26/2023    Hypomagnesemia 10/26/2023    Chronic pancreatitis ([address]) 10/26/2023    Esophageal abnormality 2023    Acute lower GI bleeding 2023    Asymptomatic bacteriuria 2023    Left hip pain 2022    Pancytopenia ([address]) 2018    Closed comminuted fracture of hip, left, initial encounter ([address]) 11/15/2018    Essential hypertension 11/15/2018    Chronic hyponatremia 11/15/2018    Closed intertrochanteric fracture, left, initial encounter ([address]) 11/15/2018    Pneumonia 2016      LOS (days): 4  Geometric Mean LOS (GMLOS) (days): 3.30  Days to GMLOS:-0.2     OBJECTIVE:  PATIENT READMITTED [address] of Unplanned Readmission Score: 24.9         Current admission status: Inpatient       Preferred Pharmacy:   [address]  [facility], [address] [address]  Phone: [phone] Fax: [address] [facility], [address] [place] [address]  Phone: [phone] Fax: [phone]    Primary Care Provider: Yumiko Sharma MD    Primary Insurance: MEDICARE  Secondary Insurance: [employer]    ASSESSMENT:  [address] [facility], [address] [facility] Representative - Spouse   Primary Phone: [phone] (Mobile)  Home Phone: [phone]                 Readmission Root Cause  30 Day Readmission: No    Patient Information  Admitted from[de-identified] Home  Mental Status: Alert  During Assessment patient was accompanied by: Not accompanied during assessment  Assessment information provided by[de-identified] Patient  Primary Caregiver: Self  Support Systems: Spouse/significant other  [place] of Residence: [address] [place] [address] do you live in?: [place] entry access options. Select all that apply.: Stairs  Number of steps to enter home.: 3  Type of Current Residence: [facility]  Living Arrangements: Lives w/ Spouse/significant other    Activities of Daily Living Prior to Admission  Functional Status: Independent  Completes ADLs independently?: Yes  Ambulates independently?: Yes  Does patient use assisted devices?: Yes  Assisted Devices (DME) used: Straight Cane  Does patient currently own DME?: Yes  What DME does the patient currently own?: Herve Hussein, Other (Comment) (Transport Chair)  Does patient have a history of Outpatient Therapy (PT/OT)?: Yes  Does the patient have a history of Short-Term Rehab?: Yes  Does patient have a history of HHC?: Yes  Does patient currently have [address]?: No    Patient Information Continued  Income Source: Pension/long term  Does patient have prescription coverage?: Yes  Does patient receive dialysis treatments?: No  Does patient have a history of substance abuse?: No    Means of Transportation  Means of Transport to Appts[de-identified] Family transport    Housing Stability: Unknown (11/21/2023)    Housing Stability Vital Sign     Unable to Pay for Housing in the Last Year: No     Number of [address] in the Last Year: Not on file     Unstable Housing in the Last Year: No   Food Insecurity: No Food Insecurity (11/21/2023)    Hunger Vital Sign     Worried About Running Out of Food in the Last Year: Never true     [address] in the Last Year: Never true   Transportation Needs: No Transportation Needs (11/21/2023)    PRAPARE - Transportation     Lack of Transportation (Medical): No     Lack of Transportation (Non-Medical):  No   Utilities: Not At Risk (11/21/2023)    [facility] Utilities     Threatened with loss of utilities: No       DISCHARGE DETAILS:    Discharge planning discussed with[de-identified] patient at bedside  Freedom of Choice: Yes    [address]         Is the patient interested in [facility] at discharge?: No    DME Referral Provided  Referral made for DME?: No    Other Referral/Resources/Interventions Provided:  Interventions: None Indicated  Referral Comments: [facility] due to decompensated liver disease. CM met with patient at bedside to complete assessment/ discuss dcp. Patient lives with her  in a ranch home, 3STE. Patient is independent at baseline with ADLs and ambulation, uses a cane- also owns a walker and transport chair. Patient does have a history of STR, HHC and OP therapy. No current identified CM needs, however CM remains available.     Would you like to participate in our [address] service program?  : No - Declined    Treatment Team Recommendation: Home  Discharge Destination Plan[de-identified] Home  Transport at Discharge : Family

## 2023-11-22 NOTE — TELEPHONE ENCOUNTER
Called patient, spoke to pt's spouse, Gerald Madrigal, scheduled ov with Dr. Robyn Collazo on 12/7/23 at [facility]. Mailed appointment card to patient.

## 2023-11-24 LAB
BACTERIA BLD CULT: NORMAL
BACTERIA BLD CULT: NORMAL

## 2023-11-25 LAB — MISCELLANEOUS LAB TEST RESULT: NORMAL

## 2023-11-28 ENCOUNTER — APPOINTMENT (OUTPATIENT)
Dept: LAB | Facility: CLINIC | Age: 68
End: 2023-11-28
Payer: MEDICARE

## 2023-11-28 DIAGNOSIS — N17.9 AKI (ACUTE KIDNEY INJURY) (HCC): ICD-10-CM

## 2023-11-29 ENCOUNTER — APPOINTMENT (OUTPATIENT)
Dept: LAB | Facility: CLINIC | Age: 68
End: 2023-11-29
Payer: MEDICARE

## 2023-11-29 ENCOUNTER — TELEPHONE (OUTPATIENT)
Dept: OTHER | Facility: HOSPITAL | Age: 68
End: 2023-11-29

## 2023-11-29 DIAGNOSIS — K74.69 DECOMPENSATED LIVER DISEASE (HCC): Primary | ICD-10-CM

## 2023-11-29 LAB
ANION GAP SERPL CALCULATED.3IONS-SCNC: 11 MMOL/L
BUN SERPL-MCNC: 34 MG/DL (ref 5–25)
CALCIUM SERPL-MCNC: 8.6 MG/DL (ref 8.4–10.2)
CHLORIDE SERPL-SCNC: 103 MMOL/L (ref 96–108)
CO2 SERPL-SCNC: 15 MMOL/L (ref 21–32)
CREAT SERPL-MCNC: 1.34 MG/DL (ref 0.6–1.3)
GFR SERPL CREATININE-BSD FRML MDRD: 40 ML/MIN/1.73SQ M
GLUCOSE P FAST SERPL-MCNC: 103 MG/DL (ref 65–99)
POTASSIUM SERPL-SCNC: 4 MMOL/L (ref 3.5–5.3)
SODIUM SERPL-SCNC: 129 MMOL/L (ref 135–147)

## 2023-11-29 PROCEDURE — 80048 BASIC METABOLIC PNL TOTAL CA: CPT

## 2023-11-29 PROCEDURE — 36415 COLL VENOUS BLD VENIPUNCTURE: CPT

## 2023-11-29 NOTE — TELEPHONE ENCOUNTER
I called and spoke to patient regarding lab work. Patient was in the bathroom therefore I spoke with patient's spouse. Kidney function is elevated. This was checked after restarting diuretics at the end of her recent admission. I recommended stopping diuretics at this time. I recommend repeat lab work in 2 to 3 days. If there is any worsening at this time I would recommend going to the emergency room. Patients spouse reports she is relatively the same as she was last week at discharge. No significant worsening in abdominal distention. No pain. Mental status is the same as at discharge. Reports she was following fluid restriction. Reports urinary output is normal.  I discussed multiple ER alarm symptoms of when to bring patient to the emergency room. He reports understanding. He will call us with any questions or concerns. They have OV with hepatologist 12/7.

## 2023-12-02 ENCOUNTER — APPOINTMENT (OUTPATIENT)
Dept: LAB | Facility: CLINIC | Age: 68
End: 2023-12-02
Payer: MEDICARE

## 2023-12-02 DIAGNOSIS — K74.69 DECOMPENSATED LIVER DISEASE (HCC): ICD-10-CM

## 2023-12-02 LAB
ALBUMIN SERPL BCP-MCNC: 2.5 G/DL (ref 3.5–5)
ALP SERPL-CCNC: 121 U/L (ref 34–104)
ALT SERPL W P-5'-P-CCNC: 51 U/L (ref 7–52)
ANION GAP SERPL CALCULATED.3IONS-SCNC: 8 MMOL/L
AST SERPL W P-5'-P-CCNC: 123 U/L (ref 13–39)
BASOPHILS # BLD AUTO: 0.03 THOUSANDS/ÂΜL (ref 0–0.1)
BASOPHILS NFR BLD AUTO: 0 % (ref 0–1)
BILIRUB DIRECT SERPL-MCNC: 3 MG/DL (ref 0–0.2)
BILIRUB SERPL-MCNC: 6.76 MG/DL (ref 0.2–1)
BUN SERPL-MCNC: 28 MG/DL (ref 5–25)
CALCIUM SERPL-MCNC: 8.3 MG/DL (ref 8.4–10.2)
CHLORIDE SERPL-SCNC: 102 MMOL/L (ref 96–108)
CO2 SERPL-SCNC: 18 MMOL/L (ref 21–32)
CREAT SERPL-MCNC: 1.28 MG/DL (ref 0.6–1.3)
EOSINOPHIL # BLD AUTO: 0.1 THOUSAND/ÂΜL (ref 0–0.61)
EOSINOPHIL NFR BLD AUTO: 1 % (ref 0–6)
ERYTHROCYTE [DISTWIDTH] IN BLOOD BY AUTOMATED COUNT: 14.8 % (ref 11.6–15.1)
GFR SERPL CREATININE-BSD FRML MDRD: 43 ML/MIN/1.73SQ M
GLUCOSE P FAST SERPL-MCNC: 109 MG/DL (ref 65–99)
HCT VFR BLD AUTO: 26.8 % (ref 34.8–46.1)
HGB BLD-MCNC: 8.8 G/DL (ref 11.5–15.4)
IMM GRANULOCYTES # BLD AUTO: 0.05 THOUSAND/UL (ref 0–0.2)
IMM GRANULOCYTES NFR BLD AUTO: 1 % (ref 0–2)
INR PPP: 1.62 (ref 0.84–1.19)
LYMPHOCYTES # BLD AUTO: 1.99 THOUSANDS/ÂΜL (ref 0.6–4.47)
LYMPHOCYTES NFR BLD AUTO: 23 % (ref 14–44)
MCH RBC QN AUTO: 35.8 PG (ref 26.8–34.3)
MCHC RBC AUTO-ENTMCNC: 32.8 G/DL (ref 31.4–37.4)
MCV RBC AUTO: 109 FL (ref 82–98)
MONOCYTES # BLD AUTO: 0.55 THOUSAND/ÂΜL (ref 0.17–1.22)
MONOCYTES NFR BLD AUTO: 7 % (ref 4–12)
NEUTROPHILS # BLD AUTO: 5.8 THOUSANDS/ÂΜL (ref 1.85–7.62)
NEUTS SEG NFR BLD AUTO: 68 % (ref 43–75)
NRBC BLD AUTO-RTO: 0 /100 WBCS
PLATELET # BLD AUTO: 147 THOUSANDS/UL (ref 149–390)
PMV BLD AUTO: 12 FL (ref 8.9–12.7)
POTASSIUM SERPL-SCNC: 4 MMOL/L (ref 3.5–5.3)
PROT SERPL-MCNC: 6.4 G/DL (ref 6.4–8.4)
PROTHROMBIN TIME: 20 SECONDS (ref 11.6–14.5)
RBC # BLD AUTO: 2.46 MILLION/UL (ref 3.81–5.12)
SODIUM SERPL-SCNC: 128 MMOL/L (ref 135–147)
WBC # BLD AUTO: 8.52 THOUSAND/UL (ref 4.31–10.16)

## 2023-12-02 PROCEDURE — 85025 COMPLETE CBC W/AUTO DIFF WBC: CPT

## 2023-12-02 PROCEDURE — 80048 BASIC METABOLIC PNL TOTAL CA: CPT

## 2023-12-02 PROCEDURE — 85610 PROTHROMBIN TIME: CPT

## 2023-12-02 PROCEDURE — 80076 HEPATIC FUNCTION PANEL: CPT

## 2023-12-02 PROCEDURE — 36415 COLL VENOUS BLD VENIPUNCTURE: CPT

## 2023-12-07 ENCOUNTER — OFFICE VISIT (OUTPATIENT)
Dept: GASTROENTEROLOGY | Facility: CLINIC | Age: 68
End: 2023-12-07
Payer: MEDICARE

## 2023-12-07 VITALS
DIASTOLIC BLOOD PRESSURE: 70 MMHG | OXYGEN SATURATION: 96 % | SYSTOLIC BLOOD PRESSURE: 110 MMHG | WEIGHT: 145 LBS | TEMPERATURE: 97 F | HEIGHT: 64 IN | BODY MASS INDEX: 24.75 KG/M2 | HEART RATE: 70 BPM

## 2023-12-07 DIAGNOSIS — K74.69 DECOMPENSATED LIVER DISEASE (HCC): ICD-10-CM

## 2023-12-07 DIAGNOSIS — Z11.59 ENCOUNTER FOR SCREENING FOR OTHER VIRAL DISEASES: ICD-10-CM

## 2023-12-07 DIAGNOSIS — K86.1 CHRONIC PANCREATITIS, UNSPECIFIED PANCREATITIS TYPE (HCC): ICD-10-CM

## 2023-12-07 PROCEDURE — 99215 OFFICE O/P EST HI 40 MIN: CPT | Performed by: INTERNAL MEDICINE

## 2023-12-07 RX ORDER — LACTULOSE 10 G/15ML
20 SOLUTION ORAL 2 TIMES DAILY
Qty: 1800 ML | Refills: 11 | Status: SHIPPED | OUTPATIENT
Start: 2023-12-07 | End: 2024-12-06

## 2023-12-07 NOTE — PATIENT INSTRUCTIONS
As discussed today:    Medications:  Continue taking your current medications. I renewed your lactulose. I would like you to take it twice daily. Your goal is to have 3 soft, but formed bowel movements daily. Adjust the dose as needed (up or down) to achieve this goal.  Laboratory Testing (Listed below):  Please have blood work drawn in 1 week, then monthly  Avoid alcohol and tobacco products. Also avoid raw seafood/oysters and avoid swimming/wading in unchlorinated bae, particularly from the [facility], due to increased risk of infection from a bacteria called Vibrio Vulnificus. Avoid medications called NSAID's (Motrin/Ibuprofen, Naproxen/Naprosyn/Aleve) if possible, due to increase risk of kidney dysfunction, and if you use medications containing acetaminophen (Tylenol, percocet, Endocet, and many cough/cold medications), the dose should not exceed 2 grams/day. Seek immediate medical attention if you develop fevers over 101 F, have evidence of GI bleeding (black or bloody stools, bloody or coffee ground emesis) or confusion. Call our office if you develop worsening symptoms related to lower extremity edema, ascites, jaundice or excessive bruising/bleeding.

## 2023-12-07 NOTE — PROGRESS NOTES
Emily Wong [facility] - Outpatient Follow-up Note  Caitlyn Mary 76 y.o. female MRN: [id number]  Encounter: [id number]          ASSESSMENT AND PLAN:      Summary:    Ms. Silvina rKamer is a 76y.o. year old female with cirrhosis secondary to Chronic alcohol abuse which is decompensated with ascites, thrombocytopenia, jaundice, hypoalbuminemia, and hypoprothrombinemia. Problem List and Plan:    End Stage Liver Disease: Current MELD Score is 28. Reportedly quit alcohol 5 years ago. Current decompensation trigger unclear beyond progression of disease. Serologic work-up ruled out viral and autoimmune hepatitis and hemochromatosis. Transaminases still elevated with AST:ALT > 3:1.  Will check PeTH. Will also check ceruloplasmin, A1AT level and hepatitis A and B immunity. Advanced age and comorbidities likely preclude transplant candidacy, however if her MELD does not continue to improve, will again discuss with her possibility of being referred for transplant evaluation. Continue to check MELD labs every month. Volume: Ascites/Edema not well controlled:  Weight increasing and LE edema present. Diuretics on hold secondary to increased Cr and decreased Na to 128. Will repeat labs next week. Pt to continue checking weight every other day. Difficult management situation given she is on salt tabs for her hyponatremia (SIADH) with makes volume overload from her portal HTN worse. Will review labs next week and if her creatinine is improved, we will discuss with patient potentially restarting lower dose diuretics. Hepatic Encephalopathy:  Stage 1. Continue lactulose twice daily for a goal of 3 soft, but formed BM/day. She (and ) were instructed on dose adjustment as needed to achieve this goal.  Esophageal Varices: Last EGD done on July 25, 2023 showing no evidence of portal HTN. Repeat EGD due in 1 July, 2024.   Hepatoma Screening: Last abdominal was an MRI/MRCP  done 3 weeks ago showing subtle micronodularity of the hepatic countour,. Will plan for repeat ultrasound in 6 months. Colon Cancer Screening: Colonoscopy done in July showing extensive diverticulosis. Repeat colonoscopy recommended in 3 years due to personal history of polyps. Nutritional status: Moderate sarcopenia noted. Encouraged high protein snacking, low salt diet. If weight loss evident, will refer to nutritional counseling. Health Maintenance:  Ms. Marilu Zheng was counseled to avoid alcohol and tobacco products. Ms. Marilu Zheng was also advised to avoid raw seafood/oysters and from swimming in unchlorinated bae, particularly from the [facility], due to increased risk of infection from Vibrio Vulnificus. Ms. Marilu Zheng was also instructed to seek immediate medical attention should she develop fevers over 80 F, evidence of GI bleeding (black or bloody stools, bloody or coffee ground emesis) or confusion. Ms. Marilu Zheng was advised to avoid NSAIDs, if possible, due to increase risk of renal dysfunction, and advised that if she should use acetaminophen, dose should not exceed 2 grams/day. Ms. Marilu Zheng was recommend to remain up to date with adult vaccination, including influenza, pneumovax and meningococcus. Inactivated HSV and zoster vaccine is safe and encouraged by PCP. Ms. Marilu Zheng was instructed to call either our office or that of her referring doctor should she develop worsening symptoms related to lower extremity edema, ascites, jaundice or excessive bruising/bleeding. FOLLOW-UP:  Return in about 3 months (around 3/7/2024). VISIT DIAGNOSES AND ORDERS:      1. Chronic pancreatitis, unspecified pancreatitis type ([facility])    2. Decompensated liver disease ([facility])    3.  Encounter for screening for other viral diseases      Orders Placed This Encounter   Procedures    MISCELLANEOUS LAB TEST    Comprehensive metabolic panel    CBC and Platelet    Protime-INR    IgG, IgA, IgM    Alpha-1-antitrypsin    Hepatitis B surface antibody Hepatitis A antibody, total    Ceruloplasmin    CBC and Platelet    Protime-INR    Comprehensive metabolic panel     ______________________________________________________________________    SUBJECTIVE:         Ms. Urmila Spring is a 76 y.o. female with medical history as outlined below including hypertension, anemia, chronic pancreatitis, chronic hyponatremia secondary to SIADH who comes in today for follow-up with hepatology after recent hospitalization last month after outpatient labs showed a bilirubin of 9 with PRETTY. Given that patient reported and was believed to have last used alcohol 5 years ago this was felt to be more related to decompensated disease rather than acute alcoholic hepatitis. Serologic evaluation for other underlying causes of chronic liver disease was undertaken. Viral and autoimmune hepatitis and hemochromatosis were ruled out. She had abdominal imaging done, and ultrasound showed hepatic steatosis with a slightly nodular hepatic surface contour suggesting cirrhosis as well as sequela of chronic pancreatitis. She subsequently underwent an MRI MRCP which showed a questionable very subtle micro nodularity along the left hepatic lobe of the liver, and sequela of chronic pancreatitis with main pancreatic duct dilatation and evidence of a known intraductal calculus in the pancreatic head was not noted on the MRI. She was noted to have moderate volume ascites and moderate right and small left pleural effusions. She was started on diuretics on discharge however outpatient labs showed a acute kidney injury and diuretics were discontinued. She had repeat blood work done 5 days ago which showed mild improvement of her kidney function but persistent hyponatremia with sodium of 128. Her LFTs been essentially unchanged with a bilirubin of 6.76, alk phos 121, , ALT 51, albumin 2.5. INR was 1.62.     In the office today, she complains of gradually increasing weight, accompanied by mild lower extremity edema and further distention of her abdomen. She states this occurred after she stopped her diuretics. She also complains of ongoing fatigue. She ran out of lactulose and also complains of more fogginess and difficulty with her memory. She does not feel her jaundice has improved. She continues to have easy bruising but denies any evidence of bleeding. She denies pruritus. She denies diarrhea or constipation. She denies abdominal pain, heartburn, reflux, dysphagia, odynophagia. She is not eating much and has not been drinking the ensures that her  has purchased. REVIEW OF SYSTEMS     Review of Systems Per HPI    Historical Information   Patient Active Problem List   Diagnosis    Pneumonia    Closed comminuted fracture of hip, left, initial encounter [facility])    Essential hypertension    Chronic hyponatremia    Closed intertrochanteric fracture, left, initial encounter ([address])    Pancytopenia ([address])    Left hip pain    Acute lower GI bleeding    Asymptomatic bacteriuria    Esophageal abnormality    Ascites/anasarca    Hypomagnesemia    Chronic pancreatitis (HCC)    Decompensated liver disease (HCC)    Abnormal transaminases     Social History     Substance and Sexual Activity   Alcohol Use Not Currently    Alcohol/week: 1.0 standard drink of alcohol    Types: 1 Cans of beer per week    Comment: 2-3 times per week.  Drinks heavier before     Social History     Substance and Sexual Activity   Drug Use No     Social History     Tobacco Use   Smoking Status Former   Smokeless Tobacco Never       Meds/Allergies       Current Outpatient Medications:     Calcium Carb-Cholecalciferol (CALCIUM + D3 PO)    Cholecalciferol (VITAMIN D3) 3000 UNITS TABS    diclofenac sodium (VOLTAREN) 1 %    docusate sodium (COLACE) 100 mg capsule    ferrous sulfate 325 (65 Fe) mg tablet    furosemide (LASIX) 20 mg tablet    lactulose (CHRONULAC) 10 g/15 mL solution    lidocaine (LIDODERM) 5 %    losartan (COZAAR) 50 mg tablet    metoprolol tartrate (LOPRESSOR) 100 mg tablet    Omega-3 Fatty Acids (FISH OIL) 1,000 mg    pancrelipase, Lip-Prot-Amyl, (CREON) 24,000 units    senna (SENOKOT) 8.6 mg    sodium chloride 1 g tablet    spironolactone (ALDACTONE) 50 mg tablet    thiamine 100 MG tablet    traMADol (ULTRAM) 50 mg tablet    folic acid (FOLVITE) 1 mg tablet    magnesium gluconate (MAGONATE) 500 mg tablet    pantoprazole (PROTONIX) 40 mg tablet    Allergies   Allergen Reactions    Demerol [Meperidine] Other (See Comments)     hallucinations    Diphenhydramine Other (See Comments)     hallucinations    Prednisone Tremor    Sulfa Antibiotics Other (See Comments)     hallucinations    Percocet [Oxycodone-Acetaminophen] Palpitations           Objective     Blood pressure 110/70, pulse 70, temperature (!) 97 °F (36.1 °C), temperature source Tympanic, height 5' 4" (1.626 m), weight 65.8 kg (145 lb), SpO2 96 %. Body mass index is 24.89 kg/m². PHYSICAL EXAM:      Physical Exam  Constitutional:       General: She is not in acute distress. Appearance: She is ill-appearing. HENT:      Head: Normocephalic. Ears:      Comments: Hard of hearing     Mouth/Throat:      Mouth: Mucous membranes are moist.   Eyes:      General: Scleral icterus present. Extraocular Movements: Extraocular movements intact. Cardiovascular:      Rate and Rhythm: Normal rate. Heart sounds: No murmur heard. Pulmonary:      Effort: Pulmonary effort is normal. No respiratory distress. Abdominal:      General: There is distension. Palpations: There is shifting dullness and fluid wave. There is no splenomegaly. Musculoskeletal:         General: Swelling (2+ bilateral lower extremity mildly pitting edema) present. Cervical back: Normal range of motion. Skin:     Coloration: Skin is jaundiced. Findings: Bruising present. No erythema. Neurological:      Mental Status: She is oriented to person, place, and time. Psychiatric:         Behavior: Behavior normal.         Lab Results:   Lab Results   Component Value Date    K 4.0 12/02/2023    CO2 18 (L) 12/02/2023     12/02/2023    BUN 28 (H) 12/02/2023    CREATININE 1.28 12/02/2023    GLUCOSE 96 03/08/2016     Lab Results   Component Value Date    WBC 8.52 12/02/2023    HGB 8.8 (L) 12/02/2023    HCT 26.8 (L) 12/02/2023     (H) 12/02/2023     (L) 12/02/2023     Lab Results   Component Value Date    TP 6.4 12/02/2023     (H) 12/02/2023    ALT 51 12/02/2023    BILIDIR 3.00 (H) 12/02/2023    INR 1.62 (H) 12/02/2023      Lab Results   Component Value Date    IRON 87 10/26/2023    FERRITIN 2,518 (H) 10/26/2023     Lab Results   Component Value Date     (H) 10/26/2016    TRIG 31 10/26/2016     MELD 3.0: 28 at 12/2/2023 10:34 AM  MELD-Na: 27 at 12/2/2023 10:34 AM  Calculated from:  Serum Creatinine: 1.28 mg/dL at 12/2/2023 10:34 AM  Serum Sodium: 128 mmol/L at 12/2/2023 10:34 AM  Total Bilirubin: 6.76 mg/dL at 12/2/2023 10:34 AM  Serum Albumin: 2.5 g/dL at 12/2/2023 10:34 AM  INR(ratio): 1.62 at 12/2/2023 10:34 AM  Age at listing (hypothetical): 76 years  Sex: Female at 12/2/2023 10:34 AM        Radiology Results:   XR chest portable    Result Date: 11/20/2023  Narrative: CHEST INDICATION:   decompensated cirrhosis. COMPARISON: 10/25/2023 EXAM PERFORMED/VIEWS:  XR CHEST PORTABLE FINDINGS: Hazy opacity at the right lung base with silhouetting of the hemidiaphragm may represent a combination of layered effusion and parenchymal opacity. Bibasilar reticular opacity may represent atelectasis, pneumonitis, edema. Small left effusion. No pneumothorax. Stable cardiomediastinal silhouette. Impression: Hazy opacity at the right lung base with silhouetting of the hemidiaphragm may represent a combination of layered effusion and parenchymal opacity. Small left effusion. Bibasilar reticular opacity may represent atelectasis, pneumonitis, edema.  Workstation performed: [id number]     MRI abdomen w wo contrast and mrcp    Result Date: 11/20/2023  Narrative: MRI OF THE ABDOMEN WITH AND WITHOUT CONTRAST WITH MRCP INDICATION: 76 years / Female  pancreatitis. COMPARISON: Abdominal ultrasound 11/17/2023. CT abdomen/pelvis 10/26/2023. Abdominal MRI 8/18/2017. TECHNIQUE:  Multiplanar/multisequence MRI of the abdomen with 3D MRCP was performed before and after administration of contrast. IV Contrast:  6 mL of Gadobutrol injection (SINGLE-DOSE) FINDINGS: LOWER CHEST:   Moderate right and small left pleural effusions and right greater than left. Basilar atelectasis LIVER: Questionable very subtle micronodularity along the anterior left hepatic lobe. No suspicious mass. Two probable subcentimeter hemangiomas in segment 7 (series 7 image 9) and 4B (series 7 image 16). The segment 7 lesion is moderately T2 hyperintense without significant arterial hyperenhancement, but which becomes more inconspicuous  on delayed phase. The segment 4B lesion is moderately T2 hyperintense with probable nodular discontinuous arterial hyperenhancement which remains enhancing on delayed phase. Mild periportal edema. The hepatic veins and portal veins are patent. BILE DUCTS:  No intrahepatic or extrahepatic bile duct dilation. Common bile duct is normal in caliber. No choledocholithiasis, biliary stricture or suspicious mass. GALLBLADDER: Cholecystectomy PANCREAS: Again seen is pancreatic duct dilatation measuring up to 6 mm with a probable small focus of gas in the pancreatic duct within the body (series 8 image 20). The known intraductal calculus in the pancreatic head is not well seen. Additional pancreatic calcifications are also not well seen by MRI. Atrophy and decreased T1 signal of the pancreatic tail, similar to prior. ADRENAL GLANDS:  Unremarkable. SPLEEN:  Normal. KIDNEYS/PROXIMAL URETERS:  No hydroureteronephrosis. No suspicious renal mass. BOWEL:   No dilated loops of bowel. PERITONEUM/RETROPERITONEUM: Moderate volume abdominopelvic ascites. LYMPH NODES:  No abdominal lymphadenopathy. VASCULAR STRUCTURES:  No aneurysm. ABDOMINAL WALL: Moderate body wall edema OSSEOUS STRUCTURES:  No suspicious osseous lesion. Impression: 1. Questionable very subtle micronodularity along the anterior left hepatic lobe, which may be artifact related to patient motion or represent very early cirrhosis, noting no additional morphologic features of cirrhosis. Two subcentimeter probable hemangiomas. 2. Sequela of chronic pancreatitis again noted with main pancreatic duct dilatation. Known intraductal calculus in the pancreatic head is not well seen by MRI. No evidence of acute pancreatitis. 3. Moderate volume ascites, moderate right and small left pleural effusions, moderate body wall edema and mild periportal edema, likely related to fluid overloaded state. Workstation performed: [id number]     US abdomen complete    Result Date: 11/17/2023  Narrative: ABDOMEN ULTRASOUND, COMPLETE INDICATION:   Jaundice. Epigastric pain, right upper quadrant pain. History of chronic pancreatitis. COMPARISON: Ultrasound dated October 26, 2023 and CT dated October 26, 2023. TECHNIQUE:   Real-time ultrasound of the abdomen was performed with a curvilinear transducer with both volumetric sweeps and still imaging techniques. FINDINGS: PANCREAS: The main pancreatic duct is dilated, measuring approximately 7 mm (8 mm on October 26, 2023). Pancreatic calcifications redemonstrated. AORTA AND IVC:  Visualized portions are normal for patient age. LIVER: Size: Top normal in size. The liver measures 17.3 cm in the midclavicular line. Contour:  Surface contour is nodular. Parenchyma: Increased echogenicity, somewhat heterogeneous echotexture. No suspicious liver mass identified. There is a 9 x 4 x 7 mm cyst in the right lobe of the liver.  Limited imaging of the main portal vein shows it to be patent and hepatopetal. BILIARY: Patient has undergone cholecystectomy. No intrahepatic biliary dilatation. CBD measures 7.0 mm. No evidence of choledocholithiasis. KIDNEY: Right kidney measures 9.0 x 3.9 x 4.4  cm. Volume 80.7 mL Lobulated contour with mild cortical thinning. No hydronephrosis. Left kidney measures 9.9 x 5.1 x 4.6 cm. Volume 122.2 mL Lobulated contour. No hydronephrosis. SPLEEN: Measures 11.0 x 10.9 x 4.5 cm. Volume 281.1 mL The spleen measured 13.4 cm craniocaudal dimension on the October 26, 2023 CT. Top normal size. ASCITES: There is a small amount of ascites. Impression: Hepatic steatosis. The surface contour of the liver is somewhat nodular. Clinical and laboratory correlation regarding the possibility of cirrhosis is suggested. Sequelae of chronic pancreatitis. The pancreatic duct measures approximately 7 mm. There is a small amount of ascites.  Workstation performed: [id number]         Balbir Chen MD

## 2023-12-13 ENCOUNTER — APPOINTMENT (OUTPATIENT)
Dept: LAB | Facility: CLINIC | Age: 68
End: 2023-12-13
Payer: MEDICARE

## 2023-12-13 DIAGNOSIS — Z11.59 ENCOUNTER FOR SCREENING FOR OTHER VIRAL DISEASES: ICD-10-CM

## 2023-12-13 DIAGNOSIS — K86.1 CHRONIC PANCREATITIS, UNSPECIFIED PANCREATITIS TYPE (HCC): ICD-10-CM

## 2023-12-13 DIAGNOSIS — K74.69 DECOMPENSATED LIVER DISEASE (HCC): ICD-10-CM

## 2023-12-13 LAB
ALBUMIN SERPL BCP-MCNC: 2.5 G/DL (ref 3.5–5)
ALP SERPL-CCNC: 106 U/L (ref 34–104)
ALT SERPL W P-5'-P-CCNC: 35 U/L (ref 7–52)
ANION GAP SERPL CALCULATED.3IONS-SCNC: 6 MMOL/L
AST SERPL W P-5'-P-CCNC: 67 U/L (ref 13–39)
BILIRUB SERPL-MCNC: 4.96 MG/DL (ref 0.2–1)
BUN SERPL-MCNC: 19 MG/DL (ref 5–25)
CALCIUM ALBUM COR SERPL-MCNC: 10.5 MG/DL (ref 8.3–10.1)
CALCIUM SERPL-MCNC: 9.3 MG/DL (ref 8.4–10.2)
CHLORIDE SERPL-SCNC: 108 MMOL/L (ref 96–108)
CO2 SERPL-SCNC: 18 MMOL/L (ref 21–32)
CREAT SERPL-MCNC: 1.11 MG/DL (ref 0.6–1.3)
ERYTHROCYTE [DISTWIDTH] IN BLOOD BY AUTOMATED COUNT: 14 % (ref 11.6–15.1)
GFR SERPL CREATININE-BSD FRML MDRD: 51 ML/MIN/1.73SQ M
GLUCOSE P FAST SERPL-MCNC: 117 MG/DL (ref 65–99)
HAV AB SER QL IA: NORMAL
HBV SURFACE AB SER-ACNC: <3 MIU/ML
HCT VFR BLD AUTO: 25 % (ref 34.8–46.1)
HGB BLD-MCNC: 8.2 G/DL (ref 11.5–15.4)
IGA SERPL-MCNC: 874 MG/DL (ref 66–433)
IGG SERPL-MCNC: 1942 MG/DL (ref 635–1741)
IGM SERPL-MCNC: 207 MG/DL (ref 45–281)
INR PPP: 1.75 (ref 0.84–1.19)
MCH RBC QN AUTO: 34.7 PG (ref 26.8–34.3)
MCHC RBC AUTO-ENTMCNC: 32.8 G/DL (ref 31.4–37.4)
MCV RBC AUTO: 106 FL (ref 82–98)
PLATELET # BLD AUTO: 128 THOUSANDS/UL (ref 149–390)
PMV BLD AUTO: 11.6 FL (ref 8.9–12.7)
POTASSIUM SERPL-SCNC: 3.5 MMOL/L (ref 3.5–5.3)
PROT SERPL-MCNC: 6.2 G/DL (ref 6.4–8.4)
PROTHROMBIN TIME: 21.2 SECONDS (ref 11.6–14.5)
RBC # BLD AUTO: 2.36 MILLION/UL (ref 3.81–5.12)
SODIUM SERPL-SCNC: 132 MMOL/L (ref 135–147)
WBC # BLD AUTO: 7.17 THOUSAND/UL (ref 4.31–10.16)

## 2023-12-13 PROCEDURE — 85610 PROTHROMBIN TIME: CPT | Performed by: INTERNAL MEDICINE

## 2023-12-13 PROCEDURE — 80053 COMPREHEN METABOLIC PANEL: CPT | Performed by: INTERNAL MEDICINE

## 2023-12-13 PROCEDURE — 85027 COMPLETE CBC AUTOMATED: CPT | Performed by: INTERNAL MEDICINE

## 2023-12-13 PROCEDURE — 80321 ALCOHOLS BIOMARKERS 1OR 2: CPT

## 2023-12-13 PROCEDURE — 82784 ASSAY IGA/IGD/IGG/IGM EACH: CPT

## 2023-12-13 PROCEDURE — 82390 ASSAY OF CERULOPLASMIN: CPT

## 2023-12-13 PROCEDURE — 86708 HEPATITIS A ANTIBODY: CPT

## 2023-12-13 PROCEDURE — 82103 ALPHA-1-ANTITRYPSIN TOTAL: CPT

## 2023-12-13 PROCEDURE — 86706 HEP B SURFACE ANTIBODY: CPT

## 2023-12-13 PROCEDURE — 36415 COLL VENOUS BLD VENIPUNCTURE: CPT

## 2023-12-14 LAB
A1AT SERPL-MCNC: 151 MG/DL (ref 101–187)
CERULOPLASMIN SERPL-MCNC: 13.9 MG/DL (ref 19–39)

## 2023-12-15 ENCOUNTER — TELEPHONE (OUTPATIENT)
Age: 68
End: 2023-12-15

## 2023-12-15 NOTE — TELEPHONE ENCOUNTER
Patients GI provider:  Dr. Sarai Rosa    Number to return call: [phone]    Reason for call: Carson Man from Dr Clarence Ott office requesting lab results be faxed to the office.  Fax jesse is [phone]    Scheduled procedure/appointment date if applicable: 4/2/3847

## 2023-12-19 ENCOUNTER — NURSE TRIAGE (OUTPATIENT)
Age: 68
End: 2023-12-19

## 2023-12-19 DIAGNOSIS — K74.69 DECOMPENSATED LIVER DISEASE (HCC): Primary | ICD-10-CM

## 2023-12-19 NOTE — TELEPHONE ENCOUNTER
"Please advise   Last OV: 12/7/23     Patient and her  Garrison calling in, pt provided verbal consent to speak with  on her behalf.    Reports patient is having increase in lower leg edema and abdominal distension since stopping diuretics in November, she has not been weighing herself so she is not sure of her weight gain. Pts  explains her swelling is worsening she is more short of breath, it takes her a while to move around, she is not eating much and she is only allowed 5 8 oz of fluids a day. Pt reports she is only drinking about 8 oz of fluids a day and her  is concerned if she keeps limiting her water intake she will dehydrate.    Garrison is asking if Dr. Sorensen would call him and note they do not use mychart.      ED precautions given to .   Reason for Disposition   MODERATE swelling of both ankles (e.g., swelling extends up to the knees) AND new-onset or worsening    Answer Assessment - Initial Assessment Questions  1. ONSET: \"When did the swelling start?\" (e.g., minutes, hours, days)      ongoing  2. LOCATION: \"What part of the leg is swollen?\"  \"Are both legs swollen or just one leg?\"      Bilateral legs from thigh down   3. SEVERITY: \"How bad is the swelling?\" (e.g., localized; mild, moderate, severe)   - Localized - small area of swelling localized to one leg   - MILD pedal edema - swelling limited to foot and ankle, pitting edema < 1/4 inch (6 mm) deep, rest and elevation eliminate most or all swelling   - MODERATE edema - swelling of lower leg to knee, pitting edema > 1/4 inch (6 mm) deep, rest and elevation only partially reduce swelling   - SEVERE edema - swelling extends above knee, facial or hand swelling present       Moderate   4. REDNESS: \"Does the swelling look red or infected?\"      N/a  5. PAIN: \"Is the swelling painful to touch?\" If Yes, ask: \"How painful is it?\"   (Scale 1-10; mild, moderate or severe)      N/a  6. FEVER: \"Do you have a fever?\" If Yes, ask: " "\"What is it, how was it measured, and when did it start?\"       no  7. CAUSE: \"What do you think is causing the leg swelling?\"      liver  8. MEDICAL HISTORY: \"Do you have a history of heart failure, kidney disease, liver failure, or cancer?\"      Liver   9. RECURRENT SYMPTOM: \"Have you had leg swelling before?\" If Yes, ask: \"When was the last time?\" \"What happened that time?\"      Recurrent   10. OTHER SYMPTOMS: \"Do you have any other symptoms?\" (e.g., chest pain, difficulty breathing)        SOB    Protocols used: Leg Swelling and Edema-ADULT-[place]    "

## 2023-12-20 ENCOUNTER — HOSPITAL ENCOUNTER (INPATIENT)
Facility: HOSPITAL | Age: 68
LOS: 13 days | Discharge: HOME | DRG: 432 | End: 2024-01-02
Attending: EMERGENCY MEDICINE | Admitting: INTERNAL MEDICINE
Payer: MEDICARE

## 2023-12-20 ENCOUNTER — APPOINTMENT (EMERGENCY)
Dept: RADIOLOGY | Facility: HOSPITAL | Age: 68
DRG: 432 | End: 2023-12-20
Payer: MEDICARE

## 2023-12-20 DIAGNOSIS — R60.0 BILATERAL LOWER EXTREMITY EDEMA: ICD-10-CM

## 2023-12-20 DIAGNOSIS — R18.8 ASCITES: ICD-10-CM

## 2023-12-20 DIAGNOSIS — N18.30 STAGE 3 CHRONIC KIDNEY DISEASE, UNSPECIFIED WHETHER STAGE 3A OR 3B CKD (HCC): ICD-10-CM

## 2023-12-20 DIAGNOSIS — R60.1 ANASARCA: ICD-10-CM

## 2023-12-20 DIAGNOSIS — K74.60 CIRRHOSIS (HCC): Primary | ICD-10-CM

## 2023-12-20 DIAGNOSIS — K74.69 DECOMPENSATED LIVER DISEASE (HCC): ICD-10-CM

## 2023-12-20 DIAGNOSIS — E87.1 CHRONIC HYPONATREMIA: ICD-10-CM

## 2023-12-20 PROBLEM — Z98.890 STATUS POST INCISION AND DRAINAGE: Status: ACTIVE | Noted: 2023-12-20

## 2023-12-20 PROBLEM — E87.3 METABOLIC ALKALOSIS: Status: ACTIVE | Noted: 2023-12-20

## 2023-12-20 LAB
ALBUMIN SERPL BCP-MCNC: 2.2 G/DL (ref 3.5–5)
ALP SERPL-CCNC: 75 U/L (ref 34–104)
ALT SERPL W P-5'-P-CCNC: 23 U/L (ref 7–52)
AMMONIA PLAS-SCNC: 37 UMOL/L (ref 18–72)
ANION GAP SERPL CALCULATED.3IONS-SCNC: 6 MMOL/L
APTT PPP: 46 SECONDS (ref 23–37)
AST SERPL W P-5'-P-CCNC: 44 U/L (ref 13–39)
BASOPHILS # BLD AUTO: 0.03 THOUSANDS/ÂΜL (ref 0–0.1)
BASOPHILS NFR BLD AUTO: 1 % (ref 0–1)
BILIRUB SERPL-MCNC: 3.77 MG/DL (ref 0.2–1)
BNP SERPL-MCNC: 409 PG/ML (ref 0–100)
BUN SERPL-MCNC: 24 MG/DL (ref 5–25)
CALCIUM ALBUM COR SERPL-MCNC: 9.9 MG/DL (ref 8.3–10.1)
CALCIUM SERPL-MCNC: 8.5 MG/DL (ref 8.4–10.2)
CHLORIDE SERPL-SCNC: 112 MMOL/L (ref 96–108)
CO2 SERPL-SCNC: 15 MMOL/L (ref 21–32)
CREAT SERPL-MCNC: 1.23 MG/DL (ref 0.6–1.3)
EOSINOPHIL # BLD AUTO: 0.09 THOUSAND/ÂΜL (ref 0–0.61)
EOSINOPHIL NFR BLD AUTO: 2 % (ref 0–6)
ERYTHROCYTE [DISTWIDTH] IN BLOOD BY AUTOMATED COUNT: 14.6 % (ref 11.6–15.1)
GFR SERPL CREATININE-BSD FRML MDRD: 45 ML/MIN/1.73SQ M
GLUCOSE SERPL-MCNC: 113 MG/DL (ref 65–140)
HCT VFR BLD AUTO: 24.5 % (ref 34.8–46.1)
HGB BLD-MCNC: 8.1 G/DL (ref 11.5–15.4)
IMM GRANULOCYTES # BLD AUTO: 0.03 THOUSAND/UL (ref 0–0.2)
IMM GRANULOCYTES NFR BLD AUTO: 1 % (ref 0–2)
INR PPP: 1.82 (ref 0.84–1.19)
LYMPHOCYTES # BLD AUTO: 1.56 THOUSANDS/ÂΜL (ref 0.6–4.47)
LYMPHOCYTES NFR BLD AUTO: 30 % (ref 14–44)
MCH RBC QN AUTO: 34.9 PG (ref 26.8–34.3)
MCHC RBC AUTO-ENTMCNC: 33.1 G/DL (ref 31.4–37.4)
MCV RBC AUTO: 106 FL (ref 82–98)
MONOCYTES # BLD AUTO: 0.34 THOUSAND/ÂΜL (ref 0.17–1.22)
MONOCYTES NFR BLD AUTO: 6 % (ref 4–12)
NEUTROPHILS # BLD AUTO: 3.23 THOUSANDS/ÂΜL (ref 1.85–7.62)
NEUTS SEG NFR BLD AUTO: 60 % (ref 43–75)
NRBC BLD AUTO-RTO: 0 /100 WBCS
OSMOLALITY UR/SERPL-RTO: 289 MMOL/KG (ref 282–298)
PLATELET # BLD AUTO: 99 THOUSANDS/UL (ref 149–390)
PMV BLD AUTO: 11.4 FL (ref 8.9–12.7)
POTASSIUM SERPL-SCNC: 4 MMOL/L (ref 3.5–5.3)
PROT SERPL-MCNC: 5.7 G/DL (ref 6.4–8.4)
PROTHROMBIN TIME: 21.9 SECONDS (ref 11.6–14.5)
RBC # BLD AUTO: 2.32 MILLION/UL (ref 3.81–5.12)
SODIUM SERPL-SCNC: 133 MMOL/L (ref 135–147)
WBC # BLD AUTO: 5.28 THOUSAND/UL (ref 4.31–10.16)

## 2023-12-20 PROCEDURE — 83930 ASSAY OF BLOOD OSMOLALITY: CPT

## 2023-12-20 PROCEDURE — 71045 X-RAY EXAM CHEST 1 VIEW: CPT

## 2023-12-20 PROCEDURE — 85025 COMPLETE CBC W/AUTO DIFF WBC: CPT

## 2023-12-20 PROCEDURE — 83880 ASSAY OF NATRIURETIC PEPTIDE: CPT

## 2023-12-20 PROCEDURE — 80053 COMPREHEN METABOLIC PANEL: CPT

## 2023-12-20 PROCEDURE — 82140 ASSAY OF AMMONIA: CPT

## 2023-12-20 PROCEDURE — 85730 THROMBOPLASTIN TIME PARTIAL: CPT

## 2023-12-20 PROCEDURE — 85610 PROTHROMBIN TIME: CPT

## 2023-12-20 PROCEDURE — 36415 COLL VENOUS BLD VENIPUNCTURE: CPT

## 2023-12-20 PROCEDURE — 99223 1ST HOSP IP/OBS HIGH 75: CPT | Performed by: INTERNAL MEDICINE

## 2023-12-20 PROCEDURE — 99285 EMERGENCY DEPT VISIT HI MDM: CPT

## 2023-12-20 PROCEDURE — 99285 EMERGENCY DEPT VISIT HI MDM: CPT | Performed by: EMERGENCY MEDICINE

## 2023-12-20 RX ORDER — SPIRONOLACTONE 100 MG/1
100 TABLET, FILM COATED ORAL DAILY
Status: DISCONTINUED | OUTPATIENT
Start: 2023-12-20 | End: 2023-12-22

## 2023-12-20 RX ORDER — AMOXICILLIN 250 MG/1
500 CAPSULE ORAL EVERY 6 HOURS
Status: COMPLETED | OUTPATIENT
Start: 2023-12-20 | End: 2023-12-26

## 2023-12-20 RX ORDER — LACTULOSE 10 G/15ML
20 SOLUTION ORAL 2 TIMES DAILY
Status: DISCONTINUED | OUTPATIENT
Start: 2023-12-20 | End: 2023-12-31

## 2023-12-20 RX ORDER — FUROSEMIDE 10 MG/ML
40 INJECTION INTRAMUSCULAR; INTRAVENOUS
Status: DISCONTINUED | OUTPATIENT
Start: 2023-12-20 | End: 2023-12-22

## 2023-12-20 RX ORDER — SODIUM BICARBONATE 650 MG/1
650 TABLET ORAL
Status: DISCONTINUED | OUTPATIENT
Start: 2023-12-20 | End: 2023-12-22

## 2023-12-20 RX ORDER — FERROUS SULFATE 325(65) MG
325 TABLET ORAL
Status: DISCONTINUED | OUTPATIENT
Start: 2023-12-21 | End: 2024-01-02 | Stop reason: HOSPADM

## 2023-12-20 RX ORDER — ACETAMINOPHEN 325 MG/1
650 TABLET ORAL EVERY 8 HOURS PRN
Status: DISCONTINUED | OUTPATIENT
Start: 2023-12-20 | End: 2024-01-02 | Stop reason: HOSPADM

## 2023-12-20 RX ORDER — HEPARIN SODIUM 5000 [USP'U]/ML
5000 INJECTION, SOLUTION INTRAVENOUS; SUBCUTANEOUS EVERY 8 HOURS SCHEDULED
Status: DISCONTINUED | OUTPATIENT
Start: 2023-12-20 | End: 2023-12-24

## 2023-12-20 RX ADMIN — PANCRELIPASE 24000 UNITS: 24000; 76000; 120000 CAPSULE, DELAYED RELEASE PELLETS ORAL at 18:26

## 2023-12-20 RX ADMIN — AMOXICILLIN 500 MG: 250 CAPSULE ORAL at 18:26

## 2023-12-20 RX ADMIN — SODIUM BICARBONATE 650 MG TABLET 650 MG: at 18:26

## 2023-12-20 RX ADMIN — AMOXICILLIN 500 MG: 250 CAPSULE ORAL at 23:33

## 2023-12-20 RX ADMIN — LACTULOSE 20 G: 20 SOLUTION ORAL at 18:26

## 2023-12-20 RX ADMIN — FUROSEMIDE 40 MG: 10 INJECTION, SOLUTION INTRAMUSCULAR; INTRAVENOUS at 18:26

## 2023-12-20 RX ADMIN — HEPARIN SODIUM 5000 UNITS: 5000 INJECTION INTRAVENOUS; SUBCUTANEOUS at 22:00

## 2023-12-20 RX ADMIN — HEPARIN SODIUM 5000 UNITS: 5000 INJECTION INTRAVENOUS; SUBCUTANEOUS at 18:26

## 2023-12-20 NOTE — TELEPHONE ENCOUNTER
"Spoke with pt's  Michael, who seems very anxious & overwhelmed regarding pt's condition. Reassurance provided, nature of liver decompensation reviewed.  Michael reports pt is having SOB on exertion, continues with jaundice but is better since being in hospital, B/L lower leg edema up to thighs \"like concrete blocks\" & is experiencing period of confusion.     Recommended michael have pt evaluated in ED. Advised Dr Sorensen was made aware and will make recommendations on medications post ED eval.    Pt is currently taking lactulose PRN with multiple incontinent diarrhea episodes, pt is not currently taking any diuretic medication.    Michael agreeable and verbalized understanding. He will take pt to [facility]. Adt order placed  "

## 2023-12-20 NOTE — ED PROVIDER NOTES
History  Chief Complaint   Patient presents with    Edema     Edema from the wait down, SOB, dizziness, hx cirrhosis and recently taken off he diuretics      Patient is a 68-year-old female with cirrhosis of the liver and chronic kidney disease that presents to the emergency department with worsening of her bilateral lower extremity edema as well as worsening bloating of her abdomen present for the last week.  She has been seen and evaluated by gastroenterology several times in the past and was told to stop taking her Lasix due to worsening creatinine.  Since stopping the Lasix her edema is worsened and she is now feeling distention of her abdomen.  She denies fevers, nausea, vomiting, chest pain but is experiencing some shortness of breath with exertion.  She denies shortness of breath at rest.  She denies any recent illnesses or injuries.        Prior to Admission Medications   Prescriptions Last Dose Informant Patient Reported? Taking?   Calcium Carb-Cholecalciferol (CALCIUM + D3 PO) 12/20/2023 Spouse/Significant Other Yes Yes   Sig: Take 200 Units by mouth daily.   Cholecalciferol (VITAMIN D3) 3000 UNITS TABS 12/20/2023 Spouse/Significant Other Yes Yes   Sig: Take 100 Units by mouth daily.   Omega-3 Fatty Acids (FISH OIL) 1,000 mg 12/20/2023 Spouse/Significant Other Yes Yes   Sig: Take 1,000 mg by mouth daily.   diclofenac sodium (VOLTAREN) 1 % Past Week Spouse/Significant Other No Yes   Sig: APPLY TWO GRAMS TOPICALLY 4 (FOUR) TIMES A DAY   docusate sodium (COLACE) 100 mg capsule Past Month Spouse/Significant Other No Yes   Sig: Take 1 capsule (100 mg total) by mouth 2 (two) times a day   ferrous sulfate 325 (65 Fe) mg tablet 12/20/2023 Spouse/Significant Other Yes Yes   Sig: Take 325 mg by mouth daily with breakfast   folic acid (FOLVITE) 1 mg tablet   No No   Sig: Take 1 tablet (1 mg total) by mouth daily Do not start before October 30, 2023.   lactulose (CHRONULAC) 10 g/15 mL solution Past Month  No Yes    Sig: Take 30 mL (20 g total) by mouth 2 (two) times a day   lidocaine (LIDODERM) 5 % Past Month Spouse/Significant Other No Yes   Sig: Apply 1 patch topically daily Remove & Discard patch within 12 hours or as directed by MD   losartan (COZAAR) 50 mg tablet 12/20/2023 Spouse/Significant Other Yes Yes   Sig: Take 100 mg by mouth daily   magnesium gluconate (MAGONATE) 500 mg tablet   No No   Sig: Take 2 tablets (1,000 mg total) by mouth 2 (two) times a day   metoprolol tartrate (LOPRESSOR) 100 mg tablet 12/20/2023 Spouse/Significant Other No Yes   Sig: Take 1 tablet (100 mg total) by mouth 2 (two) times a day   pancrelipase, Lip-Prot-Amyl, (CREON) 24,000 units 12/20/2023 Spouse/Significant Other No Yes   Sig: Take 1 capsule (24,000 Units total) by mouth 3 (three) times a day with meals   pantoprazole (PROTONIX) 40 mg tablet   No No   Sig: Take 1 tablet (40 mg total) by mouth daily in the early morning Do not start before July 27, 2023.   senna (SENOKOT) 8.6 mg Past Month Spouse/Significant Other No Yes   Sig: Take 1 tablet (8.6 mg total) by mouth daily   sodium chloride 1 g tablet 12/20/2023 Spouse/Significant Other No Yes   Sig: Take 1 tablet (1 g total) by mouth 2 (two) times a day with meals   spironolactone (ALDACTONE) 50 mg tablet Not Taking Spouse/Significant Other No No   Sig: Take 1 tablet (50 mg total) by mouth daily Do not start before November 22, 2023.   Patient not taking: Reported on 12/20/2023   thiamine 100 MG tablet 12/20/2023 Spouse/Significant Other Yes Yes   Sig: Take 100 mg by mouth daily.   traMADol (ULTRAM) 50 mg tablet  Spouse/Significant Other No No   Sig: Take 1 tablet (50 mg total) by mouth every 6 (six) hours as needed for moderate pain      Facility-Administered Medications: None       Past Medical History:   Diagnosis Date    Diarrhea 10/26/2023    Hypertension     Hyponatremia        Past Surgical History:   Procedure Laterality Date    EYE SURGERY      DE OPTX FEM SHFT FX W/INSJ IMED  IMPLT W/WO SCREW Left 11/17/2018    Procedure: Intramedullary nail fixation left hip fracture;  Surgeon: Ross Herrera MD;  Location: [facility];  Service: Orthopedics       Family History   Problem Relation Age of Onset    Heart disease Mother     Heart disease Father      I have reviewed and agree with the history as documented.    E-Cigarette/Vaping    E-Cigarette Use Never User      E-Cigarette/Vaping Substances     Social History     Tobacco Use    Smoking status: Former    Smokeless tobacco: Never   Vaping Use    Vaping status: Never Used   Substance Use Topics    Alcohol use: Not Currently     Alcohol/week: 1.0 standard drink of alcohol     Types: 1 Cans of beer per week     Comment: 2-3 times per week. Drinks heavier before    Drug use: No        Review of Systems   Constitutional:  Negative for chills and fever.   HENT:  Negative for congestion, ear pain and sore throat.    Eyes:  Negative for pain and visual disturbance.   Respiratory:  Positive for shortness of breath (with exertion). Negative for cough.    Cardiovascular:  Positive for leg swelling. Negative for chest pain and palpitations.   Gastrointestinal:  Positive for abdominal distention. Negative for abdominal pain, blood in stool, constipation, diarrhea, nausea and vomiting.   Genitourinary:  Negative for dysuria and hematuria.   Musculoskeletal:  Negative for arthralgias and back pain.   Skin:  Negative for color change and rash.   Neurological:  Negative for dizziness, seizures, syncope, light-headedness and headaches.   All other systems reviewed and are negative.      Physical Exam  ED Triage Vitals   Temperature Pulse Respirations Blood Pressure SpO2   12/20/23 1103 12/20/23 1103 12/20/23 1055 12/20/23 1055 12/20/23 1110   (!) 96.4 °F (35.8 °C) 82 18 (!) 100/49 97 %      Temp Source Heart Rate Source Patient Position - Orthostatic VS BP Location FiO2 (%)   12/20/23 1503 12/20/23 1715 12/20/23 1715 12/20/23 1715 --   Oral Monitor Lying  Right arm       Pain Score       12/20/23 1753       No Pain             Orthostatic Vital Signs  Vitals:    12/20/23 1700 12/20/23 1715 12/20/23 1811 12/20/23 1833   BP: 109/52 102/50 93/54 (!) 101/46   Pulse: 57 56 59    Patient Position - Orthostatic VS:  Lying Lying Lying       Physical Exam  Vitals and nursing note reviewed.   Constitutional:       Appearance: Normal appearance. She is well-developed. She is ill-appearing. She is not toxic-appearing.   HENT:      Head: Normocephalic and atraumatic.      Right Ear: External ear normal.      Left Ear: External ear normal.      Mouth/Throat:      Pharynx: Oropharynx is clear. No oropharyngeal exudate or posterior oropharyngeal erythema.   Eyes:      General:         Right eye: No discharge.         Left eye: No discharge.      Extraocular Movements: Extraocular movements intact.      Conjunctiva/sclera: Conjunctivae normal.   Cardiovascular:      Rate and Rhythm: Normal rate and regular rhythm.      Pulses: Normal pulses.      Heart sounds: Normal heart sounds. No murmur heard.  Pulmonary:      Effort: Pulmonary effort is normal. No respiratory distress.      Breath sounds: Rales (Bilateral) present. No wheezing or rhonchi.   Abdominal:      General: Abdomen is flat.      Palpations: Abdomen is soft.      Tenderness: There is no abdominal tenderness. There is no guarding or rebound.   Musculoskeletal:         General: No swelling or deformity. Normal range of motion.      Cervical back: Neck supple. No tenderness.      Right lower leg: Edema present.      Left lower leg: Edema present.      Comments: Bilateral pitting edema to the waist   Skin:     General: Skin is warm and dry.      Capillary Refill: Capillary refill takes less than 2 seconds.   Neurological:      Mental Status: She is alert.         ED Medications  Medications   Diclofenac Sodium (VOLTAREN) 1 % topical gel 2 g (has no administration in time range)   ferrous sulfate tablet 325 mg (has no  administration in time range)   lactulose (CHRONULAC) oral solution 20 g (20 g Oral Given 12/20/23 1826)   pancrelipase (Lip-Prot-Amyl) (CREON) delayed release capsule 24,000 Units (24,000 Units Oral Given 12/20/23 1826)   spironolactone (ALDACTONE) tablet 100 mg (100 mg Oral Not Given 12/20/23 1827)   acetaminophen (TYLENOL) tablet 650 mg (has no administration in time range)   heparin (porcine) subcutaneous injection 5,000 Units (5,000 Units Subcutaneous Given 12/20/23 1826)   amoxicillin (AMOXIL) capsule 500 mg (500 mg Oral Given 12/20/23 1826)   sodium bicarbonate tablet 650 mg (650 mg Oral Given 12/20/23 1826)   furosemide (LASIX) injection 40 mg (40 mg Intravenous Given 12/20/23 1826)       Diagnostic Studies  Results Reviewed       Procedure Component Value Units Date/Time    Osmolality-If this is regarding a toxic alcohol, STOP. Test is not routinely indicated. Please consult medical  on call for further guidance. [[id number]]  (Normal) Collected: 12/20/23 1416    Lab Status: Final result Specimen: Blood from Arm, Left Updated: 12/20/23 1926     Osmolality Serum 289 mmol/KG     B-Type Natriuretic Peptide(BNP) [[id number]]  (Abnormal) Collected: 12/20/23 1416    Lab Status: Final result Specimen: Blood from Arm, Left Updated: 12/20/23 1514      pg/mL     Protime-INR [[id number]]  (Abnormal) Collected: 12/20/23 1416    Lab Status: Final result Specimen: Blood from Arm, Left Updated: 12/20/23 1454     Protime 21.9 seconds      INR 1.82    APTT [[id number]]  (Abnormal) Collected: 12/20/23 1416    Lab Status: Final result Specimen: Blood from Arm, Left Updated: 12/20/23 1454     PTT 46 seconds     CBC and differential [[id number]]  (Abnormal) Collected: 12/20/23 1416    Lab Status: Final result Specimen: Blood from Arm, Left Updated: 12/20/23 1447     WBC 5.28 Thousand/uL      RBC 2.32 Million/uL      Hemoglobin 8.1 g/dL      Hematocrit 24.5 %       fL      MCH 34.9 pg      MCHC 33.1 g/dL       RDW 14.6 %      MPV 11.4 fL      Platelets 99 Thousands/uL      nRBC 0 /100 WBCs      Neutrophils Relative 60 %      Immat GRANS % 1 %      Lymphocytes Relative 30 %      Monocytes Relative 6 %      Eosinophils Relative 2 %      Basophils Relative 1 %      Neutrophils Absolute 3.23 Thousands/µL      Immature Grans Absolute 0.03 Thousand/uL      Lymphocytes Absolute 1.56 Thousands/µL      Monocytes Absolute 0.34 Thousand/µL      Eosinophils Absolute 0.09 Thousand/µL      Basophils Absolute 0.03 Thousands/µL     Comprehensive metabolic panel [[id number]]  (Abnormal) Collected: 12/20/23 1416    Lab Status: Final result Specimen: Blood from Arm, Left Updated: 12/20/23 1445     Sodium 133 mmol/L      Potassium 4.0 mmol/L      Chloride 112 mmol/L      CO2 15 mmol/L      ANION GAP 6 mmol/L      BUN 24 mg/dL      Creatinine 1.23 mg/dL      Glucose 113 mg/dL      Calcium 8.5 mg/dL      Corrected Calcium 9.9 mg/dL      AST 44 U/L      ALT 23 U/L      Alkaline Phosphatase 75 U/L      Total Protein 5.7 g/dL      Albumin 2.2 g/dL      Total Bilirubin 3.77 mg/dL      eGFR 45 ml/min/1.73sq m     Narrative:      [employer] guidelines for Chronic Kidney Disease (CKD):     Stage 1 with normal or high GFR (GFR > 90 mL/min/1.73 square meters)    Stage 2 Mild CKD (GFR = 60-89 mL/min/1.73 square meters)    Stage 3A Moderate CKD (GFR = 45-59 mL/min/1.73 square meters)    Stage 3B Moderate CKD (GFR = 30-44 mL/min/1.73 square meters)    Stage 4 Severe CKD (GFR = 15-29 mL/min/1.73 square meters)    Stage 5 End Stage CKD (GFR <15 mL/min/1.73 square meters)  Note: GFR calculation is accurate only with a steady state creatinine    Ammonia [[id number]]  (Normal) Collected: 12/20/23 1416    Lab Status: Final result Specimen: Blood from Arm, Left Updated: 12/20/23 1443     Ammonia 37 umol/L                    XR chest 1 view portable   ED Interpretation by Stephen Gallego DO (12/20 1234)   Bilateral pulmonary edema with  bilateral pleural effusions, right worse than left      Final Result by Westley Vang MD (12/20 1417)      Shifting bilateral right greater than the left pleural effusions                  Workstation performed: [id number]               Procedures  Procedures      ED Course                                       Medical Decision Making  68F with history of cirrhosis of the liver presents to the emergency department with diffuse bilateral lower extremity edema up to the waist in addition to worsening abdominal distention and intermittent mild confusion.  On physical exam, patient has pitting edema to her waist.  Large volume ascites noted on bedside ultrasound evaluation.   Laboratory evaluation reveals mild hyponatremia, elevated INR, anemia with hemoglobin of 8.1 and elevated BNP  Patient was recently told to stop taking her diuretic medication due to worsening kidney function.  Chest x-ray reveals bilateral right greater than left pleural effusions.  Patient's presentation consistent with worsening of symptoms related to her liver cirrhosis and she is appropriate for admission under the care of [facility] internal medicine for further evaluation and treatment by both gastroenterology team and perhaps interventional radiology team for paracentesis.  Patient is admitted under care of [facility] internal medicine.    Amount and/or Complexity of Data Reviewed  Labs: ordered.  Radiology: ordered and independent interpretation performed.    Risk  Decision regarding hospitalization.          Disposition  Final diagnoses:   Cirrhosis (HCC)   Anasarca   Bilateral lower extremity edema   Ascites     Time reflects when diagnosis was documented in both MDM as applicable and the Disposition within this note       Time User Action Codes Description Comment    12/20/2023  3:36 PM Stephen Gallego [K74.60] Cirrhosis (HCC)     12/20/2023  3:36 PM Stephen Gallego [R60.1] Anasarca     12/20/2023  3:36 PM Stephen Gallego  [R60.9] Edema     12/20/2023  3:36 PM Stephen Gallego Remove [R60.9] Edema     12/20/2023  3:36 PM Jose Ramon Gallegow Add [R60.0] Bilateral lower extremity edema     12/20/2023  3:37 PM Gallego, Stephen Add [R18.8] Ascites     12/20/2023  5:21 PM Terrell Mayberry Add [E87.1] Chronic hyponatremia     12/20/2023  5:29 PM Terrell Mayberry Add [K74.69] Decompensated liver disease (HCC)           ED Disposition       ED Disposition   Admit    Condition   Stable    Date/Time   Wed Dec 20, 2023  3:36 PM    Comment   Case was discussed with LETICIA and the patient's admission status was agreed to be Admission Status: inpatient status to the service of Dr. Aguilera.               Follow-up Information    None         Current Discharge Medication List        CONTINUE these medications which have NOT CHANGED    Details   Calcium Carb-Cholecalciferol (CALCIUM + D3 PO) Take 200 Units by mouth daily.      Cholecalciferol (VITAMIN D3) 3000 UNITS TABS Take 100 Units by mouth daily.      diclofenac sodium (VOLTAREN) 1 % APPLY TWO GRAMS TOPICALLY 4 (FOUR) TIMES A DAY  Qty: 100 g, Refills: 4    Associated Diagnoses: S/P ORIF (open reduction internal fixation) fracture      docusate sodium (COLACE) 100 mg capsule Take 1 capsule (100 mg total) by mouth 2 (two) times a day  Refills: 0    Associated Diagnoses: Closed comminuted fracture of hip, left, initial encounter ([facility])      ferrous sulfate 325 (65 Fe) mg tablet Take 325 mg by mouth daily with breakfast      lactulose (CHRONULAC) 10 g/15 mL solution Take 30 mL (20 g total) by mouth 2 (two) times a day  Qty: 1800 mL, Refills: 11    Associated Diagnoses: Decompensated liver disease (HCC)      lidocaine (LIDODERM) 5 % Apply 1 patch topically daily Remove & Discard patch within 12 hours or as directed by MD  Qty: 30 patch, Refills: 0    Associated Diagnoses: Closed comminuted fracture of hip, left, initial encounter ([facility])      losartan (COZAAR) 50 mg tablet Take 100 mg by mouth daily      metoprolol tartrate  (LOPRESSOR) 100 mg tablet Take 1 tablet (100 mg total) by mouth 2 (two) times a day  Qty: 30 tablet, Refills: 0    Associated Diagnoses: Essential hypertension      Omega-3 Fatty Acids (FISH OIL) 1,000 mg Take 1,000 mg by mouth daily.      pancrelipase, Lip-Prot-Amyl, (CREON) 24,000 units Take 1 capsule (24,000 Units total) by mouth 3 (three) times a day with meals  Qty: 90 capsule, Refills: 0    Associated Diagnoses: Other chronic pancreatitis (HCC)      senna (SENOKOT) 8.6 mg Take 1 tablet (8.6 mg total) by mouth daily  Qty: 120 each, Refills: 0    Associated Diagnoses: Closed comminuted fracture of hip, left, initial encounter ([facility])      sodium chloride 1 g tablet Take 1 tablet (1 g total) by mouth 2 (two) times a day with meals  Qty: 60 tablet, Refills: 0    Associated Diagnoses: Hyponatremia      thiamine 100 MG tablet Take 100 mg by mouth daily.      folic acid (FOLVITE) 1 mg tablet Take 1 tablet (1 mg total) by mouth daily Do not start before October 30, 2023.  Qty: 30 tablet, Refills: 0    Associated Diagnoses: Pancytopenia (HCC)      magnesium gluconate (MAGONATE) 500 mg tablet Take 2 tablets (1,000 mg total) by mouth 2 (two) times a day  Qty: 120 tablet, Refills: 0    Associated Diagnoses: Hypomagnesemia      pantoprazole (PROTONIX) 40 mg tablet Take 1 tablet (40 mg total) by mouth daily in the early morning Do not start before July 27, 2023.  Qty: 90 tablet, Refills: 0    Associated Diagnoses: Bleeding per rectum      spironolactone (ALDACTONE) 50 mg tablet Take 1 tablet (50 mg total) by mouth daily Do not start before November 22, 2023.  Qty: 30 tablet, Refills: 0    Associated Diagnoses: Decompensated liver disease (HCC)      traMADol (ULTRAM) 50 mg tablet Take 1 tablet (50 mg total) by mouth every 6 (six) hours as needed for moderate pain  Qty: 30 tablet, Refills: 0    Associated Diagnoses: Closed comminuted fracture of hip, left, initial encounter ([facility])           No discharge procedures on  file.    PDMP Review       None             ED Provider  Attending physically available and evaluated Cassidy Zhang. I managed the patient along with the ED Attending.    Electronically Signed by           Stephen Gallego,   12/20/23 2001

## 2023-12-20 NOTE — PLAN OF CARE
Problem: PAIN - ADULT  Goal: Verbalizes/displays adequate comfort level or baseline comfort level  Description: Interventions:  - Encourage patient to monitor pain and request assistance  - Assess pain using appropriate pain scale  - Administer analgesics based on type and severity of pain and evaluate response  - Implement non-pharmacological measures as appropriate and evaluate response  - Consider cultural and social influences on pain and pain management  - Notify physician/advanced practitioner if interventions unsuccessful or patient reports new pain  Outcome: Progressing     Problem: INFECTION - ADULT  Goal: Absence or prevention of progression during hospitalization  Description: INTERVENTIONS:  - Assess and monitor for signs and symptoms of infection  - Monitor lab/diagnostic results  - Monitor all insertion sites, i.e. indwelling lines, tubes, and drains  - Monitor endotracheal if appropriate and nasal secretions for changes in amount and color  - [place] appropriate cooling/warming therapies per order  - Administer medications as ordered  - Instruct and encourage patient and family to use good hand hygiene technique  - Identify and instruct in appropriate isolation precautions for identified infection/condition  Outcome: Progressing  Goal: Absence of fever/infection during neutropenic period  Description: INTERVENTIONS:  - Monitor WBC    Outcome: Progressing     Problem: SAFETY ADULT  Goal: Patient will remain free of falls  Description: INTERVENTIONS:  - Educate patient/family on patient safety including physical limitations  - Instruct patient to call for assistance with activity   - Consult OT/PT to assist with strengthening/mobility   - Keep Call bell within reach  - Keep bed low and locked with side rails adjusted as appropriate  - Keep care items and personal belongings within reach  - Initiate and maintain comfort rounds  - Make Fall Risk Sign visible to staff  - Offer Toileting every  Hours,  in advance of need  - Initiate/Maintain alarm  - Obtain necessary fall risk management equipment:  - Apply yellow socks and bracelet for high fall risk patients  - Consider moving patient to room near nurses station  Outcome: Progressing  Goal: Maintain or return to baseline ADL function  Description: INTERVENTIONS:  -  Assess patient's ability to carry out ADLs; assess patient's baseline for ADL function and identify physical deficits which impact ability to perform ADLs (bathing, care of mouth/teeth, toileting, grooming, dressing, etc.)  - Assess/evaluate cause of self-care deficits   - Assess range of motion  - Assess patient's mobility; develop plan if impaired  - Assess patient's need for assistive devices and provide as appropriate  - Encourage maximum independence but intervene and supervise when necessary  - Involve family in performance of ADLs  - Assess for home care needs following discharge   - Consider OT consult to assist with ADL evaluation and planning for discharge  - Provide patient education as appropriate  Outcome: Progressing  Goal: Maintains/Returns to pre admission functional level  Description: INTERVENTIONS:  - Perform AM-PAC 6 Click Basic Mobility/ Daily Activity assessment daily.  - Set and communicate daily mobility goal to care team and patient/family/caregiver.   - Collaborate with rehabilitation services on mobility goals if consulted  - Perform Range of Motion  times a day.  - Reposition patient every  hours.  - Dangle patient  times a day  - Stand patient times a day  - Ambulate patient  times a day  - Out of bed to chair  times a day   - Out of bed for meals  times a day  - Out of bed for toileting  - Record patient progress and toleration of activity level   Outcome: Progressing     Problem: DISCHARGE PLANNING  Goal: Discharge to home or other facility with appropriate resources  Description: INTERVENTIONS:  - Identify barriers to discharge w/patient and caregiver  - Arrange for  needed discharge resources and transportation as appropriate  - Identify discharge learning needs (meds, wound care, etc.)  - Arrange for interpretive services to assist at discharge as needed  - Refer to Case Management Department for coordinating discharge planning if the patient needs post-hospital services based on physician/advanced practitioner order or complex needs related to functional status, cognitive ability, or social support system  Outcome: Progressing     Problem: Knowledge Deficit  Goal: Patient/family/caregiver demonstrates understanding of disease process, treatment plan, medications, and discharge instructions  Description: Complete learning assessment and assess knowledge base.  Interventions:  - Provide teaching at level of understanding  - Provide teaching via preferred learning methods  Outcome: Progressing

## 2023-12-20 NOTE — H&P
Count includes the [facility]  H&P  Name: Cassidy Zhang 68 y.o. female I MRN: [id number]  Unit/Bed#: -[facility] I Date of Admission: 12/20/2023   Date of Service: 12/20/2023 I Hospital Day: 0      Assessment/Plan   * Decompensated liver disease (HCC)  Assessment & Plan  Presents for ascites and anasarca, found also to have mild hyponatremia, hypoalbuminemia, hyperbilirubinemia, hypocoagulability, anemia, and thrombocytopenia. Ammonia wnl.  No asterixis on exam, pt A&O x3, maybe some mild confusion (delayed when answering questions, but appropriate), though pt at or near her baseline per spouse.  Denies fevers, chills, sweats, abd pain, hematemesis, hemoptysis, hematochezia, melena.  Reports holding lactulose for last 2-3 days for frequent BMs at home (>5 loose BM per day)  Recently discontinued lasix and spironolactone in the o/p setting d/t worsening renal function and hyponatremia  MELD score 23 on presentation  Last drink 5-6 years ago    Plan:  Start Lasix 40 mg IV BID  Start spironolactone 100 mg PO QD  Diet: High protein, low salt, fluid restrict 1500 cc/day  Resume lactulose 20 mg BID and titrate to target 3-5 loose BM per day  Monitor MELD labs  No indication for SBP tx or ppx at this time  Monitor Hgb and transfuse for Hgb<7  Consult GI and Nephrology, appreciate recommendations  Consider IR consultation for paracentesis    Ascites/anasarca  Assessment & Plan  2/2 end stage liver disease, as noted above    Plan:  Diuresis, fluid restriction, dietary salt restriction as above   Nephrology and GI consults, as above  Consider IR consult for paracentesis, replace albumin if high volume para    Chronic hyponatremia  Assessment & Plan  History of SIADH, however suspect also a component of hypotonic hypervolemic hyponatremia in the setting of liver failure complicated by ascites and anasarca.     Plan:  Collect urine sodium, urine osm, serum osm before starting diuretics, and f/u results.  Diuresis, fluid  restriction, dietary salt restriction as above  Hold home Na tablets, at least until hyponatremia labs interpreted--starting NaHCO3 tabs for metabolic alkalosis  Nephrology consulted, appreciate recommendations      Metabolic alkalosis  Assessment & Plan  HCO3 15 POA  Pt reports >5 loose bowel movements/day  Suspect primary disturbance due to GI losses    Check VBG to confirm alkalemia with a primary metabolic disturbance  Start NaHCO3 tabs 650 mg PO BID  Nephrology consulted, appreciate recommendations    Essential hypertension  Assessment & Plan  Hold home antihypertensive (amlodipine and losartan) at this time, given soft BP and desire for aggressive diuresis. Can resume as indicated.    Chronic pancreatitis (HCC)  Assessment & Plan  2/2 EtOH abuse, now in remission.     Continue home pancrealipase    Status post incision and drainage  Assessment & Plan  Pt and  report dental I&D for oral abscess day prior to presentation 12/20/23, started on amoxicillin 500 mg PO Q6H    Continue [id number] amoxicillin       VTE Pharmacologic Prophylaxis: VTE Score: 7 High Risk (Score >/= 5) - Pharmacological DVT Prophylaxis Ordered: heparin. Sequential Compression Devices Ordered.  Code Status: Level 1 - Full Code   Discussion with family: Updated  () at bedside.    Anticipated Length of Stay: Patient will be admitted on an inpatient basis with an anticipated length of stay of greater than 2 midnights secondary to decompensated cirrhosis with ascites.    Chief Complaint: edema, OLIVERA    History of Present Illness:  Cassidy Zhang is a 68 y.o. female with a PMH of cirrhosis secondary to chronic alcohol abuse (quit 5-6 years ago) which is decompensated with ascites, thrombocytopenia, jaundice, hypoalbuminemia, and hypoprothrombinemia who presents with abdominal distension, swelling of the BLEs, and OLIVERA, which she and her , present at bedside, state have progressed over the last 1-2 weeks.  "Exercise tolerance has decreased to approximately 15-20 feet, and having difficulty climbing 12 steps into home. She was recently admitted for same from 11/17-11/21/23. Lasix and spironolactone were subsequently discontinued in the outpatient setting due to rising creatinine and decreasing sodium.  She has continued to try to limit her fluid intake, though she is not sure how much she consumes on a daily basis.  She has not been weighing herself daily.  MELD of 23 on admission, was 28 when seen in GI office on 12/7/23.  She denies fevers, chills, CP, SOB at rest, orthopnea, PND, abd pain, N/V, change in urinary habits. Reports she stopped taking lactulose 2 days ago due to >5 loose BMs per day. Also, some misunderstanding of the purpose of lactulose, as she and  believed this was for constipation, which we attempted to clarify at this time. Pt denies confusion, but  does believe she may exhibit some confusion in the mornings. Pt is delayed to respond but appropriate and A&O to self, others, year, month, date, and place (\"[facility]\") at time of this exam, no asterixis. She denies hematemesis, hematochezia, or melena.   Had dental procedure for oral abscess on day prior to presentation and was started on amoxicillin 500 mg PO q6h.     Review of Systems:  Review of Systems   Constitutional:  Positive for activity change (decreased exercise tolerance) and fatigue. Negative for chills, diaphoresis and fever.   HENT:  Negative for sore throat and trouble swallowing.    Eyes:         Yellowing of eyes   Respiratory:  Positive for cough (Intermittent, nonproductive) and shortness of breath (Dyspnea on exertion, denies shortness of breath at rest). Negative for wheezing.    Cardiovascular:  Positive for leg swelling. Negative for chest pain and palpitations.   Gastrointestinal:  Positive for abdominal distention. Negative for abdominal pain, blood in stool, constipation, diarrhea (Greater than 5 loose " bowel movements per day, on lactulose), nausea and vomiting.   Genitourinary:  Negative for decreased urine volume, difficulty urinating, dysuria, frequency and urgency.   Musculoskeletal:         Intermittent chronic left lower extremity pain status post orthopedic surgery for fracture   Skin:  Positive for color change (yellowing of skin).   Neurological:  Positive for weakness (Generalized). Negative for headaches.   Psychiatric/Behavioral:  Negative for confusion.        Past Medical and Surgical History:   Past Medical History:   Diagnosis Date    Diarrhea 10/26/2023    Hypertension     Hyponatremia        Past Surgical History:   Procedure Laterality Date    EYE SURGERY      DE OPTX FEM SHFT FX W/INSJ IMED IMPLT W/WO SCREW Left 11/17/2018    Procedure: Intramedullary nail fixation left hip fracture;  Surgeon: Ross Herrera MD;  Location: [facility];  Service: Orthopedics       Meds/Allergies:  Prior to Admission medications    Medication Sig Start Date End Date Taking? Authorizing Provider   Calcium Carb-Cholecalciferol (CALCIUM + D3 PO) Take 200 Units by mouth daily.   Yes Historical Provider, MD   Cholecalciferol (VITAMIN D3) 3000 UNITS TABS Take 100 Units by mouth daily.   Yes Historical Provider, MD   diclofenac sodium (VOLTAREN) 1 % APPLY TWO GRAMS TOPICALLY 4 (FOUR) TIMES A DAY 8/5/20  Yes Ross Herrera MD   docusate sodium (COLACE) 100 mg capsule Take 1 capsule (100 mg total) by mouth 2 (two) times a day 11/21/18  Yes Kassi Maravilla MD   ferrous sulfate 325 (65 Fe) mg tablet Take 325 mg by mouth daily with breakfast   Yes Historical Provider, MD   lactulose (CHRONULAC) 10 g/15 mL solution Take 30 mL (20 g total) by mouth 2 (two) times a day 12/7/23 12/6/24 Yes Marcello Sorensen MD   lidocaine (LIDODERM) 5 % Apply 1 patch topically daily Remove & Discard patch within 12 hours or as directed by MD 11/22/18  Yes Kassi Maravilla MD   losartan (COZAAR) 50 mg tablet Take 100 mg by mouth  daily   Yes Historical Provider, MD   metoprolol tartrate (LOPRESSOR) 100 mg tablet Take 1 tablet (100 mg total) by mouth 2 (two) times a day 11/21/23  Yes Judith Shirley DO   Omega-3 Fatty Acids (FISH OIL) 1,000 mg Take 1,000 mg by mouth daily.   Yes Historical Provider, MD   pancrelipase, Lip-Prot-Amyl, (CREON) 24,000 units Take 1 capsule (24,000 Units total) by mouth 3 (three) times a day with meals 11/21/23 12/21/23 Yes Judith Shirley DO   senna (SENOKOT) 8.6 mg Take 1 tablet (8.6 mg total) by mouth daily 11/22/18  Yes Kassi Maravilla MD   sodium chloride 1 g tablet Take 1 tablet (1 g total) by mouth 2 (two) times a day with meals 11/21/23  Yes Judith Shirley DO   thiamine 100 MG tablet Take 100 mg by mouth daily.   Yes Historical Provider, MD   folic acid (FOLVITE) 1 mg tablet Take 1 tablet (1 mg total) by mouth daily Do not start before October 30, 2023. 10/30/23 11/29/23  Carly Chauhan MD   magnesium gluconate (MAGONATE) 500 mg tablet Take 2 tablets (1,000 mg total) by mouth 2 (two) times a day 10/29/23 11/28/23  Carly Chauhan MD   pantoprazole (PROTONIX) 40 mg tablet Take 1 tablet (40 mg total) by mouth daily in the early morning Do not start before July 27, 2023. 7/27/23 10/26/23  Kiet Sheppard PA-C   spironolactone (ALDACTONE) 50 mg tablet Take 1 tablet (50 mg total) by mouth daily Do not start before November 22, 2023.  Patient not taking: Reported on 12/20/2023 11/22/23   Judith Shirley DO   traMADol (ULTRAM) 50 mg tablet Take 1 tablet (50 mg total) by mouth every 6 (six) hours as needed for moderate pain 11/21/18   Kassi Maravilla MD   furosemide (LASIX) 20 mg tablet Take 1 tablet (20 mg total) by mouth daily  Patient not taking: Reported on 12/20/2023 11/21/23 12/20/23  Judith Shirley, DO     I have reviewed home medications with patient family member.    Allergies:   Allergies   Allergen Reactions    Demerol [Meperidine] Other (See Comments)      "hallucinations    Diphenhydramine Other (See Comments)     hallucinations    Prednisone Tremor    Sulfa Antibiotics Other (See Comments)     hallucinations    Percocet [Oxycodone-Acetaminophen] Palpitations       Social History:  Marital Status: /Civil Union   Occupation: Not assessed  Patient Pre-hospital Living Situation: Home, With spouse  Patient Pre-hospital Level of Mobility:  Walks at baseline, but has started walking with cane due to heaviness in legs and OLIVERA.  Patient Pre-hospital Diet Restrictions: fluid restriction  Substance Use History:   Social History     Substance and Sexual Activity   Alcohol Use Not Currently    Alcohol/week: 1.0 standard drink of alcohol    Types: 1 Cans of beer per week    Comment: 2-3 times per week. Drinks heavier before     Social History     Tobacco Use   Smoking Status Former   Smokeless Tobacco Never     Social History     Substance and Sexual Activity   Drug Use No       Family History:  Family History   Problem Relation Age of Onset    Heart disease Mother     Heart disease Father        Physical Exam:     Vitals:   Blood Pressure: (!) 101/46 (12/20/23 1833)  Pulse: 59 (12/20/23 1811)  Temperature: 97.9 °F (36.6 °C) (12/20/23 1811)  Temp Source: Oral (12/20/23 1811)  Respirations: 18 (12/20/23 1811)  Height: 5' 4\" (162.6 cm) (12/20/23 1811)  Weight - Scale: 65.8 kg (145 lb) (12/20/23 1811)  SpO2: 94 % (12/20/23 1811)    Physical Exam  Vitals reviewed.   Constitutional:       General: She is not in acute distress.     Appearance: She is normal weight. She is ill-appearing. She is not toxic-appearing or diaphoretic.   HENT:      Head: Normocephalic and atraumatic.   Eyes:      General: Scleral icterus present.         Right eye: No discharge.         Left eye: No discharge.   Cardiovascular:      Rate and Rhythm: Normal rate and regular rhythm.      Pulses: Normal pulses.      Heart sounds: Normal heart sounds. No murmur heard.     No friction rub. No gallop. "   Pulmonary:      Effort: Pulmonary effort is normal. No respiratory distress.      Breath sounds: No stridor. Rales (scant inspiratory bibasilar rales) present. No wheezing or rhonchi.   Chest:      Chest wall: No tenderness.   Abdominal:      General: Bowel sounds are normal. There is distension.      Palpations: Abdomen is soft.      Tenderness: There is no abdominal tenderness. There is no guarding or rebound.      Comments: Shifting dullness to percussion   Musculoskeletal:         General: Swelling present. No tenderness, deformity or signs of injury.      Right lower leg: Edema (2-3+ pitting edema from ankle to knee, 1-2+ pitting in dependent thighs, 1+ pitting in dependent flanks) present.      Left lower leg: Edema (2-3+ pitting edema from ankle to knee, 1-2+ pitting in dependent thighs, 1+ pitting in dependent flanks) present.   Skin:     General: Skin is warm and dry.      Coloration: Skin is jaundiced.   Neurological:      General: No focal deficit present.      Mental Status: She is alert and oriented to person, place, and time.      Comments: Negative for asterixis. Delayed but appropriate when answering questions.   Psychiatric:         Mood and Affect: Mood normal.         Behavior: Behavior normal.        Additional Data:     Lab Results:  Results from last 7 days   Lab Units 12/20/23  1416   WBC Thousand/uL 5.28   HEMOGLOBIN g/dL 8.1*   HEMATOCRIT % 24.5*   PLATELETS Thousands/uL 99*   NEUTROS PCT % 60   LYMPHS PCT % 30   MONOS PCT % 6   EOS PCT % 2     Results from last 7 days   Lab Units 12/20/23  1416   SODIUM mmol/L 133*   POTASSIUM mmol/L 4.0   CHLORIDE mmol/L 112*   CO2 mmol/L 15*   BUN mg/dL 24   CREATININE mg/dL 1.23   ANION GAP mmol/L 6   CALCIUM mg/dL 8.5   ALBUMIN g/dL 2.2*   TOTAL BILIRUBIN mg/dL 3.77*   ALK PHOS U/L 75   ALT U/L 23   AST U/L 44*   GLUCOSE RANDOM mg/dL 113     Results from last 7 days   Lab Units 12/20/23  1416   INR  1.82*                   Lines/Drains:  Invasive  Devices       Peripheral Intravenous Line  Duration             Peripheral IV 12/20/23 Left;Upper Arm <1 day    Peripheral IV 12/20/23 Right;Upper Arm <1 day                        Imaging: Reviewed radiology reports from this admission including: chest xray and Personally reviewed the following imaging: chest xray  XR chest 1 view portable   ED Interpretation by Stephen Gallego DO (12/20 2340)   Bilateral pulmonary edema with bilateral pleural effusions, right worse than left      Final Result by Westley Vang MD (12/20 3964)      Shifting bilateral right greater than the left pleural effusions                  Workstation performed: [id number]             EKG and Other Studies Reviewed on Admission:   EKG: No EKG obtained.    ** Please Note: This note has been constructed using a voice recognition system. **

## 2023-12-21 ENCOUNTER — APPOINTMENT (INPATIENT)
Dept: RADIOLOGY | Facility: HOSPITAL | Age: 68
DRG: 432 | End: 2023-12-21
Payer: MEDICARE

## 2023-12-21 LAB
ALBUMIN FLD-MCNC: <1.5 G/DL
ALBUMIN SERPL BCP-MCNC: 2.4 G/DL (ref 3.5–5)
ALP SERPL-CCNC: 87 U/L (ref 34–104)
ALT SERPL W P-5'-P-CCNC: 25 U/L (ref 7–52)
ANION GAP SERPL CALCULATED.3IONS-SCNC: 9 MMOL/L
APPEARANCE FLD: CLEAR
AST SERPL W P-5'-P-CCNC: 45 U/L (ref 13–39)
BASE EX.OXY STD BLDV CALC-SCNC: 95.1 % (ref 60–80)
BASE EXCESS BLDV CALC-SCNC: -9.4 MMOL/L
BILIRUB SERPL-MCNC: 3.86 MG/DL (ref 0.2–1)
BUN SERPL-MCNC: 26 MG/DL (ref 5–25)
CALCIUM ALBUM COR SERPL-MCNC: 10.1 MG/DL (ref 8.3–10.1)
CALCIUM SERPL-MCNC: 8.8 MG/DL (ref 8.4–10.2)
CHLORIDE SERPL-SCNC: 110 MMOL/L (ref 96–108)
CO2 SERPL-SCNC: 13 MMOL/L (ref 21–32)
COLOR FLD: YELLOW
CREAT SERPL-MCNC: 1.3 MG/DL (ref 0.6–1.3)
ERYTHROCYTE [DISTWIDTH] IN BLOOD BY AUTOMATED COUNT: 14.6 % (ref 11.6–15.1)
GFR SERPL CREATININE-BSD FRML MDRD: 42 ML/MIN/1.73SQ M
GLUCOSE FLD-MCNC: 207 MG/DL
GLUCOSE SERPL-MCNC: 154 MG/DL (ref 65–140)
HCO3 BLDV-SCNC: 14.8 MMOL/L (ref 24–30)
HCT VFR BLD AUTO: 23.2 % (ref 34.8–46.1)
HGB BLD-MCNC: 7.6 G/DL (ref 11.5–15.4)
HISTIOCYTES NFR FLD: 82 %
LDH FLD L TO P-CCNC: <30 U/L
LYMPHOCYTES NFR BLD AUTO: 3 %
MAGNESIUM SERPL-MCNC: 1.3 MG/DL (ref 1.9–2.7)
MCH RBC QN AUTO: 34.9 PG (ref 26.8–34.3)
MCHC RBC AUTO-ENTMCNC: 32.8 G/DL (ref 31.4–37.4)
MCV RBC AUTO: 106 FL (ref 82–98)
MISCELLANEOUS LAB TEST RESULT: NORMAL
MONO+MESO NFR FLD MANUAL: 3 %
NEUTS SEG NFR BLD AUTO: 12 %
O2 CT BLDV-SCNC: 11 ML/DL
PATHOLOGY REVIEW: NO
PCO2 BLDV: 26.4 MM HG (ref 42–50)
PH BLDV: 7.37 [PH] (ref 7.3–7.4)
PHOSPHATE SERPL-MCNC: 3.5 MG/DL (ref 2.3–4.1)
PLATELET # BLD AUTO: 76 THOUSANDS/UL (ref 149–390)
PMV BLD AUTO: 12.1 FL (ref 8.9–12.7)
PO2 BLDV: 169.5 MM HG (ref 35–45)
POTASSIUM SERPL-SCNC: 3.6 MMOL/L (ref 3.5–5.3)
PROT FLD-MCNC: <3 G/DL
PROT SERPL-MCNC: 6.1 G/DL (ref 6.4–8.4)
RBC # BLD AUTO: 2.18 MILLION/UL (ref 3.81–5.12)
SITE: NORMAL
SODIUM SERPL-SCNC: 132 MMOL/L (ref 135–147)
TOTAL CELLS COUNTED SPEC: 100
WBC # BLD AUTO: 4.88 THOUSAND/UL (ref 4.31–10.16)
WBC # FLD MANUAL: 75 /UL

## 2023-12-21 PROCEDURE — 84100 ASSAY OF PHOSPHORUS: CPT

## 2023-12-21 PROCEDURE — 87205 SMEAR GRAM STAIN: CPT

## 2023-12-21 PROCEDURE — 84157 ASSAY OF PROTEIN OTHER: CPT

## 2023-12-21 PROCEDURE — 0W9G3ZZ DRAINAGE OF PERITONEAL CAVITY, PERCUTANEOUS APPROACH: ICD-10-PCS | Performed by: INTERNAL MEDICINE

## 2023-12-21 PROCEDURE — 88112 CYTOPATH CELL ENHANCE TECH: CPT | Performed by: PATHOLOGY

## 2023-12-21 PROCEDURE — 99223 1ST HOSP IP/OBS HIGH 75: CPT | Performed by: INTERNAL MEDICINE

## 2023-12-21 PROCEDURE — 83735 ASSAY OF MAGNESIUM: CPT

## 2023-12-21 PROCEDURE — 88305 TISSUE EXAM BY PATHOLOGIST: CPT | Performed by: PATHOLOGY

## 2023-12-21 PROCEDURE — 99232 SBSQ HOSP IP/OBS MODERATE 35: CPT | Performed by: INTERNAL MEDICINE

## 2023-12-21 PROCEDURE — 87070 CULTURE OTHR SPECIMN AEROBIC: CPT

## 2023-12-21 PROCEDURE — 93005 ELECTROCARDIOGRAM TRACING: CPT

## 2023-12-21 PROCEDURE — 82805 BLOOD GASES W/O2 SATURATION: CPT

## 2023-12-21 PROCEDURE — 49083 ABD PARACENTESIS W/IMAGING: CPT | Performed by: INTERNAL MEDICINE

## 2023-12-21 PROCEDURE — 85027 COMPLETE CBC AUTOMATED: CPT

## 2023-12-21 PROCEDURE — 89051 BODY FLUID CELL COUNT: CPT

## 2023-12-21 PROCEDURE — 82945 GLUCOSE OTHER FLUID: CPT

## 2023-12-21 PROCEDURE — 80053 COMPREHEN METABOLIC PANEL: CPT

## 2023-12-21 PROCEDURE — 83615 LACTATE (LD) (LDH) ENZYME: CPT

## 2023-12-21 PROCEDURE — 49083 ABD PARACENTESIS W/IMAGING: CPT

## 2023-12-21 PROCEDURE — NC001 PR NO CHARGE: Performed by: STUDENT IN AN ORGANIZED HEALTH CARE EDUCATION/TRAINING PROGRAM

## 2023-12-21 PROCEDURE — 82042 OTHER SOURCE ALBUMIN QUAN EA: CPT

## 2023-12-21 RX ORDER — MIDODRINE HYDROCHLORIDE 5 MG/1
5 TABLET ORAL
Status: DISCONTINUED | OUTPATIENT
Start: 2023-12-21 | End: 2023-12-27

## 2023-12-21 RX ORDER — MAGNESIUM SULFATE HEPTAHYDRATE 40 MG/ML
4 INJECTION, SOLUTION INTRAVENOUS ONCE
Status: COMPLETED | OUTPATIENT
Start: 2023-12-21 | End: 2023-12-21

## 2023-12-21 RX ORDER — ALBUMIN (HUMAN) 12.5 G/50ML
25 SOLUTION INTRAVENOUS
Status: DISCONTINUED | OUTPATIENT
Start: 2023-12-21 | End: 2023-12-24

## 2023-12-21 RX ADMIN — FUROSEMIDE 40 MG: 10 INJECTION, SOLUTION INTRAMUSCULAR; INTRAVENOUS at 18:35

## 2023-12-21 RX ADMIN — PANCRELIPASE 24000 UNITS: 24000; 76000; 120000 CAPSULE, DELAYED RELEASE PELLETS ORAL at 09:30

## 2023-12-21 RX ADMIN — SODIUM BICARBONATE 650 MG TABLET 650 MG: at 16:33

## 2023-12-21 RX ADMIN — MIDODRINE HYDROCHLORIDE 5 MG: 5 TABLET ORAL at 13:39

## 2023-12-21 RX ADMIN — AMOXICILLIN 500 MG: 250 CAPSULE ORAL at 06:39

## 2023-12-21 RX ADMIN — MAGNESIUM SULFATE IN WATER 4 G: 4 INJECTION, SOLUTION INTRAVENOUS at 13:32

## 2023-12-21 RX ADMIN — PANCRELIPASE 24000 UNITS: 24000; 76000; 120000 CAPSULE, DELAYED RELEASE PELLETS ORAL at 13:39

## 2023-12-21 RX ADMIN — AMOXICILLIN 500 MG: 250 CAPSULE ORAL at 13:39

## 2023-12-21 RX ADMIN — MIDODRINE HYDROCHLORIDE 5 MG: 5 TABLET ORAL at 16:34

## 2023-12-21 RX ADMIN — SPIRONOLACTONE 100 MG: 100 TABLET ORAL at 13:39

## 2023-12-21 RX ADMIN — HEPARIN SODIUM 5000 UNITS: 5000 INJECTION INTRAVENOUS; SUBCUTANEOUS at 06:37

## 2023-12-21 RX ADMIN — ACETAMINOPHEN 650 MG: 325 TABLET, FILM COATED ORAL at 03:50

## 2023-12-21 RX ADMIN — TRIMETHOBENZAMIDE HYDROCHLORIDE 200 MG: 100 INJECTION INTRAMUSCULAR at 12:49

## 2023-12-21 RX ADMIN — ALBUMIN (HUMAN) 25 G: 0.25 INJECTION, SOLUTION INTRAVENOUS at 17:02

## 2023-12-21 RX ADMIN — HEPARIN SODIUM 5000 UNITS: 5000 INJECTION INTRAVENOUS; SUBCUTANEOUS at 22:00

## 2023-12-21 RX ADMIN — AMOXICILLIN 500 MG: 250 CAPSULE ORAL at 23:17

## 2023-12-21 RX ADMIN — HEPARIN SODIUM 5000 UNITS: 5000 INJECTION INTRAVENOUS; SUBCUTANEOUS at 16:34

## 2023-12-21 RX ADMIN — FERROUS SULFATE TAB 325 MG (65 MG ELEMENTAL FE) 325 MG: 325 (65 FE) TAB at 09:31

## 2023-12-21 RX ADMIN — TRIMETHOBENZAMIDE HYDROCHLORIDE 200 MG: 100 INJECTION INTRAMUSCULAR at 03:37

## 2023-12-21 RX ADMIN — LACTULOSE 20 G: 20 SOLUTION ORAL at 18:35

## 2023-12-21 RX ADMIN — AMOXICILLIN 500 MG: 250 CAPSULE ORAL at 18:35

## 2023-12-21 RX ADMIN — PANCRELIPASE 24000 UNITS: 24000; 76000; 120000 CAPSULE, DELAYED RELEASE PELLETS ORAL at 16:34

## 2023-12-21 RX ADMIN — FUROSEMIDE 40 MG: 10 INJECTION, SOLUTION INTRAMUSCULAR; INTRAVENOUS at 09:32

## 2023-12-21 NOTE — CONSULTS
Consultation -  [facility]  Cassidy ALBARO Zhang 68 y.o. female MRN: [id number]  Unit/Bed#: -01 Encounter: [id number]        Inpatient consult to gastroenterology  Consult performed by: Trevor Edward MD  Consult ordered by: Terrell Mayberry MD          ASSESSMENT/PLAN:   Acute on chronic decompensated alcoholic liver cirrhosis      --Patient presents with ascites, bilateral lower extremity swelling and dyspnea on exertion found to have hyponatremia, hypoalbuminemia, thrombocytopenia, hyperbilirubinemia, anemia.  --AAOx3, no confusion, asterixis  --Denies fever , abdominal pain, hematemesis, hemoptysis, hematochezia, melena  --SH was on hold of lasix and spironolactone for increased creatinine and hyponatremia  --Not taking lactulose last 2 days as thought it is for constipation and having >5 BMs per day  --KEI, AMA, ASMA, hemochromatosis mutation, acute hepatitis panel, AFP tumor     marker negative on previous admission  --11/19 MRI abdomen with and without contrast and MRCP Questionable micronodularity along the anterior left hepatic lobe suggestive possibly early cirrhosis.  Two subcentimeter probable hemangiomas.Moderate volume ascites, moderate right and small left pleural effusions, moderate body wall edema and mild periportal edema, likely related to fluid overloaded state.  --Last EGD in July 2023 not showing any evidence of portal hypertension  --Platelet 147>128>99, bilirubin 3.77>3.86, sodium 132>133>132, INR 1.62>1.75>1.82  --Ammonia 37  --Serum osmolality 289  --Renal function shows creatinine 1.28 on 12/07 and now 1.11>1.23>1.3 baseline creatinine 0.7 on 10/23  --Patient afebrile, no leucocytosis on CBC  --CXR shows shifting bilateral right >left sided pleural effusion  --US result pending  --MELD score today 24 estimated 3 months mortality 19.6%  --Acute on chronic decompensated alcoholic liver cirrhosis with ascites, hyperbilirubinemia, thrombocytopenia, hypoalbuminemia possibly  secondary to discontinuing lasix, spironolactone, lactulose vs infection oral abscess      Plan:  Continue lasix 40 mg IV BID   Continue Spironolactone 100 mg daily  Monitor I/Os  Monitor daily standing weight  Monitor BMP, CBC daily  Continue lactulose 20 g BID with goal of >3 Bms  Monitor for worsening mental status  Trend MELD labs  Consider paracentesis to rule out SBP     Hyperbilirubinemia     --POA total bilirubin 3.77 today 3.86   --MRI in 11/19/23 shows Sequela of chronic pancreatitis again noted with main pancreatic duct dilatation. Known intraductal calculus in the pancreatic head is not well seen by MRI. No evidence of acute pancreatitis     Plan:  Continue monitoring BMP and MELD score     Hepatic encephalopathy     --Patient was mildly confused on admission but today she is AAOx3  --Asterixis present mildly  --Stopped taking lactulose last 2 days for misunderstanding the purpose of it  --BM after admission     Plan:  Continue lactulose 20 g daily for hepatic encephalopathy counseled provided for a goal of  >3 soft/form bowel movements.   Monitor stool output     Chronic pancreatitis     --Patient has History of chronic pancreatitis with pancreatic insufficency  --Most likely secondary to alcohol abuse and recurrent gallstone  --Lipase not checked this admission  --MRI in 11/19/23 shows Sequela of chronic pancreatitis again noted with main pancreatic duct dilatation. Known intraductal calculus in the pancreatic head is not well seen by MRI. No evidence of acute pancreatitis.   --Ca 19-9 <2 on previous admission    Plan:  Monitor for worsening symptoms  Continue pancrelipase        ______________________________________________________________________    Reason for Consult / Principal Problem: Decompensated liver disease (HCC)    HPI: Cassidy Zhang is a 68 y.o. year old female who presents with Decompensated liver disease (HCC) with PMH of decompensated liver cirrhosis secondary to chronic alcohol  abuse presents with abdominal distension, bilateral lower extremities swelling, and dyspnea on exertion progressive for last 1-2 weeks. Now she can only has 15-20 feet walking, and difficulty climbing 12 steps at home. Recently after outpatient visit she stopped taking lasix and spironolactone for increasing creatinine level and decresed sodium level for last 2 days. She was continuing fluid restriction but not sure how much she is drinking on daily basis. She has been weighing herself daily. Her MELD score on admission is 24 which was 28 on GI office visit in 12/7/23. She recently stopped taking lactulose last 2 days as she thought it was for constipation and had >5 BMs per day. Patient was mildly confused on admission as per her  but on examination she was AAOx3.  She had dental procedure for oral abscess on day prior to hospital presentation and is on amoxicillin 500 mg q6h.    Review of Systems:  The remainder of the review of systems was negative except for the pertinent positives noted in HPI. Gastrointestinal ROS: no abdominal pain, change in bowel habits, or black or bloody stools  positive for - feeling sleepy  negative for - abdominal pain, appetite loss, blood in stools, change in bowel habits, change in stools, constipation, diarrhea, heartburn, hematemesis, melena, or nausea/vomiting  Had 2 bowel movement after admission while on lactulose.      Historical Information   Past Medical History:   Diagnosis Date    Diarrhea 10/26/2023    Hypertension     Hyponatremia      Past Surgical History:   Procedure Laterality Date    EYE SURGERY      MI OPTX FEM SHFT FX W/INSJ IMED IMPLT W/WO SCREW Left 11/17/2018    Procedure: Intramedullary nail fixation left hip fracture;  Surgeon: Ross Herrera MD;  Location: [facility];  Service: Orthopedics     Social History   Social History     Substance and Sexual Activity   Alcohol Use Not Currently    Alcohol/week: 1.0 standard drink of alcohol    Types: 1  Cans of beer per week    Comment: 2-3 times per week. Drinks heavier before     Social History     Substance and Sexual Activity   Drug Use No     Social History     Tobacco Use   Smoking Status Former   Smokeless Tobacco Never     Family History   Problem Relation Age of Onset    Heart disease Mother     Heart disease Father        Meds/Allergies     Medications Prior to Admission   Medication    Calcium Carb-Cholecalciferol (CALCIUM + D3 PO)    Cholecalciferol (VITAMIN D3) 3000 UNITS TABS    diclofenac sodium (VOLTAREN) 1 %    docusate sodium (COLACE) 100 mg capsule    ferrous sulfate 325 (65 Fe) mg tablet    lactulose (CHRONULAC) 10 g/15 mL solution    lidocaine (LIDODERM) 5 %    losartan (COZAAR) 50 mg tablet    metoprolol tartrate (LOPRESSOR) 100 mg tablet    Omega-3 Fatty Acids (FISH OIL) 1,000 mg    pancrelipase, Lip-Prot-Amyl, (CREON) 24,000 units    senna (SENOKOT) 8.6 mg    sodium chloride 1 g tablet    thiamine 100 MG tablet    folic acid (FOLVITE) 1 mg tablet    magnesium gluconate (MAGONATE) 500 mg tablet    pantoprazole (PROTONIX) 40 mg tablet    spironolactone (ALDACTONE) 50 mg tablet    traMADol (ULTRAM) 50 mg tablet     Current Facility-Administered Medications   Medication Dose Route Frequency    acetaminophen (TYLENOL) tablet 650 mg  650 mg Oral Q8H PRN    amoxicillin (AMOXIL) capsule 500 mg  500 mg Oral Q6H    Diclofenac Sodium (VOLTAREN) 1 % topical gel 2 g  2 g Topical Q6H PRN    ferrous sulfate tablet 325 mg  325 mg Oral Daily With Breakfast    furosemide (LASIX) injection 40 mg  40 mg Intravenous BID (diuretic)    heparin (porcine) subcutaneous injection 5,000 Units  5,000 Units Subcutaneous Q8H [facility]    lactulose (CHRONULAC) oral solution 20 g  20 g Oral BID    pancrelipase (Lip-Prot-Amyl) (CREON) delayed release capsule 24,000 Units  24,000 Units Oral TID With Meals    sodium bicarbonate tablet 650 mg  650 mg Oral BID after meals    spironolactone (ALDACTONE) tablet 100 mg  100 mg Oral Daily  "   trimethobenzamide (TIGAN) IM injection 200 mg  200 mg Intramuscular Q6H PRN       Allergies   Allergen Reactions    Demerol [Meperidine] Other (See Comments)     hallucinations    Diphenhydramine Other (See Comments)     hallucinations    Prednisone Tremor    Sulfa Antibiotics Other (See Comments)     hallucinations    Percocet [Oxycodone-Acetaminophen] Palpitations       Objective     Blood pressure 114/57, pulse 67, temperature 97.5 °F (36.4 °C), temperature source Oral, resp. rate 17, height 5' 4\" (1.626 m), weight 65.9 kg (145 lb 4.5 oz), SpO2 95%.      Intake/Output Summary (Last 24 hours) at 12/21/2023 0938  Last data filed at 12/21/2023 0101  Gross per 24 hour   Intake --   Output 300 ml   Net -300 ml       PHYSICAL EXAM   Physical Exam   Constitutional: She is oriented to person, place, and time.  Non-toxic appearance. She appears ill. No distress.   HENT:   Head: Normocephalic and atraumatic.   Nose: Nose normal.   Mouth/Throat: Mucous membranes are moist. Oropharynx is clear.   Eyes: Pupils are equal, round, and reactive to light. Scleral icterus is present.   Neck: Carotid bruit is not present.   Cardiovascular: Normal rate, regular rhythm, normal heart sounds and normal pulses.   Pulmonary/Chest: Effort normal and breath sounds normal. No stridor. No respiratory distress. She has no wheezes. She has no rhonchi. She has no rales. She exhibits no tenderness.   Abdominal: Bowel sounds are normal. She exhibits distension. She exhibits no mass. There is no abdominal tenderness. There is no rebound and no guarding.   Musculoskeletal:         General: Swelling present. No tenderness, deformity or signs of injury. Normal range of motion.      Cervical back: Normal range of motion. No rigidity or tenderness.      Right lower leg: Edema present.      Left lower leg: Edema present.   Lymphadenopathy:     She has no cervical adenopathy.   Neurological: She is alert and oriented to person, place, and time. No " cranial nerve deficit or sensory deficit.   Asterixis present mildly   Skin: Skin is warm and dry. Capillary refill takes 2 to 3 seconds. No bruising, no lesion and no rash noted. She is not diaphoretic. No erythema. There is jaundice and pallor.   Psychiatric: Her behavior is normal. Mood and thought content normal.   Nursing note and vitals reviewed.      Lab Results:   Admission on 12/20/2023   Component Date Value    WBC 12/20/2023 5.28     RBC 12/20/2023 2.32 (L)     Hemoglobin 12/20/2023 8.1 (L)     Hematocrit 12/20/2023 24.5 (L)     MCV 12/20/2023 106 (H)     MCH 12/20/2023 34.9 (H)     MCHC 12/20/2023 33.1     RDW 12/20/2023 14.6     MPV 12/20/2023 11.4     Platelets 12/20/2023 99 (L)     nRBC 12/20/2023 0     Neutrophils Relative 12/20/2023 60     Immat GRANS % 12/20/2023 1     Lymphocytes Relative 12/20/2023 30     Monocytes Relative 12/20/2023 6     Eosinophils Relative 12/20/2023 2     Basophils Relative 12/20/2023 1     Neutrophils Absolute 12/20/2023 3.23     Immature Grans Absolute 12/20/2023 0.03     Lymphocytes Absolute 12/20/2023 1.56     Monocytes Absolute 12/20/2023 0.34     Eosinophils Absolute 12/20/2023 0.09     Basophils Absolute 12/20/2023 0.03     Sodium 12/20/2023 133 (L)     Potassium 12/20/2023 4.0     Chloride 12/20/2023 112 (H)     CO2 12/20/2023 15 (L)     ANION GAP 12/20/2023 6     BUN 12/20/2023 24     Creatinine 12/20/2023 1.23     Glucose 12/20/2023 113     Calcium 12/20/2023 8.5     Corrected Calcium 12/20/2023 9.9     AST 12/20/2023 44 (H)     ALT 12/20/2023 23     Alkaline Phosphatase 12/20/2023 75     Total Protein 12/20/2023 5.7 (L)     Albumin 12/20/2023 2.2 (L)     Total Bilirubin 12/20/2023 3.77 (H)     eGFR 12/20/2023 45     Ammonia 12/20/2023 37     BNP 12/20/2023 409 (H)     Protime 12/20/2023 21.9 (H)     INR 12/20/2023 1.82 (H)     PTT 12/20/2023 46 (H)     Osmolality Serum 12/20/2023 289     Sodium 12/21/2023 132 (L)     Potassium 12/21/2023 3.6     Chloride  12/21/2023 110 (H)     CO2 12/21/2023 13 (L)     ANION GAP 12/21/2023 9     BUN 12/21/2023 26 (H)     Creatinine 12/21/2023 1.30     Glucose 12/21/2023 154 (H)     Calcium 12/21/2023 8.8     Corrected Calcium 12/21/2023 10.1     AST 12/21/2023 45 (H)     ALT 12/21/2023 25     Alkaline Phosphatase 12/21/2023 87     Total Protein 12/21/2023 6.1 (L)     Albumin 12/21/2023 2.4 (L)     Total Bilirubin 12/21/2023 3.86 (H)     eGFR 12/21/2023 42     Magnesium 12/21/2023 1.3 (L)     Phosphorus 12/21/2023 3.5     WBC 12/21/2023 4.88     RBC 12/21/2023 2.18 (L)     Hemoglobin 12/21/2023 7.6 (L)     Hematocrit 12/21/2023 23.2 (L)     MCV 12/21/2023 106 (H)     MCH 12/21/2023 34.9 (H)     MCHC 12/21/2023 32.8     RDW 12/21/2023 14.6     Platelets 12/21/2023 76 (L)     MPV 12/21/2023 12.1      Imaging Studies: I have personally reviewed pertinent reports.    Chest X-ray: Shifting bilateral right greater than left pleural effusion.

## 2023-12-21 NOTE — ASSESSMENT & PLAN NOTE
Presents for ascites and anasarca, found also to have mild hyponatremia, hypoalbuminemia, hyperbilirubinemia, hypocoagulability, anemia, and thrombocytopenia. Ammonia wnl.  No asterixis on exam, pt A&O x3, maybe some mild confusion (delayed when answering questions, but appropriate), though pt at or near her baseline per spouse.  Denies fevers, chills, sweats, abd pain, hematemesis, hemoptysis, hematochezia, melena.  Reports holding lactulose for last 2-3 days for frequent BMs at home (>5 loose BM per day)  Recently discontinued lasix and spironolactone in the o/p setting d/t worsening renal function and hyponatremia  MELD score 23 on presentation  Last drink 5-6 years ago    Plan:  Start Lasix 40 mg IV BID  Start spironolactone 100 mg PO QD  Diet: High protein, low salt, fluid restrict 1500 cc/day  Resume lactulose 20 mg BID and titrate to target 3-5 loose BM per day  Monitor MELD labs  No indication for SBP tx or ppx at this time  Monitor Hgb and transfuse for Hgb<7  Consult GI and Nephrology, appreciate recommendations  Consider IR consultation for paracentesis

## 2023-12-21 NOTE — ASSESSMENT & PLAN NOTE
Hold home antihypertensive (amlodipine and losartan) at this time, given soft BP and desire for aggressive diuresis.   BP today is 100/35

## 2023-12-21 NOTE — PROGRESS NOTES
Counts include 234 beds at the [facility]  Progress Note  Name: Cassidy Zhang I  MRN: [id number]  Unit/Bed#: -01 I Date of Admission: 12/20/2023   Date of Service: 12/21/2023 I Hospital Day: 1    Assessment/Plan   * Decompensated liver disease (HCC)  Assessment & Plan  Presents for ascites and anasarca, found also to have mild hyponatremia, hypoalbuminemia, hyperbilirubinemia, hypocoagulability, anemia, and thrombocytopenia. Ammonia wnl.  No asterixis on exam, pt A&O x3, maybe some mild confusion (delayed when answering questions, but appropriate), though pt at or near her baseline per spouse.  Denies fevers, chills, sweats, abd pain, hematemesis, hemoptysis, hematochezia, melena.  Reports holding lactulose for last 2-3 days for frequent BMs at home (>5 loose BM per day)  Recently discontinued lasix and spironolactone in the o/p setting d/t worsening renal function and hyponatremia  MELD score 23 on presentation  Last drink 5-6 years ago    Plan:  Start Lasix 40 mg IV BID  Start spironolactone 100 mg PO QD  Diet: High protein, low salt, fluid restrict 1500 cc/day  Resume lactulose 20 mg BID and titrate to target 3-5 loose BM per day  Monitor MELD labs  Monitor Hgb and transfuse for Hgb<7  GI and Nephro consulted  IR paracentesis order placed with complete labs  Albumin replacement   Tigan for nausea - EKG ordered to evaluate QTC for zofran    Status post incision and drainage  Assessment & Plan  Pt and  report dental I&D for oral abscess day prior to presentation 12/20/23, started on amoxicillin 500 mg PO Q6H. Acute infection may also have played a role in knocking patient into liver decompensation.     Continue [id number] amoxicillin    Metabolic alkalosis  Assessment & Plan  HCO3 15 POA  Pt reports >5 loose bowel movements/day  Suspect primary disturbance due to GI losses    Check VBG to confirm alkalemia with a primary metabolic disturbance  Start NaHCO3 tabs 650 mg PO BID  Nephrology on  board    Chronic pancreatitis (HCC)  Assessment & Plan  2/2 EtOH abuse, now in remission.     Continue home pancrealipase    Ascites/anasarca  Assessment & Plan  2/2 end stage liver disease, as noted above    Plan:  Diuresis, fluid restriction, dietary salt restriction as above   Nephrology and GI consults, as above  IR consult for paracentesis, replace albumin if high volume para    Chronic hyponatremia  Assessment & Plan  History of SIADH, however suspect also a component of hypotonic hypervolemic hyponatremia in the setting of liver failure complicated by ascites and anasarca.     Plan:  Collect urine sodium, urine osm, serum osm before starting diuretics, and f/u results.  Diuresis, fluid restriction, dietary salt restriction as above  Hold home Na tablets, at least until hyponatremia labs interpreted--starting NaHCO3 tabs for metabolic alkalosis  Nephrology consulted - waiting for osmolality labs and VBG  -Albumin 25g BID to be given with lasix to balance bolume status  -Started midodrine 5mg TID      Essential hypertension  Assessment & Plan  Hold home antihypertensive (amlodipine and losartan) at this time, given soft BP and desire for aggressive diuresis. Can resume as indicated.             VTE Pharmacologic Prophylaxis: VTE Score: 7 High Risk (Score >/= 5) - Pharmacological DVT Prophylaxis Ordered: heparin. Sequential Compression Devices Ordered.    Mobility:   Basic Mobility Inpatient Raw Score: 18  JH-HLM Goal: 6: Walk 10 steps or more  JH-HLM Achieved: 3: Sit at edge of bed  HLM Goal NOT achieved. Continue with multidisciplinary rounding and encourage appropriate mobility to improve upon HLM goals.    Patient Centered Rounds: I performed bedside rounds with nursing staff today.   Discussions with Specialists or Other Care Team Provider: GI, nephro    Education and Discussions with Family / Patient: Updated  () via phone.    Total Time Spent on Date of Encounter in care of patient:  25 mins. This time was spent on one or more of the following: performing physical exam; counseling and coordination of care; obtaining or reviewing history; documenting in the medical record; reviewing/ordering tests, medications or procedures; communicating with other healthcare professionals and discussing with patient's family/caregivers.    Current Length of Stay: 1 day(s)  Current Patient Status: Inpatient   Certification Statement: The patient will continue to require additional inpatient hospital stay due to decompensated liver disease  Discharge Plan: Anticipate discharge in 24-48 hrs to home.    Code Status: Level 1 - Full Code    Subjective:   Patient seen and examined at bedside this morning.  States she is uncomfortable from amount of fluid in her belly.  No acute events overnight.  States her appetite has been okay, however it comes and goes.  Stop taking her lactulose due to increasing bowel movements.  Feels she is a bit dehydrated.    Objective:     Vitals:   Temp (24hrs), Av.4 °F (36.3 °C), Min:96.4 °F (35.8 °C), Max:97.9 °F (36.6 °C)    Temp:  [96.4 °F (35.8 °C)-97.9 °F (36.6 °C)] 97.5 °F (36.4 °C)  HR:  [55-82] 67  Resp:  [16-18] 17  BP: ()/(46-74) 114/57  SpO2:  [94 %-100 %] 95 %  Body mass index is 24.94 kg/m².     Input and Output Summary (last 24 hours):     Intake/Output Summary (Last 24 hours) at 2023 1049  Last data filed at 2023 0101  Gross per 24 hour   Intake --   Output 300 ml   Net -300 ml       Physical Exam:   Physical Exam  Constitutional:       General: She is not in acute distress.     Appearance: She is ill-appearing.   HENT:      Head: Normocephalic and atraumatic.      Right Ear: External ear normal.      Left Ear: External ear normal.      Nose: Nose normal.      Mouth/Throat:      Mouth: Mucous membranes are dry.      Pharynx: Oropharynx is clear.   Eyes:      General: Scleral icterus present.      Extraocular Movements: Extraocular movements intact.    Cardiovascular:      Rate and Rhythm: Normal rate and regular rhythm.      Heart sounds: No murmur heard.  Pulmonary:      Effort: Pulmonary effort is normal.      Breath sounds: Rales (mild diffuse) present.      Comments: Diminished breath sounds    Abdominal:      General: There is distension.      Palpations: Abdomen is soft.      Tenderness: There is no abdominal tenderness.   Musculoskeletal:         General: Swelling (pitting in bilateral LE) present. No tenderness.      Cervical back: Normal range of motion and neck supple.   Skin:     General: Skin is warm and dry.      Coloration: Skin is jaundiced.   Neurological:      Mental Status: She is alert and oriented to person, place, and time.      Comments: Slow to respond   Psychiatric:         Mood and Affect: Mood normal.          Additional Data:     Labs:  Results from last 7 days   Lab Units 12/21/23  0725 12/20/23  1416   WBC Thousand/uL 4.88 5.28   HEMOGLOBIN g/dL 7.6* 8.1*   HEMATOCRIT % 23.2* 24.5*   PLATELETS Thousands/uL 76* 99*   NEUTROS PCT %  --  60   LYMPHS PCT %  --  30   MONOS PCT %  --  6   EOS PCT %  --  2     Results from last 7 days   Lab Units 12/21/23  0725   SODIUM mmol/L 132*   POTASSIUM mmol/L 3.6   CHLORIDE mmol/L 110*   CO2 mmol/L 13*   BUN mg/dL 26*   CREATININE mg/dL 1.30   ANION GAP mmol/L 9   CALCIUM mg/dL 8.8   ALBUMIN g/dL 2.4*   TOTAL BILIRUBIN mg/dL 3.86*   ALK PHOS U/L 87   ALT U/L 25   AST U/L 45*   GLUCOSE RANDOM mg/dL 154*     Results from last 7 days   Lab Units 12/20/23  1416   INR  1.82*                   Lines/Drains:  Invasive Devices       Peripheral Intravenous Line  Duration             Peripheral IV 12/20/23 Left;Upper Arm <1 day    Peripheral IV 12/20/23 Right;Upper Arm <1 day                          Imaging: Reviewed radiology reports from this admission including: chest xray and ultrasound(s)    Recent Cultures (last 7 days):         Last 24 Hours Medication List:   Current Facility-Administered  Medications   Medication Dose Route Frequency Provider Last Rate    acetaminophen  650 mg Oral Q8H PRN Terrell Mayberry MD      Albumin 25%  25 g Intravenous BID (diuretic) AQUILES Pepper      amoxicillin  500 mg Oral Q6H Terrell Mayberry MD      Diclofenac Sodium  2 g Topical Q6H PRN Terrell Mayberry MD      ferrous sulfate  325 mg Oral Daily With Breakfast Terrell Mayberry MD      furosemide  40 mg Intravenous BID (diuretic) Terrell Mayberry MD      heparin (porcine)  5,000 Units Subcutaneous Q8H HAJA Terrell Mayberry MD      lactulose  20 g Oral BID Terrell Mayberry MD      midodrine  5 mg Oral TID AC AQUILES Pepper      pancrelipase (Lip-Prot-Amyl)  24,000 Units Oral TID With Meals Terrell Mayberry MD      sodium bicarbonate  650 mg Oral BID after meals Terrell Mayberry MD      spironolactone  100 mg Oral Daily Terrell Mayberry MD      trimethobenzamide  200 mg Intramuscular Q6H PRN Jodie Larose MD          Today, Patient Was Seen By: Dania Quiñones MD    **Please Note: This note may have been constructed using a voice recognition system.**

## 2023-12-21 NOTE — PLAN OF CARE
Problem: PAIN - ADULT  Goal: Verbalizes/displays adequate comfort level or baseline comfort level  Description: Interventions:  - Encourage patient to monitor pain and request assistance  - Assess pain using appropriate pain scale  - Administer analgesics based on type and severity of pain and evaluate response  - Implement non-pharmacological measures as appropriate and evaluate response  - Consider cultural and social influences on pain and pain management  - Notify physician/advanced practitioner if interventions unsuccessful or patient reports new pain  Outcome: Progressing     Problem: INFECTION - ADULT  Goal: Absence or prevention of progression during hospitalization  Description: INTERVENTIONS:  - Assess and monitor for signs and symptoms of infection  - Monitor lab/diagnostic results  - Monitor all insertion sites, i.e. indwelling lines, tubes, and drains  - Monitor endotracheal if appropriate and nasal secretions for changes in amount and color  - [place] appropriate cooling/warming therapies per order  - Administer medications as ordered  - Instruct and encourage patient and family to use good hand hygiene technique  - Identify and instruct in appropriate isolation precautions for identified infection/condition  Outcome: Progressing  Goal: Absence of fever/infection during neutropenic period  Description: INTERVENTIONS:  - Monitor WBC    Outcome: Progressing     Problem: SAFETY ADULT  Goal: Patient will remain free of falls  Description: INTERVENTIONS:  - Educate patient/family on patient safety including physical limitations  - Instruct patient to call for assistance with activity   - Consult OT/PT to assist with strengthening/mobility   - Keep Call bell within reach  - Keep bed low and locked with side rails adjusted as appropriate  - Keep care items and personal belongings within reach  - Initiate and maintain comfort rounds  - Make Fall Risk Sign visible to staff  - Offer Toileting every  Hours,  in advance of need  - Initiate/Maintain alarm  - Obtain necessary fall risk management equipment:  - Apply yellow socks and bracelet for high fall risk patients  - Consider moving patient to room near nurses station  Outcome: Progressing  Goal: Maintain or return to baseline ADL function  Description: INTERVENTIONS:  -  Assess patient's ability to carry out ADLs; assess patient's baseline for ADL function and identify physical deficits which impact ability to perform ADLs (bathing, care of mouth/teeth, toileting, grooming, dressing, etc.)  - Assess/evaluate cause of self-care deficits   - Assess range of motion  - Assess patient's mobility; develop plan if impaired  - Assess patient's need for assistive devices and provide as appropriate  - Encourage maximum independence but intervene and supervise when necessary  - Involve family in performance of ADLs  - Assess for home care needs following discharge   - Consider OT consult to assist with ADL evaluation and planning for discharge  - Provide patient education as appropriate  Outcome: Progressing  Goal: Maintains/Returns to pre admission functional level  Description: INTERVENTIONS:  - Perform AM-PAC 6 Click Basic Mobility/ Daily Activity assessment daily.  - Set and communicate daily mobility goal to care team and patient/family/caregiver.   - Collaborate with rehabilitation services on mobility goals if consulted  - Perform Range of Motion  times a day.  - Reposition patient every  hours.  - Dangle patient  times a day  - Stand patient times a day  - Ambulate patient  times a day  - Out of bed to chair  times a day   - Out of bed for meals  times a day  - Out of bed for toileting  - Record patient progress and toleration of activity level   Outcome: Progressing     Problem: DISCHARGE PLANNING  Goal: Discharge to home or other facility with appropriate resources  Description: INTERVENTIONS:  - Identify barriers to discharge w/patient and caregiver  - Arrange for  needed discharge resources and transportation as appropriate  - Identify discharge learning needs (meds, wound care, etc.)  - Arrange for interpretive services to assist at discharge as needed  - Refer to Case Management Department for coordinating discharge planning if the patient needs post-hospital services based on physician/advanced practitioner order or complex needs related to functional status, cognitive ability, or social support system  Outcome: Progressing     Problem: Knowledge Deficit  Goal: Patient/family/caregiver demonstrates understanding of disease process, treatment plan, medications, and discharge instructions  Description: Complete learning assessment and assess knowledge base.  Interventions:  - Provide teaching at level of understanding  - Provide teaching via preferred learning methods  Outcome: Progressing

## 2023-12-21 NOTE — ASSESSMENT & PLAN NOTE
HCO3 15 POA  Pt reports >5 loose bowel movements/day  Suspect primary disturbance due to GI losses    Check VBG to confirm alkalemia with a primary metabolic disturbance  Start NaHCO3 tabs 650 mg PO BID  Nephrology on board

## 2023-12-21 NOTE — ASSESSMENT & PLAN NOTE
Hold home antihypertensive (amlodipine and losartan) at this time, given soft BP and desire for aggressive diuresis. Can resume as indicated.

## 2023-12-21 NOTE — ASSESSMENT & PLAN NOTE
Pt and  report dental I&D for oral abscess day prior to presentation 12/20/23, started on amoxicillin 500 mg PO Q6H. Acute infection may also have played a role in knocking patient into liver decompensation.     Continue [id number]-aw-dncuwcrea amoxicillin

## 2023-12-21 NOTE — ASSESSMENT & PLAN NOTE
2/2 end stage liver disease, as noted above    Plan:  Diuresis, fluid restriction, dietary salt restriction as above   Nephrology and GI consults, as above  Paracentesis drained 2.4L

## 2023-12-21 NOTE — DISCHARGE SUMMARY
[facility]  Discharge- Cassidy Zhang 1955, 68 y.o. female MRN: [id number]  Unit/Bed#: ALBARO CHANEY 427-01 Encounter: [id number]  Primary Care Provider: Jillian Calix MD   Date and time admitted to hospital: 12/20/2023 10:44 AM    * Decompensated liver disease (HCC)  Assessment & Plan  Presents for ascites and anasarca, found also to have mild hyponatremia, hypoalbuminemia, hyperbilirubinemia, hypocoagulability, anemia, and thrombocytopenia. Ammonia wnl.  No asterixis on exam, pt A&O x3, maybe some mild confusion (delayed when answering questions, but appropriate), though pt at or near her baseline per spouse.  Reports holding lactulose for last 2-3 days for frequent BMs at home (>5 loose BM per day)  Recently discontinued lasix and spironolactone in the o/p setting d/t worsening renal function and hyponatremia   MELD score 23 on presentation  Last drink 5-6 years ago    MELD 3.0: 25 at 12/31/2023  5:24 AM  MELD-Na: 25 at 12/31/2023  5:24 AM  Calculated from:  Serum Creatinine: 1.27 mg/dL at 12/31/2023  5:24 AM  Serum Sodium: 131 mmol/L at 12/31/2023  5:24 AM  Total Bilirubin: 3.64 mg/dL at 12/31/2023  5:24 AM  Serum Albumin: 3.4 g/dL at 12/31/2023  5:24 AM  INR(ratio): 1.87 at 12/31/2023  5:24 AM  Age at listing (hypothetical): 68 years  Sex: Female at 12/31/2023  5:24 AM        Plan:  Lactulose 30 mg twice daily  Anusol cream  Continue Creon 24,000 units with meals  Protonix 40 mg twice daily  Follow-up with GI as an outpatient on 1/28    Coagulopathy (HCC)  Assessment & Plan  2/2 cirrhosis  INR at 1.8 and no acute bleeding     Acute blood loss anemia  Assessment & Plan  1 unit pRBC this admission and 2 units of FFP  S/p transfusion on 12/28 1U RBC  Stable hemorrhoidal bleed.   -No signs of active bleeding  -Can consider outpatient banding procedure with GI       Status post incision and drainage  Assessment & Plan  Pt and  report dental I&D for oral abscess day prior to  presentation 12/20/23, started on amoxicillin 500 mg PO Q6H. Acute infection may also have played a role in knocking patient into liver decompensation. Now resolved    Metabolic acidosis  Assessment & Plan  Stopped sodium bicarb tabs per nephro.  Resolved.     CKD (chronic kidney disease) stage 3, GFR 30-59 ml/min (HCC)  Assessment & Plan  Estimated Creatinine Clearance: 36.6 mL/min (by C-G formula based on SCr of 1.27 mg/dL).  No PRETTY this admission.  Cr near new baseline of 1.0-1.1  In the setting of type II hepatorenal syndrome.  - Midodrine 10mg TID to enhance renal perfusion  Cr stable after diuresis      Chronic pancreatitis (HCC)  Assessment & Plan  2/2 EtOH abuse, now in remission.     Continue home pancrealipase    Ascites/anasarca  Assessment & Plan  2/2 end stage liver disease, as noted above.    Plan:  Lactone 12.5 mg daily.  Hold Lasix until follow-up with PCP.  Follow-up with nephrology as an outpatient    Chronic hyponatremia  Assessment & Plan  History of SIADH, however suspect also a component of hypotonic hypervolemic hyponatremia in the setting of liver failure complicated by ascites and anasarca. Sodium improving.     Plan:  Hold sodium tablets  Midodrine 10 mg 3 times daily  Aldactone 12.5 mg daily  Hold Lasix until follow-up with PCP or GI      Essential hypertension  Assessment & Plan  BP now low due to cirrhosis.  Hold Norvasc and ARB as pt needs midodrine.  Discussed with PCP as an outpatient.      Medical Problems       Resolved Problems  Date Reviewed: 1/1/2024   None       Discharging Physician / Practitioner: Dania Quiñones MD  PCP: Jillian Calix MD  Admission Date:   Admission Orders (From admission, onward)       Ordered        12/20/23 1537  INPATIENT ADMISSION  Once                          Discharge Date: 01/02/24    Consultations During Hospital Stay:  IR  GI  Nephrology    Procedures Performed:   IR paracentesis    Significant Findings / Test Results:   XR chest pa & lateral   Final  Result by Maxime De La Rosa MD (01/01 1109)      Medial right lung base opacity similar to prior exam may be due to pneumonia or atelectasis.                  Workstation performed: [id number]         XR chest 1 view   Final Result by Law Christy MD (12/28 1526)      Medial right base small infiltrate and/or atelectasis.      Small pleural effusions                  Workstation performed: [id number]         CT abdomen pelvis wo contrast   Final Result by Breonna Trevino MD (12/22 1725)      No evidence of hematoma related to recent paracentesis.      Small volume ascites throughout the abdomen and pelvis with mesenteric edema. Moderate body wall edema.      4.8 x 4.4 cm left ovarian cyst which has substantially increased in size since a pelvic ultrasound done on August 7, 2017. Recommend yearly follow-up..            Workstation performed: [id number]         IR INPATIENT Paracentesis   Final Result by Antonio Archer MD (12/22 1704)   Successful therapeutic paracentesis.      ATTESTATION      I, the attending radiologist, was present for the entire procedure. Guidance images were reviewed and I am in agreement with the findings on the report.      Attending physician: Antonio Archer.   Resident:  Adonis Camara.         Workstation performed: [id number]         XR chest 1 view portable   ED Interpretation by Stephen Gallego DO (12/20 1234)   Bilateral pulmonary edema with bilateral pleural effusions, right worse than left      Final Result by Westley Vang MD (12/20 1417)      Shifting bilateral right greater than the left pleural effusions                  Workstation performed: [id number]               Incidental Findings:   none     Test Results Pending at Discharge (will require follow up):   none     Outpatient Tests Requested:  none    Complications:  none    Reason for Admission: SOB, decreased activity, swelling    Hospital Course:   Cassidy Zhang is a 68 y.o. female patient who  originally presented to the hospital on 12/20/2023 due to decompensated liver cirrhosis.  Patient was found to have ascites, thrombocytopenia, jaundice, hypoalbuminemia, low prothrombin.  Of note, patient states that she was not taking her spironolactone and Lasix due to rising creatinine and decreasing sodium level.  She is also had multiple episodes of loose stools for the past few days, thus for 2 days she held her lactulose doses.  Patient was slow to respond on exam with no significant asterixis.  Patient also had a recent dental procedure for oral abscess on the 20th and was prescribed amoxicillin which may have contributed to her liver decompensation.  Patient was restarted on furosemide 40 mg twice daily, lactulose 20 g twice daily, and spironolactone 100 mg daily.  Patient also received paracentesis for fluid overload with complete labs, which were subsequently negative.  Patient was also started on albumin supplementation per nephrology with given doses of Lasix. Pt also reported some BRBPR given her cirrhosis received up to 2 units of PRBC and 2 units of FFP. The BRBPR was determined to be hemorrhoidal. Pt was also presented with hyponatremia which nephrology was consulted for.  Patient was held on home diuretics, and was given IV Bumex with albumin while inpatient.  This was eventually stopped.  Nephrology recommended patient be discharged on 12.5 mg Aldactone and holding Lasix until follow-up with PCP.  On December 31, patient experienced increased encephalopathy, thus GI increased her lactulose dosing as well as started on rifaximin.  Patient subsequently improved in her mental status.  Due to expenses, rifaximin will not be prescribed at discharge and GI will work on getting it for patient as an outpatient. Patient will be discharged to home with instructions to follow up with GI and nephrology.         Please see above list of diagnoses and related plan for additional information.     Condition at  "Discharge: stable    Discharge Day Visit / Exam:   Subjective: Patient seen and examined at bedside this morning.  No acute events overnight, patient states she is feeling much better overall.  Vitals: Blood Pressure: 116/54 (24)  Pulse: 65 (24)  Temperature: 98.3 °F (36.8 °C) (24)  Temp Source: Oral (24 2141)  Respirations: 16 (24)  Height: 5' 4\" (162.6 cm) (23 1237)  Weight - Scale: 59.8 kg (131 lb 12.8 oz) (24)  SpO2: 100 % (24)  Exam:   Physical Exam  Constitutional:       General: She is not in acute distress.     Appearance: Normal appearance.   HENT:      Head: Normocephalic and atraumatic.      Right Ear: External ear normal.      Left Ear: External ear normal.      Nose: Nose normal. No congestion.      Mouth/Throat:      Mouth: Mucous membranes are moist.      Pharynx: Oropharynx is clear.   Eyes:      General: Scleral icterus (mild) present.      Extraocular Movements: Extraocular movements intact.      Conjunctiva/sclera: Conjunctivae normal.   Cardiovascular:      Rate and Rhythm: Normal rate and regular rhythm.      Heart sounds: No murmur heard.  Pulmonary:      Effort: Pulmonary effort is normal. No respiratory distress.      Breath sounds: Normal breath sounds. No wheezing or rales.   Abdominal:      General: Bowel sounds are normal. There is distension.      Palpations: Abdomen is soft.      Tenderness: There is no abdominal tenderness.   Musculoskeletal:         General: Swelling (bilateral LE pitting) present. No tenderness.      Cervical back: Normal range of motion and neck supple.   Skin:     General: Skin is warm and dry.      Coloration: Skin is jaundiced.   Neurological:      Mental Status: She is alert.      Comments: Disoriented, slow to respond. Did not know her name or  this morning. No asterixis.   Psychiatric:         Mood and Affect: Mood normal.          Discussion with Family: Updated  " () at bedside.    Discharge instructions/Information to patient and family:   See after visit summary for information provided to patient and family.      Provisions for Follow-Up Care:  See after visit summary for information related to follow-up care and any pertinent home health orders.      Mobility at time of Discharge:   Basic Mobility Inpatient Raw Score: 16  JH-HLM Goal: 5: Stand one or more mins  JH-HLM Achieved: 4: Move to chair/commode  HLM Goal achieved. Continue to encourage appropriate mobility.     Disposition:   Home    Planned Readmission: no     Discharge Statement:  I spent 45  minutes discharging the patient. This time was spent on the day of discharge. I had direct contact with the patient on the day of discharge. Greater than 50% of the total time was spent examining patient, answering all patient questions, arranging and discussing plan of care with patient as well as directly providing post-discharge instructions.  Additional time then spent on discharge activities.    Discharge Medications:  See after visit summary for reconciled discharge medications provided to patient and/or family.      **Please Note: This note may have been constructed using a voice recognition system**

## 2023-12-21 NOTE — ASSESSMENT & PLAN NOTE
HCO3 15 POA  Pt reports >5 loose bowel movements/day  Suspect primary disturbance due to GI losses    Check VBG to confirm alkalemia with a primary metabolic disturbance  Start NaHCO3 tabs 650 mg PO BID  Nephrology consulted, appreciate recommendations

## 2023-12-21 NOTE — ASSESSMENT & PLAN NOTE
Pt and  report dental I&D for oral abscess day prior to presentation 12/20/23, started on amoxicillin 500 mg PO Q6H. Acute infection may also have played a role in knocking patient into liver decompensation.     Continue [id number] amoxicillin

## 2023-12-21 NOTE — ASSESSMENT & PLAN NOTE
Presents for ascites and anasarca, found also to have mild hyponatremia, hypoalbuminemia, hyperbilirubinemia, hypocoagulability, anemia, and thrombocytopenia. Ammonia wnl.  No asterixis on exam, pt A&O x3, maybe some mild confusion (delayed when answering questions, but appropriate), though pt at or near her baseline per spouse.  Denies fevers, chills, sweats, abd pain, hematemesis, hemoptysis, hematochezia, melena.  Reports holding lactulose for last 2-3 days for frequent BMs at home (>5 loose BM per day)  Recently discontinued lasix and spironolactone in the o/p setting d/t worsening renal function and hyponatremia  MELD score 23 on presentation  Last drink 5-6 years ago    MELD 3.0: 26 at 12/21/2023  7:25 AM  MELD-Na: 24 at 12/21/2023  7:25 AM  Calculated from:  Serum Creatinine: 1.30 mg/dL at 12/21/2023  7:25 AM  Serum Sodium: 132 mmol/L at 12/21/2023  7:25 AM  Total Bilirubin: 3.86 mg/dL at 12/21/2023  7:25 AM  Serum Albumin: 2.4 g/dL at 12/21/2023  7:25 AM  INR(ratio): 1.82 at 12/20/2023  2:16 PM  Age at listing (hypothetical): 68 years  Sex: Female at 12/21/2023  7:25 AM        Plan:  Lower Lasix to 20 mg IV tonight to avoid volume shifts per nephrology  Hold spironolactone at this time per nephrology, restart as able  Diet: High protein, low salt, fluid restrict 1500 cc/day  Resume lactulose 20 mg BID and titrate to target 3-5 loose BM per day  Monitor MELD labs  Monitor Hgb and transfuse for Hgb<7  GI and Nephro consulted  IR paracentesis drained 2.4L fluid - labs unremarkable at this point   Albumin replacement   Tigan for nausea   GI recommended [place] labs with potential liver transplant evaluation  May need recurrent paracentesis as on outpatient

## 2023-12-21 NOTE — ASSESSMENT & PLAN NOTE
Presents for ascites and anasarca, found also to have mild hyponatremia, hypoalbuminemia, hyperbilirubinemia, hypocoagulability, anemia, and thrombocytopenia. Ammonia wnl.  No asterixis on exam, pt A&O x3, maybe some mild confusion (delayed when answering questions, but appropriate), though pt at or near her baseline per spouse.  Denies fevers, chills, sweats, abd pain, hematemesis, hemoptysis, hematochezia, melena.  Reports holding lactulose for last 2-3 days for frequent BMs at home (>5 loose BM per day)  Recently discontinued lasix and spironolactone in the o/p setting d/t worsening renal function and hyponatremia  MELD score 23 on presentation  Last drink 5-6 years ago    Plan:  Start Lasix 40 mg IV BID  Start spironolactone 100 mg PO QD  Diet: High protein, low salt, fluid restrict 1500 cc/day  Resume lactulose 20 mg BID and titrate to target 3-5 loose BM per day  Monitor MELD labs  Monitor Hgb and transfuse for Hgb<7  GI and Nephro consulted  IR paracentesis order placed with complete labs  Albumin replacement   Tigan for nausea - EKG ordered to evaluate QTC for zofran

## 2023-12-21 NOTE — ASSESSMENT & PLAN NOTE
Pt and  report dental I&D for oral abscess day prior to presentation 12/20/23, started on amoxicillin 500 mg PO Q6H    Continue [id number] amoxicillin

## 2023-12-21 NOTE — ASSESSMENT & PLAN NOTE
History of SIADH, however suspect also a component of hypotonic hypervolemic hyponatremia in the setting of liver failure complicated by ascites and anasarca.     Plan:  Collect urine sodium, urine osm, serum osm before starting diuretics, and f/u results.  Diuresis, fluid restriction, dietary salt restriction as above  Hold home Na tablets, at least until hyponatremia labs interpreted--starting NaHCO3 tabs for metabolic alkalosis  Nephrology consulted - waiting for osmolality labs and VBG  -Albumin 25g BID to be given with lasix to balance bolume status  -Started midodrine 5mg TID

## 2023-12-21 NOTE — ASSESSMENT & PLAN NOTE
HCO3 15 POA- 18 today   Pt reports 1 loose stool today    Nephrology on board- currently giving 1300 BID of bicarbonate

## 2023-12-21 NOTE — BRIEF OP NOTE (RAD/CATH)
INTERVENTIONAL RADIOLOGY PROCEDURE NOTE    Date: 12/21/2023    Procedure: Paracentesis    Preoperative diagnosis:   1. Cirrhosis (HCC)    2. Anasarca    3. Bilateral lower extremity edema    4. Ascites    5. Chronic hyponatremia    6. Decompensated liver disease (HCC)         Postoperative diagnosis: Same.    Surgeon: Antonio Archer MD     Assistant: None. No qualified resident was available.    Blood loss: None    Specimens: None     Findings: Ultrasound-guided paracentesis performed. Please see the radiology report for details.    Complications: None immediate.    Anesthesia: local

## 2023-12-21 NOTE — ASSESSMENT & PLAN NOTE
History of SIADH, however suspect also a component of hypotonic hypervolemic hyponatremia in the setting of liver failure complicated by ascites and anasarca.     Plan:  Collect urine sodium, urine osm, serum osm before starting diuretics, and f/u results.  Diuresis, fluid restriction, dietary salt restriction as above  Hold home Na tablets, at least until hyponatremia labs interpreted--starting NaHCO3 tabs for metabolic alkalosis  Nephrology consulted - waiting for osmolality labs and VBG  -Albumin 25g BID to be given with lasix to balance volume status  -Started midodrine 5mg TID

## 2023-12-21 NOTE — ASSESSMENT & PLAN NOTE
In the setting of type II hepatorenal syndrome.  - Midodrine 5mg TID to enhance renal perfusion  - Albumin given with Lasix doses

## 2023-12-21 NOTE — ASSESSMENT & PLAN NOTE
2/2 end stage liver disease, as noted above    Plan:  Diuresis, fluid restriction, dietary salt restriction as above   Nephrology and GI consults, as above  Consider IR consult for paracentesis, replace albumin if high volume para

## 2023-12-21 NOTE — ASSESSMENT & PLAN NOTE
2/2 end stage liver disease, as noted above    Plan:  Diuresis, fluid restriction, dietary salt restriction as above   Nephrology and GI consults, as above  IR consult for paracentesis, replace albumin if high volume para

## 2023-12-21 NOTE — ASSESSMENT & PLAN NOTE
2/2 EtOH abuse, now in remission.     Continue home pancrealipase   Yes she can hold warfarin for 3 days. No history of stroke.  No lovenox bridging.

## 2023-12-21 NOTE — CONSULTS
NEPHROLOGY HOSPITAL CONSULTATION   Cassidy Zhang 68 y.o. female MRN: [id number]  Unit/Bed#: -01 Encounter: [id number]    ASSESSMENT and PLAN:  Subacute acute kidney injury (POA):  Baseline creatinine appears to be near 0.7 as of October 2023.  In November creatinine up to 1.92 in the setting of hospitalization due to decompensated liver disease.  Creatinine improved to 1.02 at discharge  Follow-up creatinine x 2 between 1.1 to 1.3 mg/dL  Patient admitted on 12/20 creatinine 1.23 with repeat level 1.3  Etiology: Persistent elevation in creatinine in the setting of worsening liver function-HRS/ ARB/recent PRETTY.  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  Currently on sodium bicarbonate tablets  Fluid restriction 1500 mL day    Blood pressure/volume:  History of essential hypertension  Blood pressure on the low side, relatively low  Home medications: Losartan 100 mg daily, metoprolol tartrate 100 mg twice a day, Aldactone 50 mg daily  Current medications: On diuretic Aldactone 100 mg daily and Lasix 40 mg IV twice daily  Add albumin to be given 30 minutes prior to Lasix    Metabolic acidosis:  Normal anion gap  Suspect related to GI losses/diarrhea  VBG in the a.m.    Anemia:  On iron supplement daily  Hemoglobin 7.6  Per primary  team    Decompensated liver disease:  Ascites/anasarca  Patient was holding lactulose for several days prior to admission due to increased diarrhea  Lactulose resumed on admission  GI consult pending    Ascites:  Management per GI.  Will continue diuretic, add albumin  Albumin replacement for large volume paracentesis  MRI November 2023: Anasarca, ascites-moderate volume.  Pleural effusions    Other:  Abdominal ultrasound November 2023 chronic pancreatitis  Recent tooth abscess/root canal: On amoxicillin      HISTORY OF PRESENT ILLNESS:  Requesting Physician: Haseeb Mccarthy MD  Reason for Consult: Acute kidney injury/volume overload/hyponatremia    Cassidy Zhang is a 68 y.o. female with a past medical history of cirrhosis, alcohol abuse, hyponatremia who was admitted to [facility] after presenting with increasing lower extremity edema, ascites.  Severe lower extremity edema with edema up to the abdominal area noted.  Increasing abdominal girth.  Patient is rather poor historian.  Disoriented to date and time.  Answers other questions appropriately.  States that she has been uncomfortable due to increasing swelling and came to the emergency room.  Questioned about alcohol use.  She states that she has not been drinking for 6 years.  She does not use NSAIDs.  A renal consultation is requested today for assistance in the management of volume overload, decompensated cirrhosis, hyponatremia, metabolic acidosis.    PAST MEDICAL HISTORY:  Past Medical History:   Diagnosis Date    Diarrhea 10/26/2023    Hypertension     Hyponatremia        PAST SURGICAL HISTORY:  Past Surgical History:   Procedure Laterality Date    EYE SURGERY      IN OPTX FEM SHFT FX W/INSJ IMED IMPLT W/WO SCREW Left 11/17/2018    Procedure: Intramedullary nail fixation left hip fracture;  Surgeon: Ross Herrera MD;  Location: [facility];  Service: Orthopedics       ALLERGIES:  Allergies   Allergen Reactions    Demerol [Meperidine]  Other (See Comments)     hallucinations    Diphenhydramine Other (See Comments)     hallucinations    Prednisone Tremor    Sulfa Antibiotics Other (See Comments)     hallucinations    Percocet [Oxycodone-Acetaminophen] Palpitations       SOCIAL HISTORY:  Social History     Substance and Sexual Activity   Alcohol Use Not Currently    Alcohol/week: 1.0 standard drink of alcohol    Types: 1 Cans of beer per week    Comment: 2-3 times per week. Drinks heavier before     Social History     Substance and Sexual Activity   Drug Use No     Social History     Tobacco Use   Smoking Status Former   Smokeless Tobacco Never       FAMILY HISTORY:  Family History   Problem Relation Age of Onset    Heart disease Mother     Heart disease Father        MEDICATIONS:    Current Facility-Administered Medications:     acetaminophen (TYLENOL) tablet 650 mg, 650 mg, Oral, Q8H PRN, Terrell Mayberry MD, 650 mg at 12/21/23 0350    amoxicillin (AMOXIL) capsule 500 mg, 500 mg, Oral, Q6H, Terrell Mayberry MD, 500 mg at 12/21/23 0639    Diclofenac Sodium (VOLTAREN) 1 % topical gel 2 g, 2 g, Topical, Q6H PRN, Terrell Mayberry MD    ferrous sulfate tablet 325 mg, 325 mg, Oral, Daily With Breakfast, Terrell Mayberry MD, 325 mg at 12/21/23 0931    furosemide (LASIX) injection 40 mg, 40 mg, Intravenous, BID (diuretic), Terrell Mayberry MD, 40 mg at 12/21/23 0932    heparin (porcine) subcutaneous injection 5,000 Units, 5,000 Units, Subcutaneous, Q8H HAJA, Terrell Mayberry MD, 5,000 Units at 12/21/23 0637    lactulose (CHRONULAC) oral solution 20 g, 20 g, Oral, BID, Terrell Mayberry MD, 20 g at 12/20/23 1826    pancrelipase (Lip-Prot-Amyl) (CREON) delayed release capsule 24,000 Units, 24,000 Units, Oral, TID With Meals, Terrell Mayberry MD, 24,000 Units at 12/21/23 0930    sodium bicarbonate tablet 650 mg, 650 mg, Oral, BID after meals, Terrell Mayberry MD, 650 mg at 12/20/23 1826    spironolactone (ALDACTONE) tablet 100 mg, 100 mg, Oral, Daily, Terrell Mayberry MD     trimethobenzamide (TIGAN) IM injection 200 mg, 200 mg, Intramuscular, Q6H PRN, Jodie Larose MD, 200 mg at 12/21/23 0337    REVIEW OF SYSTEMS:  Constitutional: Negative for fevers or chills  HENT: Negative for postnasal drip  Eyes: Negative for visual disturbance.   Respiratory: Positive for shortness of breath  Cardiovascular: Negative for chest pain, palpitations.  Positive for leg swelling  Gastrointestinal: Positive for increasing abdominal girth, increasing diarrhea/discontinue lactulose several days ago  Genitourinary: No dysuria, hematuria  Musculoskeletal: Positive for arthralgias and myalgia  Skin: Negative for rash.   Neurological: Negative for focal weakness  Hematological: Negative for bleeding.  Psychiatric/Behavioral: Monitor for confusion  All the systems were reviewed and were negative except as documented on the HPI.    PHYSICAL EXAM:  Current Weight: Weight - Scale: 65.9 kg (145 lb 4.5 oz)  First Weight: Weight - Scale: 65.8 kg (145 lb)  Vitals:    12/20/23 2225 12/21/23 0600 12/21/23 0732 12/21/23 0900   BP: 109/62  108/74 114/57   BP Location:       Pulse: 58  70 67   Resp: 17  17    Temp:   97.5 °F (36.4 °C)    TempSrc:   Oral    SpO2: 96%  95%    Weight:  65.9 kg (145 lb 4.5 oz)     Height:           Intake/Output Summary (Last 24 hours) at 12/21/2023 0953  Last data filed at 12/21/2023 0101  Gross per 24 hour   Intake --   Output 300 ml   Net -300 ml     Physical Exam  Vitals reviewed.   Constitutional:       General: She is not in acute distress.     Appearance: She is well-developed. She is ill-appearing.   HENT:      Head: Normocephalic and atraumatic.      Nose: No congestion.      Mouth/Throat:      Mouth: Mucous membranes are moist.   Eyes:      General: Scleral icterus present.         Right eye: No discharge.         Left eye: No discharge.      Extraocular Movements: Extraocular movements intact.   Neck:      Thyroid: No thyromegaly.      Vascular: No carotid bruit or JVD.       Trachea: No tracheal deviation.   Cardiovascular:      Rate and Rhythm: Normal rate and regular rhythm.      Heart sounds: No murmur heard.     No friction rub. No gallop.   Pulmonary:      Effort: Pulmonary effort is normal.      Breath sounds: Normal breath sounds.   Abdominal:      General: Bowel sounds are normal. There is distension.      Palpations: Abdomen is soft. There is fluid wave. There is no mass.      Tenderness: There is no abdominal tenderness. There is no guarding or rebound.   Musculoskeletal:         General: Tenderness present. Normal range of motion.      Cervical back: Normal range of motion and neck supple. No rigidity or tenderness.      Right lower leg: 3+ Pitting Edema present.      Left lower leg: 3+ Pitting Edema present.   Skin:     General: Skin is warm and dry.      Capillary Refill: Capillary refill takes less than 2 seconds.      Coloration: Skin is not jaundiced or pale.      Findings: No erythema or rash.   Neurological:      General: No focal deficit present.      Mental Status: She is alert.   Psychiatric:         Mood and Affect: Mood normal.         Behavior: Behavior normal.         Thought Content: Thought content normal.         Cognition and Memory: Cognition is impaired. Memory is impaired.           Invasive Devices:      Lab Results:   Results from last 7 days   Lab Units 12/21/23  0725 12/20/23  1416   WBC Thousand/uL 4.88 5.28   HEMOGLOBIN g/dL 7.6* 8.1*   HEMATOCRIT % 23.2* 24.5*   PLATELETS Thousands/uL 76* 99*   POTASSIUM mmol/L 3.6 4.0   CHLORIDE mmol/L 110* 112*   CO2 mmol/L 13* 15*   BUN mg/dL 26* 24   CREATININE mg/dL 1.30 1.23   CALCIUM mg/dL 8.8 8.5   MAGNESIUM mg/dL 1.3*  --    PHOSPHORUS mg/dL 3.5  --    ALK PHOS U/L 87 75   ALT U/L 25 23   AST U/L 45* 44*     Other Studies:

## 2023-12-21 NOTE — ASSESSMENT & PLAN NOTE
History of SIADH, however suspect also a component of hypotonic hypervolemic hyponatremia in the setting of liver failure complicated by ascites and anasarca.     Plan:  Collect urine sodium, urine osm, serum osm before starting diuretics, and f/u results.  Diuresis, fluid restriction, dietary salt restriction as above  Hold home Na tablets, at least until hyponatremia labs interpreted--starting NaHCO3 tabs for metabolic alkalosis  Nephrology consulted, appreciate recommendations

## 2023-12-22 ENCOUNTER — APPOINTMENT (INPATIENT)
Dept: CT IMAGING | Facility: HOSPITAL | Age: 68
DRG: 432 | End: 2023-12-22
Payer: MEDICARE

## 2023-12-22 PROBLEM — E87.20 METABOLIC ACIDOSIS: Status: ACTIVE | Noted: 2023-12-20

## 2023-12-22 PROBLEM — D64.9 ANEMIA: Status: ACTIVE | Noted: 2023-12-22

## 2023-12-22 LAB
ALBUMIN SERPL BCP-MCNC: 2.6 G/DL (ref 3.5–5)
ALP SERPL-CCNC: 78 U/L (ref 34–104)
ALT SERPL W P-5'-P-CCNC: 22 U/L (ref 7–52)
ANION GAP SERPL CALCULATED.3IONS-SCNC: 9 MMOL/L
AST SERPL W P-5'-P-CCNC: 39 U/L (ref 13–39)
BASE EX.OXY STD BLDV CALC-SCNC: 47.4 % (ref 60–80)
BASE EXCESS BLDV CALC-SCNC: -7.8 MMOL/L
BILIRUB SERPL-MCNC: 3.62 MG/DL (ref 0.2–1)
BUN SERPL-MCNC: 20 MG/DL (ref 5–25)
CALCIUM ALBUM COR SERPL-MCNC: 10.2 MG/DL (ref 8.3–10.1)
CALCIUM SERPL-MCNC: 9.1 MG/DL (ref 8.4–10.2)
CHLORIDE SERPL-SCNC: 110 MMOL/L (ref 96–108)
CO2 SERPL-SCNC: 17 MMOL/L (ref 21–32)
CREAT SERPL-MCNC: 1.04 MG/DL (ref 0.6–1.3)
ERYTHROCYTE [DISTWIDTH] IN BLOOD BY AUTOMATED COUNT: 14.6 % (ref 11.6–15.1)
FOLATE SERPL-MCNC: 14.3 NG/ML
GFR SERPL CREATININE-BSD FRML MDRD: 55 ML/MIN/1.73SQ M
GLUCOSE SERPL-MCNC: 124 MG/DL (ref 65–140)
HCO3 BLDV-SCNC: 17.1 MMOL/L (ref 24–30)
HCT VFR BLD AUTO: 20.2 % (ref 34.8–46.1)
HGB BLD-MCNC: 6.8 G/DL (ref 11.5–15.4)
HGB BLD-MCNC: 7.1 G/DL (ref 11.5–15.4)
INR PPP: 1.96 (ref 0.84–1.19)
MCH RBC QN AUTO: 34.7 PG (ref 26.8–34.3)
MCHC RBC AUTO-ENTMCNC: 33.7 G/DL (ref 31.4–37.4)
MCV RBC AUTO: 103 FL (ref 82–98)
O2 CT BLDV-SCNC: 5 ML/DL
PCO2 BLDV: 32.1 MM HG (ref 42–50)
PH BLDV: 7.34 [PH] (ref 7.3–7.4)
PLATELET # BLD AUTO: 90 THOUSANDS/UL (ref 149–390)
PMV BLD AUTO: 11.6 FL (ref 8.9–12.7)
PO2 BLDV: 29.2 MM HG (ref 35–45)
POTASSIUM SERPL-SCNC: 3 MMOL/L (ref 3.5–5.3)
PROT SERPL-MCNC: 5.9 G/DL (ref 6.4–8.4)
PROTHROMBIN TIME: 23.2 SECONDS (ref 11.6–14.5)
RBC # BLD AUTO: 1.96 MILLION/UL (ref 3.81–5.12)
SODIUM 24H UR-SCNC: 122 MOL/L
SODIUM SERPL-SCNC: 136 MMOL/L (ref 135–147)
TSH SERPL DL<=0.05 MIU/L-ACNC: 1.91 UIU/ML (ref 0.45–4.5)
VIT B12 SERPL-MCNC: 1164 PG/ML (ref 180–914)
WBC # BLD AUTO: 5.04 THOUSAND/UL (ref 4.31–10.16)

## 2023-12-22 PROCEDURE — 80053 COMPREHEN METABOLIC PANEL: CPT | Performed by: NURSE PRACTITIONER

## 2023-12-22 PROCEDURE — 99232 SBSQ HOSP IP/OBS MODERATE 35: CPT | Performed by: INTERNAL MEDICINE

## 2023-12-22 PROCEDURE — 74176 CT ABD & PELVIS W/O CONTRAST: CPT

## 2023-12-22 PROCEDURE — 85027 COMPLETE CBC AUTOMATED: CPT | Performed by: NURSE PRACTITIONER

## 2023-12-22 PROCEDURE — 82607 VITAMIN B-12: CPT

## 2023-12-22 PROCEDURE — 99233 SBSQ HOSP IP/OBS HIGH 50: CPT | Performed by: STUDENT IN AN ORGANIZED HEALTH CARE EDUCATION/TRAINING PROGRAM

## 2023-12-22 PROCEDURE — 85018 HEMOGLOBIN: CPT | Performed by: INTERNAL MEDICINE

## 2023-12-22 PROCEDURE — 82805 BLOOD GASES W/O2 SATURATION: CPT | Performed by: NURSE PRACTITIONER

## 2023-12-22 PROCEDURE — G1004 CDSM NDSC: HCPCS

## 2023-12-22 PROCEDURE — 84300 ASSAY OF URINE SODIUM: CPT

## 2023-12-22 PROCEDURE — 82746 ASSAY OF FOLIC ACID SERUM: CPT

## 2023-12-22 PROCEDURE — 85610 PROTHROMBIN TIME: CPT

## 2023-12-22 PROCEDURE — 84443 ASSAY THYROID STIM HORMONE: CPT

## 2023-12-22 RX ORDER — POTASSIUM CHLORIDE 20 MEQ/1
40 TABLET, EXTENDED RELEASE ORAL ONCE
Status: COMPLETED | OUTPATIENT
Start: 2023-12-22 | End: 2023-12-22

## 2023-12-22 RX ORDER — POTASSIUM CHLORIDE 20 MEQ/1
20 TABLET, EXTENDED RELEASE ORAL ONCE
Status: COMPLETED | OUTPATIENT
Start: 2023-12-22 | End: 2023-12-22

## 2023-12-22 RX ORDER — FUROSEMIDE 10 MG/ML
20 INJECTION INTRAMUSCULAR; INTRAVENOUS
Status: DISCONTINUED | OUTPATIENT
Start: 2023-12-22 | End: 2023-12-24

## 2023-12-22 RX ORDER — SODIUM BICARBONATE 650 MG/1
1300 TABLET ORAL
Status: DISCONTINUED | OUTPATIENT
Start: 2023-12-22 | End: 2023-12-30

## 2023-12-22 RX ADMIN — FUROSEMIDE 40 MG: 10 INJECTION, SOLUTION INTRAMUSCULAR; INTRAVENOUS at 09:39

## 2023-12-22 RX ADMIN — HEPARIN SODIUM 5000 UNITS: 5000 INJECTION INTRAVENOUS; SUBCUTANEOUS at 13:25

## 2023-12-22 RX ADMIN — AMOXICILLIN 500 MG: 250 CAPSULE ORAL at 13:25

## 2023-12-22 RX ADMIN — MIDODRINE HYDROCHLORIDE 5 MG: 5 TABLET ORAL at 13:25

## 2023-12-22 RX ADMIN — POTASSIUM CHLORIDE 40 MEQ: 1500 TABLET, EXTENDED RELEASE ORAL at 09:38

## 2023-12-22 RX ADMIN — LACTULOSE 20 G: 20 SOLUTION ORAL at 09:32

## 2023-12-22 RX ADMIN — POTASSIUM CHLORIDE 20 MEQ: 1500 TABLET, EXTENDED RELEASE ORAL at 10:03

## 2023-12-22 RX ADMIN — AMOXICILLIN 500 MG: 250 CAPSULE ORAL at 23:19

## 2023-12-22 RX ADMIN — FUROSEMIDE 20 MG: 10 INJECTION, SOLUTION INTRAMUSCULAR; INTRAVENOUS at 16:15

## 2023-12-22 RX ADMIN — ALBUMIN (HUMAN) 25 G: 0.25 INJECTION, SOLUTION INTRAVENOUS at 09:42

## 2023-12-22 RX ADMIN — SODIUM BICARBONATE 650 MG TABLET 1300 MG: at 18:05

## 2023-12-22 RX ADMIN — SODIUM BICARBONATE 650 MG TABLET 650 MG: at 09:32

## 2023-12-22 RX ADMIN — AMOXICILLIN 500 MG: 250 CAPSULE ORAL at 05:49

## 2023-12-22 RX ADMIN — MIDODRINE HYDROCHLORIDE 5 MG: 5 TABLET ORAL at 16:16

## 2023-12-22 RX ADMIN — MIDODRINE HYDROCHLORIDE 5 MG: 5 TABLET ORAL at 05:50

## 2023-12-22 RX ADMIN — HEPARIN SODIUM 5000 UNITS: 5000 INJECTION INTRAVENOUS; SUBCUTANEOUS at 23:19

## 2023-12-22 RX ADMIN — PANCRELIPASE 24000 UNITS: 24000; 76000; 120000 CAPSULE, DELAYED RELEASE PELLETS ORAL at 09:31

## 2023-12-22 RX ADMIN — ALBUMIN (HUMAN) 25 G: 0.25 INJECTION, SOLUTION INTRAVENOUS at 16:06

## 2023-12-22 RX ADMIN — HEPARIN SODIUM 5000 UNITS: 5000 INJECTION INTRAVENOUS; SUBCUTANEOUS at 05:49

## 2023-12-22 RX ADMIN — LACTULOSE 20 G: 20 SOLUTION ORAL at 18:02

## 2023-12-22 RX ADMIN — AMOXICILLIN 500 MG: 250 CAPSULE ORAL at 18:02

## 2023-12-22 RX ADMIN — FERROUS SULFATE TAB 325 MG (65 MG ELEMENTAL FE) 325 MG: 325 (65 FE) TAB at 09:42

## 2023-12-22 RX ADMIN — PANCRELIPASE 24000 UNITS: 24000; 76000; 120000 CAPSULE, DELAYED RELEASE PELLETS ORAL at 13:29

## 2023-12-22 RX ADMIN — PANCRELIPASE 24000 UNITS: 24000; 76000; 120000 CAPSULE, DELAYED RELEASE PELLETS ORAL at 16:16

## 2023-12-22 NOTE — PROGRESS NOTES
NEPHROLOGY HOSPITAL PROGRESS NOTE   Cassidy Zhang 68 y.o. female MRN: [id number]  Unit/Bed#: W -01 Encounter: [id number]  Reason for Consult: Volume overload, hyponatremia    ASSESSMENT and PLAN:  68-year-old female with history of cirrhosis, hyponatremia admitted with increased lower extremity edema and ascites.  Nephrology is consulted for management of hyponatremia, metabolic acidosis and volume overload.    1.  Elevated creatinine on CKD.  Baseline creatinine appears to be around 0.7 until 10/2023.  Episode of PRETTY in 11/2023 with peak creatinine of 1.9 at discharge creatinine 1.02.  Currently admitted with creatinine 1.23, peak creatinine of 1.30 on 12/21.  Creatinine today 0.97.  Etiology most likely in setting of type II hepatorenal syndrome +/- hemodynamic changes in setting of ARB use/minus volume overload.  Kidney function is currently improving on current regimen of IV Lasix and IV albumin.  Continue IV Lasix 20 mg twice daily undercover of IV albumin 25 g twice daily.  Continue midodrine 5 mg 3 times daily but hold for systolic blood pressure more than 130.  Trend BMP daily.    2.  Hyponatremia.  Most likely in setting of volume overload in setting of cirrhosis.  Serum sodium currently normalized to 138.  Continue IV diuretics.    3. Volume overload.  Status post 2.4 L paracentesis on 12/21.  Significant improvement in signs of volume overload and will continue IV Lasix undercover of IV albumin as above.    4.  Metabolic acidosis.  Bicarbonate level today improved to 18.  Continue sodium bicarbonate 1300 mg twice daily.    5.  Hypomagnesemia.  Serum magnesium 1.3.  Give IV magnesium 4 g x 1.    6.  Hypotension.  This is in setting of cirrhosis.  Blood pressure currently improved on midodrine 5 mg 3 times daily.  Try to maintain MAP more than 82.    6.  Decompensated liver cirrhosis.  Management per primary team and GI.    Discussed with internal medicine team.  After discussion, we agreed that  patient has improved significantly in terms of her volume overload with improvement in kidney function and to continue IV Lasix undercover of IV albumin today.    SUBJECTIVE / 24H INTERVAL HISTORY:  Patient feels much better.  Leg swelling is significantly improved.  Abdominal distention has decreased.  Denies dyspnea.    OBJECTIVE:  Current Weight: Weight - Scale: 63.4 kg (139 lb 12.4 oz)  Vitals:    12/22/23 1530 12/22/23 2158 12/23/23 0600 12/23/23 0757   BP: (!) 107/46 119/50  (!) 100/35   BP Location:  Right arm     Pulse:  79  84   Resp: 16 16  18   Temp: 97.9 °F (36.6 °C) 97.5 °F (36.4 °C)  98.2 °F (36.8 °C)   TempSrc:  Oral     SpO2:  98%  99%   Weight:   63.4 kg (139 lb 12.4 oz)    Height:           Intake/Output Summary (Last 24 hours) at 12/23/2023 0811  Last data filed at 12/22/2023 0819  Gross per 24 hour   Intake 0 ml   Output --   Net 0 ml     Review of Systems   Constitutional:  Negative for chills and fever.   HENT:  Negative for ear pain and sore throat.    Eyes:  Negative for pain and visual disturbance.   Respiratory:  Negative for cough and shortness of breath.    Cardiovascular:  Positive for leg swelling. Negative for chest pain and palpitations.   Gastrointestinal:  Positive for abdominal distention. Negative for abdominal pain and vomiting.   Genitourinary:  Negative for dysuria and hematuria.   Musculoskeletal:  Negative for arthralgias and back pain.   Skin:  Negative for color change and rash.   Neurological:  Negative for seizures and syncope.   All other systems reviewed and are negative.    Physical Exam  Vitals and nursing note reviewed.   Constitutional:       General: She is not in acute distress.     Appearance: She is well-developed.   HENT:      Head: Normocephalic and atraumatic.   Eyes:      Conjunctiva/sclera: Conjunctivae normal.   Cardiovascular:      Rate and Rhythm: Normal rate and regular rhythm.      Pulses: Normal pulses.      Heart sounds: Normal heart sounds. No  murmur heard.  Pulmonary:      Effort: Pulmonary effort is normal. No respiratory distress.      Breath sounds: Normal breath sounds.   Abdominal:      General: There is distension.      Palpations: Abdomen is soft.      Tenderness: There is no abdominal tenderness.   Musculoskeletal:         General: No swelling.      Cervical back: Neck supple.      Right lower leg: Edema present.      Left lower leg: Edema present.   Skin:     General: Skin is warm and dry.      Capillary Refill: Capillary refill takes less than 2 seconds.   Neurological:      Mental Status: She is alert.   Psychiatric:         Mood and Affect: Mood normal.         Medications:    Current Facility-Administered Medications:     acetaminophen (TYLENOL) tablet 650 mg, 650 mg, Oral, Q8H PRN, Terrell Mayberry MD, 650 mg at 12/21/23 0350    albumin human (FLEXBUMIN) 25 % injection 25 g, 25 g, Intravenous, BID (diuretic), AQUILES Pepper, 25 g at 12/22/23 1606    amoxicillin (AMOXIL) capsule 500 mg, 500 mg, Oral, Q6H, Terrell Mayberry MD, 500 mg at 12/23/23 0619    Diclofenac Sodium (VOLTAREN) 1 % topical gel 2 g, 2 g, Topical, Q6H PRN, Terrell Mayberry MD    ferrous sulfate tablet 325 mg, 325 mg, Oral, Daily With Breakfast, Terrell Mayberry MD, 325 mg at 12/22/23 0942    furosemide (LASIX) injection 20 mg, 20 mg, Intravenous, BID (diuretic), Chelsy Sorensen MD, 20 mg at 12/22/23 1615    heparin (porcine) subcutaneous injection 5,000 Units, 5,000 Units, Subcutaneous, Q8H HAJA, Terrell Mayberry MD, 5,000 Units at 12/23/23 0619    lactulose (CHRONULAC) oral solution 20 g, 20 g, Oral, BID, Terrell Mayberry MD, 20 g at 12/22/23 1802    magnesium sulfate 2 g/50 mL IVPB (premix) 2 g, 2 g, Intravenous, Once, Jodie Larose MD, Last Rate: 25 mL/hr at 12/23/23 0621, 2 g at 12/23/23 0621    midodrine (PROAMATINE) tablet 5 mg, 5 mg, Oral, TID AC, AQUILES Pepper, 5 mg at 12/23/23 0619    pancrelipase (Lip-Prot-Amyl) (CREON) delayed release capsule 24,837  "Units, 24,000 Units, Oral, TID With Meals, Terrell Mayberry MD, 24,000 Units at 12/22/23 1616    sodium bicarbonate tablet 1,300 mg, 1,300 mg, Oral, BID after meals, Chelsy Sorensen MD, 1,300 mg at 12/22/23 1805    trimethobenzamide (TIGAN) IM injection 200 mg, 200 mg, Intramuscular, Q6H PRN, Jodie Larose MD, 200 mg at 12/21/23 1249    Laboratory Results:  Results from last 7 days   Lab Units 12/23/23  0431 12/22/23  0929 12/22/23  0819 12/21/23  0725 12/20/23  1416   WBC Thousand/uL 3.72*  --  5.04 4.88 5.28   HEMOGLOBIN g/dL 6.1* 7.1* 6.8* 7.6* 8.1*   HEMATOCRIT % 18.7*  --  20.2* 23.2* 24.5*   PLATELETS Thousands/uL 75*  --  90* 76* 99*   POTASSIUM mmol/L 3.6  --  3.0* 3.6 4.0   CHLORIDE mmol/L 113*  --  110* 110* 112*   CO2 mmol/L 18*  --  17* 13* 15*   BUN mg/dL 17  --  20 26* 24   CREATININE mg/dL 0.97  --  1.04 1.30 1.23   CALCIUM mg/dL 9.4  --  9.1 8.8 8.5   MAGNESIUM mg/dL 1.3*  --   --  1.3*  --    PHOSPHORUS mg/dL  --   --   --  3.5  --        Portions of the record may have been created with voice recognition software. Occasional wrong word or \"sound a like\" substitutions may have occurred due to the inherent limitations of voice recognition software. Read the chart carefully and recognize, using context, where substitutions have occurred. If you have any questions, please contact the dictating provider.\    "

## 2023-12-22 NOTE — PROGRESS NOTES
NEPHROLOGY HOSPITAL PROGRESS NOTE   Cassidy Zhang 68 y.o. female MRN: [id number]  Unit/Bed#: W -01 Encounter: [id number]  Reason for Consult: PRETTY    ASSESSMENT and PLAN    68-year-old female with a past medical history of cirrhosis, alcohol abuse, hyponatremia who initially presented with worsening lower extremity edema and ascites.  Nephrology is on board for volume overload.  Patient has been abstaining from alcohol for 6 years.    1-elevated creatinine present on admission with PRETTY    - Baseline creatinine 0.7 mg/dL-October 2023  - Had acute kidney injury creatinine 1.9 mg/dL in November which improved to 1.02 mg/dL on discharge  - Admission creatinine 12/20 was 1.3 mg/dL  - Etiology of elevated creatinine may be in the setting of liver disease/volume overload/ARB/ATN  - Urinalysis-  - Renal imaging with MRI-no hydronephrosis in November  - 12/21-was started on midodrine.  ARB was held.  Was given albumin and Lasix.  Is also status post paracentesis  - 12/22-creatinine improving 1 mg/dL.  Sodium level also improving 136.  Hypokalemia being repleted.  Spironolactone is not being able to be dose due to hypotension.  Continue midodrine.  Holding spironolactone for now.    Plan  - I/os; avoid nephrotoxic agents  - Continue midodrine today  - Continue albumin and Lasix today  - Increase dose of bicarbonate tablets  - Trend magnesium  - Agree with potassium repletion  - Lower Lasix slightly to 20 mg this evening from 40 mg twice daily to avoid large shifts given paracentesis yesterday  - Hold spironolactone for now and plan to restart when more stable  - Anemia plans and thrombocytopenia plans per primary team  - Follow-up urine studies  - Trend calcium for now      2-electrolytes-maintain normokalemia to reduce risk of hepatic encephalopathy.  Being repleted.    - Hyponatremia-resolved with diuresis    3-acid/base-possibly in setting of GI losses.  Anion gap not elevated.  Continue bicarbonate tablets and  increase dose today.  Bicarbonate improving    4-anemia-per primary team    5-cirrhosis-Per primary team and GI team    - Unclear etiology for hepatic decompensation.  Has been abstaining from alcohol use for over 5 to 6 years.  Hepatology on board.    9-uhjxkcg-swndxh post paracentesis per IR team with 2.4 L paracentesis 12/21    7-tooth abscess-on antibiotic per primary team    SUBJECTIVE / 24H INTERVAL HISTORY:    Patient denies complaints.  But appears mildly confused.    OBJECTIVE:  Current Weight: Weight - Scale: 65.9 kg (145 lb 4.5 oz)  Vitals:    12/21/23 1937 12/22/23 0803 12/22/23 0812 12/22/23 0939   BP: 118/51 (!) 116/35 (!) 106/32 (!) 103/39   BP Location:  Right arm Left arm    Pulse: 70 69 78    Resp:  18     Temp: (!) 97.3 °F (36.3 °C) (!) 97.4 °F (36.3 °C)     TempSrc:  Oral     SpO2: 100% 100% 100%    Weight:       Height:           Intake/Output Summary (Last 24 hours) at 12/22/2023 1012  Last data filed at 12/21/2023 1640  Gross per 24 hour   Intake 240 ml   Output 600 ml   Net -360 ml     General: NAD  Skin: no rash  Eyes: icteric sclera  ENT: moist mucous membrane  Neck: supple  Chest: CTA b/l, no ronchii, no wheeze, no rubs, no rales  CVS: s1s2, no murmur, no gallop, no rub  Abdomen: soft, nontender, nl sounds  Extremities: 2-3+ LE edema b/l  : no sher  Neuro: AAOX3  Psych: normal affect    Medications:    Current Facility-Administered Medications:     acetaminophen (TYLENOL) tablet 650 mg, 650 mg, Oral, Q8H PRN, Terrell Mayberry MD, 650 mg at 12/21/23 0350    albumin human (FLEXBUMIN) 25 % injection 25 g, 25 g, Intravenous, BID (diuretic), AQUILES Pepper, 25 g at 12/22/23 0942    amoxicillin (AMOXIL) capsule 500 mg, 500 mg, Oral, Q6H, Terrell Mayberry MD, 500 mg at 12/22/23 0549    Diclofenac Sodium (VOLTAREN) 1 % topical gel 2 g, 2 g, Topical, Q6H PRN, Terrell Mayberry MD    ferrous sulfate tablet 325 mg, 325 mg, Oral, Daily With Breakfast, Terrell Mayberry MD, 325 mg at 12/22/23 0991     furosemide (LASIX) injection 40 mg, 40 mg, Intravenous, BID (diuretic), Dania Quiñones MD, 40 mg at 12/22/23 0939    heparin (porcine) subcutaneous injection 5,000 Units, 5,000 Units, Subcutaneous, Q8H HAJA, Terrell Mayberry MD, 5,000 Units at 12/22/23 0549    lactulose (CHRONULAC) oral solution 20 g, 20 g, Oral, BID, Terrell Mayberry MD, 20 g at 12/22/23 0932    midodrine (PROAMATINE) tablet 5 mg, 5 mg, Oral, TID AC, Cinthia Andrew, AQUILES, 5 mg at 12/22/23 0550    pancrelipase (Lip-Prot-Amyl) (CREON) delayed release capsule 24,000 Units, 24,000 Units, Oral, TID With Meals, Terrell Mayberry MD, 24,000 Units at 12/22/23 0931    sodium bicarbonate tablet 1,300 mg, 1,300 mg, Oral, BID after meals, Chelsy Sorensen MD    spironolactone (ALDACTONE) tablet 100 mg, 100 mg, Oral, Daily, Terrell Mayberry MD, 100 mg at 12/21/23 1339    trimethobenzamide (TIGAN) IM injection 200 mg, 200 mg, Intramuscular, Q6H PRN, Jodie Larose MD, 200 mg at 12/21/23 1249    Laboratory Results:  Results from last 7 days   Lab Units 12/22/23  0929 12/22/23  0819 12/21/23  0725 12/20/23  1416   WBC Thousand/uL  --  5.04 4.88 5.28   HEMOGLOBIN g/dL 7.1* 6.8* 7.6* 8.1*   HEMATOCRIT %  --  20.2* 23.2* 24.5*   PLATELETS Thousands/uL  --  90* 76* 99*   POTASSIUM mmol/L  --  3.0* 3.6 4.0   CHLORIDE mmol/L  --  110* 110* 112*   CO2 mmol/L  --  17* 13* 15*   BUN mg/dL  --  20 26* 24   CREATININE mg/dL  --  1.04 1.30 1.23   CALCIUM mg/dL  --  9.1 8.8 8.5   MAGNESIUM mg/dL  --   --  1.3*  --    PHOSPHORUS mg/dL  --   --  3.5  --         What Type Of Note Output Would You Prefer (Optional)?: Bullet Format How Severe Are Your Spot(S)?: mild Have Your Spot(S) Been Treated In The Past?: has not been treated Hpi Title: Evaluation of Skin Lesions

## 2023-12-22 NOTE — PLAN OF CARE
Problem: PAIN - ADULT  Goal: Verbalizes/displays adequate comfort level or baseline comfort level  Description: Interventions:  - Encourage patient to monitor pain and request assistance  - Assess pain using appropriate pain scale  - Administer analgesics based on type and severity of pain and evaluate response  - Implement non-pharmacological measures as appropriate and evaluate response  - Consider cultural and social influences on pain and pain management  - Notify physician/advanced practitioner if interventions unsuccessful or patient reports new pain  Outcome: Progressing     Problem: INFECTION - ADULT  Goal: Absence or prevention of progression during hospitalization  Description: INTERVENTIONS:  - Assess and monitor for signs and symptoms of infection  - Monitor lab/diagnostic results  - Monitor all insertion sites, i.e. indwelling lines, tubes, and drains  - Monitor endotracheal if appropriate and nasal secretions for changes in amount and color  - [place] appropriate cooling/warming therapies per order  - Administer medications as ordered  - Instruct and encourage patient and family to use good hand hygiene technique  - Identify and instruct in appropriate isolation precautions for identified infection/condition  Outcome: Progressing  Goal: Absence of fever/infection during neutropenic period  Description: INTERVENTIONS:  - Monitor WBC    Outcome: Progressing     Problem: SAFETY ADULT  Goal: Patient will remain free of falls  Description: INTERVENTIONS:  - Educate patient/family on patient safety including physical limitations  - Instruct patient to call for assistance with activity   - Consult OT/PT to assist with strengthening/mobility   - Keep Call bell within reach  - Keep bed low and locked with side rails adjusted as appropriate  - Keep care items and personal belongings within reach  - Initiate and maintain comfort rounds  - Make Fall Risk Sign visible to staff  - Offer Toileting every  Hours,  in advance of need  - Initiate/Maintain alarm  - Obtain necessary fall risk management equipment:  - Apply yellow socks and bracelet for high fall risk patients  - Consider moving patient to room near nurses station  Outcome: Progressing  Goal: Maintain or return to baseline ADL function  Description: INTERVENTIONS:  -  Assess patient's ability to carry out ADLs; assess patient's baseline for ADL function and identify physical deficits which impact ability to perform ADLs (bathing, care of mouth/teeth, toileting, grooming, dressing, etc.)  - Assess/evaluate cause of self-care deficits   - Assess range of motion  - Assess patient's mobility; develop plan if impaired  - Assess patient's need for assistive devices and provide as appropriate  - Encourage maximum independence but intervene and supervise when necessary  - Involve family in performance of ADLs  - Assess for home care needs following discharge   - Consider OT consult to assist with ADL evaluation and planning for discharge  - Provide patient education as appropriate  Outcome: Progressing  Goal: Maintains/Returns to pre admission functional level  Description: INTERVENTIONS:  - Perform AM-PAC 6 Click Basic Mobility/ Daily Activity assessment daily.  - Set and communicate daily mobility goal to care team and patient/family/caregiver.   - Collaborate with rehabilitation services on mobility goals if consulted  - Perform Range of Motion  times a day.  - Reposition patient every  hours.  - Dangle patient  times a day  - Stand patient times a day  - Ambulate patient  times a day  - Out of bed to chair  times a day   - Out of bed for meals  times a day  - Out of bed for toileting  - Record patient progress and toleration of activity level   Outcome: Progressing     Problem: DISCHARGE PLANNING  Goal: Discharge to home or other facility with appropriate resources  Description: INTERVENTIONS:  - Identify barriers to discharge w/patient and caregiver  - Arrange for  needed discharge resources and transportation as appropriate  - Identify discharge learning needs (meds, wound care, etc.)  - Arrange for interpretive services to assist at discharge as needed  - Refer to Case Management Department for coordinating discharge planning if the patient needs post-hospital services based on physician/advanced practitioner order or complex needs related to functional status, cognitive ability, or social support system  Outcome: Progressing     Problem: Knowledge Deficit  Goal: Patient/family/caregiver demonstrates understanding of disease process, treatment plan, medications, and discharge instructions  Description: Complete learning assessment and assess knowledge base.  Interventions:  - Provide teaching at level of understanding  - Provide teaching via preferred learning methods  Outcome: Progressing

## 2023-12-22 NOTE — PLAN OF CARE
Problem: PAIN - ADULT  Goal: Verbalizes/displays adequate comfort level or baseline comfort level  Description: Interventions:  - Encourage patient to monitor pain and request assistance  - Assess pain using appropriate pain scale  - Administer analgesics based on type and severity of pain and evaluate response  - Implement non-pharmacological measures as appropriate and evaluate response  - Consider cultural and social influences on pain and pain management  - Notify physician/advanced practitioner if interventions unsuccessful or patient reports new pain  Outcome: Progressing     Problem: INFECTION - ADULT  Goal: Absence or prevention of progression during hospitalization  Description: INTERVENTIONS:  - Assess and monitor for signs and symptoms of infection  - Monitor lab/diagnostic results  - Monitor all insertion sites, i.e. indwelling lines, tubes, and drains  - Monitor endotracheal if appropriate and nasal secretions for changes in amount and color  - [place] appropriate cooling/warming therapies per order  - Administer medications as ordered  - Instruct and encourage patient and family to use good hand hygiene technique  - Identify and instruct in appropriate isolation precautions for identified infection/condition  Outcome: Progressing  Goal: Absence of fever/infection during neutropenic period  Description: INTERVENTIONS:  - Monitor WBC    Outcome: Progressing     Problem: SAFETY ADULT  Goal: Patient will remain free of falls  Description: INTERVENTIONS:  - Educate patient/family on patient safety including physical limitations  - Instruct patient to call for assistance with activity   - Consult OT/PT to assist with strengthening/mobility   - Keep Call bell within reach  - Keep bed low and locked with side rails adjusted as appropriate  - Keep care items and personal belongings within reach  - Initiate and maintain comfort rounds  - Make Fall Risk Sign visible to staff  - Offer Toileting every  Hours,  in advance of need  - Initiate/Maintain alarm  - Obtain necessary fall risk management equipment:  - Apply yellow socks and bracelet for high fall risk patients  - Consider moving patient to room near nurses station  Outcome: Progressing  Goal: Maintain or return to baseline ADL function  Description: INTERVENTIONS:  -  Assess patient's ability to carry out ADLs; assess patient's baseline for ADL function and identify physical deficits which impact ability to perform ADLs (bathing, care of mouth/teeth, toileting, grooming, dressing, etc.)  - Assess/evaluate cause of self-care deficits   - Assess range of motion  - Assess patient's mobility; develop plan if impaired  - Assess patient's need for assistive devices and provide as appropriate  - Encourage maximum independence but intervene and supervise when necessary  - Involve family in performance of ADLs  - Assess for home care needs following discharge   - Consider OT consult to assist with ADL evaluation and planning for discharge  - Provide patient education as appropriate  Outcome: Progressing  Goal: Maintains/Returns to pre admission functional level  Description: INTERVENTIONS:  - Perform AM-PAC 6 Click Basic Mobility/ Daily Activity assessment daily.  - Set and communicate daily mobility goal to care team and patient/family/caregiver.   - Collaborate with rehabilitation services on mobility goals if consulted  - Perform Range of Motion  times a day.  - Reposition patient every  hours.  - Dangle patient  times a day  - Stand patient times a day  - Ambulate patient  times a day  - Out of bed to chair  times a day   - Out of bed for meals  times a day  - Out of bed for toileting  - Record patient progress and toleration of activity level   Outcome: Progressing     Problem: DISCHARGE PLANNING  Goal: Discharge to home or other facility with appropriate resources  Description: INTERVENTIONS:  - Identify barriers to discharge w/patient and caregiver  - Arrange for  needed discharge resources and transportation as appropriate  - Identify discharge learning needs (meds, wound care, etc.)  - Arrange for interpretive services to assist at discharge as needed  - Refer to Case Management Department for coordinating discharge planning if the patient needs post-hospital services based on physician/advanced practitioner order or complex needs related to functional status, cognitive ability, or social support system  Outcome: Progressing     Problem: Knowledge Deficit  Goal: Patient/family/caregiver demonstrates understanding of disease process, treatment plan, medications, and discharge instructions  Description: Complete learning assessment and assess knowledge base.  Interventions:  - Provide teaching at level of understanding  - Provide teaching via preferred learning methods  Outcome: Progressing

## 2023-12-22 NOTE — PROGRESS NOTES
[facility]  Progress Note  Name: Cassidy Zhang I  MRN: [id number]  Unit/Bed#: [facility]BAR[facility] -01 I Date of Admission: 12/20/2023   Date of Service: 12/22/2023 I Hospital Day: 2    Assessment/Plan   * Decompensated liver disease (HCC)  Assessment & Plan  Presents for ascites and anasarca, found also to have mild hyponatremia, hypoalbuminemia, hyperbilirubinemia, hypocoagulability, anemia, and thrombocytopenia. Ammonia wnl.  No asterixis on exam, pt A&O x3, maybe some mild confusion (delayed when answering questions, but appropriate), though pt at or near her baseline per spouse.  Denies fevers, chills, sweats, abd pain, hematemesis, hemoptysis, hematochezia, melena.  Reports holding lactulose for last 2-3 days for frequent BMs at home (>5 loose BM per day)  Recently discontinued lasix and spironolactone in the o/p setting d/t worsening renal function and hyponatremia  MELD score 23 on presentation  Last drink 5-6 years ago    MELD 3.0: 26 at 12/21/2023  7:25 AM  MELD-Na: 24 at 12/21/2023  7:25 AM  Calculated from:  Serum Creatinine: 1.30 mg/dL at 12/21/2023  7:25 AM  Serum Sodium: 132 mmol/L at 12/21/2023  7:25 AM  Total Bilirubin: 3.86 mg/dL at 12/21/2023  7:25 AM  Serum Albumin: 2.4 g/dL at 12/21/2023  7:25 AM  INR(ratio): 1.82 at 12/20/2023  2:16 PM  Age at listing (hypothetical): 68 years  Sex: Female at 12/21/2023  7:25 AM        Plan:  Lower Lasix to 20 mg IV tonight to avoid volume shifts per nephrology  Hold spironolactone at this time per nephrology, restart as able  Diet: High protein, low salt, fluid restrict 1500 cc/day  Resume lactulose 20 mg BID and titrate to target 3-5 loose BM per day  Monitor MELD labs  Monitor Hgb and transfuse for Hgb<7  GI and Nephro consulted  IR paracentesis drained 2.4L fluid - labs unremarkable at this point   Albumin replacement   Tigan for nausea   GI recommended [place] labs with potential liver transplant evaluation  May need recurrent paracentesis as  on outpatient     Anemia  Assessment & Plan  Patient was found to have hemoglobin of 6.8 this morning.  Blood transfusion consent was obtained.  Repeat hemoglobin was checked prior to transfusion which resulted at 7.1.  No blood in the stools, no evidence of active bleeding at this time.  May be secondary to chronic liver disease at this point, but unclear etiology.  -Routine CBCs  -Transfuse if hemoglobin less than 7  -Monitor for any new or worsening symptoms    Status post incision and drainage  Assessment & Plan  Pt and  report dental I&D for oral abscess day prior to presentation 12/20/23, started on amoxicillin 500 mg PO Q6H. Acute infection may also have played a role in knocking patient into liver decompensation.     Continue [id number] amoxicillin    Metabolic acidosis  Assessment & Plan  HCO3 15 POA  Pt reports >5 loose bowel movements/day  Suspect primary disturbance due to GI losses    Check VBG to confirm alkalemia with a primary metabolic disturbance  Start NaHCO3 tabs 1300 mg PO BID  Nephrology on board    PRETTY (acute kidney injury) (HCC)  Assessment & Plan  In the setting of type II hepatorenal syndrome.  - Midodrine 5mg TID to enhance renal perfusion  - Albumin given with Lasix doses      Chronic pancreatitis (HCC)  Assessment & Plan  2/2 EtOH abuse, now in remission.     Continue home pancrealipase    Ascites/anasarca  Assessment & Plan  2/2 end stage liver disease, as noted above    Plan:  Diuresis, fluid restriction, dietary salt restriction as above   Nephrology and GI consults, as above  Paracentesis drained 2.4L    Chronic hyponatremia  Assessment & Plan  History of SIADH, however suspect also a component of hypotonic hypervolemic hyponatremia in the setting of liver failure complicated by ascites and anasarca.     Plan:  Collect urine sodium, urine osm, serum osm before starting diuretics, and f/u results.  Diuresis, fluid restriction, dietary salt restriction as above  Hold  home Na tablets, at least until hyponatremia labs interpreted--starting NaHCO3 tabs for metabolic alkalosis  Nephrology consulted - waiting for osmolality labs and VBG  -Albumin 25g BID to be given with lasix to balance volume status  -Started midodrine 5mg TID      Essential hypertension  Assessment & Plan  Hold home antihypertensive (amlodipine and losartan) at this time, given soft BP and desire for aggressive diuresis. Can resume as indicated.             VTE Pharmacologic Prophylaxis: VTE Score: 7 High Risk (Score >/= 5) - Pharmacological DVT Prophylaxis Ordered: heparin. Sequential Compression Devices Ordered.    Mobility:   Basic Mobility Inpatient Raw Score: 18  JH-HLM Goal: 6: Walk 10 steps or more  JH-HLM Achieved: 4: Move to chair/commode  HLM Goal NOT achieved. Continue with multidisciplinary rounding and encourage appropriate mobility to improve upon HLM goals.    Patient Centered Rounds: I performed bedside rounds with nursing staff today.   Discussions with Specialists or Other Care Team Provider: GI, nephro    Education and Discussions with Family / Patient: Updated  () via phone.    Total Time Spent on Date of Encounter in care of patient: 25 mins. This time was spent on one or more of the following: performing physical exam; counseling and coordination of care; obtaining or reviewing history; documenting in the medical record; reviewing/ordering tests, medications or procedures; communicating with other healthcare professionals and discussing with patient's family/caregivers.    Current Length of Stay: 2 day(s)  Current Patient Status: Inpatient   Certification Statement: The patient will continue to require additional inpatient hospital stay due to decompensated liver disease  Discharge Plan: Anticipate discharge in 24-48 hrs to home.    Code Status: Level 1 - Full Code    Subjective:   Patient seen and examined at bedside this morning.  States she feels much better overall  after receiving the paracentesis.  Feels much more comfortable.  States she is consistently going to the bathroom and moving her bowels.  Has been eating and drinking okay.  No other concerns at this time.    Objective:     Vitals:   Temp (24hrs), Av.4 °F (36.3 °C), Min:97.3 °F (36.3 °C), Max:97.4 °F (36.3 °C)    Temp:  [97.3 °F (36.3 °C)-97.4 °F (36.3 °C)] 97.4 °F (36.3 °C)  HR:  [62-78] 78  Resp:  [15-18] 18  BP: ()/(32-54) 103/39  SpO2:  [97 %-100 %] 100 %  Body mass index is 24.94 kg/m².     Input and Output Summary (last 24 hours):     Intake/Output Summary (Last 24 hours) at 2023 1128  Last data filed at 2023 1640  Gross per 24 hour   Intake 240 ml   Output 600 ml   Net -360 ml       Physical Exam:   Physical Exam  Constitutional:       General: She is not in acute distress.     Appearance: She is not ill-appearing (significant improved).   HENT:      Head: Normocephalic and atraumatic.      Right Ear: External ear normal.      Left Ear: External ear normal.      Nose: Nose normal.      Mouth/Throat:      Mouth: Mucous membranes are dry.      Pharynx: Oropharynx is clear.   Eyes:      General: Scleral icterus present.      Extraocular Movements: Extraocular movements intact.   Cardiovascular:      Rate and Rhythm: Normal rate and regular rhythm.      Heart sounds: No murmur heard.  Pulmonary:      Effort: Pulmonary effort is normal.      Breath sounds: No rales.      Comments: Diminished breath sounds    Abdominal:      General: There is distension (improved).      Palpations: Abdomen is soft.      Tenderness: There is no abdominal tenderness.   Musculoskeletal:         General: Swelling (pitting in bilateral LE) present. No tenderness.      Cervical back: Normal range of motion and neck supple.   Skin:     General: Skin is warm and dry.      Coloration: Skin is jaundiced.   Neurological:      Mental Status: She is alert and oriented to person, place, and time.      Comments: Slow to  respond   Psychiatric:         Mood and Affect: Mood normal.          Additional Data:     Labs:  Results from last 7 days   Lab Units 12/22/23  0929 12/22/23  0819 12/21/23  0725 12/20/23  1416   WBC Thousand/uL  --  5.04   < > 5.28   HEMOGLOBIN g/dL 7.1* 6.8*   < > 8.1*   HEMATOCRIT %  --  20.2*   < > 24.5*   PLATELETS Thousands/uL  --  90*   < > 99*   NEUTROS PCT %  --   --   --  60   LYMPHS PCT %  --   --   --  30   MONOS PCT %  --   --   --  6   EOS PCT %  --   --   --  2    < > = values in this interval not displayed.     Results from last 7 days   Lab Units 12/22/23  0819   SODIUM mmol/L 136   POTASSIUM mmol/L 3.0*   CHLORIDE mmol/L 110*   CO2 mmol/L 17*   BUN mg/dL 20   CREATININE mg/dL 1.04   ANION GAP mmol/L 9   CALCIUM mg/dL 9.1   ALBUMIN g/dL 2.6*   TOTAL BILIRUBIN mg/dL 3.62*   ALK PHOS U/L 78   ALT U/L 22   AST U/L 39   GLUCOSE RANDOM mg/dL 124     Results from last 7 days   Lab Units 12/22/23  1028   INR  1.96*                   Lines/Drains:  Invasive Devices       Peripheral Intravenous Line  Duration             Peripheral IV 12/20/23 Left;Upper Arm 1 day    Peripheral IV 12/20/23 Right;Upper Arm 1 day                          Imaging: Reviewed radiology reports from this admission including: chest xray and ultrasound(s)    Recent Cultures (last 7 days):   Results from last 7 days   Lab Units 12/21/23  1637   GRAM STAIN RESULT  No Polys or Bacteria seen       Last 24 Hours Medication List:   Current Facility-Administered Medications   Medication Dose Route Frequency Provider Last Rate    acetaminophen  650 mg Oral Q8H PRN Terrell Mayberry MD      Albumin 25%  25 g Intravenous BID (diuretic) AQUILES Pepper      amoxicillin  500 mg Oral Q6H Terrell Mayberry MD      Diclofenac Sodium  2 g Topical Q6H PRN Terrell Mayberry MD      ferrous sulfate  325 mg Oral Daily With Breakfast Terrell Mayberry MD      furosemide  20 mg Intravenous BID (diuretic) Chelsy Sorensen MD      heparin (porcine)  5,000  Units Subcutaneous Q8H [facility] Terrell Mayberry MD      lactulose  20 g Oral BID Terrell Mayberry MD      midodrine  5 mg Oral TID AC AQUILES Pepper      pancrelipase (Lip-Prot-Amyl)  24,000 Units Oral TID With Meals Terrell Mayberry MD      sodium bicarbonate  1,300 mg Oral BID after meals Chelsy Sorensen MD      trimethobenzamide  200 mg Intramuscular Q6H PRN Jodie Larose MD          Today, Patient Was Seen By: Dania Quiñones MD    **Please Note: This note may have been constructed using a voice recognition system.**

## 2023-12-22 NOTE — PROGRESS NOTES
[facility]  Progress Note - Cassidy Zhang 68 y.o. female MRN: [id number]    Unit/Bed#: [facility]BAR[facility] -01 Encounter: [id number]    Assessment/Plan:  Acute on chronic decompensated alcoholic liver cirrhosis      --Patient presents with ascites, bilateral lower extremity swelling and dyspnea on exertion found to have hyponatremia, hypoalbuminemia, thrombocytopenia, hyperbilirubinemia, anemia.  --AAOx3, no confusion, asterixis  --Denies fever , abdominal pain, hematemesis, hemoptysis, hematochezia, melena  --SH was on hold of lasix and spironolactone for increased creatinine and hyponatremia  --Not taking lactulose last 2 days as thought it is for constipation and having >5 BMs per day  --KEI, AMA, ASMA, hemochromatosis mutation, acute hepatitis panel, AFP tumor     marker negative on previous admission  --11/19 MRI abdomen with and without contrast and MRCP Questionable micronodularity along the anterior left hepatic lobe suggestive possibly early cirrhosis.  Two subcentimeter probable hemangiomas.Moderate volume ascites, moderate right and small left pleural effusions, moderate body wall edema and mild periportal edema, likely related to fluid overloaded state.  --Last EGD in July 2023 not showing any evidence of portal hypertension  --Platelet 147>128>99>76>90, bilirubin 3.77>3.86>3.62, sodium 132>133>132>136, INR 1.62>1.75>1.82  --Ammonia 37  --Serum osmolality 289  --Patient afebrile, no leucocytosis on CBC, Hb 7.1  --CXR shows shifting bilateral right >left sided pleural effusion  --MELD score on 12/21/23 was 24 estimated 3 months mortality 19.6% and today 20 with INR 1.96 means estimated 3 months mortality 19.6%  --Paracentesis done on 12/21/23 with albumin given. Fluid study negative. SAAG 1.2 likely portal hypertension.  --Acute on chronic decompensated alcoholic liver cirrhosis with ascites, hyperbilirubinemia, thrombocytopenia, hypoalbuminemia possibly secondary to discontinuing lasix,  spironolactone, lactulose vs infection oral abscess       Plan:  Continue lasix 40 mg IV BID may consider to switch oral lasix  Continue holding Spironolactone 100 mg daily as per nephrology  Monitor I/Os  Monitor daily standing weight  Monitor BMP, CBC daily  Continue lactulose 20 g BID with goal of >3 BMs  Monitor for worsening mental status  Trend MELD labs  Fluid restriction   Ordered CT abdomen pelvis due to dropping Hb, low BP to rule out bleeding       Acute kidney injury:     --Patient's renal function shows creatinine 1.28 on 12/07 and now 1.11>1.23>1.3>1.04 baseline creatinine 0.7 on 10/23  --PRETTY most likely secondary to hepatorenal syndrome  --Nephrology is following    Plan:  Check urine sodium  Monitor BMP  Midodrine for increasing renal perfusion     Hepatic encephalopathy:     --Patient was mildly confused on admission but today she is AAOx3  --Asterixis present mildly  --Stopped taking lactulose last 2 days for misunderstanding the purpose of it  --BM after admission     Plan:  Continue lactulose 20 g daily for hepatic encephalopathy counseled provided for a goal of  >3 soft/form bowel movements.   Monitor stool output     Chronic pancreatitis:     --Patient has History of chronic pancreatitis with pancreatic insufficency  --Most likely secondary to alcohol abuse and recurrent gallstone  --Lipase not checked this admission  --MRI in 11/19/23 shows Sequela of chronic pancreatitis again noted with main pancreatic duct dilatation. Known intraductal calculus in the pancreatic head is not well seen by MRI. No evidence of acute pancreatitis.   --Ca 19-9 <2 on previous admission     Plan:  Monitor for worsening symptoms  Continue pancrelipase        Subjective:   No acute overnight event. Patient more alert than yesterday. She is eating and drinking well. Denies abdominal pain, nausea, vomiting, dizziness, lightheadedness. Had 2 bowel movement this morning without any visible blood.    Objective:  "    Vitals: Blood pressure (!) 106/32, pulse 78, temperature (!) 97.4 °F (36.3 °C), temperature source Oral, resp. rate 18, height 5' 4\" (1.626 m), weight 65.9 kg (145 lb 4.5 oz), SpO2 100%.,Body mass index is 24.94 kg/m².      Intake/Output Summary (Last 24 hours) at 12/22/2023 0922  Last data filed at 12/21/2023 1640  Gross per 24 hour   Intake 240 ml   Output 600 ml   Net -360 ml       Physical Exam:  Physical Exam   Constitutional: She is oriented to person, place, and time.  Non-toxic appearance. She does not appear ill. No distress.   HENT:   Head: Normocephalic and atraumatic.   Nose: Nose normal.   Mouth/Throat: Mucous membranes are moist. Oropharynx is clear.   Eyes: Pupils are equal, round, and reactive to light. Scleral icterus is present.   Neck: Carotid bruit is not present.   Cardiovascular: Normal rate, regular rhythm, normal heart sounds and normal pulses.   Pulmonary/Chest: Effort normal and breath sounds normal. No stridor. No respiratory distress. She has no wheezes. She has no rhonchi. She has no rales. She exhibits no tenderness.   Abdominal: Normal appearance and bowel sounds are normal. She exhibits distension. She exhibits no mass. There is no abdominal tenderness. There is no rebound and no guarding.   Musculoskeletal:         General: Swelling present. No tenderness, deformity or signs of injury. Normal range of motion.      Cervical back: Normal range of motion. No rigidity or tenderness.      Right lower leg: Edema present.      Left lower leg: Edema present.   Lymphadenopathy:     She has no cervical adenopathy.   Neurological: She is alert and oriented to person, place, and time. No cranial nerve deficit or sensory deficit.   Asterixis present mildly   Skin: Skin is warm and dry. Capillary refill takes 2 to 3 seconds. No bruising, no lesion and no rash noted. She is not diaphoretic. No erythema. There is jaundice and pallor.   Psychiatric: Her behavior is normal. Mood and thought " content normal.   Nursing note and vitals reviewed.          Invasive Devices       Peripheral Intravenous Line  Duration             Peripheral IV 12/20/23 Left;Upper Arm 1 day    Peripheral IV 12/20/23 Right;Upper Arm 1 day                            Lab, Imaging and other studies:     Admission on 12/20/2023   Component Date Value    WBC 12/20/2023 5.28     RBC 12/20/2023 2.32 (L)     Hemoglobin 12/20/2023 8.1 (L)     Hematocrit 12/20/2023 24.5 (L)     MCV 12/20/2023 106 (H)     MCH 12/20/2023 34.9 (H)     MCHC 12/20/2023 33.1     RDW 12/20/2023 14.6     MPV 12/20/2023 11.4     Platelets 12/20/2023 99 (L)     nRBC 12/20/2023 0     Neutrophils Relative 12/20/2023 60     Immat GRANS % 12/20/2023 1     Lymphocytes Relative 12/20/2023 30     Monocytes Relative 12/20/2023 6     Eosinophils Relative 12/20/2023 2     Basophils Relative 12/20/2023 1     Neutrophils Absolute 12/20/2023 3.23     Immature Grans Absolute 12/20/2023 0.03     Lymphocytes Absolute 12/20/2023 1.56     Monocytes Absolute 12/20/2023 0.34     Eosinophils Absolute 12/20/2023 0.09     Basophils Absolute 12/20/2023 0.03     Sodium 12/20/2023 133 (L)     Potassium 12/20/2023 4.0     Chloride 12/20/2023 112 (H)     CO2 12/20/2023 15 (L)     ANION GAP 12/20/2023 6     BUN 12/20/2023 24     Creatinine 12/20/2023 1.23     Glucose 12/20/2023 113     Calcium 12/20/2023 8.5     Corrected Calcium 12/20/2023 9.9     AST 12/20/2023 44 (H)     ALT 12/20/2023 23     Alkaline Phosphatase 12/20/2023 75     Total Protein 12/20/2023 5.7 (L)     Albumin 12/20/2023 2.2 (L)     Total Bilirubin 12/20/2023 3.77 (H)     eGFR 12/20/2023 45     Ammonia 12/20/2023 37     BNP 12/20/2023 409 (H)     Protime 12/20/2023 21.9 (H)     INR 12/20/2023 1.82 (H)     PTT 12/20/2023 46 (H)     Osmolality Serum 12/20/2023 289     Sodium 12/21/2023 132 (L)     Potassium 12/21/2023 3.6     Chloride 12/21/2023 110 (H)     CO2 12/21/2023 13 (L)     ANION GAP 12/21/2023 9     BUN 12/21/2023 26  (H)     Creatinine 12/21/2023 1.30     Glucose 12/21/2023 154 (H)     Calcium 12/21/2023 8.8     Corrected Calcium 12/21/2023 10.1     AST 12/21/2023 45 (H)     ALT 12/21/2023 25     Alkaline Phosphatase 12/21/2023 87     Total Protein 12/21/2023 6.1 (L)     Albumin 12/21/2023 2.4 (L)     Total Bilirubin 12/21/2023 3.86 (H)     eGFR 12/21/2023 42     Magnesium 12/21/2023 1.3 (L)     Phosphorus 12/21/2023 3.5     WBC 12/21/2023 4.88     RBC 12/21/2023 2.18 (L)     Hemoglobin 12/21/2023 7.6 (L)     Hematocrit 12/21/2023 23.2 (L)     MCV 12/21/2023 106 (H)     MCH 12/21/2023 34.9 (H)     MCHC 12/21/2023 32.8     RDW 12/21/2023 14.6     Platelets 12/21/2023 76 (L)     MPV 12/21/2023 12.1     Site 12/21/2023 Paracentesis     Color, Fluid 12/21/2023 Yellow     Clarity, Fluid 12/21/2023 Clear     WBC, Fluid 12/21/2023 75     Gram Stain Result 12/21/2023 No Polys or Bacteria seen     Protein, Fluid 12/21/2023 <3.0     Glucose, Fluid 12/21/2023 207     Albumin, Fluid 12/21/2023 <1.5     LD, Fluid 12/21/2023 <30     pH, Bret 12/21/2023 7.367     pCO2, Bret 12/21/2023 26.4 (L)     pO2, Bret 12/21/2023 169.5 (H)     HCO3, Bret 12/21/2023 14.8 (L)     Base Excess, Bret 12/21/2023 -9.4     O2 Content, Bret 12/21/2023 11.0     O2 HGB, VENOUS 12/21/2023 95.1 (H)     Total Counted 12/21/2023 100     Neutrophils % (Fluid) 12/21/2023 12     Lymphs % (Fluid) 12/21/2023 3     Mesothelial % (Fluid) 12/21/2023 3     Histiocyte % (Fluid) 12/21/2023 82     Pathology Review 12/21/2023 No     Sodium 12/22/2023 136     Potassium 12/22/2023 3.0 (L)     Chloride 12/22/2023 110 (H)     CO2 12/22/2023 17 (L)     ANION GAP 12/22/2023 9     BUN 12/22/2023 20     Creatinine 12/22/2023 1.04     Glucose 12/22/2023 124     Calcium 12/22/2023 9.1     Corrected Calcium 12/22/2023 10.2 (H)     AST 12/22/2023 39     ALT 12/22/2023 22     Alkaline Phosphatase 12/22/2023 78     Total Protein 12/22/2023 5.9 (L)     Albumin 12/22/2023 2.6 (L)     Total Bilirubin  12/22/2023 3.62 (H)     eGFR 12/22/2023 55     pH, Bret 12/22/2023 7.344     pCO2, Bret 12/22/2023 32.1 (L)     pO2, Bret 12/22/2023 29.2 (L)     HCO3, Bret 12/22/2023 17.1 (L)     Base Excess, Bret 12/22/2023 -7.8     O2 Content, Bret 12/22/2023 5.0     O2 HGB, VENOUS 12/22/2023 47.4 (L)     WBC 12/22/2023 5.04     RBC 12/22/2023 1.96 (L)     Hemoglobin 12/22/2023 6.8 (LL)     Hematocrit 12/22/2023 20.2 (L)     MCV 12/22/2023 103 (H)     MCH 12/22/2023 34.7 (H)     MCHC 12/22/2023 33.7     RDW 12/22/2023 14.6     Platelets 12/22/2023 90 (L)     MPV 12/22/2023 11.6          I have personally reviewed pertinent reports.      Current Facility-Administered Medications   Medication Dose Route Frequency    acetaminophen (TYLENOL) tablet 650 mg  650 mg Oral Q8H PRN    albumin human (FLEXBUMIN) 25 % injection 25 g  25 g Intravenous BID (diuretic)    amoxicillin (AMOXIL) capsule 500 mg  500 mg Oral Q6H    Diclofenac Sodium (VOLTAREN) 1 % topical gel 2 g  2 g Topical Q6H PRN    ferrous sulfate tablet 325 mg  325 mg Oral Daily With Breakfast    furosemide (LASIX) injection 40 mg  40 mg Intravenous BID (diuretic)    heparin (porcine) subcutaneous injection 5,000 Units  5,000 Units Subcutaneous Q8H HAJA    lactulose (CHRONULAC) oral solution 20 g  20 g Oral BID    midodrine (PROAMATINE) tablet 5 mg  5 mg Oral TID AC    pancrelipase (Lip-Prot-Amyl) (CREON) delayed release capsule 24,000 Units  24,000 Units Oral TID With Meals    sodium bicarbonate tablet 650 mg  650 mg Oral BID after meals    spironolactone (ALDACTONE) tablet 100 mg  100 mg Oral Daily    trimethobenzamide (TIGAN) IM injection 200 mg  200 mg Intramuscular Q6H PRN

## 2023-12-23 LAB
ABO GROUP BLD: NORMAL
ALBUMIN SERPL BCP-MCNC: 2.8 G/DL (ref 3.5–5)
ALP SERPL-CCNC: 61 U/L (ref 34–104)
ALT SERPL W P-5'-P-CCNC: 18 U/L (ref 7–52)
ANION GAP SERPL CALCULATED.3IONS-SCNC: 7 MMOL/L
AST SERPL W P-5'-P-CCNC: 38 U/L (ref 13–39)
BASOPHILS # BLD AUTO: 0.03 THOUSANDS/ÂΜL (ref 0–0.1)
BASOPHILS NFR BLD AUTO: 1 % (ref 0–1)
BILIRUB DIRECT SERPL-MCNC: 1.51 MG/DL (ref 0–0.2)
BILIRUB SERPL-MCNC: 3.49 MG/DL (ref 0.2–1)
BLD GP AB SCN SERPL QL: NEGATIVE
BUN SERPL-MCNC: 17 MG/DL (ref 5–25)
CALCIUM SERPL-MCNC: 9.4 MG/DL (ref 8.4–10.2)
CHLORIDE SERPL-SCNC: 113 MMOL/L (ref 96–108)
CO2 SERPL-SCNC: 18 MMOL/L (ref 21–32)
CREAT SERPL-MCNC: 0.97 MG/DL (ref 0.6–1.3)
EOSINOPHIL # BLD AUTO: 0.12 THOUSAND/ÂΜL (ref 0–0.61)
EOSINOPHIL NFR BLD AUTO: 3 % (ref 0–6)
ERYTHROCYTE [DISTWIDTH] IN BLOOD BY AUTOMATED COUNT: 14.6 % (ref 11.6–15.1)
ERYTHROCYTE [DISTWIDTH] IN BLOOD BY AUTOMATED COUNT: 16.8 % (ref 11.6–15.1)
GFR SERPL CREATININE-BSD FRML MDRD: 60 ML/MIN/1.73SQ M
GLUCOSE SERPL-MCNC: 95 MG/DL (ref 65–140)
HCT VFR BLD AUTO: 18.7 % (ref 34.8–46.1)
HCT VFR BLD AUTO: 21.1 % (ref 34.8–46.1)
HGB BLD-MCNC: 6.1 G/DL (ref 11.5–15.4)
HGB BLD-MCNC: 7.2 G/DL (ref 11.5–15.4)
IMM GRANULOCYTES # BLD AUTO: 0.01 THOUSAND/UL (ref 0–0.2)
IMM GRANULOCYTES NFR BLD AUTO: 0 % (ref 0–2)
LYMPHOCYTES # BLD AUTO: 1.72 THOUSANDS/ÂΜL (ref 0.6–4.47)
LYMPHOCYTES NFR BLD AUTO: 45 % (ref 14–44)
MAGNESIUM SERPL-MCNC: 1.3 MG/DL (ref 1.9–2.7)
MCH RBC QN AUTO: 34.1 PG (ref 26.8–34.3)
MCH RBC QN AUTO: 34.3 PG (ref 26.8–34.3)
MCHC RBC AUTO-ENTMCNC: 33.2 G/DL (ref 31.4–37.4)
MCHC RBC AUTO-ENTMCNC: 34.1 G/DL (ref 31.4–37.4)
MCV RBC AUTO: 100 FL (ref 82–98)
MCV RBC AUTO: 103 FL (ref 82–98)
MONOCYTES # BLD AUTO: 0.33 THOUSAND/ÂΜL (ref 0.17–1.22)
MONOCYTES NFR BLD AUTO: 8 % (ref 4–12)
NEUTROPHILS # BLD AUTO: 1.7 THOUSANDS/ÂΜL (ref 1.85–7.62)
NEUTS SEG NFR BLD AUTO: 43 % (ref 43–75)
NRBC BLD AUTO-RTO: 0 /100 WBCS
PLATELET # BLD AUTO: 69 THOUSANDS/UL (ref 149–390)
PLATELET # BLD AUTO: 75 THOUSANDS/UL (ref 149–390)
PMV BLD AUTO: 11.4 FL (ref 8.9–12.7)
PMV BLD AUTO: 11.4 FL (ref 8.9–12.7)
POTASSIUM SERPL-SCNC: 3.6 MMOL/L (ref 3.5–5.3)
PROT SERPL-MCNC: 5.8 G/DL (ref 6.4–8.4)
RBC # BLD AUTO: 1.81 MILLION/UL (ref 3.81–5.12)
RBC # BLD AUTO: 2.11 MILLION/UL (ref 3.81–5.12)
RH BLD: POSITIVE
SODIUM SERPL-SCNC: 138 MMOL/L (ref 135–147)
SPECIMEN EXPIRATION DATE: NORMAL
WBC # BLD AUTO: 3.72 THOUSAND/UL (ref 4.31–10.16)
WBC # BLD AUTO: 3.91 THOUSAND/UL (ref 4.31–10.16)

## 2023-12-23 PROCEDURE — 80321 ALCOHOLS BIOMARKERS 1OR 2: CPT | Performed by: PHYSICIAN ASSISTANT

## 2023-12-23 PROCEDURE — 86923 COMPATIBILITY TEST ELECTRIC: CPT

## 2023-12-23 PROCEDURE — 99232 SBSQ HOSP IP/OBS MODERATE 35: CPT | Performed by: INTERNAL MEDICINE

## 2023-12-23 PROCEDURE — 86901 BLOOD TYPING SEROLOGIC RH(D): CPT

## 2023-12-23 PROCEDURE — 99233 SBSQ HOSP IP/OBS HIGH 50: CPT | Performed by: NURSE PRACTITIONER

## 2023-12-23 PROCEDURE — 86900 BLOOD TYPING SEROLOGIC ABO: CPT

## 2023-12-23 PROCEDURE — C9113 INJ PANTOPRAZOLE SODIUM, VIA: HCPCS | Performed by: NURSE PRACTITIONER

## 2023-12-23 PROCEDURE — 80076 HEPATIC FUNCTION PANEL: CPT | Performed by: NURSE PRACTITIONER

## 2023-12-23 PROCEDURE — 86850 RBC ANTIBODY SCREEN: CPT

## 2023-12-23 PROCEDURE — 83735 ASSAY OF MAGNESIUM: CPT | Performed by: INTERNAL MEDICINE

## 2023-12-23 PROCEDURE — 80048 BASIC METABOLIC PNL TOTAL CA: CPT

## 2023-12-23 PROCEDURE — 85025 COMPLETE CBC W/AUTO DIFF WBC: CPT

## 2023-12-23 PROCEDURE — 85027 COMPLETE CBC AUTOMATED: CPT

## 2023-12-23 PROCEDURE — P9016 RBC LEUKOCYTES REDUCED: HCPCS

## 2023-12-23 RX ORDER — MAGNESIUM SULFATE HEPTAHYDRATE 40 MG/ML
2 INJECTION, SOLUTION INTRAVENOUS ONCE
Status: COMPLETED | OUTPATIENT
Start: 2023-12-23 | End: 2023-12-23

## 2023-12-23 RX ORDER — PANTOPRAZOLE SODIUM 40 MG/10ML
40 INJECTION, POWDER, LYOPHILIZED, FOR SOLUTION INTRAVENOUS EVERY 12 HOURS SCHEDULED
Status: DISCONTINUED | OUTPATIENT
Start: 2023-12-23 | End: 2023-12-26

## 2023-12-23 RX ADMIN — PANTOPRAZOLE SODIUM 40 MG: 40 INJECTION, POWDER, FOR SOLUTION INTRAVENOUS at 10:27

## 2023-12-23 RX ADMIN — LACTULOSE 20 G: 20 SOLUTION ORAL at 08:59

## 2023-12-23 RX ADMIN — PANCRELIPASE 24000 UNITS: 24000; 76000; 120000 CAPSULE, DELAYED RELEASE PELLETS ORAL at 09:00

## 2023-12-23 RX ADMIN — ALBUMIN (HUMAN) 25 G: 0.25 INJECTION, SOLUTION INTRAVENOUS at 08:52

## 2023-12-23 RX ADMIN — SODIUM BICARBONATE 650 MG TABLET 1300 MG: at 17:03

## 2023-12-23 RX ADMIN — HEPARIN SODIUM 5000 UNITS: 5000 INJECTION INTRAVENOUS; SUBCUTANEOUS at 21:23

## 2023-12-23 RX ADMIN — FUROSEMIDE 20 MG: 10 INJECTION, SOLUTION INTRAMUSCULAR; INTRAVENOUS at 08:59

## 2023-12-23 RX ADMIN — MIDODRINE HYDROCHLORIDE 5 MG: 5 TABLET ORAL at 06:19

## 2023-12-23 RX ADMIN — AMOXICILLIN 500 MG: 250 CAPSULE ORAL at 11:51

## 2023-12-23 RX ADMIN — LACTULOSE 20 G: 20 SOLUTION ORAL at 17:03

## 2023-12-23 RX ADMIN — HEPARIN SODIUM 5000 UNITS: 5000 INJECTION INTRAVENOUS; SUBCUTANEOUS at 17:02

## 2023-12-23 RX ADMIN — MIDODRINE HYDROCHLORIDE 5 MG: 5 TABLET ORAL at 11:51

## 2023-12-23 RX ADMIN — PANTOPRAZOLE SODIUM 40 MG: 40 INJECTION, POWDER, FOR SOLUTION INTRAVENOUS at 21:23

## 2023-12-23 RX ADMIN — PANCRELIPASE 24000 UNITS: 24000; 76000; 120000 CAPSULE, DELAYED RELEASE PELLETS ORAL at 11:51

## 2023-12-23 RX ADMIN — MAGNESIUM SULFATE HEPTAHYDRATE 2 G: 40 INJECTION, SOLUTION INTRAVENOUS at 06:21

## 2023-12-23 RX ADMIN — FERROUS SULFATE TAB 325 MG (65 MG ELEMENTAL FE) 325 MG: 325 (65 FE) TAB at 09:00

## 2023-12-23 RX ADMIN — AMOXICILLIN 500 MG: 250 CAPSULE ORAL at 22:58

## 2023-12-23 RX ADMIN — SODIUM BICARBONATE 650 MG TABLET 1300 MG: at 08:59

## 2023-12-23 RX ADMIN — AMOXICILLIN 500 MG: 250 CAPSULE ORAL at 06:19

## 2023-12-23 RX ADMIN — AMOXICILLIN 500 MG: 250 CAPSULE ORAL at 17:03

## 2023-12-23 RX ADMIN — HEPARIN SODIUM 5000 UNITS: 5000 INJECTION INTRAVENOUS; SUBCUTANEOUS at 06:19

## 2023-12-23 RX ADMIN — MIDODRINE HYDROCHLORIDE 5 MG: 5 TABLET ORAL at 17:03

## 2023-12-23 RX ADMIN — ALBUMIN (HUMAN) 25 G: 0.25 INJECTION, SOLUTION INTRAVENOUS at 16:56

## 2023-12-23 RX ADMIN — PANCRELIPASE 24000 UNITS: 24000; 76000; 120000 CAPSULE, DELAYED RELEASE PELLETS ORAL at 17:04

## 2023-12-23 NOTE — PLAN OF CARE
Problem: PAIN - ADULT  Goal: Verbalizes/displays adequate comfort level or baseline comfort level  Description: Interventions:  - Encourage patient to monitor pain and request assistance  - Assess pain using appropriate pain scale  - Administer analgesics based on type and severity of pain and evaluate response  - Implement non-pharmacological measures as appropriate and evaluate response  - Consider cultural and social influences on pain and pain management  - Notify physician/advanced practitioner if interventions unsuccessful or patient reports new pain  12/23/2023 0313 by Alek Marsh RN  Outcome: Progressing  12/23/2023 0313 by Alek Marsh RN  Outcome: Progressing     Problem: INFECTION - ADULT  Goal: Absence or prevention of progression during hospitalization  Description: INTERVENTIONS:  - Assess and monitor for signs and symptoms of infection  - Monitor lab/diagnostic results  - Monitor all insertion sites, i.e. indwelling lines, tubes, and drains  - Monitor endotracheal if appropriate and nasal secretions for changes in amount and color  - [place] appropriate cooling/warming therapies per order  - Administer medications as ordered  - Instruct and encourage patient and family to use good hand hygiene technique  - Identify and instruct in appropriate isolation precautions for identified infection/condition  12/23/2023 0313 by Alek Marsh RN  Outcome: Progressing  12/23/2023 0313 by Alek Marsh RN  Outcome: Progressing  Goal: Absence of fever/infection during neutropenic period  Description: INTERVENTIONS:  - Monitor WBC    12/23/2023 0313 by Alek Marsh RN  Outcome: Progressing  12/23/2023 0313 by Alek Marsh RN  Outcome: Progressing     Problem: SAFETY ADULT  Goal: Patient will remain free of falls  Description: INTERVENTIONS:  - Educate patient/family on patient safety including physical limitations  - Instruct patient to call for assistance with activity   - Consult OT/PT  to assist with strengthening/mobility   - Keep Call bell within reach  - Keep bed low and locked with side rails adjusted as appropriate  - Keep care items and personal belongings within reach  - Initiate and maintain comfort rounds  - Make Fall Risk Sign visible to staff  - Offer Toileting every  Hours, in advance of need  - Initiate/Maintain alarm  - Obtain necessary fall risk management equipment:  - Apply yellow socks and bracelet for high fall risk patients  - Consider moving patient to room near nurses station  12/23/2023 0313 by Alek Marsh RN  Outcome: Progressing  12/23/2023 0313 by Alek Marsh RN  Outcome: Progressing  Goal: Maintain or return to baseline ADL function  Description: INTERVENTIONS:  -  Assess patient's ability to carry out ADLs; assess patient's baseline for ADL function and identify physical deficits which impact ability to perform ADLs (bathing, care of mouth/teeth, toileting, grooming, dressing, etc.)  - Assess/evaluate cause of self-care deficits   - Assess range of motion  - Assess patient's mobility; develop plan if impaired  - Assess patient's need for assistive devices and provide as appropriate  - Encourage maximum independence but intervene and supervise when necessary  - Involve family in performance of ADLs  - Assess for home care needs following discharge   - Consider OT consult to assist with ADL evaluation and planning for discharge  - Provide patient education as appropriate  12/23/2023 0313 by Alek Marsh RN  Outcome: Progressing  12/23/2023 0313 by Alek Marsh, RN  Outcome: Progressing  Goal: Maintains/Returns to pre admission functional level  Description: INTERVENTIONS:  - Perform AM-PAC 6 Click Basic Mobility/ Daily Activity assessment daily.  - Set and communicate daily mobility goal to care team and patient/family/caregiver.   - Collaborate with rehabilitation services on mobility goals if consulted  - Perform Range of Motion  times a day.  -  Reposition patient every  hours.  - Dangle patient  times a day  - Stand patient times a day  - Ambulate patient  times a day  - Out of bed to chair  times a day   - Out of bed for meals  times a day  - Out of bed for toileting  - Record patient progress and toleration of activity level   12/23/2023 0313 by Alek Marsh RN  Outcome: Progressing  12/23/2023 0313 by Alek Marsh RN  Outcome: Progressing     Problem: DISCHARGE PLANNING  Goal: Discharge to home or other facility with appropriate resources  Description: INTERVENTIONS:  - Identify barriers to discharge w/patient and caregiver  - Arrange for needed discharge resources and transportation as appropriate  - Identify discharge learning needs (meds, wound care, etc.)  - Arrange for interpretive services to assist at discharge as needed  - Refer to Case Management Department for coordinating discharge planning if the patient needs post-hospital services based on physician/advanced practitioner order or complex needs related to functional status, cognitive ability, or social support system  12/23/2023 0313 by Alek Marsh RN  Outcome: Progressing  12/23/2023 0313 by Alek Marsh RN  Outcome: Progressing     Problem: Knowledge Deficit  Goal: Patient/family/caregiver demonstrates understanding of disease process, treatment plan, medications, and discharge instructions  Description: Complete learning assessment and assess knowledge base.  Interventions:  - Provide teaching at level of understanding  - Provide teaching via preferred learning methods  12/23/2023 0313 by Alek Marsh RN  Outcome: Progressing  12/23/2023 0313 by Alek Marsh RN  Outcome: Progressing

## 2023-12-23 NOTE — PROGRESS NOTES
Progress Note- Cassidy Zhang 68 y.o. female MRN: [id number]    Unit/Bed#: W -01 Encounter: [id number]      Assessment and Plan:    68-year-old female with history of EtOH cirrhosis decompensated by hepatic encephalopathy and ascites who presented for worsening ascites and anasarca.  She had recently decompensation earlier this year despite 5 to 6 years of abstinence with negative PEth in November. She has established care with hepatologist Dr. Sorensen, however diuresis has been limited due to renal dysfunction and hyponatremia. She also stopped taking her lactulose due to diarrhea.  Additionally had a dental procedure which was complicated by abscess requiring amoxicillin.    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 MELD yesterday 20. No INR today to calculate.    -Continue treatment of oral abscess per primary team  -Monitor mental status closely  -Continue Lactulose and titrate to 2-3 BM daily    -S/p paracentesis 12/21 w/ 2.4L serosanguinous fluid removed. Studies negative for SBP.  -Nephrology assistance appreciated. Continue IV lasix, Albumin, midodrine per renal    -Hgb dropped following paracentesis. CT scan performed negative for hematoma. Hgb 7.1 to 6.1 this morning. Pt denies any melena/hematochezia, having brown stool per nursing.  -Monitor CBC, transfuse blood products as needed. She received 1 unit today  -Continue iron supplementation per primary team    -Follow up repeat PEth level  -Recent MRI in November w/ no suspicious mass  -EGD in July 2023 with no evidence of varices    -Monitor MELD labs daily  -Patient will need urgent hepatology follow-up within 2 weeks after discharge    #2  Chronic pancreatitis: Etiology alcohol, reports sobriety for 5 years. Recent MRI in November notable for main PD dilation & known intraductal calculus in the  pancreatic head. Denies any abdominal pain.  -Continue alcohol cessation  -Continue Creon    MELD 3.0: 20 at 12/23/2023  4:31 AM  MELD-Na: 19 at 12/23/2023  4:31 AM  Calculated from:  Serum Creatinine: 0.97 mg/dL (Using min of 1 mg/dL) at 12/23/2023  4:31 AM  Serum Sodium: 138 mmol/L (Using max of 137 mmol/L) at 12/23/2023  4:31 AM  Total Bilirubin: 3.49 mg/dL at 12/23/2023  4:31 AM  Serum Albumin: 2.8 g/dL at 12/23/2023  4:31 AM  INR(ratio): 1.96 at 12/22/2023 10:28 AM  Age at listing (hypothetical): 68 years  Sex: Female at 12/23/2023  4:31 AM      ______________________________________________________________________    Subjective:     Pt lying in bed she is oriented x 3.  Reports she is having brown bowel movements.  Denies any abdominal pain, nausea or vomiting.  No evidence of bruising on abdomen.  Denies any nosebleeds    Medication Administration - last 24 hours from 12/22/2023 1641 to 12/23/2023 1641         Date/Time Order Dose Route Action Action by     12/23/2023 0900 EST ferrous sulfate tablet 325 mg 325 mg Oral Given Britt Ayala LPN     12/23/2023 0859 EST lactulose (CHRONULAC) oral solution 20 g 20 g Oral Given Britt Ayala LPN     12/22/2023 1802 EST lactulose (CHRONULAC) oral solution 20 g 20 g Oral Given Berna Cowan RN     12/23/2023 1151 EST pancrelipase (Lip-Prot-Amyl) (CREON) delayed release capsule 24,000 Units 24,000 Units Oral Given Britt Ayala LPN     12/23/2023 0900 EST pancrelipase (Lip-Prot-Amyl) (CREON) delayed release capsule 24,000 Units 24,000 Units Oral Given Britt Jamie, LPN     12/23/2023 0619 EST heparin (porcine) subcutaneous injection 5,000 Units 5,000 Units Subcutaneous Given Alek Marsh RN     12/22/2023 2319 EST heparin (porcine) subcutaneous injection 5,000 Units 5,000 Units Subcutaneous Given Alek Marsh RN     12/23/2023 1151 EST amoxicillin (AMOXIL) capsule 500 mg 500 mg Oral Given Britt Ayala LPN     12/23/2023 0619 EST amoxicillin (AMOXIL)  "capsule 500 mg 500 mg Oral Given Alek Marsh RN     12/22/2023 2319 EST amoxicillin (AMOXIL) capsule 500 mg 500 mg Oral Given Alek Marsh RN     12/22/2023 1802 EST amoxicillin (AMOXIL) capsule 500 mg 500 mg Oral Given Berna Cowan RN     12/23/2023 0852 EST albumin human (FLEXBUMIN) 25 % injection 25 g 25 g Intravenous New Bag Britt Ayala LPN     12/23/2023 1151 EST midodrine (PROAMATINE) tablet 5 mg 5 mg Oral Given Britt Ayala LPN     12/23/2023 0619 EST midodrine (PROAMATINE) tablet 5 mg 5 mg Oral Given Alek Marsh RN     12/23/2023 0859 EST sodium bicarbonate tablet 1,300 mg 1,300 mg Oral Given Britt Ayala LPN     12/22/2023 1805 EST sodium bicarbonate tablet 1,300 mg 1,300 mg Oral Given Berna Cowan RN     12/23/2023 0859 EST furosemide (LASIX) injection 20 mg 20 mg Intravenous Given by Other Britt Ayala LPN     12/23/2023 0621 EST magnesium sulfate 2 g/50 mL IVPB (premix) 2 g 2 g Intravenous New Bag Alek Marsh RN     12/23/2023 1027 EST pantoprazole (PROTONIX) injection 40 mg 40 mg Intravenous Given Berna Cowan RN            Objective:     Vitals: Blood pressure (!) 116/46, pulse 86, temperature 98 °F (36.7 °C), resp. rate 16, height 5' 4\" (1.626 m), weight 63.4 kg (139 lb 12.4 oz), SpO2 99%.,Body mass index is 23.99 kg/m².    No intake or output data in the 24 hours ending 12/23/23 1641    Physical Exam:   General Appearance: Awake and alert, in no acute distress  Abdomen: Soft, non-tender, non-distended; bowel sounds normal; no masses or no organomegaly    Invasive Devices       Peripheral Intravenous Line  Duration             Peripheral IV 12/20/23 Left;Upper Arm 3 days    Peripheral IV 12/23/23 Distal;Right;Ventral (anterior) Forearm <1 day                    Lab Results:  Admission on 12/20/2023   Component Date Value    WBC 12/20/2023 5.28     RBC 12/20/2023 2.32 (L)     Hemoglobin 12/20/2023 8.1 (L)     Hematocrit 12/20/2023 24.5 (L)     MCV 12/20/2023 106 (H)  "    MCH 12/20/2023 34.9 (H)     MCHC 12/20/2023 33.1     RDW 12/20/2023 14.6     MPV 12/20/2023 11.4     Platelets 12/20/2023 99 (L)     nRBC 12/20/2023 0     Neutrophils Relative 12/20/2023 60     Immat GRANS % 12/20/2023 1     Lymphocytes Relative 12/20/2023 30     Monocytes Relative 12/20/2023 6     Eosinophils Relative 12/20/2023 2     Basophils Relative 12/20/2023 1     Neutrophils Absolute 12/20/2023 3.23     Immature Grans Absolute 12/20/2023 0.03     Lymphocytes Absolute 12/20/2023 1.56     Monocytes Absolute 12/20/2023 0.34     Eosinophils Absolute 12/20/2023 0.09     Basophils Absolute 12/20/2023 0.03     Sodium 12/20/2023 133 (L)     Potassium 12/20/2023 4.0     Chloride 12/20/2023 112 (H)     CO2 12/20/2023 15 (L)     ANION GAP 12/20/2023 6     BUN 12/20/2023 24     Creatinine 12/20/2023 1.23     Glucose 12/20/2023 113     Calcium 12/20/2023 8.5     Corrected Calcium 12/20/2023 9.9     AST 12/20/2023 44 (H)     ALT 12/20/2023 23     Alkaline Phosphatase 12/20/2023 75     Total Protein 12/20/2023 5.7 (L)     Albumin 12/20/2023 2.2 (L)     Total Bilirubin 12/20/2023 3.77 (H)     eGFR 12/20/2023 45     Ammonia 12/20/2023 37     BNP 12/20/2023 409 (H)     Protime 12/20/2023 21.9 (H)     INR 12/20/2023 1.82 (H)     PTT 12/20/2023 46 (H)     Osmolality Serum 12/20/2023 289     Sodium 12/21/2023 132 (L)     Potassium 12/21/2023 3.6     Chloride 12/21/2023 110 (H)     CO2 12/21/2023 13 (L)     ANION GAP 12/21/2023 9     BUN 12/21/2023 26 (H)     Creatinine 12/21/2023 1.30     Glucose 12/21/2023 154 (H)     Calcium 12/21/2023 8.8     Corrected Calcium 12/21/2023 10.1     AST 12/21/2023 45 (H)     ALT 12/21/2023 25     Alkaline Phosphatase 12/21/2023 87     Total Protein 12/21/2023 6.1 (L)     Albumin 12/21/2023 2.4 (L)     Total Bilirubin 12/21/2023 3.86 (H)     eGFR 12/21/2023 42     Magnesium 12/21/2023 1.3 (L)     Phosphorus 12/21/2023 3.5     WBC 12/21/2023 4.88     RBC 12/21/2023 2.18 (L)     Hemoglobin  12/21/2023 7.6 (L)     Hematocrit 12/21/2023 23.2 (L)     MCV 12/21/2023 106 (H)     MCH 12/21/2023 34.9 (H)     MCHC 12/21/2023 32.8     RDW 12/21/2023 14.6     Platelets 12/21/2023 76 (L)     MPV 12/21/2023 12.1     Site 12/21/2023 Paracentesis     Color, Fluid 12/21/2023 Yellow     Clarity, Fluid 12/21/2023 Clear     WBC, Fluid 12/21/2023 75     Body Fluid Culture, Ster* 12/21/2023 No growth     Gram Stain Result 12/21/2023 No Polys or Bacteria seen     Protein, Fluid 12/21/2023 <3.0     Glucose, Fluid 12/21/2023 207     Albumin, Fluid 12/21/2023 <1.5     LD, Fluid 12/21/2023 <30     pH, Bret 12/21/2023 7.367     pCO2, Bret 12/21/2023 26.4 (L)     pO2, Bret 12/21/2023 169.5 (H)     HCO3, Bret 12/21/2023 14.8 (L)     Base Excess, Bret 12/21/2023 -9.4     O2 Content, Bret 12/21/2023 11.0     O2 HGB, VENOUS 12/21/2023 95.1 (H)     Total Counted 12/21/2023 100     Neutrophils % (Fluid) 12/21/2023 12     Lymphs % (Fluid) 12/21/2023 3     Mesothelial % (Fluid) 12/21/2023 3     Histiocyte % (Fluid) 12/21/2023 82     Pathology Review 12/21/2023 No     Sodium 12/22/2023 136     Potassium 12/22/2023 3.0 (L)     Chloride 12/22/2023 110 (H)     CO2 12/22/2023 17 (L)     ANION GAP 12/22/2023 9     BUN 12/22/2023 20     Creatinine 12/22/2023 1.04     Glucose 12/22/2023 124     Calcium 12/22/2023 9.1     Corrected Calcium 12/22/2023 10.2 (H)     AST 12/22/2023 39     ALT 12/22/2023 22     Alkaline Phosphatase 12/22/2023 78     Total Protein 12/22/2023 5.9 (L)     Albumin 12/22/2023 2.6 (L)     Total Bilirubin 12/22/2023 3.62 (H)     eGFR 12/22/2023 55     pH, Bret 12/22/2023 7.344     pCO2, Bret 12/22/2023 32.1 (L)     pO2, Bret 12/22/2023 29.2 (L)     HCO3, Bret 12/22/2023 17.1 (L)     Base Excess, Bret 12/22/2023 -7.8     O2 Content, Bret 12/22/2023 5.0     O2 HGB, VENOUS 12/22/2023 47.4 (L)     WBC 12/22/2023 5.04     RBC 12/22/2023 1.96 (L)     Hemoglobin 12/22/2023 6.8 (LL)     Hematocrit 12/22/2023 20.2 (L)     MCV 12/22/2023 103 (H)      MCH 12/22/2023 34.7 (H)     MCHC 12/22/2023 33.7     RDW 12/22/2023 14.6     Platelets 12/22/2023 90 (L)     MPV 12/22/2023 11.6     Hemoglobin 12/22/2023 7.1 (L)     Sodium, Ur 12/22/2023 122     Folate 12/22/2023 14.3     TSH 3RD GENERATON 12/22/2023 1.912     Vitamin B-12 12/22/2023 1,164 (H)     Protime 12/22/2023 23.2 (H)     INR 12/22/2023 1.96 (H)     WBC 12/23/2023 3.72 (L)     RBC 12/23/2023 1.81 (L)     Hemoglobin 12/23/2023 6.1 (LL)     Hematocrit 12/23/2023 18.7 (L)     MCV 12/23/2023 103 (H)     MCH 12/23/2023 34.3     MCHC 12/23/2023 33.2     RDW 12/23/2023 14.6     Platelets 12/23/2023 75 (L)     MPV 12/23/2023 11.4     Sodium 12/23/2023 138     Potassium 12/23/2023 3.6     Chloride 12/23/2023 113 (H)     CO2 12/23/2023 18 (L)     ANION GAP 12/23/2023 7     BUN 12/23/2023 17     Creatinine 12/23/2023 0.97     Glucose 12/23/2023 95     Calcium 12/23/2023 9.4     eGFR 12/23/2023 60     Magnesium 12/23/2023 1.3 (L)     ABO Grouping 12/23/2023 O     Rh Factor 12/23/2023 Positive     Antibody Screen 12/23/2023 Negative     Specimen Expiration Date 12/23/2023 20231226     Unit Product Code 12/23/2023 [id number]     Unit Number 12/23/2023 [id number]     Unit ABO 12/23/2023 O     Unit RH 12/23/2023 POS     Crossmatch 12/23/2023 Compatible     Unit Dispense Status 12/23/2023 Issued     Unit Product Volume 12/23/2023 350     Total Bilirubin 12/23/2023 3.49 (H)     Bilirubin, Direct 12/23/2023 1.51 (H)     Alkaline Phosphatase 12/23/2023 61     AST 12/23/2023 38     ALT 12/23/2023 18     Total Protein 12/23/2023 5.8 (L)     Albumin 12/23/2023 2.8 (L)        Imaging Studies: I have personally reviewed pertinent imaging studies.

## 2023-12-23 NOTE — PLAN OF CARE
Problem: PAIN - ADULT  Goal: Verbalizes/displays adequate comfort level or baseline comfort level  Description: Interventions:  - Encourage patient to monitor pain and request assistance  - Assess pain using appropriate pain scale  - Administer analgesics based on type and severity of pain and evaluate response  - Implement non-pharmacological measures as appropriate and evaluate response  - Consider cultural and social influences on pain and pain management  - Notify physician/advanced practitioner if interventions unsuccessful or patient reports new pain  Outcome: Progressing     Problem: INFECTION - ADULT  Goal: Absence or prevention of progression during hospitalization  Description: INTERVENTIONS:  - Assess and monitor for signs and symptoms of infection  - Monitor lab/diagnostic results  - Monitor all insertion sites, i.e. indwelling lines, tubes, and drains  - Monitor endotracheal if appropriate and nasal secretions for changes in amount and color  - [place] appropriate cooling/warming therapies per order  - Administer medications as ordered  - Instruct and encourage patient and family to use good hand hygiene technique  - Identify and instruct in appropriate isolation precautions for identified infection/condition  Outcome: Progressing  Goal: Absence of fever/infection during neutropenic period  Description: INTERVENTIONS:  - Monitor WBC    Outcome: Progressing     Problem: SAFETY ADULT  Goal: Patient will remain free of falls  Description: INTERVENTIONS:  - Educate patient/family on patient safety including physical limitations  - Instruct patient to call for assistance with activity   - Consult OT/PT to assist with strengthening/mobility   - Keep Call bell within reach  - Keep bed low and locked with side rails adjusted as appropriate  - Keep care items and personal belongings within reach  - Initiate and maintain comfort rounds  - Make Fall Risk Sign visible to staff  - Offer Toileting every  Hours,  in advance of need  - Initiate/Maintain alarm  - Obtain necessary fall risk management equipment:  - Apply yellow socks and bracelet for high fall risk patients  - Consider moving patient to room near nurses station  Outcome: Progressing  Goal: Maintain or return to baseline ADL function  Description: INTERVENTIONS:  -  Assess patient's ability to carry out ADLs; assess patient's baseline for ADL function and identify physical deficits which impact ability to perform ADLs (bathing, care of mouth/teeth, toileting, grooming, dressing, etc.)  - Assess/evaluate cause of self-care deficits   - Assess range of motion  - Assess patient's mobility; develop plan if impaired  - Assess patient's need for assistive devices and provide as appropriate  - Encourage maximum independence but intervene and supervise when necessary  - Involve family in performance of ADLs  - Assess for home care needs following discharge   - Consider OT consult to assist with ADL evaluation and planning for discharge  - Provide patient education as appropriate  Outcome: Progressing  Goal: Maintains/Returns to pre admission functional level  Description: INTERVENTIONS:  - Perform AM-PAC 6 Click Basic Mobility/ Daily Activity assessment daily.  - Set and communicate daily mobility goal to care team and patient/family/caregiver.   - Collaborate with rehabilitation services on mobility goals if consulted  - Perform Range of Motion  times a day.  - Reposition patient every  hours.  - Dangle patient  times a day  - Stand patient times a day  - Ambulate patient  times a day  - Out of bed to chair  times a day   - Out of bed for meals  times a day  - Out of bed for toileting  - Record patient progress and toleration of activity level   Outcome: Progressing     Problem: DISCHARGE PLANNING  Goal: Discharge to home or other facility with appropriate resources  Description: INTERVENTIONS:  - Identify barriers to discharge w/patient and caregiver  - Arrange for  needed discharge resources and transportation as appropriate  - Identify discharge learning needs (meds, wound care, etc.)  - Arrange for interpretive services to assist at discharge as needed  - Refer to Case Management Department for coordinating discharge planning if the patient needs post-hospital services based on physician/advanced practitioner order or complex needs related to functional status, cognitive ability, or social support system  Outcome: Progressing     Problem: Knowledge Deficit  Goal: Patient/family/caregiver demonstrates understanding of disease process, treatment plan, medications, and discharge instructions  Description: Complete learning assessment and assess knowledge base.  Interventions:  - Provide teaching at level of understanding  - Provide teaching via preferred learning methods  Outcome: Progressing

## 2023-12-23 NOTE — PLAN OF CARE
Problem: PAIN - ADULT  Goal: Verbalizes/displays adequate comfort level or baseline comfort level  Description: Interventions:  - Encourage patient to monitor pain and request assistance  - Assess pain using appropriate pain scale  - Administer analgesics based on type and severity of pain and evaluate response  - Implement non-pharmacological measures as appropriate and evaluate response  - Consider cultural and social influences on pain and pain management  - Notify physician/advanced practitioner if interventions unsuccessful or patient reports new pain  Outcome: Progressing     Problem: INFECTION - ADULT  Goal: Absence or prevention of progression during hospitalization  Description: INTERVENTIONS:  - Assess and monitor for signs and symptoms of infection  - Monitor lab/diagnostic results  - Monitor all insertion sites, i.e. indwelling lines, tubes, and drains  - Monitor endotracheal if appropriate and nasal secretions for changes in amount and color  - [place] appropriate cooling/warming therapies per order  - Administer medications as ordered  - Instruct and encourage patient and family to use good hand hygiene technique  - Identify and instruct in appropriate isolation precautions for identified infection/condition  Outcome: Progressing  Goal: Absence of fever/infection during neutropenic period  Description: INTERVENTIONS:  - Monitor WBC    Outcome: Progressing     Problem: SAFETY ADULT  Goal: Patient will remain free of falls  Description: INTERVENTIONS:  - Educate patient/family on patient safety including physical limitations  - Instruct patient to call for assistance with activity   - Consult OT/PT to assist with strengthening/mobility   - Keep Call bell within reach  - Keep bed low and locked with side rails adjusted as appropriate  - Keep care items and personal belongings within reach  - Initiate and maintain comfort rounds  - Make Fall Risk Sign visible to staff  - Offer Toileting every  Hours,  in advance of need  - Initiate/Maintain alarm  - Obtain necessary fall risk management equipment:  - Apply yellow socks and bracelet for high fall risk patients  - Consider moving patient to room near nurses station  Outcome: Progressing  Goal: Maintain or return to baseline ADL function  Description: INTERVENTIONS:  -  Assess patient's ability to carry out ADLs; assess patient's baseline for ADL function and identify physical deficits which impact ability to perform ADLs (bathing, care of mouth/teeth, toileting, grooming, dressing, etc.)  - Assess/evaluate cause of self-care deficits   - Assess range of motion  - Assess patient's mobility; develop plan if impaired  - Assess patient's need for assistive devices and provide as appropriate  - Encourage maximum independence but intervene and supervise when necessary  - Involve family in performance of ADLs  - Assess for home care needs following discharge   - Consider OT consult to assist with ADL evaluation and planning for discharge  - Provide patient education as appropriate  Outcome: Progressing  Goal: Maintains/Returns to pre admission functional level  Description: INTERVENTIONS:  - Perform AM-PAC 6 Click Basic Mobility/ Daily Activity assessment daily.  - Set and communicate daily mobility goal to care team and patient/family/caregiver.   - Collaborate with rehabilitation services on mobility goals if consulted  - Perform Range of Motion  times a day.  - Reposition patient every  hours.  - Dangle patient  times a day  - Stand patient times a day  - Ambulate patient  times a day  - Out of bed to chair  times a day   - Out of bed for meals  times a day  - Out of bed for toileting  - Record patient progress and toleration of activity level   Outcome: Progressing     Problem: DISCHARGE PLANNING  Goal: Discharge to home or other facility with appropriate resources  Description: INTERVENTIONS:  - Identify barriers to discharge w/patient and caregiver  - Arrange for  needed discharge resources and transportation as appropriate  - Identify discharge learning needs (meds, wound care, etc.)  - Arrange for interpretive services to assist at discharge as needed  - Refer to Case Management Department for coordinating discharge planning if the patient needs post-hospital services based on physician/advanced practitioner order or complex needs related to functional status, cognitive ability, or social support system  Outcome: Progressing     Problem: Knowledge Deficit  Goal: Patient/family/caregiver demonstrates understanding of disease process, treatment plan, medications, and discharge instructions  Description: Complete learning assessment and assess knowledge base.  Interventions:  - Provide teaching at level of understanding  - Provide teaching via preferred learning methods  Outcome: Progressing

## 2023-12-23 NOTE — PROGRESS NOTES
[facility]  Progress Note  Name: Cassidy Zhang I  MRN: [id number]  Unit/Bed#: [facility]BAR[facility] 427-01 I Date of Admission: 12/20/2023   Date of Service: 12/23/2023 I Hospital Day: 3    Assessment/Plan   Anemia  Assessment & Plan  Hemoglobin is 6.1  Will transfuse    Status post incision and drainage  Assessment & Plan  Pt and  report dental I&D for oral abscess day prior to presentation 12/20/23, started on amoxicillin 500 mg PO Q6H. Acute infection may also have played a role in knocking patient into liver decompensation.     Continue [id number] amoxicillin    Metabolic acidosis  Assessment & Plan  HCO3 15 POA- 18 today   Pt reports 1 loose stool today    Nephrology on board- currently giving 1300 BID of bicarbonate    PRETTY (acute kidney injury) (HCC)  Assessment & Plan  In the setting of type II hepatorenal syndrome.  - Midodrine 5mg TID to enhance renal perfusion  - Albumin given with Lasix doses      Chronic pancreatitis (HCC)  Assessment & Plan  2/2 EtOH abuse, now in remission.     Continue home pancrealipase    Ascites/anasarca  Assessment & Plan  2/2 end stage liver disease, as noted above    Plan:  Diuresis, fluid restriction, dietary salt restriction as above   Nephrology and GI consults, as above  Paracentesis drained 2.4L    Chronic hyponatremia  Assessment & Plan  History of SIADH, however suspect also a component of hypotonic hypervolemic hyponatremia in the setting of liver failure complicated by ascites and anasarca.     Plan:  Collect urine sodium, urine osm, serum osm before starting diuretics, and f/u results.  Diuresis, fluid restriction, dietary salt restriction as above  Hold home Na tablets, at least until hyponatremia labs interpreted--starting NaHCO3 tabs for metabolic alkalosis  Nephrology consulted - waiting for osmolality labs and VBG  -Albumin 25g BID to be given with lasix to balance volume status  -Started midodrine 5mg TID      Essential  hypertension  Assessment & Plan  Hold home antihypertensive (amlodipine and losartan) at this time, given soft BP and desire for aggressive diuresis.   BP today is 100/35    * Decompensated liver disease (HCC)  Assessment & Plan  Presents for ascites and anasarca, found also to have mild hyponatremia, hypoalbuminemia, hyperbilirubinemia, hypocoagulability, anemia, and thrombocytopenia. Ammonia wnl.  No asterixis on exam, pt A&O x3, maybe some mild confusion (delayed when answering questions, but appropriate), though pt at or near her baseline per spouse.  Denies fevers, chills, sweats, abd pain, hematemesis, hemoptysis, hematochezia, melena.  Reports holding lactulose for last 2-3 days for frequent BMs at home (>5 loose BM per day)  Recently discontinued lasix and spironolactone in the o/p setting d/t worsening renal function and hyponatremia  MELD score 23 on presentation  Last drink 5-6 years ago    MELD 3.0: 26 at 12/21/2023  7:25 AM  MELD-Na: 24 at 12/21/2023  7:25 AM  Calculated from:  Serum Creatinine: 1.30 mg/dL at 12/21/2023  7:25 AM  Serum Sodium: 132 mmol/L at 12/21/2023  7:25 AM  Total Bilirubin: 3.86 mg/dL at 12/21/2023  7:25 AM  Serum Albumin: 2.4 g/dL at 12/21/2023  7:25 AM  INR(ratio): 1.82 at 12/20/2023  2:16 PM  Age at listing (hypothetical): 68 years  Sex: Female at 12/21/2023  7:25 AM        Plan:  Lower Lasix to 20 mg IV tonight to avoid volume shifts per nephrology  Hold spironolactone at this time per nephrology, restart as able  Diet: High protein, low salt, fluid restrict 1500 cc/day  Resume lactulose 20 mg BID and titrate to target 3-5 loose BM per day  Monitor MELD labs  Monitor Hgb and transfuse for Hgb<7  GI and Nephro consulted  IR paracentesis drained 2.4L fluid - labs unremarkable at this point   Albumin replacement   Tigan for nausea   GI recommended Peth labs with potential liver transplant evaluation  May need recurrent paracentesis as on outpatient                VTE Pharmacologic  "Prophylaxis: VTE Score: 7 Moderate Risk (Score 3-4) - Pharmacological DVT Prophylaxis Ordered: heparin.        Patient Centered Rounds: I performed bedside rounds with nursing staff today.   Discussions with Specialists or Other Care Team Provider: discussed with nursing regarding transfusion.     Education and Discussions with Family / Patient:  resident will update family. .     Total Time Spent on Date of Encounter in care of patient: 30 mins. This time was spent on one or more of the following: performing physical exam; counseling and coordination of care; obtaining or reviewing history; documenting in the medical record; reviewing/ordering tests, medications or procedures; communicating with other healthcare professionals and discussing with patient's family/caregivers.    Current Length of Stay: 3 day(s)  Current Patient Status: Inpatient   Certification Statement: The patient will continue to require additional inpatient hospital stay due to anemia.   Discharge Plan: Anticipate discharge in 48-72 hrs to home.    Code Status: Level 1 - Full Code    Subjective:   Patient seen and examined   Feeling \"good\"    Objective:     Vitals:   Temp (24hrs), Av.7 °F (36.5 °C), Min:97.1 °F (36.2 °C), Max:98.2 °F (36.8 °C)    Temp:  [97.1 °F (36.2 °C)-98.2 °F (36.8 °C)] 98.2 °F (36.8 °C)  HR:  [79-84] 84  Resp:  [16-18] 18  BP: (100-119)/(35-50) 100/35  SpO2:  [98 %-100 %] 99 %  Body mass index is 23.99 kg/m².     Input and Output Summary (last 24 hours):   No intake or output data in the 24 hours ending 23 0915    Physical Exam:   Physical Exam  Constitutional:       General: She is not in acute distress.     Appearance: She is not ill-appearing, toxic-appearing or diaphoretic.   HENT:      Head: Normocephalic.      Mouth/Throat:      Mouth: Mucous membranes are moist.   Eyes:      General: No scleral icterus.        Right eye: No discharge.         Left eye: No discharge.      Pupils: Pupils are equal, round, and " reactive to light.   Cardiovascular:      Rate and Rhythm: Normal rate.      Heart sounds: No murmur heard.     No friction rub. No gallop.   Pulmonary:      Effort: No respiratory distress.      Breath sounds: No stridor. No wheezing, rhonchi or rales.   Chest:      Chest wall: No tenderness.   Abdominal:      General: There is distension.      Palpations: There is no mass.      Tenderness: There is no abdominal tenderness. There is no right CVA tenderness, left CVA tenderness, guarding or rebound.      Hernia: No hernia is present.   Musculoskeletal:      Right lower leg: No edema.      Left lower leg: No edema.   Skin:     Coloration: Skin is pale. Skin is not jaundiced.      Findings: No bruising, erythema or rash.   Neurological:      General: No focal deficit present.      Mental Status: She is alert.      Cranial Nerves: No cranial nerve deficit.      Sensory: No sensory deficit.      Motor: No weakness.      Coordination: Coordination normal.      Gait: Gait normal.      Deep Tendon Reflexes: Reflexes normal.   Psychiatric:         Mood and Affect: Mood normal.          Additional Data:     Labs:  Results from last 7 days   Lab Units 12/23/23  0431 12/21/23  0725 12/20/23  1416   WBC Thousand/uL 3.72*   < > 5.28   HEMOGLOBIN g/dL 6.1*   < > 8.1*   HEMATOCRIT % 18.7*   < > 24.5*   PLATELETS Thousands/uL 75*   < > 99*   NEUTROS PCT %  --   --  60   LYMPHS PCT %  --   --  30   MONOS PCT %  --   --  6   EOS PCT %  --   --  2    < > = values in this interval not displayed.     Results from last 7 days   Lab Units 12/23/23  0431 12/22/23  0819   SODIUM mmol/L 138 136   POTASSIUM mmol/L 3.6 3.0*   CHLORIDE mmol/L 113* 110*   CO2 mmol/L 18* 17*   BUN mg/dL 17 20   CREATININE mg/dL 0.97 1.04   ANION GAP mmol/L 7 9   CALCIUM mg/dL 9.4 9.1   ALBUMIN g/dL  --  2.6*   TOTAL BILIRUBIN mg/dL  --  3.62*   ALK PHOS U/L  --  78   ALT U/L  --  22   AST U/L  --  39   GLUCOSE RANDOM mg/dL 95 124     Results from last 7 days    Lab Units 12/22/23  1028   INR  1.96*                   Lines/Drains:  Invasive Devices       Peripheral Intravenous Line  Duration             Peripheral IV 12/20/23 Left;Upper Arm 2 days    Peripheral IV 12/23/23 Distal;Right;Ventral (anterior) Forearm <1 day                          Imaging: No pertinent imaging reviewed.    Recent Cultures (last 7 days):   Results from last 7 days   Lab Units 12/21/23  1637   GRAM STAIN RESULT  No Polys or Bacteria seen   BODY FLUID CULTURE, STERILE  No growth       Last 24 Hours Medication List:   Current Facility-Administered Medications   Medication Dose Route Frequency Provider Last Rate    acetaminophen  650 mg Oral Q8H PRN Terrell Mayberry MD      Albumin 25%  25 g Intravenous BID (diuretic) AQUILES Pepper      amoxicillin  500 mg Oral Q6H Terrell Mayberry MD      Diclofenac Sodium  2 g Topical Q6H PRN Terrell Mayberry MD      ferrous sulfate  325 mg Oral Daily With Breakfast Terrell Mayberry MD      furosemide  20 mg Intravenous BID (diuretic) Chelsy Sorensen MD      heparin (porcine)  5,000 Units Subcutaneous Q8H HAJA Terrell Mayberry MD      lactulose  20 g Oral BID Terrell Mayberry MD      midodrine  5 mg Oral TID AC AQUILES Pepper      pancrelipase (Lip-Prot-Amyl)  24,000 Units Oral TID With Meals Terrell Mayberry MD      sodium bicarbonate  1,300 mg Oral BID after meals Chelsy Sorensen MD      trimethobenzamide  200 mg Intramuscular Q6H PRN Jodie Larose MD          Today, Patient Was Seen By: Nivia Ng MD    **Please Note: This note may have been constructed using a voice recognition system.**

## 2023-12-24 PROBLEM — N18.30 CKD (CHRONIC KIDNEY DISEASE) STAGE 3, GFR 30-59 ML/MIN (HCC): Status: ACTIVE | Noted: 2023-11-18

## 2023-12-24 PROBLEM — R79.89 ELEVATED SERUM CREATININE: Status: ACTIVE | Noted: 2023-11-18

## 2023-12-24 PROBLEM — D62 ACUTE BLOOD LOSS ANEMIA: Status: ACTIVE | Noted: 2023-12-22

## 2023-12-24 PROBLEM — D68.9 COAGULOPATHY (HCC): Status: ACTIVE | Noted: 2023-12-24

## 2023-12-24 LAB
ABO GROUP BLD BPU: NORMAL
ALBUMIN SERPL BCP-MCNC: 3.1 G/DL (ref 3.5–5)
ALP SERPL-CCNC: 70 U/L (ref 34–104)
ALT SERPL W P-5'-P-CCNC: 16 U/L (ref 7–52)
ANION GAP SERPL CALCULATED.3IONS-SCNC: 7 MMOL/L
ANISOCYTOSIS BLD QL SMEAR: PRESENT
AST SERPL W P-5'-P-CCNC: 32 U/L (ref 13–39)
BACTERIA SPEC BFLD CULT: NO GROWTH
BASOPHILS # BLD AUTO: 0.02 THOUSANDS/ÂΜL (ref 0–0.1)
BASOPHILS NFR BLD AUTO: 1 % (ref 0–1)
BILIRUB DIRECT SERPL-MCNC: 1.61 MG/DL (ref 0–0.2)
BILIRUB SERPL-MCNC: 3.72 MG/DL (ref 0.2–1)
BPU ID: NORMAL
BUN SERPL-MCNC: 13 MG/DL (ref 5–25)
CALCIUM SERPL-MCNC: 9.2 MG/DL (ref 8.4–10.2)
CHLORIDE SERPL-SCNC: 112 MMOL/L (ref 96–108)
CO2 SERPL-SCNC: 19 MMOL/L (ref 21–32)
CREAT SERPL-MCNC: 0.95 MG/DL (ref 0.6–1.3)
CROSSMATCH: NORMAL
EOSINOPHIL # BLD AUTO: 0.13 THOUSAND/ÂΜL (ref 0–0.61)
EOSINOPHIL NFR BLD AUTO: 4 % (ref 0–6)
ERYTHROCYTE [DISTWIDTH] IN BLOOD BY AUTOMATED COUNT: 16.8 % (ref 11.6–15.1)
GFR SERPL CREATININE-BSD FRML MDRD: 61 ML/MIN/1.73SQ M
GLUCOSE SERPL-MCNC: 122 MG/DL (ref 65–140)
GRAM STN SPEC: NORMAL
HCT VFR BLD AUTO: 20.1 % (ref 34.8–46.1)
HGB BLD-MCNC: 7.1 G/DL (ref 11.5–15.4)
HYPERCHROMIA BLD QL SMEAR: PRESENT
IMM GRANULOCYTES # BLD AUTO: 0.01 THOUSAND/UL (ref 0–0.2)
IMM GRANULOCYTES NFR BLD AUTO: 0 % (ref 0–2)
INR PPP: 2.25 (ref 0.84–1.19)
LYMPHOCYTES # BLD AUTO: 1.8 THOUSANDS/ÂΜL (ref 0.6–4.47)
LYMPHOCYTES NFR BLD AUTO: 47 % (ref 14–44)
MAGNESIUM SERPL-MCNC: 1.4 MG/DL (ref 1.9–2.7)
MCH RBC QN AUTO: 35.1 PG (ref 26.8–34.3)
MCHC RBC AUTO-ENTMCNC: 35.3 G/DL (ref 31.4–37.4)
MCV RBC AUTO: 100 FL (ref 82–98)
MONOCYTES # BLD AUTO: 0.3 THOUSAND/ÂΜL (ref 0.17–1.22)
MONOCYTES NFR BLD AUTO: 8 % (ref 4–12)
NEUTROPHILS # BLD AUTO: 1.48 THOUSANDS/ÂΜL (ref 1.85–7.62)
NEUTS SEG NFR BLD AUTO: 40 % (ref 43–75)
NRBC BLD AUTO-RTO: 0 /100 WBCS
PLATELET # BLD AUTO: 77 THOUSANDS/UL (ref 149–390)
PLATELET BLD QL SMEAR: ABNORMAL
PMV BLD AUTO: 11.5 FL (ref 8.9–12.7)
POTASSIUM SERPL-SCNC: 3.3 MMOL/L (ref 3.5–5.3)
PROT SERPL-MCNC: 5.6 G/DL (ref 6.4–8.4)
PROTHROMBIN TIME: 25.7 SECONDS (ref 11.6–14.5)
RBC # BLD AUTO: 2.02 MILLION/UL (ref 3.81–5.12)
RBC MORPH BLD: PRESENT
SODIUM SERPL-SCNC: 138 MMOL/L (ref 135–147)
UNIT DISPENSE STATUS: NORMAL
UNIT PRODUCT CODE: NORMAL
UNIT PRODUCT VOLUME: 350 ML
UNIT RH: NORMAL
WBC # BLD AUTO: 3.74 THOUSAND/UL (ref 4.31–10.16)

## 2023-12-24 PROCEDURE — P9040 RBC LEUKOREDUCED IRRADIATED: HCPCS

## 2023-12-24 PROCEDURE — 99233 SBSQ HOSP IP/OBS HIGH 50: CPT | Performed by: GENERAL PRACTICE

## 2023-12-24 PROCEDURE — 80048 BASIC METABOLIC PNL TOTAL CA: CPT

## 2023-12-24 PROCEDURE — 85025 COMPLETE CBC W/AUTO DIFF WBC: CPT

## 2023-12-24 PROCEDURE — 99232 SBSQ HOSP IP/OBS MODERATE 35: CPT | Performed by: INTERNAL MEDICINE

## 2023-12-24 PROCEDURE — 85610 PROTHROMBIN TIME: CPT | Performed by: NURSE PRACTITIONER

## 2023-12-24 PROCEDURE — C9113 INJ PANTOPRAZOLE SODIUM, VIA: HCPCS | Performed by: NURSE PRACTITIONER

## 2023-12-24 PROCEDURE — P9017 PLASMA 1 DONOR FRZ W/IN 8 HR: HCPCS

## 2023-12-24 PROCEDURE — 80076 HEPATIC FUNCTION PANEL: CPT

## 2023-12-24 PROCEDURE — 83735 ASSAY OF MAGNESIUM: CPT

## 2023-12-24 RX ORDER — FUROSEMIDE 40 MG/1
40 TABLET ORAL
Status: DISCONTINUED | OUTPATIENT
Start: 2023-12-24 | End: 2023-12-27

## 2023-12-24 RX ORDER — SPIRONOLACTONE 100 MG/1
100 TABLET, FILM COATED ORAL DAILY
Status: DISCONTINUED | OUTPATIENT
Start: 2023-12-24 | End: 2023-12-27

## 2023-12-24 RX ORDER — POTASSIUM CHLORIDE 20 MEQ/1
40 TABLET, EXTENDED RELEASE ORAL ONCE
Status: COMPLETED | OUTPATIENT
Start: 2023-12-24 | End: 2023-12-24

## 2023-12-24 RX ORDER — MAGNESIUM SULFATE HEPTAHYDRATE 40 MG/ML
4 INJECTION, SOLUTION INTRAVENOUS ONCE
Status: COMPLETED | OUTPATIENT
Start: 2023-12-24 | End: 2023-12-25

## 2023-12-24 RX ADMIN — PANTOPRAZOLE SODIUM 40 MG: 40 INJECTION, POWDER, FOR SOLUTION INTRAVENOUS at 10:17

## 2023-12-24 RX ADMIN — MIDODRINE HYDROCHLORIDE 5 MG: 5 TABLET ORAL at 17:57

## 2023-12-24 RX ADMIN — PANCRELIPASE 24000 UNITS: 24000; 76000; 120000 CAPSULE, DELAYED RELEASE PELLETS ORAL at 12:03

## 2023-12-24 RX ADMIN — LACTULOSE 20 G: 20 SOLUTION ORAL at 17:57

## 2023-12-24 RX ADMIN — MAGNESIUM SULFATE IN WATER 4 G: 4 INJECTION, SOLUTION INTRAVENOUS at 12:04

## 2023-12-24 RX ADMIN — AMOXICILLIN 500 MG: 250 CAPSULE ORAL at 23:25

## 2023-12-24 RX ADMIN — FERROUS SULFATE TAB 325 MG (65 MG ELEMENTAL FE) 325 MG: 325 (65 FE) TAB at 10:15

## 2023-12-24 RX ADMIN — MIDODRINE HYDROCHLORIDE 5 MG: 5 TABLET ORAL at 13:17

## 2023-12-24 RX ADMIN — HEPARIN SODIUM 5000 UNITS: 5000 INJECTION INTRAVENOUS; SUBCUTANEOUS at 13:17

## 2023-12-24 RX ADMIN — PANCRELIPASE 24000 UNITS: 24000; 76000; 120000 CAPSULE, DELAYED RELEASE PELLETS ORAL at 17:59

## 2023-12-24 RX ADMIN — AMOXICILLIN 500 MG: 250 CAPSULE ORAL at 17:57

## 2023-12-24 RX ADMIN — SODIUM BICARBONATE 650 MG TABLET 1300 MG: at 10:15

## 2023-12-24 RX ADMIN — POTASSIUM CHLORIDE 40 MEQ: 1500 TABLET, EXTENDED RELEASE ORAL at 10:15

## 2023-12-24 RX ADMIN — AMOXICILLIN 500 MG: 250 CAPSULE ORAL at 12:03

## 2023-12-24 RX ADMIN — PANTOPRAZOLE SODIUM 40 MG: 40 INJECTION, POWDER, FOR SOLUTION INTRAVENOUS at 20:42

## 2023-12-24 RX ADMIN — HEPARIN SODIUM 5000 UNITS: 5000 INJECTION INTRAVENOUS; SUBCUTANEOUS at 05:12

## 2023-12-24 RX ADMIN — LACTULOSE 20 G: 20 SOLUTION ORAL at 10:14

## 2023-12-24 RX ADMIN — SODIUM BICARBONATE 650 MG TABLET 1300 MG: at 17:57

## 2023-12-24 RX ADMIN — PANCRELIPASE 24000 UNITS: 24000; 76000; 120000 CAPSULE, DELAYED RELEASE PELLETS ORAL at 10:15

## 2023-12-24 RX ADMIN — MIDODRINE HYDROCHLORIDE 5 MG: 5 TABLET ORAL at 10:15

## 2023-12-24 RX ADMIN — FUROSEMIDE 40 MG: 40 TABLET ORAL at 17:57

## 2023-12-24 RX ADMIN — AMOXICILLIN 500 MG: 250 CAPSULE ORAL at 05:12

## 2023-12-24 NOTE — PLAN OF CARE
Problem: PAIN - ADULT  Goal: Verbalizes/displays adequate comfort level or baseline comfort level  Description: Interventions:  - Encourage patient to monitor pain and request assistance  - Assess pain using appropriate pain scale  - Administer analgesics based on type and severity of pain and evaluate response  - Implement non-pharmacological measures as appropriate and evaluate response  - Consider cultural and social influences on pain and pain management  - Notify physician/advanced practitioner if interventions unsuccessful or patient reports new pain  Outcome: Progressing     Problem: INFECTION - ADULT  Goal: Absence or prevention of progression during hospitalization  Description: INTERVENTIONS:  - Assess and monitor for signs and symptoms of infection  - Monitor lab/diagnostic results  - Monitor all insertion sites, i.e. indwelling lines, tubes, and drains  - Monitor endotracheal if appropriate and nasal secretions for changes in amount and color  - [place] appropriate cooling/warming therapies per order  - Administer medications as ordered  - Instruct and encourage patient and family to use good hand hygiene technique  - Identify and instruct in appropriate isolation precautions for identified infection/condition  Outcome: Progressing  Goal: Absence of fever/infection during neutropenic period  Description: INTERVENTIONS:  - Monitor WBC    Outcome: Progressing     Problem: SAFETY ADULT  Goal: Patient will remain free of falls  Description: INTERVENTIONS:  - Educate patient/family on patient safety including physical limitations  - Instruct patient to call for assistance with activity   - Consult OT/PT to assist with strengthening/mobility   - Keep Call bell within reach  - Keep bed low and locked with side rails adjusted as appropriate  - Keep care items and personal belongings within reach  - Initiate and maintain comfort rounds  - Make Fall Risk Sign visible to staff  - Offer Toileting every  Hours,  in advance of need  - Initiate/Maintain alarm  - Obtain necessary fall risk management equipment:  - Apply yellow socks and bracelet for high fall risk patients  - Consider moving patient to room near nurses station  Outcome: Progressing  Goal: Maintain or return to baseline ADL function  Description: INTERVENTIONS:  -  Assess patient's ability to carry out ADLs; assess patient's baseline for ADL function and identify physical deficits which impact ability to perform ADLs (bathing, care of mouth/teeth, toileting, grooming, dressing, etc.)  - Assess/evaluate cause of self-care deficits   - Assess range of motion  - Assess patient's mobility; develop plan if impaired  - Assess patient's need for assistive devices and provide as appropriate  - Encourage maximum independence but intervene and supervise when necessary  - Involve family in performance of ADLs  - Assess for home care needs following discharge   - Consider OT consult to assist with ADL evaluation and planning for discharge  - Provide patient education as appropriate  Outcome: Progressing  Goal: Maintains/Returns to pre admission functional level  Description: INTERVENTIONS:  - Perform AM-PAC 6 Click Basic Mobility/ Daily Activity assessment daily.  - Set and communicate daily mobility goal to care team and patient/family/caregiver.   - Collaborate with rehabilitation services on mobility goals if consulted  - Perform Range of Motion  times a day.  - Reposition patient every  hours.  - Dangle patient  times a day  - Stand patient times a day  - Ambulate patient  times a day  - Out of bed to chair  times a day   - Out of bed for meals  times a day  - Out of bed for toileting  - Record patient progress and toleration of activity level   Outcome: Progressing     Problem: DISCHARGE PLANNING  Goal: Discharge to home or other facility with appropriate resources  Description: INTERVENTIONS:  - Identify barriers to discharge w/patient and caregiver  - Arrange for  needed discharge resources and transportation as appropriate  - Identify discharge learning needs (meds, wound care, etc.)  - Arrange for interpretive services to assist at discharge as needed  - Refer to Case Management Department for coordinating discharge planning if the patient needs post-hospital services based on physician/advanced practitioner order or complex needs related to functional status, cognitive ability, or social support system  Outcome: Progressing     Problem: Knowledge Deficit  Goal: Patient/family/caregiver demonstrates understanding of disease process, treatment plan, medications, and discharge instructions  Description: Complete learning assessment and assess knowledge base.  Interventions:  - Provide teaching at level of understanding  - Provide teaching via preferred learning methods  Outcome: Progressing

## 2023-12-24 NOTE — PROGRESS NOTES
NEPHROLOGY HOSPITAL PROGRESS NOTE   Cassidy Zhang 68 y.o. female MRN: [id number]  Unit/Bed#: W -01 Encounter: [id number]  Reason for Consult: Volume overload, hyponatremia    ASSESSMENT and PLAN:  68-year-old female with history of cirrhosis, hyponatremia admitted with increased lower extremity edema and ascites.  Nephrology is consulted for management of hyponatremia, metabolic acidosis and volume overload.    1.  Elevated creatinine on CKD.  Baseline creatinine appears to be around 0.7 until 10/2023.  Episode of PRETTY in 11/2023 with peak creatinine of 1.9 at discharge creatinine 1.02.  Currently admitted with creatinine 1.23, peak creatinine of 1.30 on 12/21.  Creatinine today 0.95.  Etiology most likely in setting of type II hepatorenal syndrome +/- hemodynamic changes in setting of ARB use +/- volume overload.  Kidney function is currently improved with administration of intravenous diuretics undercover of intravenous albumin.  Okay to transition to oral diuretics today.  Start oral Lasix 40 mg twice daily and spironolactone 100 mg daily.  Trend BMP.    2.  Hyponatremia.  Most likely in setting of volume overload in setting of cirrhosis.  Sodium is currently malaised to 138.  Continue diuretics as above.    3. Volume overload.  Status post 2.4 L paracentesis on 12/21.  Significant improvement in signs of volume overload and will continue IV Lasix undercover of IV albumin as above.  Okay to transition to oral agents as above.    4.  Metabolic acidosis.  Level improved to 91.  Continue sodium bicarbonate 1300 mg twice daily.    5.  Hypomagnesemia.  1.4.  Give IV magnesium 4 g x 1.    6.  Hypokalemia.  Serum potassium 3.3.  Give potassium chloride 40 M EQ x 1.    6.  Hypotension.  This is in setting of cirrhosis.  Blood pressure currently improved on midodrine 5 mg 3 times daily.  Try to maintain MAP more than 82.    6.  Decompensated liver cirrhosis.  Management per primary team and GI.    Discussed with  internal medicine team.  After discussion, we agreed that volume status is currently improved and to transition to oral furosemide and Aldactone.    SUBJECTIVE / 24H INTERVAL HISTORY:  Leg swelling and abdominal swelling improved significantly.  Denies dyspnea.      OBJECTIVE:  Current Weight: Weight - Scale: 63 kg (138 lb 14.2 oz)  Vitals:    12/23/23 1642 12/23/23 2216 12/23/23 2219 12/24/23 0600   BP: (!) 108/45 (!) 109/49 113/50    BP Location:  Right arm Right arm    Pulse: 76 81 80    Resp: 16 16 16    Temp: 98 °F (36.7 °C) 98.5 °F (36.9 °C) 98.5 °F (36.9 °C)    TempSrc: Oral Oral Oral    SpO2:  99% 99%    Weight:    63 kg (138 lb 14.2 oz)   Height:           Intake/Output Summary (Last 24 hours) at 12/24/2023 0643  Last data filed at 12/24/2023 0511  Gross per 24 hour   Intake 1310 ml   Output --   Net 1310 ml     Review of Systems   Constitutional:  Negative for chills and fever.   HENT:  Negative for ear pain and sore throat.    Eyes:  Negative for pain and visual disturbance.   Respiratory:  Negative for cough and shortness of breath.    Cardiovascular:  Negative for chest pain and palpitations.   Gastrointestinal:  Negative for abdominal pain and vomiting.   Genitourinary:  Negative for dysuria and hematuria.   Musculoskeletal:  Negative for arthralgias and back pain.   Skin:  Negative for color change and rash.   Neurological:  Negative for seizures and syncope.   All other systems reviewed and are negative.    Physical Exam  Vitals and nursing note reviewed.   Constitutional:       General: She is not in acute distress.     Appearance: She is well-developed.   HENT:      Head: Normocephalic and atraumatic.   Eyes:      Conjunctiva/sclera: Conjunctivae normal.   Cardiovascular:      Rate and Rhythm: Normal rate and regular rhythm.      Pulses: Normal pulses.      Heart sounds: Normal heart sounds. No murmur heard.  Pulmonary:      Effort: Pulmonary effort is normal. No respiratory distress.      Breath  sounds: Normal breath sounds.   Abdominal:      General: There is distension.      Palpations: Abdomen is soft.      Tenderness: There is no abdominal tenderness.   Musculoskeletal:         General: No swelling.      Cervical back: Neck supple.      Right lower leg: Edema present.      Left lower leg: Edema present.   Skin:     General: Skin is warm and dry.      Capillary Refill: Capillary refill takes less than 2 seconds.   Neurological:      Mental Status: She is alert.   Psychiatric:         Mood and Affect: Mood normal.       Medications:    Current Facility-Administered Medications:     acetaminophen (TYLENOL) tablet 650 mg, 650 mg, Oral, Q8H PRN, Terrell Mayberry MD, 650 mg at 12/21/23 0350    albumin human (FLEXBUMIN) 25 % injection 25 g, 25 g, Intravenous, BID (diuretic), AQUILES Pepper, 25 g at 12/23/23 1656    amoxicillin (AMOXIL) capsule 500 mg, 500 mg, Oral, Q6H, Terrell Mayberry MD, 500 mg at 12/24/23 0512    Diclofenac Sodium (VOLTAREN) 1 % topical gel 2 g, 2 g, Topical, Q6H PRN, Terrell Mayberry MD    ferrous sulfate tablet 325 mg, 325 mg, Oral, Daily With Breakfast, Terrell Mayberry MD, 325 mg at 12/23/23 0900    furosemide (LASIX) injection 20 mg, 20 mg, Intravenous, BID (diuretic), Chelsy Sorensen MD, 20 mg at 12/23/23 0859    heparin (porcine) subcutaneous injection 5,000 Units, 5,000 Units, Subcutaneous, Q8H HAJA, Terrell Mayberry MD, 5,000 Units at 12/24/23 0512    lactulose (CHRONULAC) oral solution 20 g, 20 g, Oral, BID, Terrell Mayberry MD, 20 g at 12/23/23 1703    midodrine (PROAMATINE) tablet 5 mg, 5 mg, Oral, TID AC, AQUILES Pepper, 5 mg at 12/23/23 1703    pancrelipase (Lip-Prot-Amyl) (CREON) delayed release capsule 24,000 Units, 24,000 Units, Oral, TID With Meals, Terrell Mayberry MD, 24,000 Units at 12/23/23 1704    pantoprazole (PROTONIX) injection 40 mg, 40 mg, Intravenous, Q12H Breonna SMYTH CRNP, 40 mg at 12/23/23 2123    sodium bicarbonate tablet 1,300 mg, 1,300  "mg, Oral, BID after meals, Chelsy Sorensen MD, 1,300 mg at 12/23/23 1703    trimethobenzamide (TIGAN) IM injection 200 mg, 200 mg, Intramuscular, Q6H PRN, Jodie Larose MD, 200 mg at 12/21/23 1249    Laboratory Results:  Results from last 7 days   Lab Units 12/24/23  0504 12/23/23 2010 12/23/23  0431 12/22/23  0929 12/22/23  0819 12/21/23  0725 12/20/23  1416   WBC Thousand/uL  --  3.91* 3.72*  --  5.04 4.88 5.28   HEMOGLOBIN g/dL  --  7.2* 6.1* 7.1* 6.8* 7.6* 8.1*   HEMATOCRIT %  --  21.1* 18.7*  --  20.2* 23.2* 24.5*   PLATELETS Thousands/uL  --  69* 75*  --  90* 76* 99*   POTASSIUM mmol/L 3.3*  --  3.6  --  3.0* 3.6 4.0   CHLORIDE mmol/L 112*  --  113*  --  110* 110* 112*   CO2 mmol/L 19*  --  18*  --  17* 13* 15*   BUN mg/dL 13  --  17  --  20 26* 24   CREATININE mg/dL 0.95  --  0.97  --  1.04 1.30 1.23   CALCIUM mg/dL 9.2  --  9.4  --  9.1 8.8 8.5   MAGNESIUM mg/dL 1.4*  --  1.3*  --   --  1.3*  --    PHOSPHORUS mg/dL  --   --   --   --   --  3.5  --        Portions of the record may have been created with voice recognition software. Occasional wrong word or \"sound a like\" substitutions may have occurred due to the inherent limitations of voice recognition software. Read the chart carefully and recognize, using context, where substitutions have occurred. If you have any questions, please contact the dictating provider.\    "

## 2023-12-24 NOTE — ASSESSMENT & PLAN NOTE
Pt and  report dental I&D for oral abscess day prior to presentation 12/20/23, started on amoxicillin 500 mg PO Q6H. Acute infection may also have played a role in knocking patient into liver decompensation.     Continue [id number] amoxicillin

## 2023-12-24 NOTE — ASSESSMENT & PLAN NOTE
History of SIADH, however suspect also a component of hypotonic hypervolemic hyponatremia in the setting of liver failure complicated by ascites and anasarca.     Plan:  U Na 122  Na normalized after diuretics  Hold home Na tablets, --starting NaHCO3 tabs for metabolic alkalosis  Nephrology appreciated       Patient Education        Leg Pain: Care Instructions  Your Care Instructions  Many things can cause leg pain. Too much exercise or overuse can cause a muscle cramp (or charley horse). You can get leg cramps from not eating a balanced diet that has enough potassium, calcium, and other minerals. If you do not drink enough fluids or are taking certain medicines, you may develop leg cramps. Other causes of leg pain include injuries, blood flow problems, nerve damage, and twisted and enlarged veins (varicose veins). You can usually ease pain with self-care. Your doctor may recommend that you rest your leg and keep it elevated. Follow-up care is a key part of your treatment and safety. Be sure to make and go to all appointments, and call your doctor if you are having problems. It's also a good idea to know your test results and keep a list of the medicines you take. How can you care for yourself at home? · Take pain medicines exactly as directed. ? If the doctor gave you a prescription medicine for pain, take it as prescribed. ? If you are not taking a prescription pain medicine, ask your doctor if you can take an over-the-counter medicine. · Take any other medicines exactly as prescribed. Call your doctor if you think you are having a problem with your medicine. · Rest your leg while you have pain, and avoid standing for long periods of time. · Prop up your leg at or above the level of your heart when possible. · Make sure you are eating a balanced diet that is rich in calcium, potassium, and magnesium, especially if you are pregnant. · If directed by your doctor, put ice or a cold pack on the area for 10 to 20 minutes at a time. Put a thin cloth between the ice and your skin. · Your leg may be in a splint, a brace, or an elastic bandage, and you may have crutches to help you walk. Follow your doctor's directions about how long to wear supports and how to use the crutches.   When should you call for help?  Call 911 anytime you think you may need emergency care. For example, call if:    · You have sudden chest pain and shortness of breath, or you cough up blood.     · Your leg is cool or pale or changes color.    Call your doctor now or seek immediate medical care if:    · You have increasing or severe pain.     · Your leg suddenly feels weak and you cannot move it.     · You have signs of a blood clot, such as:  ? Pain in your calf, back of the knee, thigh, or groin. ? Redness and swelling in your leg or groin.     · You have signs of infection, such as:  ? Increased pain, swelling, warmth, or redness. ? Red streaks leading from the sore area. ? Pus draining from a place on your leg. ? A fever.     · You cannot bear weight on your leg.    Watch closely for changes in your health, and be sure to contact your doctor if:    · You do not get better as expected. Where can you learn more? Go to http://elizabeth-stefan.info/. Enter D519 in the search box to learn more about \"Leg Pain: Care Instructions. \"  Current as of: September 23, 2018  Content Version: 11.9  © 4928-0854 [employer]. Care instructions adapted under license by [employer] (which disclaims liability or warranty for this information). If you have questions about a medical condition or this instruction, always ask your healthcare professional. [place] [address] any warranty or liability for your use of this information. Patient Education        Achilles Tendon Tear: Care Instructions  Your Care Instructions    You have ruptured or torn your Achilles tendon. The Achilles tendon (also called the heel cord) connects the calf muscles on the back of the lower leg to the bone at the base of the heel. Treatment for an Achilles tendon injury depends on whether the tendon has been partially torn or completely ruptured. A cast or splint can often treat a partial tear.  If your tendon has ruptured, you may need surgery. You and your orthopedic doctor will choose a treatment plan, so it is important to go to any follow-up appointments. Follow-up care is a key part of your treatment and safety. Be sure to make and go to all appointments, and call your doctor if you are having problems. It's also a good idea to know your test results and keep a list of the medicines you take. How can you care for yourself at home? · Prop up the sore foot on a pillow anytime you sit or lie down during the next 3 days. Try to keep it above the level of your heart. This will help reduce swelling. · Take pain medicines exactly as directed. ? If the doctor gave you a prescription medicine for pain, take it as prescribed. ? If you are not taking a prescription pain medicine, ask your doctor if you can take an over-the-counter medicine. · Do not put weight on the affected foot until your doctor says you can. Use crutches or a walker. · Wear the splint or cast as directed until your doctor says you can remove it. When should you call for help? Call 911 anytime you think you may need emergency care. For example, call if:    · You have chest pain, are short of breath, or you cough up blood.    Call your doctor now or seek immediate medical care if:    · You have new or worse pain.     · Your foot is cool or pale or changes color.     · You have tingling, weakness, or numbness in your toes.     · Your cast or splint feels too tight.     · You have signs of a blood clot in your leg (called a deep vein thrombosis), such as:  ? Pain in your calf, back of the knee, thigh, or groin. ? Redness or swelling in your leg.    Watch closely for changes in your health, and be sure to contact your doctor if:    · You have a problem with your splint or cast.     · You do not get better as expected. Where can you learn more? Go to http://elizabeth-stefan.info/.   Enter F495 in the search box to learn more about \"Achilles Tendon Tear: Care Instructions. \"  Current as of: September 20, 2018  Content Version: 11.9  © 8482-4553 [employer]. Care instructions adapted under license by [email or web address] (which disclaims liability or warranty for this information). If you have questions about a medical condition or this instruction, always ask your healthcare professional. [place] [address] any warranty or liability for your use of this information.

## 2023-12-24 NOTE — PLAN OF CARE
Problem: PAIN - ADULT  Goal: Verbalizes/displays adequate comfort level or baseline comfort level  Description: Interventions:  - Encourage patient to monitor pain and request assistance  - Assess pain using appropriate pain scale  - Administer analgesics based on type and severity of pain and evaluate response  - Implement non-pharmacological measures as appropriate and evaluate response  - Consider cultural and social influences on pain and pain management  - Notify physician/advanced practitioner if interventions unsuccessful or patient reports new pain  Outcome: Progressing     Problem: INFECTION - ADULT  Goal: Absence or prevention of progression during hospitalization  Description: INTERVENTIONS:  - Assess and monitor for signs and symptoms of infection  - Monitor lab/diagnostic results  - Monitor all insertion sites, i.e. indwelling lines, tubes, and drains  - Monitor endotracheal if appropriate and nasal secretions for changes in amount and color  - Nocatee appropriate cooling/warming therapies per order  - Administer medications as ordered  - Instruct and encourage patient and family to use good hand hygiene technique  - Identify and instruct in appropriate isolation precautions for identified infection/condition  Outcome: Progressing  Goal: Absence of fever/infection during neutropenic period  Description: INTERVENTIONS:  - Monitor WBC    Outcome: Progressing     Problem: SAFETY ADULT  Goal: Patient will remain free of falls  Description: INTERVENTIONS:  - Educate patient/family on patient safety including physical limitations  - Instruct patient to call for assistance with activity   - Consult OT/PT to assist with strengthening/mobility   - Keep Call bell within reach  - Keep bed low and locked with side rails adjusted as appropriate  - Keep care items and personal belongings within reach  - Initiate and maintain comfort rounds  - Make Fall Risk Sign visible to staff  - Offer Toileting every  Hours,  in advance of need  - Initiate/Maintain alarm  - Obtain necessary fall risk management equipment:  - Apply yellow socks and bracelet for high fall risk patients  - Consider moving patient to room near nurses station  Outcome: Progressing  Goal: Maintain or return to baseline ADL function  Description: INTERVENTIONS:  -  Assess patient's ability to carry out ADLs; assess patient's baseline for ADL function and identify physical deficits which impact ability to perform ADLs (bathing, care of mouth/teeth, toileting, grooming, dressing, etc.)  - Assess/evaluate cause of self-care deficits   - Assess range of motion  - Assess patient's mobility; develop plan if impaired  - Assess patient's need for assistive devices and provide as appropriate  - Encourage maximum independence but intervene and supervise when necessary  - Involve family in performance of ADLs  - Assess for home care needs following discharge   - Consider OT consult to assist with ADL evaluation and planning for discharge  - Provide patient education as appropriate  Outcome: Progressing  Goal: Maintains/Returns to pre admission functional level  Description: INTERVENTIONS:  - Perform AM-PAC 6 Click Basic Mobility/ Daily Activity assessment daily.  - Set and communicate daily mobility goal to care team and patient/family/caregiver.   - Collaborate with rehabilitation services on mobility goals if consulted  - Perform Range of Motion  times a day.  - Reposition patient every  hours.  - Dangle patient  times a day  - Stand patient times a day  - Ambulate patient  times a day  - Out of bed to chair  times a day   - Out of bed for meals  times a day  - Out of bed for toileting  - Record patient progress and toleration of activity level   Outcome: Progressing     Problem: DISCHARGE PLANNING  Goal: Discharge to home or other facility with appropriate resources  Description: INTERVENTIONS:  - Identify barriers to discharge w/patient and caregiver  - Arrange for  needed discharge resources and transportation as appropriate  - Identify discharge learning needs (meds, wound care, etc.)  - Arrange for interpretive services to assist at discharge as needed  - Refer to Case Management Department for coordinating discharge planning if the patient needs post-hospital services based on physician/advanced practitioner order or complex needs related to functional status, cognitive ability, or social support system  Outcome: Progressing     Problem: Knowledge Deficit  Goal: Patient/family/caregiver demonstrates understanding of disease process, treatment plan, medications, and discharge instructions  Description: Complete learning assessment and assess knowledge base.  Interventions:  - Provide teaching at level of understanding  - Provide teaching via preferred learning methods  Outcome: Progressing     Problem: Prexisting or High Potential for Compromised Skin Integrity  Goal: Skin integrity is maintained or improved  Description: INTERVENTIONS:  - Identify patients at risk for skin breakdown  - Assess and monitor skin integrity  - Assess and monitor nutrition and hydration status  - Monitor labs   - Assess for incontinence   - Turn and reposition patient  - Assist with mobility/ambulation  - Relieve pressure over bony prominences  - Avoid friction and shearing  - Provide appropriate hygiene as needed including keeping skin clean and dry  - Evaluate need for skin moisturizer/barrier cream  - Collaborate with interdisciplinary team   - Patient/family teaching  - Consider wound care consult   Outcome: Progressing

## 2023-12-24 NOTE — PROGRESS NOTES
[facility]  Progress Note  Name: Cassidy Zhnag I  MRN: [id number]  Unit/Bed#: ALBARO -01 I Date of Admission: 12/20/2023   Date of Service: 12/24/2023 I Hospital Day: 4    Assessment/Plan   * Decompensated liver disease (HCC)  Assessment & Plan  Presents for ascites and anasarca, found also to have mild hyponatremia, hypoalbuminemia, hyperbilirubinemia, hypocoagulability, anemia, and thrombocytopenia. Ammonia wnl.  No asterixis on exam, pt A&O x3, maybe some mild confusion (delayed when answering questions, but appropriate), though pt at or near her baseline per spouse.  Reports holding lactulose for last 2-3 days for frequent BMs at home (>5 loose BM per day) - now on lactulose bid  Recently discontinued lasix and spironolactone in the o/p setting d/t worsening renal function and hyponatremia - now on PO Lasix and Aldactone  MELD score 23 on presentation  Last drink 5-6 years ago    MELD 3.0: 21 at 12/24/2023  5:04 AM  MELD-Na: 21 at 12/24/2023  5:04 AM  Calculated from:  Serum Creatinine: 0.95 mg/dL (Using min of 1 mg/dL) at 12/24/2023  5:04 AM  Serum Sodium: 138 mmol/L (Using max of 137 mmol/L) at 12/24/2023  5:04 AM  Total Bilirubin: 3.72 mg/dL at 12/24/2023  5:04 AM  Serum Albumin: 3.1 g/dL at 12/24/2023  5:04 AM  INR(ratio): 2.25 at 12/24/2023  5:04 AM  Age at listing (hypothetical): 68 years  Sex: Female at 12/24/2023  5:04 AM        Plan:  IV Lasix/Albumin -> PO Lasix and Aldactone  Diet: High protein, low salt, fluid restrict 1500 cc/day  Resume lactulose 20 mg BID and titrate to target 3-5 loose BM per day  Monitor MELD labs  Monitor Hgb and transfuse for Hgb<7  GI and Nephro appreicated  IR paracentesis drained 2.4L fluid - labs unremarkabl  Tigan for nausea   Peth negative   potential liver transplant evaluation  May need recurrent paracentesis as on outpatient     Chronic hyponatremia  Assessment & Plan  History of SIADH, however suspect also a component of hypotonic  hypervolemic hyponatremia in the setting of liver failure complicated by ascites and anasarca.     Plan:  U Na 122  Na normalized after diuretics  Hold home Na tablets, --starting NaHCO3 tabs for metabolic alkalosis  Nephrology appreciated        Coagulopathy (HCC)  Assessment & Plan  2/2 cirrhosis  Today give 2U FFP to reverse coagulopathy - check INR after transfusion.  If INR > 1.7 and PRBC needed, would give more FFP    Acute blood loss anemia  Assessment & Plan  Pt so far received 2U PRBC  Today pt w/ 4 oz BRBPR so ordered 1U PRBC - recheck Hgb after transfusion.  In setting of ABLA, keep Hgb at 7.5  If pt has another episode of BRBPR, would do CT lower GIB.    Clears diet    Status post incision and drainage  Assessment & Plan  Pt and  report dental I&D for oral abscess day prior to presentation 12/20/23, started on amoxicillin 500 mg PO Q6H. Acute infection may also have played a role in knocking patient into liver decompensation.     Continue [id number] amoxicillin    Metabolic acidosis  Assessment & Plan    Nephrology on board- currently giving 1300 BID of bicarbonate    CKD (chronic kidney disease) stage 3, GFR 30-59 ml/min ([facility])  Assessment & Plan  Estimated Creatinine Clearance: 48.9 mL/min (by C-G formula based on SCr of 0.95 mg/dL).  No PRETTY this admission.  Cr near new baseline of 1.0-1.1  In the setting of type II hepatorenal syndrome.  - Midodrine 5mg TID to enhance renal perfusion  Cr stable after diuresis      Chronic pancreatitis (HCC)  Assessment & Plan  2/2 EtOH abuse, now in remission.     Continue home pancrealipase    Ascites/anasarca  Assessment & Plan  2/2 end stage liver disease, as noted above    Plan:  Diuresis, fluid restriction, dietary salt restriction as above   Nephrology and GI consults, as above  Paracentesis drained 2.4L    Essential hypertension  Assessment & Plan  BP now low due to cirrhosis.  Hold Norvasc and ARB as pt on diuretics and needing midodrine                VTE Pharmacologic Prophylaxis: VTE Score: 7 High Risk (Score >/= 5) - Pharmacological DVT Prophylaxis Contraindicated. Sequential Compression Devices Ordered.    Mobility:   Basic Mobility Inpatient Raw Score: 18  JH-HLM Goal: 6: Walk 10 steps or more  JH-HLM Achieved: 6: Walk 10 steps or more  HLM Goal achieved. Continue to encourage appropriate mobility.    Patient Centered Rounds: I performed bedside rounds with nursing staff today.   Discussions with Specialists or Other Care Team Provider: GI    Education and Discussions with Family / Patient: Updated  ( and other family member) at bedside.      Current Length of Stay: 4 day(s)  Current Patient Status: Inpatient   Certification Statement: The patient will continue to require additional inpatient hospital stay due to ABLA  Discharge Plan: Anticipate discharge in >72 hrs to home.    Code Status: Level 1 - Full Code    Subjective:   BRBPR    Objective:     Vitals:   Temp (24hrs), Av.3 °F (36.8 °C), Min:98 °F (36.7 °C), Max:98.6 °F (37 °C)    Temp:  [98 °F (36.7 °C)-98.6 °F (37 °C)] 98.6 °F (37 °C)  HR:  [71-86] 71  Resp:  [16-18] 18  BP: (104-116)/(45-50) 104/48  SpO2:  [97 %-99 %] 97 %  Body mass index is 23.84 kg/m².     Input and Output Summary (last 24 hours):     Intake/Output Summary (Last 24 hours) at 2023 1418  Last data filed at 2023 0511  Gross per 24 hour   Intake 1310 ml   Output --   Net 1310 ml       Physical Exam:   Physical Exam  HENT:      Head: Normocephalic and atraumatic.      Nose: Nose normal.      Mouth/Throat:      Mouth: Mucous membranes are moist.   Eyes:      Extraocular Movements: Extraocular movements intact.      Conjunctiva/sclera: Conjunctivae normal.   Cardiovascular:      Rate and Rhythm: Normal rate and regular rhythm.   Pulmonary:      Effort: Pulmonary effort is normal.      Breath sounds: Normal breath sounds.   Abdominal:      General: Bowel sounds are normal. There is no  distension.      Palpations: Abdomen is soft.      Tenderness: There is no abdominal tenderness.   Musculoskeletal:         General: Normal range of motion.      Cervical back: Normal range of motion and neck supple.      Right lower leg: No edema.      Left lower leg: No edema.   Skin:     General: Skin is warm and dry.   Neurological:      Mental Status: She is alert and oriented to person, place, and time.          Additional Data:     Labs:  Results from last 7 days   Lab Units 12/24/23  0504   WBC Thousand/uL 3.74*   HEMOGLOBIN g/dL 7.1*   HEMATOCRIT % 20.1*   PLATELETS Thousands/uL 77*   NEUTROS PCT % 40*   LYMPHS PCT % 47*   MONOS PCT % 8   EOS PCT % 4     Results from last 7 days   Lab Units 12/24/23  0504   SODIUM mmol/L 138   POTASSIUM mmol/L 3.3*   CHLORIDE mmol/L 112*   CO2 mmol/L 19*   BUN mg/dL 13   CREATININE mg/dL 0.95   ANION GAP mmol/L 7   CALCIUM mg/dL 9.2   ALBUMIN g/dL 3.1*   TOTAL BILIRUBIN mg/dL 3.72*   ALK PHOS U/L 70   ALT U/L 16   AST U/L 32   GLUCOSE RANDOM mg/dL 122     Results from last 7 days   Lab Units 12/24/23  0504   INR  2.25*                   Lines/Drains:  Invasive Devices       Peripheral Intravenous Line  Duration             Peripheral IV 12/20/23 Left;Upper Arm 4 days    Peripheral IV 12/23/23 Distal;Right;Ventral (anterior) Forearm 1 day                          Imaging: No pertinent imaging reviewed.    Recent Cultures (last 7 days):   Results from last 7 days   Lab Units 12/21/23  1637   GRAM STAIN RESULT  No Polys or Bacteria seen   BODY FLUID CULTURE, STERILE  No growth       Last 24 Hours Medication List:   Current Facility-Administered Medications   Medication Dose Route Frequency Provider Last Rate    acetaminophen  650 mg Oral Q8H PRN Trerell Mayberry MD      amoxicillin  500 mg Oral Q6H Terrell Mayberry MD      Diclofenac Sodium  2 g Topical Q6H PRN Terrell Mayberry MD      ferrous sulfate  325 mg Oral Daily With Breakfast Terrell Mayberry MD      furosemide  40 mg Oral  BID (diuretic) Rob Andre MD      lactulose  20 g Oral BID Terrell Mayberry MD      magnesium sulfate  4 g Intravenous Once Rob Andre MD 4 g (12/24/23 1204)    midodrine  5 mg Oral TID AC AQUILES Pepper      pancrelipase (Lip-Prot-Amyl)  24,000 Units Oral TID With Meals Terrell Mayberry MD      pantoprazole  40 mg Intravenous Q12H [facility] AQUILES Fitzpatrick      sodium bicarbonate  1,300 mg Oral BID after meals Chelsy Sorensen MD      spironolactone  100 mg Oral Daily Rob Andre MD      trimethobenzamide  200 mg Intramuscular Q6H PRN Jodie Larose MD          Today, Patient Was Seen By: Royal Navarrete DO    **Please Note: This note may have been constructed using a voice recognition system.**

## 2023-12-24 NOTE — ASSESSMENT & PLAN NOTE
Estimated Creatinine Clearance: 48.9 mL/min (by C-G formula based on SCr of 0.95 mg/dL).  No PRETTY this admission.  Cr near new baseline of 1.0-1.1  In the setting of type II hepatorenal syndrome.  - Midodrine 5mg TID to enhance renal perfusion  Cr stable after diuresis

## 2023-12-24 NOTE — ASSESSMENT & PLAN NOTE
Presents for ascites and anasarca, found also to have mild hyponatremia, hypoalbuminemia, hyperbilirubinemia, hypocoagulability, anemia, and thrombocytopenia. Ammonia wnl.  No asterixis on exam, pt A&O x3, maybe some mild confusion (delayed when answering questions, but appropriate), though pt at or near her baseline per spouse.  Reports holding lactulose for last 2-3 days for frequent BMs at home (>5 loose BM per day) - now on lactulose bid  Recently discontinued lasix and spironolactone in the o/p setting d/t worsening renal function and hyponatremia - now on PO Lasix and Aldactone  MELD score 23 on presentation  Last drink 5-6 years ago    MELD 3.0: 21 at 12/24/2023  5:04 AM  MELD-Na: 21 at 12/24/2023  5:04 AM  Calculated from:  Serum Creatinine: 0.95 mg/dL (Using min of 1 mg/dL) at 12/24/2023  5:04 AM  Serum Sodium: 138 mmol/L (Using max of 137 mmol/L) at 12/24/2023  5:04 AM  Total Bilirubin: 3.72 mg/dL at 12/24/2023  5:04 AM  Serum Albumin: 3.1 g/dL at 12/24/2023  5:04 AM  INR(ratio): 2.25 at 12/24/2023  5:04 AM  Age at listing (hypothetical): 68 years  Sex: Female at 12/24/2023  5:04 AM        Plan:  IV Lasix/Albumin -> PO Lasix and Aldactone  Diet: High protein, low salt, fluid restrict 1500 cc/day  Resume lactulose 20 mg BID and titrate to target 3-5 loose BM per day  Monitor MELD labs  Monitor Hgb and transfuse for Hgb<7  GI and Nephro appreicated  IR paracentesis drained 2.4L fluid - labs unremarkabl  Tigan for nausea   Peth negative   potential liver transplant evaluation  May need recurrent paracentesis as on outpatient

## 2023-12-24 NOTE — ASSESSMENT & PLAN NOTE
Pt so far received 2U PRBC  Today pt w/ 4 oz BRBPR so ordered 1U PRBC - recheck Hgb after transfusion

## 2023-12-25 LAB
ABO GROUP BLD BPU: NORMAL
ALBUMIN SERPL BCP-MCNC: 2.9 G/DL (ref 3.5–5)
ALP SERPL-CCNC: 76 U/L (ref 34–104)
ALT SERPL W P-5'-P-CCNC: 16 U/L (ref 7–52)
ANION GAP SERPL CALCULATED.3IONS-SCNC: 6 MMOL/L
AST SERPL W P-5'-P-CCNC: 28 U/L (ref 13–39)
ATRIAL RATE: 67 BPM
BASE EX.OXY STD BLDV CALC-SCNC: 96.1 % (ref 60–80)
BASE EXCESS BLDV CALC-SCNC: -2.9 MMOL/L
BASOPHILS # BLD AUTO: 0.02 THOUSANDS/ÂΜL (ref 0–0.1)
BASOPHILS NFR BLD AUTO: 0 % (ref 0–1)
BILIRUB SERPL-MCNC: 4.58 MG/DL (ref 0.2–1)
BPU ID: NORMAL
BUN SERPL-MCNC: 13 MG/DL (ref 5–25)
CALCIUM ALBUM COR SERPL-MCNC: 10.1 MG/DL (ref 8.3–10.1)
CALCIUM SERPL-MCNC: 9.2 MG/DL (ref 8.4–10.2)
CHLORIDE SERPL-SCNC: 108 MMOL/L (ref 96–108)
CO2 SERPL-SCNC: 20 MMOL/L (ref 21–32)
CREAT SERPL-MCNC: 0.97 MG/DL (ref 0.6–1.3)
CROSSMATCH: NORMAL
EOSINOPHIL # BLD AUTO: 0.16 THOUSAND/ÂΜL (ref 0–0.61)
EOSINOPHIL NFR BLD AUTO: 3 % (ref 0–6)
ERYTHROCYTE [DISTWIDTH] IN BLOOD BY AUTOMATED COUNT: 17.5 % (ref 11.6–15.1)
GFR SERPL CREATININE-BSD FRML MDRD: 60 ML/MIN/1.73SQ M
GLUCOSE SERPL-MCNC: 131 MG/DL (ref 65–140)
HCO3 BLDV-SCNC: 21.1 MMOL/L (ref 24–30)
HCT VFR BLD AUTO: 22.9 % (ref 34.8–46.1)
HGB BLD-MCNC: 8 G/DL (ref 11.5–15.4)
IMM GRANULOCYTES # BLD AUTO: 0 THOUSAND/UL (ref 0–0.2)
IMM GRANULOCYTES NFR BLD AUTO: 0 % (ref 0–2)
INR PPP: 1.69 (ref 0.84–1.19)
INR PPP: 1.7 (ref 0.84–1.19)
LYMPHOCYTES # BLD AUTO: 1.95 THOUSANDS/ÂΜL (ref 0.6–4.47)
LYMPHOCYTES NFR BLD AUTO: 41 % (ref 14–44)
MAGNESIUM SERPL-MCNC: 1.8 MG/DL (ref 1.9–2.7)
MCH RBC QN AUTO: 33.3 PG (ref 26.8–34.3)
MCHC RBC AUTO-ENTMCNC: 34.9 G/DL (ref 31.4–37.4)
MCV RBC AUTO: 95 FL (ref 82–98)
MONOCYTES # BLD AUTO: 0.48 THOUSAND/ÂΜL (ref 0.17–1.22)
MONOCYTES NFR BLD AUTO: 10 % (ref 4–12)
NEUTROPHILS # BLD AUTO: 2.1 THOUSANDS/ÂΜL (ref 1.85–7.62)
NEUTS SEG NFR BLD AUTO: 46 % (ref 43–75)
NRBC BLD AUTO-RTO: 0 /100 WBCS
O2 CT BLDV-SCNC: 12 ML/DL
P AXIS: 67 DEGREES
PCO2 BLDV: 33.4 MM HG (ref 42–50)
PH BLDV: 7.42 [PH] (ref 7.3–7.4)
PHOSPHATE SERPL-MCNC: 2.4 MG/DL (ref 2.3–4.1)
PLATELET # BLD AUTO: 65 THOUSANDS/UL (ref 149–390)
PMV BLD AUTO: 10.8 FL (ref 8.9–12.7)
PO2 BLDV: 113.5 MM HG (ref 35–45)
POTASSIUM SERPL-SCNC: 3.4 MMOL/L (ref 3.5–5.3)
PR INTERVAL: 140 MS
PROT SERPL-MCNC: 5.5 G/DL (ref 6.4–8.4)
PROTHROMBIN TIME: 20.6 SECONDS (ref 11.6–14.5)
PROTHROMBIN TIME: 20.8 SECONDS (ref 11.6–14.5)
QRS AXIS: 52 DEGREES
QRSD INTERVAL: 80 MS
QT INTERVAL: 448 MS
QTC INTERVAL: 473 MS
RBC # BLD AUTO: 2.4 MILLION/UL (ref 3.81–5.12)
SODIUM SERPL-SCNC: 134 MMOL/L (ref 135–147)
T WAVE AXIS: 80 DEGREES
UNIT DISPENSE STATUS: NORMAL
UNIT PRODUCT CODE: NORMAL
UNIT PRODUCT VOLUME: 280 ML
UNIT PRODUCT VOLUME: 280 ML
UNIT PRODUCT VOLUME: 350 ML
UNIT RH: NORMAL
VENTRICULAR RATE: 67 BPM
WBC # BLD AUTO: 4.71 THOUSAND/UL (ref 4.31–10.16)

## 2023-12-25 PROCEDURE — 99232 SBSQ HOSP IP/OBS MODERATE 35: CPT | Performed by: STUDENT IN AN ORGANIZED HEALTH CARE EDUCATION/TRAINING PROGRAM

## 2023-12-25 PROCEDURE — 80053 COMPREHEN METABOLIC PANEL: CPT | Performed by: GENERAL PRACTICE

## 2023-12-25 PROCEDURE — 83735 ASSAY OF MAGNESIUM: CPT | Performed by: GENERAL PRACTICE

## 2023-12-25 PROCEDURE — 84100 ASSAY OF PHOSPHORUS: CPT | Performed by: GENERAL PRACTICE

## 2023-12-25 PROCEDURE — 85610 PROTHROMBIN TIME: CPT | Performed by: GENERAL PRACTICE

## 2023-12-25 PROCEDURE — C9113 INJ PANTOPRAZOLE SODIUM, VIA: HCPCS | Performed by: NURSE PRACTITIONER

## 2023-12-25 PROCEDURE — 99232 SBSQ HOSP IP/OBS MODERATE 35: CPT | Performed by: GENERAL PRACTICE

## 2023-12-25 PROCEDURE — 99232 SBSQ HOSP IP/OBS MODERATE 35: CPT | Performed by: NURSE PRACTITIONER

## 2023-12-25 PROCEDURE — 85025 COMPLETE CBC W/AUTO DIFF WBC: CPT | Performed by: GENERAL PRACTICE

## 2023-12-25 PROCEDURE — 82805 BLOOD GASES W/O2 SATURATION: CPT | Performed by: GENERAL PRACTICE

## 2023-12-25 RX ADMIN — AMOXICILLIN 500 MG: 250 CAPSULE ORAL at 05:02

## 2023-12-25 RX ADMIN — AMOXICILLIN 500 MG: 250 CAPSULE ORAL at 12:05

## 2023-12-25 RX ADMIN — AMOXICILLIN 500 MG: 250 CAPSULE ORAL at 18:08

## 2023-12-25 RX ADMIN — MIDODRINE HYDROCHLORIDE 5 MG: 5 TABLET ORAL at 09:34

## 2023-12-25 RX ADMIN — PANCRELIPASE 24000 UNITS: 24000; 76000; 120000 CAPSULE, DELAYED RELEASE PELLETS ORAL at 12:05

## 2023-12-25 RX ADMIN — LACTULOSE 20 G: 20 SOLUTION ORAL at 09:17

## 2023-12-25 RX ADMIN — LACTULOSE 20 G: 20 SOLUTION ORAL at 18:08

## 2023-12-25 RX ADMIN — FUROSEMIDE 40 MG: 40 TABLET ORAL at 16:08

## 2023-12-25 RX ADMIN — PANCRELIPASE 24000 UNITS: 24000; 76000; 120000 CAPSULE, DELAYED RELEASE PELLETS ORAL at 16:08

## 2023-12-25 RX ADMIN — SODIUM BICARBONATE 650 MG TABLET 1300 MG: at 09:17

## 2023-12-25 RX ADMIN — FUROSEMIDE 40 MG: 40 TABLET ORAL at 09:17

## 2023-12-25 RX ADMIN — MIDODRINE HYDROCHLORIDE 5 MG: 5 TABLET ORAL at 16:08

## 2023-12-25 RX ADMIN — AMOXICILLIN 500 MG: 250 CAPSULE ORAL at 23:25

## 2023-12-25 RX ADMIN — FERROUS SULFATE TAB 325 MG (65 MG ELEMENTAL FE) 325 MG: 325 (65 FE) TAB at 09:34

## 2023-12-25 RX ADMIN — MIDODRINE HYDROCHLORIDE 5 MG: 5 TABLET ORAL at 12:05

## 2023-12-25 RX ADMIN — SODIUM BICARBONATE 650 MG TABLET 1300 MG: at 18:08

## 2023-12-25 RX ADMIN — SPIRONOLACTONE 100 MG: 100 TABLET ORAL at 09:17

## 2023-12-25 RX ADMIN — PANCRELIPASE 24000 UNITS: 24000; 76000; 120000 CAPSULE, DELAYED RELEASE PELLETS ORAL at 09:17

## 2023-12-25 RX ADMIN — PANTOPRAZOLE SODIUM 40 MG: 40 INJECTION, POWDER, FOR SOLUTION INTRAVENOUS at 09:50

## 2023-12-25 RX ADMIN — PANTOPRAZOLE SODIUM 40 MG: 40 INJECTION, POWDER, FOR SOLUTION INTRAVENOUS at 23:25

## 2023-12-25 NOTE — ASSESSMENT & PLAN NOTE
History of SIADH, however suspect also a component of hypotonic hypervolemic hyponatremia in the setting of liver failure complicated by ascites and anasarca.     Plan:  U Na 122  Na normalized after diuretics  Hold home Na tablets, --starting NaHCO3 tabs for metabolic alkalosis  Nephrology appreciated

## 2023-12-25 NOTE — ASSESSMENT & PLAN NOTE
Pt and  report dental I&D for oral abscess day prior to presentation 12/20/23, started on amoxicillin 500 mg PO Q6H. Acute infection may also have played a role in knocking patient into liver decompensation.     Continue [id number] amoxicillin

## 2023-12-25 NOTE — PLAN OF CARE
Problem: PAIN - ADULT  Goal: Verbalizes/displays adequate comfort level or baseline comfort level  Description: Interventions:  - Encourage patient to monitor pain and request assistance  - Assess pain using appropriate pain scale  - Administer analgesics based on type and severity of pain and evaluate response  - Implement non-pharmacological measures as appropriate and evaluate response  - Consider cultural and social influences on pain and pain management  - Notify physician/advanced practitioner if interventions unsuccessful or patient reports new pain  Outcome: Progressing     Problem: INFECTION - ADULT  Goal: Absence or prevention of progression during hospitalization  Description: INTERVENTIONS:  - Assess and monitor for signs and symptoms of infection  - Monitor lab/diagnostic results  - Monitor all insertion sites, i.e. indwelling lines, tubes, and drains  - Monitor endotracheal if appropriate and nasal secretions for changes in amount and color  - [place] appropriate cooling/warming therapies per order  - Administer medications as ordered  - Instruct and encourage patient and family to use good hand hygiene technique  - Identify and instruct in appropriate isolation precautions for identified infection/condition  Outcome: Progressing  Goal: Absence of fever/infection during neutropenic period  Description: INTERVENTIONS:  - Monitor WBC    Outcome: Progressing     Problem: SAFETY ADULT  Goal: Patient will remain free of falls  Description: INTERVENTIONS:  - Educate patient/family on patient safety including physical limitations  - Instruct patient to call for assistance with activity   - Consult OT/PT to assist with strengthening/mobility   - Keep Call bell within reach  - Keep bed low and locked with side rails adjusted as appropriate  - Keep care items and personal belongings within reach  - Initiate and maintain comfort rounds  - Make Fall Risk Sign visible to staff  - Offer Toileting every  Hours,  in advance of need  - Initiate/Maintain alarm  - Obtain necessary fall risk management equipment:  - Apply yellow socks and bracelet for high fall risk patients  - Consider moving patient to room near nurses station  Outcome: Progressing  Goal: Maintain or return to baseline ADL function  Description: INTERVENTIONS:  -  Assess patient's ability to carry out ADLs; assess patient's baseline for ADL function and identify physical deficits which impact ability to perform ADLs (bathing, care of mouth/teeth, toileting, grooming, dressing, etc.)  - Assess/evaluate cause of self-care deficits   - Assess range of motion  - Assess patient's mobility; develop plan if impaired  - Assess patient's need for assistive devices and provide as appropriate  - Encourage maximum independence but intervene and supervise when necessary  - Involve family in performance of ADLs  - Assess for home care needs following discharge   - Consider OT consult to assist with ADL evaluation and planning for discharge  - Provide patient education as appropriate  Outcome: Progressing  Goal: Maintains/Returns to pre admission functional level  Description: INTERVENTIONS:  - Perform AM-PAC 6 Click Basic Mobility/ Daily Activity assessment daily.  - Set and communicate daily mobility goal to care team and patient/family/caregiver.   - Collaborate with rehabilitation services on mobility goals if consulted  - Perform Range of Motion  times a day.  - Reposition patient every  hours.  - Dangle patient  times a day  - Stand patient times a day  - Ambulate patient  times a day  - Out of bed to chair  times a day   - Out of bed for meals  times a day  - Out of bed for toileting  - Record patient progress and toleration of activity level   Outcome: Progressing     Problem: DISCHARGE PLANNING  Goal: Discharge to home or other facility with appropriate resources  Description: INTERVENTIONS:  - Identify barriers to discharge w/patient and caregiver  - Arrange for  needed discharge resources and transportation as appropriate  - Identify discharge learning needs (meds, wound care, etc.)  - Arrange for interpretive services to assist at discharge as needed  - Refer to Case Management Department for coordinating discharge planning if the patient needs post-hospital services based on physician/advanced practitioner order or complex needs related to functional status, cognitive ability, or social support system  Outcome: Progressing     Problem: Knowledge Deficit  Goal: Patient/family/caregiver demonstrates understanding of disease process, treatment plan, medications, and discharge instructions  Description: Complete learning assessment and assess knowledge base.  Interventions:  - Provide teaching at level of understanding  - Provide teaching via preferred learning methods  Outcome: Progressing     Problem: Prexisting or High Potential for Compromised Skin Integrity  Goal: Skin integrity is maintained or improved  Description: INTERVENTIONS:  - Identify patients at risk for skin breakdown  - Assess and monitor skin integrity  - Assess and monitor nutrition and hydration status  - Monitor labs   - Assess for incontinence   - Turn and reposition patient  - Assist with mobility/ambulation  - Relieve pressure over bony prominences  - Avoid friction and shearing  - Provide appropriate hygiene as needed including keeping skin clean and dry  - Evaluate need for skin moisturizer/barrier cream  - Collaborate with interdisciplinary team   - Patient/family teaching  - Consider wound care consult   Outcome: Progressing

## 2023-12-25 NOTE — ASSESSMENT & PLAN NOTE
Presents for ascites and anasarca, found also to have mild hyponatremia, hypoalbuminemia, hyperbilirubinemia, hypocoagulability, anemia, and thrombocytopenia. Ammonia wnl.  No asterixis on exam, pt A&O x3, maybe some mild confusion (delayed when answering questions, but appropriate), though pt at or near her baseline per spouse.  Reports holding lactulose for last 2-3 days for frequent BMs at home (>5 loose BM per day) - now on lactulose bid  Recently discontinued lasix and spironolactone in the o/p setting d/t worsening renal function and hyponatremia - now on PO Lasix and Aldactone  MELD score 23 on presentation  Last drink 5-6 years ago    MELD 3.0: 22 at 12/25/2023  5:05 AM  MELD-Na: 20 at 12/25/2023  5:05 AM  Calculated from:  Serum Creatinine: 0.97 mg/dL (Using min of 1 mg/dL) at 12/25/2023  2:59 AM  Serum Sodium: 134 mmol/L at 12/25/2023  2:59 AM  Total Bilirubin: 4.58 mg/dL at 12/25/2023  2:59 AM  Serum Albumin: 2.9 g/dL at 12/25/2023  2:59 AM  INR(ratio): 1.69 at 12/25/2023  5:05 AM  Age at listing (hypothetical): 68 years  Sex: Female at 12/25/2023  5:05 AM        Plan:  IV Lasix/Albumin -> PO Lasix and Aldactone  Diet: High protein, low salt, fluid restrict 1500 cc/day  Resume lactulose 20 mg BID and titrate to target 3-5 loose BM per day  Monitor MELD labs  Monitor Hgb and transfuse for Hgb<7  GI and Nephro appreicated  IR paracentesis drained 2.4L fluid - labs unremarkabl  Tigan for nausea   Peth negative   potential liver transplant evaluation  May need recurrent paracentesis as on outpatient

## 2023-12-25 NOTE — PROGRESS NOTES
Progress Note- Cassidy Zhang 68 y.o. female MRN: [id number]    Unit/Bed#: W -01 Encounter: [id number]      Assessment and Plan:    68-year-old female with history of EtOH cirrhosis decompensated by hepatic encephalopathy and ascites who presented for worsening ascites and anasarca.  She had recently decompensation earlier this year despite 5 to 6 years of abstinence with negative PEth in November. She has established care with hepatologist Dr. Sorensen, however diuresis has been limited due to renal dysfunction and hyponatremia. She also stopped taking her lactulose due to diarrhea.  Additionally had a dental procedure which was complicated by abscess requiring amoxicillin.     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 MELD 20>22.     -Continue treatment of oral abscess per primary team  -Monitor mental status closely  -Continue Lactulose and titrate to 2-3 BM daily     -S/p paracentesis 12/21 w/ 2.4L serosanguinous fluid removed. Studies negative for SBP.  -Nephrology assistance appreciated. Continue lasix, Albumin, midodrine per renal     -Hgb dropped following paracentesis. CT scan performed negative for hematoma.   -She had 4 ox BRBPR x1 12/24 per primary team, vitals remained stable and she was given 1 unit RBC, 2 FFP. Hgb improved appropriately after from 7-8 with no further rectal bleeding. She had colonoscopy recently in July noting medium internal hemorrhoids, diverticulosis. Advanced diet from clears. Continue to monitor stool output   -Monitor CBC, transfuse blood products as needed.   -Continue iron supplementation per primary team     -Follow up repeat PEth level  -Recent MRI in November w/ no suspicious mass  -EGD in July 2023 with no evidence of varices     -Monitor MELD labs daily  -Patient will need urgent hepatology follow-up within  2 weeks after discharge     #2  Chronic pancreatitis: Etiology alcohol, reports sobriety for 5 years. Recent MRI in November notable for main PD dilation & known intraductal calculus in the pancreatic head. Denies any abdominal pain.  -Continue alcohol cessation  -Continue Creon          ______________________________________________________________________    Subjective:     Pt seen laying in bed. Reports 2 BM so far today were brown. No further rectal bleeding per her report. Denies any abdominal pain, nausea/vomiting, heartburn    Medication Administration - last 24 hours from 12/24/2023 1354 to 12/25/2023 1354         Date/Time Order Dose Route Action Action by     12/25/2023 0934 EST ferrous sulfate tablet 325 mg 325 mg Oral Given Rabia Vaughn     12/25/2023 0917 EST lactulose (CHRONULAC) oral solution 20 g 20 g Oral Given Rabia Vaughn     12/24/2023 1757 EST lactulose (CHRONULAC) oral solution 20 g 20 g Oral Given Britt Ayala LPN     12/25/2023 1205 EST pancrelipase (Lip-Prot-Amyl) (CREON) delayed release capsule 24,000 Units 24,000 Units Oral Given Rabia Vaughn     12/25/2023 0917 EST pancrelipase (Lip-Prot-Amyl) (CREON) delayed release capsule 24,000 Units 24,000 Units Oral Given Rabia Vaughn     12/24/2023 1759 EST pancrelipase (Lip-Prot-Amyl) (CREON) delayed release capsule 24,000 Units 24,000 Units Oral Given Britt Ayala LPN     12/25/2023 1205 EST amoxicillin (AMOXIL) capsule 500 mg 500 mg Oral Given Rabia Vaughn     12/25/2023 0502 EST amoxicillin (AMOXIL) capsule 500 mg 500 mg Oral Given Ayleen Pierre LPN     12/24/2023 2325 EST amoxicillin (AMOXIL) capsule 500 mg 500 mg Oral Given Kelvin Das RN     12/24/2023 1757 EST amoxicillin (AMOXIL) capsule 500 mg 500 mg Oral Given Britt Ayala LPN     12/25/2023 1205 EST midodrine (PROAMATINE) tablet 5 mg 5 mg Oral Given Rabia Vaughn     12/25/2023 0929 EST midodrine (PROAMATINE) tablet 5 mg 5 mg Oral Given Rabia  "Roderick     12/24/2023 1757 EST midodrine (PROAMATINE) tablet 5 mg 5 mg Oral Given Britt Ayala LPN     12/25/2023 0917 EST sodium bicarbonate tablet 1,300 mg 1,300 mg Oral Given Rabia Vaughn     12/24/2023 1757 EST sodium bicarbonate tablet 1,300 mg 1,300 mg Oral Given Britt Ayala LPN     12/25/2023 0950 EST pantoprazole (PROTONIX) injection 40 mg 40 mg Intravenous Given Myla Cadena, GILDA     12/24/2023 2042 EST pantoprazole (PROTONIX) injection 40 mg 40 mg Intravenous Given Kelvin Das, GILDA     12/25/2023 1205 EST magnesium sulfate 4 g/100 mL IVPB (premix) 4 g 0 g Intravenous Stopped Rabia Vaughn     12/25/2023 0917 EST furosemide (LASIX) tablet 40 mg 40 mg Oral Given Rabia Vaughn     12/24/2023 1757 EST furosemide (LASIX) tablet 40 mg 40 mg Oral Given Britt Ayala LPN     12/25/2023 0917 EST spironolactone (ALDACTONE) tablet 100 mg 100 mg Oral Given Rabia Vaughn            Objective:     Vitals: Blood pressure 111/53, pulse 74, temperature 98.3 °F (36.8 °C), resp. rate 18, height 5' 4\" (1.626 m), weight 63 kg (138 lb 14.2 oz), SpO2 99%.,Body mass index is 23.84 kg/m².      Intake/Output Summary (Last 24 hours) at 12/25/2023 1354  Last data filed at 12/25/2023 0105  Gross per 24 hour   Intake 950 ml   Output --   Net 950 ml       Physical Exam:   General Appearance: Awake and alert, in no acute distress  Abdomen: Soft, non-tender, non-distended; bowel sounds normal; no masses or no organomegaly    Invasive Devices       Peripheral Intravenous Line  Duration             Peripheral IV 12/23/23 Distal;Right;Ventral (anterior) Forearm 2 days                    Lab Results:  No results displayed because visit has over 200 results.          Imaging Studies: I have personally reviewed pertinent imaging studies.     "

## 2023-12-25 NOTE — ASSESSMENT & PLAN NOTE
Estimated Creatinine Clearance: 47.9 mL/min (by C-G formula based on SCr of 0.97 mg/dL).  No PRETTY this admission.  Cr near new baseline of 1.0-1.1  In the setting of type II hepatorenal syndrome.  - Midodrine 5mg TID to enhance renal perfusion  Cr stable after diuresis

## 2023-12-25 NOTE — PLAN OF CARE
Problem: PAIN - ADULT  Goal: Verbalizes/displays adequate comfort level or baseline comfort level  Description: Interventions:  - Encourage patient to monitor pain and request assistance  - Assess pain using appropriate pain scale  - Administer analgesics based on type and severity of pain and evaluate response  - Implement non-pharmacological measures as appropriate and evaluate response  - Consider cultural and social influences on pain and pain management  - Notify physician/advanced practitioner if interventions unsuccessful or patient reports new pain  Outcome: Progressing     Problem: INFECTION - ADULT  Goal: Absence or prevention of progression during hospitalization  Description: INTERVENTIONS:  - Assess and monitor for signs and symptoms of infection  - Monitor lab/diagnostic results  - Monitor all insertion sites, i.e. indwelling lines, tubes, and drains  - Monitor endotracheal if appropriate and nasal secretions for changes in amount and color  - [place] appropriate cooling/warming therapies per order  - Administer medications as ordered  - Instruct and encourage patient and family to use good hand hygiene technique  - Identify and instruct in appropriate isolation precautions for identified infection/condition  Outcome: Progressing  Goal: Absence of fever/infection during neutropenic period  Description: INTERVENTIONS:  - Monitor WBC    Outcome: Progressing     Problem: SAFETY ADULT  Goal: Patient will remain free of falls  Description: INTERVENTIONS:  - Educate patient/family on patient safety including physical limitations  - Instruct patient to call for assistance with activity   - Consult OT/PT to assist with strengthening/mobility   - Keep Call bell within reach  - Keep bed low and locked with side rails adjusted as appropriate  - Keep care items and personal belongings within reach  - Initiate and maintain comfort rounds  - Make Fall Risk Sign visible to staff  - Offer Toileting every  Hours,  in advance of need  - Initiate/Maintain alarm  - Obtain necessary fall risk management equipment:  - Apply yellow socks and bracelet for high fall risk patients  - Consider moving patient to room near nurses station  Outcome: Progressing  Goal: Maintain or return to baseline ADL function  Description: INTERVENTIONS:  -  Assess patient's ability to carry out ADLs; assess patient's baseline for ADL function and identify physical deficits which impact ability to perform ADLs (bathing, care of mouth/teeth, toileting, grooming, dressing, etc.)  - Assess/evaluate cause of self-care deficits   - Assess range of motion  - Assess patient's mobility; develop plan if impaired  - Assess patient's need for assistive devices and provide as appropriate  - Encourage maximum independence but intervene and supervise when necessary  - Involve family in performance of ADLs  - Assess for home care needs following discharge   - Consider OT consult to assist with ADL evaluation and planning for discharge  - Provide patient education as appropriate  Outcome: Progressing  Goal: Maintains/Returns to pre admission functional level  Description: INTERVENTIONS:  - Perform AM-PAC 6 Click Basic Mobility/ Daily Activity assessment daily.  - Set and communicate daily mobility goal to care team and patient/family/caregiver.   - Collaborate with rehabilitation services on mobility goals if consulted  - Perform Range of Motion  times a day.  - Reposition patient every  hours.  - Dangle patient  times a day  - Stand patient times a day  - Ambulate patient  times a day  - Out of bed to chair  times a day   - Out of bed for meals  times a day  - Out of bed for toileting  - Record patient progress and toleration of activity level   Outcome: Progressing

## 2023-12-25 NOTE — PROGRESS NOTES
NEPHROLOGY PROGRESS NOTE   Cassidy Zhang 68 y.o. female MRN: [id number]  Unit/Bed#: ALBAR[facility] -01 Encounter: [id number]  Reason for Consult: Hyponatremia    ASSESSMENT AND PLAN:  67 yo woman with PMH of cirrhosis, hyponatremia p/w  LE and ascites.  Nephrology is consulted for management of hyponatremia and fluid overload      PLAN:    #Hyposomolar Hyponatremia   Sodium on admission: 129  Serum osm: 289  Urine osm: ?  Urine Sodium: 122  Current sodium 134 mEq/L  Etiology: Likely secondary to elevated ADH in the settings of cirrhosis complicated with third spacing  Plan:  Fluid restriction 1.5 L  Continue with diuretics, Lasix and Aldactone   BMP every 24   Avoid overcorrection: no more than 6-8mEq in the next 24hr, goal </=140  Please monitor UOP,  Will monitor labs, call renal if SNa+ <126    #Volume status/hypertension:  Volume: Hypervolemic with third spacing  Blood pressure: Tendency to hypotension, /53  Recommend:  On Lasix and Aldactone  Monitor urinary output    # Hypokalemia  Continue Aldactone and replet oral potassium as needed  Liberalize potassium in the diet      # Liver cirrhosis  Ascites status post paracentesis  Management as per GI    SUBJECTIVE:  Patient seen and examined at bedside. No chest pain, shortness of breath, nausea, vomiting, abdominal pain or diarrhea.     OBJECTIVE:  Current Weight: Weight - Scale: 63 kg (138 lb 14.2 oz)  Vitals:    12/25/23 0803   BP: 111/53   Pulse:    Resp: 18   Temp: 98.3 °F (36.8 °C)   SpO2:        Intake/Output Summary (Last 24 hours) at 12/25/2023 1217  Last data filed at 12/25/2023 0105  Gross per 24 hour   Intake 950 ml   Output --   Net 950 ml     Wt Readings from Last 3 Encounters:   12/24/23 63 kg (138 lb 14.2 oz)   12/07/23 65.8 kg (145 lb)   11/21/23 62.2 kg (137 lb 3.2 oz)     Temp Readings from Last 3 Encounters:   12/25/23 98.3 °F (36.8 °C)   12/07/23 (!) 97 °F (36.1 °C) (Tympanic)   11/21/23 98.8 °F (37.1 °C)     BP Readings from Last 3  Encounters:   12/25/23 111/53   12/07/23 110/70   11/21/23 121/56     Pulse Readings from Last 3 Encounters:   12/25/23 74   12/07/23 70   11/21/23 71        General:  no acute distress at this time  Skin:  No acute rash  Eyes:  No scleral icterus and noninjected  ENT:  mucous membranes moist  Neck:  no carotid bruits  Chest:  Clear to auscultation percussion, good respiratory effort, no use of accessory respiratory muscles  CVS:  Regular rate and rhythm without rub   Abdomen:  soft and nontender   Extremities: no significant lower extremity edema  Neuro:  No gross focality  Psych:  Alert      Medications:    Current Facility-Administered Medications:     acetaminophen (TYLENOL) tablet 650 mg, 650 mg, Oral, Q8H PRN, Terrell Mayberry MD, 650 mg at 12/21/23 0350    amoxicillin (AMOXIL) capsule 500 mg, 500 mg, Oral, Q6H, Terrell Mayberry MD, 500 mg at 12/25/23 1205    Diclofenac Sodium (VOLTAREN) 1 % topical gel 2 g, 2 g, Topical, Q6H PRN, Terrell Mayberry MD    ferrous sulfate tablet 325 mg, 325 mg, Oral, Daily With Breakfast, Terrell Mayberry MD, 325 mg at 12/25/23 0934    furosemide (LASIX) tablet 40 mg, 40 mg, Oral, BID (diuretic), Rob Andre MD, 40 mg at 12/25/23 0917    lactulose (CHRONULAC) oral solution 20 g, 20 g, Oral, BID, Terrell Mayberry MD, 20 g at 12/25/23 0917    midodrine (PROAMATINE) tablet 5 mg, 5 mg, Oral, TID AC, AQUILES Pepper, 5 mg at 12/25/23 1205    pancrelipase (Lip-Prot-Amyl) (CREON) delayed release capsule 24,000 Units, 24,000 Units, Oral, TID With Meals, Terrell Mayberry MD, 24,000 Units at 12/25/23 1205    pantoprazole (PROTONIX) injection 40 mg, 40 mg, Intravenous, Q12H HAJA, AQUILES Colby, 40 mg at 12/25/23 0950    sodium bicarbonate tablet 1,300 mg, 1,300 mg, Oral, BID after meals, Chelsy Sorensen MD, 1,300 mg at 12/25/23 0917    spironolactone (ALDACTONE) tablet 100 mg, 100 mg, Oral, Daily, Rob Andre MD, 100 mg at 12/25/23 0917    trimethobenzamide (TIGAN) IM  injection 200 mg, 200 mg, Intramuscular, Q6H PRN, Jodie Larose MD, 200 mg at 12/21/23 1249    Laboratory Results:  Results from last 7 days   Lab Units 12/25/23  0259 12/24/23  0504 12/23/23 2010 12/23/23  0431 12/22/23  0929 12/22/23  0819 12/21/23  0725 12/20/23  1416   WBC Thousand/uL 4.71 3.74* 3.91* 3.72*  --  5.04 4.88 5.28   HEMOGLOBIN g/dL 8.0* 7.1* 7.2* 6.1* 7.1* 6.8* 7.6* 8.1*   HEMATOCRIT % 22.9* 20.1* 21.1* 18.7*  --  20.2* 23.2* 24.5*   PLATELETS Thousands/uL 65* 77* 69* 75*  --  90* 76* 99*   SODIUM mmol/L 134* 138  --  138  --  136 132* 133*   POTASSIUM mmol/L 3.4* 3.3*  --  3.6  --  3.0* 3.6 4.0   CHLORIDE mmol/L 108 112*  --  113*  --  110* 110* 112*   CO2 mmol/L 20* 19*  --  18*  --  17* 13* 15*   BUN mg/dL 13 13  --  17  --  20 26* 24   CREATININE mg/dL 0.97 0.95  --  0.97  --  1.04 1.30 1.23   CALCIUM mg/dL 9.2 9.2  --  9.4  --  9.1 8.8 8.5   MAGNESIUM mg/dL 1.8* 1.4*  --  1.3*  --   --  1.3*  --    PHOSPHORUS mg/dL 2.4  --   --   --   --   --  3.5  --        CT abdomen pelvis wo contrast   Final Result by Breonna Trevino MD (12/22 1725)      No evidence of hematoma related to recent paracentesis.      Small volume ascites throughout the abdomen and pelvis with mesenteric edema. Moderate body wall edema.      4.8 x 4.4 cm left ovarian cyst which has substantially increased in size since a pelvic ultrasound done on August 7, 2017. Recommend yearly follow-up..            Workstation performed: [id number]         IR INPATIENT Paracentesis   Final Result by Antonio Archer MD (12/22 1704)   Successful therapeutic paracentesis.      ATTESTATION      I, the attending radiologist, was present for the entire procedure. Guidance images were reviewed and I am in agreement with the findings on the report.      Attending physician: Antonio Archer.   Resident:  Adonis Camara.         Workstation performed: [id number]         XR chest 1 view portable   ED Interpretation by Stephen Gallego DO (12/20  "1234)   Bilateral pulmonary edema with bilateral pleural effusions, right worse than left      Final Result by Westley Vang MD (12/20 1417)      Shifting bilateral right greater than the left pleural effusions                  Workstation performed: [id number]             Portions of the record may have been created with voice recognition software. Occasional wrong word or \"sound a like\" substitutions may have occurred due to the inherent limitations of voice recognition software. Read the chart carefully and recognize, using context, where substitutions have occurred.    "

## 2023-12-26 LAB
ALBUMIN SERPL BCP-MCNC: 2.8 G/DL (ref 3.5–5)
ALP SERPL-CCNC: 60 U/L (ref 34–104)
ALT SERPL W P-5'-P-CCNC: 15 U/L (ref 7–52)
ANION GAP SERPL CALCULATED.3IONS-SCNC: 8 MMOL/L
AST SERPL W P-5'-P-CCNC: 27 U/L (ref 13–39)
BILIRUB SERPL-MCNC: 4.38 MG/DL (ref 0.2–1)
BUN SERPL-MCNC: 16 MG/DL (ref 5–25)
CALCIUM ALBUM COR SERPL-MCNC: 10.3 MG/DL (ref 8.3–10.1)
CALCIUM SERPL-MCNC: 9.3 MG/DL (ref 8.4–10.2)
CHLORIDE SERPL-SCNC: 104 MMOL/L (ref 96–108)
CO2 SERPL-SCNC: 19 MMOL/L (ref 21–32)
CREAT SERPL-MCNC: 1.2 MG/DL (ref 0.6–1.3)
ERYTHROCYTE [DISTWIDTH] IN BLOOD BY AUTOMATED COUNT: 17 % (ref 11.6–15.1)
GFR SERPL CREATININE-BSD FRML MDRD: 46 ML/MIN/1.73SQ M
GLUCOSE SERPL-MCNC: 91 MG/DL (ref 65–140)
HCT VFR BLD AUTO: 23.1 % (ref 34.8–46.1)
HGB BLD-MCNC: 8 G/DL (ref 11.5–15.4)
HGB BLD-MCNC: 8.4 G/DL (ref 11.5–15.4)
INR PPP: 1.79 (ref 0.84–1.19)
MAGNESIUM SERPL-MCNC: 1.4 MG/DL (ref 1.9–2.7)
MCH RBC QN AUTO: 33.5 PG (ref 26.8–34.3)
MCHC RBC AUTO-ENTMCNC: 34.6 G/DL (ref 31.4–37.4)
MCV RBC AUTO: 97 FL (ref 82–98)
PLATELET # BLD AUTO: 70 THOUSANDS/UL (ref 149–390)
PMV BLD AUTO: 11.7 FL (ref 8.9–12.7)
POTASSIUM SERPL-SCNC: 3.5 MMOL/L (ref 3.5–5.3)
PROT SERPL-MCNC: 5.7 G/DL (ref 6.4–8.4)
PROTHROMBIN TIME: 21.6 SECONDS (ref 11.6–14.5)
RBC # BLD AUTO: 2.39 MILLION/UL (ref 3.81–5.12)
SODIUM SERPL-SCNC: 131 MMOL/L (ref 135–147)
WBC # BLD AUTO: 4.72 THOUSAND/UL (ref 4.31–10.16)

## 2023-12-26 PROCEDURE — 85018 HEMOGLOBIN: CPT | Performed by: PHYSICIAN ASSISTANT

## 2023-12-26 PROCEDURE — 83735 ASSAY OF MAGNESIUM: CPT | Performed by: GENERAL PRACTICE

## 2023-12-26 PROCEDURE — 80053 COMPREHEN METABOLIC PANEL: CPT | Performed by: GENERAL PRACTICE

## 2023-12-26 PROCEDURE — C9113 INJ PANTOPRAZOLE SODIUM, VIA: HCPCS | Performed by: NURSE PRACTITIONER

## 2023-12-26 PROCEDURE — 99232 SBSQ HOSP IP/OBS MODERATE 35: CPT | Performed by: INTERNAL MEDICINE

## 2023-12-26 PROCEDURE — 85610 PROTHROMBIN TIME: CPT | Performed by: GENERAL PRACTICE

## 2023-12-26 PROCEDURE — 88112 CYTOPATH CELL ENHANCE TECH: CPT | Performed by: PATHOLOGY

## 2023-12-26 PROCEDURE — 85027 COMPLETE CBC AUTOMATED: CPT | Performed by: GENERAL PRACTICE

## 2023-12-26 PROCEDURE — 88305 TISSUE EXAM BY PATHOLOGIST: CPT | Performed by: PATHOLOGY

## 2023-12-26 RX ORDER — PANTOPRAZOLE SODIUM 40 MG/1
40 TABLET, DELAYED RELEASE ORAL
Status: DISCONTINUED | OUTPATIENT
Start: 2023-12-26 | End: 2024-01-02 | Stop reason: HOSPADM

## 2023-12-26 RX ORDER — MAGNESIUM SULFATE HEPTAHYDRATE 40 MG/ML
4 INJECTION, SOLUTION INTRAVENOUS ONCE
Status: COMPLETED | OUTPATIENT
Start: 2023-12-26 | End: 2023-12-26

## 2023-12-26 RX ORDER — HYDROCORTISONE ACETATE 25 MG/1
25 SUPPOSITORY RECTAL 2 TIMES DAILY
Status: DISCONTINUED | OUTPATIENT
Start: 2023-12-26 | End: 2023-12-30

## 2023-12-26 RX ADMIN — FERROUS SULFATE TAB 325 MG (65 MG ELEMENTAL FE) 325 MG: 325 (65 FE) TAB at 08:26

## 2023-12-26 RX ADMIN — MIDODRINE HYDROCHLORIDE 5 MG: 5 TABLET ORAL at 12:31

## 2023-12-26 RX ADMIN — LACTULOSE 20 G: 20 SOLUTION ORAL at 17:13

## 2023-12-26 RX ADMIN — PANCRELIPASE 24000 UNITS: 24000; 76000; 120000 CAPSULE, DELAYED RELEASE PELLETS ORAL at 17:13

## 2023-12-26 RX ADMIN — LACTULOSE 20 G: 20 SOLUTION ORAL at 08:26

## 2023-12-26 RX ADMIN — POTASSIUM CHLORIDE 30 MEQ: 1500 TABLET, EXTENDED RELEASE ORAL at 14:16

## 2023-12-26 RX ADMIN — MIDODRINE HYDROCHLORIDE 5 MG: 5 TABLET ORAL at 15:44

## 2023-12-26 RX ADMIN — AMOXICILLIN 500 MG: 250 CAPSULE ORAL at 12:31

## 2023-12-26 RX ADMIN — SODIUM BICARBONATE 650 MG TABLET 1300 MG: at 17:13

## 2023-12-26 RX ADMIN — FUROSEMIDE 40 MG: 40 TABLET ORAL at 15:44

## 2023-12-26 RX ADMIN — SODIUM BICARBONATE 650 MG TABLET 1300 MG: at 08:26

## 2023-12-26 RX ADMIN — MIDODRINE HYDROCHLORIDE 5 MG: 5 TABLET ORAL at 06:15

## 2023-12-26 RX ADMIN — PANTOPRAZOLE SODIUM 40 MG: 40 INJECTION, POWDER, FOR SOLUTION INTRAVENOUS at 08:26

## 2023-12-26 RX ADMIN — PANTOPRAZOLE SODIUM 40 MG: 40 TABLET, DELAYED RELEASE ORAL at 15:44

## 2023-12-26 RX ADMIN — MAGNESIUM SULFATE 4 G: 4 INJECTION INTRAVENOUS at 08:36

## 2023-12-26 RX ADMIN — PANCRELIPASE 24000 UNITS: 24000; 76000; 120000 CAPSULE, DELAYED RELEASE PELLETS ORAL at 12:31

## 2023-12-26 RX ADMIN — AMOXICILLIN 500 MG: 250 CAPSULE ORAL at 06:15

## 2023-12-26 RX ADMIN — PANCRELIPASE 24000 UNITS: 24000; 76000; 120000 CAPSULE, DELAYED RELEASE PELLETS ORAL at 08:25

## 2023-12-26 RX ADMIN — POTASSIUM CHLORIDE 30 MEQ: 1500 TABLET, EXTENDED RELEASE ORAL at 08:46

## 2023-12-26 RX ADMIN — HYDROCORTISONE ACETATE 25 MG: 25 SUPPOSITORY RECTAL at 14:16

## 2023-12-26 NOTE — PLAN OF CARE
Problem: PAIN - ADULT  Goal: Verbalizes/displays adequate comfort level or baseline comfort level  Description: Interventions:  - Encourage patient to monitor pain and request assistance  - Assess pain using appropriate pain scale  - Administer analgesics based on type and severity of pain and evaluate response  - Implement non-pharmacological measures as appropriate and evaluate response  - Consider cultural and social influences on pain and pain management  - Notify physician/advanced practitioner if interventions unsuccessful or patient reports new pain  Outcome: Progressing     Problem: INFECTION - ADULT  Goal: Absence or prevention of progression during hospitalization  Description: INTERVENTIONS:  - Assess and monitor for signs and symptoms of infection  - Monitor lab/diagnostic results  - Monitor all insertion sites, i.e. indwelling lines, tubes, and drains  - Monitor endotracheal if appropriate and nasal secretions for changes in amount and color  - [place] appropriate cooling/warming therapies per order  - Administer medications as ordered  - Instruct and encourage patient and family to use good hand hygiene technique  - Identify and instruct in appropriate isolation precautions for identified infection/condition  Outcome: Progressing  Goal: Absence of fever/infection during neutropenic period  Description: INTERVENTIONS:  - Monitor WBC    Outcome: Progressing     Problem: SAFETY ADULT  Goal: Patient will remain free of falls  Description: INTERVENTIONS:  - Educate patient/family on patient safety including physical limitations  - Instruct patient to call for assistance with activity   - Consult OT/PT to assist with strengthening/mobility   - Keep Call bell within reach  - Keep bed low and locked with side rails adjusted as appropriate  - Keep care items and personal belongings within reach  - Initiate and maintain comfort rounds  - Make Fall Risk Sign visible to staff  - Offer Toileting every  Hours,  in advance of need  - Initiate/Maintain alarm  - Obtain necessary fall risk management equipment:  - Apply yellow socks and bracelet for high fall risk patients  - Consider moving patient to room near nurses station  Outcome: Progressing     Problem: Prexisting or High Potential for Compromised Skin Integrity  Goal: Skin integrity is maintained or improved  Description: INTERVENTIONS:  - Identify patients at risk for skin breakdown  - Assess and monitor skin integrity  - Assess and monitor nutrition and hydration status  - Monitor labs   - Assess for incontinence   - Turn and reposition patient  - Assist with mobility/ambulation  - Relieve pressure over bony prominences  - Avoid friction and shearing  - Provide appropriate hygiene as needed including keeping skin clean and dry  - Evaluate need for skin moisturizer/barrier cream  - Collaborate with interdisciplinary team   - Patient/family teaching  - Consider wound care consult   Outcome: Progressing

## 2023-12-26 NOTE — ASSESSMENT & PLAN NOTE
Presents for ascites and anasarca, found also to have mild hyponatremia, hypoalbuminemia, hyperbilirubinemia, hypocoagulability, anemia, and thrombocytopenia. Ammonia wnl.  No asterixis on exam, pt A&O x3, maybe some mild confusion (delayed when answering questions, but appropriate), though pt at or near her baseline per spouse.  Reports holding lactulose for last 2-3 days for frequent BMs at home (>5 loose BM per day) - now on lactulose bid  Recently discontinued lasix and spironolactone in the o/p setting d/t worsening renal function and hyponatremia - now on PO Lasix and Aldactone  MELD score 23 on presentation  Last drink 5-6 years ago    MELD 3.0: 25 at 12/26/2023  4:03 AM  MELD-Na: 24 at 12/26/2023  4:03 AM  Calculated from:  Serum Creatinine: 1.20 mg/dL at 12/26/2023  4:03 AM  Serum Sodium: 131 mmol/L at 12/26/2023  4:03 AM  Total Bilirubin: 4.38 mg/dL at 12/26/2023  4:03 AM  Serum Albumin: 2.8 g/dL at 12/26/2023  4:03 AM  INR(ratio): 1.79 at 12/26/2023  4:03 AM  Age at listing (hypothetical): 68 years  Sex: Female at 12/26/2023  4:03 AM        Plan:  Diet: High protein, low salt, fluid restrict 1500 cc/day  Resume lactulose 10 g BID and titrate to target 3-5 loose BM per day  Monitor MELD labs  Monitor Hgb and transfuse for Hgb<7  GI and Nephro appreicated  IR paracentesis drained 2.4L fluid - labs unremarkabl  Tigan for nausea   Peth negative   potential liver transplant evaluation  May need recurrent paracentesis as on outpatient

## 2023-12-26 NOTE — PROGRESS NOTES
The pantoprazole has been converted to Oral per [facility] IV-to-PO Auto-Conversion Protocol for Adults as approved by the Pharmacy and Therapeutics Committee. The patient met all eligible criteria:  1) Age = 18 years old   2) Received at least one dose of the IV form   3) Receiving at least one other scheduled oral/enteral medication   4) Tolerating an oral/enteral diet   and did not have any exclusions:   1) Critical care patient   2) Active GI bleed (IF assessing H2RAs or PPIs)   3) Continuous tube feeding (IF assessing cipro, doxycycline, levofloxacin, minocycline, rifampin, or voriconazole)   4) Receiving PO vancomycin (IF assessing metronidazole)   5) Persistent nausea and/or vomiting   6) Ileus or gastrointestinal obstruction   7) Ai/nasogastric tube set for continuous suction   8) Specific order not to automatically convert to PO (in the order's comments or if discussed in the most recent Infectious Disease or primary team's progress notes).

## 2023-12-26 NOTE — PLAN OF CARE
Problem: PAIN - ADULT  Goal: Verbalizes/displays adequate comfort level or baseline comfort level  Description: Interventions:  - Encourage patient to monitor pain and request assistance  - Assess pain using appropriate pain scale  - Administer analgesics based on type and severity of pain and evaluate response  - Implement non-pharmacological measures as appropriate and evaluate response  - Consider cultural and social influences on pain and pain management  - Notify physician/advanced practitioner if interventions unsuccessful or patient reports new pain  Outcome: Progressing     Problem: INFECTION - ADULT  Goal: Absence or prevention of progression during hospitalization  Description: INTERVENTIONS:  - Assess and monitor for signs and symptoms of infection  - Monitor lab/diagnostic results  - Monitor all insertion sites, i.e. indwelling lines, tubes, and drains  - Monitor endotracheal if appropriate and nasal secretions for changes in amount and color  - [place] appropriate cooling/warming therapies per order  - Administer medications as ordered  - Instruct and encourage patient and family to use good hand hygiene technique  - Identify and instruct in appropriate isolation precautions for identified infection/condition  Outcome: Progressing  Goal: Absence of fever/infection during neutropenic period  Description: INTERVENTIONS:  - Monitor WBC    Outcome: Progressing     Problem: SAFETY ADULT  Goal: Patient will remain free of falls  Description: INTERVENTIONS:  - Educate patient/family on patient safety including physical limitations  - Instruct patient to call for assistance with activity   - Consult OT/PT to assist with strengthening/mobility   - Keep Call bell within reach  - Keep bed low and locked with side rails adjusted as appropriate  - Keep care items and personal belongings within reach  - Initiate and maintain comfort rounds  - Make Fall Risk Sign visible to staff  - Offer Toileting every  Hours,  in advance of need  - Initiate/Maintain alarm  - Obtain necessary fall risk management equipment:  - Apply yellow socks and bracelet for high fall risk patients  - Consider moving patient to room near nurses station  Outcome: Progressing  Goal: Maintain or return to baseline ADL function  Description: INTERVENTIONS:  -  Assess patient's ability to carry out ADLs; assess patient's baseline for ADL function and identify physical deficits which impact ability to perform ADLs (bathing, care of mouth/teeth, toileting, grooming, dressing, etc.)  - Assess/evaluate cause of self-care deficits   - Assess range of motion  - Assess patient's mobility; develop plan if impaired  - Assess patient's need for assistive devices and provide as appropriate  - Encourage maximum independence but intervene and supervise when necessary  - Involve family in performance of ADLs  - Assess for home care needs following discharge   - Consider OT consult to assist with ADL evaluation and planning for discharge  - Provide patient education as appropriate  Outcome: Progressing  Goal: Maintains/Returns to pre admission functional level  Description: INTERVENTIONS:  - Perform AM-PAC 6 Click Basic Mobility/ Daily Activity assessment daily.  - Set and communicate daily mobility goal to care team and patient/family/caregiver.   - Collaborate with rehabilitation services on mobility goals if consulted  - Perform Range of Motion  times a day.  - Reposition patient every  hours.  - Dangle patient  times a day  - Stand patient times a day  - Ambulate patient  times a day  - Out of bed to chair  times a day   - Out of bed for meals  times a day  - Out of bed for toileting  - Record patient progress and toleration of activity level   Outcome: Progressing     Problem: DISCHARGE PLANNING  Goal: Discharge to home or other facility with appropriate resources  Description: INTERVENTIONS:  - Identify barriers to discharge w/patient and caregiver  - Arrange for  needed discharge resources and transportation as appropriate  - Identify discharge learning needs (meds, wound care, etc.)  - Arrange for interpretive services to assist at discharge as needed  - Refer to Case Management Department for coordinating discharge planning if the patient needs post-hospital services based on physician/advanced practitioner order or complex needs related to functional status, cognitive ability, or social support system  Outcome: Progressing     Problem: Knowledge Deficit  Goal: Patient/family/caregiver demonstrates understanding of disease process, treatment plan, medications, and discharge instructions  Description: Complete learning assessment and assess knowledge base.  Interventions:  - Provide teaching at level of understanding  - Provide teaching via preferred learning methods  Outcome: Progressing     Problem: Prexisting or High Potential for Compromised Skin Integrity  Goal: Skin integrity is maintained or improved  Description: INTERVENTIONS:  - Identify patients at risk for skin breakdown  - Assess and monitor skin integrity  - Assess and monitor nutrition and hydration status  - Monitor labs   - Assess for incontinence   - Turn and reposition patient  - Assist with mobility/ambulation  - Relieve pressure over bony prominences  - Avoid friction and shearing  - Provide appropriate hygiene as needed including keeping skin clean and dry  - Evaluate need for skin moisturizer/barrier cream  - Collaborate with interdisciplinary team   - Patient/family teaching  - Consider wound care consult   Outcome: Progressing

## 2023-12-26 NOTE — ASSESSMENT & PLAN NOTE
History of SIADH, however suspect also a component of hypotonic hypervolemic hyponatremia in the setting of liver failure complicated by ascites and anasarca.     Plan:  U Na 122  Na normalized after diuretics  Hold home Na tablets, --starting NaHCO3 tabs for metabolic alkalosis  Fluid restriction 1.5 L  Discontinue Lasix and Aldactone for now  Albumin in + Bumex 1 mg twice daily (30 minutes after albumin)  BMP at 6 PM  BMP every 24  Avoid overcorrection: no more than 6-8mEq in the next 24hr, goal </=132  Please monitor UOP,  Will monitor labs, call renal if SNa+ <126

## 2023-12-26 NOTE — PROGRESS NOTES
[facility]  Progress Note  Name: Cassidy Zhang I  MRN: [id number]  Unit/Bed#: ALBARO -01 I Date of Admission: 12/20/2023   Date of Service: 12/26/2023 I Hospital Day: 6    Assessment/Plan   * Decompensated liver disease (HCC)  Assessment & Plan  Presents for ascites and anasarca, found also to have mild hyponatremia, hypoalbuminemia, hyperbilirubinemia, hypocoagulability, anemia, and thrombocytopenia. Ammonia wnl.  No asterixis on exam, pt A&O x3, maybe some mild confusion (delayed when answering questions, but appropriate), though pt at or near her baseline per spouse.  Reports holding lactulose for last 2-3 days for frequent BMs at home (>5 loose BM per day) - now on lactulose bid  Recently discontinued lasix and spironolactone in the o/p setting d/t worsening renal function and hyponatremia - now on PO Lasix and Aldactone  MELD score 23 on presentation  Last drink 5-6 years ago    MELD 3.0: 21 at 12/24/2023  5:04 AM  MELD-Na: 21 at 12/24/2023  5:04 AM  Calculated from:  Serum Creatinine: 0.95 mg/dL (Using min of 1 mg/dL) at 12/24/2023  5:04 AM  Serum Sodium: 138 mmol/L (Using max of 137 mmol/L) at 12/24/2023  5:04 AM  Total Bilirubin: 3.72 mg/dL at 12/24/2023  5:04 AM  Serum Albumin: 3.1 g/dL at 12/24/2023  5:04 AM  INR(ratio): 2.25 at 12/24/2023  5:04 AM  Age at listing (hypothetical): 68 years  Sex: Female at 12/24/2023  5:04 AM        Plan:  IV Lasix/Albumin -> PO Lasix and Aldactone  Diet: High protein, low salt, fluid restrict 1500 cc/day  Resume lactulose 20 mg BID and titrate to target 3-5 loose BM per day  Monitor MELD labs  Monitor Hgb and transfuse for Hgb<7  GI and Nephro appreicated  IR paracentesis drained 2.4L fluid - labs unremarkabl  Tigan for nausea   Peth negative   potential liver transplant evaluation  May need recurrent paracentesis as on outpatient     Chronic hyponatremia  Assessment & Plan  History of SIADH, however suspect also a component of hypotonic  hypervolemic hyponatremia in the setting of liver failure complicated by ascites and anasarca.     Plan:  U Na 122  Na normalized after diuretics  Hold home Na tablets, --starting NaHCO3 tabs for metabolic alkalosis  Nephrology appreciated        Coagulopathy (HCC)  Assessment & Plan  2/2 cirrhosis  Today give 2U FFP to reverse coagulopathy - check INR after transfusion.  If INR > 1.7 and PRBC needed, would give more FFP    Acute blood loss anemia  Assessment & Plan  Pt so far received 2U PRBC  Today pt w/ 4 oz BRBPR so ordered 1U PRBC - recheck Hgb after transfusion.  In setting of ABLA, keep Hgb at 7.5  If pt has another episode of BRBPR, would do CT lower GIB.    Clears diet    Status post incision and drainage  Assessment & Plan  Pt and  report dental I&D for oral abscess day prior to presentation 12/20/23, started on amoxicillin 500 mg PO Q6H. Acute infection may also have played a role in knocking patient into liver decompensation.     Continue [id number] amoxicillin    Metabolic acidosis  Assessment & Plan    Nephrology on board- currently giving 1300 BID of bicarbonate    CKD (chronic kidney disease) stage 3, GFR 30-59 ml/min ([facility])  Assessment & Plan  Estimated Creatinine Clearance: 48.9 mL/min (by C-G formula based on SCr of 0.95 mg/dL).  No PRETTY this admission.  Cr near new baseline of 1.0-1.1  In the setting of type II hepatorenal syndrome.  - Midodrine 5mg TID to enhance renal perfusion  Cr stable after diuresis      Chronic pancreatitis (HCC)  Assessment & Plan  2/2 EtOH abuse, now in remission.     Continue home pancrealipase    Ascites/anasarca  Assessment & Plan  2/2 end stage liver disease, as noted above    Plan:  Diuresis, fluid restriction, dietary salt restriction as above   Nephrology and GI consults, as above  Paracentesis drained 2.4L    Essential hypertension  Assessment & Plan  BP now low due to cirrhosis.  Hold Norvasc and ARB as pt on diuretics and needing midodrine                VTE Pharmacologic Prophylaxis: VTE Score: 7 High Risk (Score >/= 5) - Pharmacological DVT Prophylaxis Contraindicated. Sequential Compression Devices Ordered.    Mobility:   Basic Mobility Inpatient Raw Score: 16  JH-HLM Goal: 5: Stand one or more mins  JH-HLM Achieved: 5: Stand (1 or more minutes)  HLM Goal achieved. Continue to encourage appropriate mobility.    Patient Centered Rounds: I performed bedside rounds with nursing staff today.   Discussions with Specialists or Other Care Team Provider: GI    Education and Discussions with Family / Patient: Updated  ( and other family member) at bedside.      Current Length of Stay: 6 day(s)  Current Patient Status: Inpatient   Certification Statement: The patient will continue to require additional inpatient hospital stay due to ABLA  Discharge Plan: Anticipate discharge in 24-48 hrs to home.    Code Status: Level 1 - Full Code    Subjective:   Pt reports improved/resolving BRBPR. Pt denies confusion, dizziness, SoB, CP, Abd pain.     Objective:     Vitals:   Temp (24hrs), Av.5 °F (36.9 °C), Min:98.4 °F (36.9 °C), Max:98.8 °F (37.1 °C)    Temp:  [98.4 °F (36.9 °C)-98.8 °F (37.1 °C)] 98.8 °F (37.1 °C)  HR:  [73-82] 82  Resp:  [14-16] 14  BP: (108-115)/(49-53) 108/49  SpO2:  [99 %-100 %] 99 %  Body mass index is 23.99 kg/m².     Input and Output Summary (last 24 hours):   No intake or output data in the 24 hours ending 23 1402      Physical Exam:   Physical Exam  HENT:      Head: Normocephalic and atraumatic.      Nose: Nose normal.      Mouth/Throat:      Mouth: Mucous membranes are moist.   Eyes:      Extraocular Movements: Extraocular movements intact.      Conjunctiva/sclera: Conjunctivae normal.   Cardiovascular:      Rate and Rhythm: Normal rate and regular rhythm.   Pulmonary:      Effort: Pulmonary effort is normal.      Breath sounds: Normal breath sounds.   Abdominal:      General: Bowel sounds are normal. There is no  distension.      Palpations: Abdomen is soft.      Tenderness: There is no abdominal tenderness.   Musculoskeletal:         General: Normal range of motion.      Cervical back: Normal range of motion and neck supple.      Right lower leg: No edema.      Left lower leg: No edema.   Skin:     General: Skin is warm and dry.   Neurological:      Mental Status: She is alert and oriented to person, place, and time.          Additional Data:     Labs:  Results from last 7 days   Lab Units 12/26/23  1339 12/26/23  0614 12/25/23  0259   WBC Thousand/uL  --  4.72 4.71   HEMOGLOBIN g/dL 8.4* 8.0* 8.0*   HEMATOCRIT %  --  23.1* 22.9*   PLATELETS Thousands/uL  --  70* 65*   NEUTROS PCT %  --   --  46   LYMPHS PCT %  --   --  41   MONOS PCT %  --   --  10   EOS PCT %  --   --  3     Results from last 7 days   Lab Units 12/26/23  0403   SODIUM mmol/L 131*   POTASSIUM mmol/L 3.5   CHLORIDE mmol/L 104   CO2 mmol/L 19*   BUN mg/dL 16   CREATININE mg/dL 1.20   ANION GAP mmol/L 8   CALCIUM mg/dL 9.3   ALBUMIN g/dL 2.8*   TOTAL BILIRUBIN mg/dL 4.38*   ALK PHOS U/L 60   ALT U/L 15   AST U/L 27   GLUCOSE RANDOM mg/dL 91     Results from last 7 days   Lab Units 12/26/23  0403   INR  1.79*                   Lines/Drains:  Invasive Devices       Peripheral Intravenous Line  Duration             Peripheral IV 12/23/23 Distal;Right;Ventral (anterior) Forearm 3 days                          Imaging: No pertinent imaging reviewed.    Recent Cultures (last 7 days):   Results from last 7 days   Lab Units 12/21/23  1637   GRAM STAIN RESULT  No Polys or Bacteria seen   BODY FLUID CULTURE, STERILE  No growth       Last 24 Hours Medication List:   Current Facility-Administered Medications   Medication Dose Route Frequency Provider Last Rate    acetaminophen  650 mg Oral Q8H PRN Terrell Mayberry MD      Diclofenac Sodium  2 g Topical Q6H PRN Terrell Mayberry MD      ferrous sulfate  325 mg Oral Daily With Breakfast Terrell Mayberry MD      furosemide  40 mg  Oral BID (diuretic) Rob Andre MD      hydrocortisone  25 mg Rectal BID Destiney Camarillo PA-C      lactulose  20 g Oral BID Terrell Mayberry MD      midodrine  5 mg Oral TID AC AQUILES Pepper      pancrelipase (Lip-Prot-Amyl)  24,000 Units Oral TID With Meals Terrell Mayberry MD      pantoprazole  40 mg Oral BID AC AQUILES Col[place]      potassium chloride  30 mEq Oral Q6H Javy Mayen MD      sodium bicarbonate  1,300 mg Oral BID after meals Chelsy Sorensen MD      spironolactone  100 mg Oral Daily Rob Andre MD      trimethobenzamide  200 mg Intramuscular Q6H PRN Jodie Larose MD          Today, Patient Was Seen By: Sunday Silva MD    **Please Note: This note may have been constructed using a voice recognition system.**

## 2023-12-26 NOTE — ASSESSMENT & PLAN NOTE
Patient ambulatory to triage with CO sore throat. Seen Thursday of this week for same. Reports increase in pain and swelling.  Reports new fever with difficulty swallowing Stable after 1 unit pRBC this admission and 2 units of FFP  Stable hemorrhoidal bleed.   Hgb this afternoon stable at 8.4

## 2023-12-26 NOTE — ASSESSMENT & PLAN NOTE
Estimated Creatinine Clearance: 38.7 mL/min (by C-G formula based on SCr of 1.2 mg/dL).  No PRETTY this admission.  Cr near new baseline of 1.0-1.1  In the setting of type II hepatorenal syndrome.  - Midodrine 5mg TID to enhance renal perfusion  Cr stable after diuresis

## 2023-12-26 NOTE — ASSESSMENT & PLAN NOTE
Pt and  report dental I&D for oral abscess day prior to presentation 12/20/23, started on amoxicillin 500 mg PO Q6H. Acute infection may also have played a role in knocking patient into liver decompensation.     Continue [id number] amoxicillin

## 2023-12-26 NOTE — PROGRESS NOTES
"Progress Note - Nephrology   Cassidy ALBARO Zhang 68 y.o. female MRN: [id number]  Unit/Bed#: W -01 Encounter: [id number]    ASSESSMENT AND PLAN:  67 yo woman with PMH of cirrhosis, hyponatremia p/w  LE and ascites.  Nephrology is consulted for management of hyponatremia and fluid overload     1.  Hyponatremia:: Etiology felt increased ADH in the setting of cirrhosis with third spacing.  Rule out other causes including pseudohyponatremia with a serum awesome now 289  - Sodium on admission 129  - Workup: Urine sodium 122/serum osmolality 289/normal TSH    Obtained to complete the workup: Urine osmolality/a.m. cortisol/uric acid/SPEP UPEP and light chain ratio to rule out pseudohyponatremia    Current sodium: 131 slightly lower    Recommend:  - Fluid restriction 1200 mL  - Diuretics please see below    2.  Volume/blood pressure:  - Blood pressure low normal will place on spironolactone with hold parameters  - Diurese for volume overload on furosemide and spironolactone    3.  Electrolytes/acid-base:  - Hypokalemia: Replete try maintain potassium over 4.0 to avoid hepatic encephalopathy with increase ammonium  - Metabolic acidosis: Placed on sodium bicarbonate    4.  MBD:  - Borderline hypercalcemia avoid all calcium and vitamin D products  - Hypomagnesemia: Replete    5.  Compensated cirrhosis: Status post paracentesis followed by GI    6.  History of chronic pancreatitis secondary to EtOH abuse    7.  Rectal bleeding per primary service: Current hemoglobin 8.0 stable        Subjective:   Patient is overall feeling better  No nausea vomiting or diarrhea  No shortness of breath or chest pain today  Urinating well    Objective:     Vitals: Blood pressure (!) 108/49, pulse 82, temperature 98.8 °F (37.1 °C), temperature source Oral, resp. rate 14, height 5' 4\" (1.626 m), weight 63.4 kg (139 lb 12.4 oz), SpO2 99%.,Body mass index is 23.99 kg/m².    Weight (last 2 days)       Date/Time Weight    12/26/23 0600 63.4 (139.77) "    12/24/23 0600 63 (138.89)            No intake or output data in the 24 hours ending 12/26/23 0830         Physical Exam: General:  No acute distress  Skin:  No acute rash  Eyes:  No scleral icterus and noninjected  ENT:  Moist mucous membranes  Neck:  Supple, no jugular venous distention, trachea midline, overall appearance is normal  Chest:  Clear to auscultation  CVS:  Regular rate and rhythm, without a rub or gallops  Abdomen:  Normal bowel sounds, soft and nontender and moderate distention with a fluid wave  Extremities: Trace lower extremity edema, and no cyanosis, no significant arthritic changes  Neuro:  No gross focality  Psych:  Alert and oriented and appropriate                Medications:    Scheduled Meds:  Current Facility-Administered Medications   Medication Dose Route Frequency Provider Last Rate    acetaminophen  650 mg Oral Q8H PRN Terrell Mayberry MD      amoxicillin  500 mg Oral Q6H Terrell Mayberry MD      Diclofenac Sodium  2 g Topical Q6H PRN Terrell Mayberry MD      ferrous sulfate  325 mg Oral Daily With Breakfast Terrell Mayberry MD      furosemide  40 mg Oral BID (diuretic) Rob Andre MD      lactulose  20 g Oral BID Terrell Mayberry MD      magnesium sulfate  4 g Intravenous Once Sunday Silva MD      midodrine  5 mg Oral TID AC AQUILES Pepper      pancrelipase (Lip-Prot-Amyl)  24,000 Units Oral TID With Meals Terrell Mayberry MD      pantoprazole  40 mg Intravenous Q12H [facility] AQUILES Colby      sodium bicarbonate  1,300 mg Oral BID after meals Chelsy Sorensen MD      spironolactone  100 mg Oral Daily Rob Andre MD      trimethobenzamide  200 mg Intramuscular Q6H PRN Jodie Larose MD         PRN Meds:.  acetaminophen    Diclofenac Sodium    trimethobenzamide    Continuous Infusions:     Lab, Imaging and other studies: I have personally reviewed pertinent labs.  Laboratory Results:  Results from last 7 days   Lab Units 12/26/23  0614 12/26/23  0403 12/25/23  0255  "12/24/23  0504 12/23/23 2010 12/23/23  0431 12/22/23  0929 12/22/23  0819 12/21/23  0725 12/20/23  1416   WBC Thousand/uL 4.72  --  4.71 3.74* 3.91* 3.72*  --  5.04 4.88 5.28   HEMOGLOBIN g/dL 8.0*  --  8.0* 7.1* 7.2* 6.1* 7.1* 6.8* 7.6* 8.1*   HEMATOCRIT % 23.1*  --  22.9* 20.1* 21.1* 18.7*  --  20.2* 23.2* 24.5*   PLATELETS Thousands/uL 70*  --  65* 77* 69* 75*  --  90* 76* 99*   POTASSIUM mmol/L  --  3.5 3.4* 3.3*  --  3.6  --  3.0* 3.6 4.0   CHLORIDE mmol/L  --  104 108 112*  --  113*  --  110* 110* 112*   CO2 mmol/L  --  19* 20* 19*  --  18*  --  17* 13* 15*   BUN mg/dL  --  16 13 13  --  17  --  20 26* 24   CREATININE mg/dL  --  1.20 0.97 0.95  --  0.97  --  1.04 1.30 1.23   CALCIUM mg/dL  --  9.3 9.2 9.2  --  9.4  --  9.1 8.8 8.5   MAGNESIUM mg/dL  --  1.4* 1.8* 1.4*  --  1.3*  --   --  1.3*  --    PHOSPHORUS mg/dL  --   --  2.4  --   --   --   --   --  3.5  --      Urinalysis:   Lab Results   Component Value Date    COLORU Yellow 11/18/2023    CLARITYU Turbid 11/18/2023    SPECGRAV 1.009 11/18/2023    PHUR 6.0 11/18/2023    PHUR 5.5 11/16/2018    LEUKOCYTESUR Large (A) 11/18/2023    NITRITE Negative 11/18/2023    GLUCOSEU Negative 11/18/2023    KETONESU Negative 11/18/2023    BILIRUBINUR Negative 11/18/2023    BLOODU Negative 11/18/2023     ABGs: No results found for: \"PH\"  Radiology review:     Portions of the record may have been created with voice recognition software.  Occasional wrong word or \"sound a like\" substitutions may have occurred due to the inherent limitations of voice recognition software.  Read the chart carefully and recognize, using context, where substitutions have occurred.                    "

## 2023-12-26 NOTE — PROGRESS NOTES
Progress Note - Cassidy Zhang 68 y.o. female MRN: [id number]    Unit/Bed#: ALBARO -01 Encounter: [id number]    Assessment and Plan:   Principal Problem:    Decompensated liver disease (HCC)  Active Problems:    Essential hypertension    Chronic hyponatremia    Ascites/anasarca    Chronic pancreatitis (HCC)    CKD (chronic kidney disease) stage 3, GFR 30-59 ml/min (HCC)    Metabolic acidosis    Status post incision and drainage    Acute blood loss anemia    Coagulopathy (HCC)    #1. Decompensated alcoholic cirrhosis complicated by hepatic encephalopathy, ascites: MELD 22>25. Recent dental infection as well as rectal bleeding at this time   -continue lactulose, titrate for 3 BM daily  -S/p paracentesis on 12/21 negative for SBP  -nephro recs appreciated, on lasix, aldactone, and midodrine  -follow up PEth level which is still in progress  -EGD in July 2023 without varices  -MRI in November without lesions  -monitor MELD labs  -needs urgent outpatient f/u with hepatology, may consider transplant eval pending PEth but age may be a limiting factor    #2. Chronic pancreatitis: reports being sober for 5 years, has PD dilation and known intraductal calculus in pancreatic head  -continue alcohol cessation  -continue creon before meals.     #3. Rectal bleeding: had an episode of rectal bleeding on 12/24, then again last night and again this AM, stool is liquid and brown with bright red blood, witneesed by myself. Hgb has been stable since transfusion, suspect hemorrhoidal bleeding, last colonoscopy was in July 2023 with internal medium protruding hemorrhoids  -will start anusol supp  -check hgb again this afternoon  -is on liquids for now  -consider colonoscopy if hgb drops or rectal bleeding persists/becomes more severe   ----------------------------------------------------------------------------------------------------------------    Subjective:     Has some rectal bleeding last night and again this Am with stools, has  "some crampy lower abdominal pain prior to BM. On clears. No N/V    Objective:     Vitals: Blood pressure (!) 108/49, pulse 82, temperature 98.8 °F (37.1 °C), temperature source Oral, resp. rate 14, height 5' 4\" (1.626 m), weight 63.4 kg (139 lb 12.4 oz), SpO2 99%.,Body mass index is 23.99 kg/m².    No intake or output data in the 24 hours ending 12/26/23 1010    Physical Exam:     General Appearance: Alert, appears stated age and cooperative  Lungs: Clear to auscultation bilaterally, no rales or rhonchi, no labored breathing/accessory muscle use  Heart: Regular rate and rhythm, S1, S2 normal, no murmur, click, rub or gallop  Abdomen: Soft, non-tender, non-distended; bowel sounds normal; no masses or no organomegaly  Extremities: No cyanosis, clubbing, or edema    Invasive Devices       Peripheral Intravenous Line  Duration             Peripheral IV 12/23/23 Distal;Right;Ventral (anterior) Forearm 3 days                    Lab Results:  Results from last 7 days   Lab Units 12/26/23  0614 12/25/23  0259   WBC Thousand/uL 4.72 4.71   HEMOGLOBIN g/dL 8.0* 8.0*   HEMATOCRIT % 23.1* 22.9*   PLATELETS Thousands/uL 70* 65*   NEUTROS PCT %  --  46   LYMPHS PCT %  --  41   MONOS PCT %  --  10   EOS PCT %  --  3     Results from last 7 days   Lab Units 12/26/23  0403   POTASSIUM mmol/L 3.5   CHLORIDE mmol/L 104   CO2 mmol/L 19*   BUN mg/dL 16   CREATININE mg/dL 1.20   CALCIUM mg/dL 9.3   ALK PHOS U/L 60   ALT U/L 15   AST U/L 27     Invalid input(s): \"BILI\"  Results from last 7 days   Lab Units 12/26/23  0403   INR  1.79*           Imaging Studies: I have personally reviewed pertinent imaging studies.    CT abdomen pelvis wo contrast    Result Date: 12/22/2023  Impression: No evidence of hematoma related to recent paracentesis. Small volume ascites throughout the abdomen and pelvis with mesenteric edema. Moderate body wall edema. 4.8 x 4.4 cm left ovarian cyst which has substantially increased in size since a pelvic ultrasound " done on August 7, 2017. Recommend yearly follow-up.. Workstation performed: [id number]     IR INPATIENT Paracentesis    Result Date: 12/22/2023  Impression: Successful therapeutic paracentesis. ATTESTATION I, the attending radiologist, was present for the entire procedure. Guidance images were reviewed and I am in agreement with the findings on the report. Attending physician: Antonio Archer. Resident:  Adonis Camara. Workstation performed: [id number]     XR chest 1 view portable    Result Date: 12/20/2023  Impression: Shifting bilateral right greater than the left pleural effusions Workstation performed: [id number]

## 2023-12-27 LAB
ANION GAP SERPL CALCULATED.3IONS-SCNC: 11 MMOL/L
ANION GAP SERPL CALCULATED.3IONS-SCNC: 4 MMOL/L
BUN SERPL-MCNC: 19 MG/DL (ref 5–25)
BUN SERPL-MCNC: 20 MG/DL (ref 5–25)
CALCIUM SERPL-MCNC: 10 MG/DL (ref 8.4–10.2)
CALCIUM SERPL-MCNC: 9.2 MG/DL (ref 8.4–10.2)
CHLORIDE SERPL-SCNC: 102 MMOL/L (ref 96–108)
CHLORIDE SERPL-SCNC: 98 MMOL/L (ref 96–108)
CO2 SERPL-SCNC: 19 MMOL/L (ref 21–32)
CO2 SERPL-SCNC: 20 MMOL/L (ref 21–32)
CORTIS SERPL-MCNC: 7.7 UG/DL
CREAT SERPL-MCNC: 1.56 MG/DL (ref 0.6–1.3)
CREAT SERPL-MCNC: 1.58 MG/DL (ref 0.6–1.3)
GFR SERPL CREATININE-BSD FRML MDRD: 33 ML/MIN/1.73SQ M
GFR SERPL CREATININE-BSD FRML MDRD: 33 ML/MIN/1.73SQ M
GLUCOSE SERPL-MCNC: 109 MG/DL (ref 65–140)
GLUCOSE SERPL-MCNC: 183 MG/DL (ref 65–140)
MAGNESIUM SERPL-MCNC: 2.1 MG/DL (ref 1.9–2.7)
POTASSIUM SERPL-SCNC: 4.3 MMOL/L (ref 3.5–5.3)
POTASSIUM SERPL-SCNC: 4.5 MMOL/L (ref 3.5–5.3)
SODIUM SERPL-SCNC: 126 MMOL/L (ref 135–147)
SODIUM SERPL-SCNC: 128 MMOL/L (ref 135–147)
T4 FREE SERPL-MCNC: 1.16 NG/DL (ref 0.61–1.12)
TSH SERPL DL<=0.05 MIU/L-ACNC: 1.7 UIU/ML (ref 0.45–4.5)

## 2023-12-27 PROCEDURE — 82533 TOTAL CORTISOL: CPT | Performed by: INTERNAL MEDICINE

## 2023-12-27 PROCEDURE — 80048 BASIC METABOLIC PNL TOTAL CA: CPT | Performed by: STUDENT IN AN ORGANIZED HEALTH CARE EDUCATION/TRAINING PROGRAM

## 2023-12-27 PROCEDURE — 84165 PROTEIN E-PHORESIS SERUM: CPT | Performed by: INTERNAL MEDICINE

## 2023-12-27 PROCEDURE — 86334 IMMUNOFIX E-PHORESIS SERUM: CPT | Performed by: INTERNAL MEDICINE

## 2023-12-27 PROCEDURE — 84443 ASSAY THYROID STIM HORMONE: CPT | Performed by: INTERNAL MEDICINE

## 2023-12-27 PROCEDURE — 83521 IG LIGHT CHAINS FREE EACH: CPT | Performed by: INTERNAL MEDICINE

## 2023-12-27 PROCEDURE — 83735 ASSAY OF MAGNESIUM: CPT | Performed by: INTERNAL MEDICINE

## 2023-12-27 PROCEDURE — 99233 SBSQ HOSP IP/OBS HIGH 50: CPT | Performed by: STUDENT IN AN ORGANIZED HEALTH CARE EDUCATION/TRAINING PROGRAM

## 2023-12-27 PROCEDURE — 84439 ASSAY OF FREE THYROXINE: CPT | Performed by: INTERNAL MEDICINE

## 2023-12-27 PROCEDURE — 99232 SBSQ HOSP IP/OBS MODERATE 35: CPT | Performed by: INTERNAL MEDICINE

## 2023-12-27 PROCEDURE — 80048 BASIC METABOLIC PNL TOTAL CA: CPT | Performed by: INTERNAL MEDICINE

## 2023-12-27 RX ORDER — BUMETANIDE 0.25 MG/ML
1 INJECTION INTRAMUSCULAR; INTRAVENOUS 2 TIMES DAILY
Status: DISCONTINUED | OUTPATIENT
Start: 2023-12-27 | End: 2023-12-29

## 2023-12-27 RX ORDER — OCTREOTIDE ACETATE 100 UG/ML
100 INJECTION, SOLUTION INTRAVENOUS; SUBCUTANEOUS EVERY 8 HOURS SCHEDULED
Status: DISCONTINUED | OUTPATIENT
Start: 2023-12-27 | End: 2023-12-31

## 2023-12-27 RX ORDER — MIDODRINE HYDROCHLORIDE 5 MG/1
10 TABLET ORAL
Status: DISCONTINUED | OUTPATIENT
Start: 2023-12-27 | End: 2023-12-29

## 2023-12-27 RX ORDER — BUMETANIDE 0.25 MG/ML
1 INJECTION INTRAMUSCULAR; INTRAVENOUS 2 TIMES DAILY
Status: DISCONTINUED | OUTPATIENT
Start: 2023-12-27 | End: 2023-12-27

## 2023-12-27 RX ORDER — ALBUMIN (HUMAN) 12.5 G/50ML
25 SOLUTION INTRAVENOUS 2 TIMES DAILY
Status: COMPLETED | OUTPATIENT
Start: 2023-12-27 | End: 2023-12-29

## 2023-12-27 RX ADMIN — PANCRELIPASE 24000 UNITS: 24000; 76000; 120000 CAPSULE, DELAYED RELEASE PELLETS ORAL at 16:59

## 2023-12-27 RX ADMIN — MIDODRINE HYDROCHLORIDE 10 MG: 5 TABLET ORAL at 16:54

## 2023-12-27 RX ADMIN — OCTREOTIDE ACETATE 100 MCG: 100 INJECTION, SOLUTION INTRAVENOUS; SUBCUTANEOUS at 16:54

## 2023-12-27 RX ADMIN — MIDODRINE HYDROCHLORIDE 10 MG: 5 TABLET ORAL at 10:47

## 2023-12-27 RX ADMIN — PANTOPRAZOLE SODIUM 40 MG: 40 TABLET, DELAYED RELEASE ORAL at 16:54

## 2023-12-27 RX ADMIN — SODIUM BICARBONATE 650 MG TABLET 1300 MG: at 16:54

## 2023-12-27 RX ADMIN — OCTREOTIDE ACETATE 100 MCG: 100 INJECTION, SOLUTION INTRAVENOUS; SUBCUTANEOUS at 23:56

## 2023-12-27 RX ADMIN — PANCRELIPASE 24000 UNITS: 24000; 76000; 120000 CAPSULE, DELAYED RELEASE PELLETS ORAL at 12:04

## 2023-12-27 RX ADMIN — BUMETANIDE 1 MG: 0.25 INJECTION, SOLUTION INTRAMUSCULAR; INTRAVENOUS at 10:47

## 2023-12-27 RX ADMIN — BUMETANIDE 1 MG: 0.25 INJECTION, SOLUTION INTRAMUSCULAR; INTRAVENOUS at 19:17

## 2023-12-27 RX ADMIN — SODIUM BICARBONATE 650 MG TABLET 1300 MG: at 08:16

## 2023-12-27 RX ADMIN — HYDROCORTISONE ACETATE 25 MG: 25 SUPPOSITORY RECTAL at 23:56

## 2023-12-27 RX ADMIN — FERROUS SULFATE TAB 325 MG (65 MG ELEMENTAL FE) 325 MG: 325 (65 FE) TAB at 08:16

## 2023-12-27 RX ADMIN — ALBUMIN (HUMAN) 25 G: 0.25 INJECTION, SOLUTION INTRAVENOUS at 10:52

## 2023-12-27 RX ADMIN — HYDROCORTISONE ACETATE 25 MG: 25 SUPPOSITORY RECTAL at 10:47

## 2023-12-27 RX ADMIN — LACTULOSE 20 G: 20 SOLUTION ORAL at 17:04

## 2023-12-27 RX ADMIN — PANCRELIPASE 24000 UNITS: 24000; 76000; 120000 CAPSULE, DELAYED RELEASE PELLETS ORAL at 08:24

## 2023-12-27 RX ADMIN — LACTULOSE 20 G: 20 SOLUTION ORAL at 08:16

## 2023-12-27 RX ADMIN — PANTOPRAZOLE SODIUM 40 MG: 40 TABLET, DELAYED RELEASE ORAL at 08:21

## 2023-12-27 RX ADMIN — MIDODRINE HYDROCHLORIDE 5 MG: 5 TABLET ORAL at 08:23

## 2023-12-27 RX ADMIN — ALBUMIN (HUMAN) 25 G: 0.25 INJECTION, SOLUTION INTRAVENOUS at 17:04

## 2023-12-27 NOTE — PROGRESS NOTES
Progress Note - Cassidy Zhang 68 y.o. female MRN: [id number]    Unit/Bed#: ALBARO -01 Encounter: [id number]    Assessment and Plan:   Principal Problem:    Decompensated liver disease (HCC)  Active Problems:    Essential hypertension    Chronic hyponatremia    Ascites/anasarca    Chronic pancreatitis (HCC)    CKD (chronic kidney disease) stage 3, GFR 30-59 ml/min (HCC)    Metabolic acidosis    Status post incision and drainage    Acute blood loss anemia    Coagulopathy (HCC)    #1. Decompensated alcoholic cirrhosis complicated by hepatic encephalopathy, ascites: MELD 25>30 due to worsening Cr and sodium levels, INR not checked today. Recent dental infection as well as rectal bleeding at this time   -continue lactulose, titrate for 3 BM daily  -S/p paracentesis on 12/21 negative for SBP  -nephro recs appreciated, lasix and aldactone have been dc for now, was started on albumin and bumex with serial BMPs  -follow up PEth level which is still in progress  -EGD in July 2023 without varices  -MRI in November without lesions  -monitor MELD labs  -needs urgent outpatient f/u with hepatology, may consider transplant eval pending PEth but age may be a limiting factor     #2. Chronic pancreatitis: reports being sober for 5 years, has PD dilation and known intraductal calculus in pancreatic head  -continue alcohol cessation  -continue creon before meals.      #3. Rectal bleeding: this has been intermittent, she had two nonbloody stools overnight, then one with blood this afternoon, suspect hemorrhoids, hgb actually improved today  -continue anusol supp  -monitor hgb  -would forgo colonoscopy at this time due to stable hgb, recent colonoscopy with hemorrhoids, and worsening Cr/hyponatremia.     ----------------------------------------------------------------------------------------------------------------    Subjective:     Reported 2 BM overnight without any blood. No abdominal pain     Objective:     Vitals: Blood pressure  "102/53, pulse 84, temperature 98.4 °F (36.9 °C), resp. rate 16, height 5' 4\" (1.626 m), weight 64.5 kg (142 lb 3.2 oz), SpO2 99%.,Body mass index is 24.41 kg/m².      Intake/Output Summary (Last 24 hours) at 12/27/2023 0857  Last data filed at 12/27/2023 0801  Gross per 24 hour   Intake 180 ml   Output --   Net 180 ml       Physical Exam:     General Appearance: Alert, appears stated age and cooperative  Lungs: Clear to auscultation bilaterally, no rales or rhonchi, no labored breathing/accessory muscle use  Heart: Regular rate and rhythm, S1, S2 normal, no murmur, click, rub or gallop  Abdomen: Soft, non-tender, non-distended; bowel sounds normal; no masses or no organomegaly  Extremities: No cyanosis, clubbing; LE edema b/l, no significant asterixis    Invasive Devices       Peripheral Intravenous Line  Duration             Peripheral IV 12/23/23 Distal;Right;Ventral (anterior) Forearm 4 days                    Lab Results:  Results from last 7 days   Lab Units 12/26/23  1339 12/26/23  0614 12/25/23  0259   WBC Thousand/uL  --  4.72 4.71   HEMOGLOBIN g/dL 8.4* 8.0* 8.0*   HEMATOCRIT %  --  23.1* 22.9*   PLATELETS Thousands/uL  --  70* 65*   NEUTROS PCT %  --   --  46   LYMPHS PCT %  --   --  41   MONOS PCT %  --   --  10   EOS PCT %  --   --  3     Results from last 7 days   Lab Units 12/27/23  0454 12/26/23  0403   POTASSIUM mmol/L 4.5 3.5   CHLORIDE mmol/L 102 104   CO2 mmol/L 20* 19*   BUN mg/dL 19 16   CREATININE mg/dL 1.56* 1.20   CALCIUM mg/dL 9.2 9.3   ALK PHOS U/L  --  60   ALT U/L  --  15   AST U/L  --  27     Invalid input(s): \"BILI\"  Results from last 7 days   Lab Units 12/26/23  0403   INR  1.79*           Imaging Studies: I have personally reviewed pertinent imaging studies.    CT abdomen pelvis wo contrast    Result Date: 12/22/2023  Impression: No evidence of hematoma related to recent paracentesis. Small volume ascites throughout the abdomen and pelvis with mesenteric edema. Moderate body wall " edema. 4.8 x 4.4 cm left ovarian cyst which has substantially increased in size since a pelvic ultrasound done on August 7, 2017. Recommend yearly follow-up.. Workstation performed: [id number]     IR INPATIENT Paracentesis    Result Date: 12/22/2023  Impression: Successful therapeutic paracentesis. ATTESTATION I, the attending radiologist, was present for the entire procedure. Guidance images were reviewed and I am in agreement with the findings on the report. Attending physician: Antonio Archer. Resident:  Adonis Camara. Workstation performed: [id number]     XR chest 1 view portable    Result Date: 12/20/2023  Impression: Shifting bilateral right greater than the left pleural effusions Workstation performed: [id number]

## 2023-12-27 NOTE — PLAN OF CARE
Problem: PAIN - ADULT  Goal: Verbalizes/displays adequate comfort level or baseline comfort level  Description: Interventions:  - Encourage patient to monitor pain and request assistance  - Assess pain using appropriate pain scale  - Administer analgesics based on type and severity of pain and evaluate response  - Implement non-pharmacological measures as appropriate and evaluate response  - Consider cultural and social influences on pain and pain management  - Notify physician/advanced practitioner if interventions unsuccessful or patient reports new pain  Outcome: Progressing     Problem: INFECTION - ADULT  Goal: Absence or prevention of progression during hospitalization  Description: INTERVENTIONS:  - Assess and monitor for signs and symptoms of infection  - Monitor lab/diagnostic results  - Monitor all insertion sites, i.e. indwelling lines, tubes, and drains  - Monitor endotracheal if appropriate and nasal secretions for changes in amount and color  - [place] appropriate cooling/warming therapies per order  - Administer medications as ordered  - Instruct and encourage patient and family to use good hand hygiene technique  - Identify and instruct in appropriate isolation precautions for identified infection/condition  Outcome: Progressing  Goal: Absence of fever/infection during neutropenic period  Description: INTERVENTIONS:  - Monitor WBC    Outcome: Progressing     Problem: SAFETY ADULT  Goal: Patient will remain free of falls  Description: INTERVENTIONS:  - Educate patient/family on patient safety including physical limitations  - Instruct patient to call for assistance with activity   - Consult OT/PT to assist with strengthening/mobility   - Keep Call bell within reach  - Keep bed low and locked with side rails adjusted as appropriate  - Keep care items and personal belongings within reach  - Initiate and maintain comfort rounds  - Make Fall Risk Sign visible to staff  - Offer Toileting every  Hours,  in advance of need  - Initiate/Maintain alarm  - Obtain necessary fall risk management equipment:  - Apply yellow socks and bracelet for high fall risk patients  - Consider moving patient to room near nurses station  Outcome: Progressing  Goal: Maintain or return to baseline ADL function  Description: INTERVENTIONS:  -  Assess patient's ability to carry out ADLs; assess patient's baseline for ADL function and identify physical deficits which impact ability to perform ADLs (bathing, care of mouth/teeth, toileting, grooming, dressing, etc.)  - Assess/evaluate cause of self-care deficits   - Assess range of motion  - Assess patient's mobility; develop plan if impaired  - Assess patient's need for assistive devices and provide as appropriate  - Encourage maximum independence but intervene and supervise when necessary  - Involve family in performance of ADLs  - Assess for home care needs following discharge   - Consider OT consult to assist with ADL evaluation and planning for discharge  - Provide patient education as appropriate  Outcome: Progressing  Goal: Maintains/Returns to pre admission functional level  Description: INTERVENTIONS:  - Perform AM-PAC 6 Click Basic Mobility/ Daily Activity assessment daily.  - Set and communicate daily mobility goal to care team and patient/family/caregiver.   - Collaborate with rehabilitation services on mobility goals if consulted  - Perform Range of Motion  times a day.  - Reposition patient every  hours.  - Dangle patient  times a day  - Stand patient times a day  - Ambulate patient  times a day  - Out of bed to chair  times a day   - Out of bed for meals  times a day  - Out of bed for toileting  - Record patient progress and toleration of activity level   Outcome: Progressing     Problem: DISCHARGE PLANNING  Goal: Discharge to home or other facility with appropriate resources  Description: INTERVENTIONS:  - Identify barriers to discharge w/patient and caregiver  - Arrange for  needed discharge resources and transportation as appropriate  - Identify discharge learning needs (meds, wound care, etc.)  - Arrange for interpretive services to assist at discharge as needed  - Refer to Case Management Department for coordinating discharge planning if the patient needs post-hospital services based on physician/advanced practitioner order or complex needs related to functional status, cognitive ability, or social support system  Outcome: Progressing     Problem: Knowledge Deficit  Goal: Patient/family/caregiver demonstrates understanding of disease process, treatment plan, medications, and discharge instructions  Description: Complete learning assessment and assess knowledge base.  Interventions:  - Provide teaching at level of understanding  - Provide teaching via preferred learning methods  Outcome: Progressing     Problem: Prexisting or High Potential for Compromised Skin Integrity  Goal: Skin integrity is maintained or improved  Description: INTERVENTIONS:  - Identify patients at risk for skin breakdown  - Assess and monitor skin integrity  - Assess and monitor nutrition and hydration status  - Monitor labs   - Assess for incontinence   - Turn and reposition patient  - Assist with mobility/ambulation  - Relieve pressure over bony prominences  - Avoid friction and shearing  - Provide appropriate hygiene as needed including keeping skin clean and dry  - Evaluate need for skin moisturizer/barrier cream  - Collaborate with interdisciplinary team   - Patient/family teaching  - Consider wound care consult   Outcome: Progressing     Problem: Nutrition/Hydration-ADULT  Goal: Nutrient/Hydration intake appropriate for improving, restoring or maintaining nutritional needs  Description: Monitor and assess patient's nutrition/hydration status for malnutrition. Collaborate with interdisciplinary team and initiate plan and interventions as ordered.  Monitor patient's weight and dietary intake as ordered or  per policy. Utilize nutrition screening tool and intervene as necessary. Determine patient's food preferences and provide high-protein, high-caloric foods as appropriate.     INTERVENTIONS:  - Monitor oral intake, urinary output, labs, and treatment plans  - Assess nutrition and hydration status and recommend course of action  - Evaluate amount of meals eaten  - Assist patient with eating if necessary   - Allow adequate time for meals  - Recommend/ encourage appropriate diets, oral nutritional supplements, and vitamin/mineral supplements  - Order, calculate, and assess calorie counts as needed  - Recommend, monitor, and adjust tube feedings and TPN/PPN based on assessed needs  - Assess need for intravenous fluids  - Provide specific nutrition/hydration education as appropriate  - Include patient/family/caregiver in decisions related to nutrition  Outcome: Progressing

## 2023-12-27 NOTE — CASE MANAGEMENT
Case Management Assessment    Patient name Cassidy Zhang  Location W /W -01 MRN [id number]  : 1955 Date 2023       Current Admission Date: 2023  Current Admission Diagnosis:Decompensated liver disease (HCC)   Patient Active Problem List    Diagnosis Date Noted    Coagulopathy (HCC) 2023    Acute blood loss anemia 2023    Metabolic acidosis 2023    Status post incision and drainage 2023    Abnormal transaminases 2023    CKD (chronic kidney disease) stage 3, GFR 30-59 ml/min (HCC) 2023    Decompensated liver disease (HCC) 10/29/2023    Ascites/anasarca 10/26/2023    Hypomagnesemia 10/26/2023    Chronic pancreatitis ([facility]) 10/26/2023    Esophageal abnormality 2023    Acute lower GI bleeding 2023    Asymptomatic bacteriuria 2023    Left hip pain 2022    Pancytopenia ([facility]) 2018    Closed comminuted fracture of hip, left, initial encounter ([facility]) 11/15/2018    Essential hypertension 11/15/2018    Chronic hyponatremia 11/15/2018    Closed intertrochanteric fracture, left, initial encounter ([facility]) 11/15/2018    Pneumonia 2016      LOS (days): 7  Geometric Mean LOS (GMLOS) (days): 3.3  Days to GMLOS:-3.5     OBJECTIVE:  PATIENT READMITTED TO HOSPITAL  Risk of Unplanned Readmission Score: 26.41         Current admission status: Inpatient       Preferred Pharmacy:   [facility]/pharmacy #[id number] - [place] - [address]  [address] [place] [address]  Phone: [phone] Fax: [phone]    [facility] - [place], [place] - [address]  [address]  [address]  [place] [address]  Phone: [phone] Fax: [phone]    Primary Care Provider: Jillian Calix MD    Primary Insurance: MEDICARE  Secondary Insurance: AETNA    ASSESSMENT:  Active Health Care Proxies       Garrison Zhang [facility] Care Representative - Spouse   Primary Phone: [phone] (Mobile)  Home Phone: [phone]                            Readmission Root Cause  30 Day Readmission: Yes  Who directed you to return to the hospital?: Self  Did you understand whom to contact if you had questions or problems?: Yes  Did you get your prescriptions before you left the hospital?: No  Reason:: Preference for own pharmacy  Were you able to get your prescriptions filled when you left the hospital?: Yes  Did you take your medications as prescribed?: Yes  Were you able to get to your follow-up appointments?: Yes  During previous admission, was a post-acute recommendation made?: No  Patient was readmitted due to: liver disease  Action Plan: continue to follow up with nephrology - potential candidate for liver transplant    Patient Information  Admitted from:: Home  Mental Status: Alert  During Assessment patient was accompanied by: Not accompanied during assessment  Assessment information provided by:: Patient  Primary Caregiver: Self  Support Systems: Spouse/significant other  County of Residence: [place]  What city do you live in?: [place]  Home entry access options. Select all that apply.: Stairs  Number of steps to enter home.: 3  Type of Current Residence: Merged with [facility]  Living Arrangements: Lives w/ Spouse/significant other    Activities of Daily Living Prior to Admission  Functional Status: Independent  Completes ADLs independently?: Yes  Ambulates independently?: Yes  Does patient use assisted devices?: Yes  Assisted Devices (DME) used: Straight Cane  Does patient currently own DME?: Yes  What DME does the patient currently own?: Straight Cane, Walker, Other (Comment) (transport chair)  Does patient have a history of Outpatient Therapy (PT/OT)?: Yes  Does the patient have a history of Short-Term Rehab?: Yes  Does patient have a history of HHC?: Yes  Does patient currently have HHC?: No         Patient Information Continued  Income Source: SSI/SSD  Does patient have prescription coverage?: Yes  Does patient receive dialysis treatments?: No  Does patient  have a history of substance abuse?: No  Does patient have a history of Mental Health Diagnosis?: No         Means of Transportation  Means of Transport to Appts:: Drives Self      Housing Stability: Unknown (12/27/2023)    Housing Stability Vital Sign     Unable to Pay for Housing in the Last Year: No     Number of Places Lived in the Last Year: Not on file     Unstable Housing in the Last Year: No   Food Insecurity: No Food Insecurity (12/27/2023)    Hunger Vital Sign     Worried About Running Out of Food in the Last Year: Never true     Ran Out of Food in the Last Year: Never true   Transportation Needs: No Transportation Needs (12/27/2023)    PRAPARE - Transportation     Lack of Transportation (Medical): No     Lack of Transportation (Non-Medical): No   Utilities: Not At Risk (12/27/2023)    [facility] Utilities     Threatened with loss of utilities: No     Pt has no needs at this time.  CM is available in the event needs arise.

## 2023-12-27 NOTE — PROGRESS NOTES
NEPHROLOGY PROGRESS NOTE   Cassidy Zhang 68 y.o. female MRN: [id number]  Unit/Bed#: ALBARO -01 Encounter: [id number]  Reason for Consult: Hyponatremia    ASSESSMENT AND PLAN:  69 yo woman with PMH of cirrhosis, hyponatremia p/w  LE and ascites.  Nephrology is consulted for management of hyponatremia and fluid overload        PLAN:     # Acute on chronic Hyposomolar Hyponatremia   Sodium on admission: 129  Serum osm: 289  Urine osm: ?  Urine Sodium: 122  Current sodium 126 mEq/L worsening  Etiology: Multifactorial likely secondary to elevated ADH in the settings of cirrhosis with hypoperfusion and intravascular depletion with third spacing   Plan:  Fluid restriction 1.5 L  Discontinue Lasix and Aldactone for now  Albumin in + Bumex 1 mg twice daily (30 minutes after albumin)  BMP at 6 PM  BMP every 24  Avoid overcorrection: no more than 6-8mEq in the next 24hr, goal </=132  Please monitor UOP,  Will monitor labs, call renal if SNa+ <126     #Volume status/hypertension:  Volume: Hypervolemic with third spacing  Blood pressure: Hypotension, /53  Recommend:  Increase midodrine to 10 mg 3 times daily  Albumin as above with Bumex 30 minutes after albumin  Monitor urinary output and daily weight     # Hypokalemia  Liberalize potassium in the diet  Replete with p.o. potassium as needed      # Liver cirrhosis  Ascites status post paracentesis  Management as per GI  MELD 29  Consider steroids if appropriate      #Non-Oliguric KDIGO PRETTY stage 1   Etiology: Likely secondary to HRS with worsening liver cirrhosis  Baseline creatinine: 1 mg/dL  Current creatinine: 1.5 mg/dL, trending up  Peak creatinine: Trending  UA: Ordered  Renal imaging : Will order ultrasound if worsening kidney function  Treatment:  No indication of dialysis at this time  Maintain MAP:  Over 65 mmHg if possible/avoid hypoperfusion:  Hold parameters on blood pressure medications  Avoid nephrotoxic agents such as NSAIDs, and IV contrast if possible.  Avoid opioids   Adjust medications to GFR  HRS treatment  Midodrine 10 mg 3 times daily  Albumin twice daily  Octreotide 3 times daily subcutaneous      #Acid-base Disorder  serum HCO3 20 mEq/L  A  Non-anion gap metabolic acidosis secondary to PRETTY  On sodium bicarbonate, will attempt to reduce dose to avoid sodium load    #Volume status/hypertension:  Volume: Third spacing, hypervolemic with hypoperfusion  Blood pressure: Hypotensive, /53  Recommend:  Albumin as above  Midodrine 3 times daily as above      #Anemia:  Current hemoglobin: 8.4 mg/dL  Treatment:  Transfuse for hemoglobin less than 7.0 per primary service      Discussed with primary team.  PRETTY concerning of HRS.  We agreed to start HRS treatment    SUBJECTIVE:  Patient seen and examined at bedside. No chest pain, shortness of breath, nausea, vomiting, abdominal pain or diarrhea.  OBJECTIVE:  Current Weight: Weight - Scale: 64.5 kg (142 lb 3.2 oz)  Vitals:    23 0814   BP: 102/53   Pulse: 84   Resp:    Temp: 98.4 °F (36.9 °C)   SpO2: 99%       Intake/Output Summary (Last 24 hours) at 2023 1148  Last data filed at 2023 0801  Gross per 24 hour   Intake 180 ml   Output --   Net 180 ml     Wt Readings from Last 3 Encounters:   23 64.5 kg (142 lb 3.2 oz)   23 65.8 kg (145 lb)   23 62.2 kg (137 lb 3.2 oz)     Temp Readings from Last 3 Encounters:   23 98.4 °F (36.9 °C)   23 (!) 97 °F (36.1 °C) (Tympanic)   23 98.8 °F (37.1 °C)     BP Readings from Last 3 Encounters:   23 102/53   23 110/70   23 121/56     Pulse Readings from Last 3 Encounters:   23 84   23 70   23 71        General:  no acute distress at this time  Skin:  No acute rash  Eyes:  No scleral icterus and noninjected  ENT:  mucous membranes moist  Neck:  no carotid bruits  Chest:  Clear to auscultation percussion, good respiratory effort, no use of accessory respiratory muscles  CVS:  Regular rate and  rhythm without rub   Abdomen: Distended, nontender  Extremities: lower extremity edema  Neuro:  No gross focality  Psych:  Alert , cooperative       Medications:    Current Facility-Administered Medications:     acetaminophen (TYLENOL) tablet 650 mg, 650 mg, Oral, Q8H PRN, Terrell Mayberry MD, 650 mg at 12/21/23 0350    albumin human (FLEXBUMIN) 25 % injection 25 g, 25 g, Intravenous, BID, Joselyn Reyes Bahamonde, MD, 25 g at 12/27/23 1052    bumetanide (BUMEX) injection 1 mg, 1 mg, Intravenous, BID, Joselyn Reyes Bahamonde, MD, 1 mg at 12/27/23 1047    Diclofenac Sodium (VOLTAREN) 1 % topical gel 2 g, 2 g, Topical, Q6H PRN, Terrell Mayberry MD    ferrous sulfate tablet 325 mg, 325 mg, Oral, Daily With Breakfast, Terrell Mayberry MD, 325 mg at 12/27/23 0816    hydrocortisone (ANUSOL-HC) rectal suppository 25 mg, 25 mg, Rectal, BID, Destiney Camarillo PA-C, 25 mg at 12/27/23 1047    lactulose (CHRONULAC) oral solution 20 g, 20 g, Oral, BID, Terrell Mayberry MD, 20 g at 12/27/23 0816    midodrine (PROAMATINE) tablet 10 mg, 10 mg, Oral, TID AC, Joselyn Reyes Bahamonde, MD, 10 mg at 12/27/23 1047    pancrelipase (Lip-Prot-Amyl) (CREON) delayed release capsule 24,000 Units, 24,000 Units, Oral, TID With Meals, Terrell Mayberry MD, 24,000 Units at 12/27/23 0824    pantoprazole (PROTONIX) EC tablet 40 mg, 40 mg, Oral, BID AC, AQUILES Colby, 40 mg at 12/27/23 0821    sodium bicarbonate tablet 1,300 mg, 1,300 mg, Oral, BID after meals, Chelsy Sorensen MD, 1,300 mg at 12/27/23 0816    trimethobenzamide (TIGAN) IM injection 200 mg, 200 mg, Intramuscular, Q6H PRN, Jodie Larose MD, 200 mg at 12/21/23 1249    Laboratory Results:  Results from last 7 days   Lab Units 12/27/23  0454 12/26/23  1339 12/26/23  0614 12/26/23  0403 12/25/23  0259 12/24/23  0504 12/23/23  2010 12/23/23  0431 12/22/23  0929 12/22/23  0819 12/21/23  0725   WBC Thousand/uL  --   --  4.72  --  4.71 3.74* 3.91* 3.72*  --  5.04 4.88   HEMOGLOBIN g/dL  --   8.4* 8.0*  --  8.0* 7.1* 7.2* 6.1* 7.1* 6.8* 7.6*   HEMATOCRIT %  --   --  23.1*  --  22.9* 20.1* 21.1* 18.7*  --  20.2* 23.2*   PLATELETS Thousands/uL  --   --  70*  --  65* 77* 69* 75*  --  90* 76*   SODIUM mmol/L 126*  --   --  131* 134* 138  --  138  --  136 132*   POTASSIUM mmol/L 4.5  --   --  3.5 3.4* 3.3*  --  3.6  --  3.0* 3.6   CHLORIDE mmol/L 102  --   --  104 108 112*  --  113*  --  110* 110*   CO2 mmol/L 20*  --   --  19* 20* 19*  --  18*  --  17* 13*   BUN mg/dL 19  --   --  16 13 13  --  17  --  20 26*   CREATININE mg/dL 1.56*  --   --  1.20 0.97 0.95  --  0.97  --  1.04 1.30   CALCIUM mg/dL 9.2  --   --  9.3 9.2 9.2  --  9.4  --  9.1 8.8   MAGNESIUM mg/dL 2.1  --   --  1.4* 1.8* 1.4*  --  1.3*  --   --  1.3*   PHOSPHORUS mg/dL  --   --   --   --  2.4  --   --   --   --   --  3.5       CT abdomen pelvis wo contrast   Final Result by Breonna Trevino MD (12/22 1725)      No evidence of hematoma related to recent paracentesis.      Small volume ascites throughout the abdomen and pelvis with mesenteric edema. Moderate body wall edema.      4.8 x 4.4 cm left ovarian cyst which has substantially increased in size since a pelvic ultrasound done on August 7, 2017. Recommend yearly follow-up..            Workstation performed: [id number]         IR INPATIENT Paracentesis   Final Result by Antonio Archer MD (12/22 1704)   Successful therapeutic paracentesis.      ATTESTATION      I, the attending radiologist, was present for the entire procedure. Guidance images were reviewed and I am in agreement with the findings on the report.      Attending physician: Antonio Archer.   Resident:  Adonis Camara.         Workstation performed: [id number]         XR chest 1 view portable   ED Interpretation by Stephen Gallego DO (12/20 7456)   Bilateral pulmonary edema with bilateral pleural effusions, right worse than left      Final Result by Westley Vang MD (12/20 4220)      Shifting bilateral right  "greater than the left pleural effusions                  Workstation performed: [id number]             Portions of the record may have been created with voice recognition software. Occasional wrong word or \"sound a like\" substitutions may have occurred due to the inherent limitations of voice recognition software. Read the chart carefully and recognize, using context, where substitutions have occurred.    "

## 2023-12-27 NOTE — PLAN OF CARE
Problem: PAIN - ADULT  Goal: Verbalizes/displays adequate comfort level or baseline comfort level  Description: Interventions:  - Encourage patient to monitor pain and request assistance  - Assess pain using appropriate pain scale  - Administer analgesics based on type and severity of pain and evaluate response  - Implement non-pharmacological measures as appropriate and evaluate response  - Consider cultural and social influences on pain and pain management  - Notify physician/advanced practitioner if interventions unsuccessful or patient reports new pain  Outcome: Progressing     Problem: INFECTION - ADULT  Goal: Absence or prevention of progression during hospitalization  Description: INTERVENTIONS:  - Assess and monitor for signs and symptoms of infection  - Monitor lab/diagnostic results  - Monitor all insertion sites, i.e. indwelling lines, tubes, and drains  - Monitor endotracheal if appropriate and nasal secretions for changes in amount and color  - [place] appropriate cooling/warming therapies per order  - Administer medications as ordered  - Instruct and encourage patient and family to use good hand hygiene technique  - Identify and instruct in appropriate isolation precautions for identified infection/condition  Outcome: Progressing  Goal: Absence of fever/infection during neutropenic period  Description: INTERVENTIONS:  - Monitor WBC    Outcome: Progressing     Problem: SAFETY ADULT  Goal: Patient will remain free of falls  Description: INTERVENTIONS:  - Educate patient/family on patient safety including physical limitations  - Instruct patient to call for assistance with activity   - Consult OT/PT to assist with strengthening/mobility   - Keep Call bell within reach  - Keep bed low and locked with side rails adjusted as appropriate  - Keep care items and personal belongings within reach  - Initiate and maintain comfort rounds  - Make Fall Risk Sign visible to staff  - Offer Toileting every  Hours,  in advance of need  - Initiate/Maintain alarm  - Obtain necessary fall risk management equipment:  - Apply yellow socks and bracelet for high fall risk patients  - Consider moving patient to room near nurses station  Outcome: Progressing  Goal: Maintain or return to baseline ADL function  Description: INTERVENTIONS:  -  Assess patient's ability to carry out ADLs; assess patient's baseline for ADL function and identify physical deficits which impact ability to perform ADLs (bathing, care of mouth/teeth, toileting, grooming, dressing, etc.)  - Assess/evaluate cause of self-care deficits   - Assess range of motion  - Assess patient's mobility; develop plan if impaired  - Assess patient's need for assistive devices and provide as appropriate  - Encourage maximum independence but intervene and supervise when necessary  - Involve family in performance of ADLs  - Assess for home care needs following discharge   - Consider OT consult to assist with ADL evaluation and planning for discharge  - Provide patient education as appropriate  Outcome: Progressing  Goal: Maintains/Returns to pre admission functional level  Description: INTERVENTIONS:  - Perform AM-PAC 6 Click Basic Mobility/ Daily Activity assessment daily.  - Set and communicate daily mobility goal to care team and patient/family/caregiver.   - Collaborate with rehabilitation services on mobility goals if consulted  - Perform Range of Motion  times a day.  - Reposition patient every  hours.  - Dangle patient  times a day  - Stand patient times a day  - Ambulate patient  times a day  - Out of bed to chair  times a day   - Out of bed for meals  times a day  - Out of bed for toileting  - Record patient progress and toleration of activity level   Outcome: Progressing     Problem: DISCHARGE PLANNING  Goal: Discharge to home or other facility with appropriate resources  Description: INTERVENTIONS:  - Identify barriers to discharge w/patient and caregiver  - Arrange for  needed discharge resources and transportation as appropriate  - Identify discharge learning needs (meds, wound care, etc.)  - Arrange for interpretive services to assist at discharge as needed  - Refer to Case Management Department for coordinating discharge planning if the patient needs post-hospital services based on physician/advanced practitioner order or complex needs related to functional status, cognitive ability, or social support system  Outcome: Progressing     Problem: Knowledge Deficit  Goal: Patient/family/caregiver demonstrates understanding of disease process, treatment plan, medications, and discharge instructions  Description: Complete learning assessment and assess knowledge base.  Interventions:  - Provide teaching at level of understanding  - Provide teaching via preferred learning methods  Outcome: Progressing     Problem: Prexisting or High Potential for Compromised Skin Integrity  Goal: Skin integrity is maintained or improved  Description: INTERVENTIONS:  - Identify patients at risk for skin breakdown  - Assess and monitor skin integrity  - Assess and monitor nutrition and hydration status  - Monitor labs   - Assess for incontinence   - Turn and reposition patient  - Assist with mobility/ambulation  - Relieve pressure over bony prominences  - Avoid friction and shearing  - Provide appropriate hygiene as needed including keeping skin clean and dry  - Evaluate need for skin moisturizer/barrier cream  - Collaborate with interdisciplinary team   - Patient/family teaching  - Consider wound care consult   Outcome: Progressing

## 2023-12-28 ENCOUNTER — APPOINTMENT (INPATIENT)
Dept: RADIOLOGY | Facility: HOSPITAL | Age: 68
DRG: 432 | End: 2023-12-28
Payer: MEDICARE

## 2023-12-28 LAB
ALBUMIN SERPL BCP-MCNC: 3.6 G/DL (ref 3.5–5)
ALBUMIN SERPL ELPH-MCNC: 2.62 G/DL (ref 3.2–5.1)
ALBUMIN SERPL ELPH-MCNC: 52.4 % (ref 48–70)
ALP SERPL-CCNC: 77 U/L (ref 34–104)
ALPHA1 GLOB SERPL ELPH-MCNC: 0.18 G/DL (ref 0.15–0.47)
ALPHA1 GLOB SERPL ELPH-MCNC: 3.5 % (ref 1.8–7)
ALPHA2 GLOB SERPL ELPH-MCNC: 0.32 G/DL (ref 0.42–1.04)
ALPHA2 GLOB SERPL ELPH-MCNC: 6.3 % (ref 5.9–14.9)
ALT SERPL W P-5'-P-CCNC: 16 U/L (ref 7–52)
ANION GAP SERPL CALCULATED.3IONS-SCNC: 12 MMOL/L
AST SERPL W P-5'-P-CCNC: 37 U/L (ref 13–39)
BACTERIA UR QL AUTO: ABNORMAL /HPF
BETA GLOB ABNORMAL SERPL ELPH-MCNC: 0.17 G/DL (ref 0.31–0.57)
BETA1 GLOB SERPL ELPH-MCNC: 3.3 % (ref 4.7–7.7)
BETA2 GLOB SERPL ELPH-MCNC: 6.1 % (ref 3.1–7.9)
BETA2+GAMMA GLOB SERPL ELPH-MCNC: 0.31 G/DL (ref 0.2–0.58)
BILIRUB DIRECT SERPL-MCNC: 1.36 MG/DL (ref 0–0.2)
BILIRUB SERPL-MCNC: 4 MG/DL (ref 0.2–1)
BILIRUB UR QL STRIP: NEGATIVE
BUN SERPL-MCNC: 19 MG/DL (ref 5–25)
CALCIUM SERPL-MCNC: 10.4 MG/DL (ref 8.4–10.2)
CHLORIDE SERPL-SCNC: 97 MMOL/L (ref 96–108)
CLARITY UR: CLEAR
CO2 SERPL-SCNC: 21 MMOL/L (ref 21–32)
COLOR UR: COLORLESS
CREAT SERPL-MCNC: 1.48 MG/DL (ref 0.6–1.3)
GAMMA GLOB ABNORMAL SERPL ELPH-MCNC: 1.42 G/DL (ref 0.4–1.66)
GAMMA GLOB SERPL ELPH-MCNC: 28.4 % (ref 6.9–22.3)
GFR SERPL CREATININE-BSD FRML MDRD: 36 ML/MIN/1.73SQ M
GLUCOSE SERPL-MCNC: 136 MG/DL (ref 65–140)
GLUCOSE UR STRIP-MCNC: NEGATIVE MG/DL
HGB UR QL STRIP.AUTO: NEGATIVE
HYALINE CASTS #/AREA URNS LPF: ABNORMAL /LPF
IGG/ALB SER: 1.1 {RATIO} (ref 1.1–1.8)
INR PPP: 1.7 (ref 0.84–1.19)
INTERPRETATION UR IFE-IMP: NORMAL
KAPPA LC FREE SER-MCNC: 96.8 MG/L (ref 3.3–19.4)
KAPPA LC FREE/LAMBDA FREE SER: 0.99 {RATIO} (ref 0.26–1.65)
KETONES UR STRIP-MCNC: NEGATIVE MG/DL
LAMBDA LC FREE SERPL-MCNC: 97.5 MG/L (ref 5.7–26.3)
LEUKOCYTE ESTERASE UR QL STRIP: NEGATIVE
M PROTEIN 1 MFR SERPL ELPH: 3.9 %
M PROTEIN 1 SERPL ELPH-MCNC: 0.2 G/DL
NITRITE UR QL STRIP: NEGATIVE
NON-SQ EPI CELLS URNS QL MICRO: ABNORMAL /HPF
PH UR STRIP.AUTO: 6 [PH]
POTASSIUM SERPL-SCNC: 4.1 MMOL/L (ref 3.5–5.3)
PROT PATTERN SERPL ELPH-IMP: ABNORMAL
PROT SERPL-MCNC: 5 G/DL (ref 6.4–8.2)
PROT SERPL-MCNC: 6.6 G/DL (ref 6.4–8.4)
PROT UR STRIP-MCNC: NEGATIVE MG/DL
PROTHROMBIN TIME: 20.7 SECONDS (ref 11.6–14.5)
RBC #/AREA URNS AUTO: ABNORMAL /HPF
SODIUM SERPL-SCNC: 130 MMOL/L (ref 135–147)
SP GR UR STRIP.AUTO: 1.01 (ref 1–1.03)
UROBILINOGEN UR STRIP-ACNC: <2 MG/DL
WBC #/AREA URNS AUTO: ABNORMAL /HPF

## 2023-12-28 PROCEDURE — 80076 HEPATIC FUNCTION PANEL: CPT | Performed by: PHYSICIAN ASSISTANT

## 2023-12-28 PROCEDURE — 99232 SBSQ HOSP IP/OBS MODERATE 35: CPT | Performed by: INTERNAL MEDICINE

## 2023-12-28 PROCEDURE — 81001 URINALYSIS AUTO W/SCOPE: CPT | Performed by: STUDENT IN AN ORGANIZED HEALTH CARE EDUCATION/TRAINING PROGRAM

## 2023-12-28 PROCEDURE — 84165 PROTEIN E-PHORESIS SERUM: CPT | Performed by: STUDENT IN AN ORGANIZED HEALTH CARE EDUCATION/TRAINING PROGRAM

## 2023-12-28 PROCEDURE — 71045 X-RAY EXAM CHEST 1 VIEW: CPT

## 2023-12-28 PROCEDURE — 80048 BASIC METABOLIC PNL TOTAL CA: CPT | Performed by: PHYSICIAN ASSISTANT

## 2023-12-28 PROCEDURE — 86334 IMMUNOFIX E-PHORESIS SERUM: CPT | Performed by: STUDENT IN AN ORGANIZED HEALTH CARE EDUCATION/TRAINING PROGRAM

## 2023-12-28 PROCEDURE — 85610 PROTHROMBIN TIME: CPT | Performed by: PHYSICIAN ASSISTANT

## 2023-12-28 PROCEDURE — 87040 BLOOD CULTURE FOR BACTERIA: CPT

## 2023-12-28 PROCEDURE — 99232 SBSQ HOSP IP/OBS MODERATE 35: CPT | Performed by: STUDENT IN AN ORGANIZED HEALTH CARE EDUCATION/TRAINING PROGRAM

## 2023-12-28 RX ADMIN — SODIUM BICARBONATE 650 MG TABLET 1300 MG: at 17:37

## 2023-12-28 RX ADMIN — LACTULOSE 20 G: 20 SOLUTION ORAL at 09:19

## 2023-12-28 RX ADMIN — HYDROCORTISONE ACETATE 25 MG: 25 SUPPOSITORY RECTAL at 09:29

## 2023-12-28 RX ADMIN — MIDODRINE HYDROCHLORIDE 10 MG: 5 TABLET ORAL at 09:19

## 2023-12-28 RX ADMIN — OCTREOTIDE ACETATE 100 MCG: 100 INJECTION, SOLUTION INTRAVENOUS; SUBCUTANEOUS at 21:40

## 2023-12-28 RX ADMIN — PANTOPRAZOLE SODIUM 40 MG: 40 TABLET, DELAYED RELEASE ORAL at 17:37

## 2023-12-28 RX ADMIN — OCTREOTIDE ACETATE 100 MCG: 100 INJECTION, SOLUTION INTRAVENOUS; SUBCUTANEOUS at 06:21

## 2023-12-28 RX ADMIN — PANCRELIPASE 24000 UNITS: 24000; 76000; 120000 CAPSULE, DELAYED RELEASE PELLETS ORAL at 09:20

## 2023-12-28 RX ADMIN — LACTULOSE 20 G: 20 SOLUTION ORAL at 17:37

## 2023-12-28 RX ADMIN — MIDODRINE HYDROCHLORIDE 10 MG: 5 TABLET ORAL at 17:37

## 2023-12-28 RX ADMIN — ALBUMIN (HUMAN) 25 G: 0.25 INJECTION, SOLUTION INTRAVENOUS at 09:29

## 2023-12-28 RX ADMIN — HYDROCORTISONE ACETATE 25 MG: 25 SUPPOSITORY RECTAL at 21:41

## 2023-12-28 RX ADMIN — MIDODRINE HYDROCHLORIDE 10 MG: 5 TABLET ORAL at 12:00

## 2023-12-28 RX ADMIN — ALBUMIN (HUMAN) 25 G: 0.25 INJECTION, SOLUTION INTRAVENOUS at 17:48

## 2023-12-28 RX ADMIN — SODIUM BICARBONATE 650 MG TABLET 1300 MG: at 09:19

## 2023-12-28 RX ADMIN — PANCRELIPASE 24000 UNITS: 24000; 76000; 120000 CAPSULE, DELAYED RELEASE PELLETS ORAL at 17:54

## 2023-12-28 RX ADMIN — PANTOPRAZOLE SODIUM 40 MG: 40 TABLET, DELAYED RELEASE ORAL at 09:21

## 2023-12-28 RX ADMIN — PANCRELIPASE 24000 UNITS: 24000; 76000; 120000 CAPSULE, DELAYED RELEASE PELLETS ORAL at 12:00

## 2023-12-28 RX ADMIN — BUMETANIDE 1 MG: 0.25 INJECTION, SOLUTION INTRAMUSCULAR; INTRAVENOUS at 17:49

## 2023-12-28 RX ADMIN — OCTREOTIDE ACETATE 100 MCG: 100 INJECTION, SOLUTION INTRAVENOUS; SUBCUTANEOUS at 14:34

## 2023-12-28 RX ADMIN — FERROUS SULFATE TAB 325 MG (65 MG ELEMENTAL FE) 325 MG: 325 (65 FE) TAB at 09:19

## 2023-12-28 NOTE — ASSESSMENT & PLAN NOTE
Stable after 1 unit pRBC this admission and 2 units of FFP  Stable hemorrhoidal bleed.   Hgb this afternoon stable

## 2023-12-28 NOTE — ASSESSMENT & PLAN NOTE
Presents for ascites and anasarca, found also to have mild hyponatremia, hypoalbuminemia, hyperbilirubinemia, hypocoagulability, anemia, and thrombocytopenia. Ammonia wnl.  No asterixis on exam, pt A&O x3, maybe some mild confusion (delayed when answering questions, but appropriate), though pt at or near her baseline per spouse.  Reports holding lactulose for last 2-3 days for frequent BMs at home (>5 loose BM per day)  Recently discontinued lasix and spironolactone in the o/p setting d/t worsening renal function and hyponatremia   MELD score 23 on presentation  Last drink 5-6 years ago    MELD 3.0: 28 at 12/28/2023  5:29 AM  MELD-Na: 27 at 12/28/2023  5:29 AM  Calculated from:  Serum Creatinine: 1.58 mg/dL at 12/27/2023  9:01 PM  Serum Sodium: 128 mmol/L at 12/27/2023  9:01 PM  Total Bilirubin: 4.00 mg/dL at 12/28/2023  5:29 AM  Serum Albumin: 3.6 g/dL (Using max of 3.5 g/dL) at 12/28/2023  5:29 AM  INR(ratio): 1.70 at 12/28/2023  5:29 AM  Age at listing (hypothetical): 68 years  Sex: Female at 12/28/2023  5:29 AM        Plan:  Diet: High protein, low salt, fluid restrict 1500 cc/day  Resume lactulose 10 g BID and titrate to target 3-5 loose BM per day  Monitor MELD labs  Monitor Hgb and transfuse for Hgb<7  GI and Nephro appreicated  IR paracentesis drained 2.4L fluid - labs unremarkabl  Tigan for nausea   Peth negative   potential liver transplant evaluation  May need recurrent paracentesis as on outpatient   Octreotide q8h  Due to worsening MELD - infectious workup with UA (negative), CXR, and blood cultures

## 2023-12-28 NOTE — PLAN OF CARE
Problem: PAIN - ADULT  Goal: Verbalizes/displays adequate comfort level or baseline comfort level  Description: Interventions:  - Encourage patient to monitor pain and request assistance  - Assess pain using appropriate pain scale  - Administer analgesics based on type and severity of pain and evaluate response  - Implement non-pharmacological measures as appropriate and evaluate response  - Consider cultural and social influences on pain and pain management  - Notify physician/advanced practitioner if interventions unsuccessful or patient reports new pain  Outcome: Progressing     Problem: INFECTION - ADULT  Goal: Absence or prevention of progression during hospitalization  Description: INTERVENTIONS:  - Assess and monitor for signs and symptoms of infection  - Monitor lab/diagnostic results  - Monitor all insertion sites, i.e. indwelling lines, tubes, and drains  - Monitor endotracheal if appropriate and nasal secretions for changes in amount and color  - [place] appropriate cooling/warming therapies per order  - Administer medications as ordered  - Instruct and encourage patient and family to use good hand hygiene technique  - Identify and instruct in appropriate isolation precautions for identified infection/condition  Outcome: Progressing  Goal: Absence of fever/infection during neutropenic period  Description: INTERVENTIONS:  - Monitor WBC    Outcome: Progressing     Problem: SAFETY ADULT  Goal: Patient will remain free of falls  Description: INTERVENTIONS:  - Educate patient/family on patient safety including physical limitations  - Instruct patient to call for assistance with activity   - Consult OT/PT to assist with strengthening/mobility   - Keep Call bell within reach  - Keep bed low and locked with side rails adjusted as appropriate  - Keep care items and personal belongings within reach  - Initiate and maintain comfort rounds  - Make Fall Risk Sign visible to staff  - Offer Toileting every  Hours,  in advance of need  - Initiate/Maintain alarm  - Obtain necessary fall risk management equipment:  - Apply yellow socks and bracelet for high fall risk patients  - Consider moving patient to room near nurses station  Outcome: Progressing  Goal: Maintain or return to baseline ADL function  Description: INTERVENTIONS:  -  Assess patient's ability to carry out ADLs; assess patient's baseline for ADL function and identify physical deficits which impact ability to perform ADLs (bathing, care of mouth/teeth, toileting, grooming, dressing, etc.)  - Assess/evaluate cause of self-care deficits   - Assess range of motion  - Assess patient's mobility; develop plan if impaired  - Assess patient's need for assistive devices and provide as appropriate  - Encourage maximum independence but intervene and supervise when necessary  - Involve family in performance of ADLs  - Assess for home care needs following discharge   - Consider OT consult to assist with ADL evaluation and planning for discharge  - Provide patient education as appropriate  Outcome: Progressing  Goal: Maintains/Returns to pre admission functional level  Description: INTERVENTIONS:  - Perform AM-PAC 6 Click Basic Mobility/ Daily Activity assessment daily.  - Set and communicate daily mobility goal to care team and patient/family/caregiver.   - Collaborate with rehabilitation services on mobility goals if consulted  - Perform Range of Motion  times a day.  - Reposition patient every  hours.  - Dangle patient  times a day  - Stand patient times a day  - Ambulate patient  times a day  - Out of bed to chair  times a day   - Out of bed for meals  times a day  - Out of bed for toileting  - Record patient progress and toleration of activity level   Outcome: Progressing

## 2023-12-28 NOTE — PROGRESS NOTES
Progress Note - Cassidy Zhang 68 y.o. female MRN: [id number]    Unit/Bed#: [facility] -01 Encounter: [id number]    Assessment and Plan:   Principal Problem:    Decompensated liver disease (HCC)  Active Problems:    Essential hypertension    Chronic hyponatremia    Ascites/anasarca    Chronic pancreatitis (HCC)    CKD (chronic kidney disease) stage 3, GFR 30-59 ml/min (HCC)    Metabolic acidosis    Status post incision and drainage    Acute blood loss anemia    Coagulopathy (HCC)    #1. Decompensated alcoholic cirrhosis complicated by hepatic encephalopathy, ascites: MELD 30>26. Recent dental infection as well as rectal bleeding at this time   -infectious work up in progress due to worsening MELD. US negative, CXR read pending, blood cx pending  -continue lactulose, titrate for 3 BM daily which she is having  -S/p paracentesis on 12/21 negative for SBP  -nephro recs appreciated, lasix and aldactone have been dc for now, was started on albumin and bumex with serial BMPs as well as midodrine and octreotide  -PEth negative 12/13  -EGD in July 2023 without varices  -MRI in November without lesions  -monitor MELD labs  -no indication for steroids, patient does not have acute alcoholic hepatitis   -needs urgent outpatient f/u with hepatology, will reach out to hepatology today for ay further recs      #2. Chronic pancreatitis: reports being sober for 5 years, has PD dilation and known intraductal calculus in pancreatic head  -continue alcohol cessation  -continue creon before meals.      #3. Rectal bleeding: intermittent, hgb stable, likely hemorrhoidal  -continue anusol supp  -monitor hgb  -no indication for colonoscopy at this time    ----------------------------------------------------------------------------------------------------------------    Subjective:     No complaints, eating well, moving bowels 3-4 times daily, last BM without blood.     Objective:     Vitals: Blood pressure 94/58, pulse 74, temperature 97.6 °F  "(36.4 °C), resp. rate 12, height 5' 4\" (1.626 m), weight 65.5 kg (144 lb 6.4 oz), SpO2 99%.,Body mass index is 24.79 kg/m².      Intake/Output Summary (Last 24 hours) at 12/28/2023 1047  Last data filed at 12/27/2023 1852  Gross per 24 hour   Intake 680 ml   Output --   Net 680 ml       Physical Exam:     General Appearance: Alert, appears stated age and cooperative; chronically ill appearing, jaundice and scleral icterus present  Lungs: Clear to auscultation bilaterally, no rales or rhonchi, no labored breathing/accessory muscle use  Heart: Regular rate and rhythm, S1, S2 normal, no murmur, click, rub or gallop  Abdomen: Soft, non-tender, non-distended; bowel sounds normal; no masses or no organomegaly  Extremities: No cyanosis, clubbing; mild LE edema b/l    Invasive Devices       Peripheral Intravenous Line  Duration             Peripheral IV 12/23/23 Distal;Right;Ventral (anterior) Forearm 5 days                    Lab Results:  Results from last 7 days   Lab Units 12/26/23  1339 12/26/23  0614 12/25/23  0259   WBC Thousand/uL  --  4.72 4.71   HEMOGLOBIN g/dL 8.4* 8.0* 8.0*   HEMATOCRIT %  --  23.1* 22.9*   PLATELETS Thousands/uL  --  70* 65*   NEUTROS PCT %  --   --  46   LYMPHS PCT %  --   --  41   MONOS PCT %  --   --  10   EOS PCT %  --   --  3     Results from last 7 days   Lab Units 12/28/23  0529 12/27/23  2101   POTASSIUM mmol/L  --  4.3   CHLORIDE mmol/L  --  98   CO2 mmol/L  --  19*   BUN mg/dL  --  20   CREATININE mg/dL  --  1.58*   CALCIUM mg/dL  --  10.0   ALK PHOS U/L 77  --    ALT U/L 16  --    AST U/L 37  --      Invalid input(s): \"BILI\"  Results from last 7 days   Lab Units 12/28/23  0529   INR  1.70*           Imaging Studies: I have personally reviewed pertinent imaging studies.    CT abdomen pelvis wo contrast    Result Date: 12/22/2023  Impression: No evidence of hematoma related to recent paracentesis. Small volume ascites throughout the abdomen and pelvis with mesenteric edema. Moderate " body wall edema. 4.8 x 4.4 cm left ovarian cyst which has substantially increased in size since a pelvic ultrasound done on August 7, 2017. Recommend yearly follow-up.. Workstation performed: [id number]     IR INPATIENT Paracentesis    Result Date: 12/22/2023  Impression: Successful therapeutic paracentesis. ATTESTATION I, the attending radiologist, was present for the entire procedure. Guidance images were reviewed and I am in agreement with the findings on the report. Attending physician: Antonio Archer. Resident:  Adonis Camara. Workstation performed: [id number]     XR chest 1 view portable    Result Date: 12/20/2023  Impression: Shifting bilateral right greater than the left pleural effusions Workstation performed: [id number]

## 2023-12-28 NOTE — ASSESSMENT & PLAN NOTE
Pt and  report dental I&D for oral abscess day prior to presentation 12/20/23, started on amoxicillin 500 mg PO Q6H. Acute infection may also have played a role in knocking patient into liver decompensation. Now resolved

## 2023-12-28 NOTE — ASSESSMENT & PLAN NOTE
Estimated Creatinine Clearance: 29.4 mL/min (A) (by C-G formula based on SCr of 1.58 mg/dL (H)).  No PRETTY this admission.  Cr near new baseline of 1.0-1.1  In the setting of type II hepatorenal syndrome.  - Midodrine 10mg TID to enhance renal perfusion  Cr stable after diuresis

## 2023-12-28 NOTE — PLAN OF CARE
Problem: PAIN - ADULT  Goal: Verbalizes/displays adequate comfort level or baseline comfort level  Description: Interventions:  - Encourage patient to monitor pain and request assistance  - Assess pain using appropriate pain scale  - Administer analgesics based on type and severity of pain and evaluate response  - Implement non-pharmacological measures as appropriate and evaluate response  - Consider cultural and social influences on pain and pain management  - Notify physician/advanced practitioner if interventions unsuccessful or patient reports new pain  Outcome: Progressing     Problem: INFECTION - ADULT  Goal: Absence or prevention of progression during hospitalization  Description: INTERVENTIONS:  - Assess and monitor for signs and symptoms of infection  - Monitor lab/diagnostic results  - Monitor all insertion sites, i.e. indwelling lines, tubes, and drains  - Monitor endotracheal if appropriate and nasal secretions for changes in amount and color  - [place] appropriate cooling/warming therapies per order  - Administer medications as ordered  - Instruct and encourage patient and family to use good hand hygiene technique  - Identify and instruct in appropriate isolation precautions for identified infection/condition  Outcome: Progressing  Goal: Absence of fever/infection during neutropenic period  Description: INTERVENTIONS:  - Monitor WBC    Outcome: Progressing     Problem: SAFETY ADULT  Goal: Patient will remain free of falls  Description: INTERVENTIONS:  - Educate patient/family on patient safety including physical limitations  - Instruct patient to call for assistance with activity   - Consult OT/PT to assist with strengthening/mobility   - Keep Call bell within reach  - Keep bed low and locked with side rails adjusted as appropriate  - Keep care items and personal belongings within reach  - Initiate and maintain comfort rounds  - Make Fall Risk Sign visible to staff  - Offer Toileting every  Hours,  in advance of need  - Initiate/Maintain alarm  - Obtain necessary fall risk management equipment:  - Apply yellow socks and bracelet for high fall risk patients  - Consider moving patient to room near nurses station  Outcome: Progressing  Goal: Maintain or return to baseline ADL function  Description: INTERVENTIONS:  -  Assess patient's ability to carry out ADLs; assess patient's baseline for ADL function and identify physical deficits which impact ability to perform ADLs (bathing, care of mouth/teeth, toileting, grooming, dressing, etc.)  - Assess/evaluate cause of self-care deficits   - Assess range of motion  - Assess patient's mobility; develop plan if impaired  - Assess patient's need for assistive devices and provide as appropriate  - Encourage maximum independence but intervene and supervise when necessary  - Involve family in performance of ADLs  - Assess for home care needs following discharge   - Consider OT consult to assist with ADL evaluation and planning for discharge  - Provide patient education as appropriate  Outcome: Progressing  Goal: Maintains/Returns to pre admission functional level  Description: INTERVENTIONS:  - Perform AM-PAC 6 Click Basic Mobility/ Daily Activity assessment daily.  - Set and communicate daily mobility goal to care team and patient/family/caregiver.   - Collaborate with rehabilitation services on mobility goals if consulted  - Perform Range of Motion  times a day.  - Reposition patient every  hours.  - Dangle patient  times a day  - Stand patient times a day  - Ambulate patient  times a day  - Out of bed to chair  times a day   - Out of bed for meals  times a day  - Out of bed for toileting  - Record patient progress and toleration of activity level   Outcome: Progressing     Problem: DISCHARGE PLANNING  Goal: Discharge to home or other facility with appropriate resources  Description: INTERVENTIONS:  - Identify barriers to discharge w/patient and caregiver  - Arrange for  needed discharge resources and transportation as appropriate  - Identify discharge learning needs (meds, wound care, etc.)  - Arrange for interpretive services to assist at discharge as needed  - Refer to Case Management Department for coordinating discharge planning if the patient needs post-hospital services based on physician/advanced practitioner order or complex needs related to functional status, cognitive ability, or social support system  Outcome: Progressing     Problem: Knowledge Deficit  Goal: Patient/family/caregiver demonstrates understanding of disease process, treatment plan, medications, and discharge instructions  Description: Complete learning assessment and assess knowledge base.  Interventions:  - Provide teaching at level of understanding  - Provide teaching via preferred learning methods  Outcome: Progressing     Problem: Prexisting or High Potential for Compromised Skin Integrity  Goal: Skin integrity is maintained or improved  Description: INTERVENTIONS:  - Identify patients at risk for skin breakdown  - Assess and monitor skin integrity  - Assess and monitor nutrition and hydration status  - Monitor labs   - Assess for incontinence   - Turn and reposition patient  - Assist with mobility/ambulation  - Relieve pressure over bony prominences  - Avoid friction and shearing  - Provide appropriate hygiene as needed including keeping skin clean and dry  - Evaluate need for skin moisturizer/barrier cream  - Collaborate with interdisciplinary team   - Patient/family teaching  - Consider wound care consult   Outcome: Progressing     Problem: Nutrition/Hydration-ADULT  Goal: Nutrient/Hydration intake appropriate for improving, restoring or maintaining nutritional needs  Description: Monitor and assess patient's nutrition/hydration status for malnutrition. Collaborate with interdisciplinary team and initiate plan and interventions as ordered.  Monitor patient's weight and dietary intake as ordered or  per policy. Utilize nutrition screening tool and intervene as necessary. Determine patient's food preferences and provide high-protein, high-caloric foods as appropriate.     INTERVENTIONS:  - Monitor oral intake, urinary output, labs, and treatment plans  - Assess nutrition and hydration status and recommend course of action  - Evaluate amount of meals eaten  - Assist patient with eating if necessary   - Allow adequate time for meals  - Recommend/ encourage appropriate diets, oral nutritional supplements, and vitamin/mineral supplements  - Order, calculate, and assess calorie counts as needed  - Recommend, monitor, and adjust tube feedings and TPN/PPN based on assessed needs  - Assess need for intravenous fluids  - Provide specific nutrition/hydration education as appropriate  - Include patient/family/caregiver in decisions related to nutrition  Outcome: Progressing

## 2023-12-28 NOTE — PROGRESS NOTES
NEPHROLOGY PROGRESS NOTE   Cassidy Zhang 68 y.o. female MRN: [id number]  Unit/Bed#: ALBARO -01 Encounter: [id number]  Reason for Consult:     ASSESSMENT AND PLAN:  67 yo woman with PMH of cirrhosis, hyponatremia p/w  LE and ascites.  Nephrology is consulted for management of hyponatremia and fluid overload        PLAN:     # Acute on chronic Hyposomolar Hyponatremia   Sodium on admission: 129  Serum osm: 289  Urine Sodium: 122  Current sodium 130 mEq/L improved with albumi and  diuretics   Etiology: Multifactorial likely secondary to elevated ADH in the settings of cirrhosis with hypoperfusion and intravascular depletion with third spacing   Plan:  Fluid restriction 1.5 L  Holding Lasix and Aldactone for now  Continue Albumin in + Bumex 1 mg twice daily (30 minutes after albumin)  Agree with Bmp Q 24h   Avoid overcorrection: no more than 6-8mEq in the next 24hr, goal </=132  Please monitor UOP,  Will monitor labs, call renal if SNa+ <126     #Volume status/hypertension:  Volume: Hypervolemic with third spacing  Blood pressure: hypotension , BP 94/59mmhg   Recommend:  midodrine to 10 mg 3 times daily  Albumin as above with Bumex 30 minutes after albumin  Monitor urinary output and daily weight     # Hypokalemia  Liberalize potassium in the diet  Replete with p.o. potassium as needed      # Liver cirrhosis  Ascites status post paracentesis  Management as per GI  MELD 29  Consider steroids if appropriate       #Non-Oliguric KDIGO PRETTY stage 1   Etiology: Likely secondary to HRS with worsening liver cirrhosis  Baseline creatinine: 1 mg/dL  Current creatinine: 1.48 mg/dL, not worsening  Peak creatinine: 1.58 mg/dL  UA: No hematuria, no leukocyturia  Renal imaging : No hydronephrosis on CT scan   Treatment:  No dialysis at this time  Maintain MAP:  Over 65 mmHg if possible/avoid hypoperfusion:  Hold parameters on blood pressure medications  Avoid nephrotoxic agents such as NSAIDs, and IV contrast if possible. Avoid  opioids   Adjust medications to GFR  Continue with HRS treatment  Midodrine 10 mg 3 times daily  Albumin twice daily  Octreotide 3 times daily subcutaneous        #Acid-base Disorder  serum HCO3 21 mEq/L, goal> 21  A  Non-anion gap metabolic acidosis secondary to PRETTY  Continue sodium bicarbonate for now plan is to decrease the dose to reduce sodium load     #Volume status/hypertension:  Volume: Hypervolemic with third spacing  Blood pressure: Hypotensive, BP 94/58 likely secondary to worsening liver disease  Recommend:  Albumin as above  Midodrine 10 mg 3 times daily        #Anemia:  Current hemoglobin: 8.4 mg/dL  Treatment:  Transfuse for hemoglobin less than 7.0 per primary service         SUBJECTIVE:  Patient seen and examined at bedside. No chest pain, shortness of breath, nausea, vomiting, abdominal pain or diarrhea.  OBJECTIVE:  Current Weight: Weight - Scale: 65.5 kg (144 lb 6.4 oz)  Vitals:    23 1019   BP: 94/58   Pulse:    Resp:    Temp:    SpO2:        Intake/Output Summary (Last 24 hours) at 2023 1326  Last data filed at 2023 1852  Gross per 24 hour   Intake 680 ml   Output --   Net 680 ml     Wt Readings from Last 3 Encounters:   23 65.5 kg (144 lb 6.4 oz)   23 65.8 kg (145 lb)   23 62.2 kg (137 lb 3.2 oz)     Temp Readings from Last 3 Encounters:   23 97.6 °F (36.4 °C)   23 (!) 97 °F (36.1 °C) (Tympanic)   23 98.8 °F (37.1 °C)     BP Readings from Last 3 Encounters:   23 94/58   23 110/70   23 121/56     Pulse Readings from Last 3 Encounters:   23 74   23 70   23 71        General:  no acute distress at this time  Skin:  No acute rash  Eyes:  No scleral icterus and noninjected  ENT:  mucous membranes moist  Neck:  no carotid bruits  Chest:  Clear to auscultation percussion, good respiratory effort, no use of accessory respiratory muscles  CVS:  Regular rate and rhythm without rub   Abdomen:  soft and nontender    Extremities: lower extremity edema  Neuro:  No gross focality  Psych:  Alert , cooperative       Medications:    Current Facility-Administered Medications:     acetaminophen (TYLENOL) tablet 650 mg, 650 mg, Oral, Q8H PRN, Terrell Mayberry MD, 650 mg at 12/21/23 0350    albumin human (FLEXBUMIN) 25 % injection 25 g, 25 g, Intravenous, BID, Joselyn Reyes Bahamonde, MD, Last Rate: 0 mL/hr at 12/27/23 1901, 25 g at 12/28/23 0929    bumetanide (BUMEX) injection 1 mg, 1 mg, Intravenous, BID, Joselyn Reyes Bahamonde, MD, 1 mg at 12/27/23 1917    Diclofenac Sodium (VOLTAREN) 1 % topical gel 2 g, 2 g, Topical, Q6H PRN, Terrell Mayberry MD    ferrous sulfate tablet 325 mg, 325 mg, Oral, Daily With Breakfast, Terrell Mayberry MD, 325 mg at 12/28/23 0919    hydrocortisone (ANUSOL-HC) rectal suppository 25 mg, 25 mg, Rectal, BID, Destiney Camarillo PA-C, 25 mg at 12/28/23 0929    lactulose (CHRONULAC) oral solution 20 g, 20 g, Oral, BID, Terrell Mayberry MD, 20 g at 12/28/23 0919    midodrine (PROAMATINE) tablet 10 mg, 10 mg, Oral, TID AC, Joselyn Reyes Bahamonde, MD, 10 mg at 12/28/23 1200    octreotide (SandoSTATIN) injection 100 mcg, 100 mcg, Subcutaneous, Q8H HAJA, Joselyn Reyes Bahamonde, MD, 100 mcg at 12/28/23 0621    pancrelipase (Lip-Prot-Amyl) (CREON) delayed release capsule 24,000 Units, 24,000 Units, Oral, TID With Meals, Terrell Mayberry MD, 24,000 Units at 12/28/23 1200    pantoprazole (PROTONIX) EC tablet 40 mg, 40 mg, Oral, BID AC, AQUILES Colby, 40 mg at 12/28/23 0921    sodium bicarbonate tablet 1,300 mg, 1,300 mg, Oral, BID after meals, Chelsy Sorensen MD, 1,300 mg at 12/28/23 0919    trimethobenzamide (TIGAN) IM injection 200 mg, 200 mg, Intramuscular, Q6H PRN, Jodie Larose MD, 200 mg at 12/21/23 1249    Laboratory Results:  Results from last 7 days   Lab Units 12/28/23  0529 12/27/23  2101 12/27/23  0454 12/26/23  1339 12/26/23  0614 12/26/23  0403 12/25/23  0259 12/24/23  0504 12/23/23 2010  12/23/23  0431 12/22/23  0929 12/22/23  0819   WBC Thousand/uL  --   --   --   --  4.72  --  4.71 3.74* 3.91* 3.72*  --  5.04   HEMOGLOBIN g/dL  --   --   --  8.4* 8.0*  --  8.0* 7.1* 7.2* 6.1* 7.1* 6.8*   HEMATOCRIT %  --   --   --   --  23.1*  --  22.9* 20.1* 21.1* 18.7*  --  20.2*   PLATELETS Thousands/uL  --   --   --   --  70*  --  65* 77* 69* 75*  --  90*   SODIUM mmol/L 130* 128* 126*  --   --  131* 134* 138  --  138  --  136   POTASSIUM mmol/L 4.1 4.3 4.5  --   --  3.5 3.4* 3.3*  --  3.6  --  3.0*   CHLORIDE mmol/L 97 98 102  --   --  104 108 112*  --  113*  --  110*   CO2 mmol/L 21 19* 20*  --   --  19* 20* 19*  --  18*  --  17*   BUN mg/dL 19 20 19  --   --  16 13 13  --  17  --  20   CREATININE mg/dL 1.48* 1.58* 1.56*  --   --  1.20 0.97 0.95  --  0.97  --  1.04   CALCIUM mg/dL 10.4* 10.0 9.2  --   --  9.3 9.2 9.2  --  9.4  --  9.1   MAGNESIUM mg/dL  --   --  2.1  --   --  1.4* 1.8* 1.4*  --  1.3*  --   --    PHOSPHORUS mg/dL  --   --   --   --   --   --  2.4  --   --   --   --   --        CT abdomen pelvis wo contrast   Final Result by Breonna Trevino MD (12/22 1725)      No evidence of hematoma related to recent paracentesis.      Small volume ascites throughout the abdomen and pelvis with mesenteric edema. Moderate body wall edema.      4.8 x 4.4 cm left ovarian cyst which has substantially increased in size since a pelvic ultrasound done on August 7, 2017. Recommend yearly follow-up..            Workstation performed: [id number]         IR INPATIENT Paracentesis   Final Result by Antonio Archer MD (12/22 1704)   Successful therapeutic paracentesis.      ATTESTATION      I, the attending radiologist, was present for the entire procedure. Guidance images were reviewed and I am in agreement with the findings on the report.      Attending physician: Antonio Archer.   Resident:  Adonis Camara.         Workstation performed: [id number]         XR chest 1 view portable   ED Interpretation by Stephen Ng  "DO Julián (12/20 1234)   Bilateral pulmonary edema with bilateral pleural effusions, right worse than left      Final Result by Westley Vang MD (12/20 9550)      Shifting bilateral right greater than the left pleural effusions                  Workstation performed: [id number]         XR chest 1 view    (Results Pending)       Portions of the record may have been created with voice recognition software. Occasional wrong word or \"sound a like\" substitutions may have occurred due to the inherent limitations of voice recognition software. Read the chart carefully and recognize, using context, where substitutions have occurred.    "

## 2023-12-28 NOTE — ASSESSMENT & PLAN NOTE
History of SIADH, however suspect also a component of hypotonic hypervolemic hyponatremia in the setting of liver failure complicated by ascites and anasarca. Sodium improving.     Plan:  Hold home Na tablets, --starting NaHCO3 tabs for metabolic alkalosis  Fluid restriction 1.5 L  Discontinue Lasix and Aldactone for now  Albumin in + Bumex 1 mg twice daily (30 minutes after albumin)  BMP at 6 PM  BMP every 24  Avoid overcorrection: no more than 6-8mEq in the next 24hr, goal </=132  Please monitor UOP  Will monitor labs, call renal if SNa+ <126  Follow nephrology recs

## 2023-12-28 NOTE — PROGRESS NOTES
[facility]  Progress Note  Name: Cassidy Zahng I  MRN: [id number]  Unit/Bed#: [facility]BARO -01 I Date of Admission: 12/20/2023   Date of Service: 12/28/2023 I Hospital Day: 8    Assessment/Plan   * Decompensated liver disease (HCC)  Assessment & Plan  Presents for ascites and anasarca, found also to have mild hyponatremia, hypoalbuminemia, hyperbilirubinemia, hypocoagulability, anemia, and thrombocytopenia. Ammonia wnl.  No asterixis on exam, pt A&O x3, maybe some mild confusion (delayed when answering questions, but appropriate), though pt at or near her baseline per spouse.  Reports holding lactulose for last 2-3 days for frequent BMs at home (>5 loose BM per day)  Recently discontinued lasix and spironolactone in the o/p setting d/t worsening renal function and hyponatremia   MELD score 23 on presentation  Last drink 5-6 years ago    MELD 3.0: 28 at 12/28/2023  5:29 AM  MELD-Na: 27 at 12/28/2023  5:29 AM  Calculated from:  Serum Creatinine: 1.58 mg/dL at 12/27/2023  9:01 PM  Serum Sodium: 128 mmol/L at 12/27/2023  9:01 PM  Total Bilirubin: 4.00 mg/dL at 12/28/2023  5:29 AM  Serum Albumin: 3.6 g/dL (Using max of 3.5 g/dL) at 12/28/2023  5:29 AM  INR(ratio): 1.70 at 12/28/2023  5:29 AM  Age at listing (hypothetical): 68 years  Sex: Female at 12/28/2023  5:29 AM        Plan:  Diet: High protein, low salt, fluid restrict 1500 cc/day  Resume lactulose 10 g BID and titrate to target 3-5 loose BM per day  Monitor MELD labs  Monitor Hgb and transfuse for Hgb<7  GI and Nephro appreicated  IR paracentesis drained 2.4L fluid - labs unremarkabl  Tigan for nausea   Peth negative   potential liver transplant evaluation  May need recurrent paracentesis as on outpatient   Octreotide q8h  Due to worsening MELD - infectious workup with UA (negative), CXR, and blood cultures    Coagulopathy (HCC)  Assessment & Plan  2/2 cirrhosis  INR at 1.7 and no acute bleeding     Acute blood loss anemia  Assessment &  Plan  Stable after 1 unit pRBC this admission and 2 units of FFP  Stable hemorrhoidal bleed.   Hgb this afternoon stable     Status post incision and drainage  Assessment & Plan  Pt and  report dental I&D for oral abscess day prior to presentation 12/20/23, started on amoxicillin 500 mg PO Q6H. Acute infection may also have played a role in knocking patient into liver decompensation. Now resolved    Metabolic acidosis  Assessment & Plan  Nephrology on board- currently giving 1300 BID of bicarbonate    CKD (chronic kidney disease) stage 3, GFR 30-59 ml/min (HCC)  Assessment & Plan  Estimated Creatinine Clearance: 29.4 mL/min (A) (by C-G formula based on SCr of 1.58 mg/dL (H)).  No PRETTY this admission.  Cr near new baseline of 1.0-1.1  In the setting of type II hepatorenal syndrome.  - Midodrine 10mg TID to enhance renal perfusion  Cr stable after diuresis      Chronic pancreatitis (HCC)  Assessment & Plan  2/2 EtOH abuse, now in remission.     Continue home pancrealipase    Ascites/anasarca  Assessment & Plan  2/2 end stage liver disease, as noted above    Plan:  Diuresis, fluid restriction, dietary salt restriction as above   Nephrology and GI consults, as above  Paracentesis drained 2.4L    Chronic hyponatremia  Assessment & Plan  History of SIADH, however suspect also a component of hypotonic hypervolemic hyponatremia in the setting of liver failure complicated by ascites and anasarca. Sodium improving.     Plan:  Hold home Na tablets, --starting NaHCO3 tabs for metabolic alkalosis  Fluid restriction 1.5 L  Discontinue Lasix and Aldactone for now  Albumin in + Bumex 1 mg twice daily (30 minutes after albumin)  BMP at 6 PM  BMP every 24  Avoid overcorrection: no more than 6-8mEq in the next 24hr, goal </=132  Please monitor UOP  Will monitor labs, call renal if SNa+ <126  Follow nephrology recs      Essential hypertension  Assessment & Plan  BP now low due to cirrhosis.  Hold Norvasc and ARB as pt on  diuretics and needing midodrine           VTE Pharmacologic Prophylaxis: VTE Score: 7 High Risk (Score >/= 5) - Pharmacological DVT Prophylaxis Contraindicated. Sequential Compression Devices Ordered.    Mobility:   Basic Mobility Inpatient Raw Score: 16  JH-HLM Goal: 5: Stand one or more mins  JH-HLM Achieved: 6: Walk 10 steps or more  HLM Goal NOT achieved. Continue with multidisciplinary rounding and encourage appropriate mobility to improve upon HLM goals.    Patient Centered Rounds: I performed bedside rounds with nursing staff today.   Discussions with Specialists or Other Care Team Provider: GI and Nephro    Education and Discussions with Family / Patient: Updated  () via phone.    Total Time Spent on Date of Encounter in care of patient: 25 mins. This time was spent on one or more of the following: performing physical exam; counseling and coordination of care; obtaining or reviewing history; documenting in the medical record; reviewing/ordering tests, medications or procedures; communicating with other healthcare professionals and discussing with patient's family/caregivers.    Current Length of Stay: 8 day(s)  Current Patient Status: Inpatient   Certification Statement: The patient will continue to require additional inpatient hospital stay due to hyponatremia  Discharge Plan: Anticipate discharge in 24-48 hrs to home.    Code Status: Level 1 - Full Code    Subjective:   Patient seen and examined at bedside this morning.  No acute events overnight.  Patient states she did not notice any more blood in her stool.  States she is peeing significantly.  No other acute concerns at this time.    Objective:     Vitals:   Temp (24hrs), Av.7 °F (36.5 °C), Min:97.5 °F (36.4 °C), Max:97.8 °F (36.6 °C)    Temp:  [97.5 °F (36.4 °C)-97.8 °F (36.6 °C)] 97.6 °F (36.4 °C)  HR:  [68-83] 74  Resp:  [12-16] 12  BP: ()/(46-58) 94/58  SpO2:  [99 %-100 %] 99 %  Body mass index is 24.79 kg/m².      Input and Output Summary (last 24 hours):     Intake/Output Summary (Last 24 hours) at 12/28/2023 1139  Last data filed at 12/27/2023 1852  Gross per 24 hour   Intake 680 ml   Output --   Net 680 ml       Physical Exam:   Physical Exam  Constitutional:       General: She is not in acute distress.     Appearance: Normal appearance.   HENT:      Head: Normocephalic and atraumatic.      Right Ear: External ear normal.      Left Ear: External ear normal.      Nose: Nose normal. No congestion.      Mouth/Throat:      Mouth: Mucous membranes are moist.      Pharynx: Oropharynx is clear.   Eyes:      General: Scleral icterus (mild) present.      Extraocular Movements: Extraocular movements intact.      Conjunctiva/sclera: Conjunctivae normal.   Cardiovascular:      Rate and Rhythm: Normal rate and regular rhythm.      Heart sounds: No murmur heard.  Pulmonary:      Effort: Pulmonary effort is normal. No respiratory distress.      Breath sounds: Normal breath sounds. No wheezing or rales.   Abdominal:      General: Bowel sounds are normal. There is distension.      Palpations: Abdomen is soft.      Tenderness: There is no abdominal tenderness.   Musculoskeletal:         General: Swelling (bilateral LE pitting) present. No tenderness.      Cervical back: Normal range of motion and neck supple.   Skin:     General: Skin is warm and dry.      Coloration: Skin is jaundiced.   Neurological:      Mental Status: She is alert and oriented to person, place, and time. Mental status is at baseline.   Psychiatric:         Mood and Affect: Mood normal.          Additional Data:     Labs:  Results from last 7 days   Lab Units 12/26/23  1339 12/26/23  0614 12/25/23  0259   WBC Thousand/uL  --  4.72 4.71   HEMOGLOBIN g/dL 8.4* 8.0* 8.0*   HEMATOCRIT %  --  23.1* 22.9*   PLATELETS Thousands/uL  --  70* 65*   NEUTROS PCT %  --   --  46   LYMPHS PCT %  --   --  41   MONOS PCT %  --   --  10   EOS PCT %  --   --  3     Results from last 7  days   Lab Units 12/28/23  0529   SODIUM mmol/L 130*   POTASSIUM mmol/L 4.1   CHLORIDE mmol/L 97   CO2 mmol/L 21   BUN mg/dL 19   CREATININE mg/dL 1.48*   ANION GAP mmol/L 12   CALCIUM mg/dL 10.4*   ALBUMIN g/dL 3.6   TOTAL BILIRUBIN mg/dL 4.00*   ALK PHOS U/L 77   ALT U/L 16   AST U/L 37   GLUCOSE RANDOM mg/dL 136     Results from last 7 days   Lab Units 12/28/23  0529   INR  1.70*                   Lines/Drains:  Invasive Devices       Peripheral Intravenous Line  Duration             Peripheral IV 12/23/23 Distal;Right;Ventral (anterior) Forearm 5 days                          Imaging: No pertinent imaging reviewed.    Recent Cultures (last 7 days):   Results from last 7 days   Lab Units 12/21/23  1637   GRAM STAIN RESULT  No Polys or Bacteria seen   BODY FLUID CULTURE, STERILE  No growth       Last 24 Hours Medication List:   Current Facility-Administered Medications   Medication Dose Route Frequency Provider Last Rate    acetaminophen  650 mg Oral Q8H PRN Terrell Mayberry MD      Albumin 25%  25 g Intravenous BID Joselyn Reyes Bahamonde, MD 0 g (12/27/23 1901)    bumetanide  1 mg Intravenous BID Joselyn Reyes Bahamonde, MD      Diclofenac Sodium  2 g Topical Q6H PRN Terrell Mayberry MD      ferrous sulfate  325 mg Oral Daily With Breakfast Terrell Mayberry MD      hydrocortisone  25 mg Rectal BID Destiney Camarillo PA-C      lactulose  20 g Oral BID Terrell Mayberry MD      midodrine  10 mg Oral TID AC Joselyn Reyes Bahamonde, MD      octreotide  100 mcg Subcutaneous Q8H [facility] Joselyn Reyes Bahamonde, MD      pancrelipase (Lip-Prot-Amyl)  24,000 Units Oral TID With Meals Terrell Mayberry MD      pantoprazole  40 mg Oral BID AC AQUILES Colby      sodium bicarbonate  1,300 mg Oral BID after meals Chelsy Sorensen MD      trimethobenzamide  200 mg Intramuscular Q6H PRN Jodie Larose MD          Today, Patient Was Seen By: Dania Quiñones MD    **Please Note: This note may have been constructed using a voice  recognition system.**

## 2023-12-28 NOTE — PROGRESS NOTES
[facility]  Progress Note  Name: Cassidy Zhang I  MRN: [id number]  Unit/Bed#: ALBARO -01 I Date of Admission: 12/20/2023   Date of Service: 12/27/2023 I Hospital Day: 7    Assessment/Plan   * Decompensated liver disease (HCC)  Assessment & Plan  Presents for ascites and anasarca, found also to have mild hyponatremia, hypoalbuminemia, hyperbilirubinemia, hypocoagulability, anemia, and thrombocytopenia. Ammonia wnl.  No asterixis on exam, pt A&O x3, maybe some mild confusion (delayed when answering questions, but appropriate), though pt at or near her baseline per spouse.  Reports holding lactulose for last 2-3 days for frequent BMs at home (>5 loose BM per day) - now on lactulose bid  Recently discontinued lasix and spironolactone in the o/p setting d/t worsening renal function and hyponatremia - now on PO Lasix and Aldactone  MELD score 23 on presentation  Last drink 5-6 years ago    MELD 3.0: 21 at 12/24/2023  5:04 AM  MELD-Na: 21 at 12/24/2023  5:04 AM  Calculated from:  Serum Creatinine: 0.95 mg/dL (Using min of 1 mg/dL) at 12/24/2023  5:04 AM  Serum Sodium: 138 mmol/L (Using max of 137 mmol/L) at 12/24/2023  5:04 AM  Total Bilirubin: 3.72 mg/dL at 12/24/2023  5:04 AM  Serum Albumin: 3.1 g/dL at 12/24/2023  5:04 AM  INR(ratio): 2.25 at 12/24/2023  5:04 AM  Age at listing (hypothetical): 68 years  Sex: Female at 12/24/2023  5:04 AM        Plan:  IV Lasix/Albumin -> PO Lasix and Aldactone  Diet: High protein, low salt, fluid restrict 1500 cc/day  Resume lactulose 20 mg BID and titrate to target 3-5 loose BM per day  Monitor MELD labs  Monitor Hgb and transfuse for Hgb<7  GI and Nephro appreicated  IR paracentesis drained 2.4L fluid - labs unremarkabl  Tigan for nausea   Peth negative   potential liver transplant evaluation  May need recurrent paracentesis as on outpatient     Chronic hyponatremia  Assessment & Plan  History of SIADH, however suspect also a component of hypotonic  hypervolemic hyponatremia in the setting of liver failure complicated by ascites and anasarca.     Plan:  U Na 122  Na normalized after diuretics  Hold home Na tablets, --starting NaHCO3 tabs for metabolic alkalosis  Nephrology appreciated        Coagulopathy (HCC)  Assessment & Plan  2/2 cirrhosis  Today give 2U FFP to reverse coagulopathy - check INR after transfusion.  If INR > 1.7 and PRBC needed, would give more FFP    Acute blood loss anemia  Assessment & Plan  Pt so far received 2U PRBC  Today pt w/ 4 oz BRBPR so ordered 1U PRBC - recheck Hgb after transfusion.  In setting of ABLA, keep Hgb at 7.5  If pt has another episode of BRBPR, would do CT lower GIB.    Clears diet    Status post incision and drainage  Assessment & Plan  Pt and  report dental I&D for oral abscess day prior to presentation 12/20/23, started on amoxicillin 500 mg PO Q6H. Acute infection may also have played a role in knocking patient into liver decompensation.     Continue [id number] amoxicillin    Metabolic acidosis  Assessment & Plan    Nephrology on board- currently giving 1300 BID of bicarbonate    CKD (chronic kidney disease) stage 3, GFR 30-59 ml/min ([facility])  Assessment & Plan  Estimated Creatinine Clearance: 48.9 mL/min (by C-G formula based on SCr of 0.95 mg/dL).  No PRETTY this admission.  Cr near new baseline of 1.0-1.1  In the setting of type II hepatorenal syndrome.  - Midodrine 5mg TID to enhance renal perfusion  Cr stable after diuresis      Chronic pancreatitis (HCC)  Assessment & Plan  2/2 EtOH abuse, now in remission.     Continue home pancrealipase    Ascites/anasarca  Assessment & Plan  2/2 end stage liver disease, as noted above    Plan:  Diuresis, fluid restriction, dietary salt restriction as above   Nephrology and GI consults, as above  Paracentesis drained 2.4L    Essential hypertension  Assessment & Plan  BP now low due to cirrhosis.  Hold Norvasc and ARB as pt on diuretics and needing midodrine                VTE Pharmacologic Prophylaxis: VTE Score: 7 High Risk (Score >/= 5) - Pharmacological DVT Prophylaxis Contraindicated. Sequential Compression Devices Ordered.    Mobility:   Basic Mobility Inpatient Raw Score: 16  JH-HLM Goal: 5: Stand one or more mins  JH-HLM Achieved: 4: Move to chair/commode  HLM Goal achieved. Continue to encourage appropriate mobility.    Patient Centered Rounds: I performed bedside rounds with nursing staff today.   Discussions with Specialists or Other Care Team Provider: GI    Education and Discussions with Family / Patient: Updated  ( and other family member) at bedside.      Current Length of Stay: 7 day(s)  Current Patient Status: Inpatient   Certification Statement: The patient will continue to require additional inpatient hospital stay due to ABLA  Discharge Plan: Anticipate discharge in 24-48 hrs to home.    Code Status: Level 1 - Full Code    Subjective:   Pt reports improved/resolving BRBPR. Pt denies confusion, dizziness, SoB, CP, Abd pain.     Objective:     Vitals:   Temp (24hrs), Av °F (36.7 °C), Min:97.8 °F (36.6 °C), Max:98.4 °F (36.9 °C)    Temp:  [97.8 °F (36.6 °C)-98.4 °F (36.9 °C)] 97.8 °F (36.6 °C)  HR:  [77-84] 77  Resp:  [16] 16  BP: (102-107)/(53-58) 107/58  SpO2:  [99 %-100 %] 100 %  Body mass index is 24.41 kg/m².     Input and Output Summary (last 24 hours):     Intake/Output Summary (Last 24 hours) at 2023 1910  Last data filed at 2023 1852  Gross per 24 hour   Intake 860 ml   Output --   Net 860 ml         Physical Exam:   Physical Exam  HENT:      Head: Normocephalic and atraumatic.      Nose: Nose normal.      Mouth/Throat:      Mouth: Mucous membranes are moist.   Eyes:      Extraocular Movements: Extraocular movements intact.      Conjunctiva/sclera: Conjunctivae normal.   Cardiovascular:      Rate and Rhythm: Normal rate and regular rhythm.   Pulmonary:      Effort: Pulmonary effort is normal.      Breath sounds:  Normal breath sounds.   Abdominal:      General: Bowel sounds are normal. There is no distension.      Palpations: Abdomen is soft.      Tenderness: There is no abdominal tenderness.   Musculoskeletal:         General: Normal range of motion.      Cervical back: Normal range of motion and neck supple.      Right lower leg: No edema.      Left lower leg: No edema.   Skin:     General: Skin is warm and dry.   Neurological:      Mental Status: She is alert and oriented to person, place, and time.          Additional Data:     Labs:  Results from last 7 days   Lab Units 12/26/23  1339 12/26/23  0614 12/25/23  0259   WBC Thousand/uL  --  4.72 4.71   HEMOGLOBIN g/dL 8.4* 8.0* 8.0*   HEMATOCRIT %  --  23.1* 22.9*   PLATELETS Thousands/uL  --  70* 65*   NEUTROS PCT %  --   --  46   LYMPHS PCT %  --   --  41   MONOS PCT %  --   --  10   EOS PCT %  --   --  3     Results from last 7 days   Lab Units 12/27/23  0454 12/26/23  0403   SODIUM mmol/L 126* 131*   POTASSIUM mmol/L 4.5 3.5   CHLORIDE mmol/L 102 104   CO2 mmol/L 20* 19*   BUN mg/dL 19 16   CREATININE mg/dL 1.56* 1.20   ANION GAP mmol/L 4 8   CALCIUM mg/dL 9.2 9.3   ALBUMIN g/dL  --  2.8*   TOTAL BILIRUBIN mg/dL  --  4.38*   ALK PHOS U/L  --  60   ALT U/L  --  15   AST U/L  --  27   GLUCOSE RANDOM mg/dL 109 91     Results from last 7 days   Lab Units 12/26/23  0403   INR  1.79*                   Lines/Drains:  Invasive Devices       Peripheral Intravenous Line  Duration             Peripheral IV 12/23/23 Distal;Right;Ventral (anterior) Forearm 4 days                          Imaging: No pertinent imaging reviewed.    Recent Cultures (last 7 days):   Results from last 7 days   Lab Units 12/21/23  1637   GRAM STAIN RESULT  No Polys or Bacteria seen   BODY FLUID CULTURE, STERILE  No growth       Last 24 Hours Medication List:   Current Facility-Administered Medications   Medication Dose Route Frequency Provider Last Rate    acetaminophen  650 mg Oral Q8H PRN Terrell Mayberry  MD      Albumin 25%  25 g Intravenous BID Joselyn Reyes Bahamonde, MD Stopped (12/27/23 1901)    bumetanide  1 mg Intravenous BID Joselyn Reyes Bahamonde, MD      Diclofenac Sodium  2 g Topical Q6H PRN Terrell Mayberry MD      ferrous sulfate  325 mg Oral Daily With Breakfast Terrell Mayberry MD      hydrocortisone  25 mg Rectal BID Destiney Camarillo PA-C      lactulose  20 g Oral BID Terrell Mayberry MD      midodrine  10 mg Oral TID AC Joselyn Reyes Bahamonde, MD      octreotide  100 mcg Subcutaneous Q8H HAJA Joselyn Reyes Bahamonde, MD      pancrelipase (Lip-Prot-Amyl)  24,000 Units Oral TID With Meals Terrell Mayberry MD      pantoprazole  40 mg Oral BID AC AQUILES Colby      sodium bicarbonate  1,300 mg Oral BID after meals Chelsy Sorensen MD      trimethobenzamide  200 mg Intramuscular Q6H PRN Jodie Larose MD          Today, Patient Was Seen By: Sunday Silva MD    **Please Note: This note may have been constructed using a voice recognition system.**

## 2023-12-29 DIAGNOSIS — K74.69 DECOMPENSATED LIVER DISEASE (HCC): ICD-10-CM

## 2023-12-29 LAB
ABO GROUP BLD: NORMAL
ALBUMIN SERPL BCP-MCNC: 3.3 G/DL (ref 3.5–5)
ALP SERPL-CCNC: 54 U/L (ref 34–104)
ALT SERPL W P-5'-P-CCNC: 15 U/L (ref 7–52)
ANION GAP SERPL CALCULATED.3IONS-SCNC: 7 MMOL/L
AST SERPL W P-5'-P-CCNC: 35 U/L (ref 13–39)
BILIRUB DIRECT SERPL-MCNC: 1.54 MG/DL (ref 0–0.2)
BILIRUB SERPL-MCNC: 3.61 MG/DL (ref 0.2–1)
BLD GP AB SCN SERPL QL: NEGATIVE
BUN SERPL-MCNC: 19 MG/DL (ref 5–25)
CALCIUM SERPL-MCNC: 9.7 MG/DL (ref 8.4–10.2)
CHLORIDE SERPL-SCNC: 97 MMOL/L (ref 96–108)
CO2 SERPL-SCNC: 25 MMOL/L (ref 21–32)
CREAT SERPL-MCNC: 1.34 MG/DL (ref 0.6–1.3)
ERYTHROCYTE [DISTWIDTH] IN BLOOD BY AUTOMATED COUNT: 15.9 % (ref 11.6–15.1)
GFR SERPL CREATININE-BSD FRML MDRD: 40 ML/MIN/1.73SQ M
GLUCOSE SERPL-MCNC: 138 MG/DL (ref 65–140)
HCT VFR BLD AUTO: 19 % (ref 34.8–46.1)
HCT VFR BLD AUTO: 21.9 % (ref 34.8–46.1)
HCT VFR BLD AUTO: 23.4 % (ref 34.8–46.1)
HGB BLD-MCNC: 6.5 G/DL (ref 11.5–15.4)
HGB BLD-MCNC: 7.3 G/DL (ref 11.5–15.4)
HGB BLD-MCNC: 7.8 G/DL (ref 11.5–15.4)
INR PPP: 1.88 (ref 0.84–1.19)
MCH RBC QN AUTO: 34 PG (ref 26.8–34.3)
MCHC RBC AUTO-ENTMCNC: 34.2 G/DL (ref 31.4–37.4)
MCV RBC AUTO: 100 FL (ref 82–98)
MISCELLANEOUS LAB TEST RESULT: NORMAL
PLATELET # BLD AUTO: 63 THOUSANDS/UL (ref 149–390)
PMV BLD AUTO: 11.6 FL (ref 8.9–12.7)
POTASSIUM SERPL-SCNC: 3.5 MMOL/L (ref 3.5–5.3)
PROT SERPL-MCNC: 5.8 G/DL (ref 6.4–8.4)
PROTHROMBIN TIME: 22.5 SECONDS (ref 11.6–14.5)
RBC # BLD AUTO: 1.91 MILLION/UL (ref 3.81–5.12)
RH BLD: POSITIVE
SODIUM SERPL-SCNC: 129 MMOL/L (ref 135–147)
SPECIMEN EXPIRATION DATE: NORMAL
WBC # BLD AUTO: 3.85 THOUSAND/UL (ref 4.31–10.16)

## 2023-12-29 PROCEDURE — 85014 HEMATOCRIT: CPT

## 2023-12-29 PROCEDURE — 99232 SBSQ HOSP IP/OBS MODERATE 35: CPT | Performed by: HOSPITALIST

## 2023-12-29 PROCEDURE — 99232 SBSQ HOSP IP/OBS MODERATE 35: CPT | Performed by: INTERNAL MEDICINE

## 2023-12-29 PROCEDURE — 86923 COMPATIBILITY TEST ELECTRIC: CPT

## 2023-12-29 PROCEDURE — 85027 COMPLETE CBC AUTOMATED: CPT

## 2023-12-29 PROCEDURE — 80048 BASIC METABOLIC PNL TOTAL CA: CPT

## 2023-12-29 PROCEDURE — 86901 BLOOD TYPING SEROLOGIC RH(D): CPT

## 2023-12-29 PROCEDURE — 85018 HEMOGLOBIN: CPT

## 2023-12-29 PROCEDURE — 86850 RBC ANTIBODY SCREEN: CPT

## 2023-12-29 PROCEDURE — P9040 RBC LEUKOREDUCED IRRADIATED: HCPCS

## 2023-12-29 PROCEDURE — 80076 HEPATIC FUNCTION PANEL: CPT | Performed by: PHYSICIAN ASSISTANT

## 2023-12-29 PROCEDURE — 85610 PROTHROMBIN TIME: CPT

## 2023-12-29 PROCEDURE — 86900 BLOOD TYPING SEROLOGIC ABO: CPT

## 2023-12-29 RX ORDER — LACTULOSE 10 G/15ML
SOLUTION ORAL
Qty: 5400 ML | Refills: 1 | Status: SHIPPED | OUTPATIENT
Start: 2023-12-29 | End: 2024-01-02

## 2023-12-29 RX ADMIN — OCTREOTIDE ACETATE 100 MCG: 100 INJECTION, SOLUTION INTRAVENOUS; SUBCUTANEOUS at 13:40

## 2023-12-29 RX ADMIN — OCTREOTIDE ACETATE 100 MCG: 100 INJECTION, SOLUTION INTRAVENOUS; SUBCUTANEOUS at 23:40

## 2023-12-29 RX ADMIN — MIDODRINE HYDROCHLORIDE 10 MG: 5 TABLET ORAL at 06:34

## 2023-12-29 RX ADMIN — MIDODRINE HYDROCHLORIDE 12.5 MG: 5 TABLET ORAL at 18:03

## 2023-12-29 RX ADMIN — ALBUMIN (HUMAN) 25 G: 0.25 INJECTION, SOLUTION INTRAVENOUS at 09:48

## 2023-12-29 RX ADMIN — ALBUMIN (HUMAN) 25 G: 0.25 INJECTION, SOLUTION INTRAVENOUS at 18:02

## 2023-12-29 RX ADMIN — SODIUM BICARBONATE 650 MG TABLET 1300 MG: at 08:17

## 2023-12-29 RX ADMIN — OCTREOTIDE ACETATE 100 MCG: 100 INJECTION, SOLUTION INTRAVENOUS; SUBCUTANEOUS at 06:36

## 2023-12-29 RX ADMIN — MIDODRINE HYDROCHLORIDE 12.5 MG: 5 TABLET ORAL at 10:36

## 2023-12-29 RX ADMIN — FERROUS SULFATE TAB 325 MG (65 MG ELEMENTAL FE) 325 MG: 325 (65 FE) TAB at 08:17

## 2023-12-29 RX ADMIN — HYDROCORTISONE ACETATE 25 MG: 25 SUPPOSITORY RECTAL at 09:46

## 2023-12-29 RX ADMIN — PANTOPRAZOLE SODIUM 40 MG: 40 TABLET, DELAYED RELEASE ORAL at 18:03

## 2023-12-29 RX ADMIN — PANCRELIPASE 24000 UNITS: 24000; 76000; 120000 CAPSULE, DELAYED RELEASE PELLETS ORAL at 18:02

## 2023-12-29 RX ADMIN — PANCRELIPASE 24000 UNITS: 24000; 76000; 120000 CAPSULE, DELAYED RELEASE PELLETS ORAL at 08:17

## 2023-12-29 RX ADMIN — PANTOPRAZOLE SODIUM 40 MG: 40 TABLET, DELAYED RELEASE ORAL at 06:34

## 2023-12-29 RX ADMIN — HYDROCORTISONE ACETATE 25 MG: 25 SUPPOSITORY RECTAL at 23:40

## 2023-12-29 RX ADMIN — SODIUM BICARBONATE 650 MG TABLET 1300 MG: at 18:03

## 2023-12-29 RX ADMIN — LACTULOSE 20 G: 20 SOLUTION ORAL at 18:02

## 2023-12-29 RX ADMIN — PANCRELIPASE 24000 UNITS: 24000; 76000; 120000 CAPSULE, DELAYED RELEASE PELLETS ORAL at 13:40

## 2023-12-29 NOTE — PLAN OF CARE
Problem: PAIN - ADULT  Goal: Verbalizes/displays adequate comfort level or baseline comfort level  Description: Interventions:  - Encourage patient to monitor pain and request assistance  - Assess pain using appropriate pain scale  - Administer analgesics based on type and severity of pain and evaluate response  - Implement non-pharmacological measures as appropriate and evaluate response  - Consider cultural and social influences on pain and pain management  - Notify physician/advanced practitioner if interventions unsuccessful or patient reports new pain  Outcome: Progressing     Problem: INFECTION - ADULT  Goal: Absence or prevention of progression during hospitalization  Description: INTERVENTIONS:  - Assess and monitor for signs and symptoms of infection  - Monitor lab/diagnostic results  - Monitor all insertion sites, i.e. indwelling lines, tubes, and drains  - Monitor endotracheal if appropriate and nasal secretions for changes in amount and color  - [place] appropriate cooling/warming therapies per order  - Administer medications as ordered  - Instruct and encourage patient and family to use good hand hygiene technique  - Identify and instruct in appropriate isolation precautions for identified infection/condition  Outcome: Progressing  Goal: Absence of fever/infection during neutropenic period  Description: INTERVENTIONS:  - Monitor WBC    Outcome: Progressing     Problem: SAFETY ADULT  Goal: Patient will remain free of falls  Description: INTERVENTIONS:  - Educate patient/family on patient safety including physical limitations  - Instruct patient to call for assistance with activity   - Consult OT/PT to assist with strengthening/mobility   - Keep Call bell within reach  - Keep bed low and locked with side rails adjusted as appropriate  - Keep care items and personal belongings within reach  - Initiate and maintain comfort rounds  - Make Fall Risk Sign visible to staff  - Offer Toileting every  Hours,  in advance of need  - Initiate/Maintain alarm  - Obtain necessary fall risk management equipment:  - Apply yellow socks and bracelet for high fall risk patients  - Consider moving patient to room near nurses station  Outcome: Progressing  Goal: Maintain or return to baseline ADL function  Description: INTERVENTIONS:  -  Assess patient's ability to carry out ADLs; assess patient's baseline for ADL function and identify physical deficits which impact ability to perform ADLs (bathing, care of mouth/teeth, toileting, grooming, dressing, etc.)  - Assess/evaluate cause of self-care deficits   - Assess range of motion  - Assess patient's mobility; develop plan if impaired  - Assess patient's need for assistive devices and provide as appropriate  - Encourage maximum independence but intervene and supervise when necessary  - Involve family in performance of ADLs  - Assess for home care needs following discharge   - Consider OT consult to assist with ADL evaluation and planning for discharge  - Provide patient education as appropriate  Outcome: Progressing  Goal: Maintains/Returns to pre admission functional level  Description: INTERVENTIONS:  - Perform AM-PAC 6 Click Basic Mobility/ Daily Activity assessment daily.  - Set and communicate daily mobility goal to care team and patient/family/caregiver.   - Collaborate with rehabilitation services on mobility goals if consulted  - Perform Range of Motion  times a day.  - Reposition patient every  hours.  - Dangle patient  times a day  - Stand patient times a day  - Ambulate patient  times a day  - Out of bed to chair  times a day   - Out of bed for meals  times a day  - Out of bed for toileting  - Record patient progress and toleration of activity level   Outcome: Progressing     Problem: DISCHARGE PLANNING  Goal: Discharge to home or other facility with appropriate resources  Description: INTERVENTIONS:  - Identify barriers to discharge w/patient and caregiver  - Arrange for  needed discharge resources and transportation as appropriate  - Identify discharge learning needs (meds, wound care, etc.)  - Arrange for interpretive services to assist at discharge as needed  - Refer to Case Management Department for coordinating discharge planning if the patient needs post-hospital services based on physician/advanced practitioner order or complex needs related to functional status, cognitive ability, or social support system  Outcome: Progressing     Problem: Knowledge Deficit  Goal: Patient/family/caregiver demonstrates understanding of disease process, treatment plan, medications, and discharge instructions  Description: Complete learning assessment and assess knowledge base.  Interventions:  - Provide teaching at level of understanding  - Provide teaching via preferred learning methods  Outcome: Progressing     Problem: Prexisting or High Potential for Compromised Skin Integrity  Goal: Skin integrity is maintained or improved  Description: INTERVENTIONS:  - Identify patients at risk for skin breakdown  - Assess and monitor skin integrity  - Assess and monitor nutrition and hydration status  - Monitor labs   - Assess for incontinence   - Turn and reposition patient  - Assist with mobility/ambulation  - Relieve pressure over bony prominences  - Avoid friction and shearing  - Provide appropriate hygiene as needed including keeping skin clean and dry  - Evaluate need for skin moisturizer/barrier cream  - Collaborate with interdisciplinary team   - Patient/family teaching  - Consider wound care consult   Outcome: Progressing     Problem: Nutrition/Hydration-ADULT  Goal: Nutrient/Hydration intake appropriate for improving, restoring or maintaining nutritional needs  Description: Monitor and assess patient's nutrition/hydration status for malnutrition. Collaborate with interdisciplinary team and initiate plan and interventions as ordered.  Monitor patient's weight and dietary intake as ordered or  per policy. Utilize nutrition screening tool and intervene as necessary. Determine patient's food preferences and provide high-protein, high-caloric foods as appropriate.     INTERVENTIONS:  - Monitor oral intake, urinary output, labs, and treatment plans  - Assess nutrition and hydration status and recommend course of action  - Evaluate amount of meals eaten  - Assist patient with eating if necessary   - Allow adequate time for meals  - Recommend/ encourage appropriate diets, oral nutritional supplements, and vitamin/mineral supplements  - Order, calculate, and assess calorie counts as needed  - Recommend, monitor, and adjust tube feedings and TPN/PPN based on assessed needs  - Assess need for intravenous fluids  - Provide specific nutrition/hydration education as appropriate  - Include patient/family/caregiver in decisions related to nutrition  Outcome: Progressing

## 2023-12-29 NOTE — PROGRESS NOTES
Progress Note - Cassidy Zhang 68 y.o. female MRN: [id number]    Unit/Bed#: ALBARO -01 Encounter: [id number]    Assessment and Plan:     68-year-old female with decompensated alcoholic cirrhosis complicated by hepatic encephalopathy, ascites, no recent alcohol use who presented 12/24 for lower extremity edema, was improving however then with bump in creatinine now being treated for HRS.    1. Decompensated alcoholic cirrhosis complicated by hepatic encephalopathy, ascites, MELD 3.0 stable at 27  Patient reports doing well.  She has no GI symptoms. Mild improvement of creatinine today from 1.48-1.34.  Sodium relatively stable at 129.  Mild increase in INR to 1.8.  Bilirubin stable at 3.9.  She did have drop in hemoglobin from 8.4-6.5 with reported nonbloody bowel movement yesterday.    -Repeat infectious workup was obtained yesterday so far with pending blood cultures, negative UA, questionable small infiltrate versus atelectasis on x-ray.  -Consider the addition of empiric antibiotics if persistent decompensation without source.  -See below for drop in hemoglobin    -continue lactulose, titrate for 3 BM daily which she is having    -S/p paracentesis on 12/21 negative for SBP  -nephro recs appreciated.  -Currently on octreotide, midodrine and albumin    -EGD in July 2023 without varices    -MRI in November without lesions    -monitor MELD labs daily  -no indication for steroids, patient does not have acute alcoholic hepatitis   -PEth negative 12/13  -needs urgent outpatient f/u with hepatology, will reach out to hepatology today for ay further recs     -If patient has any persistent decompensation or worsening MELD score would reach out again to hepatology to discuss more urgent liver transplant evaluation.     2. Chronic pancreatitis  Reports being sober for 5 years, has PD dilation and known intraductal calculus in pancreatic head  -continue alcohol cessation  -continue creon before meals.      3. Rectal  "bleeding  4. Acute on chronic anemia  Patient's baseline hemoglobin is around 7-8.  She has had a few drops below 7 during this admission.  Her hemoglobin this morning was 6.5 down from 8.4 however this was rechecked and is currently 7.3.  She had a bowel movement yesterday which she reports was brown with no bright red blood.  She has been having some intermittent bright red blood suspected to be due to hemorrhoids.  Recent EGD and colonoscopy 7/2023 with no varices at that time.    -Repeat hemoglobin is currently 7.3.  -Initially placed n.p.o. this morning when hemoglobin dropped however will place back on a diet in the setting of relatively stable hemoglobin as well as last bowel movement being brown/nonbloody.  -Patient is on iron supplement which may contribute to stool color  -Continue Anusol suppositories    -Monitor hemoglobin.  - Monitor stool output.  -Transfuse as needed per primary team    -Patient has persistent drops in hemoglobin or persistent recurrence of bright red blood per rectum, would recommend EGD with flex sigmoidoscopy.  I discussed this with patient today.      Informed primary team of above recommendations  ----------------------------------------------------------------------------------------------------------------    Subjective:     Patient reports she is doing well.  She tolerated her breakfast this morning without difficulty.  She has no abdominal pain nausea or vomiting.  She reports her last bowel movement was last night which she was happy about because it was nonbloody and brown.  She reports the last time she saw blood streaks in her stool was 2 days ago.    Objective:     Vitals: Blood pressure (!) 96/45, pulse 71, temperature 98.5 °F (36.9 °C), resp. rate 17, height 5' 4\" (1.626 m), weight 56.4 kg (124 lb 6.4 oz), SpO2 99%.,Body mass index is 21.35 kg/m².      Intake/Output Summary (Last 24 hours) at 12/29/2023 1009  Last data filed at 12/29/2023 0842  Gross per 24 hour " "  Intake 360 ml   Output --   Net 360 ml       Physical Exam:     General Appearance: Sitting comfortably in bed.  Does not appear in distress.  Alert and oriented, no asterixis  Lungs: Clear to auscultation bilaterally, no rales or rhonchi, no labored breathing/accessory muscle use  Heart: Regular rate and rhythm, S1, S2 normal, no murmur, click, rub or gallop  Abdomen: Soft, non-tender, non-distended; bowel sounds normal; no masses or no organomegaly. Benign abdomen  Extremities: No cyanosis, clubbing, or edema    Invasive Devices       Peripheral Intravenous Line  Duration             Peripheral IV 12/29/23 Right;Ventral (anterior) Forearm <1 day                    Lab Results:  Results from last 7 days   Lab Units 12/29/23  0641 12/26/23  0614 12/25/23  0259   WBC Thousand/uL 3.85*   < > 4.71   HEMOGLOBIN g/dL 6.5*   < > 8.0*   HEMATOCRIT % 19.0*   < > 22.9*   PLATELETS Thousands/uL 63*   < > 65*   NEUTROS PCT %  --   --  46   LYMPHS PCT %  --   --  41   MONOS PCT %  --   --  10   EOS PCT %  --   --  3    < > = values in this interval not displayed.     Results from last 7 days   Lab Units 12/29/23  0641   POTASSIUM mmol/L 3.5   CHLORIDE mmol/L 97   CO2 mmol/L 25   BUN mg/dL 19   CREATININE mg/dL 1.34*   CALCIUM mg/dL 9.7   ALK PHOS U/L 54   ALT U/L 15   AST U/L 35     Invalid input(s): \"BILI\"  Results from last 7 days   Lab Units 12/29/23  0641   INR  1.88*           Imaging Studies: I have personally reviewed pertinent imaging studies.    CT abdomen pelvis wo contrast    Result Date: 12/22/2023  Impression: No evidence of hematoma related to recent paracentesis. Small volume ascites throughout the abdomen and pelvis with mesenteric edema. Moderate body wall edema. 4.8 x 4.4 cm left ovarian cyst which has substantially increased in size since a pelvic ultrasound done on August 7, 2017. Recommend yearly follow-up.. Workstation performed: [id number]     IR INPATIENT Paracentesis    Result Date: " 12/22/2023  Impression: Successful therapeutic paracentesis. ATTESTATION I, the attending radiologist, was present for the entire procedure. Guidance images were reviewed and I am in agreement with the findings on the report. Attending physician: Antonio Archer. Resident:  Adonis Camara. Workstation performed: [id number]     XR chest 1 view portable    Result Date: 12/20/2023  Impression: Shifting bilateral right greater than the left pleural effusions Workstation performed: [id number]

## 2023-12-29 NOTE — ASSESSMENT & PLAN NOTE
Nephrology on board- s/p 1300 BID of bicarbonate  Decrease NaHCO3 to 650 daily giving resolution of acidosis

## 2023-12-29 NOTE — ASSESSMENT & PLAN NOTE
History of SIADH, however suspect also a component of hypotonic hypervolemic hyponatremia in the setting of liver failure complicated by ascites and anasarca. Sodium improving.     Plan:  Hold home Na tablets, --starting NaHCO3 tabs for metabolic alkalosis  Fluid restriction 1.5 L  Discontinue Lasix and Aldactone for now  C/w Albumin, hold Bumex, hold Aldactone   BMP every 24 hours  Avoid overcorrection: no more than 6-8mEq in the next 24hr, goal </=132  Please monitor UOP  Will monitor labs, call renal if SNa+ <126  Follow nephrology recs

## 2023-12-29 NOTE — ASSESSMENT & PLAN NOTE
1 unit pRBC this admission and 2 units of FFP  S/p transfusion on 12/28 1U RBC  Stable hemorrhoidal bleed.   - Today Hgb 7.8  -No signs of active bleeding  -GI plans to monitor at this point since no bloody stools, if further drop in Hbg, they would recommend EGD and Flex sig

## 2023-12-29 NOTE — ASSESSMENT & PLAN NOTE
Presents for ascites and anasarca, found also to have mild hyponatremia, hypoalbuminemia, hyperbilirubinemia, hypocoagulability, anemia, and thrombocytopenia. Ammonia wnl.  No asterixis on exam, pt A&O x3, maybe some mild confusion (delayed when answering questions, but appropriate), though pt at or near her baseline per spouse.  Reports holding lactulose for last 2-3 days for frequent BMs at home (>5 loose BM per day)  Recently discontinued lasix and spironolactone in the o/p setting d/t worsening renal function and hyponatremia   MELD score 23 on presentation  Last drink 5-6 years ago    MELD 3.0: 27 at 12/29/2023  6:41 AM  MELD-Na: 26 at 12/29/2023  6:41 AM  Calculated from:  Serum Creatinine: 1.34 mg/dL at 12/29/2023  6:41 AM  Serum Sodium: 129 mmol/L at 12/29/2023  6:41 AM  Total Bilirubin: 4.00 mg/dL at 12/28/2023  5:29 AM  Serum Albumin: 3.6 g/dL (Using max of 3.5 g/dL) at 12/28/2023  5:29 AM  INR(ratio): 1.88 at 12/29/2023  6:41 AM  Age at listing (hypothetical): 68 years  Sex: Female at 12/29/2023  6:41 AM        Plan:  Diet: High protein, low salt, fluid restrict 1500 cc/day  Resume lactulose 20 g BID and titrate to target 3-5 loose BM per day  Monitor MELD labs  Monitor Hgb and transfuse for Hgb<7  GI and Nephro appreicated  IR paracentesis drained 2.4L fluid - labs unremarkable  Tigan for nausea   Peth negative   potential liver transplant evaluation  May need recurrent paracentesis as on outpatient   Octreotide q8h  Due to worsening MELD - infectious workup with UA (negative), CXR, and blood cultures - negative thus far

## 2023-12-29 NOTE — PROGRESS NOTES
Former[facility]  Progress Note  Name: Cassidy Zhang I  MRN: [id number]  Unit/Bed#: [facility]BARO -01 I Date of Admission: 12/20/2023   Date of Service: 12/29/2023 I Hospital Day: 9    Assessment/Plan   * Decompensated liver disease (HCC)  Assessment & Plan  Presents for ascites and anasarca, found also to have mild hyponatremia, hypoalbuminemia, hyperbilirubinemia, hypocoagulability, anemia, and thrombocytopenia. Ammonia wnl.  No asterixis on exam, pt A&O x3, maybe some mild confusion (delayed when answering questions, but appropriate), though pt at or near her baseline per spouse.  Reports holding lactulose for last 2-3 days for frequent BMs at home (>5 loose BM per day)  Recently discontinued lasix and spironolactone in the o/p setting d/t worsening renal function and hyponatremia   MELD score 23 on presentation  Last drink 5-6 years ago    MELD 3.0: 27 at 12/29/2023  6:41 AM  MELD-Na: 26 at 12/29/2023  6:41 AM  Calculated from:  Serum Creatinine: 1.34 mg/dL at 12/29/2023  6:41 AM  Serum Sodium: 129 mmol/L at 12/29/2023  6:41 AM  Total Bilirubin: 4.00 mg/dL at 12/28/2023  5:29 AM  Serum Albumin: 3.6 g/dL (Using max of 3.5 g/dL) at 12/28/2023  5:29 AM  INR(ratio): 1.88 at 12/29/2023  6:41 AM  Age at listing (hypothetical): 68 years  Sex: Female at 12/29/2023  6:41 AM        Plan:  Diet: High protein, low salt, fluid restrict 1500 cc/day  Resume lactulose 20 g BID and titrate to target 3-5 loose BM per day  Monitor MELD labs  Monitor Hgb and transfuse for Hgb<7  GI and Nephro appreicated  IR paracentesis drained 2.4L fluid - labs unremarkable  Tigan for nausea   Peth negative   potential liver transplant evaluation  May need recurrent paracentesis as on outpatient   Octreotide q8h  Due to worsening MELD - infectious workup with UA (negative), CXR, and blood cultures - negative thus far    Coagulopathy (HCC)  Assessment & Plan  2/2 cirrhosis  INR at 1.8 and no acute bleeding     Acute blood loss  anemia  Assessment & Plan  1 unit pRBC this admission and 2 units of FFP  Stable hemorrhoidal bleed.     -No signs of active bleeding  -Hbg this morning came back at 6.5  -Transfuse 1 unit PRBC with H&H recheck 2 hours later  -GI plans to monitor at this point since no bloody stools, if further drop in Hbg, they would recommend EGD and Flex sig    Status post incision and drainage  Assessment & Plan  Pt and  report dental I&D for oral abscess day prior to presentation 12/20/23, started on amoxicillin 500 mg PO Q6H. Acute infection may also have played a role in knocking patient into liver decompensation. Now resolved    Metabolic acidosis  Assessment & Plan  Nephrology on board- currently giving 1300 BID of bicarbonate    CKD (chronic kidney disease) stage 3, GFR 30-59 ml/min (HCC)  Assessment & Plan  Estimated Creatinine Clearance: 34.7 mL/min (A) (by C-G formula based on SCr of 1.34 mg/dL (H)).  No PRETTY this admission.  Cr near new baseline of 1.0-1.1  In the setting of type II hepatorenal syndrome.  - Midodrine 10mg TID to enhance renal perfusion  Cr stable after diuresis      Chronic pancreatitis (HCC)  Assessment & Plan  2/2 EtOH abuse, now in remission.     Continue home pancrealipase    Ascites/anasarca  Assessment & Plan  2/2 end stage liver disease, as noted above    Plan:  Diuresis, fluid restriction, dietary salt restriction as above   Nephrology and GI consults, as above  Paracentesis drained 2.4L    Chronic hyponatremia  Assessment & Plan  History of SIADH, however suspect also a component of hypotonic hypervolemic hyponatremia in the setting of liver failure complicated by ascites and anasarca. Sodium improving.     Plan:  Hold home Na tablets, --starting NaHCO3 tabs for metabolic alkalosis  Fluid restriction 1.5 L  Discontinue Lasix and Aldactone for now  Albumin in + Bumex 1 mg twice daily (30 minutes after albumin)  BMP every 24 hours  Avoid overcorrection: no more than 6-8mEq in the next  24hr, goal </=132  Please monitor UOP  Will monitor labs, call renal if SNa+ <126  Follow nephrology recs      Essential hypertension  Assessment & Plan  BP now low due to cirrhosis.  Hold Norvasc and ARB as pt on diuretics and needing midodrine           VTE Pharmacologic Prophylaxis: VTE Score: 7 High Risk (Score >/= 5) - Pharmacological DVT Prophylaxis Contraindicated. Sequential Compression Devices Ordered.    Mobility:   Basic Mobility Inpatient Raw Score: 16  JH-HLM Goal: 5: Stand one or more mins  JH-HLM Achieved: 6: Walk 10 steps or more  HLM Goal NOT achieved. Continue with multidisciplinary rounding and encourage appropriate mobility to improve upon HLM goals.    Patient Centered Rounds: I performed bedside rounds with nursing staff today.   Discussions with Specialists or Other Care Team Provider: GI and Nephro    Education and Discussions with Family / Patient: Updated  () via phone.    Total Time Spent on Date of Encounter in care of patient: 25 mins. This time was spent on one or more of the following: performing physical exam; counseling and coordination of care; obtaining or reviewing history; documenting in the medical record; reviewing/ordering tests, medications or procedures; communicating with other healthcare professionals and discussing with patient's family/caregivers.    Current Length of Stay: 9 day(s)  Current Patient Status: Inpatient   Certification Statement: The patient will continue to require additional inpatient hospital stay due to hyponatremia  Discharge Plan: Anticipate discharge in 24-48 hrs to home.    Code Status: Level 1 - Full Code    Subjective:   Patient seen and examined at bedside this morning.  No acute events overnight, states she slept well and is eating and drinking well.  Feels much better overall.  States she had 1 very large bowel movement last night.  Has been peeing frequently.  Patient also states she had significant swelling in her left  wrist after being poked and prodded sometimes by blood draws.  Notified the nurse, who will provide ice for the patient and will let us know if swelling worsens.  Patient not in any pain in the wrist.    Objective:     Vitals:   Temp (24hrs), Av.3 °F (36.8 °C), Min:97.6 °F (36.4 °C), Max:98.7 °F (37.1 °C)    Temp:  [97.6 °F (36.4 °C)-98.7 °F (37.1 °C)] 98.5 °F (36.9 °C)  HR:  [69-78] 71  Resp:  [12-17] 17  BP: ()/(45-61) 96/45  SpO2:  [99 %] 99 %  Body mass index is 21.35 kg/m².     Input and Output Summary (last 24 hours):     Intake/Output Summary (Last 24 hours) at 2023 [facility]  Last data filed at 2023 0842  Gross per 24 hour   Intake 360 ml   Output --   Net 360 ml       Physical Exam:   Physical Exam  Constitutional:       General: She is not in acute distress.     Appearance: Normal appearance.   HENT:      Head: Normocephalic and atraumatic.      Right Ear: External ear normal.      Left Ear: External ear normal.      Nose: Nose normal. No congestion.      Mouth/Throat:      Mouth: Mucous membranes are moist.      Pharynx: Oropharynx is clear.   Eyes:      General: Scleral icterus (mild) present.      Extraocular Movements: Extraocular movements intact.      Conjunctiva/sclera: Conjunctivae normal.   Cardiovascular:      Rate and Rhythm: Normal rate and regular rhythm.      Heart sounds: No murmur heard.  Pulmonary:      Effort: Pulmonary effort is normal. No respiratory distress.      Breath sounds: Normal breath sounds. No wheezing or rales.   Abdominal:      General: Bowel sounds are normal. There is distension.      Palpations: Abdomen is soft.      Tenderness: There is no abdominal tenderness.   Musculoskeletal:         General: Swelling (bilateral LE pitting) present. No tenderness.      Cervical back: Normal range of motion and neck supple.   Skin:     General: Skin is warm and dry.      Coloration: Skin is jaundiced.   Neurological:      Mental Status: She is alert and oriented to  person, place, and time. Mental status is at baseline.   Psychiatric:         Mood and Affect: Mood normal.          Additional Data:     Labs:  Results from last 7 days   Lab Units 12/29/23  0641 12/26/23  0614 12/25/23  0259   WBC Thousand/uL 3.85*   < > 4.71   HEMOGLOBIN g/dL 6.5*   < > 8.0*   HEMATOCRIT % 19.0*   < > 22.9*   PLATELETS Thousands/uL 63*   < > 65*   NEUTROS PCT %  --   --  46   LYMPHS PCT %  --   --  41   MONOS PCT %  --   --  10   EOS PCT %  --   --  3    < > = values in this interval not displayed.     Results from last 7 days   Lab Units 12/29/23  0641   SODIUM mmol/L 129*   POTASSIUM mmol/L 3.5   CHLORIDE mmol/L 97   CO2 mmol/L 25   BUN mg/dL 19   CREATININE mg/dL 1.34*   ANION GAP mmol/L 7   CALCIUM mg/dL 9.7   ALBUMIN g/dL 3.3*   TOTAL BILIRUBIN mg/dL 3.61*   ALK PHOS U/L 54   ALT U/L 15   AST U/L 35   GLUCOSE RANDOM mg/dL 138     Results from last 7 days   Lab Units 12/29/23  0641   INR  1.88*                   Lines/Drains:  Invasive Devices       Peripheral Intravenous Line  Duration             Peripheral IV 12/29/23 Right;Ventral (anterior) Forearm <1 day                          Imaging: No pertinent imaging reviewed.    Recent Cultures (last 7 days):   Results from last 7 days   Lab Units 12/28/23  1108   BLOOD CULTURE  Received in Microbiology Lab. Culture in Progress.  Received in Microbiology Lab. Culture in Progress.       Last 24 Hours Medication List:   Current Facility-Administered Medications   Medication Dose Route Frequency Provider Last Rate    acetaminophen  650 mg Oral Q8H PRN Terrell Mayberry MD      Albumin 25%  25 g Intravenous BID Joselyn Reyes Bahamonde, MD 0 g (12/27/23 1901)    Diclofenac Sodium  2 g Topical Q6H PRN Terrell Mayberry MD      ferrous sulfate  325 mg Oral Daily With Breakfast Terrell Mayberry MD      hydrocortisone  25 mg Rectal BID Destiney Camarillo PA-C      lactulose  20 g Oral BID Terrell Mayberry MD      midodrine  12.5 mg Oral TID AC Joselyn Reyes  MD Jonas      octreotide  100 mcg Subcutaneous Q8H [facility] Joselyn Reyes Bahamonde, MD      pancrelipase (Lip-Prot-Amyl)  24,000 Units Oral TID With Meals Terrell Mayberry MD      pantoprazole  40 mg Oral BID AC AQUILES Colby      sodium bicarbonate  1,300 mg Oral BID after meals Chelsy Sorensen MD      trimethobenzamide  200 mg Intramuscular Q6H PRN Jodie Larose MD          Today, Patient Was Seen By: Dania Quiñones MD    **Please Note: This note may have been constructed using a voice recognition system.**

## 2023-12-29 NOTE — PLAN OF CARE
Problem: PAIN - ADULT  Goal: Verbalizes/displays adequate comfort level or baseline comfort level  Description: Interventions:  - Encourage patient to monitor pain and request assistance  - Assess pain using appropriate pain scale  - Administer analgesics based on type and severity of pain and evaluate response  - Implement non-pharmacological measures as appropriate and evaluate response  - Consider cultural and social influences on pain and pain management  - Notify physician/advanced practitioner if interventions unsuccessful or patient reports new pain  Outcome: Progressing     Problem: INFECTION - ADULT  Goal: Absence or prevention of progression during hospitalization  Description: INTERVENTIONS:  - Assess and monitor for signs and symptoms of infection  - Monitor lab/diagnostic results  - Monitor all insertion sites, i.e. indwelling lines, tubes, and drains  - Monitor endotracheal if appropriate and nasal secretions for changes in amount and color  - [place] appropriate cooling/warming therapies per order  - Administer medications as ordered  - Instruct and encourage patient and family to use good hand hygiene technique  - Identify and instruct in appropriate isolation precautions for identified infection/condition  Outcome: Progressing  Goal: Absence of fever/infection during neutropenic period  Description: INTERVENTIONS:  - Monitor WBC    Outcome: Progressing     Problem: SAFETY ADULT  Goal: Patient will remain free of falls  Description: INTERVENTIONS:  - Educate patient/family on patient safety including physical limitations  - Instruct patient to call for assistance with activity   - Consult OT/PT to assist with strengthening/mobility   - Keep Call bell within reach  - Keep bed low and locked with side rails adjusted as appropriate  - Keep care items and personal belongings within reach  - Initiate and maintain comfort rounds  - Make Fall Risk Sign visible to staff  - Offer Toileting every  Hours,  in advance of need  - Initiate/Maintain alarm  - Obtain necessary fall risk management equipment:  - Apply yellow socks and bracelet for high fall risk patients  - Consider moving patient to room near nurses station  Outcome: Progressing  Goal: Maintain or return to baseline ADL function  Description: INTERVENTIONS:  -  Assess patient's ability to carry out ADLs; assess patient's baseline for ADL function and identify physical deficits which impact ability to perform ADLs (bathing, care of mouth/teeth, toileting, grooming, dressing, etc.)  - Assess/evaluate cause of self-care deficits   - Assess range of motion  - Assess patient's mobility; develop plan if impaired  - Assess patient's need for assistive devices and provide as appropriate  - Encourage maximum independence but intervene and supervise when necessary  - Involve family in performance of ADLs  - Assess for home care needs following discharge   - Consider OT consult to assist with ADL evaluation and planning for discharge  - Provide patient education as appropriate  Outcome: Progressing  Goal: Maintains/Returns to pre admission functional level  Description: INTERVENTIONS:  - Perform AM-PAC 6 Click Basic Mobility/ Daily Activity assessment daily.  - Set and communicate daily mobility goal to care team and patient/family/caregiver.   - Collaborate with rehabilitation services on mobility goals if consulted  - Perform Range of Motion  times a day.  - Reposition patient every  hours.  - Dangle patient  times a day  - Stand patient times a day  - Ambulate patient  times a day  - Out of bed to chair  times a day   - Out of bed for meals  times a day  - Out of bed for toileting  - Record patient progress and toleration of activity level   Outcome: Progressing     Problem: DISCHARGE PLANNING  Goal: Discharge to home or other facility with appropriate resources  Description: INTERVENTIONS:  - Identify barriers to discharge w/patient and caregiver  - Arrange for  needed discharge resources and transportation as appropriate  - Identify discharge learning needs (meds, wound care, etc.)  - Arrange for interpretive services to assist at discharge as needed  - Refer to Case Management Department for coordinating discharge planning if the patient needs post-hospital services based on physician/advanced practitioner order or complex needs related to functional status, cognitive ability, or social support system  Outcome: Progressing     Problem: Knowledge Deficit  Goal: Patient/family/caregiver demonstrates understanding of disease process, treatment plan, medications, and discharge instructions  Description: Complete learning assessment and assess knowledge base.  Interventions:  - Provide teaching at level of understanding  - Provide teaching via preferred learning methods  Outcome: Progressing     Problem: Prexisting or High Potential for Compromised Skin Integrity  Goal: Skin integrity is maintained or improved  Description: INTERVENTIONS:  - Identify patients at risk for skin breakdown  - Assess and monitor skin integrity  - Assess and monitor nutrition and hydration status  - Monitor labs   - Assess for incontinence   - Turn and reposition patient  - Assist with mobility/ambulation  - Relieve pressure over bony prominences  - Avoid friction and shearing  - Provide appropriate hygiene as needed including keeping skin clean and dry  - Evaluate need for skin moisturizer/barrier cream  - Collaborate with interdisciplinary team   - Patient/family teaching  - Consider wound care consult   Outcome: Progressing     Problem: Nutrition/Hydration-ADULT  Goal: Nutrient/Hydration intake appropriate for improving, restoring or maintaining nutritional needs  Description: Monitor and assess patient's nutrition/hydration status for malnutrition. Collaborate with interdisciplinary team and initiate plan and interventions as ordered.  Monitor patient's weight and dietary intake as ordered or  per policy. Utilize nutrition screening tool and intervene as necessary. Determine patient's food preferences and provide high-protein, high-caloric foods as appropriate.     INTERVENTIONS:  - Monitor oral intake, urinary output, labs, and treatment plans  - Assess nutrition and hydration status and recommend course of action  - Evaluate amount of meals eaten  - Assist patient with eating if necessary   - Allow adequate time for meals  - Recommend/ encourage appropriate diets, oral nutritional supplements, and vitamin/mineral supplements  - Order, calculate, and assess calorie counts as needed  - Recommend, monitor, and adjust tube feedings and TPN/PPN based on assessed needs  - Assess need for intravenous fluids  - Provide specific nutrition/hydration education as appropriate  - Include patient/family/caregiver in decisions related to nutrition  Outcome: Progressing

## 2023-12-29 NOTE — ASSESSMENT & PLAN NOTE
Estimated Creatinine Clearance: 34.7 mL/min (A) (by C-G formula based on SCr of 1.34 mg/dL (H)).  No PRETTY this admission.  Cr near new baseline of 1.0-1.1  In the setting of type II hepatorenal syndrome.  - Midodrine 10mg TID to enhance renal perfusion  Cr stable after diuresis

## 2023-12-30 LAB
ABO GROUP BLD BPU: NORMAL
ALBUMIN SERPL BCP-MCNC: 3.8 G/DL (ref 3.5–5)
ALP SERPL-CCNC: 58 U/L (ref 34–104)
ALT SERPL W P-5'-P-CCNC: 14 U/L (ref 7–52)
ANION GAP SERPL CALCULATED.3IONS-SCNC: 11 MMOL/L
AST SERPL W P-5'-P-CCNC: 33 U/L (ref 13–39)
BILIRUB DIRECT SERPL-MCNC: 1.88 MG/DL (ref 0–0.2)
BILIRUB SERPL-MCNC: 5.21 MG/DL (ref 0.2–1)
BPU ID: NORMAL
BUN SERPL-MCNC: 21 MG/DL (ref 5–25)
CALCIUM SERPL-MCNC: 10.1 MG/DL (ref 8.4–10.2)
CHLORIDE SERPL-SCNC: 96 MMOL/L (ref 96–108)
CO2 SERPL-SCNC: 24 MMOL/L (ref 21–32)
CREAT SERPL-MCNC: 1.45 MG/DL (ref 0.6–1.3)
CROSSMATCH: NORMAL
ERYTHROCYTE [DISTWIDTH] IN BLOOD BY AUTOMATED COUNT: 18.8 % (ref 11.6–15.1)
GFR SERPL CREATININE-BSD FRML MDRD: 37 ML/MIN/1.73SQ M
GLUCOSE SERPL-MCNC: 123 MG/DL (ref 65–140)
HCT VFR BLD AUTO: 23 % (ref 34.8–46.1)
HGB BLD-MCNC: 7.8 G/DL (ref 11.5–15.4)
MCH RBC QN AUTO: 32.1 PG (ref 26.8–34.3)
MCHC RBC AUTO-ENTMCNC: 33.9 G/DL (ref 31.4–37.4)
MCV RBC AUTO: 95 FL (ref 82–98)
PLATELET # BLD AUTO: 75 THOUSANDS/UL (ref 149–390)
PMV BLD AUTO: 11.8 FL (ref 8.9–12.7)
POTASSIUM SERPL-SCNC: 3.6 MMOL/L (ref 3.5–5.3)
PROT SERPL-MCNC: 6.3 G/DL (ref 6.4–8.4)
RBC # BLD AUTO: 2.43 MILLION/UL (ref 3.81–5.12)
SODIUM SERPL-SCNC: 131 MMOL/L (ref 135–147)
UNIT DISPENSE STATUS: NORMAL
UNIT PRODUCT CODE: NORMAL
UNIT PRODUCT VOLUME: 250 ML
UNIT RH: NORMAL
WBC # BLD AUTO: 5.58 THOUSAND/UL (ref 4.31–10.16)

## 2023-12-30 PROCEDURE — 99232 SBSQ HOSP IP/OBS MODERATE 35: CPT | Performed by: STUDENT IN AN ORGANIZED HEALTH CARE EDUCATION/TRAINING PROGRAM

## 2023-12-30 PROCEDURE — 85027 COMPLETE CBC AUTOMATED: CPT

## 2023-12-30 PROCEDURE — 80048 BASIC METABOLIC PNL TOTAL CA: CPT

## 2023-12-30 PROCEDURE — 99232 SBSQ HOSP IP/OBS MODERATE 35: CPT | Performed by: INTERNAL MEDICINE

## 2023-12-30 PROCEDURE — 80076 HEPATIC FUNCTION PANEL: CPT | Performed by: NURSE PRACTITIONER

## 2023-12-30 RX ORDER — HYDROCORTISONE 25 MG/G
CREAM TOPICAL 4 TIMES DAILY PRN
Status: DISCONTINUED | OUTPATIENT
Start: 2023-12-30 | End: 2023-12-30

## 2023-12-30 RX ORDER — SODIUM BICARBONATE 650 MG/1
650 TABLET ORAL DAILY
Status: DISCONTINUED | OUTPATIENT
Start: 2023-12-30 | End: 2023-12-31

## 2023-12-30 RX ORDER — SODIUM BICARBONATE 650 MG/1
650 TABLET ORAL
Status: DISCONTINUED | OUTPATIENT
Start: 2023-12-30 | End: 2023-12-30

## 2023-12-30 RX ORDER — HYDROCORTISONE 25 MG/G
CREAM TOPICAL 2 TIMES DAILY
Status: DISCONTINUED | OUTPATIENT
Start: 2023-12-30 | End: 2024-01-02 | Stop reason: HOSPADM

## 2023-12-30 RX ADMIN — PANCRELIPASE 24000 UNITS: 24000; 76000; 120000 CAPSULE, DELAYED RELEASE PELLETS ORAL at 17:28

## 2023-12-30 RX ADMIN — OCTREOTIDE ACETATE 100 MCG: 100 INJECTION, SOLUTION INTRAVENOUS; SUBCUTANEOUS at 21:29

## 2023-12-30 RX ADMIN — MIDODRINE HYDROCHLORIDE 12.5 MG: 5 TABLET ORAL at 13:09

## 2023-12-30 RX ADMIN — PANCRELIPASE 24000 UNITS: 24000; 76000; 120000 CAPSULE, DELAYED RELEASE PELLETS ORAL at 08:22

## 2023-12-30 RX ADMIN — PANTOPRAZOLE SODIUM 40 MG: 40 TABLET, DELAYED RELEASE ORAL at 06:24

## 2023-12-30 RX ADMIN — MIDODRINE HYDROCHLORIDE 12.5 MG: 5 TABLET ORAL at 17:28

## 2023-12-30 RX ADMIN — FERROUS SULFATE TAB 325 MG (65 MG ELEMENTAL FE) 325 MG: 325 (65 FE) TAB at 08:21

## 2023-12-30 RX ADMIN — SODIUM BICARBONATE 650 MG TABLET 1300 MG: at 08:21

## 2023-12-30 RX ADMIN — HYDROCORTISONE ACETATE 25 MG: 25 SUPPOSITORY RECTAL at 13:10

## 2023-12-30 RX ADMIN — PANCRELIPASE 24000 UNITS: 24000; 76000; 120000 CAPSULE, DELAYED RELEASE PELLETS ORAL at 13:10

## 2023-12-30 RX ADMIN — OCTREOTIDE ACETATE 100 MCG: 100 INJECTION, SOLUTION INTRAVENOUS; SUBCUTANEOUS at 14:22

## 2023-12-30 RX ADMIN — SODIUM BICARBONATE 650 MG TABLET 650 MG: at 13:10

## 2023-12-30 RX ADMIN — LACTULOSE 20 G: 20 SOLUTION ORAL at 08:21

## 2023-12-30 RX ADMIN — OCTREOTIDE ACETATE 100 MCG: 100 INJECTION, SOLUTION INTRAVENOUS; SUBCUTANEOUS at 06:25

## 2023-12-30 RX ADMIN — MIDODRINE HYDROCHLORIDE 12.5 MG: 5 TABLET ORAL at 06:24

## 2023-12-30 RX ADMIN — PANTOPRAZOLE SODIUM 40 MG: 40 TABLET, DELAYED RELEASE ORAL at 17:28

## 2023-12-30 RX ADMIN — LACTULOSE 20 G: 20 SOLUTION ORAL at 17:28

## 2023-12-30 NOTE — PLAN OF CARE
Problem: PAIN - ADULT  Goal: Verbalizes/displays adequate comfort level or baseline comfort level  Description: Interventions:  - Encourage patient to monitor pain and request assistance  - Assess pain using appropriate pain scale  - Administer analgesics based on type and severity of pain and evaluate response  - Implement non-pharmacological measures as appropriate and evaluate response  - Consider cultural and social influences on pain and pain management  - Notify physician/advanced practitioner if interventions unsuccessful or patient reports new pain  Outcome: Progressing     Problem: INFECTION - ADULT  Goal: Absence or prevention of progression during hospitalization  Description: INTERVENTIONS:  - Assess and monitor for signs and symptoms of infection  - Monitor lab/diagnostic results  - Monitor all insertion sites, i.e. indwelling lines, tubes, and drains  - Monitor endotracheal if appropriate and nasal secretions for changes in amount and color  - [place] appropriate cooling/warming therapies per order  - Administer medications as ordered  - Instruct and encourage patient and family to use good hand hygiene technique  - Identify and instruct in appropriate isolation precautions for identified infection/condition  Outcome: Progressing  Goal: Absence of fever/infection during neutropenic period  Description: INTERVENTIONS:  - Monitor WBC    Outcome: Progressing     Problem: SAFETY ADULT  Goal: Patient will remain free of falls  Description: INTERVENTIONS:  - Educate patient/family on patient safety including physical limitations  - Instruct patient to call for assistance with activity   - Consult OT/PT to assist with strengthening/mobility   - Keep Call bell within reach  - Keep bed low and locked with side rails adjusted as appropriate  - Keep care items and personal belongings within reach  - Initiate and maintain comfort rounds  - Make Fall Risk Sign visible to staff  - Offer Toileting every  Hours,  in advance of need  - Initiate/Maintain alarm  - Obtain necessary fall risk management equipment:  - Apply yellow socks and bracelet for high fall risk patients  - Consider moving patient to room near nurses station  Outcome: Progressing  Goal: Maintain or return to baseline ADL function  Description: INTERVENTIONS:  -  Assess patient's ability to carry out ADLs; assess patient's baseline for ADL function and identify physical deficits which impact ability to perform ADLs (bathing, care of mouth/teeth, toileting, grooming, dressing, etc.)  - Assess/evaluate cause of self-care deficits   - Assess range of motion  - Assess patient's mobility; develop plan if impaired  - Assess patient's need for assistive devices and provide as appropriate  - Encourage maximum independence but intervene and supervise when necessary  - Involve family in performance of ADLs  - Assess for home care needs following discharge   - Consider OT consult to assist with ADL evaluation and planning for discharge  - Provide patient education as appropriate  Outcome: Progressing  Goal: Maintains/Returns to pre admission functional level  Description: INTERVENTIONS:  - Perform AM-PAC 6 Click Basic Mobility/ Daily Activity assessment daily.  - Set and communicate daily mobility goal to care team and patient/family/caregiver.   - Collaborate with rehabilitation services on mobility goals if consulted  - Perform Range of Motion  times a day.  - Reposition patient every  hours.  - Dangle patient  times a day  - Stand patient times a day  - Ambulate patient  times a day  - Out of bed to chair  times a day   - Out of bed for meals  times a day  - Out of bed for toileting  - Record patient progress and toleration of activity level   Outcome: Progressing     Problem: DISCHARGE PLANNING  Goal: Discharge to home or other facility with appropriate resources  Description: INTERVENTIONS:  - Identify barriers to discharge w/patient and caregiver  - Arrange for  needed discharge resources and transportation as appropriate  - Identify discharge learning needs (meds, wound care, etc.)  - Arrange for interpretive services to assist at discharge as needed  - Refer to Case Management Department for coordinating discharge planning if the patient needs post-hospital services based on physician/advanced practitioner order or complex needs related to functional status, cognitive ability, or social support system  Outcome: Progressing     Problem: Knowledge Deficit  Goal: Patient/family/caregiver demonstrates understanding of disease process, treatment plan, medications, and discharge instructions  Description: Complete learning assessment and assess knowledge base.  Interventions:  - Provide teaching at level of understanding  - Provide teaching via preferred learning methods  Outcome: Progressing     Problem: Prexisting or High Potential for Compromised Skin Integrity  Goal: Skin integrity is maintained or improved  Description: INTERVENTIONS:  - Identify patients at risk for skin breakdown  - Assess and monitor skin integrity  - Assess and monitor nutrition and hydration status  - Monitor labs   - Assess for incontinence   - Turn and reposition patient  - Assist with mobility/ambulation  - Relieve pressure over bony prominences  - Avoid friction and shearing  - Provide appropriate hygiene as needed including keeping skin clean and dry  - Evaluate need for skin moisturizer/barrier cream  - Collaborate with interdisciplinary team   - Patient/family teaching  - Consider wound care consult   Outcome: Progressing     Problem: Nutrition/Hydration-ADULT  Goal: Nutrient/Hydration intake appropriate for improving, restoring or maintaining nutritional needs  Description: Monitor and assess patient's nutrition/hydration status for malnutrition. Collaborate with interdisciplinary team and initiate plan and interventions as ordered.  Monitor patient's weight and dietary intake as ordered or  per policy. Utilize nutrition screening tool and intervene as necessary. Determine patient's food preferences and provide high-protein, high-caloric foods as appropriate.     INTERVENTIONS:  - Monitor oral intake, urinary output, labs, and treatment plans  - Assess nutrition and hydration status and recommend course of action  - Evaluate amount of meals eaten  - Assist patient with eating if necessary   - Allow adequate time for meals  - Recommend/ encourage appropriate diets, oral nutritional supplements, and vitamin/mineral supplements  - Order, calculate, and assess calorie counts as needed  - Recommend, monitor, and adjust tube feedings and TPN/PPN based on assessed needs  - Assess need for intravenous fluids  - Provide specific nutrition/hydration education as appropriate  - Include patient/family/caregiver in decisions related to nutrition  Outcome: Progressing

## 2023-12-30 NOTE — PLAN OF CARE
Problem: PAIN - ADULT  Goal: Verbalizes/displays adequate comfort level or baseline comfort level  Description: Interventions:  - Encourage patient to monitor pain and request assistance  - Assess pain using appropriate pain scale  - Administer analgesics based on type and severity of pain and evaluate response  - Implement non-pharmacological measures as appropriate and evaluate response  - Consider cultural and social influences on pain and pain management  - Notify physician/advanced practitioner if interventions unsuccessful or patient reports new pain  Outcome: Progressing     Problem: INFECTION - ADULT  Goal: Absence or prevention of progression during hospitalization  Description: INTERVENTIONS:  - Assess and monitor for signs and symptoms of infection  - Monitor lab/diagnostic results  - Monitor all insertion sites, i.e. indwelling lines, tubes, and drains  - Monitor endotracheal if appropriate and nasal secretions for changes in amount and color  - [place] appropriate cooling/warming therapies per order  - Administer medications as ordered  - Instruct and encourage patient and family to use good hand hygiene technique  - Identify and instruct in appropriate isolation precautions for identified infection/condition  Outcome: Progressing  Goal: Absence of fever/infection during neutropenic period  Description: INTERVENTIONS:  - Monitor WBC    Outcome: Progressing     Problem: SAFETY ADULT  Goal: Patient will remain free of falls  Description: INTERVENTIONS:  - Educate patient/family on patient safety including physical limitations  - Instruct patient to call for assistance with activity   - Consult OT/PT to assist with strengthening/mobility   - Keep Call bell within reach  - Keep bed low and locked with side rails adjusted as appropriate  - Keep care items and personal belongings within reach  - Initiate and maintain comfort rounds  - Make Fall Risk Sign visible to staff  - Offer Toileting every  Hours,  in advance of need  - Initiate/Maintain alarm  - Obtain necessary fall risk management equipment:  - Apply yellow socks and bracelet for high fall risk patients  - Consider moving patient to room near nurses station  Outcome: Progressing  Goal: Maintain or return to baseline ADL function  Description: INTERVENTIONS:  -  Assess patient's ability to carry out ADLs; assess patient's baseline for ADL function and identify physical deficits which impact ability to perform ADLs (bathing, care of mouth/teeth, toileting, grooming, dressing, etc.)  - Assess/evaluate cause of self-care deficits   - Assess range of motion  - Assess patient's mobility; develop plan if impaired  - Assess patient's need for assistive devices and provide as appropriate  - Encourage maximum independence but intervene and supervise when necessary  - Involve family in performance of ADLs  - Assess for home care needs following discharge   - Consider OT consult to assist with ADL evaluation and planning for discharge  - Provide patient education as appropriate  Outcome: Progressing  Goal: Maintains/Returns to pre admission functional level  Description: INTERVENTIONS:  - Perform AM-PAC 6 Click Basic Mobility/ Daily Activity assessment daily.  - Set and communicate daily mobility goal to care team and patient/family/caregiver.   - Collaborate with rehabilitation services on mobility goals if consulted  - Perform Range of Motion  times a day.  - Reposition patient every  hours.  - Dangle patient  times a day  - Stand patient times a day  - Ambulate patient  times a day  - Out of bed to chair  times a day   - Out of bed for meals  times a day  - Out of bed for toileting  - Record patient progress and toleration of activity level   Outcome: Progressing     Problem: DISCHARGE PLANNING  Goal: Discharge to home or other facility with appropriate resources  Description: INTERVENTIONS:  - Identify barriers to discharge w/patient and caregiver  - Arrange for  needed discharge resources and transportation as appropriate  - Identify discharge learning needs (meds, wound care, etc.)  - Arrange for interpretive services to assist at discharge as needed  - Refer to Case Management Department for coordinating discharge planning if the patient needs post-hospital services based on physician/advanced practitioner order or complex needs related to functional status, cognitive ability, or social support system  Outcome: Progressing     Problem: Knowledge Deficit  Goal: Patient/family/caregiver demonstrates understanding of disease process, treatment plan, medications, and discharge instructions  Description: Complete learning assessment and assess knowledge base.  Interventions:  - Provide teaching at level of understanding  - Provide teaching via preferred learning methods  Outcome: Progressing     Problem: Prexisting or High Potential for Compromised Skin Integrity  Goal: Skin integrity is maintained or improved  Description: INTERVENTIONS:  - Identify patients at risk for skin breakdown  - Assess and monitor skin integrity  - Assess and monitor nutrition and hydration status  - Monitor labs   - Assess for incontinence   - Turn and reposition patient  - Assist with mobility/ambulation  - Relieve pressure over bony prominences  - Avoid friction and shearing  - Provide appropriate hygiene as needed including keeping skin clean and dry  - Evaluate need for skin moisturizer/barrier cream  - Collaborate with interdisciplinary team   - Patient/family teaching  - Consider wound care consult   Outcome: Progressing     Problem: Nutrition/Hydration-ADULT  Goal: Nutrient/Hydration intake appropriate for improving, restoring or maintaining nutritional needs  Description: Monitor and assess patient's nutrition/hydration status for malnutrition. Collaborate with interdisciplinary team and initiate plan and interventions as ordered.  Monitor patient's weight and dietary intake as ordered or  per policy. Utilize nutrition screening tool and intervene as necessary. Determine patient's food preferences and provide high-protein, high-caloric foods as appropriate.     INTERVENTIONS:  - Monitor oral intake, urinary output, labs, and treatment plans  - Assess nutrition and hydration status and recommend course of action  - Evaluate amount of meals eaten  - Assist patient with eating if necessary   - Allow adequate time for meals  - Recommend/ encourage appropriate diets, oral nutritional supplements, and vitamin/mineral supplements  - Order, calculate, and assess calorie counts as needed  - Recommend, monitor, and adjust tube feedings and TPN/PPN based on assessed needs  - Assess need for intravenous fluids  - Provide specific nutrition/hydration education as appropriate  - Include patient/family/caregiver in decisions related to nutrition  Outcome: Progressing

## 2023-12-30 NOTE — PROGRESS NOTES
Progress Note - [facility] [place] [facility]  Cassidy Zhang 68 y.o. female MRN: [id number]    Unit/Bed#: ALBARO -01 Encounter: [id number]      Assessment/Plan:    68-year-old female with decompensated alcoholic cirrhosis complicated by hepatic encephalopathy, ascites, no recent alcohol use who presented 12/24 for lower extremity edema, was improving however then with bump in creatinine now being treated for HRS.     1. Decompensated alcoholic cirrhosis complicated by hepatic encephalopathy, ascites, MELD 3.0 stable at 27  Patient reports doing well.  She has no GI symptoms. Mild improvement of creatinine today from 1.48-1.34.  Sodium relatively stable at 129.  Mild increase in INR to 1.8. Bilirubin 5.21 and elevated.  She did have drop in hemoglobin from 8.4-6.5 with reported nonbloody bowel movement on 12/29.  Repeat hemoglobin 1229 7.8.  Hemoglobin today 7.8.  Mental status clear.  No signs of hepatic encephalopathy.  -Infectious workup negative UA, x-ray questionable small infiltrate Vitas says atelectasis.  Blood cultures no growth after 24 hours.  -Continue lactulose, titrate for 3 BM daily  -S/p paracentesis on 12/21 negative for SBP  -Currently on octreotide, midodrine and albumin  -EGD in July 2023 without varices  -MRI in November without lesions  -Monitor MELD labs daily  -no indication for steroids, patient does not have acute alcoholic hepatitis   -PETH negative 12/13  -Patient has follow-up scheduled with Dr. Mahajan on 1/28  -If patient has any persistent decompensation or worsening MELD score would reach out again to hepatology to discuss more urgent liver transplant evaluation.     2. Chronic pancreatitis  Reports being sober for 5 years, has PD dilation and known intraductal calculus in pancreatic head  -continue alcohol cessation  -continue creon before meals.      3. Rectal bleeding  4. Acute on chronic anemia  Patient's baseline hemoglobin is around 7-8.  She has had a few drops below 7 during this admission.  Her  "hemoglobin 6.5  on 12/29 down from 8.4 however this was rechecked and was found to be 7.3 on 12/29.  Hemoglobin 7.8 at 1630 on 12/29..  Hemoglobin 7.8 today.  She had a bowel movement today.  Patient states that there was blood when wiping.  She has been having some intermittent bright red blood suspected to be due to hemorrhoids.  Recent EGD and colonoscopy 7/2023 with no varices at that time.     -Patient is on iron supplement which may contribute to stool color  -Continue Anusol suppositories  -Monitor hemoglobin.  -Monitor stool output.  -Transfuse as needed per primary team  -Patient has persistent drops in hemoglobin or persistent recurrence of bright red blood per rectum, would recommend EGD with flex sigmoidoscopy.        Subjective:   Lying in bed in no acute distress.  Tolerating diet.  Denies nausea, vomiting, or abdominal pain.  Positive jaundice and scleral icterus.    Objective:     Vitals: Blood pressure 96/50, pulse 59, temperature 98 °F (36.7 °C), resp. rate 15, height 5' 4\" (1.626 m), weight 56.6 kg (124 lb 12.5 oz), SpO2 99%.,Body mass index is 21.42 kg/m².      Intake/Output Summary (Last 24 hours) at 12/30/2023 1327  Last data filed at 12/30/2023 0825  Gross per 24 hour   Intake 1030 ml   Output --   Net 1030 ml       Physical Exam: Physical Exam  Vitals and nursing note reviewed.   Constitutional:       General: She is not in acute distress.  Eyes:      General: No scleral icterus.  Cardiovascular:      Rate and Rhythm: Normal rate and regular rhythm.      Pulses: Normal pulses.      Heart sounds: Normal heart sounds.   Pulmonary:      Effort: Pulmonary effort is normal. No respiratory distress.      Breath sounds: Normal breath sounds. No stridor. No wheezing, rhonchi or rales.   Abdominal:      General: Bowel sounds are normal. There is no distension.      Palpations: Abdomen is soft. There is no mass.      Tenderness: There is no abdominal tenderness. There is no guarding or rebound.      " Hernia: No hernia is present.   Musculoskeletal:      Right lower leg: No edema.      Left lower leg: No edema.   Skin:     Coloration: Skin is jaundiced. Skin is not pale.   Neurological:      Mental Status: She is alert and oriented to person, place, and time.   Psychiatric:         Mood and Affect: Mood normal.         Invasive Devices       Peripheral Intravenous Line  Duration             Peripheral IV 12/29/23 Right;Ventral (anterior) Forearm 1 day                    Current Facility-Administered Medications:     acetaminophen (TYLENOL) tablet 650 mg, 650 mg, Oral, Q8H PRN, Terrell Mayberry MD, 650 mg at 12/21/23 0350    Diclofenac Sodium (VOLTAREN) 1 % topical gel 2 g, 2 g, Topical, Q6H PRN, Terrell Mayberry MD    ferrous sulfate tablet 325 mg, 325 mg, Oral, Daily With Breakfast, Terrell Mayberry MD, 325 mg at 12/30/23 0821    hydrocortisone (ANUSOL-HC) rectal suppository 25 mg, 25 mg, Rectal, BID, Destiney Camarillo PA-C, 25 mg at 12/30/23 1310    lactulose (CHRONULAC) oral solution 20 g, 20 g, Oral, BID, Terrell Mayberry MD, 20 g at 12/30/23 0821    midodrine (PROAMATINE) tablet 12.5 mg, 12.5 mg, Oral, TID AC, Joselyn Reyes Bahamonde, MD, 12.5 mg at 12/30/23 1309    octreotide (SandoSTATIN) injection 100 mcg, 100 mcg, Subcutaneous, Q8H HAJA, Joselyn Reyes Bahamonde, MD, 100 mcg at 12/30/23 0625    pancrelipase (Lip-Prot-Amyl) (CREON) delayed release capsule 24,000 Units, 24,000 Units, Oral, TID With Meals, Terrell Mayberry MD, 24,000 Units at 12/30/23 1310    pantoprazole (PROTONIX) EC tablet 40 mg, 40 mg, Oral, BID AC, AQUILES Colby, 40 mg at 12/30/23 0624    sodium bicarbonate tablet 650 mg, 650 mg, Oral, Daily, Joselyn Reyes Bahamonde, MD, 650 mg at 12/30/23 1310    trimethobenzamide (TIGAN) IM injection 200 mg, 200 mg, Intramuscular, Q6H PRN, Jodie Larose MD, 200 mg at 12/21/23 7033    Lab Results:   Recent Results (from the past 24 hour(s))   Hemoglobin and hematocrit, blood    Collection Time:  12/29/23  4:33 PM   Result Value Ref Range    Hemoglobin 7.8 (L) 11.5 - 15.4 g/dL    Hematocrit 23.4 (L) 34.8 - 46.1 %   CBC and Platelet    Collection Time: 12/30/23  5:02 AM   Result Value Ref Range    WBC 5.58 4.31 - 10.16 Thousand/uL    RBC 2.43 (L) 3.81 - 5.12 Million/uL    Hemoglobin 7.8 (L) 11.5 - 15.4 g/dL    Hematocrit 23.0 (L) 34.8 - 46.1 %    MCV 95 82 - 98 fL    MCH 32.1 26.8 - 34.3 pg    MCHC 33.9 31.4 - 37.4 g/dL    RDW 18.8 (H) 11.6 - 15.1 %    Platelets 75 (L) 149 - 390 Thousands/uL    MPV 11.8 8.9 - 12.7 fL   Basic metabolic panel    Collection Time: 12/30/23  5:02 AM   Result Value Ref Range    Sodium 131 (L) 135 - 147 mmol/L    Potassium 3.6 3.5 - 5.3 mmol/L    Chloride 96 96 - 108 mmol/L    CO2 24 21 - 32 mmol/L    ANION GAP 11 mmol/L    BUN 21 5 - 25 mg/dL    Creatinine 1.45 (H) 0.60 - 1.30 mg/dL    Glucose 123 65 - 140 mg/dL    Calcium 10.1 8.4 - 10.2 mg/dL    eGFR 37 ml/min/1.73sq m   Hepatic function panel    Collection Time: 12/30/23  5:02 AM   Result Value Ref Range    Total Bilirubin 5.21 (H) 0.20 - 1.00 mg/dL    Bilirubin, Direct 1.88 (H) 0.00 - 0.20 mg/dL    Alkaline Phosphatase 58 34 - 104 U/L    AST 33 13 - 39 U/L    ALT 14 7 - 52 U/L    Total Protein 6.3 (L) 6.4 - 8.4 g/dL    Albumin 3.8 3.5 - 5.0 g/dL   Prepare Leukoreduced RBC: 1 Units    Collection Time: 12/30/23  5:47 AM   Result Value Ref Range    Unit Product Code U2422S87     Unit Number [id number]     Unit ABO O     Unit RH NEG     Crossmatch Compatible     Unit Dispense Status Presumed Trans     Unit Product Volume 250 mL             Imaging Studies: XR chest 1 view    Result Date: 12/28/2023  Narrative: CHEST INDICATION:   infection rule out. COMPARISON: 12/20/2023 EXAM PERFORMED/VIEWS:  XR CHEST 1 VIEW FINDINGS: Cardiomediastinal silhouette appears unremarkable. Right base small airspace opacity. Slight bilateral CP angle blunting. No pneumothorax. Osseous structures appear within normal limits for patient age.      Impression: Medial right base small infiltrate and/or atelectasis. Small pleural effusions Workstation performed: [id number]     CT abdomen pelvis wo contrast    Result Date: 12/22/2023  Narrative: CT ABDOMEN AND PELVIS WITHOUT IV CONTRAST INDICATION:   Drop in hemoglobin/BP in the setting of coagulopathy/chronic liver disease and recent paracentesis, query for intraperitoneal hematoma/bleeding. COMPARISON:  None. TECHNIQUE:  CT examination of the abdomen and pelvis was performed without intravenous contrast.  Axial, sagittal, and coronal 2D reformatted images were created from the source data and submitted for interpretation. Radiation dose length product (DLP) for this visit:  480 mGy-cm .  This examination, like all CT scans performed in the [facility], was performed utilizing techniques to minimize radiation dose exposure, including the use of iterative reconstruction and automated exposure control. Enteric contrast was not administered. FINDINGS: ABDOMEN LOWER CHEST: Small right and trace left pleural effusions with bibasilar atelectasis. LIVER/BILIARY TREE:  Unremarkable. GALLBLADDER: Status post cholecystectomy. SPLEEN:  Unremarkable. PANCREAS: Glandular atrophy in the body tail and neck with mild ductal dilatation unchanged. Few scattered pancreatic parenchymal calcifications compatible with chronic calcific pancreatitis. ADRENAL GLANDS: Fine linear calcification in the medial limb of the right adrenal gland probably from prior infection or hemorrhage. Left adrenal gland normal in appearance. KIDNEYS/URETERS:  Unremarkable. No hydronephrosis. STOMACH AND BOWEL: Multiple colonic diverticula throughout the distal colon with no evidence of acute diverticulitis. APPENDIX:  No findings to suggest appendicitis. ABDOMINOPELVIC CAVITY: Small volume ascites throughout the abdomen and pelvis, with mesenteric and body wall edema. VESSELS: Atherosclerotic changes are present.  No evidence of  aneurysm. PELVIS REPRODUCTIVE ORGANS: Vascular calcifications in the both adnexa. Calcified uterine myoma.r 4.8 x 4.4 cm left ovarian cyst. Right ovary is grossly unremarkable. URINARY BLADDER: Gas in the nondependent portion of the bladder. ABDOMINAL WALL/INGUINAL REGIONS: Moderate body wall edema. OSSEOUS STRUCTURES: Status post left ORIF. Degenerative changes throughout the spine.     Impression: No evidence of hematoma related to recent paracentesis. Small volume ascites throughout the abdomen and pelvis with mesenteric edema. Moderate body wall edema. 4.8 x 4.4 cm left ovarian cyst which has substantially increased in size since a pelvic ultrasound done on August 7, 2017. Recommend yearly follow-up.. Workstation performed: [id number]      INPATIENT Paracentesis    Result Date: 12/22/2023  Narrative: PROCEDURE: Therapeutic paracentesis STAFF: Antonio Archer M.D. NUMBER OF IMAGES: Multiple. COMPLICATIONS: None. MEDICATIONS: 1% lidocaine subcutaneous. INDICATION: Symptomatic ascites. PROCEDURE: Using ultrasound guidance, the left paracolic gutter was punctured and a catheter was placed into the peritoneal cavity. A total of 2400 milliliters of fluid was removed. The catheter was then removed. FINDINGS: Massive ascites. Serosanguinous ascites was removed.     Impression: Successful therapeutic paracentesis. ATTESTATION I, the attending radiologist, was present for the entire procedure. Guidance images were reviewed and I am in agreement with the findings on the report. Attending physician: Antonio Archer. Resident:  Adonis Camara. Workstation performed: [id number]     US bedside procedure    Result Date: 12/20/2023  Narrative: [id number]    XR chest 1 view portable    Result Date: 12/20/2023  Narrative: CHEST INDICATION:   SOB, vol. overload. COMPARISON: 11/18/2023 EXAM PERFORMED/VIEWS:  XR CHEST PORTABLE FINDINGS: Cardiomediastinal silhouette appears unremarkable. Persistent right  "greater than left pleural effusions. There has been some redistribution of the fluid on the right, now partially tracking into the fissures. There is probably mild bibasilar atelectasis. No significant pneumonic infiltrate. No pneumothorax. Osseous structures appear within normal limits for patient age.     Impression: Shifting bilateral right greater than the left pleural effusions Workstation performed: [id number]           AQUILES Cavazos      Please Note: \"This note has been constructed using a voice recognition system.Therefore there may be syntax, spelling, and/or grammatical errors. Please call if you have any questions. \"**   "

## 2023-12-30 NOTE — PROGRESS NOTES
NEPHROLOGY PROGRESS NOTE   Cassidy Zhang 68 y.o. female MRN: [id number]  Unit/Bed#: ALBARO -01 Encounter: [id number]  Reason for Consult: Hyponatremia    ASSESSMENT AND PLAN:  67 yo woman with PMH of cirrhosis, hyponatremia p/w  LE and ascites.  Nephrology is consulted for management of hyponatremia and fluid overload        PLAN:     # Acute on chronic Hyposomolar Hyponatremia   Sodium on admission: 129  Serum osm: 289  Urine Sodium: 122  Current sodium 131 mEq/L slowly improving    Etiology: Multifactorial likely secondary to elevated ADH in the settings of cirrhosis with hypoperfusion and intravascular depletion with third spacing   Plan:  Fluid restriction 1.5 L  Holding Lasix and Aldactone for now  Continue albumin for now with no diuretics IV  Will require p.o. diuretics in the next 24 to 48 hours  BMP every 24  Avoid overcorrection: no more than 6-8mEq in the next 24hr, goal </=137  Please monitor UOP,  Will monitor labs, call renal if SNa+ <126     #Volume status/hypertension:  Volume: Hypervolemic with third spacing improving  Blood pressure: Hypotensive, BP 96/50  Multifactorial secondary to advanced liver disease and GI bleedRecommend:  midodrine to 10 mg 3 times daily  Albumin as above   Monitor urinary output and daily weight     # Hypokalemia  Liberalize potassium in the diet  Replete with p.o. potassium as needed      # Liver cirrhosis  Ascites status post paracentesis  Management as per GI  MELD 29       #Non-Oliguric KDIGO PRETTY stage 1   Etiology: Likely secondary to HRS with worsening liver cirrhosis  Baseline creatinine: 1 mg/dL  Current creatinine: Plateauing at 1.4 mg/dL  Peak creatinine: 1.58 mg/dL  UA: No hematuria, no leukocyturia  Renal imaging : No hydronephrosis on CT scan   Treatment:  No dialysis at this time  Maintain MAP:  Over 65 mmHg if possible/avoid hypoperfusion:  Hold parameters on blood pressure medications  Avoid nephrotoxic agents such as NSAIDs, and IV contrast if possible.  Avoid opioids   Adjust medications to GFR  Continue with HRS treatment  Midodrine 10 mg 3 times daily  Albumin twice daily  Octreotide 3 times daily subcutaneous  Holding diuretics for now        #Acid-base Disorder  serum HCO3 24 mEq/L, goal> 21  A  Decrease sodium bicarbonate to 650 daily         #Anemia:  Current hemoglobin: 7.8 mg/dL  Secondary to GI bleed  Treatment:  Transfuse for hemoglobin less than 7.0 per primary service         SUBJECTIVE:  Patient seen and examined at bedside.  Patient reported GI bleed, no shortness of breath    OBJECTIVE:  Current Weight: Weight - Scale: 56.6 kg (124 lb 12.5 oz)  Vitals:    23 0742   BP: 96/50   Pulse: 59   Resp: 15   Temp: 98 °F (36.7 °C)   SpO2: 99%       Intake/Output Summary (Last 24 hours) at 2023 1107  Last data filed at 2023 0825  Gross per 24 hour   Intake 1270 ml   Output --   Net 1270 ml     Wt Readings from Last 3 Encounters:   23 56.6 kg (124 lb 12.5 oz)   23 65.8 kg (145 lb)   23 62.2 kg (137 lb 3.2 oz)     Temp Readings from Last 3 Encounters:   23 98 °F (36.7 °C)   23 (!) 97 °F (36.1 °C) (Tympanic)   23 98.8 °F (37.1 °C)     BP Readings from Last 3 Encounters:   23 96/50   23 110/70   23 121/56     Pulse Readings from Last 3 Encounters:   23 59   23 70   23 71      General:  no acute distress at this time  Skin:  No acute rash  Eyes:  No scleral icterus and noninjected  ENT:  mucous membranes moist  Neck:  no carotid bruits  Chest:  Clear to auscultation percussion, good respiratory effort, no use of accessory respiratory muscles  CVS:  Regular rate and rhythm without rub   Abdomen:  soft and nontender   Extremities: lower extremity edema  Neuro:  No gross focality  Psych:  Alert , cooperative       Medications:    Current Facility-Administered Medications:     acetaminophen (TYLENOL) tablet 650 mg, 650 mg, Oral, Q8H PRN, Terrell Mayberry MD, 650 mg at 23  0350    Diclofenac Sodium (VOLTAREN) 1 % topical gel 2 g, 2 g, Topical, Q6H PRN, Terrell Mayberry MD    ferrous sulfate tablet 325 mg, 325 mg, Oral, Daily With Breakfast, Terrell Mayberry MD, 325 mg at 12/30/23 0821    hydrocortisone (ANUSOL-HC) rectal suppository 25 mg, 25 mg, Rectal, BID, Destiney Camarillo PA-C, 25 mg at 12/29/23 2340    lactulose (CHRONULAC) oral solution 20 g, 20 g, Oral, BID, Terrell Mayberry MD, 20 g at 12/30/23 0821    midodrine (PROAMATINE) tablet 12.5 mg, 12.5 mg, Oral, TID AC, Joselyn Reyes Bahamonde, MD, 12.5 mg at 12/30/23 0624    octreotide (SandoSTATIN) injection 100 mcg, 100 mcg, Subcutaneous, Q8H HAJA, Joselyn Reyes Bahamonde, MD, 100 mcg at 12/30/23 0625    pancrelipase (Lip-Prot-Amyl) (CREON) delayed release capsule 24,000 Units, 24,000 Units, Oral, TID With Meals, Terrell Mayberry MD, 24,000 Units at 12/30/23 0822    pantoprazole (PROTONIX) EC tablet 40 mg, 40 mg, Oral, BID AC, AQUILES Colby, 40 mg at 12/30/23 0624    sodium bicarbonate tablet 650 mg, 650 mg, Oral, Daily, Joselyn Reyes Bahamonde, MD    trimethobenzamide (TIGAN) IM injection 200 mg, 200 mg, Intramuscular, Q6H PRN, Jodie Larose MD, 200 mg at 12/21/23 1249    Laboratory Results:  Results from last 7 days   Lab Units 12/30/23  0502 12/29/23  1633 12/29/23  1017 12/29/23  0641 12/28/23  0529 12/27/23  2101 12/27/23  0454 12/26/23  1339 12/26/23  0614 12/26/23  0403 12/25/23  0259 12/24/23  0504 12/23/23 2010 12/23/23 2010   WBC Thousand/uL 5.58  --   --  3.85*  --   --   --   --  4.72  --  4.71 3.74*  --  3.91*   HEMOGLOBIN g/dL 7.8* 7.8* 7.3* 6.5*  --   --   --  8.4* 8.0*  --  8.0* 7.1*  --  7.2*   HEMATOCRIT % 23.0* 23.4* 21.9* 19.0*  --   --   --   --  23.1*  --  22.9* 20.1*  --  21.1*   PLATELETS Thousands/uL 75*  --   --  63*  --   --   --   --  70*  --  65* 77*  --  69*   SODIUM mmol/L 131*  --   --  129* 130* 128* 126*  --   --  131* 134* 138   < >  --    POTASSIUM mmol/L 3.6  --   --  3.5 4.1 4.3 4.5  --    --  3.5 3.4* 3.3*   < >  --    CHLORIDE mmol/L 96  --   --  97 97 98 102  --   --  104 108 112*   < >  --    CO2 mmol/L 24  --   --  25 21 19* 20*  --   --  19* 20* 19*   < >  --    BUN mg/dL 21  --   --  19 19 20 19  --   --  16 13 13   < >  --    CREATININE mg/dL 1.45*  --   --  1.34* 1.48* 1.58* 1.56*  --   --  1.20 0.97 0.95   < >  --    CALCIUM mg/dL 10.1  --   --  9.7 10.4* 10.0 9.2  --   --  9.3 9.2 9.2   < >  --    MAGNESIUM mg/dL  --   --   --   --   --   --  2.1  --   --  1.4* 1.8* 1.4*  --   --    PHOSPHORUS mg/dL  --   --   --   --   --   --   --   --   --   --  2.4  --   --   --     < > = values in this interval not displayed.       XR chest 1 view   Final Result by Law Christy MD (12/28 1526)      Medial right base small infiltrate and/or atelectasis.      Small pleural effusions                  Workstation performed: [id number]         CT abdomen pelvis wo contrast   Final Result by Breonna Trevino MD (12/22 5585)      No evidence of hematoma related to recent paracentesis.      Small volume ascites throughout the abdomen and pelvis with mesenteric edema. Moderate body wall edema.      4.8 x 4.4 cm left ovarian cyst which has substantially increased in size since a pelvic ultrasound done on August 7, 2017. Recommend yearly follow-up..            Workstation performed: [id number]         IR INPATIENT Paracentesis   Final Result by Antonio Archer MD (12/22 2760)   Successful therapeutic paracentesis.      ATTESTATION      I, the attending radiologist, was present for the entire procedure. Guidance images were reviewed and I am in agreement with the findings on the report.      Attending physician: Antonio Archer.   Resident:  Adonis Camara.         Workstation performed: [id number]         XR chest 1 view portable   ED Interpretation by Stephen Gallego DO (12/20 7930)   Bilateral pulmonary edema with bilateral pleural effusions, right worse than left      Final Result by Westley  "Devon Vang MD (12/20 2620)      Shifting bilateral right greater than the left pleural effusions                  Workstation performed: [id number]             Portions of the record may have been created with voice recognition software. Occasional wrong word or \"sound a like\" substitutions may have occurred due to the inherent limitations of voice recognition software. Read the chart carefully and recognize, using context, where substitutions have occurred.    "

## 2023-12-31 ENCOUNTER — APPOINTMENT (INPATIENT)
Dept: RADIOLOGY | Facility: HOSPITAL | Age: 68
DRG: 432 | End: 2023-12-31
Payer: MEDICARE

## 2023-12-31 LAB
ALBUMIN SERPL BCP-MCNC: 3.4 G/DL (ref 3.5–5)
ALP SERPL-CCNC: 80 U/L (ref 34–104)
ALT SERPL W P-5'-P-CCNC: 14 U/L (ref 7–52)
AMMONIA PLAS-SCNC: 61 UMOL/L (ref 18–72)
ANION GAP SERPL CALCULATED.3IONS-SCNC: 11 MMOL/L
AST SERPL W P-5'-P-CCNC: 29 U/L (ref 13–39)
BACTERIA UR QL AUTO: ABNORMAL /HPF
BILIRUB SERPL-MCNC: 3.64 MG/DL (ref 0.2–1)
BILIRUB UR QL STRIP: NEGATIVE
BUN SERPL-MCNC: 25 MG/DL (ref 5–25)
CALCIUM ALBUM COR SERPL-MCNC: 9.9 MG/DL (ref 8.3–10.1)
CALCIUM SERPL-MCNC: 9.4 MG/DL (ref 8.4–10.2)
CHLORIDE SERPL-SCNC: 97 MMOL/L (ref 96–108)
CLARITY UR: ABNORMAL
CO2 SERPL-SCNC: 23 MMOL/L (ref 21–32)
COLOR UR: YELLOW
CREAT SERPL-MCNC: 1.27 MG/DL (ref 0.6–1.3)
ERYTHROCYTE [DISTWIDTH] IN BLOOD BY AUTOMATED COUNT: 18.2 % (ref 11.6–15.1)
GFR SERPL CREATININE-BSD FRML MDRD: 43 ML/MIN/1.73SQ M
GLUCOSE SERPL-MCNC: 133 MG/DL (ref 65–140)
GLUCOSE UR STRIP-MCNC: NEGATIVE MG/DL
HCT VFR BLD AUTO: 21 % (ref 34.8–46.1)
HGB BLD-MCNC: 7.2 G/DL (ref 11.5–15.4)
HGB UR QL STRIP.AUTO: ABNORMAL
HYALINE CASTS #/AREA URNS LPF: ABNORMAL /LPF
INR PPP: 1.87 (ref 0.84–1.19)
KETONES UR STRIP-MCNC: NEGATIVE MG/DL
LEUKOCYTE ESTERASE UR QL STRIP: ABNORMAL
MCH RBC QN AUTO: 32.3 PG (ref 26.8–34.3)
MCHC RBC AUTO-ENTMCNC: 34.3 G/DL (ref 31.4–37.4)
MCV RBC AUTO: 94 FL (ref 82–98)
NITRITE UR QL STRIP: NEGATIVE
NON-SQ EPI CELLS URNS QL MICRO: ABNORMAL /HPF
PH UR STRIP.AUTO: 5.5 [PH]
PLATELET # BLD AUTO: 76 THOUSANDS/UL (ref 149–390)
PMV BLD AUTO: 10.9 FL (ref 8.9–12.7)
POTASSIUM SERPL-SCNC: 3.7 MMOL/L (ref 3.5–5.3)
PROT SERPL-MCNC: 5.7 G/DL (ref 6.4–8.4)
PROT UR STRIP-MCNC: NEGATIVE MG/DL
PROTHROMBIN TIME: 22.3 SECONDS (ref 11.6–14.5)
RBC # BLD AUTO: 2.23 MILLION/UL (ref 3.81–5.12)
RBC #/AREA URNS AUTO: ABNORMAL /HPF
SODIUM SERPL-SCNC: 131 MMOL/L (ref 135–147)
SP GR UR STRIP.AUTO: 1.01 (ref 1–1.03)
UROBILINOGEN UR STRIP-ACNC: <2 MG/DL
WBC # BLD AUTO: 5.3 THOUSAND/UL (ref 4.31–10.16)
WBC #/AREA URNS AUTO: ABNORMAL /HPF

## 2023-12-31 PROCEDURE — 87040 BLOOD CULTURE FOR BACTERIA: CPT | Performed by: NURSE PRACTITIONER

## 2023-12-31 PROCEDURE — 99232 SBSQ HOSP IP/OBS MODERATE 35: CPT | Performed by: STUDENT IN AN ORGANIZED HEALTH CARE EDUCATION/TRAINING PROGRAM

## 2023-12-31 PROCEDURE — 80053 COMPREHEN METABOLIC PANEL: CPT | Performed by: NURSE PRACTITIONER

## 2023-12-31 PROCEDURE — 87086 URINE CULTURE/COLONY COUNT: CPT | Performed by: NURSE PRACTITIONER

## 2023-12-31 PROCEDURE — 85027 COMPLETE CBC AUTOMATED: CPT | Performed by: NURSE PRACTITIONER

## 2023-12-31 PROCEDURE — 81001 URINALYSIS AUTO W/SCOPE: CPT | Performed by: NURSE PRACTITIONER

## 2023-12-31 PROCEDURE — 82140 ASSAY OF AMMONIA: CPT

## 2023-12-31 PROCEDURE — 85610 PROTHROMBIN TIME: CPT | Performed by: NURSE PRACTITIONER

## 2023-12-31 PROCEDURE — 71046 X-RAY EXAM CHEST 2 VIEWS: CPT

## 2023-12-31 PROCEDURE — 99232 SBSQ HOSP IP/OBS MODERATE 35: CPT | Performed by: INTERNAL MEDICINE

## 2023-12-31 RX ORDER — LACTULOSE 10 G/15ML
30 SOLUTION ORAL 2 TIMES DAILY
Status: DISCONTINUED | OUTPATIENT
Start: 2023-12-31 | End: 2023-12-31

## 2023-12-31 RX ORDER — LACTULOSE 10 G/15ML
30 SOLUTION ORAL 3 TIMES DAILY
Status: DISCONTINUED | OUTPATIENT
Start: 2023-12-31 | End: 2024-01-02 | Stop reason: HOSPADM

## 2023-12-31 RX ADMIN — PANCRELIPASE 24000 UNITS: 24000; 76000; 120000 CAPSULE, DELAYED RELEASE PELLETS ORAL at 15:53

## 2023-12-31 RX ADMIN — MIDODRINE HYDROCHLORIDE 12.5 MG: 5 TABLET ORAL at 15:52

## 2023-12-31 RX ADMIN — RIFAXIMIN 550 MG: 550 TABLET ORAL at 15:52

## 2023-12-31 RX ADMIN — HYDROCORTISONE 2.5%: 25 CREAM TOPICAL at 08:35

## 2023-12-31 RX ADMIN — SODIUM BICARBONATE 650 MG TABLET 650 MG: at 08:34

## 2023-12-31 RX ADMIN — LACTULOSE 30 G: 20 SOLUTION ORAL at 15:52

## 2023-12-31 RX ADMIN — PANTOPRAZOLE SODIUM 40 MG: 40 TABLET, DELAYED RELEASE ORAL at 15:53

## 2023-12-31 RX ADMIN — RIFAXIMIN 550 MG: 550 TABLET ORAL at 21:49

## 2023-12-31 RX ADMIN — PANTOPRAZOLE SODIUM 40 MG: 40 TABLET, DELAYED RELEASE ORAL at 06:35

## 2023-12-31 RX ADMIN — MIDODRINE HYDROCHLORIDE 12.5 MG: 5 TABLET ORAL at 06:35

## 2023-12-31 RX ADMIN — PANCRELIPASE 24000 UNITS: 24000; 76000; 120000 CAPSULE, DELAYED RELEASE PELLETS ORAL at 08:34

## 2023-12-31 RX ADMIN — LACTULOSE 30 G: 20 SOLUTION ORAL at 11:30

## 2023-12-31 RX ADMIN — HYDROCORTISONE 2.5%: 25 CREAM TOPICAL at 18:29

## 2023-12-31 RX ADMIN — OCTREOTIDE ACETATE 100 MCG: 100 INJECTION, SOLUTION INTRAVENOUS; SUBCUTANEOUS at 05:15

## 2023-12-31 RX ADMIN — FERROUS SULFATE TAB 325 MG (65 MG ELEMENTAL FE) 325 MG: 325 (65 FE) TAB at 06:35

## 2023-12-31 RX ADMIN — LACTULOSE 30 G: 20 SOLUTION ORAL at 21:49

## 2023-12-31 RX ADMIN — MIDODRINE HYDROCHLORIDE 12.5 MG: 5 TABLET ORAL at 11:30

## 2023-12-31 RX ADMIN — PANCRELIPASE 24000 UNITS: 24000; 76000; 120000 CAPSULE, DELAYED RELEASE PELLETS ORAL at 11:30

## 2023-12-31 RX ADMIN — LACTULOSE 20 G: 20 SOLUTION ORAL at 08:34

## 2023-12-31 NOTE — PLAN OF CARE
Problem: PAIN - ADULT  Goal: Verbalizes/displays adequate comfort level or baseline comfort level  Description: Interventions:  - Encourage patient to monitor pain and request assistance  - Assess pain using appropriate pain scale  - Administer analgesics based on type and severity of pain and evaluate response  - Implement non-pharmacological measures as appropriate and evaluate response  - Consider cultural and social influences on pain and pain management  - Notify physician/advanced practitioner if interventions unsuccessful or patient reports new pain  Outcome: Progressing     Problem: INFECTION - ADULT  Goal: Absence or prevention of progression during hospitalization  Description: INTERVENTIONS:  - Assess and monitor for signs and symptoms of infection  - Monitor lab/diagnostic results  - Monitor all insertion sites, i.e. indwelling lines, tubes, and drains  - Monitor endotracheal if appropriate and nasal secretions for changes in amount and color  - Lititz appropriate cooling/warming therapies per order  - Administer medications as ordered  - Instruct and encourage patient and family to use good hand hygiene technique  - Identify and instruct in appropriate isolation precautions for identified infection/condition  Outcome: Progressing  Goal: Absence of fever/infection during neutropenic period  Description: INTERVENTIONS:  - Monitor WBC    Outcome: Progressing     Problem: SAFETY ADULT  Goal: Patient will remain free of falls  Description: INTERVENTIONS:  - Educate patient/family on patient safety including physical limitations  - Instruct patient to call for assistance with activity   - Consult OT/PT to assist with strengthening/mobility   - Keep Call bell within reach  - Keep bed low and locked with side rails adjusted as appropriate  - Keep care items and personal belongings within reach  - Initiate and maintain comfort rounds  - Make Fall Risk Sign visible to staff  - Offer Toileting every  Hours,  in advance of need  - Initiate/Maintain alarm  - Obtain necessary fall risk management equipment:  - Apply yellow socks and bracelet for high fall risk patients  - Consider moving patient to room near nurses station  Outcome: Progressing  Goal: Maintain or return to baseline ADL function  Description: INTERVENTIONS:  -  Assess patient's ability to carry out ADLs; assess patient's baseline for ADL function and identify physical deficits which impact ability to perform ADLs (bathing, care of mouth/teeth, toileting, grooming, dressing, etc.)  - Assess/evaluate cause of self-care deficits   - Assess range of motion  - Assess patient's mobility; develop plan if impaired  - Assess patient's need for assistive devices and provide as appropriate  - Encourage maximum independence but intervene and supervise when necessary  - Involve family in performance of ADLs  - Assess for home care needs following discharge   - Consider OT consult to assist with ADL evaluation and planning for discharge  - Provide patient education as appropriate  Outcome: Progressing  Goal: Maintains/Returns to pre admission functional level  Description: INTERVENTIONS:  - Perform AM-PAC 6 Click Basic Mobility/ Daily Activity assessment daily.  - Set and communicate daily mobility goal to care team and patient/family/caregiver.   - Collaborate with rehabilitation services on mobility goals if consulted  - Perform Range of Motion  times a day.  - Reposition patient every  hours.  - Dangle patient  times a day  - Stand patient times a day  - Ambulate patient  times a day  - Out of bed to chair  times a day   - Out of bed for meals  times a day  - Out of bed for toileting  - Record patient progress and toleration of activity level   Outcome: Progressing     Problem: DISCHARGE PLANNING  Goal: Discharge to home or other facility with appropriate resources  Description: INTERVENTIONS:  - Identify barriers to discharge w/patient and caregiver  - Arrange for  needed discharge resources and transportation as appropriate  - Identify discharge learning needs (meds, wound care, etc.)  - Arrange for interpretive services to assist at discharge as needed  - Refer to Case Management Department for coordinating discharge planning if the patient needs post-hospital services based on physician/advanced practitioner order or complex needs related to functional status, cognitive ability, or social support system  Outcome: Progressing     Problem: Knowledge Deficit  Goal: Patient/family/caregiver demonstrates understanding of disease process, treatment plan, medications, and discharge instructions  Description: Complete learning assessment and assess knowledge base.  Interventions:  - Provide teaching at level of understanding  - Provide teaching via preferred learning methods  Outcome: Progressing     Problem: Prexisting or High Potential for Compromised Skin Integrity  Goal: Skin integrity is maintained or improved  Description: INTERVENTIONS:  - Identify patients at risk for skin breakdown  - Assess and monitor skin integrity  - Assess and monitor nutrition and hydration status  - Monitor labs   - Assess for incontinence   - Turn and reposition patient  - Assist with mobility/ambulation  - Relieve pressure over bony prominences  - Avoid friction and shearing  - Provide appropriate hygiene as needed including keeping skin clean and dry  - Evaluate need for skin moisturizer/barrier cream  - Collaborate with interdisciplinary team   - Patient/family teaching  - Consider wound care consult   Outcome: Progressing     Problem: Nutrition/Hydration-ADULT  Goal: Nutrient/Hydration intake appropriate for improving, restoring or maintaining nutritional needs  Description: Monitor and assess patient's nutrition/hydration status for malnutrition. Collaborate with interdisciplinary team and initiate plan and interventions as ordered.  Monitor patient's weight and dietary intake as ordered or  per policy. Utilize nutrition screening tool and intervene as necessary. Determine patient's food preferences and provide high-protein, high-caloric foods as appropriate.     INTERVENTIONS:  - Monitor oral intake, urinary output, labs, and treatment plans  - Assess nutrition and hydration status and recommend course of action  - Evaluate amount of meals eaten  - Assist patient with eating if necessary   - Allow adequate time for meals  - Recommend/ encourage appropriate diets, oral nutritional supplements, and vitamin/mineral supplements  - Order, calculate, and assess calorie counts as needed  - Recommend, monitor, and adjust tube feedings and TPN/PPN based on assessed needs  - Assess need for intravenous fluids  - Provide specific nutrition/hydration education as appropriate  - Include patient/family/caregiver in decisions related to nutrition  Outcome: Progressing

## 2023-12-31 NOTE — ASSESSMENT & PLAN NOTE
Presents for ascites and anasarca, found also to have mild hyponatremia, hypoalbuminemia, hyperbilirubinemia, hypocoagulability, anemia, and thrombocytopenia. Ammonia wnl.  No asterixis on exam, pt A&O x3, maybe some mild confusion (delayed when answering questions, but appropriate), though pt at or near her baseline per spouse.  Reports holding lactulose for last 2-3 days for frequent BMs at home (>5 loose BM per day)  Recently discontinued lasix and spironolactone in the o/p setting d/t worsening renal function and hyponatremia   MELD score 23 on presentation  Last drink 5-6 years ago    MELD 3.0: 25 at 12/31/2023  5:24 AM  MELD-Na: 25 at 12/31/2023  5:24 AM  Calculated from:  Serum Creatinine: 1.27 mg/dL at 12/31/2023  5:24 AM  Serum Sodium: 131 mmol/L at 12/31/2023  5:24 AM  Total Bilirubin: 3.64 mg/dL at 12/31/2023  5:24 AM  Serum Albumin: 3.4 g/dL at 12/31/2023  5:24 AM  INR(ratio): 1.87 at 12/31/2023  5:24 AM  Age at listing (hypothetical): 68 years  Sex: Female at 12/31/2023  5:24 AM        Plan:  Lactulose 30 mg twice daily  Anusol cream  Continue Creon 24,000 units with meals  Protonix 40 mg twice daily  Follow-up with GI as an outpatient on 1/28

## 2023-12-31 NOTE — ASSESSMENT & PLAN NOTE
BP now low due to cirrhosis.  Hold Norvasc and ARB as pt needs midodrine.  Discussed with PCP as an outpatient.

## 2023-12-31 NOTE — ASSESSMENT & PLAN NOTE
2/2 end stage liver disease, as noted above.    Plan:  Lactone 12.5 mg daily.  Hold Lasix until follow-up with PCP.  Follow-up with nephrology as an outpatient

## 2023-12-31 NOTE — PROGRESS NOTES
[facility]  Progress Note  Name: Cassidy Zhang I  MRN: [id number]  Unit/Bed#: [facility]BAR[facility] -01 I Date of Admission: 12/20/2023   Date of Service: 12/31/2023 I Hospital Day: 11    Assessment/Plan   * Decompensated liver disease (HCC)  Assessment & Plan  Presents for ascites and anasarca, found also to have mild hyponatremia, hypoalbuminemia, hyperbilirubinemia, hypocoagulability, anemia, and thrombocytopenia. Ammonia wnl.  No asterixis on exam, pt A&O x3, maybe some mild confusion (delayed when answering questions, but appropriate), though pt at or near her baseline per spouse.  Reports holding lactulose for last 2-3 days for frequent BMs at home (>5 loose BM per day)  Recently discontinued lasix and spironolactone in the o/p setting d/t worsening renal function and hyponatremia   MELD score 23 on presentation  Last drink 5-6 years ago    MELD 3.0: 25 at 12/31/2023  5:24 AM  MELD-Na: 25 at 12/31/2023  5:24 AM  Calculated from:  Serum Creatinine: 1.27 mg/dL at 12/31/2023  5:24 AM  Serum Sodium: 131 mmol/L at 12/31/2023  5:24 AM  Total Bilirubin: 3.64 mg/dL at 12/31/2023  5:24 AM  Serum Albumin: 3.4 g/dL at 12/31/2023  5:24 AM  INR(ratio): 1.87 at 12/31/2023  5:24 AM  Age at listing (hypothetical): 68 years  Sex: Female at 12/31/2023  5:24 AM        Plan:  Diet: High protein, low salt, fluid restrict 1500 cc/day  Patient confused and disoriented this morning - Increase lactulose to 30 g TID and titrate to target 3-5 loose BM per day  Monitor MELD labs  Monitor Hgb and transfuse for Hgb<7  GI and Nephro appreciated  IR paracentesis drained 2.4L fluid - labs unremarkable  Tigan for nausea   Peth negative   potential liver transplant evaluation  May need recurrent paracentesis as on outpatient   Octreotide q8h  Due to worsening MELD - infectious workup with UA (negative), CXR, and blood cultures - negative thus far    Coagulopathy (HCC)  Assessment & Plan  2/2 cirrhosis  INR at 1.8 and no acute  bleeding     Acute blood loss anemia  Assessment & Plan  1 unit pRBC this admission and 2 units of FFP  S/p transfusion on 12/28 1U RBC  Stable hemorrhoidal bleed.   -No signs of active bleeding  -GI plans to monitor at this point since no bloody stools, if further drop in Hbg, they would recommend EGD and Flex sig  -Can consider outpatient banding procedure with GI   -Continue to monitor CBC    Status post incision and drainage  Assessment & Plan  Pt and  report dental I&D for oral abscess day prior to presentation 12/20/23, started on amoxicillin 500 mg PO Q6H. Acute infection may also have played a role in knocking patient into liver decompensation. Now resolved    Metabolic acidosis  Assessment & Plan  Nephrology on board- s/p 1300 BID of bicarbonate  Decrease NaHCO3 to 650 daily giving resolution of acidosis    CKD (chronic kidney disease) stage 3, GFR 30-59 ml/min ([facility])  Assessment & Plan  Estimated Creatinine Clearance: 36.6 mL/min (by C-G formula based on SCr of 1.27 mg/dL).  No PRETTY this admission.  Cr near new baseline of 1.0-1.1  In the setting of type II hepatorenal syndrome.  - Midodrine 10mg TID to enhance renal perfusion  Cr stable after diuresis      Chronic pancreatitis (HCC)  Assessment & Plan  2/2 EtOH abuse, now in remission.     Continue home pancrealipase    Ascites/anasarca  Assessment & Plan  2/2 end stage liver disease, as noted above    Plan:  Diuresis, fluid restriction, dietary salt restriction as above   Nephrology and GI consults, as above  Paracentesis drained 2.4L    Chronic hyponatremia  Assessment & Plan  History of SIADH, however suspect also a component of hypotonic hypervolemic hyponatremia in the setting of liver failure complicated by ascites and anasarca. Sodium improving.     Plan:  Hold home Na tablets, sodium bicarb tabs discontinued per nephro  Fluid restriction 1.5 L  Discontinue Lasix and Aldactone for now  C/w Albumin, hold Bumex, hold Aldactone, consider  restarting oral diuretics today or tomorrow  BMP every 24 hours  Avoid overcorrection: no more than 6-8mEq in the next 24hr, goal </=132  Please monitor UOP  Will monitor labs, call renal if SNa+ <126  Follow nephrology recs      Essential hypertension  Assessment & Plan  BP now low due to cirrhosis.  Hold Norvasc and ARB as pt on diuretics and needing midodrine           VTE Pharmacologic Prophylaxis: VTE Score: 7 High Risk (Score >/= 5) - Pharmacological DVT Prophylaxis Contraindicated. Sequential Compression Devices Ordered.    Mobility:   Basic Mobility Inpatient Raw Score: 16  JH-HLM Goal: 5: Stand one or more mins  JH-HLM Achieved: 5: Stand (1 or more minutes)  HLM Goal NOT achieved. Continue with multidisciplinary rounding and encourage appropriate mobility to improve upon HLM goals.    Patient Centered Rounds: I performed bedside rounds with nursing staff today.   Discussions with Specialists or Other Care Team Provider: GI and Nephro    Education and Discussions with Family / Patient: Updated  () via phone.    Total Time Spent on Date of Encounter in care of patient: 25 mins. This time was spent on one or more of the following: performing physical exam; counseling and coordination of care; obtaining or reviewing history; documenting in the medical record; reviewing/ordering tests, medications or procedures; communicating with other healthcare professionals and discussing with patient's family/caregivers.    Current Length of Stay: 11 day(s)  Current Patient Status: Inpatient   Certification Statement: The patient will continue to require additional inpatient hospital stay due to hyponatremia  Discharge Plan: Anticipate discharge in 24-48 hrs to home.    Code Status: Level 1 - Full Code    Subjective:   Patient seen and examined at bedside this morning.  No acute events overnight.  Patient seemed a bit groggy this morning, and per nursing she was unable to remember her name or date of  birth.  Patient was more slow to respond than normal this morning.  Seemed very confused.    Objective:     Vitals:   Temp (24hrs), Av °F (36.7 °C), Min:97.7 °F (36.5 °C), Max:98.3 °F (36.8 °C)    Temp:  [97.7 °F (36.5 °C)-98.3 °F (36.8 °C)] 98.1 °F (36.7 °C)  HR:  [63-90] 78  Resp:  [12-17] 17  BP: (111-127)/(51-55) 113/51  SpO2:  [98 %-100 %] 98 %  Body mass index is 22.31 kg/m².     Input and Output Summary (last 24 hours):     Intake/Output Summary (Last 24 hours) at 2023 1155  Last data filed at 2023 1300  Gross per 24 hour   Intake 240 ml   Output --   Net 240 ml       Physical Exam:   Physical Exam  Constitutional:       General: She is not in acute distress.     Appearance: Normal appearance.   HENT:      Head: Normocephalic and atraumatic.      Right Ear: External ear normal.      Left Ear: External ear normal.      Nose: Nose normal. No congestion.      Mouth/Throat:      Mouth: Mucous membranes are moist.      Pharynx: Oropharynx is clear.   Eyes:      General: Scleral icterus (mild) present.      Extraocular Movements: Extraocular movements intact.      Conjunctiva/sclera: Conjunctivae normal.   Cardiovascular:      Rate and Rhythm: Normal rate and regular rhythm.      Heart sounds: No murmur heard.  Pulmonary:      Effort: Pulmonary effort is normal. No respiratory distress.      Breath sounds: Normal breath sounds. No wheezing or rales.   Abdominal:      General: Bowel sounds are normal. There is distension.      Palpations: Abdomen is soft.      Tenderness: There is no abdominal tenderness.   Musculoskeletal:         General: Swelling (bilateral LE pitting) present. No tenderness.      Cervical back: Normal range of motion and neck supple.   Skin:     General: Skin is warm and dry.      Coloration: Skin is jaundiced.   Neurological:      Mental Status: She is alert.      Comments: Disoriented, slow to respond. Did not know her name or  this morning. No asterixis.   Psychiatric:          Mood and Affect: Mood normal.          Additional Data:     Labs:  Results from last 7 days   Lab Units 12/31/23  0524 12/26/23  0614 12/25/23  0259   WBC Thousand/uL 5.30   < > 4.71   HEMOGLOBIN g/dL 7.2*   < > 8.0*   HEMATOCRIT % 21.0*   < > 22.9*   PLATELETS Thousands/uL 76*   < > 65*   NEUTROS PCT %  --   --  46   LYMPHS PCT %  --   --  41   MONOS PCT %  --   --  10   EOS PCT %  --   --  3    < > = values in this interval not displayed.     Results from last 7 days   Lab Units 12/31/23  0524   SODIUM mmol/L 131*   POTASSIUM mmol/L 3.7   CHLORIDE mmol/L 97   CO2 mmol/L 23   BUN mg/dL 25   CREATININE mg/dL 1.27   ANION GAP mmol/L 11   CALCIUM mg/dL 9.4   ALBUMIN g/dL 3.4*   TOTAL BILIRUBIN mg/dL 3.64*   ALK PHOS U/L 80   ALT U/L 14   AST U/L 29   GLUCOSE RANDOM mg/dL 133     Results from last 7 days   Lab Units 12/31/23  0524   INR  1.87*                   Lines/Drains:  Invasive Devices       Peripheral Intravenous Line  Duration             Peripheral IV 12/29/23 Right;Ventral (anterior) Forearm 2 days                          Imaging: No pertinent imaging reviewed.    Recent Cultures (last 7 days):   Results from last 7 days   Lab Units 12/28/23  1108   BLOOD CULTURE  No Growth at 48 hrs.  No Growth at 48 hrs.       Last 24 Hours Medication List:   Current Facility-Administered Medications   Medication Dose Route Frequency Provider Last Rate    acetaminophen  650 mg Oral Q8H PRN Terrell Mayberry MD      Diclofenac Sodium  2 g Topical Q6H PRN Terrell Mayberry MD      ferrous sulfate  325 mg Oral Daily With Breakfast Terrell Mayberry MD      hydrocortisone   Topical BID Antonio Garcia MD      lactulose  30 g Oral TID Dania Quiñones MD      midodrine  12.5 mg Oral TID AC Joselyn Reyes Bahamonde, MD      pancrelipase (Lip-Prot-Amyl)  24,000 Units Oral TID With Meals Terrell Mayberry MD      pantoprazole  40 mg Oral BID AC AQUILES Colby      trimethobenzamide  200 mg Intramuscular Q6H PRN Jodie Larose MD           Today, Patient Was Seen By: Dania Quiñones MD    **Please Note: This note may have been constructed using a voice recognition system.**

## 2023-12-31 NOTE — PLAN OF CARE
Problem: PAIN - ADULT  Goal: Verbalizes/displays adequate comfort level or baseline comfort level  Description: Interventions:  - Encourage patient to monitor pain and request assistance  - Assess pain using appropriate pain scale  - Administer analgesics based on type and severity of pain and evaluate response  - Implement non-pharmacological measures as appropriate and evaluate response  - Consider cultural and social influences on pain and pain management  - Notify physician/advanced practitioner if interventions unsuccessful or patient reports new pain  Outcome: Progressing     Problem: INFECTION - ADULT  Goal: Absence or prevention of progression during hospitalization  Description: INTERVENTIONS:  - Assess and monitor for signs and symptoms of infection  - Monitor lab/diagnostic results  - Monitor all insertion sites, i.e. indwelling lines, tubes, and drains  - Monitor endotracheal if appropriate and nasal secretions for changes in amount and color  - [place] appropriate cooling/warming therapies per order  - Administer medications as ordered  - Instruct and encourage patient and family to use good hand hygiene technique  - Identify and instruct in appropriate isolation precautions for identified infection/condition  Outcome: Progressing  Goal: Absence of fever/infection during neutropenic period  Description: INTERVENTIONS:  - Monitor WBC    Outcome: Progressing     Problem: SAFETY ADULT  Goal: Patient will remain free of falls  Description: INTERVENTIONS:  - Educate patient/family on patient safety including physical limitations  - Instruct patient to call for assistance with activity   - Consult OT/PT to assist with strengthening/mobility   - Keep Call bell within reach  - Keep bed low and locked with side rails adjusted as appropriate  - Keep care items and personal belongings within reach  - Initiate and maintain comfort rounds  - Make Fall Risk Sign visible to staff  - Offer Toileting every  Hours,  in advance of need  - Initiate/Maintain alarm  - Obtain necessary fall risk management equipment:  - Apply yellow socks and bracelet for high fall risk patients  - Consider moving patient to room near nurses station  Outcome: Progressing  Goal: Maintain or return to baseline ADL function  Description: INTERVENTIONS:  -  Assess patient's ability to carry out ADLs; assess patient's baseline for ADL function and identify physical deficits which impact ability to perform ADLs (bathing, care of mouth/teeth, toileting, grooming, dressing, etc.)  - Assess/evaluate cause of self-care deficits   - Assess range of motion  - Assess patient's mobility; develop plan if impaired  - Assess patient's need for assistive devices and provide as appropriate  - Encourage maximum independence but intervene and supervise when necessary  - Involve family in performance of ADLs  - Assess for home care needs following discharge   - Consider OT consult to assist with ADL evaluation and planning for discharge  - Provide patient education as appropriate  Outcome: Progressing  Goal: Maintains/Returns to pre admission functional level  Description: INTERVENTIONS:  - Perform AM-PAC 6 Click Basic Mobility/ Daily Activity assessment daily.  - Set and communicate daily mobility goal to care team and patient/family/caregiver.   - Collaborate with rehabilitation services on mobility goals if consulted  - Perform Range of Motion  times a day.  - Reposition patient every  hours.  - Dangle patient  times a day  - Stand patient times a day  - Ambulate patient  times a day  - Out of bed to chair  times a day   - Out of bed for meals  times a day  - Out of bed for toileting  - Record patient progress and toleration of activity level   Outcome: Progressing     Problem: DISCHARGE PLANNING  Goal: Discharge to home or other facility with appropriate resources  Description: INTERVENTIONS:  - Identify barriers to discharge w/patient and caregiver  - Arrange for  needed discharge resources and transportation as appropriate  - Identify discharge learning needs (meds, wound care, etc.)  - Arrange for interpretive services to assist at discharge as needed  - Refer to Case Management Department for coordinating discharge planning if the patient needs post-hospital services based on physician/advanced practitioner order or complex needs related to functional status, cognitive ability, or social support system  Outcome: Progressing     Problem: Knowledge Deficit  Goal: Patient/family/caregiver demonstrates understanding of disease process, treatment plan, medications, and discharge instructions  Description: Complete learning assessment and assess knowledge base.  Interventions:  - Provide teaching at level of understanding  - Provide teaching via preferred learning methods  Outcome: Progressing     Problem: Prexisting or High Potential for Compromised Skin Integrity  Goal: Skin integrity is maintained or improved  Description: INTERVENTIONS:  - Identify patients at risk for skin breakdown  - Assess and monitor skin integrity  - Assess and monitor nutrition and hydration status  - Monitor labs   - Assess for incontinence   - Turn and reposition patient  - Assist with mobility/ambulation  - Relieve pressure over bony prominences  - Avoid friction and shearing  - Provide appropriate hygiene as needed including keeping skin clean and dry  - Evaluate need for skin moisturizer/barrier cream  - Collaborate with interdisciplinary team   - Patient/family teaching  - Consider wound care consult   Outcome: Progressing     Problem: Nutrition/Hydration-ADULT  Goal: Nutrient/Hydration intake appropriate for improving, restoring or maintaining nutritional needs  Description: Monitor and assess patient's nutrition/hydration status for malnutrition. Collaborate with interdisciplinary team and initiate plan and interventions as ordered.  Monitor patient's weight and dietary intake as ordered or  per policy. Utilize nutrition screening tool and intervene as necessary. Determine patient's food preferences and provide high-protein, high-caloric foods as appropriate.     INTERVENTIONS:  - Monitor oral intake, urinary output, labs, and treatment plans  - Assess nutrition and hydration status and recommend course of action  - Evaluate amount of meals eaten  - Assist patient with eating if necessary   - Allow adequate time for meals  - Recommend/ encourage appropriate diets, oral nutritional supplements, and vitamin/mineral supplements  - Order, calculate, and assess calorie counts as needed  - Recommend, monitor, and adjust tube feedings and TPN/PPN based on assessed needs  - Assess need for intravenous fluids  - Provide specific nutrition/hydration education as appropriate  - Include patient/family/caregiver in decisions related to nutrition  Outcome: Progressing

## 2023-12-31 NOTE — ASSESSMENT & PLAN NOTE
History of SIADH, however suspect also a component of hypotonic hypervolemic hyponatremia in the setting of liver failure complicated by ascites and anasarca. Sodium improving.     Plan:  Hold sodium tablets  Midodrine 10 mg 3 times daily  Aldactone 12.5 mg daily  Hold Lasix until follow-up with PCP or GI

## 2023-12-31 NOTE — PROGRESS NOTES
NEPHROLOGY PROGRESS NOTE   Cassidy Zhang 68 y.o. female MRN: [id number]  Unit/Bed#: [facility]BAR[facility] -01 Encounter: [id number]  Reason for Consult: Hyponatremia    ASSESSMENT AND PLAN:  69 yo woman with PMH of cirrhosis, hyponatremia p/w  LE and ascites.  Nephrology is consulted for management of hyponatremia and fluid overload        PLAN:     # Acute on chronic Hyposomolar Hyponatremia   Sodium on admission: 129  Serum osm: 289  Urine Sodium: 122  Current sodium stable at 131  Etiology: Multifactorial likely secondary to elevated ADH in the settings of cirrhosis with hypoperfusion and intravascular depletion with third spacing   Plan:  Continue fluid restriction 1.5 L   Holding diuretics for now   Discontinue albumin    BMP every 24 a  Please monitor UOP,  Will monitor labs, call renal if SNa+ <126     #Volume status/hypertension:  Volume: Appears euvolemic  Blood pressure: Normotensive, /51  \Recommend:  Midodrine 12.5 mg 3 times daily   status post albumin  Monitor urinary output and daily weight     # Hypokalemia  Liberalize potassium in the diet  Replete with p.o. potassium as needed      # Liver cirrhosis  Ascites status post paracentesis  Management as per GI  MELD 29       #Non-Oliguric KDIGO PRETTY stage 1 with evidence of kidney recovery  Etiology: Likely secondary to HRS with worsening liver cirrhosis  Baseline creatinine: 1 mg/dL  Peak creatinine: 1.58 mg/dL  Current creatinine: 1.2 mg/dL, trending down  UA: No hematuria, no leukocyturia  Renal imaging : No hydronephrosis on CT scan   Status post HRS treatment  Treatment:  Maintain MAP:  Over 65 mmHg if possible/avoid hypoperfusion:  Hold parameters on blood pressure medications  Avoid nephrotoxic agents such as NSAIDs, and IV contrast if possible. Avoid opioids   Adjust medications to GFR        #Acid-base Disorder  serum HCO3 23 mEq/L, goal> 21  Discontinue sodium bicarbonate        #Anemia:  Current hemoglobin: 7.2 mg/dL  Secondary to GI  bleed  Treatment:  Transfuse for hemoglobin less than 7.0 per primary service         SUBJECTIVE:  Patient seen and examined at bedside. No chest pain, shortness of breath, nausea    OBJECTIVE:  Current Weight: Weight - Scale: 59 kg (130 lb)  Vitals:    12/31/23 0739   BP: 113/51   Pulse: 78   Resp: 17   Temp: 98.1 °F (36.7 °C)   SpO2: 98%       Intake/Output Summary (Last 24 hours) at 12/31/2023 1153  Last data filed at 12/30/2023 1300  Gross per 24 hour   Intake 240 ml   Output --   Net 240 ml     Wt Readings from Last 3 Encounters:   12/31/23 59 kg (130 lb)   12/07/23 65.8 kg (145 lb)   11/21/23 62.2 kg (137 lb 3.2 oz)     Temp Readings from Last 3 Encounters:   12/31/23 98.1 °F (36.7 °C)   12/07/23 (!) 97 °F (36.1 °C) (Tympanic)   11/21/23 98.8 °F (37.1 °C)     BP Readings from Last 3 Encounters:   12/31/23 113/51   12/07/23 110/70   11/21/23 121/56     Pulse Readings from Last 3 Encounters:   12/31/23 78   12/07/23 70   11/21/23 71    General:  no acute distress at this time  Skin:  No acute rash  Eyes:  No scleral icterus and noninjected  ENT:  mucous membranes moist  Neck:  no carotid bruits  Chest:  Clear to auscultation percussion, good respiratory effort, no use of accessory respiratory muscles  CVS:  Regular rate and rhythm without rub   Abdomen:  soft and nontender   Extremities: no significant lower extremity edema  Neuro:  No gross focality  Psych:  Alert , cooperative     Medications:    Current Facility-Administered Medications:     acetaminophen (TYLENOL) tablet 650 mg, 650 mg, Oral, Q8H PRN, Terrell Mayberry MD, 650 mg at 12/21/23 0350    Diclofenac Sodium (VOLTAREN) 1 % topical gel 2 g, 2 g, Topical, Q6H PRN, Terrell Mayberry MD    ferrous sulfate tablet 325 mg, 325 mg, Oral, Daily With Breakfast, Terrell Mayberry MD, 325 mg at 12/31/23 0635    hydrocortisone (ANUSOL-HC) 2.5 % rectal cream, , Topical, BID, Antonio Garcia MD, Given at 12/31/23 0835    lactulose (CHRONULAC) oral solution 30 g, 30 g, Oral,  TID, Dania Quiñones MD, 30 g at 12/31/23 1130    midodrine (PROAMATINE) tablet 12.5 mg, 12.5 mg, Oral, TID AC, Joselyn Reyes Bahamonde, MD, 12.5 mg at 12/31/23 1130    pancrelipase (Lip-Prot-Amyl) (CREON) delayed release capsule 24,000 Units, 24,000 Units, Oral, TID With Meals, Terrell Mayberry MD, 24,000 Units at 12/31/23 1130    pantoprazole (PROTONIX) EC tablet 40 mg, 40 mg, Oral, BID AC, AQUILES Colby, 40 mg at 12/31/23 0635    trimethobenzamide (TIGAN) IM injection 200 mg, 200 mg, Intramuscular, Q6H PRN, Jodie Larose MD, 200 mg at 12/21/23 1249    Laboratory Results:  Results from last 7 days   Lab Units 12/31/23  0524 12/30/23  0502 12/29/23  1633 12/29/23  1017 12/29/23  0641 12/28/23  0529 12/27/23  2101 12/27/23  0454 12/26/23  1339 12/26/23  0614 12/26/23  0403 12/25/23  0259   WBC Thousand/uL 5.30 5.58  --   --  3.85*  --   --   --   --  4.72  --  4.71   HEMOGLOBIN g/dL 7.2* 7.8* 7.8* 7.3* 6.5*  --   --   --  8.4* 8.0*  --  8.0*   HEMATOCRIT % 21.0* 23.0* 23.4* 21.9* 19.0*  --   --   --   --  23.1*  --  22.9*   PLATELETS Thousands/uL 76* 75*  --   --  63*  --   --   --   --  70*  --  65*   SODIUM mmol/L 131* 131*  --   --  129* 130* 128* 126*  --   --  131* 134*   POTASSIUM mmol/L 3.7 3.6  --   --  3.5 4.1 4.3 4.5  --   --  3.5 3.4*   CHLORIDE mmol/L 97 96  --   --  97 97 98 102  --   --  104 108   CO2 mmol/L 23 24  --   --  25 21 19* 20*  --   --  19* 20*   BUN mg/dL 25 21  --   --  19 19 20 19  --   --  16 13   CREATININE mg/dL 1.27 1.45*  --   --  1.34* 1.48* 1.58* 1.56*  --   --  1.20 0.97   CALCIUM mg/dL 9.4 10.1  --   --  9.7 10.4* 10.0 9.2  --   --  9.3 9.2   MAGNESIUM mg/dL  --   --   --   --   --   --   --  2.1  --   --  1.4* 1.8*   PHOSPHORUS mg/dL  --   --   --   --   --   --   --   --   --   --   --  2.4       XR chest 1 view   Final Result by Law Christy MD (12/28 0561)      Medial right base small infiltrate and/or atelectasis.      Small pleural effusions                 "  Workstation performed: [id number]         CT abdomen pelvis wo contrast   Final Result by Breonna Trevino MD (12/22 1725)      No evidence of hematoma related to recent paracentesis.      Small volume ascites throughout the abdomen and pelvis with mesenteric edema. Moderate body wall edema.      4.8 x 4.4 cm left ovarian cyst which has substantially increased in size since a pelvic ultrasound done on August 7, 2017. Recommend yearly follow-up..            Workstation performed: [id number]         IR INPATIENT Paracentesis   Final Result by Antonio Archer MD (12/22 1704)   Successful therapeutic paracentesis.      ATTESTATION      I, the attending radiologist, was present for the entire procedure. Guidance images were reviewed and I am in agreement with the findings on the report.      Attending physician: Antonio Archer.   Resident:  Adonis Camara.         Workstation performed: [id number]         XR chest 1 view portable   ED Interpretation by Stephen Gallego DO (12/20 1234)   Bilateral pulmonary edema with bilateral pleural effusions, right worse than left      Final Result by Westley Vang MD (12/20 1417)      Shifting bilateral right greater than the left pleural effusions                  Workstation performed: [id number]             Portions of the record may have been created with voice recognition software. Occasional wrong word or \"sound a like\" substitutions may have occurred due to the inherent limitations of voice recognition software. Read the chart carefully and recognize, using context, where substitutions have occurred.    "

## 2023-12-31 NOTE — PROGRESS NOTES
Progress Note - [facility]  Cassidy Zhang 68 y.o. female MRN: [id number]    Unit/Bed#: ALBARO -01 Encounter: [id number]      Assessment/Plan:  68-year-old female with decompensated alcoholic cirrhosis complicated by hepatic encephalopathy, ascites, no recent alcohol use who presented 12/24 for lower extremity edema, was improving however then with bump in creatinine now being treated for HRS.     1. Decompensated alcoholic cirrhosis complicated by hepatic encephalopathy, ascites, MELD 3.0 stable at 27  Patient reports doing well.  She has no GI symptoms. Mild improvement of creatinine today from 1.48-1.34.  Sodium relatively stable at 129. Mild increase in INR to 1.8. Bilirubin 5.21 and elevated.  She did have drop in hemoglobin from 8.4-6.5 with reported nonbloody bowel movement on 12/29.  Repeat hemoglobin 12/29 7.8. Hemoglobin today 7.2.  Afebrile.  No leukocytosis, white blood count 5.30.  Patient confused this a.m. Unable to state where she is or the month or year, oriented to name only.    -Infectious workup negative UA, x-ray questionable small infiltrate versus atelectasis. Blood cultures no growth after 24 hours.  -Will recheck UA with micro, blood cultures x 2 and chest x-ray.  -Increased lactulose to 30 g 3 times daily, titrate for 3 BM daily  -Start Xifaxan 550 mg twice daily  -S/p paracentesis on 12/21 negative for SBP  -Currently on octreotide, midodrine and albumin  -EGD in July 2023 without varices  -MRI in November without lesions  -Monitor MELD labs daily  -no indication for steroids, patient does not have acute alcoholic hepatitis   -PETH negative 12/13  -Patient has follow-up scheduled with Dr. Mahajan on 1/28  -If patient has any persistent decompensation or worsening MELD score would reach out again to hepatology to discuss more urgent liver transplant evaluation.     2. Chronic pancreatitis  Reports being sober for 5 years, has PD dilation and known intraductal calculus in pancreatic  "head  -continue alcohol cessation  -continue creon before meals.      3. Rectal bleeding  4. Acute on chronic anemia  Patient's baseline hemoglobin is around 7-8.  She has had a few drops below 7 during this admission.  Her hemoglobin 6.5  on 12/29 down from 8.4 however this was rechecked and was found to be 7.3 on 12/29.  Hemoglobin 7.8 at 1630 on 12/29..  Hemoglobin 7.2 today.  She had a bowel movement today.  Patient states that there was blood when wiping.  She has been having some intermittent bright red blood suspected to be due to hemorrhoids.  Recent EGD and colonoscopy 7/2023 with no varices at that time.     -Patient is on iron supplement which may contribute to stool color  -Continue Anusol suppositories  -Monitor hemoglobin.  -Monitor stool output.  -Transfuse as needed per primary team  -Patient has persistent drops in hemoglobin or persistent recurrence of bright red blood per rectum, would recommend EGD with flex sigmoidoscopy.     Patient's MELD Score is: 25    MELD Score 90-day mortality   ?9 1.9%   10-19 6.0%   20-29 19.6%   30-39 52.6%   ?40 71.3%     MELD Score Component Values:  Component Value Date   Creatinine: 1.27 mg/dL 12/31/2023   Dialysis at least twice   in the past week  Or CVVHD for ?24 hours   in the past week:     No    Bilirubin, total: 3.64 mg/dL 12/31/2023   INR: 1.87 12/31/2023   Sodium: 131 mmol/L 12/31/2023         Subjective:   Lying in bed in no acute distress.  Patient denies nausea, vomiting, or abdominal pain.  Positive jaundice and sclera icterus.  Patient is noted to be confused this morning.  Oriented to name only.    Objective:     Vitals: Blood pressure 113/51, pulse 78, temperature 98.1 °F (36.7 °C), resp. rate 17, height 5' 4\" (1.626 m), weight 59 kg (130 lb), SpO2 98%.,Body mass index is 22.31 kg/m².      Intake/Output Summary (Last 24 hours) at 12/31/2023 1256  Last data filed at 12/30/2023 1300  Gross per 24 hour   Intake 240 ml   Output --   Net 240 ml "       Physical Exam: Physical Exam    Invasive Devices       Peripheral Intravenous Line  Duration             Peripheral IV 12/29/23 Right;Ventral (anterior) Forearm 2 days                                Current Facility-Administered Medications:     acetaminophen (TYLENOL) tablet 650 mg, 650 mg, Oral, Q8H PRN, Terrell Mayberry MD, 650 mg at 12/21/23 0350    Diclofenac Sodium (VOLTAREN) 1 % topical gel 2 g, 2 g, Topical, Q6H PRN, Terrell Mayberry MD    ferrous sulfate tablet 325 mg, 325 mg, Oral, Daily With Breakfast, Terrell Mayberry MD, 325 mg at 12/31/23 0635    hydrocortisone (ANUSOL-HC) 2.5 % rectal cream, , Topical, BID, Antonio Garcia MD, Given at 12/31/23 0835    lactulose (CHRONULAC) oral solution 30 g, 30 g, Oral, TID, Dania Quiñones MD, 30 g at 12/31/23 1130    midodrine (PROAMATINE) tablet 12.5 mg, 12.5 mg, Oral, TID AC, Joselyn Reyes Bahamonde, MD, 12.5 mg at 12/31/23 1130    pancrelipase (Lip-Prot-Amyl) (CREON) delayed release capsule 24,000 Units, 24,000 Units, Oral, TID With Meals, Terrell Mayberry MD, 24,000 Units at 12/31/23 1130    pantoprazole (PROTONIX) EC tablet 40 mg, 40 mg, Oral, BID AC, AQUILES Colby, 40 mg at 12/31/23 0635    trimethobenzamide (TIGAN) IM injection 200 mg, 200 mg, Intramuscular, Q6H PRN, Jodie Larose MD, 200 mg at 12/21/23 1249    Lab Results:   Recent Results (from the past 24 hour(s))   Protime-INR    Collection Time: 12/31/23  5:24 AM   Result Value Ref Range    Protime 22.3 (H) 11.6 - 14.5 seconds    INR 1.87 (H) 0.84 - 1.19   Comprehensive metabolic panel    Collection Time: 12/31/23  5:24 AM   Result Value Ref Range    Sodium 131 (L) 135 - 147 mmol/L    Potassium 3.7 3.5 - 5.3 mmol/L    Chloride 97 96 - 108 mmol/L    CO2 23 21 - 32 mmol/L    ANION GAP 11 mmol/L    BUN 25 5 - 25 mg/dL    Creatinine 1.27 0.60 - 1.30 mg/dL    Glucose 133 65 - 140 mg/dL    Calcium 9.4 8.4 - 10.2 mg/dL    Corrected Calcium 9.9 8.3 - 10.1 mg/dL    AST 29 13 - 39 U/L    ALT 14 7 - 52  U/L    Alkaline Phosphatase 80 34 - 104 U/L    Total Protein 5.7 (L) 6.4 - 8.4 g/dL    Albumin 3.4 (L) 3.5 - 5.0 g/dL    Total Bilirubin 3.64 (H) 0.20 - 1.00 mg/dL    eGFR 43 ml/min/1.73sq m   CBC and Platelet    Collection Time: 12/31/23  5:24 AM   Result Value Ref Range    WBC 5.30 4.31 - 10.16 Thousand/uL    RBC 2.23 (L) 3.81 - 5.12 Million/uL    Hemoglobin 7.2 (L) 11.5 - 15.4 g/dL    Hematocrit 21.0 (L) 34.8 - 46.1 %    MCV 94 82 - 98 fL    MCH 32.3 26.8 - 34.3 pg    MCHC 34.3 31.4 - 37.4 g/dL    RDW 18.2 (H) 11.6 - 15.1 %    Platelets 76 (L) 149 - 390 Thousands/uL    MPV 10.9 8.9 - 12.7 fL   Ammonia    Collection Time: 12/31/23 11:41 AM   Result Value Ref Range    Ammonia 61 18 - 72 umol/L             Imaging Studies: XR chest 1 view    Result Date: 12/28/2023  Narrative: CHEST INDICATION:   infection rule out. COMPARISON: 12/20/2023 EXAM PERFORMED/VIEWS:  XR CHEST 1 VIEW FINDINGS: Cardiomediastinal silhouette appears unremarkable. Right base small airspace opacity. Slight bilateral CP angle blunting. No pneumothorax. Osseous structures appear within normal limits for patient age.     Impression: Medial right base small infiltrate and/or atelectasis. Small pleural effusions Workstation performed: [id number]     CT abdomen pelvis wo contrast    Result Date: 12/22/2023  Narrative: CT ABDOMEN AND PELVIS WITHOUT IV CONTRAST INDICATION:   Drop in hemoglobin/BP in the setting of coagulopathy/chronic liver disease and recent paracentesis, query for intraperitoneal hematoma/bleeding. COMPARISON:  None. TECHNIQUE:  CT examination of the abdomen and pelvis was performed without intravenous contrast.  Axial, sagittal, and coronal 2D reformatted images were created from the source data and submitted for interpretation. Radiation dose length product (DLP) for this visit:  480 mGy-cm .  This examination, like all CT scans performed in the [facility], was performed utilizing techniques to minimize  radiation dose exposure, including the use of iterative reconstruction and automated exposure control. Enteric contrast was not administered. FINDINGS: ABDOMEN LOWER CHEST: Small right and trace left pleural effusions with bibasilar atelectasis. LIVER/BILIARY TREE:  Unremarkable. GALLBLADDER: Status post cholecystectomy. SPLEEN:  Unremarkable. PANCREAS: Glandular atrophy in the body tail and neck with mild ductal dilatation unchanged. Few scattered pancreatic parenchymal calcifications compatible with chronic calcific pancreatitis. ADRENAL GLANDS: Fine linear calcification in the medial limb of the right adrenal gland probably from prior infection or hemorrhage. Left adrenal gland normal in appearance. KIDNEYS/URETERS:  Unremarkable. No hydronephrosis. STOMACH AND BOWEL: Multiple colonic diverticula throughout the distal colon with no evidence of acute diverticulitis. APPENDIX:  No findings to suggest appendicitis. ABDOMINOPELVIC CAVITY: Small volume ascites throughout the abdomen and pelvis, with mesenteric and body wall edema. VESSELS: Atherosclerotic changes are present.  No evidence of aneurysm. PELVIS REPRODUCTIVE ORGANS: Vascular calcifications in the both adnexa. Calcified uterine myoma.r 4.8 x 4.4 cm left ovarian cyst. Right ovary is grossly unremarkable. URINARY BLADDER: Gas in the nondependent portion of the bladder. ABDOMINAL WALL/INGUINAL REGIONS: Moderate body wall edema. OSSEOUS STRUCTURES: Status post left ORIF. Degenerative changes throughout the spine.     Impression: No evidence of hematoma related to recent paracentesis. Small volume ascites throughout the abdomen and pelvis with mesenteric edema. Moderate body wall edema. 4.8 x 4.4 cm left ovarian cyst which has substantially increased in size since a pelvic ultrasound done on August 7, 2017. Recommend yearly follow-up.. Workstation performed: [id number]     IR INPATIENT Paracentesis    Result Date: 12/22/2023  Narrative: PROCEDURE: Therapeutic  "paracentesis STAFF: Antonio Archer M.D. NUMBER OF IMAGES: Multiple. COMPLICATIONS: None. MEDICATIONS: 1% lidocaine subcutaneous. INDICATION: Symptomatic ascites. PROCEDURE: Using ultrasound guidance, the left paracolic gutter was punctured and a catheter was placed into the peritoneal cavity. A total of 2400 milliliters of fluid was removed. The catheter was then removed. FINDINGS: Massive ascites. Serosanguinous ascites was removed.     Impression: Successful therapeutic paracentesis. ATTESTATION I, the attending radiologist, was present for the entire procedure. Guidance images were reviewed and I am in agreement with the findings on the report. Attending physician: Antonio Archer. Resident:  Adonis Camara. Workstation performed: [id number]     US bedside procedure    Result Date: 12/20/2023  Narrative: [id number]    XR chest 1 view portable    Result Date: 12/20/2023  Narrative: CHEST INDICATION:   SOB, vol. overload. COMPARISON: 11/18/2023 EXAM PERFORMED/VIEWS:  XR CHEST PORTABLE FINDINGS: Cardiomediastinal silhouette appears unremarkable. Persistent right greater than left pleural effusions. There has been some redistribution of the fluid on the right, now partially tracking into the fissures. There is probably mild bibasilar atelectasis. No significant pneumonic infiltrate. No pneumothorax. Osseous structures appear within normal limits for patient age.     Impression: Shifting bilateral right greater than the left pleural effusions Workstation performed: [id number]           AQUILES Cavazos      Please Note: \"This note has been constructed using a voice recognition system.Therefore there may be syntax, spelling, and/or grammatical errors. Please call if you have any questions. \"**   "

## 2023-12-31 NOTE — ASSESSMENT & PLAN NOTE
Estimated Creatinine Clearance: 36.6 mL/min (by C-G formula based on SCr of 1.27 mg/dL).  No PRETTY this admission.  Cr near new baseline of 1.0-1.1  In the setting of type II hepatorenal syndrome.  - Midodrine 10mg TID to enhance renal perfusion  Cr stable after diuresis

## 2023-12-31 NOTE — ASSESSMENT & PLAN NOTE
1 unit pRBC this admission and 2 units of FFP  S/p transfusion on 12/28 1U RBC  Stable hemorrhoidal bleed.   -No signs of active bleeding  -Can consider outpatient banding procedure with GI

## 2024-01-01 LAB
ALBUMIN SERPL BCP-MCNC: 3.4 G/DL (ref 3.5–5)
ALP SERPL-CCNC: 67 U/L (ref 34–104)
ALT SERPL W P-5'-P-CCNC: 14 U/L (ref 7–52)
ANION GAP SERPL CALCULATED.3IONS-SCNC: 9 MMOL/L
AST SERPL W P-5'-P-CCNC: 30 U/L (ref 13–39)
BILIRUB DIRECT SERPL-MCNC: 1.36 MG/DL (ref 0–0.2)
BILIRUB SERPL-MCNC: 3.8 MG/DL (ref 0.2–1)
BUN SERPL-MCNC: 27 MG/DL (ref 5–25)
CALCIUM SERPL-MCNC: 9.5 MG/DL (ref 8.4–10.2)
CHLORIDE SERPL-SCNC: 99 MMOL/L (ref 96–108)
CO2 SERPL-SCNC: 25 MMOL/L (ref 21–32)
CREAT SERPL-MCNC: 1.39 MG/DL (ref 0.6–1.3)
ERYTHROCYTE [DISTWIDTH] IN BLOOD BY AUTOMATED COUNT: 18.3 % (ref 11.6–15.1)
GFR SERPL CREATININE-BSD FRML MDRD: 38 ML/MIN/1.73SQ M
GLUCOSE SERPL-MCNC: 120 MG/DL (ref 65–140)
HCT VFR BLD AUTO: 21.5 % (ref 34.8–46.1)
HGB BLD-MCNC: 7.2 G/DL (ref 11.5–15.4)
MCH RBC QN AUTO: 31.7 PG (ref 26.8–34.3)
MCHC RBC AUTO-ENTMCNC: 33.5 G/DL (ref 31.4–37.4)
MCV RBC AUTO: 95 FL (ref 82–98)
PLATELET # BLD AUTO: 76 THOUSANDS/UL (ref 149–390)
PMV BLD AUTO: 11.3 FL (ref 8.9–12.7)
POTASSIUM SERPL-SCNC: 3.1 MMOL/L (ref 3.5–5.3)
PROT SERPL-MCNC: 5.8 G/DL (ref 6.4–8.4)
RBC # BLD AUTO: 2.27 MILLION/UL (ref 3.81–5.12)
SODIUM SERPL-SCNC: 133 MMOL/L (ref 135–147)
WBC # BLD AUTO: 4.21 THOUSAND/UL (ref 4.31–10.16)

## 2024-01-01 PROCEDURE — 80048 BASIC METABOLIC PNL TOTAL CA: CPT

## 2024-01-01 PROCEDURE — 85027 COMPLETE CBC AUTOMATED: CPT

## 2024-01-01 PROCEDURE — 99232 SBSQ HOSP IP/OBS MODERATE 35: CPT | Performed by: INTERNAL MEDICINE

## 2024-01-01 PROCEDURE — 80076 HEPATIC FUNCTION PANEL: CPT | Performed by: NURSE PRACTITIONER

## 2024-01-01 RX ORDER — POTASSIUM CHLORIDE 20 MEQ/1
40 TABLET, EXTENDED RELEASE ORAL ONCE
Status: COMPLETED | OUTPATIENT
Start: 2024-01-01 | End: 2024-01-01

## 2024-01-01 RX ORDER — POTASSIUM CHLORIDE 20 MEQ/1
40 TABLET, EXTENDED RELEASE ORAL ONCE
Status: DISCONTINUED | OUTPATIENT
Start: 2024-01-01 | End: 2024-01-01

## 2024-01-01 RX ADMIN — HYDROCORTISONE 2.5%: 25 CREAM TOPICAL at 08:24

## 2024-01-01 RX ADMIN — PANTOPRAZOLE SODIUM 40 MG: 40 TABLET, DELAYED RELEASE ORAL at 08:27

## 2024-01-01 RX ADMIN — PANCRELIPASE 24000 UNITS: 24000; 76000; 120000 CAPSULE, DELAYED RELEASE PELLETS ORAL at 13:12

## 2024-01-01 RX ADMIN — PANCRELIPASE 24000 UNITS: 24000; 76000; 120000 CAPSULE, DELAYED RELEASE PELLETS ORAL at 08:23

## 2024-01-01 RX ADMIN — PANTOPRAZOLE SODIUM 40 MG: 40 TABLET, DELAYED RELEASE ORAL at 16:53

## 2024-01-01 RX ADMIN — MIDODRINE HYDROCHLORIDE 12.5 MG: 5 TABLET ORAL at 08:26

## 2024-01-01 RX ADMIN — RIFAXIMIN 550 MG: 550 TABLET ORAL at 08:23

## 2024-01-01 RX ADMIN — LACTULOSE 30 G: 20 SOLUTION ORAL at 16:53

## 2024-01-01 RX ADMIN — FERROUS SULFATE TAB 325 MG (65 MG ELEMENTAL FE) 325 MG: 325 (65 FE) TAB at 08:23

## 2024-01-01 RX ADMIN — RIFAXIMIN 550 MG: 550 TABLET ORAL at 20:57

## 2024-01-01 RX ADMIN — MIDODRINE HYDROCHLORIDE 12.5 MG: 5 TABLET ORAL at 16:53

## 2024-01-01 RX ADMIN — MIDODRINE HYDROCHLORIDE 12.5 MG: 5 TABLET ORAL at 13:12

## 2024-01-01 RX ADMIN — LACTULOSE 30 G: 20 SOLUTION ORAL at 08:23

## 2024-01-01 RX ADMIN — POTASSIUM CHLORIDE 40 MEQ: 1500 TABLET, EXTENDED RELEASE ORAL at 08:23

## 2024-01-01 RX ADMIN — PANCRELIPASE 24000 UNITS: 24000; 76000; 120000 CAPSULE, DELAYED RELEASE PELLETS ORAL at 16:53

## 2024-01-01 RX ADMIN — HYDROCORTISONE 2.5%: 25 CREAM TOPICAL at 17:00

## 2024-01-01 NOTE — PROGRESS NOTES
NEPHROLOGY PROGRESS NOTE   Cassidy Zhang 68 y.o. female MRN: [id number]  Unit/Bed#: W -01 Encounter: [id number]  Reason for Consult: PRETTY, hyponatremia      SUMMARY:    68-year-old female with decompensated alcoholic cirrhosis complicated by hepatic encephalopathy, ascites, no recent alcohol use who presented 12/24 for lower extremity edema, was improving however then with bump in creatinine now being treated for HRS.     ASSESSMENT and PLAN:    Hyponatremia  -- Acute on chronic  -- Admission sodium 129  -- Urine sodium 122, urine osmolality 393  -- Serum osmolality 289 (normal), iso-osmolar, ?  Recent alcohol --> check a repeat serum osmolality tomorrow  -- Continue fluid restriction of 1.2 L/day  -- Suspected to be in the setting of poor effective circulating volume in the setting of alcoholic cirrhosis along with hypokalemia  -- Sodium level improved to 133 today  -- Potassium level 3.1  -- Replace the potassium, which will also help with the hyponatremia  -- Can check daily BMP    Hypokalemia  -- Last magnesium level from a few days ago was normal, repeat magnesium tomorrow  -- Replete potassium with 40 mEq x 1    Acute kidney injury  -- Present on admission  -- Baseline creatinine 1 mg/dL  -- It should be noted in setting of cirrhosis the creatinine will be poorly generated and will be a poor marker of true renal function  -- Was managed for suspected hepatorenal syndrome.  Now off HRS treatment  -- Imaging revealed no hydronephrosis  -- The urine analysis was bland  -- The creatinine appears to have plateaued in the low to mid 1's, creatinine today at 1.39 mg/dL    Decompensated alcoholic cirrhosis  --MELD 27  -- Management as per hepatology  -- Currently on midodrine 12.5 mg 3 times daily, blood pressure has been quite stable the blood pressure goes above 130 systolic persistently can reduce midodrine to 10 mg 3 times daily  -- Albumin 3.4  -- Liver enzymes are normal    Low bicarbonate  level--resolved      SUBJECTIVE / INTERVAL HISTORY:    Had loose stools yesterday.  Reported there was blood in it.  Reported brown stools this morning.    No abdominal distention or abdominal pain    No lower extremity edema    Reports good oral intake    OBJECTIVE:  Current Weight: Weight - Scale: 60 kg (132 lb 4.4 oz)  Vitals:    12/31/23 0739 12/31/23 1700 12/31/23 2146 01/01/24 0600   BP: 113/51 118/56 129/62    Pulse: 78  72    Resp: 17 16     Temp: 98.1 °F (36.7 °C) 98.3 °F (36.8 °C) 98.1 °F (36.7 °C)    TempSrc:  Oral     SpO2: 98%  100%    Weight:    60 kg (132 lb 4.4 oz)   Height:           Intake/Output Summary (Last 24 hours) at 1/1/2024 0741  Last data filed at 12/31/2023 2146  Gross per 24 hour   Intake 360 ml   Output --   Net 360 ml       Review of Systems:    Constitutional: Negative for chills and fever.   HENT: Negative for ear pain and sore throat.    Eyes: Negative for pain and visual disturbance.   Respiratory: Negative for cough and shortness of breath.    Cardiovascular: Negative for chest pain and palpitations.   Gastrointestinal: Negative for abdominal pain and vomiting.   Genitourinary: Negative for dysuria and hematuria.   Musculoskeletal: Negative for arthralgias and back pain.   Skin: Negative for color change and rash.   Neurological: Negative for seizures and syncope.   12 point ROS has been reviewed.    Physical Exam  Vitals and nursing note reviewed. Exam conducted with a chaperone present.   Constitutional:       General: She is not in acute distress.     Appearance: She is well-developed.   HENT:      Head: Normocephalic and atraumatic.   Eyes:      General: No scleral icterus.     Conjunctiva/sclera: Conjunctivae normal.      Pupils: Pupils are equal, round, and reactive to light.   Cardiovascular:      Rate and Rhythm: Normal rate and regular rhythm.      Heart sounds: S1 normal and S2 normal. No murmur heard.     No friction rub. No gallop.   Pulmonary:      Effort: Pulmonary  effort is normal. No respiratory distress.      Breath sounds: Normal breath sounds. No wheezing or rales.   Abdominal:      General: Bowel sounds are normal.      Palpations: Abdomen is soft.      Tenderness: There is no abdominal tenderness. There is no rebound.   Musculoskeletal:         General: Normal range of motion.      Cervical back: Normal range of motion and neck supple.   Skin:     Findings: No rash.   Neurological:      Mental Status: She is alert and oriented to person, place, and time.   Psychiatric:         Behavior: Behavior normal.         Medications:    Current Facility-Administered Medications:     acetaminophen (TYLENOL) tablet 650 mg, 650 mg, Oral, Q8H PRN, Terrell Mayberry MD, 650 mg at 12/21/23 0350    Diclofenac Sodium (VOLTAREN) 1 % topical gel 2 g, 2 g, Topical, Q6H PRN, Terrell Mayberry MD    ferrous sulfate tablet 325 mg, 325 mg, Oral, Daily With Breakfast, Terrell Mayberry MD, 325 mg at 12/31/23 0635    hydrocortisone (ANUSOL-HC) 2.5 % rectal cream, , Topical, BID, Antonio Garcia MD, Given at 12/31/23 1829    lactulose (CHRONULAC) oral solution 30 g, 30 g, Oral, TID, Dania Quiñones MD, 30 g at 12/31/23 2149    midodrine (PROAMATINE) tablet 12.5 mg, 12.5 mg, Oral, TID AC, Joselyn Reyes Bahamonde, MD, 12.5 mg at 12/31/23 1552    pancrelipase (Lip-Prot-Amyl) (CREON) delayed release capsule 24,000 Units, 24,000 Units, Oral, TID With Meals, Terrell Mayberry MD, 24,000 Units at 12/31/23 1553    pantoprazole (PROTONIX) EC tablet 40 mg, 40 mg, Oral, BID AC, AQUILES Colby, 40 mg at 12/31/23 1553    potassium chloride (K-DUR,KLOR-CON) CR tablet 40 mEq, 40 mEq, Oral, Once, Jose De Paz MD    rifaximin (XIFAXAN) tablet 550 mg, 550 mg, Oral, Q12H [facility], AQUILES Gonzalez, 550 mg at 12/31/23 2149    trimethobenzamide (TIGAN) IM injection 200 mg, 200 mg, Intramuscular, Q6H PRN, Jodie Larose MD, 200 mg at 12/21/23 3710    Laboratory Results:  Results from last 7 days   Lab Units  01/01/24  0520 12/31/23  0524 12/30/23  0502 12/29/23  1633 12/29/23  1017 12/29/23  0641 12/28/23  0529 12/27/23  2101 12/27/23  0454 12/26/23  1339 12/26/23  0614 12/26/23  0614 12/26/23  0403   WBC Thousand/uL 4.21* 5.30 5.58  --   --  3.85*  --   --   --   --   --  4.72  --    HEMOGLOBIN g/dL 7.2* 7.2* 7.8* 7.8* 7.3* 6.5*  --   --   --  8.4*   < > 8.0*  --    HEMATOCRIT % 21.5* 21.0* 23.0* 23.4* 21.9* 19.0*  --   --   --   --   --  23.1*  --    PLATELETS Thousands/uL 76* 76* 75*  --   --  63*  --   --   --   --   --  70*  --    POTASSIUM mmol/L 3.1* 3.7 3.6  --   --  3.5 4.1 4.3 4.5  --   --   --  3.5   CHLORIDE mmol/L 99 97 96  --   --  97 97 98 102  --   --   --  104   CO2 mmol/L 25 23 24  --   --  25 21 19* 20*  --   --   --  19*   BUN mg/dL 27* 25 21  --   --  19 19 20 19  --   --   --  16   CREATININE mg/dL 1.39* 1.27 1.45*  --   --  1.34* 1.48* 1.58* 1.56*  --   --   --  1.20   CALCIUM mg/dL 9.5 9.4 10.1  --   --  9.7 10.4* 10.0 9.2  --   --   --  9.3   MAGNESIUM mg/dL  --   --   --   --   --   --   --   --  2.1  --   --   --  1.4*    < > = values in this interval not displayed.       PLEASE NOTE:  This encounter was completed utilizing the [employer] Direct Speech Voice Recognition Software. Grammatical errors, random word insertions, pronoun errors and incomplete sentences are occasional consequences of the system due to software limitations, ambient noise and hardware issues.These may be missed by proof reading prior to affixing electronic signature. Any questions or concerns about the content, text or information contained within the body of this dictation should be directly addressed to the physician for clarification. Please do not hesitate to call me directly if you have any any questions or concerns.

## 2024-01-01 NOTE — PROGRESS NOTES
[facility]  Progress Note  Name: Cassidy Zhang I  MRN: [id number]  Unit/Bed#: [facility]BARO -01 I Date of Admission: 12/20/2023   Date of Service: 1/1/2024 I Hospital Day: 12    Assessment/Plan   * Decompensated liver disease (HCC)  Assessment & Plan  Presents for ascites and anasarca, found also to have mild hyponatremia, hypoalbuminemia, hyperbilirubinemia, hypocoagulability, anemia, and thrombocytopenia. Ammonia wnl.  No asterixis on exam, pt A&O x3, maybe some mild confusion (delayed when answering questions, but appropriate), though pt at or near her baseline per spouse.  Reports holding lactulose for last 2-3 days for frequent BMs at home (>5 loose BM per day)  Recently discontinued lasix and spironolactone in the o/p setting d/t worsening renal function and hyponatremia   MELD score 23 on presentation  Last drink 5-6 years ago    MELD 3.0: 25 at 12/31/2023  5:24 AM  MELD-Na: 25 at 12/31/2023  5:24 AM  Calculated from:  Serum Creatinine: 1.27 mg/dL at 12/31/2023  5:24 AM  Serum Sodium: 131 mmol/L at 12/31/2023  5:24 AM  Total Bilirubin: 3.64 mg/dL at 12/31/2023  5:24 AM  Serum Albumin: 3.4 g/dL at 12/31/2023  5:24 AM  INR(ratio): 1.87 at 12/31/2023  5:24 AM  Age at listing (hypothetical): 68 years  Sex: Female at 12/31/2023  5:24 AM        Plan:  Diet: High protein, low salt, fluid restrict 1500 cc/day  Patient mentation improving - Increase lactulose to 30 g TID and titrate to target 3-5 loose BM per day  Add rifaximin 550mg BID per GI  Monitor MELD labs  Monitor Hgb and transfuse for Hgb<7  GI and Nephro appreciated  IR paracentesis drained 2.4L fluid - labs unremarkable  Tigan for nausea   Peth negative   potential liver transplant evaluation  May need recurrent paracentesis as on outpatient   Octreotide q8h  Due to worsening MELD - infectious workup with UA (negative), CXR, and blood cultures - negative thus far    Coagulopathy (HCC)  Assessment & Plan  2/2 cirrhosis  INR at 1.8 and  no acute bleeding     Acute blood loss anemia  Assessment & Plan  1 unit pRBC this admission and 2 units of FFP  S/p transfusion on 12/28 1U RBC  Stable hemorrhoidal bleed.   -No signs of active bleeding  -GI plans to monitor at this point since no bloody stools, if further drop in Hbg, they would recommend EGD and Flex sig  -Can consider outpatient banding procedure with GI   -Continue to monitor CBC    Status post incision and drainage  Assessment & Plan  Pt and  report dental I&D for oral abscess day prior to presentation 12/20/23, started on amoxicillin 500 mg PO Q6H. Acute infection may also have played a role in knocking patient into liver decompensation. Now resolved    Metabolic acidosis  Assessment & Plan  Stopped sodium bicarb tabs per nephro.  Resolved.     CKD (chronic kidney disease) stage 3, GFR 30-59 ml/min (HCC)  Assessment & Plan  Estimated Creatinine Clearance: 36.6 mL/min (by C-G formula based on SCr of 1.27 mg/dL).  No PRETTY this admission.  Cr near new baseline of 1.0-1.1  In the setting of type II hepatorenal syndrome.  - Midodrine 10mg TID to enhance renal perfusion  Cr stable after diuresis      Chronic pancreatitis (HCC)  Assessment & Plan  2/2 EtOH abuse, now in remission.     Continue home pancrealipase    Ascites/anasarca  Assessment & Plan  2/2 end stage liver disease, as noted above    Plan:  Diuresis, fluid restriction, dietary salt restriction as above   Nephrology and GI consults, as above  Paracentesis drained 2.4L    Chronic hyponatremia  Assessment & Plan  History of SIADH, however suspect also a component of hypotonic hypervolemic hyponatremia in the setting of liver failure complicated by ascites and anasarca. Sodium improving.     Plan:  Hold home Na tablets, sodium bicarb tabs discontinued per nephro  Fluid restriction 1.5 L  Discontinue Lasix and Aldactone for now  C/w Albumin, hold Bumex, hold Aldactone, consider restarting oral diuretics today or tomorrow  BMP every 24  hours  Avoid overcorrection: no more than 6-8mEq in the next 24hr, goal </=132  Please monitor UOP  Will monitor labs, call renal if SNa+ <126  Follow nephrology recs      Essential hypertension  Assessment & Plan  BP now low due to cirrhosis.  Hold Norvasc and ARB as pt on diuretics and needing midodrine           VTE Pharmacologic Prophylaxis: VTE Score: 7 High Risk (Score >/= 5) - Pharmacological DVT Prophylaxis Contraindicated. Sequential Compression Devices Ordered.    Mobility:   Basic Mobility Inpatient Raw Score: 16  -HLM Goal: 5: Stand one or more mins  JH-HLM Achieved: 2: Bed activities/Dependent transfer  HLM Goal NOT achieved. Continue with multidisciplinary rounding and encourage appropriate mobility to improve upon HLM goals.    Patient Centered Rounds: I performed bedside rounds with nursing staff today.   Discussions with Specialists or Other Care Team Provider: GI and Nephro    Education and Discussions with Family / Patient: Updated  () via phone.    Total Time Spent on Date of Encounter in care of patient: 25 mins. This time was spent on one or more of the following: performing physical exam; counseling and coordination of care; obtaining or reviewing history; documenting in the medical record; reviewing/ordering tests, medications or procedures; communicating with other healthcare professionals and discussing with patient's family/caregivers.    Current Length of Stay: 12 day(s)  Current Patient Status: Inpatient   Certification Statement: The patient will continue to require additional inpatient hospital stay due to hyponatremia  and increasing bowel movements.   Discharge Plan: Anticipate discharge in 24-48 hrs to home.    Code Status: Level 1 - Full Code    Subjective:   Patient seen examined bedside this morning.  No acute events overnight.  Patient states she had 1 very large bowel movement yesterday.  Seems much more oriented and less confused than yesterday.   Patient's daughter was with her at bedside.    Objective:     Vitals:   Temp (24hrs), Av °F (36.7 °C), Min:97.6 °F (36.4 °C), Max:98.3 °F (36.8 °C)    Temp:  [97.6 °F (36.4 °C)-98.3 °F (36.8 °C)] 97.6 °F (36.4 °C)  HR:  [64-72] 69  Resp:  [16] 16  BP: (106-129)/(56-62) 116/62  SpO2:  [99 %-100 %] 100 %  Body mass index is 22.71 kg/m².     Input and Output Summary (last 24 hours):     Intake/Output Summary (Last 24 hours) at 2024 1334  Last data filed at 2024 0921  Gross per 24 hour   Intake 600 ml   Output --   Net 600 ml       Physical Exam:   Physical Exam  Constitutional:       General: She is not in acute distress.     Appearance: Normal appearance.   HENT:      Head: Normocephalic and atraumatic.      Right Ear: External ear normal.      Left Ear: External ear normal.      Nose: Nose normal. No congestion.      Mouth/Throat:      Mouth: Mucous membranes are moist.      Pharynx: Oropharynx is clear.   Eyes:      General: Scleral icterus (mild) present.      Extraocular Movements: Extraocular movements intact.      Conjunctiva/sclera: Conjunctivae normal.   Cardiovascular:      Rate and Rhythm: Normal rate and regular rhythm.      Heart sounds: No murmur heard.  Pulmonary:      Effort: Pulmonary effort is normal. No respiratory distress.      Breath sounds: Normal breath sounds. No wheezing or rales.   Abdominal:      General: Bowel sounds are normal. There is distension.      Palpations: Abdomen is soft.      Tenderness: There is no abdominal tenderness.   Musculoskeletal:         General: Swelling (bilateral LE pitting) present. No tenderness.      Cervical back: Normal range of motion and neck supple.   Skin:     General: Skin is warm and dry.      Coloration: Skin is jaundiced.   Neurological:      Mental Status: She is alert.      Comments: Disoriented, slow to respond. Did not know her name or  this morning. No asterixis.   Psychiatric:         Mood and Affect: Mood normal.           Additional Data:     Labs:  Results from last 7 days   Lab Units 01/01/24  0520   WBC Thousand/uL 4.21*   HEMOGLOBIN g/dL 7.2*   HEMATOCRIT % 21.5*   PLATELETS Thousands/uL 76*     Results from last 7 days   Lab Units 01/01/24  0520   SODIUM mmol/L 133*   POTASSIUM mmol/L 3.1*   CHLORIDE mmol/L 99   CO2 mmol/L 25   BUN mg/dL 27*   CREATININE mg/dL 1.39*   ANION GAP mmol/L 9   CALCIUM mg/dL 9.5   ALBUMIN g/dL 3.4*   TOTAL BILIRUBIN mg/dL 3.80*   ALK PHOS U/L 67   ALT U/L 14   AST U/L 30   GLUCOSE RANDOM mg/dL 120     Results from last 7 days   Lab Units 12/31/23  0524   INR  1.87*                   Lines/Drains:  Invasive Devices       Peripheral Intravenous Line  Duration             Peripheral IV 12/29/23 Right;Ventral (anterior) Forearm 3 days                          Imaging: No pertinent imaging reviewed.    Recent Cultures (last 7 days):   Results from last 7 days   Lab Units 12/31/23  1826 12/28/23  1108   BLOOD CULTURE  Received in Microbiology Lab. Culture in Progress.  Received in Microbiology Lab. Culture in Progress. No Growth at 72 hrs.  No Growth at 72 hrs.       Last 24 Hours Medication List:   Current Facility-Administered Medications   Medication Dose Route Frequency Provider Last Rate    acetaminophen  650 mg Oral Q8H PRN Terrell Mayberry MD      Diclofenac Sodium  2 g Topical Q6H PRN Terrell Mayberry MD      ferrous sulfate  325 mg Oral Daily With Breakfast Terrell Mayberry MD      hydrocortisone   Topical BID Antonio Garcia MD      lactulose  30 g Oral TID Dania Quiñones MD      midodrine  12.5 mg Oral TID AC Joselyn Reyes Bahamonde, MD      pancrelipase (Lip-Prot-Amyl)  24,000 Units Oral TID With Meals Terrell Mayberry MD      pantoprazole  40 mg Oral BID AC AQUILES Colby      rifaximin  550 mg Oral Q12H [facility] AQUILES Gonzalez      trimethobenzamide  200 mg Intramuscular Q6H PRN Jodie Larose MD          Today, Patient Was Seen By: Dania Quiñones MD    **Please Note: This note  may have been constructed using a voice recognition system.**

## 2024-01-01 NOTE — PLAN OF CARE
Problem: PAIN - ADULT  Goal: Verbalizes/displays adequate comfort level or baseline comfort level  Description: Interventions:  - Encourage patient to monitor pain and request assistance  - Assess pain using appropriate pain scale  - Administer analgesics based on type and severity of pain and evaluate response  - Implement non-pharmacological measures as appropriate and evaluate response  - Consider cultural and social influences on pain and pain management  - Notify physician/advanced practitioner if interventions unsuccessful or patient reports new pain  Outcome: Progressing     Problem: INFECTION - ADULT  Goal: Absence or prevention of progression during hospitalization  Description: INTERVENTIONS:  - Assess and monitor for signs and symptoms of infection  - Monitor lab/diagnostic results  - Monitor all insertion sites, i.e. indwelling lines, tubes, and drains  - Monitor endotracheal if appropriate and nasal secretions for changes in amount and color  - [place] appropriate cooling/warming therapies per order  - Administer medications as ordered  - Instruct and encourage patient and family to use good hand hygiene technique  - Identify and instruct in appropriate isolation precautions for identified infection/condition  Outcome: Progressing  Goal: Absence of fever/infection during neutropenic period  Description: INTERVENTIONS:  - Monitor WBC    Outcome: Progressing     Problem: SAFETY ADULT  Goal: Patient will remain free of falls  Description: INTERVENTIONS:  - Educate patient/family on patient safety including physical limitations  - Instruct patient to call for assistance with activity   - Consult OT/PT to assist with strengthening/mobility   - Keep Call bell within reach  - Keep bed low and locked with side rails adjusted as appropriate  - Keep care items and personal belongings within reach  - Initiate and maintain comfort rounds  - Make Fall Risk Sign visible to staff  - Offer Toileting every  Hours,  in advance of need  - Initiate/Maintain alarm  - Obtain necessary fall risk management equipment:  - Apply yellow socks and bracelet for high fall risk patients  - Consider moving patient to room near nurses station  Outcome: Progressing  Goal: Maintain or return to baseline ADL function  Description: INTERVENTIONS:  -  Assess patient's ability to carry out ADLs; assess patient's baseline for ADL function and identify physical deficits which impact ability to perform ADLs (bathing, care of mouth/teeth, toileting, grooming, dressing, etc.)  - Assess/evaluate cause of self-care deficits   - Assess range of motion  - Assess patient's mobility; develop plan if impaired  - Assess patient's need for assistive devices and provide as appropriate  - Encourage maximum independence but intervene and supervise when necessary  - Involve family in performance of ADLs  - Assess for home care needs following discharge   - Consider OT consult to assist with ADL evaluation and planning for discharge  - Provide patient education as appropriate  Outcome: Progressing  Goal: Maintains/Returns to pre admission functional level  Description: INTERVENTIONS:  - Perform AM-PAC 6 Click Basic Mobility/ Daily Activity assessment daily.  - Set and communicate daily mobility goal to care team and patient/family/caregiver.   - Collaborate with rehabilitation services on mobility goals if consulted  - Perform Range of Motion  times a day.  - Reposition patient every  hours.  - Dangle patient  times a day  - Stand patient times a day  - Ambulate patient  times a day  - Out of bed to chair  times a day   - Out of bed for meals  times a day  - Out of bed for toileting  - Record patient progress and toleration of activity level   Outcome: Progressing     Problem: DISCHARGE PLANNING  Goal: Discharge to home or other facility with appropriate resources  Description: INTERVENTIONS:  - Identify barriers to discharge w/patient and caregiver  - Arrange for  needed discharge resources and transportation as appropriate  - Identify discharge learning needs (meds, wound care, etc.)  - Arrange for interpretive services to assist at discharge as needed  - Refer to Case Management Department for coordinating discharge planning if the patient needs post-hospital services based on physician/advanced practitioner order or complex needs related to functional status, cognitive ability, or social support system  Outcome: Progressing     Problem: Knowledge Deficit  Goal: Patient/family/caregiver demonstrates understanding of disease process, treatment plan, medications, and discharge instructions  Description: Complete learning assessment and assess knowledge base.  Interventions:  - Provide teaching at level of understanding  - Provide teaching via preferred learning methods  Outcome: Progressing     Problem: Prexisting or High Potential for Compromised Skin Integrity  Goal: Skin integrity is maintained or improved  Description: INTERVENTIONS:  - Identify patients at risk for skin breakdown  - Assess and monitor skin integrity  - Assess and monitor nutrition and hydration status  - Monitor labs   - Assess for incontinence   - Turn and reposition patient  - Assist with mobility/ambulation  - Relieve pressure over bony prominences  - Avoid friction and shearing  - Provide appropriate hygiene as needed including keeping skin clean and dry  - Evaluate need for skin moisturizer/barrier cream  - Collaborate with interdisciplinary team   - Patient/family teaching  - Consider wound care consult   Outcome: Progressing     Problem: Nutrition/Hydration-ADULT  Goal: Nutrient/Hydration intake appropriate for improving, restoring or maintaining nutritional needs  Description: Monitor and assess patient's nutrition/hydration status for malnutrition. Collaborate with interdisciplinary team and initiate plan and interventions as ordered.  Monitor patient's weight and dietary intake as ordered or  per policy. Utilize nutrition screening tool and intervene as necessary. Determine patient's food preferences and provide high-protein, high-caloric foods as appropriate.     INTERVENTIONS:  - Monitor oral intake, urinary output, labs, and treatment plans  - Assess nutrition and hydration status and recommend course of action  - Evaluate amount of meals eaten  - Assist patient with eating if necessary   - Allow adequate time for meals  - Recommend/ encourage appropriate diets, oral nutritional supplements, and vitamin/mineral supplements  - Order, calculate, and assess calorie counts as needed  - Recommend, monitor, and adjust tube feedings and TPN/PPN based on assessed needs  - Assess need for intravenous fluids  - Provide specific nutrition/hydration education as appropriate  - Include patient/family/caregiver in decisions related to nutrition  Outcome: Progressing

## 2024-01-01 NOTE — PLAN OF CARE
Problem: PAIN - ADULT  Goal: Verbalizes/displays adequate comfort level or baseline comfort level  Description: Interventions:  - Encourage patient to monitor pain and request assistance  - Assess pain using appropriate pain scale  - Administer analgesics based on type and severity of pain and evaluate response  - Implement non-pharmacological measures as appropriate and evaluate response  - Consider cultural and social influences on pain and pain management  - Notify physician/advanced practitioner if interventions unsuccessful or patient reports new pain  Outcome: Progressing     Problem: INFECTION - ADULT  Goal: Absence of fever/infection during neutropenic period  Description: INTERVENTIONS:  - Monitor WBC    Outcome: Progressing     Problem: SAFETY ADULT  Goal: Maintain or return to baseline ADL function  Description: INTERVENTIONS:  -  Assess patient's ability to carry out ADLs; assess patient's baseline for ADL function and identify physical deficits which impact ability to perform ADLs (bathing, care of mouth/teeth, toileting, grooming, dressing, etc.)  - Assess/evaluate cause of self-care deficits   - Assess range of motion  - Assess patient's mobility; develop plan if impaired  - Assess patient's need for assistive devices and provide as appropriate  - Encourage maximum independence but intervene and supervise when necessary  - Involve family in performance of ADLs  - Assess for home care needs following discharge   - Consider OT consult to assist with ADL evaluation and planning for discharge  - Provide patient education as appropriate  Outcome: Progressing

## 2024-01-02 VITALS
SYSTOLIC BLOOD PRESSURE: 116 MMHG | BODY MASS INDEX: 22.5 KG/M2 | WEIGHT: 131.8 LBS | OXYGEN SATURATION: 100 % | HEART RATE: 65 BPM | TEMPERATURE: 98.3 F | RESPIRATION RATE: 16 BRPM | HEIGHT: 64 IN | DIASTOLIC BLOOD PRESSURE: 54 MMHG

## 2024-01-02 LAB
ALBUMIN SERPL BCP-MCNC: 3.7 G/DL (ref 3.5–5)
ALP SERPL-CCNC: 83 U/L (ref 34–104)
ALT SERPL W P-5'-P-CCNC: 15 U/L (ref 7–52)
ANION GAP SERPL CALCULATED.3IONS-SCNC: 7 MMOL/L
AST SERPL W P-5'-P-CCNC: 33 U/L (ref 13–39)
BACTERIA BLD CULT: NORMAL
BACTERIA BLD CULT: NORMAL
BACTERIA UR CULT: NORMAL
BILIRUB DIRECT SERPL-MCNC: 1.31 MG/DL (ref 0–0.2)
BILIRUB SERPL-MCNC: 3.93 MG/DL (ref 0.2–1)
BUN SERPL-MCNC: 25 MG/DL (ref 5–25)
CALCIUM SERPL-MCNC: 9.8 MG/DL (ref 8.4–10.2)
CHLORIDE SERPL-SCNC: 98 MMOL/L (ref 96–108)
CO2 SERPL-SCNC: 25 MMOL/L (ref 21–32)
CREAT SERPL-MCNC: 1.29 MG/DL (ref 0.6–1.3)
ERYTHROCYTE [DISTWIDTH] IN BLOOD BY AUTOMATED COUNT: 18.3 % (ref 11.6–15.1)
GFR SERPL CREATININE-BSD FRML MDRD: 42 ML/MIN/1.73SQ M
GLUCOSE SERPL-MCNC: 125 MG/DL (ref 65–140)
HCT VFR BLD AUTO: 22.1 % (ref 34.8–46.1)
HGB BLD-MCNC: 7.5 G/DL (ref 11.5–15.4)
INR PPP: 1.82 (ref 0.84–1.19)
INR PPP: 1.82 (ref 0.84–1.19)
MAGNESIUM SERPL-MCNC: 1.2 MG/DL (ref 1.9–2.7)
MCH RBC QN AUTO: 33.2 PG (ref 26.8–34.3)
MCHC RBC AUTO-ENTMCNC: 33.9 G/DL (ref 31.4–37.4)
MCV RBC AUTO: 98 FL (ref 82–98)
OSMOLALITY UR/SERPL-RTO: 283 MMOL/KG (ref 282–298)
PLATELET # BLD AUTO: 89 THOUSANDS/UL (ref 149–390)
PMV BLD AUTO: 11.1 FL (ref 8.9–12.7)
POTASSIUM SERPL-SCNC: 4.1 MMOL/L (ref 3.5–5.3)
PROT SERPL-MCNC: 6.4 G/DL (ref 6.4–8.4)
PROTHROMBIN TIME: 21.8 SECONDS (ref 11.6–14.5)
PROTHROMBIN TIME: 21.9 SECONDS (ref 11.6–14.5)
RBC # BLD AUTO: 2.26 MILLION/UL (ref 3.81–5.12)
SODIUM SERPL-SCNC: 130 MMOL/L (ref 135–147)
WBC # BLD AUTO: 4.05 THOUSAND/UL (ref 4.31–10.16)

## 2024-01-02 PROCEDURE — 99232 SBSQ HOSP IP/OBS MODERATE 35: CPT | Performed by: INTERNAL MEDICINE

## 2024-01-02 PROCEDURE — 80048 BASIC METABOLIC PNL TOTAL CA: CPT | Performed by: INTERNAL MEDICINE

## 2024-01-02 PROCEDURE — 85027 COMPLETE CBC AUTOMATED: CPT

## 2024-01-02 PROCEDURE — 99239 HOSP IP/OBS DSCHRG MGMT >30: CPT | Performed by: INTERNAL MEDICINE

## 2024-01-02 PROCEDURE — 80076 HEPATIC FUNCTION PANEL: CPT | Performed by: NURSE PRACTITIONER

## 2024-01-02 PROCEDURE — 83735 ASSAY OF MAGNESIUM: CPT | Performed by: INTERNAL MEDICINE

## 2024-01-02 PROCEDURE — 85610 PROTHROMBIN TIME: CPT | Performed by: NURSE PRACTITIONER

## 2024-01-02 PROCEDURE — 83930 ASSAY OF BLOOD OSMOLALITY: CPT | Performed by: INTERNAL MEDICINE

## 2024-01-02 RX ORDER — LACTULOSE 10 G/15ML
30 SOLUTION ORAL 2 TIMES DAILY
Qty: 240 ML | Refills: 0 | Status: SHIPPED | OUTPATIENT
Start: 2024-01-02

## 2024-01-02 RX ORDER — MIDODRINE HYDROCHLORIDE 10 MG/1
10 TABLET ORAL
Qty: 90 TABLET | Refills: 0 | Status: SHIPPED | OUTPATIENT
Start: 2024-01-03 | End: 2024-02-02

## 2024-01-02 RX ORDER — HYDROCORTISONE 25 MG/G
CREAM TOPICAL 2 TIMES DAILY
Qty: 28 G | Refills: 0 | Status: SHIPPED | OUTPATIENT
Start: 2024-01-02 | End: 2024-01-12

## 2024-01-02 RX ORDER — SPIRONOLACTONE 25 MG/1
12.5 TABLET ORAL DAILY
Qty: 15 TABLET | Refills: 0 | Status: SHIPPED | OUTPATIENT
Start: 2024-01-03 | End: 2024-02-02

## 2024-01-02 RX ORDER — MIDODRINE HYDROCHLORIDE 5 MG/1
10 TABLET ORAL
Status: DISCONTINUED | OUTPATIENT
Start: 2024-01-02 | End: 2024-01-02 | Stop reason: HOSPADM

## 2024-01-02 RX ORDER — LACTULOSE 10 G/15ML
30 SOLUTION ORAL 3 TIMES DAILY
Qty: 240 ML | Refills: 0 | Status: CANCELLED | OUTPATIENT
Start: 2024-01-02

## 2024-01-02 RX ORDER — PANTOPRAZOLE SODIUM 40 MG/1
40 TABLET, DELAYED RELEASE ORAL
Qty: 60 TABLET | Refills: 0 | Status: SHIPPED | OUTPATIENT
Start: 2024-01-02 | End: 2024-02-01

## 2024-01-02 RX ADMIN — PANCRELIPASE 24000 UNITS: 24000; 76000; 120000 CAPSULE, DELAYED RELEASE PELLETS ORAL at 17:22

## 2024-01-02 RX ADMIN — LACTULOSE 30 G: 20 SOLUTION ORAL at 08:14

## 2024-01-02 RX ADMIN — MIDODRINE HYDROCHLORIDE 12.5 MG: 5 TABLET ORAL at 06:26

## 2024-01-02 RX ADMIN — MIDODRINE HYDROCHLORIDE 12.5 MG: 5 TABLET ORAL at 11:52

## 2024-01-02 RX ADMIN — LACTULOSE 30 G: 20 SOLUTION ORAL at 17:19

## 2024-01-02 RX ADMIN — FERROUS SULFATE TAB 325 MG (65 MG ELEMENTAL FE) 325 MG: 325 (65 FE) TAB at 08:14

## 2024-01-02 RX ADMIN — PANCRELIPASE 24000 UNITS: 24000; 76000; 120000 CAPSULE, DELAYED RELEASE PELLETS ORAL at 12:10

## 2024-01-02 RX ADMIN — MIDODRINE HYDROCHLORIDE 10 MG: 5 TABLET ORAL at 17:19

## 2024-01-02 RX ADMIN — PANTOPRAZOLE SODIUM 40 MG: 40 TABLET, DELAYED RELEASE ORAL at 17:19

## 2024-01-02 RX ADMIN — HYDROCORTISONE 2.5% 1 APPLICATION: 25 CREAM TOPICAL at 17:20

## 2024-01-02 RX ADMIN — PANTOPRAZOLE SODIUM 40 MG: 40 TABLET, DELAYED RELEASE ORAL at 06:26

## 2024-01-02 RX ADMIN — PANCRELIPASE 24000 UNITS: 24000; 76000; 120000 CAPSULE, DELAYED RELEASE PELLETS ORAL at 08:15

## 2024-01-02 RX ADMIN — RIFAXIMIN 550 MG: 550 TABLET ORAL at 08:14

## 2024-01-02 RX ADMIN — HYDROCORTISONE 2.5%: 25 CREAM TOPICAL at 08:15

## 2024-01-02 NOTE — PROGRESS NOTES
NEPHROLOGY PROGRESS NOTE   Cassidy Zhang 68 y.o. female MRN: [id number]  Unit/Bed#: W -01 Encounter: [id number]  Reason for Consult: Hyponatremia      SUMMARY:    68-year-old female with decompensated alcoholic cirrhosis complicated by hepatic encephalopathy, ascites, no recent alcohol use who presented 12/24 for lower extremity edema, was improving however then with bump in creatinine now being treated for HRS.      ASSESSMENT and PLAN:     Hyponatremia  -- Acute on chronic  -- Admission sodium 129  -- Urine sodium 122, urine osmolality 393  -- Serum osmolality 289 (normal), iso-osmolar, ?  Recent alcohol --> repeat serum osmolality is normal at 283, as the osmolality is currently normal no need for further management of the hyponatremia and can maintain at this current level  -- Continue fluid restriction of 1.2 L/day  -- Suspected to be in the setting of poor effective circulating volume in the setting of alcoholic cirrhosis along with hypokalemia  --Despite the drop of the serum sodium to 130 today the serum osmolality remains normal.  Aim to keep the sodium level greater than 130  --Continue fluid restriction  --No strong objections to discharge     Hypokalemia--resolved     Acute kidney injury--improved and stabilized  -- Present on admission  -- Baseline creatinine 1 mg/dL  -- It should be noted in setting of cirrhosis the creatinine will be poorly generated and will be a poor marker of true renal function  -- Was managed for suspected hepatorenal syndrome.  Now off HRS treatment  -- Imaging revealed no hydronephrosis  -- The urine analysis was bland  -- Creatinine currently at 1.2 mg/dL     Decompensated alcoholic cirrhosis  --MELD 27  -- Management as per hepatology  -- Currently on midodrine 12.5 mg 3 times daily, blood pressure has been quite stable the blood pressure goes above 130 systolic persistently can reduce midodrine to 10 mg 3 times daily  -- Albumin 3.4  -- Liver enzymes are normal      Low bicarbonate level--resolved      SUBJECTIVE / INTERVAL HISTORY:    No complaints today.    OBJECTIVE:  Current Weight: Weight - Scale: 59.8 kg (131 lb 12.8 oz)  Vitals:    01/01/24 2141 01/01/24 2145 01/02/24 0600 01/02/24 0907   BP: (!) 104/47 (!) 110/48  116/54   BP Location: Right arm Left arm     Pulse: 70   65   Resp: 16   16   Temp: 98.4 °F (36.9 °C)   98.3 °F (36.8 °C)   TempSrc: Oral      SpO2: 100%   100%   Weight:   59.8 kg (131 lb 12.8 oz)    Height:         No intake or output data in the 24 hours ending 01/02/24 1351    Review of Systems:    Constitutional: Negative for chills and fever.   HENT: Negative for ear pain and sore throat.    Eyes: Negative for pain and visual disturbance.   Respiratory: Negative for cough and shortness of breath.    Cardiovascular: Negative for chest pain and palpitations.   Gastrointestinal: Negative for abdominal pain and vomiting.   Genitourinary: Negative for dysuria and hematuria.   Musculoskeletal: Negative for arthralgias and back pain.   Skin: Negative for color change and rash.   Neurological: Negative for seizures and syncope.   12 point ROS has been reviewed.    Physical Exam  Vitals and nursing note reviewed. Exam conducted with a chaperone present.   Constitutional:       General: She is not in acute distress.     Appearance: She is well-developed.   HENT:      Head: Normocephalic and atraumatic.   Eyes:      General: No scleral icterus.     Conjunctiva/sclera: Conjunctivae normal.      Pupils: Pupils are equal, round, and reactive to light.   Cardiovascular:      Rate and Rhythm: Normal rate and regular rhythm.      Heart sounds: S1 normal and S2 normal. No murmur heard.     No friction rub. No gallop.   Pulmonary:      Effort: Pulmonary effort is normal. No respiratory distress.      Breath sounds: Normal breath sounds. No wheezing or rales.   Abdominal:      General: Bowel sounds are normal.      Palpations: Abdomen is soft.      Tenderness: There is no  abdominal tenderness. There is no rebound.   Musculoskeletal:         General: Normal range of motion.      Cervical back: Normal range of motion and neck supple.   Skin:     Findings: No rash.   Neurological:      Mental Status: She is alert and oriented to person, place, and time.   Psychiatric:         Behavior: Behavior normal.         Medications:    Current Facility-Administered Medications:     acetaminophen (TYLENOL) tablet 650 mg, 650 mg, Oral, Q8H PRN, Terrell Mayberry MD, 650 mg at 12/21/23 0350    Diclofenac Sodium (VOLTAREN) 1 % topical gel 2 g, 2 g, Topical, Q6H PRN, Terrell Mayberry MD    ferrous sulfate tablet 325 mg, 325 mg, Oral, Daily With Breakfast, Terrell Mayberry MD, 325 mg at 01/02/24 0814    hydrocortisone (ANUSOL-HC) 2.5 % rectal cream, , Topical, BID, Antonio Garcia MD, Given at 01/02/24 0815    lactulose (CHRONULAC) oral solution 30 g, 30 g, Oral, TID, Dania Quiñones MD, 30 g at 01/02/24 0814    midodrine (PROAMATINE) tablet 12.5 mg, 12.5 mg, Oral, TID AC, Joselyn Reyes Bahamonde, MD, 12.5 mg at 01/02/24 1152    pancrelipase (Lip-Prot-Amyl) (CREON) delayed release capsule 24,000 Units, 24,000 Units, Oral, TID With Meals, Terrell Mayberry MD, 24,000 Units at 01/02/24 1210    pantoprazole (PROTONIX) EC tablet 40 mg, 40 mg, Oral, BID AC, AQUILES Colby, 40 mg at 01/02/24 0626    rifaximin (XIFAXAN) tablet 550 mg, 550 mg, Oral, Q12H [facility], AQUILES Gonzalez, 550 mg at 01/02/24 0814    trimethobenzamide (TIGAN) IM injection 200 mg, 200 mg, Intramuscular, Q6H PRN, Jodie Larose MD, 200 mg at 12/21/23 1249    Laboratory Results:  Results from last 7 days   Lab Units 01/02/24  0832 01/01/24  0520 12/31/23  0524 12/30/23  0502 12/29/23  1633 12/29/23  1017 12/29/23  0641 12/28/23  0529 12/27/23  2101 12/27/23  0454   WBC Thousand/uL 4.05* 4.21* 5.30 5.58  --   --  3.85*  --   --   --    HEMOGLOBIN g/dL 7.5* 7.2* 7.2* 7.8* 7.8* 7.3* 6.5*  --   --   --    HEMATOCRIT % 22.1* 21.5* 21.0*  23.0* 23.4* 21.9* 19.0*  --   --   --    PLATELETS Thousands/uL 89* 76* 76* 75*  --   --  63*  --   --   --    POTASSIUM mmol/L 4.1 3.1* 3.7 3.6  --   --  3.5 4.1 4.3 4.5   CHLORIDE mmol/L 98 99 97 96  --   --  97 97 98 102   CO2 mmol/L 25 25 23 24  --   --  25 21 19* 20*   BUN mg/dL 25 27* 25 21  --   --  19 19 20 19   CREATININE mg/dL 1.29 1.39* 1.27 1.45*  --   --  1.34* 1.48* 1.58* 1.56*   CALCIUM mg/dL 9.8 9.5 9.4 10.1  --   --  9.7 10.4* 10.0 9.2   MAGNESIUM mg/dL 1.2*  --   --   --   --   --   --   --   --  2.1       PLEASE NOTE:  This encounter was completed utilizing the [employer] Direct Speech Voice Recognition Software. Grammatical errors, random word insertions, pronoun errors and incomplete sentences are occasional consequences of the system due to software limitations, ambient noise and hardware issues.These may be missed by proof reading prior to affixing electronic signature. Any questions or concerns about the content, text or information contained within the body of this dictation should be directly addressed to the physician for clarification. Please do not hesitate to call me directly if you have any any questions or concerns.

## 2024-01-02 NOTE — DISCHARGE INSTR - AVS FIRST PAGE
Dear Cassidy Zhang,     It was our pleasure to care for you here at [facility].  It is our hope that we were always able to exceed the expected standards for your care during your stay.  You were hospitalized due to decompensated liver disease.  You were cared for on the medicine floor by Dania Quiñones MD under the service of Lewis Morfin MD with the [facility] who covers for your primary care physician (PCP), Jillian Calix MD, while you were hospitalized.  If you have any questions or concerns related to this hospitalization, you may contact us at .  For follow up as well as any medication refills, we recommend that you follow up with your primary care physician.  A registered nurse will reach out to you by phone within a few days after your discharge to answer any additional questions that you may have after going home.  However, at this time we provide for you here, the most important instructions / recommendations at discharge:     Notable Medication Adjustments -   Start Aldactone 12.5mg daily  Start midodrine 10mg 3 times daily  Lactulose 30mg 2 times daily  Anusol HC cream twice daily for 10 days  Pantoprazole 40mg 2 times daily   Creon 51342 units with meals  STOP lasix until you follow up with GI or PCP  STOP losartan and lopressor until you follow up with your PCP  Take all other prior to admission medications  Testing Required after Discharge -   none  ** Please contact your PCP to request testing orders for any of the testing recommended here **  Important follow up information -   Follow-up with your PCP in 1 week  Follow-up with GI as an outpatient on 1/28, discuss Rifaximin medication  Other Instructions -   Take all medications as indicated  For any new or worsening symptoms, contact your PCP or go to the nearest emergency department  Please review this entire after visit summary as additional general instructions  including medication list, appointments, activity, diet, any pertinent wound care, and other additional recommendations from your care team that may be provided for you.      Sincerely,     Dania Quiñones MD

## 2024-01-02 NOTE — CASE MANAGEMENT
Case Management Progress Note    Patient name Cassidy Zhang  Location W /W -01 MRN [id number]  : 1955 Date 2024       LOS (days): 13  Geometric Mean LOS (GMLOS) (days): 4.9  Days to GMLOS:-8        OBJECTIVE:        Current admission status: Inpatient  Preferred Pharmacy:   [facility]pharm[facility] #[id number] - [place] - [address] [address]  Phone: [phone] Fax: [phone]    [facility] - [place], [place] - [address]  [email or web address]  [address]  [place] [address]  Phone: [phone] Fax: [phone]    Primary Care Provider: Jillian Calix MD    Primary Insurance: MEDICARE  Secondary Insurance: AETNA    PROGRESS NOTE:      Consult received to find the cost for Xifaxan for pt to be discharged on.  Cost is over $1,000 due to pt's deductible.  Information has been passed on to the provider.

## 2024-01-02 NOTE — PROGRESS NOTES
Progress Note - [facility]  Cassidy Zhang 68 y.o. female MRN: [id number]    Unit/Bed#: ALBARO -01 Encounter: [id number]      Assessment/Plan:  68-year-old female with decompensated alcoholic cirrhosis complicated by hepatic encephalopathy, ascites, no recent alcohol use who presented 12/24 for lower extremity edema, was improving however then with bump in creatinine now being treated for HRS.     1. Decompensated alcoholic cirrhosis complicated by hepatic encephalopathy, ascites, MELD 3.0 stable at 27 to 24    Patient reports doing well.  She has no GI symptoms.  BUN 25-27, Cr 1.39 to 1.29.  Sodium relatively stable at 130. INR 1.87 to 1.82. Bilirubin down to 3.8.  She did have drop in hemoglobin from 8.4-6.5 with reported nonbloody bowel movement on 12/29.  Hemoglobin 7.8 to 7.2 to 7.2.  Afebrile.  No leukocytosis, Platelets 89,000  -Infectious workup negative UA, x-ray questionable small infiltrate versus atelectasis. Blood cultures no growth.  -Continue lactulose to 30 g 3 times daily, titrate for 2-3 BM daily  -Start Xifaxan 550 mg twice daily on 12/31/23  -S/p paracentesis on 12/21 negative for SBP  -Currently on midodrine (last albumin 12/29, last octreotide 12/31)  -EGD in July 2023 without varices  -MRI in November without lesions  -Monitor MELD labs daily  -no indication for steroids, patient does not have acute alcoholic hepatitis   -PETH negative 12/13  -Patient has follow-up scheduled with Dr. Mahajan on 1/28       2. Chronic pancreatitis  Reports being sober for 5 years, has PD dilation and known intraductal calculus in pancreatic head  -continue alcohol cessation  -continue creon before meals.      3. Rectal bleeding  - resolved, thought to be hemorrhoidal (colonoscopy 7/2023)  4. Acute on chronic anemia  Patient's baseline hemoglobin is around 7-8.   Recent EGD and colonoscopy 7/2023 with no varices at that time.     -Patient is on iron supplement  -Continue Anusol suppositories  -Monitor  "hemoglobin.  -Monitor stool output.  -Transfuse as needed per primary team  -Patient has persistent drops in hemoglobin or persistent recurrence of bright red blood per rectum, would recommend EGD with flex sigmoidoscopy.    Stable for discharge from GI standpoint   Keep on - Xifaxan 550mg BID  - lactulose 30g BID   - midodrine 12.5mg TID  - pantoprazole 40mg BID  - Creon 24,000 with meals  - anusol HC BID for a 10 day course    Subjective:   Lying in bed in no acute distress.  Patient denies nausea, vomiting, or abdominal pain.  She offers no complaints.   She is AAO x 3.   Bili 5 down to 3.9 with remaining LFT's normal.   Renal function normal.  Vitals stable.    Objective:     Vitals: Blood pressure (!) 110/48, pulse 70, temperature 98.4 °F (36.9 °C), temperature source Oral, resp. rate 16, height 5' 4\" (1.626 m), weight 59.8 kg (131 lb 12.8 oz), SpO2 100%.,Body mass index is 22.62 kg/m².      Intake/Output Summary (Last 24 hours) at 1/2/2024 0807  Last data filed at 1/1/2024 0921  Gross per 24 hour   Intake 240 ml   Output --   Net 240 ml       Physical Exam: Physical Exam  Constitutional:       General: She is not in acute distress.     Appearance: She is not toxic-appearing.   HENT:      Head: Normocephalic and atraumatic.   Eyes:      General: No scleral icterus.  Cardiovascular:      Rate and Rhythm: Normal rate.      Heart sounds:      No friction rub. No gallop.   Pulmonary:      Effort: Pulmonary effort is normal.   Abdominal:      General: Abdomen is flat. Bowel sounds are normal. There is no distension.      Palpations: Abdomen is soft.      Tenderness: There is no abdominal tenderness. There is no guarding.   Skin:     General: Skin is warm and dry.   Neurological:      Mental Status: She is alert and oriented to person, place, and time.      Comments: No asterixis         Invasive Devices       Peripheral Intravenous Line  Duration             Peripheral IV 12/29/23 Right;Ventral (anterior) Forearm 3 " days                                Current Facility-Administered Medications:     acetaminophen (TYLENOL) tablet 650 mg, 650 mg, Oral, Q8H PRN, Terrell Mayberry MD, 650 mg at 12/21/23 0350    Diclofenac Sodium (VOLTAREN) 1 % topical gel 2 g, 2 g, Topical, Q6H PRN, Terrell Mayberry MD    ferrous sulfate tablet 325 mg, 325 mg, Oral, Daily With Breakfast, Terrell Mayberry MD, 325 mg at 01/01/24 0823    hydrocortisone (ANUSOL-HC) 2.5 % rectal cream, , Topical, BID, Antonio Garcia MD, Given at 01/01/24 1700    lactulose (CHRONULAC) oral solution 30 g, 30 g, Oral, TID, Dania Quiñones MD, 30 g at 01/01/24 1653    midodrine (PROAMATINE) tablet 12.5 mg, 12.5 mg, Oral, TID AC, Joselyn Reyes Bahamonde, MD, 12.5 mg at 01/02/24 0626    pancrelipase (Lip-Prot-Amyl) (CREON) delayed release capsule 24,000 Units, 24,000 Units, Oral, TID With Meals, Terrell Mayberry MD, 24,000 Units at 01/01/24 1653    pantoprazole (PROTONIX) EC tablet 40 mg, 40 mg, Oral, BID AC, AQUILES Colby, 40 mg at 01/02/24 0626    rifaximin (XIFAXAN) tablet 550 mg, 550 mg, Oral, Q12H [facility], AQUILES Gonzalez, 550 mg at 01/01/24 2057    trimethobenzamide (TIGAN) IM injection 200 mg, 200 mg, Intramuscular, Q6H PRN, Jodie Larose MD, 200 mg at 12/21/23 1249    Lab Results:   No results found for this or any previous visit (from the past 24 hour(s)).            Imaging Studies: XR chest 1 view    Result Date: 12/28/2023  Narrative: CHEST INDICATION:   infection rule out. COMPARISON: 12/20/2023 EXAM PERFORMED/VIEWS:  XR CHEST 1 VIEW FINDINGS: Cardiomediastinal silhouette appears unremarkable. Right base small airspace opacity. Slight bilateral CP angle blunting. No pneumothorax. Osseous structures appear within normal limits for patient age.     Impression: Medial right base small infiltrate and/or atelectasis. Small pleural effusions Workstation performed: [id number]     CT abdomen pelvis wo contrast    Result Date: 12/22/2023  Narrative: CT ABDOMEN  AND PELVIS WITHOUT IV CONTRAST INDICATION:   Drop in hemoglobin/BP in the setting of coagulopathy/chronic liver disease and recent paracentesis, query for intraperitoneal hematoma/bleeding. COMPARISON:  None. TECHNIQUE:  CT examination of the abdomen and pelvis was performed without intravenous contrast.  Axial, sagittal, and coronal 2D reformatted images were created from the source data and submitted for interpretation. Radiation dose length product (DLP) for this visit:  480 mGy-cm .  This examination, like all CT scans performed in the [facility], was performed utilizing techniques to minimize radiation dose exposure, including the use of iterative reconstruction and automated exposure control. Enteric contrast was not administered. FINDINGS: ABDOMEN LOWER CHEST: Small right and trace left pleural effusions with bibasilar atelectasis. LIVER/BILIARY TREE:  Unremarkable. GALLBLADDER: Status post cholecystectomy. SPLEEN:  Unremarkable. PANCREAS: Glandular atrophy in the body tail and neck with mild ductal dilatation unchanged. Few scattered pancreatic parenchymal calcifications compatible with chronic calcific pancreatitis. ADRENAL GLANDS: Fine linear calcification in the medial limb of the right adrenal gland probably from prior infection or hemorrhage. Left adrenal gland normal in appearance. KIDNEYS/URETERS:  Unremarkable. No hydronephrosis. STOMACH AND BOWEL: Multiple colonic diverticula throughout the distal colon with no evidence of acute diverticulitis. APPENDIX:  No findings to suggest appendicitis. ABDOMINOPELVIC CAVITY: Small volume ascites throughout the abdomen and pelvis, with mesenteric and body wall edema. VESSELS: Atherosclerotic changes are present.  No evidence of aneurysm. PELVIS REPRODUCTIVE ORGANS: Vascular calcifications in the both adnexa. Calcified uterine myoma.r 4.8 x 4.4 cm left ovarian cyst. Right ovary is grossly unremarkable. URINARY BLADDER: Gas in the nondependent  portion of the bladder. ABDOMINAL WALL/INGUINAL REGIONS: Moderate body wall edema. OSSEOUS STRUCTURES: Status post left ORIF. Degenerative changes throughout the spine.     Impression: No evidence of hematoma related to recent paracentesis. Small volume ascites throughout the abdomen and pelvis with mesenteric edema. Moderate body wall edema. 4.8 x 4.4 cm left ovarian cyst which has substantially increased in size since a pelvic ultrasound done on August 7, 2017. Recommend yearly follow-up.. Workstation performed: [id number]      INPATIENT Paracentesis    Result Date: 12/22/2023  Narrative: PROCEDURE: Therapeutic paracentesis STAFF: Antonio Archer M.D. NUMBER OF IMAGES: Multiple. COMPLICATIONS: None. MEDICATIONS: 1% lidocaine subcutaneous. INDICATION: Symptomatic ascites. PROCEDURE: Using ultrasound guidance, the left paracolic gutter was punctured and a catheter was placed into the peritoneal cavity. A total of 2400 milliliters of fluid was removed. The catheter was then removed. FINDINGS: Massive ascites. Serosanguinous ascites was removed.     Impression: Successful therapeutic paracentesis. ATTESTATION I, the attending radiologist, was present for the entire procedure. Guidance images were reviewed and I am in agreement with the findings on the report. Attending physician: Antonio Archer. Resident:  Adonis Camara. Workstation performed: [id number]     US bedside procedure    Result Date: 12/20/2023  Narrative: [id number]    XR chest 1 view portable    Result Date: 12/20/2023  Narrative: CHEST INDICATION:   SOB, vol. overload. COMPARISON: 11/18/2023 EXAM PERFORMED/VIEWS:  XR CHEST PORTABLE FINDINGS: Cardiomediastinal silhouette appears unremarkable. Persistent right greater than left pleural effusions. There has been some redistribution of the fluid on the right, now partially tracking into the fissures. There is probably mild bibasilar atelectasis. No significant pneumonic infiltrate.  No pneumothorax. Osseous structures appear within normal limits for patient age.     Impression: Shifting bilateral right greater than the left pleural effusions Workstation performed: [id number]           Danish Rangel PA-C  Gastroentrology

## 2024-01-06 LAB
BACTERIA BLD CULT: NORMAL
BACTERIA BLD CULT: NORMAL

## 2024-01-18 ENCOUNTER — OFFICE VISIT (OUTPATIENT)
Dept: GASTROENTEROLOGY | Facility: AMBULARY SURGERY CENTER | Age: 69
End: 2024-01-18
Payer: MEDICARE

## 2024-01-18 VITALS
BODY MASS INDEX: 22.36 KG/M2 | OXYGEN SATURATION: 100 % | HEIGHT: 64 IN | WEIGHT: 131 LBS | HEART RATE: 83 BPM | DIASTOLIC BLOOD PRESSURE: 70 MMHG | SYSTOLIC BLOOD PRESSURE: 132 MMHG

## 2024-01-18 DIAGNOSIS — K74.60 CIRRHOSIS (HCC): ICD-10-CM

## 2024-01-18 DIAGNOSIS — K70.31 ASCITES DUE TO ALCOHOLIC CIRRHOSIS (HCC): ICD-10-CM

## 2024-01-18 DIAGNOSIS — K74.60 DECOMPENSATED HEPATIC CIRRHOSIS (HCC): ICD-10-CM

## 2024-01-18 DIAGNOSIS — K76.82 HEPATIC ENCEPHALOPATHY (HCC): ICD-10-CM

## 2024-01-18 DIAGNOSIS — M62.84 SARCOPENIA: ICD-10-CM

## 2024-01-18 DIAGNOSIS — N18.30 STAGE 3 CHRONIC KIDNEY DISEASE, UNSPECIFIED WHETHER STAGE 3A OR 3B CKD (HCC): Primary | ICD-10-CM

## 2024-01-18 DIAGNOSIS — K72.90 DECOMPENSATED HEPATIC CIRRHOSIS (HCC): ICD-10-CM

## 2024-01-18 PROCEDURE — 99214 OFFICE O/P EST MOD 30 MIN: CPT | Performed by: STUDENT IN AN ORGANIZED HEALTH CARE EDUCATION/TRAINING PROGRAM

## 2024-01-18 RX ORDER — LACTULOSE 10 G/15ML
30 SOLUTION ORAL 2 TIMES DAILY
Qty: 8100 ML | Refills: 3 | Status: SHIPPED | OUTPATIENT
Start: 2024-01-18 | End: 2025-01-17

## 2024-01-18 RX ORDER — MIDODRINE HYDROCHLORIDE 5 MG/1
10 TABLET ORAL
Qty: 180 TABLET | Refills: 1 | Status: SHIPPED | OUTPATIENT
Start: 2024-01-18 | End: 2024-03-18

## 2024-01-18 NOTE — PROGRESS NOTES
[facility] - Outpatient Consultation  Cassidy Zhang 68 y.o. female MRN: [id number]  Encounter: [id number]    PCP:  Jillian Calix MD, [phone]  Referring Provider:  No ref. provider found,     Patient: Cassidy [birth year], 1955  Reason for Referral: decompensated EtOH cirrhosis     ASSESSMENT/PLAN:  68 y.o. female with history of HTN and EtOH cirrhosis d/b HE and ascites who for follow up after hospitalization.     She has longstanding history of EtOH cirrhosis and recently decompensated earlier this year with HE and ascites requiring LVP. Diuretics have been limited by renal dysfunction and hyponatremia. She was also noted to have severe synthetic dysfunction with jaundice with concern for EtOH use but had a PEth that was negative (x2). She had an MRI/MRCP which showed subtle micronodularity along the L hepatic lobe as well as chronic pancreatitis with pancreatic duct dilatation and atrophy.     She recently was admitted to [facility] for worsening ascites and anasarca with A-o-CKD. Course was also complicated by HE and acute blood loss anemia requiring transfusion in the context of hemorrhoidal bleeding. She required LVP for comfort and midodrine for hemodynamic support and renal perfusion. Her peak MELD (3.0) was 26.     We discussed thenatural history, prognosis, and complications of cirrhosis, including decompensation in the form of ascites, variceal bleeding, and hepatic encephalopathy as well as risk for development of HCC. We discussed that her liver disease is irreversible but her hepatic decompensations seems to be stable on medical therapy. We discussed the possibility of LT evaluation given her MELD, but she and her  would like to confer with her family regarding referral.    Otherwise, she is up-to-date with her cirrhosis health maintenance. She will return to clinic in 2 months or sooner if needed. Thank you for the opportunity to consult in her care.     1.  Decompensated cirrhosis: MELD (3.0) 25  - EtOH with reported prolonged abstinence (although prior reports documented EtOH slushy during one of her hospitalizations). Her most recent PEth (12/2023) was negative.  - Transplant: t/c pending discussion with her family    2. Ascites   - Continue aldactone 12.5 mg daily but would consider increasing if renal function is stable   - LVPs as needed for comfort   - Avoid salt tabs   - She will continue a low sodium diet (< 2g per day) to help prevent fluid retention     3. Hepatic encephalopathy  - Titrate lactulose for goal 3-4 soft BMs daily  - Consider rifaximin 550 mg BID if she has refractory symptoms   - She should avoid sedating medications, especially opiates, benzodiazepines.     4. Varices  - Her last EGD showed no varices 7/2023  - Repeat EGD 7/2024     5. HCC surveillance  Her last imaging with MRI/MRCP 11/2023 was negative for HCC  - Repeat in 6/2024 for surveillance     6. Nutrition and sarcopenia  - She was instructed to eat multiple small meals a day and a snack prior to bedtime to help prevent protein loss and sarcopenia    7. PRETTY  - Decrease midodrine 5 mg TID and advised patient to monitor ambulatory pressures     8. Healthcare maintenance for patients with cirrhosis  -She was instructed to take no more than 2 grams of tylenol in 24 hours and no products containing NSAIDs, benzodiazapines, and narcotics.  -She was also instructed to avoid raw shellfish   -She should participate in daily exercise as to prevent loss of muscle mass  -She should abstain from all alcohol intake and was counseled on this.    -She is at risk for vitamin deficiencies and metabolic bone disease. -  -HAV and HBV immunity will be checked and she should be vaccinated through her primary care provider if she is not immune.  -Additionally, she should receive a yearly flu shot and the pneumonia vaccine through her primary care provider.    Emerald Mahajan MD  Division of Gastroenterology and  Hepatology  [facility]    ============================================================================  CC/HPI: 68 y.o. female with history of HTN and EtOH cirrhosis d/b HE and ascites who for follow up after hospitalization.    She recently decompensated earlier this year despite 5-6 years of abstinence with negative PEth in October. She established care with Dr. Sorensen and was started on diuretics, although this has been limited renal dysfunction and hyponatremia. She also stopped taking her lactulose due to diarrhea as well as her diuretics. Additionally, she had a dental procedure which was complicated by abscess requiring amoxicillin.      Following her procedure, she was recently admitted to [facility] for worsening ascites and anasarca with PRETTY and updated MELD (3.0) 26. Infectious work-up was negative and she did require LVP for comfort (last 12/21 with removal of 2.4L). She was continued on midodrine with stabilization of renal function and was restarted on low dose aldactone. Course was also complicated by rectal bleeding due to hemorrhoids and acute on chronic anemia requiring transfusion.     Last MRI abdomen was in November 2023 which was negative for HCC but did show subCM hemangiomas. Her last EGD was in July 2023 which was negative for esophageal varices.     ROS: Complete review of systems otherwise negative.     PAST MEDICAL/SURGICAL HISTORY:  Past Medical History:   Diagnosis Date    Diarrhea 10/26/2023    Hypertension     Hyponatremia         Past Surgical History:   Procedure Laterality Date    EYE SURGERY      IR PARACENTESIS  12/21/2023    KS OPTX FEM SHFT FX W/INSJ IMED IMPLT W/WO SCREW Left 11/17/2018    Procedure: Intramedullary nail fixation left hip fracture;  Surgeon: Ross Herrera MD;  Location: [facility];  Service: Orthopedics       FAMILY/SOCIAL HISTORY:  Family History   Problem Relation Age of Onset    Heart disease Mother     Heart disease Father         Social History     Tobacco Use    Smoking status: Former    Smokeless tobacco: Never   Vaping Use    Vaping status: Never Used   Substance Use Topics    Alcohol use: Not Currently     Alcohol/week: 1.0 standard drink of alcohol     Types: 1 Cans of beer per week     Comment: 2-3 times per week. Drinks heavier before    Drug use: No       MEDICATIONS:  Current Outpatient Medications on File Prior to Visit   Medication Sig Dispense Refill    Calcium Carb-Cholecalciferol (CALCIUM + D3 PO) Take 200 Units by mouth daily.      Cholecalciferol (VITAMIN D3) 3000 UNITS TABS Take 100 Units by mouth daily.      docusate sodium (COLACE) 100 mg capsule Take 1 capsule (100 mg total) by mouth 2 (two) times a day  0    ferrous sulfate 325 (65 Fe) mg tablet Take 325 mg by mouth daily with breakfast      lactulose (CHRONULAC) 10 g/15 mL solution Take 45 mL (30 g total) by mouth 2 (two) times a day 240 mL 0    magnesium gluconate (MAGONATE) 500 mg tablet Take 2 tablets (1,000 mg total) by mouth 2 (two) times a day 120 tablet 0    midodrine (PROAMATINE) 10 MG tablet Take 1 tablet (10 mg total) by mouth 3 (three) times a day before meals Do not start before January 3, 2024. 90 tablet 0    pancrelipase, Lip-Prot-Amyl, (CREON) 24,000 units Take 1 capsule (24,000 Units total) by mouth 3 (three) times a day with meals 90 capsule 0    pantoprazole (PROTONIX) 40 mg tablet Take 1 tablet (40 mg total) by mouth 2 (two) times a day before meals 60 tablet 0    senna (SENOKOT) 8.6 mg Take 1 tablet (8.6 mg total) by mouth daily 120 each 0    spironolactone (ALDACTONE) 25 mg tablet Take 0.5 tablets (12.5 mg total) by mouth daily Do not start before January 3, 2024. 15 tablet 0    thiamine 100 MG tablet Take 100 mg by mouth daily.      traMADol (ULTRAM) 50 mg tablet Take 1 tablet (50 mg total) by mouth every 6 (six) hours as needed for moderate pain 30 tablet 0    hydrocortisone (ANUSOL-HC) 2.5 % rectal cream Apply topically 2 (two) times a day for  "10 days 28 g 0     No current facility-administered medications on file prior to visit.       Allergies   Allergen Reactions    Oxycodone-Acetaminophen Tachycardia    Demerol [Meperidine] Other (See Comments)     hallucinations    Diphenhydramine Other (See Comments)     hallucinations    Milk (Cow) Diarrhea    Other Other (See Comments)    Prednisone Tremor    Sulfa Antibiotics Other (See Comments)     hallucinations    Sulfasalazine Hallucinations    Percocet [Oxycodone-Acetaminophen] Palpitations       PHYSICAL EXAM:  /70 (BP Location: Left arm, Patient Position: Sitting, Cuff Size: Standard)   Pulse 83   Ht 5' 4\" (1.626 m)   Wt 59.4 kg (131 lb)   LMP  (LMP Unknown) Comment: Postmenapausal  SpO2 100%   BMI 22.49 kg/m²   GENERAL: NAD, AAO  HEENT: anicteric, OP clear, MMM, temporal wasting   ABDOMEN: S/ND/NT, normoactive BS, no hepatomegaly, spleen not palpable  EXTREMITIES: no edema  SKIN: no rashes, no palmar erythema, no spider angiomata   NEURO: normal gait, +tremor, no asterixis     LABS/RADIOLOGY/ENDOSCOPY:  Lab Results   Component Value Date    WBC 4.05 (L) 01/02/2024    HGB 7.5 (L) 01/02/2024    HCT 22.1 (L) 01/02/2024    PLT 89 (L) 01/02/2024    GLUF 117 (H) 12/13/2023    BUN 25 01/02/2024    CREATININE 1.29 01/02/2024    K 4.1 01/02/2024    CL 98 01/02/2024    CO2 25 01/02/2024    BILIDIR 1.31 (H) 01/02/2024    ALKPHOS 83 01/02/2024    ALT 15 01/02/2024    AST 33 01/02/2024    CALCIUM 9.8 01/02/2024    EGFR 42 01/02/2024    TRIG 31 10/26/2016     (H) 10/26/2016    INR 1.82 (H) 01/02/2024    INR 1.82 (H) 01/02/2024    PTT 46 (H) 12/20/2023     MRI abdomen with MRCP (11/192023)  Abnormal mucosa in the esophagus. Islands of pink squamocolumnar tissue were noted at the GE junction, and cold forcep biopsied  The body of the stomach, antrum, duodenal bulb and 2nd part of the duodenum appeared normal. Performed random biopsy using biopsy forceps.    EGD (7/25/2023  Abnormal mucosa in the " esophagus. Islands of pink squamocolumnar tissue were noted at the GE junction, and cold forcep biopsied  The body of the stomach, antrum, duodenal bulb and 2nd part of the duodenum appeared normal. Performed random biopsy using biopsy forceps.    MELD 3.0: 25 at 1/2/2024  8:32 AM  MELD-Na: 25 at 1/2/2024  8:32 AM  Calculated from:  Serum Creatinine: 1.29 mg/dL at 1/2/2024  8:32 AM  Serum Sodium: 130 mmol/L at 1/2/2024  8:32 AM  Total Bilirubin: 3.93 mg/dL at 1/2/2024  8:32 AM  Serum Albumin: 3.7 g/dL (Using max of 3.5 g/dL) at 1/2/2024  8:32 AM  INR(ratio): 1.82 at 1/2/2024  8:32 AM  Age at listing (hypothetical): 68 years  Sex: Female at 1/2/2024  8:32 AM

## 2024-01-18 NOTE — PATIENT INSTRUCTIONS
Labs next week with your PCP's orders and mine  Decrease your midodrine to 5 mg three times a day and check your blood pressure daily. Let us know if the top number (systolic blood pressure) is less than 90  I will let you know if you can stop the magnesium based on your blood work  Schedule a follow up with me in 2 months

## 2024-01-23 ENCOUNTER — APPOINTMENT (OUTPATIENT)
Dept: LAB | Facility: AMBULARY SURGERY CENTER | Age: 69
End: 2024-01-23
Payer: MEDICARE

## 2024-01-23 DIAGNOSIS — E78.5 HYPERLIPIDEMIA, UNSPECIFIED HYPERLIPIDEMIA TYPE: ICD-10-CM

## 2024-01-23 DIAGNOSIS — N18.5 STAGE 5 CHRONIC KIDNEY DISEASE NOT ON CHRONIC DIALYSIS (HCC): ICD-10-CM

## 2024-01-23 DIAGNOSIS — K72.10 CHRONIC YELLOW ATROPHY OF LIVER (HCC): ICD-10-CM

## 2024-01-23 DIAGNOSIS — R73.09 IMPAIRED GLUCOSE TOLERANCE TEST: ICD-10-CM

## 2024-01-23 DIAGNOSIS — K74.60 CIRRHOSIS (HCC): ICD-10-CM

## 2024-01-23 LAB
ALBUMIN SERPL BCP-MCNC: 3.6 G/DL (ref 3.5–5)
ALP SERPL-CCNC: 87 U/L (ref 34–104)
ALT SERPL W P-5'-P-CCNC: 11 U/L (ref 7–52)
ANION GAP SERPL CALCULATED.3IONS-SCNC: 11 MMOL/L
AST SERPL W P-5'-P-CCNC: 28 U/L (ref 13–39)
BASOPHILS # BLD AUTO: 0.03 THOUSANDS/ÂΜL (ref 0–0.1)
BASOPHILS NFR BLD AUTO: 1 % (ref 0–1)
BILIRUB SERPL-MCNC: 3.88 MG/DL (ref 0.2–1)
BUN SERPL-MCNC: 8 MG/DL (ref 5–25)
CALCIUM SERPL-MCNC: 9.1 MG/DL (ref 8.4–10.2)
CHLORIDE SERPL-SCNC: 102 MMOL/L (ref 96–108)
CHOLEST SERPL-MCNC: 94 MG/DL
CO2 SERPL-SCNC: 17 MMOL/L (ref 21–32)
CREAT SERPL-MCNC: 1.07 MG/DL (ref 0.6–1.3)
EOSINOPHIL # BLD AUTO: 0.12 THOUSAND/ÂΜL (ref 0–0.61)
EOSINOPHIL NFR BLD AUTO: 4 % (ref 0–6)
ERYTHROCYTE [DISTWIDTH] IN BLOOD BY AUTOMATED COUNT: 18.3 % (ref 11.6–15.1)
EST. AVERAGE GLUCOSE BLD GHB EST-MCNC: 91 MG/DL
GFR SERPL CREATININE-BSD FRML MDRD: 53 ML/MIN/1.73SQ M
GLUCOSE P FAST SERPL-MCNC: 99 MG/DL (ref 65–99)
HBA1C MFR BLD: 4.8 %
HCT VFR BLD AUTO: 27.5 % (ref 34.8–46.1)
HDLC SERPL-MCNC: 26 MG/DL
HGB BLD-MCNC: 9.2 G/DL (ref 11.5–15.4)
IMM GRANULOCYTES # BLD AUTO: 0.01 THOUSAND/UL (ref 0–0.2)
IMM GRANULOCYTES NFR BLD AUTO: 0 % (ref 0–2)
INR PPP: 1.94 (ref 0.84–1.19)
LDLC SERPL CALC-MCNC: 52 MG/DL (ref 0–100)
LYMPHOCYTES # BLD AUTO: 1.65 THOUSANDS/ÂΜL (ref 0.6–4.47)
LYMPHOCYTES NFR BLD AUTO: 49 % (ref 14–44)
MAGNESIUM SERPL-MCNC: 1.1 MG/DL (ref 1.9–2.7)
MCH RBC QN AUTO: 33.6 PG (ref 26.8–34.3)
MCHC RBC AUTO-ENTMCNC: 33.5 G/DL (ref 31.4–37.4)
MCV RBC AUTO: 100 FL (ref 82–98)
MONOCYTES # BLD AUTO: 0.14 THOUSAND/ÂΜL (ref 0.17–1.22)
MONOCYTES NFR BLD AUTO: 4 % (ref 4–12)
NEUTROPHILS # BLD AUTO: 1.41 THOUSANDS/ÂΜL (ref 1.85–7.62)
NEUTS SEG NFR BLD AUTO: 42 % (ref 43–75)
NONHDLC SERPL-MCNC: 68 MG/DL
NRBC BLD AUTO-RTO: 0 /100 WBCS
PLATELET # BLD AUTO: 104 THOUSANDS/UL (ref 149–390)
PMV BLD AUTO: 11 FL (ref 8.9–12.7)
POTASSIUM SERPL-SCNC: 3.5 MMOL/L (ref 3.5–5.3)
PROT SERPL-MCNC: 6.6 G/DL (ref 6.4–8.4)
PROTHROMBIN TIME: 21.8 SECONDS (ref 11.6–14.5)
RBC # BLD AUTO: 2.74 MILLION/UL (ref 3.81–5.12)
SODIUM SERPL-SCNC: 130 MMOL/L (ref 135–147)
TRIGL SERPL-MCNC: 78 MG/DL
WBC # BLD AUTO: 3.36 THOUSAND/UL (ref 4.31–10.16)

## 2024-01-23 PROCEDURE — 80061 LIPID PANEL: CPT

## 2024-01-23 PROCEDURE — 83735 ASSAY OF MAGNESIUM: CPT

## 2024-01-23 PROCEDURE — 85610 PROTHROMBIN TIME: CPT

## 2024-01-23 PROCEDURE — 85025 COMPLETE CBC W/AUTO DIFF WBC: CPT

## 2024-01-23 PROCEDURE — 83036 HEMOGLOBIN GLYCOSYLATED A1C: CPT

## 2024-01-23 PROCEDURE — 36415 COLL VENOUS BLD VENIPUNCTURE: CPT

## 2024-01-23 PROCEDURE — 80053 COMPREHEN METABOLIC PANEL: CPT

## 2024-01-24 ENCOUNTER — TELEPHONE (OUTPATIENT)
Dept: GASTROENTEROLOGY | Facility: CLINIC | Age: 69
End: 2024-01-24

## 2024-01-24 DIAGNOSIS — E83.42 HYPOMAGNESEMIA: ICD-10-CM

## 2024-01-24 DIAGNOSIS — N18.30 STAGE 3 CHRONIC KIDNEY DISEASE, UNSPECIFIED WHETHER STAGE 3A OR 3B CKD (HCC): ICD-10-CM

## 2024-01-24 DIAGNOSIS — K74.60 DECOMPENSATED HEPATIC CIRRHOSIS (HCC): Primary | ICD-10-CM

## 2024-01-24 DIAGNOSIS — K70.31 ASCITES DUE TO ALCOHOLIC CIRRHOSIS (HCC): ICD-10-CM

## 2024-01-24 DIAGNOSIS — K72.90 DECOMPENSATED HEPATIC CIRRHOSIS (HCC): Primary | ICD-10-CM

## 2024-01-24 RX ORDER — SPIRONOLACTONE 25 MG/1
25 TABLET ORAL DAILY
Qty: 30 TABLET | Refills: 2 | Status: SHIPPED | OUTPATIENT
Start: 2024-01-24 | End: 2024-04-23

## 2024-01-24 RX ORDER — CALCIUM CARBONATE/VITAMIN D3 500-10/5ML
400 LIQUID (ML) ORAL 2 TIMES DAILY
Qty: 180 TABLET | Refills: 0 | Status: SHIPPED | OUTPATIENT
Start: 2024-01-24 | End: 2024-04-23

## 2024-01-24 NOTE — TELEPHONE ENCOUNTER
Spoke with pt &  michael. Relayed results and recommendations as per Dr Mahajan. Pt states this am pressure was 120/50 and last /73. Reviewed rx instructions and advised of prescriptions sent to [facility] pharmacy today. Both agreeable and verbalized understanding of all information

## 2024-01-24 NOTE — TELEPHONE ENCOUNTER
----- Message from Emerald Mahajan MD sent at 1/24/2024 10:32 AM EST -----    Hi can we please contact her to let her know that her renal function improved and so I am going to increase her aldactone to 25 mg daily? Hemoglobin has also improved from 7 ->9 after her bleeding has stopped. She is also weaning her midodrine so please ask to see what her ambulatory pressures have been. Lastly, her magnesium levels are still very low. They inquired about stopping her supplements during her last visit, but I would recommend continuing them for now and provided a new script if they ran out of their existing medications. She should repeat labs next week.     MELD 3.0: 24 at 1/23/2024  9:06 AM  MELD-Na: 25 at 1/23/2024  9:06 AM  Calculated from:  Serum Creatinine: 1.07 mg/dL at 1/23/2024  9:06 AM  Serum Sodium: 130 mmol/L at 1/23/2024  9:06 AM  Total Bilirubin: 3.88 mg/dL at 1/23/2024  9:06 AM  Serum Albumin: 3.6 g/dL (Using max of 3.5 g/dL) at 1/23/2024  9:06 AM  INR(ratio): 1.94 at 1/23/2024  9:06 AM  Age at listing (hypothetical): 68 years  Sex: Female at 1/23/2024  9:06 AM

## 2024-01-30 NOTE — PROGRESS NOTES
NEPHROLOGY OUTPATIENT PROGRESS NOTE   Cassidy Zhang 68 y.o. female MRN: [id number]  DATE: 1/31/2024  Reason for visit:   Chief Complaint   Patient presents with    Hospital Follow-up       ASSESSMENT and PLAN:  Chronic hyponatremia  -Was seen by nephrology during hospital stay for finding hyponatremia with admission sodium 129  -Workup suggestive of urine sodium 122 urine osmolality 393, serum osmolality 289  -CT abdomen pelvis without contrast, no evidence of malignancy finding of ascites and left ovarian cyst.  Chest x-ray finding of opacity right lung base.  Findings suggestive of effusion both sides  -Hyponatremia was suspected to be due to excessive ADH in the setting of alcoholic liver cirrhosis.  -she used to be on salt tablets in the past but was stopped during recent hospital admission due to fluid overload  -Was placed on fluid restriction 1.2 L/day- 40 oz/day  -Blood work from January 23, 2023 showed sodium level still low but stable at 130 meq/L. Started on oral sodium bicarbonate tablets.  -labs in two week and 4 weeks   -follow up in 4 months     Chronic Kidney Disease Stage 3  -Baseline Creatinine: 0.7 to 0.9  mg/dl  -Recent acute kidney injury.  Creatinine was 1.58 mg/dL  .Etiology most likely in setting of type II hepatorenal syndrome +/- hemodynamic changes in setting of ARB use   -Renal Function has been improving. 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 Check labs in two weeks   Avoid Nephrotoxins like NSAIDs and IV contrast.   CKD Education: will discuss in future   Follow up in 4 months with labs     Metabolic acidosis  -Bicarb level was low at 17 with anion gap of 11  -started on oral sodium bicarbonate tablet 650 mg bid.   -check labs in two weeks     Hypomagnesemia: Magnesium level was 1.1 mg/dL  -on mag oxide 400 mg bid . Was  started after the blood work   -check mag level with repeat lab in two week    Chronic hypotension  -On oral midodrine  -Blood pressure on the higher side, decrease the dose of midodrine to 5 mg 3 times daily.  Continue to monitor blood pressure at home. She used to be on losartan     Anemia, unspecified  -Status post PRBC during hospital admission  -Suspected hemorrhoidal bleed  -Last hemoglobin was stable at 9.2 g/dL  -on oral ferrous sulfate     Decompensated alcoholic liver cirrhosis/lower extremity edema  -Was recently started on spironolactone 25 mg daily on January 24, continue at current dose, does have trace lower extremity edema recommending elevation, continue to monitor.  Can increase the dose if renal function remains stable    Diagnoses and all orders for this visit:    Chronic hyponatremia  -     Basic metabolic panel; Future  -     Magnesium; Future  -     Basic metabolic panel; Future  -     Magnesium; Future  -     Basic metabolic panel; Future    Stage 3a chronic kidney disease (HCC)  -     midodrine (PROAMATINE) 5 mg tablet; Take 1 tablet (5 mg total) by mouth 3 (three) times a day before meals  -     Basic metabolic panel; Future  -     Magnesium; Future  -     CBC; Future  -     Phosphorus; Future  -     Protein / creatinine ratio, urine; Future  -     PTH, intact; Future  -     Urinalysis with microscopic; Future    Metabolic acidosis  -     sodium bicarbonate 650 mg tablet; Take 1 tablet (650 mg total) by mouth 2 (two) times a day  -     Basic metabolic panel; Future    Chronic hypotension  -     Basic metabolic panel; Future    Hypomagnesemia  -     Magnesium; Future  -     Magnesium; Future  -     Magnesium; Future    Anemia, unspecified type  -     CBC; Future    Alcoholic cirrhosis of liver with ascites (HCC)  -     midodrine (PROAMATINE) 5 mg tablet; Take 1 tablet (5 mg total) by mouth 3 (three) times a day before meals  -     Basic metabolic panel; Future            SUBJECTIVE /  HPI:  Cassidy Zhang is a 68 y.o.  female with history of decompensated alcoholic liver cirrhosis, encephalopathy, chronic alcohol use was recently admitted for lower extremity edema but then had worsening renal function/acute kidney injury and so nephrology was consulted.  Patient was treated for hepatorenal syndrome and renal function improved.  Also found to have hyponatremia in the setting of liver cirrhosis, was placed on fluid restriction 1.2 L/day    Blood work from January 23, 2024 showed sodium still low at 130 meq/L, renal function improved with creatinine 1.07 mg/dL, bicarb level low at 17.  Magnesium level was low at 1.1    Trace edema . Has improved   No SOB     REVIEW OF SYSTEMS:    Review of Systems   Constitutional:  Negative for chills and fever.   HENT:  Negative for ear pain and sore throat.    Eyes:  Negative for pain and visual disturbance.   Respiratory:  Negative for cough and shortness of breath.    Cardiovascular:  Positive for leg swelling. Negative for chest pain and palpitations.   Gastrointestinal:  Negative for abdominal pain and vomiting.   Genitourinary:  Negative for dysuria and hematuria.   Musculoskeletal:  Negative for arthralgias and back pain.   Skin:  Negative for color change and rash.   Neurological:  Negative for seizures and syncope.   All other systems reviewed and are negative.      More than 10 point review of systems were obtained and discussed in length with the patient. Complete review of systems were negative / unremarkable except mentioned above.       PAST MEDICAL HISTORY:  Past Medical History:   Diagnosis Date    Diarrhea 10/26/2023    Hypertension     Hyponatremia        PAST SURGICAL HISTORY:  Past Surgical History:   Procedure Laterality Date    EYE SURGERY      IR PARACENTESIS  12/21/2023    TX OPTX FEM SHFT FX W/INSJ IMED IMPLT W/WO SCREW Left 11/17/2018    Procedure: Intramedullary nail fixation left hip fracture;  Surgeon: Ross Herrera MD;   Location: [facility];  Service: Orthopedics       SOCIAL HISTORY:  Social History     Substance and Sexual Activity   Alcohol Use Not Currently    Alcohol/week: 1.0 standard drink of alcohol    Types: 1 Cans of beer per week    Comment: 2-3 times per week. Drinks heavier before     Social History     Substance and Sexual Activity   Drug Use No     Social History     Tobacco Use   Smoking Status Former   Smokeless Tobacco Never       FAMILY HISTORY:  Family History   Problem Relation Age of Onset    Heart disease Mother     Heart disease Father        MEDICATIONS:    Current Outpatient Medications:     Calcium Carb-Cholecalciferol (CALCIUM + D3 PO), Take 200 Units by mouth daily., Disp: , Rfl:     Cholecalciferol (VITAMIN D3) 3000 UNITS TABS, Take 100 Units by mouth daily., Disp: , Rfl:     docusate sodium (COLACE) 100 mg capsule, Take 1 capsule (100 mg total) by mouth 2 (two) times a day, Disp: , Rfl: 0    ferrous sulfate 325 (65 Fe) mg tablet, Take 325 mg by mouth daily with breakfast, Disp: , Rfl:     hydrocortisone (ANUSOL-HC) 2.5 % rectal cream, Apply topically 2 (two) times a day for 10 days, Disp: 28 g, Rfl: 0    lactulose (CHRONULAC) 10 g/15 mL solution, Take 45 mL (30 g total) by mouth 2 (two) times a day, Disp: 8100 mL, Rfl: 3    Magnesium Oxide 400 MG CAPS, Take 1 tablet (400 mg total) by mouth 2 (two) times a day, Disp: 180 tablet, Rfl: 0    midodrine (PROAMATINE) 5 mg tablet, Take 1 tablet (5 mg total) by mouth 3 (three) times a day before meals, Disp: 270 tablet, Rfl: 1    pancrelipase, Lip-Prot-Amyl, (CREON) 24,000 units, Take 1 capsule (24,000 Units total) by mouth 3 (three) times a day with meals, Disp: 90 capsule, Rfl: 0    pantoprazole (PROTONIX) 40 mg tablet, Take 1 tablet (40 mg total) by mouth 2 (two) times a day before meals, Disp: 60 tablet, Rfl: 0    senna (SENOKOT) 8.6 mg, Take 1 tablet (8.6 mg total) by mouth daily, Disp: 120 each, Rfl: 0    sodium bicarbonate 650 mg tablet, Take 1 tablet  "(650 mg total) by mouth 2 (two) times a day, Disp: 180 tablet, Rfl: 2    spironolactone (ALDACTONE) 25 mg tablet, Take 1 tablet (25 mg total) by mouth daily, Disp: 30 tablet, Rfl: 2    thiamine 100 MG tablet, Take 100 mg by mouth daily., Disp: , Rfl:     traMADol (ULTRAM) 50 mg tablet, Take 1 tablet (50 mg total) by mouth every 6 (six) hours as needed for moderate pain, Disp: 30 tablet, Rfl: 0      PHYSICAL EXAM:  Vitals:    01/31/24 1355   BP: 140/70   BP Location: Left arm   Patient Position: Sitting   Cuff Size: Adult   Pulse: 81   SpO2: 99%   Weight: 58.1 kg (128 lb)   Height: 5' 4\" (1.626 m)     Body mass index is 21.97 kg/m².    Physical Exam  Constitutional:       General: She is not in acute distress.     Appearance: Normal appearance. She is well-developed.   HENT:      Head: Normocephalic and atraumatic.      Nose: Nose normal.      Mouth/Throat:      Mouth: Mucous membranes are moist.   Eyes:      General: No scleral icterus.     Conjunctiva/sclera: Conjunctivae normal.      Pupils: Pupils are equal, round, and reactive to light.   Neck:      Thyroid: No thyromegaly.      Vascular: No JVD.   Cardiovascular:      Rate and Rhythm: Normal rate and regular rhythm.      Heart sounds: Normal heart sounds. No murmur heard.     No friction rub.   Pulmonary:      Effort: Pulmonary effort is normal. No respiratory distress.      Breath sounds: Normal breath sounds. No wheezing or rales.   Abdominal:      General: Bowel sounds are normal. There is no distension.      Palpations: Abdomen is soft.      Tenderness: There is no abdominal tenderness.   Musculoskeletal:         General: No deformity.      Cervical back: Neck supple.      Right lower leg: Edema (trace) present.      Left lower leg: Edema (trace) present.   Skin:     General: Skin is warm and dry.      Findings: No rash.   Neurological:      Mental Status: She is alert and oriented to person, place, and time.   Psychiatric:         Mood and Affect: Mood " normal.         Behavior: Behavior normal.         Thought Content: Thought content normal.         Lab Results:   Results for orders placed or performed in visit on 01/23/24   Comprehensive metabolic panel   Result Value Ref Range    Sodium 130 (L) 135 - 147 mmol/L    Potassium 3.5 3.5 - 5.3 mmol/L    Chloride 102 96 - 108 mmol/L    CO2 17 (L) 21 - 32 mmol/L    ANION GAP 11 mmol/L    BUN 8 5 - 25 mg/dL    Creatinine 1.07 0.60 - 1.30 mg/dL    Glucose, Fasting 99 65 - 99 mg/dL    Calcium 9.1 8.4 - 10.2 mg/dL    AST 28 13 - 39 U/L    ALT 11 7 - 52 U/L    Alkaline Phosphatase 87 34 - 104 U/L    Total Protein 6.6 6.4 - 8.4 g/dL    Albumin 3.6 3.5 - 5.0 g/dL    Total Bilirubin 3.88 (H) 0.20 - 1.00 mg/dL    eGFR 53 ml/min/1.73sq m   Protime-INR   Result Value Ref Range    Protime 21.8 (H) 11.6 - 14.5 seconds    INR 1.94 (H) 0.84 - 1.19   Magnesium   Result Value Ref Range    Magnesium 1.1 (L) 1.9 - 2.7 mg/dL   Lipid panel   Result Value Ref Range    Cholesterol 94 See Comment mg/dL    Triglycerides 78 See Comment mg/dL    HDL, Direct 26 (L) >=50 mg/dL    LDL Calculated 52 0 - 100 mg/dL    Non-HDL-Chol (CHOL-HDL) 68 mg/dl   CBC and differential   Result Value Ref Range    WBC 3.36 (L) 4.31 - 10.16 Thousand/uL    RBC 2.74 (L) 3.81 - 5.12 Million/uL    Hemoglobin 9.2 (L) 11.5 - 15.4 g/dL    Hematocrit 27.5 (L) 34.8 - 46.1 %     (H) 82 - 98 fL    MCH 33.6 26.8 - 34.3 pg    MCHC 33.5 31.4 - 37.4 g/dL    RDW 18.3 (H) 11.6 - 15.1 %    MPV 11.0 8.9 - 12.7 fL    Platelets 104 (L) 149 - 390 Thousands/uL    nRBC 0 /100 WBCs    Neutrophils Relative 42 (L) 43 - 75 %    Immat GRANS % 0 0 - 2 %    Lymphocytes Relative 49 (H) 14 - 44 %    Monocytes Relative 4 4 - 12 %    Eosinophils Relative 4 0 - 6 %    Basophils Relative 1 0 - 1 %    Neutrophils Absolute 1.41 (L) 1.85 - 7.62 Thousands/µL    Immature Grans Absolute 0.01 0.00 - 0.20 Thousand/uL    Lymphocytes Absolute 1.65 0.60 - 4.47 Thousands/µL    Monocytes Absolute 0.14 (L)  0.17 - 1.22 Thousand/µL    Eosinophils Absolute 0.12 0.00 - 0.61 Thousand/µL    Basophils Absolute 0.03 0.00 - 0.10 Thousands/µL   Hemoglobin A1C   Result Value Ref Range    Hemoglobin A1C 4.8 Normal 4.0-5.6%; PreDiabetic 5.7-6.4%; Diabetic >=6.5%; Glycemic control for adults with diabetes <7.0% %    EAG 91 mg/dl

## 2024-01-31 ENCOUNTER — OFFICE VISIT (OUTPATIENT)
Dept: NEPHROLOGY | Facility: CLINIC | Age: 69
End: 2024-01-31
Payer: MEDICARE

## 2024-01-31 VITALS
WEIGHT: 128 LBS | HEART RATE: 81 BPM | HEIGHT: 64 IN | OXYGEN SATURATION: 99 % | DIASTOLIC BLOOD PRESSURE: 70 MMHG | SYSTOLIC BLOOD PRESSURE: 140 MMHG | BODY MASS INDEX: 21.85 KG/M2

## 2024-01-31 DIAGNOSIS — E87.1 CHRONIC HYPONATREMIA: Primary | ICD-10-CM

## 2024-01-31 DIAGNOSIS — K70.31 ALCOHOLIC CIRRHOSIS OF LIVER WITH ASCITES (HCC): ICD-10-CM

## 2024-01-31 DIAGNOSIS — E87.20 METABOLIC ACIDOSIS: ICD-10-CM

## 2024-01-31 DIAGNOSIS — N18.31 STAGE 3A CHRONIC KIDNEY DISEASE (HCC): ICD-10-CM

## 2024-01-31 DIAGNOSIS — I95.89 CHRONIC HYPOTENSION: ICD-10-CM

## 2024-01-31 DIAGNOSIS — E83.42 HYPOMAGNESEMIA: ICD-10-CM

## 2024-01-31 DIAGNOSIS — D64.9 ANEMIA, UNSPECIFIED TYPE: ICD-10-CM

## 2024-01-31 PROBLEM — E22.2 SYNDROME OF INAPPROPRIATE SECRETION OF ANTIDIURETIC HORMONE (HCC): Status: ACTIVE | Noted: 2024-01-31

## 2024-01-31 PROCEDURE — 99214 OFFICE O/P EST MOD 30 MIN: CPT | Performed by: INTERNAL MEDICINE

## 2024-01-31 RX ORDER — MIDODRINE HYDROCHLORIDE 5 MG/1
5 TABLET ORAL
Qty: 270 TABLET | Refills: 1 | Status: SHIPPED | OUTPATIENT
Start: 2024-01-31

## 2024-01-31 RX ORDER — SODIUM BICARBONATE 650 MG/1
650 TABLET ORAL 2 TIMES DAILY
Qty: 180 TABLET | Refills: 2 | Status: SHIPPED | OUTPATIENT
Start: 2024-01-31

## 2024-01-31 NOTE — PATIENT INSTRUCTIONS
Started sodium bicarbonate twice daily  Decrease midodrine to 5 mg 3 times a day  Blood work in 2 weeks, 4 weeks  Continue fluid restriction 40 ounces per day  Follow up: 4 months with repeat Lab work within a week of the scheduled office visit. Will discuss the results of the previsit Labs during the office visit.        Chronic hyponatremia  -Hyponatremia was suspected to be due to excessive ADH in the setting of alcoholic liver cirrhosis.  -she used to be on salt tablets in the past but was stopped during recent hospital admission due to fluid overload  -Was placed on fluid restriction 1.2 L/day- 40 oz/day  -Blood work from January 23, 2023 showed sodium level still low but stable at 130 meq/L. Started on oral sodium bicarbonate tablets.  -labs in two week and 4 weeks   -follow up in 4 months     Chronic Kidney Disease Stage 3  -Baseline Creatinine: 0.7 to 0.9  mg/dl  -Recent acute kidney injury.  Creatinine was 1.58 mg/dL  .Etiology most likely in setting of type II hepatorenal syndrome +/- hemodynamic changes in setting of ARB use   -Renal Function has been improving. 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 Check labs in two weeks   Avoid Nephrotoxins like NSAIDs and IV contrast.   CKD Education: will discuss in future   Follow up in 4 months with labs     Metabolic acidosis  -Bicarb level was low at 17 with anion gap of 11  -started on oral sodium bicarbonate tablet 650 mg bid.   -check labs in two weeks     Hypomagnesemia: Magnesium level was 1.1 mg/dL  -on mag oxide 400 mg bid . Was started after the blood work   -check mag level with repeat lab in two week    Chronic hypotension  -On oral midodrine  -Blood pressure on the higher side, decrease the dose of midodrine to 5 mg 3 times daily.  Continue to monitor blood pressure at home. She used to be on  losartan     Anemia, unspecified  -Status post PRBC during hospital admission  -Suspected hemorrhoidal bleed  -Last hemoglobin was stable at 9.2 g/dL  -on oral ferrous sulfate     Decompensated alcoholic liver cirrhosis/lower extremity edema  -Was recently started on spironolactone 25 mg daily on January 24, continue at current dose, does have trace lower extremity edema recommending elevation, continue to monitor.  Can increase the dose if renal function remains stable

## 2024-02-01 ENCOUNTER — APPOINTMENT (OUTPATIENT)
Dept: LAB | Facility: AMBULARY SURGERY CENTER | Age: 69
End: 2024-02-01
Payer: MEDICARE

## 2024-02-01 DIAGNOSIS — E83.42 HYPOMAGNESEMIA: ICD-10-CM

## 2024-02-01 DIAGNOSIS — K72.90 DECOMPENSATED HEPATIC CIRRHOSIS (HCC): ICD-10-CM

## 2024-02-01 DIAGNOSIS — K74.60 DECOMPENSATED HEPATIC CIRRHOSIS (HCC): ICD-10-CM

## 2024-02-01 DIAGNOSIS — K70.31 ASCITES DUE TO ALCOHOLIC CIRRHOSIS (HCC): ICD-10-CM

## 2024-02-01 LAB — MAGNESIUM SERPL-MCNC: 1.4 MG/DL (ref 1.9–2.7)

## 2024-02-01 PROCEDURE — 83735 ASSAY OF MAGNESIUM: CPT

## 2024-02-15 ENCOUNTER — TELEPHONE (OUTPATIENT)
Dept: OTHER | Facility: HOSPITAL | Age: 69
End: 2024-02-15

## 2024-02-15 ENCOUNTER — APPOINTMENT (OUTPATIENT)
Dept: LAB | Facility: CLINIC | Age: 69
End: 2024-02-15
Payer: MEDICARE

## 2024-02-15 DIAGNOSIS — E87.1 CHRONIC HYPONATREMIA: ICD-10-CM

## 2024-02-15 DIAGNOSIS — E87.1 CHRONIC HYPONATREMIA: Primary | ICD-10-CM

## 2024-02-15 DIAGNOSIS — E83.42 HYPOMAGNESEMIA: ICD-10-CM

## 2024-02-15 LAB
ANION GAP SERPL CALCULATED.3IONS-SCNC: 8 MMOL/L
BUN SERPL-MCNC: 11 MG/DL (ref 5–25)
CALCIUM SERPL-MCNC: 8.9 MG/DL (ref 8.4–10.2)
CHLORIDE SERPL-SCNC: 103 MMOL/L (ref 96–108)
CO2 SERPL-SCNC: 21 MMOL/L (ref 21–32)
CREAT SERPL-MCNC: 0.89 MG/DL (ref 0.6–1.3)
GFR SERPL CREATININE-BSD FRML MDRD: 66 ML/MIN/1.73SQ M
GLUCOSE P FAST SERPL-MCNC: 105 MG/DL (ref 65–99)
MAGNESIUM SERPL-MCNC: 1.2 MG/DL (ref 1.9–2.7)
POTASSIUM SERPL-SCNC: 3.5 MMOL/L (ref 3.5–5.3)
SODIUM SERPL-SCNC: 132 MMOL/L (ref 135–147)

## 2024-02-15 PROCEDURE — 36415 COLL VENOUS BLD VENIPUNCTURE: CPT

## 2024-02-15 PROCEDURE — 83735 ASSAY OF MAGNESIUM: CPT

## 2024-02-15 PROCEDURE — 80048 BASIC METABOLIC PNL TOTAL CA: CPT

## 2024-02-15 RX ORDER — CALCIUM CARBONATE/VITAMIN D3 500-10/5ML
400 LIQUID (ML) ORAL
Qty: 270 TABLET | Refills: 3 | Status: SHIPPED | OUTPATIENT
Start: 2024-02-15

## 2024-02-15 NOTE — TELEPHONE ENCOUNTER
Discussed labs with patient and her   Sodium level acceptable at 132 with use of sodium bicarbonate tablet.  Bicarb level improved to 21 continue sodium bicarbonate tablet twice daily    Mag level 1.2-> Increased mag oxide to tid .    BMP and mag level in one month     Orders Placed This Encounter   Procedures    Basic metabolic panel     This is a patient instruction: Patient fasting for 8 hours or longer recommended.     Standing Status:   Future     Standing Expiration Date:   2/15/2025    Magnesium     Standing Status:   Future     Standing Expiration Date:   2/15/2025

## 2024-02-16 DIAGNOSIS — N18.30 STAGE 3 CHRONIC KIDNEY DISEASE, UNSPECIFIED WHETHER STAGE 3A OR 3B CKD (HCC): ICD-10-CM

## 2024-02-16 DIAGNOSIS — K74.60 DECOMPENSATED HEPATIC CIRRHOSIS (HCC): ICD-10-CM

## 2024-02-16 DIAGNOSIS — K70.31 ASCITES DUE TO ALCOHOLIC CIRRHOSIS (HCC): ICD-10-CM

## 2024-02-16 DIAGNOSIS — E83.42 HYPOMAGNESEMIA: ICD-10-CM

## 2024-02-16 DIAGNOSIS — K72.90 DECOMPENSATED HEPATIC CIRRHOSIS (HCC): ICD-10-CM

## 2024-02-16 RX ORDER — SPIRONOLACTONE 25 MG/1
25 TABLET ORAL DAILY
Qty: 90 TABLET | Refills: 1 | Status: SHIPPED | OUTPATIENT
Start: 2024-02-16 | End: 2024-08-14

## 2024-03-05 ENCOUNTER — TELEPHONE (OUTPATIENT)
Dept: NEPHROLOGY | Facility: CLINIC | Age: 69
End: 2024-03-05

## 2024-03-05 DIAGNOSIS — E87.1 CHRONIC HYPONATREMIA: ICD-10-CM

## 2024-03-05 DIAGNOSIS — E87.70 FLUID OVERLOAD: Primary | ICD-10-CM

## 2024-03-05 RX ORDER — FUROSEMIDE 40 MG/1
40 TABLET ORAL DAILY
Qty: 90 TABLET | Refills: 1 | Status: SHIPPED | OUTPATIENT
Start: 2024-03-05

## 2024-03-05 NOTE — TELEPHONE ENCOUNTER
Started on lasix 40 mg daily due to complaint of lower extremity edema and abdominal distention.  Continue spironolactone.  Patient also mentions that she has follow-up with Dr. Mahajan coming Friday.  Will need to decide if she needs paracentesis.  Bmp in two week     -Discussed with patient as well as her , will inform Dr. Mahajan as well as per request of the patient's .  Also recommended patient to go to the ED if there is any worsening shortness of breath and no improvement with diuretics

## 2024-03-05 NOTE — TELEPHONE ENCOUNTER
Pt's spouse called and stated pt's stomach is very swollen also her legs, knees and ankles are swollen. They think it might be water. They also stated she feels very gassy and is not able to release the gas. They don't know if its a kidney issue or GI issue. It has been going on for two weeks but getting gradually worse. Please advise.

## 2024-03-08 ENCOUNTER — OFFICE VISIT (OUTPATIENT)
Dept: GASTROENTEROLOGY | Facility: CLINIC | Age: 69
End: 2024-03-08
Payer: MEDICARE

## 2024-03-08 VITALS
WEIGHT: 133.8 LBS | TEMPERATURE: 96.3 F | SYSTOLIC BLOOD PRESSURE: 156 MMHG | DIASTOLIC BLOOD PRESSURE: 80 MMHG | HEIGHT: 62 IN | BODY MASS INDEX: 24.62 KG/M2 | HEART RATE: 95 BPM

## 2024-03-08 DIAGNOSIS — M62.84 SARCOPENIA: ICD-10-CM

## 2024-03-08 DIAGNOSIS — K70.31 ASCITES DUE TO ALCOHOLIC CIRRHOSIS (HCC): Primary | ICD-10-CM

## 2024-03-08 DIAGNOSIS — N18.30 STAGE 3 CHRONIC KIDNEY DISEASE, UNSPECIFIED WHETHER STAGE 3A OR 3B CKD (HCC): ICD-10-CM

## 2024-03-08 DIAGNOSIS — K76.82 HEPATIC ENCEPHALOPATHY (HCC): ICD-10-CM

## 2024-03-08 PROCEDURE — G2211 COMPLEX E/M VISIT ADD ON: HCPCS | Performed by: STUDENT IN AN ORGANIZED HEALTH CARE EDUCATION/TRAINING PROGRAM

## 2024-03-08 PROCEDURE — 99214 OFFICE O/P EST MOD 30 MIN: CPT | Performed by: STUDENT IN AN ORGANIZED HEALTH CARE EDUCATION/TRAINING PROGRAM

## 2024-03-08 RX ORDER — THIAMINE MONONITRATE (VIT B1) 100 MG
100 TABLET ORAL DAILY
COMMUNITY

## 2024-03-08 RX ORDER — DOXYCYCLINE 50 MG/1
50 CAPSULE ORAL DAILY
COMMUNITY

## 2024-03-08 NOTE — PROGRESS NOTES
[facility] - Outpatient Consultation  Cassidy Zhang 68 y.o. female MRN: [id number]  Encounter: [id number]    PCP:  Jillian Calix MD, [phone]  Referring Provider:  No ref. provider found,     Patient: Cassidy Zhang, 1955  Reason for Referral: decompensated cirrhosis     ASSESSMENT/PLAN:  68 y.o. female with history of HTN and EtOH cirrhosis d/b HE and ascites who presents for follow up.      She has longstanding history of EtOH cirrhosis and recently decompensated earlier this year with HE and ascites requiring LVP. Diuretics have been limited by renal dysfunction and hyponatremia. She was also noted to have severe synthetic dysfunction with jaundice with concern for EtOH use but had a PEth that was negative (x2). She had an MRI/MRCP which showed subtle micronodularity along the L hepatic lobe as well as chronic pancreatitis with pancreatic duct dilatation and atrophy.      She recently was admitted to [facility] in December 2023 for worsening ascites and anasarca with A-o-CKD. Course was also complicated by HE and acute blood loss anemia requiring transfusion in the context of hemorrhoidal bleeding. She required LVP for comfort and midodrine for hemodynamic support and renal perfusion. Her peak MELD (3.0) was 26.     She continues to be decompensated by ascites for which I recommend standing LVPs for comfort. She was recently started on lasix after her renal function improved and discussed that she should have repeat labs next week for further titration of her diuretics. Again discussed the possibility of LT referral given her MELD but the patient and  declined.      Otherwise, she is up-to-date with her cirrhosis health maintenance. She will return to clinic in 1 month or sooner if needed. Thank you for the opportunity to consult in her care.     1. Decompensated cirrhosis: MELD (3.0) 23  - Etiology: EtOH in remission, her most recent PEth (12/2023) was negative.  -  Transplant: t/c pending discussion with her family     2. Ascites   - Continue aldactone 25 mg daily, consider increasing 50 mg daily if her renal function is stable  - Continue lasix 40 mg daily   - LVPs every 2 as needed for comfort   - Avoid salt tabs   - She will continue a low sodium diet (< 2g per day) to help prevent fluid retention     3. Hepatic encephalopathy  - Titrate lactulose for goal 3-4 soft BMs daily  - Consider rifaximin 550 mg BID if she has refractory symptoms   - She should avoid sedating medications, especially opiates, benzodiazepines.     4. Varices  - Her last EGD showed no varices 7/2023  - Repeat EGD 7/2024     5. HCC surveillance  Her last imaging with MRI/MRCP 11/2023 was negative for HCC  - Repeat in 6/2024 for surveillance      6. Nutrition and sarcopenia  - She was instructed to eat multiple small meals a day and a snack prior to bedtime to help prevent protein loss and sarcopenia     7. CKD   - Appreciate renal recommendations  - Would avoid sodium bicarb tabs if possible to avoid fluid retention  - Would wean midodrine given her hypertension today     8. Healthcare maintenance for patients with cirrhosis  -She was instructed to take no more than 2 grams of tylenol in 24 hours and no products containing NSAIDs, benzodiazapines, and narcotics.  -She was also instructed to avoid raw shellfish   -She should participate in daily exercise as to prevent loss of muscle mass  -She should abstain from all alcohol intake and was counseled on this.    -She is at risk for vitamin deficiencies and metabolic bone disease. -  -HAV and HBV immunity will be checked and she should be vaccinated through her primary care provider if she is not immune.  -Additionally, she should receive a yearly flu shot and the pneumonia vaccine through her primary care provider.    Emerald Mahajan MD  Division of Gastroenterology and Hepatology  [facility]  [facility]    ============================================================================  CC/HPI: 68 y.o. female with with history of HTN and EtOH cirrhosis d/b HE and ascites who presents for follow up. She was last seen in January 2024.    Interval events  - Pt reports worsening abdominal distension and lower extremity edema   - Seen by renal and was started on lasix 40 mg daily with ~5 lbs weight loss     Extended liver history    She recently decompensated earlier this year despite 5-6 years of abstinence with negative PEth in October. She established care with Dr. Sorensen and was started on diuretics, although this has been limited renal dysfunction and hyponatremia. She also stopped taking her lactulose due to diarrhea as well as her diuretics. Additionally, she had a dental procedure which was complicated by abscess requiring amoxicillin.      Following her procedure, she was recently admitted to [facility] for worsening ascites and anasarca with PRETTY and updated MELD (3.0) 26. Infectious work-up was negative and she did require LVP for comfort (last 12/21 with removal of 2.4L). She was continued on midodrine with stabilization of renal function and was restarted on low dose aldactone. Course was also complicated by rectal bleeding due to hemorrhoids and acute on chronic anemia requiring transfusion.      Last MRI abdomen was in November 2023 which was negative for HCC but did show subCM hemangiomas. Her last EGD was in July 2023 which was negative for esophageal varices.        ROS: Complete review of systems otherwise negative.     PAST MEDICAL/SURGICAL HISTORY:  Past Medical History:   Diagnosis Date    Diarrhea 10/26/2023    Hypertension     Hyponatremia         Past Surgical History:   Procedure Laterality Date    EYE SURGERY      IR PARACENTESIS  12/21/2023    [place] OPTX FEM SHFT FX W/INSJ IMED IMPLT W/WO SCREW Left 11/17/2018    Procedure: Intramedullary nail fixation left hip fracture;  Surgeon: Ross  MD Javier;  Location: [facility];  Service: Orthopedics       FAMILY/SOCIAL HISTORY:  Family History   Problem Relation Age of Onset    Heart disease Mother     Heart disease Father        Social History     Tobacco Use    Smoking status: Former    Smokeless tobacco: Never   Vaping Use    Vaping status: Never Used   Substance Use Topics    Alcohol use: Not Currently     Alcohol/week: 1.0 standard drink of alcohol     Types: 1 Cans of beer per week     Comment: 2-3 times per week. Drinks heavier before    Drug use: No       MEDICATIONS:  Current Outpatient Medications on File Prior to Visit   Medication Sig Dispense Refill    Cholecalciferol (VITAMIN D3) 3000 UNITS TABS Take 100 Units by mouth daily.      doxycycline monohydrate (MONODOX) 50 mg capsule Take 50 mg by mouth daily      ferrous sulfate 325 (65 Fe) mg tablet Take 325 mg by mouth daily with breakfast      furosemide (LASIX) 40 mg tablet Take 1 tablet (40 mg total) by mouth daily 90 tablet 1    hydrocortisone (ANUSOL-HC) 2.5 % rectal cream Apply topically 2 (two) times a day for 10 days 28 g 0    lactulose (CHRONULAC) 10 g/15 mL solution Take 45 mL (30 g total) by mouth 2 (two) times a day 8100 mL 3    Magnesium Oxide 400 MG CAPS Take 1 tablet (400 mg total) by mouth 3 (three) times a day with meals 270 tablet 3    midodrine (PROAMATINE) 5 mg tablet Take 1 tablet (5 mg total) by mouth 3 (three) times a day before meals 270 tablet 1    pancrelipase, Lip-Prot-Amyl, (CREON) 24,000 units Take 1 capsule (24,000 Units total) by mouth 3 (three) times a day with meals (Patient taking differently: Take 36,000 Units by mouth 3 (three) times a day with meals) 90 capsule 0    pantoprazole (PROTONIX) 40 mg tablet Take 1 tablet (40 mg total) by mouth 2 (two) times a day before meals 60 tablet 0    sodium bicarbonate 650 mg tablet Take 1 tablet (650 mg total) by mouth 2 (two) times a day 180 tablet 2    spironolactone (ALDACTONE) 25 mg tablet TAKE 1 TABLET (25 MG TOTAL)  "BY MOUTH DAILY. 90 tablet 1    thiamine (VITAMIN B1) 100 mg tablet Take 100 mg by mouth daily      Calcium Carb-Cholecalciferol (CALCIUM + D3 PO) Take 200 Units by mouth daily. (Patient not taking: Reported on 3/8/2024)      docusate sodium (COLACE) 100 mg capsule Take 1 capsule (100 mg total) by mouth 2 (two) times a day (Patient not taking: Reported on 3/8/2024)  0    Magnesium 400 MG CAPS TAKE 1 TABLET (400 MG TOTAL) BY MOUTH 2 TIMES A DAY      senna (SENOKOT) 8.6 mg Take 1 tablet (8.6 mg total) by mouth daily (Patient not taking: Reported on 3/8/2024) 120 each 0    thiamine 100 MG tablet Take 100 mg by mouth daily. (Patient not taking: Reported on 3/8/2024)      traMADol (ULTRAM) 50 mg tablet Take 1 tablet (50 mg total) by mouth every 6 (six) hours as needed for moderate pain (Patient not taking: Reported on 3/8/2024) 30 tablet 0     No current facility-administered medications on file prior to visit.       Allergies   Allergen Reactions    Oxycodone-Acetaminophen Tachycardia    Demerol [Meperidine] Other (See Comments)     hallucinations    Diphenhydramine Other (See Comments)     hallucinations    Milk (Cow) Diarrhea    Other Other (See Comments)    Prednisone Tremor    Sulfa Antibiotics Other (See Comments)     hallucinations    Sulfasalazine Hallucinations    Percocet [Oxycodone-Acetaminophen] Palpitations       PHYSICAL EXAM:  /80 (BP Location: Left arm, Patient Position: Sitting, Cuff Size: Adult)   Pulse 95   Temp (!) 96.3 °F (35.7 °C) (Tympanic)   Ht 5' 1.5\" (1.562 m)   Wt 60.7 kg (133 lb 12.8 oz)   LMP  (LMP Unknown) Comment: Postmenapausal  BMI 24.87 kg/m²   GENERAL: NAD, AAO, temporal wasting   HEENT: anicteric, OP clear, MMM  ABDOMEN: abdominal distension with fluid wave  EXTREMITIES: sarcopenia   SKIN: no rashes, no palmar erythema, no spider angiomata   NEURO: normal gait, no tremor, no asterixis     LABS/RADIOLOGY/ENDOSCOPY:  Lab Results   Component Value Date    WBC 3.70 (L) " 02/01/2024    HGB 8.7 (L) 02/01/2024    HCT 26.3 (L) 02/01/2024     (L) 02/01/2024    GLUF 105 (H) 02/15/2024    BUN 11 02/15/2024    CREATININE 0.89 02/15/2024    K 3.5 02/15/2024     02/15/2024    CO2 21 02/15/2024    BILIDIR 1.31 (H) 01/02/2024    ALKPHOS 84 02/01/2024    ALT 13 02/01/2024    AST 28 02/01/2024    CALCIUM 8.9 02/15/2024    EGFR 66 02/15/2024    TRIG 78 01/23/2024    HDL 26 (L) 01/23/2024    PT 15.1 (H) 08/15/2018    INR 1.97 (H) 02/01/2024    PTT 46 (H) 12/20/2023     MRI abdomen with MRCP (11/19/2023)  Abnormal mucosa in the esophagus. Islands of pink squamocolumnar tissue were noted at the GE junction, and cold forcep biopsied  The body of the stomach, antrum, duodenal bulb and 2nd part of the duodenum appeared normal. Performed random biopsy using biopsy forceps.     EGD (7/25/2023  Abnormal mucosa in the esophagus. Islands of pink squamocolumnar tissue were noted at the GE junction, and cold forcep biopsied  The body of the stomach, antrum, duodenal bulb and 2nd part of the duodenum appeared normal. Performed random biopsy using biopsy forceps.       MELD 3.0: 23 at 2/1/2024  1:03 PM  MELD-Na: 23 at 2/1/2024  1:03 PM  Calculated from:  Serum Creatinine: 1.24 mg/dL at 2/1/2024  1:03 PM  Serum Sodium: 134 mmol/L at 2/1/2024  1:03 PM  Total Bilirubin: 3.64 mg/dL at 2/1/2024  1:03 PM  Serum Albumin: 3.6 g/dL (Using max of 3.5 g/dL) at 2/1/2024  1:03 PM  INR(ratio): 1.97 at 2/1/2024  1:03 PM  Age at listing (hypothetical): 68 years  Sex: Female at 2/1/2024  1:03 PM

## 2024-03-08 NOTE — PATIENT INSTRUCTIONS
Take midodrine 5 mg twice a day for 3 days, then once a day for 3 days and then stop  Blood work on Tuesday  IR will call you about the paracentesis for drainage. Schedule them every 2 weeks and do not take your water pills (furosemide/lasix and aldactone/spironolactone) on the days you go   I will talk to Dr. Gao about the sodium bicarbonate

## 2024-03-12 ENCOUNTER — LAB (OUTPATIENT)
Dept: LAB | Facility: AMBULARY SURGERY CENTER | Age: 69
End: 2024-03-12
Payer: MEDICARE

## 2024-03-12 DIAGNOSIS — K70.31 ASCITES DUE TO ALCOHOLIC CIRRHOSIS (HCC): ICD-10-CM

## 2024-03-12 DIAGNOSIS — E87.1 CHRONIC HYPONATREMIA: ICD-10-CM

## 2024-03-12 DIAGNOSIS — E83.42 HYPOMAGNESEMIA: ICD-10-CM

## 2024-03-12 DIAGNOSIS — E87.70 FLUID OVERLOAD: ICD-10-CM

## 2024-03-12 LAB
ALBUMIN SERPL BCP-MCNC: 3.5 G/DL (ref 3.5–5)
ALP SERPL-CCNC: 84 U/L (ref 34–104)
ALT SERPL W P-5'-P-CCNC: 7 U/L (ref 7–52)
ANION GAP SERPL CALCULATED.3IONS-SCNC: 11 MMOL/L (ref 4–13)
AST SERPL W P-5'-P-CCNC: 25 U/L (ref 13–39)
BILIRUB SERPL-MCNC: 3.14 MG/DL (ref 0.2–1)
BUN SERPL-MCNC: 11 MG/DL (ref 5–25)
CALCIUM SERPL-MCNC: 9.3 MG/DL (ref 8.4–10.2)
CHLORIDE SERPL-SCNC: 95 MMOL/L (ref 96–108)
CO2 SERPL-SCNC: 28 MMOL/L (ref 21–32)
CREAT SERPL-MCNC: 1.07 MG/DL (ref 0.6–1.3)
ERYTHROCYTE [DISTWIDTH] IN BLOOD BY AUTOMATED COUNT: 14.4 % (ref 11.6–15.1)
GFR SERPL CREATININE-BSD FRML MDRD: 53 ML/MIN/1.73SQ M
GLUCOSE P FAST SERPL-MCNC: 114 MG/DL (ref 65–99)
HCT VFR BLD AUTO: 32.9 % (ref 34.8–46.1)
HGB BLD-MCNC: 10.9 G/DL (ref 11.5–15.4)
INR PPP: 1.67 (ref 0.84–1.19)
MAGNESIUM SERPL-MCNC: 1.3 MG/DL (ref 1.9–2.7)
MCH RBC QN AUTO: 33 PG (ref 26.8–34.3)
MCHC RBC AUTO-ENTMCNC: 33.1 G/DL (ref 31.4–37.4)
MCV RBC AUTO: 100 FL (ref 82–98)
PLATELET # BLD AUTO: 87 THOUSANDS/UL (ref 149–390)
PMV BLD AUTO: 11.3 FL (ref 8.9–12.7)
POTASSIUM SERPL-SCNC: 3.5 MMOL/L (ref 3.5–5.3)
PROT SERPL-MCNC: 6.8 G/DL (ref 6.4–8.4)
PROTHROMBIN TIME: 19.4 SECONDS (ref 11.6–14.5)
RBC # BLD AUTO: 3.3 MILLION/UL (ref 3.81–5.12)
SODIUM SERPL-SCNC: 134 MMOL/L (ref 135–147)
WBC # BLD AUTO: 3.49 THOUSAND/UL (ref 4.31–10.16)

## 2024-03-12 PROCEDURE — 80053 COMPREHEN METABOLIC PANEL: CPT

## 2024-03-12 PROCEDURE — 85027 COMPLETE CBC AUTOMATED: CPT

## 2024-03-12 PROCEDURE — 85610 PROTHROMBIN TIME: CPT

## 2024-03-12 PROCEDURE — 83735 ASSAY OF MAGNESIUM: CPT

## 2024-03-12 PROCEDURE — 36415 COLL VENOUS BLD VENIPUNCTURE: CPT

## 2024-03-13 ENCOUNTER — HOSPITAL ENCOUNTER (OUTPATIENT)
Dept: INTERVENTIONAL RADIOLOGY/VASCULAR | Facility: HOSPITAL | Age: 69
Discharge: HOME | End: 2024-03-13
Attending: STUDENT IN AN ORGANIZED HEALTH CARE EDUCATION/TRAINING PROGRAM
Payer: MEDICARE

## 2024-03-13 VITALS
SYSTOLIC BLOOD PRESSURE: 156 MMHG | OXYGEN SATURATION: 99 % | BODY MASS INDEX: 24.35 KG/M2 | TEMPERATURE: 97.2 F | WEIGHT: 129 LBS | RESPIRATION RATE: 18 BRPM | DIASTOLIC BLOOD PRESSURE: 81 MMHG | HEART RATE: 92 BPM | HEIGHT: 61 IN

## 2024-03-13 DIAGNOSIS — E83.42 HYPOMAGNESEMIA: ICD-10-CM

## 2024-03-13 DIAGNOSIS — K74.69 DECOMPENSATED LIVER DISEASE (HCC): Primary | ICD-10-CM

## 2024-03-13 DIAGNOSIS — K72.90 DECOMPENSATED HEPATIC CIRRHOSIS (HCC): ICD-10-CM

## 2024-03-13 DIAGNOSIS — K70.31 ASCITES DUE TO ALCOHOLIC CIRRHOSIS (HCC): ICD-10-CM

## 2024-03-13 DIAGNOSIS — K70.31 ALCOHOLIC CIRRHOSIS OF LIVER WITH ASCITES (HCC): ICD-10-CM

## 2024-03-13 DIAGNOSIS — K74.60 DECOMPENSATED HEPATIC CIRRHOSIS (HCC): ICD-10-CM

## 2024-03-13 DIAGNOSIS — N18.30 STAGE 3 CHRONIC KIDNEY DISEASE, UNSPECIFIED WHETHER STAGE 3A OR 3B CKD (HCC): ICD-10-CM

## 2024-03-13 PROCEDURE — 49083 ABD PARACENTESIS W/IMAGING: CPT

## 2024-03-13 RX ORDER — LIDOCAINE HYDROCHLORIDE 10 MG/ML
INJECTION, SOLUTION EPIDURAL; INFILTRATION; INTRACAUDAL; PERINEURAL AS NEEDED
Status: COMPLETED | OUTPATIENT
Start: 2024-03-13 | End: 2024-03-13

## 2024-03-13 RX ORDER — SPIRONOLACTONE 25 MG/1
50 TABLET ORAL DAILY
Qty: 90 TABLET | Refills: 1 | Status: SHIPPED | OUTPATIENT
Start: 2024-03-13 | End: 2024-03-21 | Stop reason: SDUPTHER

## 2024-03-13 RX ADMIN — LIDOCAINE HYDROCHLORIDE 10 ML: 10 INJECTION, SOLUTION EPIDURAL; INFILTRATION; INTRACAUDAL at 08:35

## 2024-03-13 NOTE — BRIEF OP NOTE (RAD/CATH)
IR PARACENTESIS Procedure Note    PATIENT NAME: Cassidy Zhang  : 1955  MRN: [id number]    Pre-op Diagnosis:   1. Alcoholic cirrhosis of liver with ascites (HCC)      Post-op Diagnosis:   1. Alcoholic cirrhosis of liver with ascites (HCC)        Surgeon:   AQUILES Silva  Assistants:     No qualified resident was available, Resident is only observing    Estimated Blood Loss: minimal  Findings: 4950 ml clear yellow ascites fluid, LLQ.    Specimens: none    Complications:  none immediate    Anesthesia: local    AQUILES Silva     Date: 3/13/2024  Time: 9:05 AM

## 2024-03-13 NOTE — DISCHARGE INSTRUCTIONS
Abdominal Paracentesis     WHAT YOU NEED TO KNOW:   Abdominal paracentesis is a procedure to remove abnormal fluid buildup in your abdomen. Fluid builds up because of liver problems, such as swelling and scarring. Heart failure, kidney disease, a mass, or problems with your pancreas may also cause fluid buildup.     DISCHARGE INSTRUCTIONS:     Follow up with your healthcare provider as directed: Write down your questions so you remember to ask them during your visits.     Wound care: Remove dressing after 24 hours. Leave glue in place.    Return to your normal activities    Contact Interventional Radiology at [phone] ([facility] PATIENTS: Contact Interventional Radiology at [phone]) ([facility] PATIENTS: Contact Interventional Radiology at [phone]) if:  You have a fever and your wound is red and swollen.   You have yellow, green, or bad-smelling discharge coming from your wound.   You have pain or swelling in your abdomen.   You have an upset stomach or you vomit.   You have sudden, sharp pain in your abdomen.   You urinate very little or not at all.   You feel confused and more tired than usual.   Your arm or leg feels warm, tender, and painful. It may look swollen and red.   You suddenly feel lightheaded and have trouble breathing.

## 2024-03-15 ENCOUNTER — TELEPHONE (OUTPATIENT)
Dept: NEPHROLOGY | Facility: CLINIC | Age: 69
End: 2024-03-15

## 2024-03-15 DIAGNOSIS — E83.42 HYPOMAGNESEMIA: Primary | ICD-10-CM

## 2024-03-15 NOTE — RESULT ENCOUNTER NOTE
Serum sodium and potassium stable.  Renal function stable.  Labs obtained prior to large-volume paracentesis.  Repeat CMP ordered for next week by hepatology.  Magnesium remains low.  See prior note

## 2024-03-15 NOTE — TELEPHONE ENCOUNTER
Called and spoke to the patient to relay the message above. Patient expressed understanding and had no further questions or concerns at this time. Mentions that she has been staying under 40oz fluid per day to manage her sodium levels.

## 2024-03-15 NOTE — RESULT ENCOUNTER NOTE
Magnesium level reviewed and remains low at 1.3.  Please call patient let her know her magnesium remains low.  Please reinforce continue magnesium oxide 400 mg 3 times daily.  Repeat CMP on 3/21/2024 as per GI.  Please add magnesium to schedule labs. Order in EPIC

## 2024-03-15 NOTE — TELEPHONE ENCOUNTER
----- Message from Cinda Fine PA-C sent at 3/15/2024 11:03 AM EDT -----  Magnesium level reviewed and remains low at 1.3.  Please call patient let her know her magnesium remains low.  Please reinforce continue magnesium oxide 400 mg 3 times daily.  Repeat CMP on 3/21/2024 as per GI.  Please add magnesium to schedule labs. Order in EPIC

## 2024-03-21 ENCOUNTER — APPOINTMENT (OUTPATIENT)
Dept: LAB | Facility: AMBULARY SURGERY CENTER | Age: 69
End: 2024-03-21
Payer: MEDICARE

## 2024-03-21 DIAGNOSIS — E83.42 HYPOMAGNESEMIA: ICD-10-CM

## 2024-03-21 DIAGNOSIS — K72.90 DECOMPENSATED HEPATIC CIRRHOSIS (HCC): ICD-10-CM

## 2024-03-21 DIAGNOSIS — K70.31 ASCITES DUE TO ALCOHOLIC CIRRHOSIS (HCC): ICD-10-CM

## 2024-03-21 DIAGNOSIS — K74.60 DECOMPENSATED HEPATIC CIRRHOSIS (HCC): ICD-10-CM

## 2024-03-21 DIAGNOSIS — N18.30 STAGE 3 CHRONIC KIDNEY DISEASE, UNSPECIFIED WHETHER STAGE 3A OR 3B CKD (HCC): ICD-10-CM

## 2024-03-21 DIAGNOSIS — K74.69 DECOMPENSATED LIVER DISEASE (HCC): ICD-10-CM

## 2024-03-21 LAB
ALBUMIN SERPL BCP-MCNC: 3.2 G/DL (ref 3.5–5)
ALP SERPL-CCNC: 98 U/L (ref 34–104)
ALT SERPL W P-5'-P-CCNC: 11 U/L (ref 7–52)
ANION GAP SERPL CALCULATED.3IONS-SCNC: 8 MMOL/L (ref 4–13)
AST SERPL W P-5'-P-CCNC: 31 U/L (ref 13–39)
BILIRUB SERPL-MCNC: 2.26 MG/DL (ref 0.2–1)
BUN SERPL-MCNC: 16 MG/DL (ref 5–25)
CALCIUM ALBUM COR SERPL-MCNC: 9.7 MG/DL (ref 8.3–10.1)
CALCIUM SERPL-MCNC: 9.1 MG/DL (ref 8.4–10.2)
CHLORIDE SERPL-SCNC: 93 MMOL/L (ref 96–108)
CO2 SERPL-SCNC: 31 MMOL/L (ref 21–32)
CREAT SERPL-MCNC: 0.93 MG/DL (ref 0.6–1.3)
GFR SERPL CREATININE-BSD FRML MDRD: 63 ML/MIN/1.73SQ M
GLUCOSE P FAST SERPL-MCNC: 105 MG/DL (ref 65–99)
MAGNESIUM SERPL-MCNC: 1.5 MG/DL (ref 1.9–2.7)
POTASSIUM SERPL-SCNC: 3.6 MMOL/L (ref 3.5–5.3)
PROT SERPL-MCNC: 6.3 G/DL (ref 6.4–8.4)
SODIUM SERPL-SCNC: 132 MMOL/L (ref 135–147)

## 2024-03-21 PROCEDURE — 80053 COMPREHEN METABOLIC PANEL: CPT

## 2024-03-21 PROCEDURE — 83735 ASSAY OF MAGNESIUM: CPT

## 2024-03-21 PROCEDURE — 36415 COLL VENOUS BLD VENIPUNCTURE: CPT

## 2024-03-21 RX ORDER — SPIRONOLACTONE 50 MG/1
100 TABLET, FILM COATED ORAL DAILY
Qty: 180 TABLET | Refills: 1 | Status: SHIPPED | OUTPATIENT
Start: 2024-03-21 | End: 2024-09-17

## 2024-03-22 DIAGNOSIS — K74.69 DECOMPENSATED LIVER DISEASE (HCC): Primary | ICD-10-CM

## 2024-03-22 DIAGNOSIS — E83.42 HYPOMAGNESEMIA: Primary | ICD-10-CM

## 2024-03-22 DIAGNOSIS — E87.1 CHRONIC HYPONATREMIA: ICD-10-CM

## 2024-03-22 NOTE — PROGRESS NOTES
Discussed labs with patient- told to increased mag oxide to 4 times daily for one week and then to go back to 3 times a day, will check magnesium level and check BMP for hyponatremia in 1 month.  Sodium level is acceptable at 132.  Patient denies any other symptoms and understood the plan

## 2024-03-25 DIAGNOSIS — E83.42 HYPOMAGNESEMIA: ICD-10-CM

## 2024-03-25 RX ORDER — CALCIUM CARBONATE/VITAMIN D3 500-10/5ML
400 LIQUID (ML) ORAL
Qty: 270 TABLET | Refills: 3 | Status: SHIPPED | OUTPATIENT
Start: 2024-03-25

## 2024-03-27 ENCOUNTER — HOSPITAL ENCOUNTER (OUTPATIENT)
Dept: INTERVENTIONAL RADIOLOGY/VASCULAR | Facility: HOSPITAL | Age: 69
Discharge: HOME | End: 2024-03-27
Attending: STUDENT IN AN ORGANIZED HEALTH CARE EDUCATION/TRAINING PROGRAM | Admitting: RADIOLOGY
Payer: MEDICARE

## 2024-03-27 ENCOUNTER — TELEPHONE (OUTPATIENT)
Age: 69
End: 2024-03-27

## 2024-03-27 VITALS
BODY MASS INDEX: 23.35 KG/M2 | SYSTOLIC BLOOD PRESSURE: 134 MMHG | HEIGHT: 61 IN | OXYGEN SATURATION: 100 % | HEART RATE: 94 BPM | DIASTOLIC BLOOD PRESSURE: 66 MMHG | WEIGHT: 123.7 LBS | TEMPERATURE: 97.8 F | RESPIRATION RATE: 16 BRPM

## 2024-03-27 DIAGNOSIS — K70.31 ALCOHOLIC CIRRHOSIS OF LIVER WITH ASCITES (HCC): ICD-10-CM

## 2024-03-27 PROCEDURE — 49083 ABD PARACENTESIS W/IMAGING: CPT

## 2024-03-27 RX ORDER — LIDOCAINE HYDROCHLORIDE 10 MG/ML
INJECTION, SOLUTION EPIDURAL; INFILTRATION; INTRACAUDAL; PERINEURAL AS NEEDED
Status: COMPLETED | OUTPATIENT
Start: 2024-03-27 | End: 2024-03-27

## 2024-03-27 RX ADMIN — LIDOCAINE HYDROCHLORIDE 10 ML: 10 INJECTION, SOLUTION EPIDURAL; INFILTRATION; INTRACAUDAL at 08:56

## 2024-03-27 NOTE — TELEPHONE ENCOUNTER
"Spoke with pt. pt completed paracentesis today where they removed 3590 ml fluid. Pt confirms she is taking lasix 40 mg daily and aldactone 100 mg daily . She takes the lactulose PRN. She feels that the increase of aldactone is helping her because she had less fluid today than \"last time\" (4950 ml). She is calling to confirm if she needs another para in 2 wks.     Reviewed last OV notes as per Dr Mahajan to have LVP every 2 weeks as needed. Pt will call back if she feels she needs paracentesis. She will also discuss with Dr Mahajan at 4/12/24 OV.   "

## 2024-03-27 NOTE — BRIEF OP NOTE (RAD/CATH)
IR PARACENTESIS Procedure Note    PATIENT NAME: Cassidy Zhang  : 1955  MRN: [id number]    Pre-op Diagnosis:   1. Alcoholic cirrhosis of liver with ascites (HCC)      Post-op Diagnosis:   1. Alcoholic cirrhosis of liver with ascites (HCC)        Surgeon:   AQUILES Silva  Assistants:     No qualified resident was available, Resident is only observing    Estimated Blood Loss: minimal  Findings: 3590 ml cloudy yellow ascites fluid, LLQ.    Specimens: none    Complications:  none immediate    Anesthesia: local    AQUILES Silva     Date: 3/27/2024  Time: 9:48 AM

## 2024-03-27 NOTE — DISCHARGE INSTRUCTIONS
Abdominal Paracentesis     WHAT YOU NEED TO KNOW:   Abdominal paracentesis is a procedure to remove abnormal fluid buildup in your abdomen. Fluid builds up because of liver problems, such as swelling and scarring. Heart failure, kidney disease, a mass, or problems with your pancreas may also cause fluid buildup.     DISCHARGE INSTRUCTIONS:     Follow up with your healthcare provider as directed: Write down your questions so you remember to ask them during your visits.     Wound care: Remove dressing after 24 hours. Leave glue in place.    Return to your normal activities    Contact Interventional Radiology at [phone] ([facility] PATIENTS: Contact Interventional Radiology at [phone]) ([facility] PATIENTS: Contact Interventional Radiology at [phone]) if:  You have a fever and your wound is red and swollen.   You have yellow, green, or bad-smelling discharge coming from your wound.   You have pain or swelling in your abdomen.   You have an upset stomach or you vomit.   You have sudden, sharp pain in your abdomen.   You urinate very little or not at all.   You feel confused and more tired than usual.   Your arm or leg feels warm, tender, and painful. It may look swollen and red.   You suddenly feel lightheaded and have trouble breathing.

## 2024-03-27 NOTE — TELEPHONE ENCOUNTER
Patients GI provider:  Dr. Mahajan    Number to return call: [phone]    Reason for call: Pt called asking if she should have paracentesis done every 2 weeks prior to office visit on 4/12/2024     Scheduled procedure/appointment date if applicable: 4/12/2024

## 2024-04-01 ENCOUNTER — LAB (OUTPATIENT)
Dept: LAB | Facility: AMBULARY SURGERY CENTER | Age: 69
End: 2024-04-01
Payer: MEDICARE

## 2024-04-01 DIAGNOSIS — E83.42 HYPOMAGNESEMIA: ICD-10-CM

## 2024-04-01 DIAGNOSIS — K74.69 DECOMPENSATED LIVER DISEASE (HCC): ICD-10-CM

## 2024-04-01 DIAGNOSIS — E87.1 CHRONIC HYPONATREMIA: ICD-10-CM

## 2024-04-01 LAB
ALBUMIN SERPL BCP-MCNC: 3 G/DL (ref 3.5–5)
ALP SERPL-CCNC: 107 U/L (ref 34–104)
ALT SERPL W P-5'-P-CCNC: 12 U/L (ref 7–52)
ANION GAP SERPL CALCULATED.3IONS-SCNC: 8 MMOL/L (ref 4–13)
AST SERPL W P-5'-P-CCNC: 40 U/L (ref 13–39)
BILIRUB SERPL-MCNC: 1.9 MG/DL (ref 0.2–1)
BUN SERPL-MCNC: 20 MG/DL (ref 5–25)
CALCIUM ALBUM COR SERPL-MCNC: 9.6 MG/DL (ref 8.3–10.1)
CALCIUM SERPL-MCNC: 8.8 MG/DL (ref 8.4–10.2)
CHLORIDE SERPL-SCNC: 95 MMOL/L (ref 96–108)
CO2 SERPL-SCNC: 27 MMOL/L (ref 21–32)
CREAT SERPL-MCNC: 1.12 MG/DL (ref 0.6–1.3)
GFR SERPL CREATININE-BSD FRML MDRD: 50 ML/MIN/1.73SQ M
GLUCOSE P FAST SERPL-MCNC: 195 MG/DL (ref 65–99)
MAGNESIUM SERPL-MCNC: 1.5 MG/DL (ref 1.9–2.7)
POTASSIUM SERPL-SCNC: 4.6 MMOL/L (ref 3.5–5.3)
PROT SERPL-MCNC: 6.2 G/DL (ref 6.4–8.4)
SODIUM SERPL-SCNC: 130 MMOL/L (ref 135–147)

## 2024-04-01 PROCEDURE — 83735 ASSAY OF MAGNESIUM: CPT

## 2024-04-01 PROCEDURE — 80053 COMPREHEN METABOLIC PANEL: CPT

## 2024-04-01 PROCEDURE — 36415 COLL VENOUS BLD VENIPUNCTURE: CPT

## 2024-04-01 NOTE — RESULT ENCOUNTER NOTE
Please inform patient that sodium level dropped to 130 meq/L.  Renal function is acceptable at creatinine 1.12 and magnesium level is stable at 1.5 mg/dL.  Continue current dose of magnesium oxide.  Please ask her to follow fluid restriction 40 ounces per day.  Please order for another BMP to be done in 2 weeks for hyponatremia.

## 2024-04-02 ENCOUNTER — TELEPHONE (OUTPATIENT)
Dept: NEPHROLOGY | Facility: CLINIC | Age: 69
End: 2024-04-02

## 2024-04-02 DIAGNOSIS — N18.31 STAGE 3A CHRONIC KIDNEY DISEASE (HCC): Primary | ICD-10-CM

## 2024-04-02 NOTE — TELEPHONE ENCOUNTER
----- Message from Abdullahi Gao MD sent at 4/1/2024  3:02 PM EDT -----  Please inform patient that sodium level dropped to 130 meq/L.  Renal function is acceptable at creatinine 1.12 and magnesium level is stable at 1.5 mg/dL.  Continue current dose of magnesium oxide.  Please ask her to follow fluid restriction 40 ounces per day.  Please order for another BMP to be done in 2 weeks for hyponatremia.

## 2024-04-02 NOTE — TELEPHONE ENCOUNTER
I spoke to Cassidy she is aware of her results and understands we will repeat labs in 2 weeks to monitor sodium again due to low levels.  Order in epic and mailed out .

## 2024-04-10 ENCOUNTER — HOSPITAL ENCOUNTER (OUTPATIENT)
Dept: INTERVENTIONAL RADIOLOGY/VASCULAR | Facility: HOSPITAL | Age: 69
Discharge: HOME | End: 2024-04-10
Attending: STUDENT IN AN ORGANIZED HEALTH CARE EDUCATION/TRAINING PROGRAM | Admitting: INTERNAL MEDICINE
Payer: MEDICARE

## 2024-04-10 VITALS
SYSTOLIC BLOOD PRESSURE: 151 MMHG | HEIGHT: 61 IN | WEIGHT: 121 LBS | BODY MASS INDEX: 22.84 KG/M2 | RESPIRATION RATE: 18 BRPM | DIASTOLIC BLOOD PRESSURE: 71 MMHG | HEART RATE: 85 BPM | OXYGEN SATURATION: 100 % | TEMPERATURE: 97.6 F

## 2024-04-10 DIAGNOSIS — K70.31 ALCOHOLIC CIRRHOSIS OF LIVER WITH ASCITES (HCC): ICD-10-CM

## 2024-04-10 PROCEDURE — 49083 ABD PARACENTESIS W/IMAGING: CPT

## 2024-04-10 RX ORDER — LIDOCAINE HYDROCHLORIDE 10 MG/ML
INJECTION, SOLUTION EPIDURAL; INFILTRATION; INTRACAUDAL; PERINEURAL AS NEEDED
Status: COMPLETED | OUTPATIENT
Start: 2024-04-10 | End: 2024-04-10

## 2024-04-10 RX ADMIN — LIDOCAINE HYDROCHLORIDE 10 ML: 10 INJECTION, SOLUTION EPIDURAL; INFILTRATION; INTRACAUDAL at 14:15

## 2024-04-10 NOTE — DISCHARGE INSTRUCTIONS
Abdominal Paracentesis     WHAT YOU NEED TO KNOW:   Abdominal paracentesis is a procedure to remove abnormal fluid buildup in your abdomen. Fluid builds up because of liver problems, such as swelling and scarring. Heart failure, kidney disease, a mass, or problems with your pancreas may also cause fluid buildup.     DISCHARGE INSTRUCTIONS:     Follow up with your healthcare provider as directed: Write down your questions so you remember to ask them during your visits.     Wound care: Remove dressing after 24 hours. Leave glue in place.    Return to your normal activities    Contact Interventional Radiology at [phone] ([facility] PATIENTS: Contact Interventional Radiology at [phone]) ([facility] PATIENTS: Contact Interventional Radiology at [phone]) if:  You have a fever and your wound is red and swollen.   You have yellow, green, or bad-smelling discharge coming from your wound.   You have pain or swelling in your abdomen.   You have an upset stomach or you vomit.   You have sudden, sharp pain in your abdomen.   You urinate very little or not at all.   You feel confused and more tired than usual.   Your arm or leg feels warm, tender, and painful. It may look swollen and red.   You suddenly feel lightheaded and have trouble breathing.

## 2024-04-10 NOTE — BRIEF OP NOTE (RAD/CATH)
IR PARACENTESIS Procedure Note    PATIENT NAME: Cassidy Zhang  : 1955  MRN: [id number]    Pre-op Diagnosis:   1. Alcoholic cirrhosis of liver with ascites (HCC)      Post-op Diagnosis:   1. Alcoholic cirrhosis of liver with ascites (HCC)        Surgeon:   AQUILES Silva  Assistants:     No qualified resident was available, Resident is only observing    Estimated Blood Loss: minimal  Findings: 2730 ml cloudy, yellow ascites fluid, LLQ.    Specimens: none    Complications:  none immediate    Anesthesia: local    AQUILES Silva     Date: 4/10/2024  Time: 2:56 PM

## 2024-04-10 NOTE — SEDATION DOCUMENTATION
Procedure completed by ASIYA Horton. Pt tolerated without issues, VSS. Education provided to pt prior to and throughout procedure, questions answered as offered. 2730cc cloudy yellow fluid removed from abdominal space. Band-aid to site.

## 2024-04-16 ENCOUNTER — LAB (OUTPATIENT)
Dept: LAB | Facility: AMBULARY SURGERY CENTER | Age: 69
End: 2024-04-16
Payer: MEDICARE

## 2024-04-16 DIAGNOSIS — E87.1 CHRONIC HYPONATREMIA: ICD-10-CM

## 2024-04-16 DIAGNOSIS — E87.1 HYPOSMOLALITY SYNDROME: ICD-10-CM

## 2024-04-16 DIAGNOSIS — E78.6 FAMILIAL LIPOPROTEIN DEFICIENCY: ICD-10-CM

## 2024-04-16 DIAGNOSIS — I95.89 CHRONIC HYPOTENSION: ICD-10-CM

## 2024-04-16 DIAGNOSIS — I10 ESSENTIAL HYPERTENSION, MALIGNANT: ICD-10-CM

## 2024-04-16 DIAGNOSIS — D64.9 ANEMIA, UNSPECIFIED TYPE: ICD-10-CM

## 2024-04-16 DIAGNOSIS — N18.31 STAGE 3A CHRONIC KIDNEY DISEASE (HCC): ICD-10-CM

## 2024-04-16 DIAGNOSIS — E83.42 HYPOMAGNESEMIA: ICD-10-CM

## 2024-04-16 DIAGNOSIS — D72.829 HYPERLEUKOCYTOSIS: ICD-10-CM

## 2024-04-16 DIAGNOSIS — K70.31 ALCOHOLIC CIRRHOSIS OF LIVER WITH ASCITES (HCC): ICD-10-CM

## 2024-04-16 DIAGNOSIS — E87.20 METABOLIC ACIDOSIS: ICD-10-CM

## 2024-04-16 LAB
ALBUMIN SERPL BCP-MCNC: 3 G/DL (ref 3.5–5)
ALP SERPL-CCNC: 117 U/L (ref 34–104)
ALT SERPL W P-5'-P-CCNC: 11 U/L (ref 7–52)
ANION GAP SERPL CALCULATED.3IONS-SCNC: 6 MMOL/L (ref 4–13)
AST SERPL W P-5'-P-CCNC: 27 U/L (ref 13–39)
BACTERIA UR QL AUTO: ABNORMAL /HPF
BASOPHILS # BLD AUTO: 0.02 THOUSANDS/ÂΜL (ref 0–0.1)
BASOPHILS NFR BLD AUTO: 1 % (ref 0–1)
BILIRUB SERPL-MCNC: 1.84 MG/DL (ref 0.2–1)
BILIRUB UR QL STRIP: NEGATIVE
BUN SERPL-MCNC: 13 MG/DL (ref 5–25)
CALCIUM ALBUM COR SERPL-MCNC: 9.7 MG/DL (ref 8.3–10.1)
CALCIUM SERPL-MCNC: 8.9 MG/DL (ref 8.4–10.2)
CHLORIDE SERPL-SCNC: 101 MMOL/L (ref 96–108)
CLARITY UR: ABNORMAL
CO2 SERPL-SCNC: 23 MMOL/L (ref 21–32)
COLOR UR: ABNORMAL
CREAT SERPL-MCNC: 0.75 MG/DL (ref 0.6–1.3)
CREAT UR-MCNC: 57.8 MG/DL
EOSINOPHIL # BLD AUTO: 0.11 THOUSAND/ÂΜL (ref 0–0.61)
EOSINOPHIL NFR BLD AUTO: 3 % (ref 0–6)
ERYTHROCYTE [DISTWIDTH] IN BLOOD BY AUTOMATED COUNT: 14.6 % (ref 11.6–15.1)
GFR SERPL CREATININE-BSD FRML MDRD: 81 ML/MIN/1.73SQ M
GLUCOSE P FAST SERPL-MCNC: 100 MG/DL (ref 65–99)
GLUCOSE UR STRIP-MCNC: NEGATIVE MG/DL
HCT VFR BLD AUTO: 32.4 % (ref 34.8–46.1)
HDLC SERPL-MCNC: 39 MG/DL
HGB BLD-MCNC: 10.6 G/DL (ref 11.5–15.4)
HGB UR QL STRIP.AUTO: NEGATIVE
IMM GRANULOCYTES # BLD AUTO: 0.01 THOUSAND/UL (ref 0–0.2)
IMM GRANULOCYTES NFR BLD AUTO: 0 % (ref 0–2)
KETONES UR STRIP-MCNC: NEGATIVE MG/DL
LEUKOCYTE ESTERASE UR QL STRIP: ABNORMAL
LYMPHOCYTES # BLD AUTO: 1.58 THOUSANDS/ÂΜL (ref 0.6–4.47)
LYMPHOCYTES NFR BLD AUTO: 41 % (ref 14–44)
MAGNESIUM SERPL-MCNC: 1.4 MG/DL (ref 1.9–2.7)
MCH RBC QN AUTO: 33.7 PG (ref 26.8–34.3)
MCHC RBC AUTO-ENTMCNC: 32.7 G/DL (ref 31.4–37.4)
MCV RBC AUTO: 103 FL (ref 82–98)
MONOCYTES # BLD AUTO: 0.26 THOUSAND/ÂΜL (ref 0.17–1.22)
MONOCYTES NFR BLD AUTO: 7 % (ref 4–12)
NEUTROPHILS # BLD AUTO: 1.9 THOUSANDS/ÂΜL (ref 1.85–7.62)
NEUTS SEG NFR BLD AUTO: 48 % (ref 43–75)
NITRITE UR QL STRIP: NEGATIVE
NON-SQ EPI CELLS URNS QL MICRO: ABNORMAL /HPF
NRBC BLD AUTO-RTO: 0 /100 WBCS
PH UR STRIP.AUTO: 7.5 [PH]
PHOSPHATE SERPL-MCNC: 3.5 MG/DL (ref 2.3–4.1)
PLATELET # BLD AUTO: 102 THOUSANDS/UL (ref 149–390)
PMV BLD AUTO: 10 FL (ref 8.9–12.7)
POTASSIUM SERPL-SCNC: 4.6 MMOL/L (ref 3.5–5.3)
PROT SERPL-MCNC: 6 G/DL (ref 6.4–8.4)
PROT UR STRIP-MCNC: NEGATIVE MG/DL
PROT UR-MCNC: 8 MG/DL
PROT/CREAT UR: 0.14 MG/G{CREAT} (ref 0–0.1)
PTH-INTACT SERPL-MCNC: 25 PG/ML (ref 12–88)
RBC # BLD AUTO: 3.15 MILLION/UL (ref 3.81–5.12)
RBC #/AREA URNS AUTO: ABNORMAL /HPF
SODIUM SERPL-SCNC: 130 MMOL/L (ref 135–147)
SP GR UR STRIP.AUTO: 1.01 (ref 1–1.03)
UROBILINOGEN UR STRIP-ACNC: <2 MG/DL
WBC # BLD AUTO: 3.88 THOUSAND/UL (ref 4.31–10.16)
WBC #/AREA URNS AUTO: ABNORMAL /HPF

## 2024-04-16 PROCEDURE — 84156 ASSAY OF PROTEIN URINE: CPT

## 2024-04-16 PROCEDURE — 83735 ASSAY OF MAGNESIUM: CPT

## 2024-04-16 PROCEDURE — 83718 ASSAY OF LIPOPROTEIN: CPT

## 2024-04-16 PROCEDURE — 84100 ASSAY OF PHOSPHORUS: CPT

## 2024-04-16 PROCEDURE — 82570 ASSAY OF URINE CREATININE: CPT

## 2024-04-16 PROCEDURE — 83970 ASSAY OF PARATHORMONE: CPT

## 2024-04-16 PROCEDURE — 36415 COLL VENOUS BLD VENIPUNCTURE: CPT

## 2024-04-16 PROCEDURE — 81001 URINALYSIS AUTO W/SCOPE: CPT

## 2024-04-16 PROCEDURE — 80053 COMPREHEN METABOLIC PANEL: CPT

## 2024-04-16 PROCEDURE — 85025 COMPLETE CBC W/AUTO DIFF WBC: CPT

## 2024-04-17 ENCOUNTER — TELEPHONE (OUTPATIENT)
Dept: NEPHROLOGY | Facility: CLINIC | Age: 69
End: 2024-04-17

## 2024-04-17 DIAGNOSIS — N18.31 STAGE 3A CHRONIC KIDNEY DISEASE (HCC): Primary | ICD-10-CM

## 2024-04-17 DIAGNOSIS — E83.42 HYPOMAGNESEMIA: ICD-10-CM

## 2024-04-17 NOTE — RESULT ENCOUNTER NOTE
Please inform patient that sodium level is stable at 130 meq/L on blood work from 4/16.  Please order for repeat BMP and magnesium level for hyponatremia and hypomagnesemia respectively, to be done in about 3 weeks -before office visit in May

## 2024-04-17 NOTE — TELEPHONE ENCOUNTER
----- Message from Abdullahi Gao MD sent at 4/17/2024  3:16 PM EDT -----  Please inform patient that sodium level is stable at 130 meq/L on blood work from 4/16.  Please order for repeat BMP and magnesium level for hyponatremia and hypomagnesemia respectively, to be done in about 3 weeks -before office visit in May

## 2024-04-18 NOTE — TELEPHONE ENCOUNTER
Pt is aware that sodium is 130 and no changes to be made  at time.   Labs in epic. She will have the labs done before he visit .

## 2024-04-24 ENCOUNTER — HOSPITAL ENCOUNTER (OUTPATIENT)
Dept: INTERVENTIONAL RADIOLOGY/VASCULAR | Facility: HOSPITAL | Age: 69
Discharge: HOME | End: 2024-04-24
Attending: STUDENT IN AN ORGANIZED HEALTH CARE EDUCATION/TRAINING PROGRAM | Admitting: RADIOLOGY
Payer: MEDICARE

## 2024-04-24 VITALS
WEIGHT: 120 LBS | OXYGEN SATURATION: 99 % | SYSTOLIC BLOOD PRESSURE: 151 MMHG | DIASTOLIC BLOOD PRESSURE: 71 MMHG | TEMPERATURE: 97 F | HEIGHT: 61 IN | HEART RATE: 76 BPM | BODY MASS INDEX: 22.66 KG/M2 | RESPIRATION RATE: 16 BRPM

## 2024-04-24 DIAGNOSIS — K70.31 ALCOHOLIC CIRRHOSIS OF LIVER WITH ASCITES (HCC): ICD-10-CM

## 2024-04-24 PROCEDURE — 49083 ABD PARACENTESIS W/IMAGING: CPT

## 2024-04-24 RX ORDER — LIDOCAINE HYDROCHLORIDE 10 MG/ML
INJECTION, SOLUTION EPIDURAL; INFILTRATION; INTRACAUDAL; PERINEURAL AS NEEDED
Status: COMPLETED | OUTPATIENT
Start: 2024-04-24 | End: 2024-04-24

## 2024-04-24 RX ADMIN — LIDOCAINE HYDROCHLORIDE 10 ML: 10 INJECTION, SOLUTION EPIDURAL; INFILTRATION; INTRACAUDAL at 09:45

## 2024-04-24 NOTE — DISCHARGE INSTRUCTIONS
Abdominal Paracentesis     WHAT YOU NEED TO KNOW:   Abdominal paracentesis is a procedure to remove abnormal fluid buildup in your abdomen. Fluid builds up because of liver problems, such as swelling and scarring. Heart failure, kidney disease, a mass, or problems with your pancreas may also cause fluid buildup.     DISCHARGE INSTRUCTIONS:     Follow up with your healthcare provider as directed: Write down your questions so you remember to ask them during your visits.     Wound care: Remove dressing after 24 hours. Leave glue in place.    Return to your normal activities    Contact Interventional Radiology at [phone] ([facility] PATIENTS: Contact Interventional Radiology at [phone]) ([facility] PATIENTS: Contact Interventional Radiology at [phone]) if:  You have a fever and your wound is red and swollen.   You have yellow, green, or bad-smelling discharge coming from your wound.   You have pain or swelling in your abdomen.   You have an upset stomach or you vomit.   You have sudden, sharp pain in your abdomen.   You urinate very little or not at all.   You feel confused and more tired than usual.   Your arm or leg feels warm, tender, and painful. It may look swollen and red.   You suddenly feel lightheaded and have trouble breathing.

## 2024-04-24 NOTE — BRIEF OP NOTE (RAD/CATH)
IR PARACENTESIS Procedure Note    PATIENT NAME: Cassidy Zhang  : 1955  MRN: [id number]    Pre-op Diagnosis:   1. Alcoholic cirrhosis of liver with ascites (HCC)      Post-op Diagnosis:   1. Alcoholic cirrhosis of liver with ascites (HCC)        Surgeon:   AQUILES Silva  Assistants:     No qualified resident was available, Resident is only observing    Estimated Blood Loss: minimal  Findings: 2350 ml clear yellow ascites fluid, RLQ.    Specimens: none    Complications:  none immediate    Anesthesia: local    AQUILES Silva     Date: 2024  Time: 10:25 AM

## 2024-05-08 ENCOUNTER — HOSPITAL ENCOUNTER (OUTPATIENT)
Dept: INTERVENTIONAL RADIOLOGY/VASCULAR | Facility: HOSPITAL | Age: 69
Discharge: HOME | End: 2024-05-08
Attending: STUDENT IN AN ORGANIZED HEALTH CARE EDUCATION/TRAINING PROGRAM
Payer: MEDICARE

## 2024-05-08 VITALS
TEMPERATURE: 97.4 F | HEIGHT: 61 IN | DIASTOLIC BLOOD PRESSURE: 79 MMHG | HEART RATE: 82 BPM | OXYGEN SATURATION: 99 % | RESPIRATION RATE: 16 BRPM | BODY MASS INDEX: 21.67 KG/M2 | SYSTOLIC BLOOD PRESSURE: 189 MMHG | WEIGHT: 114.8 LBS

## 2024-05-08 DIAGNOSIS — K70.31 ALCOHOLIC CIRRHOSIS OF LIVER WITH ASCITES (HCC): ICD-10-CM

## 2024-05-08 PROCEDURE — 76705 ECHO EXAM OF ABDOMEN: CPT | Performed by: PHYSICIAN ASSISTANT

## 2024-05-08 NOTE — BRIEF OP NOTE (RAD/CATH)
IR Limited Abdominal Ultrasound Procedure Note    PATIENT NAME: Cassidy Zhang  : 1955  MRN: [id number]    Pre-op Diagnosis:   1. Alcoholic cirrhosis of liver with ascites (HCC)      Post-op Diagnosis:   1. Alcoholic cirrhosis of liver with ascites (HCC)        Provider:   Danelle Marin PA-C  Assistants:     No qualified resident was available, Resident is only observing    Estimated Blood Loss: None    Findings: Limited ultrasound of the abdomen to assess ascites. All 4 quadrants assessed with insufficient fluid and no safe window for paracentesis. No paracentesis performed.    Specimens: None    Complications:  None immediately    Anesthesia: none    Danelle Marin PA-C     Date: 2024  Time: 9:31 AM   Bexarotene Counseling:  I discussed with the patient the risks of bexarotene including but not limited to hair loss, dry lips/skin/eyes, liver abnormalities, hyperlipidemia, pancreatitis, depression/suicidal ideation, photosensitivity, drug rash/allergic reactions, hypothyroidism, anemia, leukopenia, infection, cataracts, and teratogenicity.  Patient understands that they will need regular blood tests to check lipid profile, liver function tests, white blood cell count, thyroid function tests and pregnancy test if applicable.

## 2024-05-08 NOTE — DISCHARGE INSTRUCTIONS
Abdominal Paracentesis     WHAT YOU NEED TO KNOW:   Abdominal paracentesis is a procedure to remove abnormal fluid buildup in your abdomen. Fluid builds up because of liver problems, such as swelling and scarring. Heart failure, kidney disease, a mass, or problems with your pancreas may also cause fluid buildup.     DISCHARGE INSTRUCTIONS:     Follow up with your healthcare provider as directed: Write down your questions so you remember to ask them during your visits.     Wound care: Remove dressing after 24 hours. Leave glue in place.    Return to your normal activities    Contact Interventional Radiology at [phone] ([facility] PATIENTS: Contact Interventional Radiology at [phone]) ([facility] PATIENTS: Contact Interventional Radiology at [phone]) if:  You have a fever and your wound is red and swollen.   You have yellow, green, or bad-smelling discharge coming from your wound.   You have pain or swelling in your abdomen.   You have an upset stomach or you vomit.   You have sudden, sharp pain in your abdomen.   You urinate very little or not at all.   You feel confused and more tired than usual.   Your arm or leg feels warm, tender, and painful. It may look swollen and red.   You suddenly feel lightheaded and have trouble breathing.

## 2024-05-10 ENCOUNTER — OFFICE VISIT (OUTPATIENT)
Dept: GASTROENTEROLOGY | Facility: CLINIC | Age: 69
End: 2024-05-10
Payer: MEDICARE

## 2024-05-10 VITALS
WEIGHT: 115.8 LBS | TEMPERATURE: 96.4 F | SYSTOLIC BLOOD PRESSURE: 168 MMHG | DIASTOLIC BLOOD PRESSURE: 90 MMHG | HEIGHT: 62 IN | BODY MASS INDEX: 21.31 KG/M2

## 2024-05-10 DIAGNOSIS — K70.31 ALCOHOLIC CIRRHOSIS OF LIVER WITH ASCITES (HCC): Primary | ICD-10-CM

## 2024-05-10 DIAGNOSIS — N18.30 STAGE 3 CHRONIC KIDNEY DISEASE, UNSPECIFIED WHETHER STAGE 3A OR 3B CKD (HCC): ICD-10-CM

## 2024-05-10 DIAGNOSIS — E22.2 SYNDROME OF INAPPROPRIATE SECRETION OF ANTIDIURETIC HORMONE (HCC): ICD-10-CM

## 2024-05-10 DIAGNOSIS — K70.31 ASCITES DUE TO ALCOHOLIC CIRRHOSIS (HCC): ICD-10-CM

## 2024-05-10 PROCEDURE — G2211 COMPLEX E/M VISIT ADD ON: HCPCS | Performed by: INTERNAL MEDICINE

## 2024-05-10 PROCEDURE — 99215 OFFICE O/P EST HI 40 MIN: CPT | Performed by: INTERNAL MEDICINE

## 2024-05-10 NOTE — PROGRESS NOTES
2  [facility]  Outpatient Hepatology Follow Up  Encounter: [id number]    PATIENT INFO     Name: Cassidy Zhang  YOB: 1955   Age: 69 y.o.   Sex: female   MRN: [id number]    ASSESSMENT & PLAN     Problems Addressed this Visit:  1. Alcoholic cirrhosis of liver with ascites (HCC)    2. Ascites due to alcoholic cirrhosis (HCC)    3. Stage 3 chronic kidney disease, unspecified whether stage 3a or 3b CKD (HCC)    4. Syndrome of inappropriate secretion of antidiuretic hormone (HCC)      Orders Placed This Encounter   Procedures    US abdomen complete with doppler    Comprehensive metabolic panel    Protime-INR    Comprehensive metabolic panel    EGD     1. Decompensated Alcohol Cirrhosis: (MELD-Na 20 ): Patient with a long standing history of cirrhosis 2/2 alcohol that recently decompensated earlier this year with ascites. Diuretic treatment limited due to hyponatremia and CKD and therefore patient had been requiring LVP. However since last visit, patient's diuretics have been able to be up titrated while her labs have remained stable and ascites with frequency of paracentesis has improved. At this time, will require close hepatology along with nephrology follow up and continued lab monitoring. Additionally due to new decompensation earlier this year, patient will be due to repeat screening EGD for evaluate for EV.    MELD 3.0: 20 at 3/12/2024  9:39 AM  MELD-Na: 20 at 3/12/2024  9:39 AM  Calculated from:  Serum Creatinine: 1.07 mg/dL at 3/12/2024  9:39 AM  Serum Sodium: 134 mmol/L at 3/12/2024  9:39 AM  Total Bilirubin: 3.14 mg/dL at 3/12/2024  9:39 AM  Serum Albumin: 3.5 g/dL at 3/12/2024  9:39 AM  INR(ratio): 1.67 at 3/12/2024  9:39 AM  Age at listing (hypothetical): 68 years  Sex: Female at 3/12/2024  9:39 AM    # Varices:   Last EGD: 7/2023 - No esophageal or gastric varices  Current Regimen: No indication for NSBB  Next EGD: Due 7/2024 given new decompensation earlier this  year    # Ascites with CKD and Hyponatremia:  Decompensated with ascites requiring LVP earlier this year (ascites study consistent with pHTN)  Last IR paracentesis performed 2024 with removal of 2350 cc  IR paracentesis attempted on 2024 but patient with insufficient fluid  On examination today: Euvolemic, benign abdominal examination  Current Regimen: Aldactone 100 mg and Lasix 40 mg daily  Repeat blood work to monitor serum creatinine and electrolytes  Will continue on current regimen, recommend nephrology follow-up    # Hepatic Encephalopathy:   Prior history, previously on lactulose/rifaximin  Patient states she self continued lactulose due to excessive BM and is not on Rifaximin   On examination today: AAOx3; no asterixis  Current Regimen: None - Okay to monitor off  Educated patient and family on signs of HE to monitor     # HCC Surveillance and Transplant Evaluation:  Most recent HCC Screenin2023 MRI with no lesions  Repeat screening due now, US ordered today  Previously Referred: No prior referral  Barriers to Transplant: Comorbid Conditions    # Screening and Health Maintenance:   Colon cancer screening: Last 2023; Due 2026   Encouraged to continue high protein and 2g Na restricted diet; avoid eating raw shellfish  Limit use of Tylenol to no more than 2 grams in 24 hour period and avoid use of all NSAIDs and narcotics  Encouraged to participate in daily exercise to avoid muscle wasting  Avoid use of alcohol and strongly encourage tobacco cessation    I have spent a total time of 40 minutes on 24 in caring for this patient including Diagnostic results, Risks and benefits of tx options, Instructions for management, Patient and family education, Importance of tx compliance, Risk factor reductions, Impressions, Documenting in the medical record, Reviewing / ordering tests, medicine, procedures  , and Obtaining or reviewing history  .    FOLLOW-UP: Schedule follow-up visit in 3  months    HISTORY OF PRESENT ILLNESS       Cassidy Zhang is a 69 y.o. female who presents to GI office for restless follow-up.  Patient with a past medical history of alcohol cirrhosis decompensated by ascites requiring LVP and EGD.  Additional past medical history includes that of hypertension.    Patient last seen in office 3/8/2024.  At that time, patient's cirrhosis had recently decompensated with EGD and ascites requiring LVP as diuretic usage has been limited due to renal dysfunction and hyponatremia.  However, slowly working on increasing diuretic regimen.  Patient has remained abstinent from alcohol most recent Peth completed 12/2023 which was negative.    Since the last visit, patient had been following up with IR for paracentesis every 2 weeks.  Last paracentesis performed on 4/24/2024 with removal of 2350 cc of fluid.  She did undergo attempted IR paracentesis on 5/8/2024 but fortunately had no fluid amenable to drainage.  She admits to compliance with current medication regimen including her diuretics.  However, does state that she discontinued lactulose as she was having multiple bowel movements.  Currently having 3 bowel movements daily     ENDOSCOPIC HISTORY     UPPER ENDOSCOPY: 7/25/2023 - No evidence of esophageal varices.  Abnormal mucosa in the esophagus.  Biopsies unremarkable.  Normal-appearing stomach and duodenum.    COLONOSCOPY: 7/25/2023 - Multiple extensive diverticula of moderate severity.  Internal medium, protruding hemorrhoids.  Repeat colonoscopy in 3 years.    REVIEW OF SYSTEMS     CONSTITUTIONAL: Denies any fever, chills, rigors, and weight loss  HEENT: No earache or tinnitus, denies hearing loss or visual disturbances  CARDIOVASCULAR: No chest pain or palpitations  RESPIRATORY: Denies any cough, hemoptysis, shortness of breath or dyspnea on exertion  GASTROINTESTINAL: As noted in the History of Present Illness  GENITOURINARY: No problems with urination, denies any hematuria or  dysuria  NEUROLOGIC: No dizziness or vertigo, denies headaches   MUSCULOSKELETAL: Denies any muscle or joint pain   SKIN: Denies skin rashes or itching  ENDOCRINE: Denies excessive thirst, denies intolerance to heat or cold  PSYCHOSOCIAL: Denies depression or anxiety, denies any recent memory loss     Historical Information   Past Medical History:   Diagnosis Date    Diarrhea 10/26/2023    Hypertension     Hyponatremia      Past Surgical History:   Procedure Laterality Date    EYE SURGERY      IR PARACENTESIS  12/21/2023    IR PARACENTESIS  3/13/2024    IR PARACENTESIS  3/27/2024    IR PARACENTESIS  4/10/2024    IR PARACENTESIS  4/24/2024    [place] OPTX FEM SHFT FX W/INSJ IMED IMPLT W/WO SCREW Left 11/17/2018    Procedure: Intramedullary nail fixation left hip fracture;  Surgeon: Ross Herrera MD;  Location: [facility];  Service: Orthopedics     Social History   Social History     Substance and Sexual Activity   Alcohol Use Not Currently    Alcohol/week: 1.0 standard drink of alcohol    Types: 1 Cans of beer per week    Comment: 2-3 times per week. Drinks heavier before     Social History     Substance and Sexual Activity   Drug Use No     Social History     Tobacco Use   Smoking Status Former   Smokeless Tobacco Never     Family History   Problem Relation Age of Onset    Heart disease Mother     Heart disease Father          MEDICATIONS AND ALLERGIES     Current Outpatient Medications   Medication Instructions    Calcium Carb-Cholecalciferol (CALCIUM + D3 PO) 200 Units, Daily    docusate sodium (COLACE) 100 mg, Oral, 2 times daily    doxycycline monohydrate (MONODOX) 50 mg, Oral, Daily    ferrous sulfate 325 mg, Oral, Daily with breakfast    furosemide (LASIX) 40 mg, Oral, Daily    hydrocortisone (ANUSOL-HC) 2.5 % rectal cream Topical, 2 times daily    lactulose (CHRONULAC) 30 g, Oral, 2 times daily    Magnesium 400 MG CAPS TAKE 1 TABLET (400 MG TOTAL) BY MOUTH 2 TIMES A DAY    Magnesium Oxide 400 mg, Oral, 3  "times daily with meals    midodrine (PROAMATINE) 5 mg, Oral, 3 times daily before meals    pancrelipase (Lip-Prot-Amyl) (CREON) 24,000 Units, Oral, 3 times daily with meals    pantoprazole (PROTONIX) 40 mg, Oral, 2 times daily before meals    senna (SENOKOT) 8.6 mg, Oral, Daily    sodium bicarbonate 650 mg, Oral, 2 times daily    spironolactone (ALDACTONE) 100 mg, Oral, Daily    thiamine (VITAMIN B1) 100 mg, Oral, Daily    thiamine 100 mg, Daily    traMADol (ULTRAM) 50 mg, Oral, Every 6 hours PRN    Vitamin D3 100 Units, Oral, Daily     Allergies   Allergen Reactions    Oxycodone-Acetaminophen Tachycardia    Demerol [Meperidine] Other (See Comments)     hallucinations    Diphenhydramine Other (See Comments)     hallucinations    Milk (Cow) Diarrhea    Other Other (See Comments)    Prednisone Tremor    Sulfa Antibiotics Other (See Comments)     hallucinations    Sulfasalazine Hallucinations    Percocet [Oxycodone-Acetaminophen] Palpitations       PHYSICAL EXAM      Objective   Blood pressure 168/90, temperature (!) 96.4 °F (35.8 °C), temperature source Tympanic, height 5' 1.5\" (1.562 m), weight 52.5 kg (115 lb 12.8 oz). Body mass index is 21.53 kg/m².    General Appearance:   Alert, cooperative, no distress   HEENT:   Normocephalic, atraumatic, anicteric     Neck:   Supple, symmetrical, trachea midline   Lungs:   Equal chest rise, respirations unlabored    Heart:   Regular rate and rhythm   Abdomen:   Soft, non-tender, non-distended; normal bowel sounds; no masses, no organomegaly    Rectal:   Deferred    Extremities:   No cyanosis, clubbing or edema    Neuro:   Moves all 4 extremities    Skin:   No jaundice, rashes, or lesions      LABORATORY RESULTS     No visits with results within 1 Day(s) from this visit.   Latest known visit with results is:   Lab on 04/16/2024   Component Date Value    Magnesium 04/16/2024 1.4 (L)     Phosphorus 04/16/2024 3.5     Creatinine, Ur 04/16/2024 57.8     Protein Urine Random " 04/16/2024 8     Prot/Creat Ratio, Ur 04/16/2024 0.14 (H)     PTH 04/16/2024 25.0     Color, UA 04/16/2024 Light Yellow     Clarity, UA 04/16/2024 Turbid     Specific Gravity, UA 04/16/2024 1.013     pH, UA 04/16/2024 7.5     Leukocytes, UA 04/16/2024 Large (A)     Nitrite, UA 04/16/2024 Negative     Protein, UA 04/16/2024 Negative     Glucose, UA 04/16/2024 Negative     Ketones, UA 04/16/2024 Negative     Urobilinogen, UA 04/16/2024 <2.0     Bilirubin, UA 04/16/2024 Negative     Occult Blood, UA 04/16/2024 Negative     RBC, UA 04/16/2024 1-2     WBC, UA 04/16/2024 Innumerable (A)     Epithelial Cells 04/16/2024 Occasional     Bacteria, UA 04/16/2024 Occasional     WBC 04/16/2024 3.88 (L)     RBC 04/16/2024 3.15 (L)     Hemoglobin 04/16/2024 10.6 (L)     Hematocrit 04/16/2024 32.4 (L)     MCV 04/16/2024 103 (H)     MCH 04/16/2024 33.7     MCHC 04/16/2024 32.7     RDW 04/16/2024 14.6     MPV 04/16/2024 10.0     Platelets 04/16/2024 102 (L)     nRBC 04/16/2024 0     Segmented % 04/16/2024 48     Immature Grans % 04/16/2024 0     Lymphocytes % 04/16/2024 41     Monocytes % 04/16/2024 7     Eosinophils Relative 04/16/2024 3     Basophils Relative 04/16/2024 1     Absolute Neutrophils 04/16/2024 1.90     Absolute Immature Grans 04/16/2024 0.01     Absolute Lymphocytes 04/16/2024 1.58     Absolute Monocytes 04/16/2024 0.26     Eosinophils Absolute 04/16/2024 0.11     Basophils Absolute 04/16/2024 0.02     Sodium 04/16/2024 130 (L)     Potassium 04/16/2024 4.6     Chloride 04/16/2024 101     CO2 04/16/2024 23     ANION GAP 04/16/2024 6     BUN 04/16/2024 13     Creatinine 04/16/2024 0.75     Glucose, Fasting 04/16/2024 100 (H)     Calcium 04/16/2024 8.9     Corrected Calcium 04/16/2024 9.7     AST 04/16/2024 27     ALT 04/16/2024 11     Alkaline Phosphatase 04/16/2024 117 (H)     Total Protein 04/16/2024 6.0 (L)     Albumin 04/16/2024 3.0 (L)     Total Bilirubin 04/16/2024 1.84 (H)     eGFR 04/16/2024 81     HDL, Direct  04/16/2024 39 (L)      IR AMB Paracentesis    Result Date: 4/26/2024  Narrative: Ultrasound-guided paracentesis Clinical History: Symptomatic recurrent ascites. Procedure: Ongoing consent was utilized for this procedure. Ultrasound used to localize the right lower quadrant ascites. The overlying skin was prepped and draped in usual sterile fashion and local anesthesia was obtained with the 1% lidocaine solution.  A 5 Sinhala Yueh needle was advanced until fluid was aspirated under live ultrasound guidance. Approximately 2350 cc' s of clear, yellow fluid was aspirated. Catheter was then removed and a Band-Aid applied. The patient tolerated the procedure well and left the department in stable condition.     Impression: Impression: Ultrasound-guided paracentesis. Signed, performed, and dictated by Jaida KWAN under the supervision of Dr. Stephen Rivas. Workstation performed: [id number]       RADIOLOGY RESULTS: I have personally reviewed pertinent imaging studies.      Polly Torres DO  Gastroenterology Fellow  [facility]  Division of Gastroenterology & Hepatology  Available on [employer]    ** Please Note: This note is constructed using a voice recognition dictation system. **

## 2024-05-10 NOTE — PATIENT INSTRUCTIONS
Scheduled date of EGD (as of today): 8/19/2024  Physician performing EGD: Dr. IVÁN Mahajan  Location of EGD: [facility]  Desired bowel prep reviewed with patient: EGD instructions given to patient at office visit  Instructions reviewed with patient by: Nydia  Clearances:  N/A     scheduled on 5/17/24 at [facility].

## 2024-05-13 ENCOUNTER — LAB (OUTPATIENT)
Dept: LAB | Facility: AMBULARY SURGERY CENTER | Age: 69
End: 2024-05-13
Payer: MEDICARE

## 2024-05-13 DIAGNOSIS — N18.31 STAGE 3A CHRONIC KIDNEY DISEASE (HCC): ICD-10-CM

## 2024-05-13 DIAGNOSIS — K70.31 ALCOHOLIC CIRRHOSIS OF LIVER WITH ASCITES (HCC): ICD-10-CM

## 2024-05-13 DIAGNOSIS — E83.42 HYPOMAGNESEMIA: ICD-10-CM

## 2024-05-13 LAB
ALBUMIN SERPL BCP-MCNC: 3.4 G/DL (ref 3.5–5)
ALP SERPL-CCNC: 125 U/L (ref 34–104)
ALT SERPL W P-5'-P-CCNC: 13 U/L (ref 7–52)
ANION GAP SERPL CALCULATED.3IONS-SCNC: 11 MMOL/L (ref 4–13)
AST SERPL W P-5'-P-CCNC: 35 U/L (ref 13–39)
BILIRUB SERPL-MCNC: 1.71 MG/DL (ref 0.2–1)
BUN SERPL-MCNC: 27 MG/DL (ref 5–25)
CALCIUM ALBUM COR SERPL-MCNC: 10.2 MG/DL (ref 8.3–10.1)
CALCIUM SERPL-MCNC: 9.7 MG/DL (ref 8.4–10.2)
CHLORIDE SERPL-SCNC: 99 MMOL/L (ref 96–108)
CO2 SERPL-SCNC: 23 MMOL/L (ref 21–32)
CREAT SERPL-MCNC: 0.92 MG/DL (ref 0.6–1.3)
GFR SERPL CREATININE-BSD FRML MDRD: 63 ML/MIN/1.73SQ M
GLUCOSE P FAST SERPL-MCNC: 118 MG/DL (ref 65–99)
INR PPP: 1.22 (ref 0.84–1.19)
MAGNESIUM SERPL-MCNC: 1.7 MG/DL (ref 1.9–2.7)
POTASSIUM SERPL-SCNC: 4.5 MMOL/L (ref 3.5–5.3)
PROT SERPL-MCNC: 6.6 G/DL (ref 6.4–8.4)
PROTHROMBIN TIME: 15.3 SECONDS (ref 11.6–14.5)
SODIUM SERPL-SCNC: 133 MMOL/L (ref 135–147)

## 2024-05-13 PROCEDURE — 85610 PROTHROMBIN TIME: CPT

## 2024-05-13 PROCEDURE — 80053 COMPREHEN METABOLIC PANEL: CPT

## 2024-05-13 PROCEDURE — 83735 ASSAY OF MAGNESIUM: CPT

## 2024-05-13 PROCEDURE — 36415 COLL VENOUS BLD VENIPUNCTURE: CPT

## 2024-05-14 NOTE — RESULT ENCOUNTER NOTE
Sodium level improved to 133 meq/L, renal function is stable.  Continue same treatment will discuss during office visit this week.  Magnesium level improving to 1.7 with magnesium supplementation

## 2024-05-16 ENCOUNTER — OFFICE VISIT (OUTPATIENT)
Dept: NEPHROLOGY | Facility: CLINIC | Age: 69
End: 2024-05-16
Payer: MEDICARE

## 2024-05-16 VITALS
HEART RATE: 112 BPM | OXYGEN SATURATION: 97 % | BODY MASS INDEX: 21.71 KG/M2 | DIASTOLIC BLOOD PRESSURE: 78 MMHG | WEIGHT: 118 LBS | SYSTOLIC BLOOD PRESSURE: 160 MMHG | HEIGHT: 62 IN

## 2024-05-16 DIAGNOSIS — N18.31 STAGE 3A CHRONIC KIDNEY DISEASE (HCC): ICD-10-CM

## 2024-05-16 DIAGNOSIS — R80.9 PROTEINURIA, UNSPECIFIED TYPE: ICD-10-CM

## 2024-05-16 DIAGNOSIS — K70.31 ALCOHOLIC CIRRHOSIS OF LIVER WITH ASCITES (HCC): ICD-10-CM

## 2024-05-16 DIAGNOSIS — E83.52 HYPERCALCEMIA: ICD-10-CM

## 2024-05-16 DIAGNOSIS — I10 ESSENTIAL HYPERTENSION: Chronic | ICD-10-CM

## 2024-05-16 DIAGNOSIS — E83.42 HYPOMAGNESEMIA: ICD-10-CM

## 2024-05-16 DIAGNOSIS — E87.1 CHRONIC HYPONATREMIA: Primary | ICD-10-CM

## 2024-05-16 DIAGNOSIS — E87.20 METABOLIC ACIDOSIS: ICD-10-CM

## 2024-05-16 DIAGNOSIS — D64.9 ANEMIA, UNSPECIFIED TYPE: ICD-10-CM

## 2024-05-16 PROCEDURE — 99214 OFFICE O/P EST MOD 30 MIN: CPT | Performed by: INTERNAL MEDICINE

## 2024-05-16 NOTE — PATIENT INSTRUCTIONS
Renal function is stable.  Blood pressure slightly on the high side recommend home monitoring of blood pressure   -Recommend 2 g sodium diet.    -Recommend daily exercise and weight loss  -Discussed home monitoring of BP and maintaining a log of blood pressure.  -Recommend goal BP less than 130/80. Please inform me if SBP below 110 or above 140's persistently.     Continue same treatment.  Blood work in 2 months to monitor calcium level and renal function and electrolytes.  Follow-up in 4 months with repeat labs

## 2024-05-16 NOTE — PROGRESS NOTES
NEPHROLOGY OUTPATIENT PROGRESS NOTE   Cassidy Zhang 69 y.o. female MRN: [id number]  DATE: 5/16/2024  Reason for visit:   Chief Complaint   Patient presents with    Follow-up    Chronic Kidney Disease       ASSESSMENT and PLAN:  Chronic hyponatremia  -Was seen by nephrology during hospital stay for finding hyponatremia with admission sodium 129  -Workup suggestive of urine sodium 122 urine osmolality 393, serum osmolality 289  -CT abdomen pelvis without contrast, no evidence of malignancy finding of ascites and left ovarian cyst.  Chest x-ray finding of opacity right lung base.  Findings suggestive of effusion both sides  -Hyponatremia was suspected to be due to excessive ADH in the setting of alcoholic liver cirrhosis.  -she used to be on salt tablets in the past but was stopped later on due to fluid overload.  Sodium level is 133 meq/L on last blood work, continue Lasix 40 mg daily.  Continue fluid restriction 1.2 L/day -40 oz/day  -continue sodium sodium bicarbonate tablets   -follow up in 4 months     Chronic Kidney Disease Stage 3a  -Baseline Creatinine: 0.7 to 0.9  mg/dl  -Renal Function has been stable. Cr was 0.92 mg/dl   -Etiology: CKD due to chronic HRS and age related nephron loss   -Plan:   If eGFR remains persistently more than 60 would call it is chronic kidney disease stage II, continue to monitor renal function.  Renal function was stable on last blood work at creatinine 0.92 mg/dL.  Continue current dose of Lasix and spironolactone .  PTH wnl   Avoid Nephrotoxins like NSAIDs and IV contrast.   Follow up in 4 months with labs     Metabolic acidosis  -Bicarb level  improving to 23   -Continue sodium bicarbonate tablet 650 mg twice daily was started during the last office visit    Hypomagnesemia: Magnesium level was 1.7 - improving   -on mag oxide 400 mg tid, continue same dose as magnesium level improving.  No muscle cramps    Primary Hypertension:   -Current medication: none      -Current blood  pressure: elevated today but stable at home at 130's   -Plan:     Continue to monitor . Off midodrine as BP trended up for last two months.  If blood pressure remains persistently elevated may consider increasing the dose of spironolactone.  Continue current dose of spironolactone.  -Recommend 2 g sodium diet.    -Recommend daily exercise and weight loss  -Discussed home monitoring of BP and maintaining a log of blood pressure.  -Recommend goal BP less than 130/80.     Hypercalcemia: Corrected calcium was 10.2, will check ionized calcium with the next blood work avoid any calcium supplement like Tums    Anemia, unspecified  -h/o hemorrhoidal bleed  -Last hemoglobin was stable at 10.6 g/dl   -on oral ferrous sulfate , continue iron tablets     Proteinuria, unspecified   -UPC ratio 0.14 UA with only 1-2 RBC per high-power field, on spironolactone which would help continue to monitor    Decompensated alcoholic liver cirrhosis/lower extremity edema  -Lower extremity edema resolved.  Currently on Lasix 40 mg daily and spironolactone 100 mg daily, continue same treatment getting paracentesis every 2 weeks, follow-up with Dr. Mahajan from GI  -She did undergo attempted IR paracentesis on 5/8/2024 but fortunately had no fluid amenable to drainage     Diagnoses and all orders for this visit:    Chronic hyponatremia  -     Comprehensive metabolic panel; Future  -     Basic metabolic panel; Future  -     Protein / creatinine ratio, urine; Future  -     Urinalysis with microscopic; Future  -     Magnesium; Future  -     Phosphorus; Future    Stage 3a chronic kidney disease (HCC)  -     Comprehensive metabolic panel; Future  -     Basic metabolic panel; Future    Metabolic acidosis  -     Basic metabolic panel; Future    Hypomagnesemia  -     Magnesium; Future    Essential hypertension  -     Basic metabolic panel; Future    Hypercalcemia  -     Calcium, ionized; Future  -     Vitamin D 25 hydroxy; Future  -     Basic metabolic  panel; Future    Proteinuria, unspecified type  -     Protein / creatinine ratio, urine; Future  -     Urinalysis with microscopic; Future    Alcoholic cirrhosis of liver with ascites (HCC)  -     Basic metabolic panel; Future    Anemia, unspecified type  -     CBC; Future              SUBJECTIVE / HPI:  Cassidy Zhang is a 69 y.o.  female with history of decompensated alcoholic liver cirrhosis, encephalopathy, chronic alcohol use was recently admitted for lower extremity edema but then had worsening renal function/acute kidney injury and so nephrology was consulted.  Patient was treated for hepatorenal syndrome and renal function improved.  Also found to have hyponatremia in the setting of liver cirrhosis, was placed on fluid restriction 1.2 L/day    Reviewed blood work from 8/13/2024: Renal function is stable at creatinine 0.92 mg/dL, sodium improved to 133 meq/L, bilirubin is elevated.  Hemoglobin 10.6 g/dL.  PTH 25 in April UPC ratio 0.14    Leg edema subsided and ascites improving, did not require any paracentesis last time she went to IR    REVIEW OF SYSTEMS:    Review of Systems   Constitutional:  Negative for activity change, appetite change, chills, diaphoresis, fatigue and fever.   HENT:  Negative for congestion, ear pain, facial swelling, nosebleeds and sore throat.    Eyes:  Negative for pain and visual disturbance.   Respiratory:  Negative for cough, chest tightness and shortness of breath.    Cardiovascular:  Negative for chest pain and palpitations.   Gastrointestinal:  Negative for abdominal distention, abdominal pain, diarrhea, nausea and vomiting.   Genitourinary:  Negative for difficulty urinating, dysuria, flank pain, frequency and hematuria.   Musculoskeletal:  Negative for arthralgias, back pain and joint swelling.   Skin:  Negative for color change and rash.   Neurological:  Negative for dizziness, seizures, syncope, weakness and headaches.   Psychiatric/Behavioral:  Negative for agitation  and confusion. The patient is not nervous/anxious.    All other systems reviewed and are negative.      More than 10 point review of systems were obtained and discussed in length with the patient. Complete review of systems were negative / unremarkable except mentioned above.       PAST MEDICAL HISTORY:  Past Medical History:   Diagnosis Date    Diarrhea 10/26/2023    Hypertension     Hyponatremia        PAST SURGICAL HISTORY:  Past Surgical History:   Procedure Laterality Date    EYE SURGERY      IR PARACENTESIS  12/21/2023    IR PARACENTESIS  3/13/2024    IR PARACENTESIS  3/27/2024    IR PARACENTESIS  4/10/2024    IR PARACENTESIS  4/24/2024    [place] OPTX FEM SHFT FX W/INSJ IMED IMPLT W/WO SCREW Left 11/17/2018    Procedure: Intramedullary nail fixation left hip fracture;  Surgeon: Ross Herrera MD;  Location: [facility];  Service: Orthopedics       SOCIAL HISTORY:  Social History     Substance and Sexual Activity   Alcohol Use Not Currently    Alcohol/week: 1.0 standard drink of alcohol    Types: 1 Cans of beer per week    Comment: 2-3 times per week. Drinks heavier before     Social History     Substance and Sexual Activity   Drug Use No     Social History     Tobacco Use   Smoking Status Former   Smokeless Tobacco Never       FAMILY HISTORY:  Family History   Problem Relation Age of Onset    Heart disease Mother     Heart disease Father        MEDICATIONS:    Current Outpatient Medications:     Cholecalciferol (VITAMIN D3) 3000 UNITS TABS, Take 100 Units by mouth daily., Disp: , Rfl:     doxycycline monohydrate (MONODOX) 50 mg capsule, Take 50 mg by mouth daily, Disp: , Rfl:     ferrous sulfate 325 (65 Fe) mg tablet, Take 325 mg by mouth daily with breakfast, Disp: , Rfl:     furosemide (LASIX) 40 mg tablet, Take 1 tablet (40 mg total) by mouth daily, Disp: 90 tablet, Rfl: 1    hydrocortisone (ANUSOL-HC) 2.5 % rectal cream, Apply topically 2 (two) times a day for 10 days, Disp: 28 g, Rfl: 0    lactulose  "(CHRONULAC) 10 g/15 mL solution, Take 45 mL (30 g total) by mouth 2 (two) times a day, Disp: 8100 mL, Rfl: 3    Magnesium Oxide 400 MG CAPS, Take 1 tablet (400 mg total) by mouth 3 (three) times a day with meals, Disp: 270 tablet, Rfl: 3    pancrelipase, Lip-Prot-Amyl, (CREON) 24,000 units, Take 1 capsule (24,000 Units total) by mouth 3 (three) times a day with meals (Patient taking differently: Take 36,000 Units by mouth 3 (three) times a day with meals), Disp: 90 capsule, Rfl: 0    pantoprazole (PROTONIX) 40 mg tablet, Take 1 tablet (40 mg total) by mouth 2 (two) times a day before meals, Disp: 60 tablet, Rfl: 0    sodium bicarbonate 650 mg tablet, Take 1 tablet (650 mg total) by mouth 2 (two) times a day, Disp: 180 tablet, Rfl: 2    spironolactone (ALDACTONE) 50 mg tablet, Take 2 tablets (100 mg total) by mouth daily, Disp: 180 tablet, Rfl: 1    thiamine (VITAMIN B1) 100 mg tablet, Take 100 mg by mouth daily, Disp: , Rfl:     docusate sodium (COLACE) 100 mg capsule, Take 1 capsule (100 mg total) by mouth 2 (two) times a day (Patient not taking: Reported on 3/8/2024), Disp: , Rfl: 0    Magnesium 400 MG CAPS, TAKE 1 TABLET (400 MG TOTAL) BY MOUTH 2 TIMES A DAY (Patient not taking: Reported on 5/10/2024), Disp: , Rfl:     senna (SENOKOT) 8.6 mg, Take 1 tablet (8.6 mg total) by mouth daily (Patient not taking: Reported on 3/8/2024), Disp: 120 each, Rfl: 0      PHYSICAL EXAM:  Vitals:    05/16/24 1055   BP: 160/78   BP Location: Left arm   Patient Position: Sitting   Cuff Size: Adult   Pulse: (!) 112   SpO2: 97%   Weight: 53.5 kg (118 lb)   Height: 5' 1.5\" (1.562 m)     Body mass index is 21.93 kg/m².    Physical Exam  Constitutional:       General: She is not in acute distress.     Appearance: Normal appearance. She is well-developed.   HENT:      Head: Normocephalic and atraumatic.      Nose: Nose normal.      Mouth/Throat:      Mouth: Mucous membranes are moist.   Eyes:      General: No scleral icterus.     " Conjunctiva/sclera: Conjunctivae normal.      Pupils: Pupils are equal, round, and reactive to light.   Neck:      Thyroid: No thyromegaly.      Vascular: No JVD.   Cardiovascular:      Rate and Rhythm: Normal rate and regular rhythm.      Heart sounds: Normal heart sounds. No murmur heard.     No friction rub.   Pulmonary:      Effort: Pulmonary effort is normal. No respiratory distress.      Breath sounds: Normal breath sounds. No wheezing or rales.   Abdominal:      General: Bowel sounds are normal. There is no distension.      Palpations: Abdomen is soft.      Tenderness: There is no abdominal tenderness.   Musculoskeletal:         General: No deformity.      Cervical back: Neck supple.      Right lower leg: No edema.      Left lower leg: No edema.   Skin:     General: Skin is warm and dry.      Findings: No rash.   Neurological:      Mental Status: She is alert and oriented to person, place, and time.   Psychiatric:         Mood and Affect: Mood normal.         Behavior: Behavior normal.         Thought Content: Thought content normal.         Lab Results:   Results for orders placed or performed in visit on 05/13/24   Magnesium   Result Value Ref Range    Magnesium 1.7 (L) 1.9 - 2.7 mg/dL   Comprehensive metabolic panel   Result Value Ref Range    Sodium 133 (L) 135 - 147 mmol/L    Potassium 4.5 3.5 - 5.3 mmol/L    Chloride 99 96 - 108 mmol/L    CO2 23 21 - 32 mmol/L    ANION GAP 11 4 - 13 mmol/L    BUN 27 (H) 5 - 25 mg/dL    Creatinine 0.92 0.60 - 1.30 mg/dL    Glucose, Fasting 118 (H) 65 - 99 mg/dL    Calcium 9.7 8.4 - 10.2 mg/dL    Corrected Calcium 10.2 (H) 8.3 - 10.1 mg/dL    AST 35 13 - 39 U/L    ALT 13 7 - 52 U/L    Alkaline Phosphatase 125 (H) 34 - 104 U/L    Total Protein 6.6 6.4 - 8.4 g/dL    Albumin 3.4 (L) 3.5 - 5.0 g/dL    Total Bilirubin 1.71 (H) 0.20 - 1.00 mg/dL    eGFR 63 ml/min/1.73sq m   Protime-INR   Result Value Ref Range    Protime 15.3 (H) 11.6 - 14.5 seconds    INR 1.22 (H) 0.84 - 1.19

## 2024-05-17 ENCOUNTER — HOSPITAL ENCOUNTER (OUTPATIENT)
Dept: ULTRASOUND IMAGING | Facility: HOSPITAL | Age: 69
Discharge: HOME | End: 2024-05-17
Payer: MEDICARE

## 2024-05-17 DIAGNOSIS — K70.31 ALCOHOLIC CIRRHOSIS OF LIVER WITH ASCITES (HCC): ICD-10-CM

## 2024-05-17 PROCEDURE — 76700 US EXAM ABDOM COMPLETE: CPT

## 2024-05-20 ENCOUNTER — TELEPHONE (OUTPATIENT)
Dept: NEPHROLOGY | Facility: CLINIC | Age: 69
End: 2024-05-20

## 2024-05-22 ENCOUNTER — HOSPITAL ENCOUNTER (OUTPATIENT)
Dept: INTERVENTIONAL RADIOLOGY/VASCULAR | Facility: HOSPITAL | Age: 69
Discharge: HOME | End: 2024-05-22
Attending: STUDENT IN AN ORGANIZED HEALTH CARE EDUCATION/TRAINING PROGRAM
Payer: MEDICARE

## 2024-05-22 VITALS
HEIGHT: 62 IN | SYSTOLIC BLOOD PRESSURE: 182 MMHG | RESPIRATION RATE: 12 BRPM | WEIGHT: 115.2 LBS | DIASTOLIC BLOOD PRESSURE: 81 MMHG | BODY MASS INDEX: 21.2 KG/M2 | TEMPERATURE: 97 F | HEART RATE: 78 BPM | OXYGEN SATURATION: 100 %

## 2024-05-22 DIAGNOSIS — K70.31 ALCOHOLIC CIRRHOSIS OF LIVER WITH ASCITES (HCC): ICD-10-CM

## 2024-05-22 PROCEDURE — 76705 ECHO EXAM OF ABDOMEN: CPT | Performed by: RADIOLOGY

## 2024-05-22 NOTE — BRIEF OP NOTE (RAD/CATH)
IR PARACENTESIS Procedure Note    PATIENT NAME: Cassidy Zhang  : 1955  MRN: [id number]    Pre-op Diagnosis:   1. Alcoholic cirrhosis of liver with ascites (HCC)      Post-op Diagnosis:   1. Alcoholic cirrhosis of liver with ascites (HCC)        Surgeon:   AQUILES Silva  Assistants:     No qualified resident was available, Resident is only observing    Estimated Blood Loss: none  Findings: Ultrasound imaging performed on all 4 abdominal quadrants with no identifiable fluid to safely access and drain for paracentesis. Procedure not performed      Specimens: none    Complications:  none immediate    Anesthesia: none    AQUILES Silva     Date: 2024  Time: 9:14 AM

## 2024-06-03 DIAGNOSIS — K70.31 ALCOHOLIC CIRRHOSIS OF LIVER WITH ASCITES (HCC): Primary | ICD-10-CM

## 2024-06-04 ENCOUNTER — NURSE TRIAGE (OUTPATIENT)
Age: 69
End: 2024-06-04

## 2024-06-05 ENCOUNTER — HOSPITAL ENCOUNTER (OUTPATIENT)
Dept: INTERVENTIONAL RADIOLOGY/VASCULAR | Facility: HOSPITAL | Age: 69
Discharge: HOME | End: 2024-06-05
Attending: STUDENT IN AN ORGANIZED HEALTH CARE EDUCATION/TRAINING PROGRAM
Payer: MEDICARE

## 2024-06-05 VITALS
HEART RATE: 83 BPM | WEIGHT: 117 LBS | RESPIRATION RATE: 16 BRPM | TEMPERATURE: 97.2 F | BODY MASS INDEX: 22.09 KG/M2 | SYSTOLIC BLOOD PRESSURE: 183 MMHG | DIASTOLIC BLOOD PRESSURE: 86 MMHG | HEIGHT: 61 IN | OXYGEN SATURATION: 99 %

## 2024-06-05 DIAGNOSIS — K70.31 ALCOHOLIC CIRRHOSIS OF LIVER WITH ASCITES (HCC): ICD-10-CM

## 2024-06-05 PROCEDURE — 76705 ECHO EXAM OF ABDOMEN: CPT | Performed by: INTERNAL MEDICINE

## 2024-06-05 NOTE — BRIEF OP NOTE (RAD/CATH)
IR PROCEDURE NOT PERFORMED Procedure Note    PATIENT NAME: Cassidy Zhang  : 1955  MRN: [id number]    Pre-op Diagnosis:   1. Alcoholic cirrhosis of liver with ascites (HCC)      Post-op Diagnosis:   1. Alcoholic cirrhosis of liver with ascites (HCC)        Surgeon:   AQUILES Silva  Assistants:     No qualified resident was available, Resident is only observing    Estimated Blood Loss: none  Findings: Ultrasound imaging performed on all 4 abdominal quadrants with no identifiable fluid to safely access and drain for paracentesis. Procedure not performed.      Specimens: none    Complications:  none immediate    Anesthesia: none    AQUILES Silva     Date: 2024  Time: 9:03 AM

## 2024-06-05 NOTE — DISCHARGE INSTRUCTIONS
Abdominal Paracentesis     WHAT YOU NEED TO KNOW:   Abdominal paracentesis is a procedure to remove abnormal fluid buildup in your abdomen. Fluid builds up because of liver problems, such as swelling and scarring. Heart failure, kidney disease, a mass, or problems with your pancreas may also cause fluid buildup.     DISCHARGE INSTRUCTIONS:     Follow up with your healthcare provider as directed: Write down your questions so you remember to ask them during your visits.     Wound care: Remove dressing after 24 hours. Leave glue in place.    Return to your normal activities    Contact Interventional Radiology at [phone] ([facility] PATIENTS: Contact Interventional Radiology at [phone]) ([facility] PATIENTS: Contact Interventional Radiology at [phone]) if:  You have a fever and your wound is red and swollen.   You have yellow, green, or bad-smelling discharge coming from your wound.   You have pain or swelling in your abdomen.   You have an upset stomach or you vomit.   You have sudden, sharp pain in your abdomen.   You urinate very little or not at all.   You feel confused and more tired than usual.   Your arm or leg feels warm, tender, and painful. It may look swollen and red.   You suddenly feel lightheaded and have trouble breathing.

## 2024-06-19 ENCOUNTER — HOSPITAL ENCOUNTER (OUTPATIENT)
Dept: INTERVENTIONAL RADIOLOGY/VASCULAR | Facility: HOSPITAL | Age: 69
Discharge: HOME | End: 2024-06-19
Attending: STUDENT IN AN ORGANIZED HEALTH CARE EDUCATION/TRAINING PROGRAM
Payer: MEDICARE

## 2024-06-19 VITALS
HEIGHT: 60 IN | OXYGEN SATURATION: 99 % | WEIGHT: 118 LBS | RESPIRATION RATE: 18 BRPM | HEART RATE: 76 BPM | TEMPERATURE: 97.8 F | SYSTOLIC BLOOD PRESSURE: 193 MMHG | BODY MASS INDEX: 23.16 KG/M2 | DIASTOLIC BLOOD PRESSURE: 75 MMHG

## 2024-06-19 DIAGNOSIS — K70.31 ALCOHOLIC CIRRHOSIS OF LIVER WITH ASCITES (HCC): ICD-10-CM

## 2024-06-19 PROCEDURE — 76705 ECHO EXAM OF ABDOMEN: CPT | Performed by: RADIOLOGY

## 2024-06-19 NOTE — BRIEF OP NOTE (RAD/CATH)
IR PROCEDURE NOT PERFORMED Procedure Note    PATIENT NAME: Cassidy Zhang  : 1955  MRN: [id number]    Pre-op Diagnosis:   1. Alcoholic cirrhosis of liver with ascites (HCC)      Post-op Diagnosis:   1. Alcoholic cirrhosis of liver with ascites (HCC)        Surgeon:   AQUILES Silva  Assistants:     No qualified resident was available, Resident is only observing    Estimated Blood Loss: none  Findings: Ultrasound imaging performed on all 4 abdominal quadrants with no identifiable fluid to safely access and drain for paracentesis. Procedure not performed.      Specimens: none    Complications:  none immediate    Anesthesia: none    AQUILES Silva     Date: 2024  Time: 11:09 AM

## 2024-06-22 PROBLEM — E22.2 SYNDROME OF INAPPROPRIATE SECRETION OF ANTIDIURETIC HORMONE (HCC): Status: RESOLVED | Noted: 2024-01-31 | Resolved: 2024-06-22

## 2024-06-22 PROBLEM — S72.142A CLOSED INTERTROCHANTERIC FRACTURE, LEFT, INITIAL ENCOUNTER (HCC): Status: RESOLVED | Noted: 2018-11-15 | Resolved: 2024-06-22

## 2024-06-22 PROBLEM — S72.092A: Status: RESOLVED | Noted: 2018-11-15 | Resolved: 2024-06-22

## 2024-07-10 ENCOUNTER — HOSPITAL ENCOUNTER (OUTPATIENT)
Dept: MRI IMAGING | Facility: HOSPITAL | Age: 69
Discharge: HOME | End: 2024-07-10
Payer: MEDICARE

## 2024-07-10 DIAGNOSIS — K70.31 ALCOHOLIC CIRRHOSIS OF LIVER WITH ASCITES (HCC): ICD-10-CM

## 2024-07-10 PROCEDURE — A9585 GADOBUTROL INJECTION: HCPCS | Performed by: STUDENT IN AN ORGANIZED HEALTH CARE EDUCATION/TRAINING PROGRAM

## 2024-07-10 PROCEDURE — 74183 MRI ABD W/O CNTR FLWD CNTR: CPT

## 2024-07-10 RX ORDER — GADOBUTROL 604.72 MG/ML
5 INJECTION INTRAVENOUS
Status: COMPLETED | OUTPATIENT
Start: 2024-07-10 | End: 2024-07-10

## 2024-07-10 RX ADMIN — GADOBUTROL 5 ML: 604.72 INJECTION INTRAVENOUS at 07:45

## 2024-07-10 NOTE — NURSING NOTE
Notified by MRI tech patient had an extravasation of 5 cc of gadolinium and infiltration of 20 cc of saline into the LAC during her MRI abdomen this morning (7/10). IV removed and ice applied to the site. Upon assessment patient denies pain/soreness at the site. Swelling noted. No redness or other change in skin color, blistering, hardness, change in temperature or sensation of the extremity noted. Pulses palpable (2+) and capillary refill <2 seconds. Patient encouraged to continue to elevate and apply ice to the site for 20-60 minutes three times a day as tolerated until swelling resolves. Patient verbalizes understanding and states this has happened before to her in the past. Denies any questions at this time. AVS/discharge instructions provided to patient and name and contact number provided in case patient has any questions or concerns. Radiologist MD Carpio made aware. Will follow up with patient on 7/11.

## 2024-07-16 ENCOUNTER — TELEPHONE (OUTPATIENT)
Dept: NEPHROLOGY | Facility: CLINIC | Age: 69
End: 2024-07-16

## 2024-07-16 ENCOUNTER — LAB (OUTPATIENT)
Dept: LAB | Facility: AMBULARY SURGERY CENTER | Age: 69
End: 2024-07-16
Payer: MEDICARE

## 2024-07-16 DIAGNOSIS — E83.52 HYPERCALCEMIA: ICD-10-CM

## 2024-07-16 DIAGNOSIS — E87.1 CHRONIC HYPONATREMIA: ICD-10-CM

## 2024-07-16 DIAGNOSIS — N18.31 STAGE 3A CHRONIC KIDNEY DISEASE (HCC): ICD-10-CM

## 2024-07-16 LAB
25(OH)D3 SERPL-MCNC: 91.6 NG/ML (ref 30–100)
ALBUMIN SERPL BCG-MCNC: 3.7 G/DL (ref 3.5–5)
ALP SERPL-CCNC: 116 U/L (ref 34–104)
ALT SERPL W P-5'-P-CCNC: 12 U/L (ref 7–52)
ANION GAP SERPL CALCULATED.3IONS-SCNC: 11 MMOL/L (ref 4–13)
AST SERPL W P-5'-P-CCNC: 25 U/L (ref 13–39)
BILIRUB SERPL-MCNC: 1.31 MG/DL (ref 0.2–1)
BUN SERPL-MCNC: 22 MG/DL (ref 5–25)
CA-I BLD-SCNC: 1.22 MMOL/L (ref 1.12–1.32)
CALCIUM SERPL-MCNC: 9.9 MG/DL (ref 8.4–10.2)
CHLORIDE SERPL-SCNC: 98 MMOL/L (ref 96–108)
CO2 SERPL-SCNC: 26 MMOL/L (ref 21–32)
CREAT SERPL-MCNC: 1 MG/DL (ref 0.6–1.3)
GFR SERPL CREATININE-BSD FRML MDRD: 57 ML/MIN/1.73SQ M
GLUCOSE SERPL-MCNC: 126 MG/DL (ref 65–140)
POTASSIUM SERPL-SCNC: 4 MMOL/L (ref 3.5–5.3)
PROT SERPL-MCNC: 7 G/DL (ref 6.4–8.4)
SODIUM SERPL-SCNC: 135 MMOL/L (ref 135–147)

## 2024-07-16 PROCEDURE — 80053 COMPREHEN METABOLIC PANEL: CPT

## 2024-07-16 PROCEDURE — 82306 VITAMIN D 25 HYDROXY: CPT

## 2024-07-16 PROCEDURE — 82330 ASSAY OF CALCIUM: CPT

## 2024-07-16 PROCEDURE — 36415 COLL VENOUS BLD VENIPUNCTURE: CPT

## 2024-07-16 NOTE — RESULT ENCOUNTER NOTE
Please inform patient that renal function is stable creatinine 1.0, sodium improved to 135 and bicarb level is at normal range.  Continue same treatment

## 2024-07-16 NOTE — TELEPHONE ENCOUNTER
----- Message from Abdullahi Gao MD sent at 7/16/2024  1:55 PM EDT -----  Please inform patient that renal function is stable creatinine 1.0, sodium improved to 135 and bicarb level is at normal range.  Continue same treatment

## 2024-07-29 ENCOUNTER — TELEPHONE (OUTPATIENT)
Age: 69
End: 2024-07-29

## 2024-07-29 NOTE — TELEPHONE ENCOUNTER
Patients GI provider:  Dr. Sorensen     Number to return call: ( [phone]     Reason for call: Pt calling to confirm procedure needs Instructions sent to home address     Scheduled procedure/appointment date if applicable: Apt/procedure 8/19 EGD

## 2024-08-02 ENCOUNTER — TELEPHONE (OUTPATIENT)
Dept: GASTROENTEROLOGY | Facility: CLINIC | Age: 69
End: 2024-08-02

## 2024-08-02 NOTE — TELEPHONE ENCOUNTER
Spoke directly with Pt today via phone call.  Phone call made to remind/confirm Pt's upcoming EGD procedure with Dr. Mahajan on 8/19/24 at [facility].  Pt requests EGD instructions be mailed to Pt's address in chart instead of email or MyChart.  EGD instructions mailed to Pt's address.  Pt given office phone number should she have any questions and/or need to reschedule procedure.

## 2024-08-19 ENCOUNTER — ANESTHESIA (OUTPATIENT)
Dept: GASTROENTEROLOGY | Facility: HOSPITAL | Age: 69
End: 2024-08-19

## 2024-08-19 ENCOUNTER — HOSPITAL ENCOUNTER (OUTPATIENT)
Dept: GASTROENTEROLOGY | Facility: HOSPITAL | Age: 69
Setting detail: OUTPATIENT SURGERY
Discharge: HOME | End: 2024-08-19
Payer: MEDICARE

## 2024-08-19 ENCOUNTER — ANESTHESIA EVENT (OUTPATIENT)
Dept: GASTROENTEROLOGY | Facility: HOSPITAL | Age: 69
End: 2024-08-19

## 2024-08-19 VITALS
SYSTOLIC BLOOD PRESSURE: 132 MMHG | BODY MASS INDEX: 23.16 KG/M2 | OXYGEN SATURATION: 100 % | HEIGHT: 60 IN | TEMPERATURE: 97.2 F | DIASTOLIC BLOOD PRESSURE: 72 MMHG | WEIGHT: 118 LBS | HEART RATE: 73 BPM | RESPIRATION RATE: 18 BRPM

## 2024-08-19 DIAGNOSIS — K70.31 ALCOHOLIC CIRRHOSIS OF LIVER WITH ASCITES (HCC): ICD-10-CM

## 2024-08-19 PROCEDURE — 43235 EGD DIAGNOSTIC BRUSH WASH: CPT | Performed by: STUDENT IN AN ORGANIZED HEALTH CARE EDUCATION/TRAINING PROGRAM

## 2024-08-19 RX ORDER — PROPOFOL 10 MG/ML
INJECTION, EMULSION INTRAVENOUS AS NEEDED
Status: DISCONTINUED | OUTPATIENT
Start: 2024-08-19 | End: 2024-08-19

## 2024-08-19 RX ORDER — LIDOCAINE HYDROCHLORIDE 20 MG/ML
INJECTION, SOLUTION EPIDURAL; INFILTRATION; INTRACAUDAL; PERINEURAL AS NEEDED
Status: DISCONTINUED | OUTPATIENT
Start: 2024-08-19 | End: 2024-08-19

## 2024-08-19 RX ORDER — SODIUM CHLORIDE, SODIUM LACTATE, POTASSIUM CHLORIDE, CALCIUM CHLORIDE 600; 310; 30; 20 MG/100ML; MG/100ML; MG/100ML; MG/100ML
INJECTION, SOLUTION INTRAVENOUS CONTINUOUS PRN
Status: DISCONTINUED | OUTPATIENT
Start: 2024-08-19 | End: 2024-08-19

## 2024-08-19 RX ADMIN — SODIUM CHLORIDE, SODIUM LACTATE, POTASSIUM CHLORIDE, AND CALCIUM CHLORIDE: .6; .31; .03; .02 INJECTION, SOLUTION INTRAVENOUS at 10:44

## 2024-08-19 RX ADMIN — PROPOFOL 20 MG: 10 INJECTION, EMULSION INTRAVENOUS at 10:53

## 2024-08-19 RX ADMIN — PROPOFOL 120 MG: 10 INJECTION, EMULSION INTRAVENOUS at 10:49

## 2024-08-19 RX ADMIN — PROPOFOL 30 MG: 10 INJECTION, EMULSION INTRAVENOUS at 10:51

## 2024-08-19 RX ADMIN — LIDOCAINE HYDROCHLORIDE 100 MG: 20 INJECTION, SOLUTION EPIDURAL; INFILTRATION; INTRACAUDAL at 10:49

## 2024-08-19 NOTE — H&P
History and Physical - [facility]  Cassidy Zhang 69 y.o. female MRN: [id number]                  HPI: Cassidy Zhang is a 69 y.o. year old female who presents for cirrhosis, variceal screening.  INR 1.22, platelet count 102.      REVIEW OF SYSTEMS: Per the HPI, and otherwise unremarkable.    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     Past Surgical History:   Procedure Laterality Date    CHOLECYSTECTOMY      EYE SURGERY      IR PARACENTESIS  12/21/2023    IR PARACENTESIS  03/13/2024    IR PARACENTESIS  03/27/2024    IR PARACENTESIS  04/10/2024    IR PARACENTESIS  04/24/2024    [place] OPTX FEM SHFT FX W/INSJ IMED IMPLT W/WO SCREW Left 11/17/2018    Procedure: Intramedullary nail fixation left hip fracture;  Surgeon: Ross Herrera MD;  Location[facility];  Service: Orthopedics     Social History   Social History     Substance and Sexual Activity   Alcohol Use Not Currently    Alcohol/week: 1.0 standard drink of alcohol    Types: 1 Cans of beer per week    Comment: 2-3 times per week. Drinks heavier before     Social History     Substance and Sexual Activity   Drug Use No     Social History     Tobacco Use   Smoking Status Former   Smokeless Tobacco Never     Family History   Problem Relation Age of Onset    Heart disease Mother     Heart disease Father        Meds/Allergies       Current Outpatient Medications:     Cholecalciferol (VITAMIN D3) 3000 UNITS TABS    doxycycline monohydrate (MONODOX) 50 mg capsule    ferrous sulfate 325 (65 Fe) mg tablet    furosemide (LASIX) 40  mg tablet    Magnesium Oxide 400 MG CAPS    pancrelipase, Lip-Prot-Amyl, (CREON) 24,000 units    pantoprazole (PROTONIX) 40 mg tablet    sodium bicarbonate 650 mg tablet    spironolactone (ALDACTONE) 50 mg tablet    thiamine (VITAMIN B1) 100 mg tablet    hydrocortisone (ANUSOL-HC) 2.5 % rectal cream    lactulose (CHRONULAC) 10 g/15 mL solution    Allergies   Allergen Reactions    Oxycodone-Acetaminophen Palpitations and Tachycardia    Demerol [Meperidine] Other (See Comments)     hallucinations    Diphenhydramine Other (See Comments)     hallucinations    Milk (Cow) Diarrhea    Other Other (See Comments)    Prednisone Tremor    Sulfa Antibiotics Other (See Comments)     hallucinations    Sulfasalazine Hallucinations       Objective     /76   Pulse 71   Temp 97.5 °F (36.4 °C) (Temporal)   Resp 19   Ht 5' (1.524 m)   Wt 53.5 kg (118 lb)   LMP  (LMP Unknown) Comment: Postmenapausal  SpO2 100%   BMI 23.05 kg/m²       PHYSICAL EXAM    Gen: NAD  Head: NCAT  CV: RRR  CHEST: Clear  ABD: soft, NT/ND  EXT: no edema      ASSESSMENT/PLAN:  This is a 69 y.o. year old female here for EGD possible banding, and she is stable and optimized for her procedure.

## 2024-08-19 NOTE — ANESTHESIA POSTPROCEDURE EVALUATION
Post-Op Assessment Note    CV Status:  Stable  Pain Score: 0    Pain management: adequate       Mental Status:  Alert and awake   Hydration Status:  Euvolemic   PONV Controlled:  Controlled   Airway Patency:  Patent and adequate  Two or more mitigation strategies used for obstructive sleep apnea   Post Op Vitals Reviewed: Yes    No anethesia notable event occurred.    Staff: CRNA               BP   106/57   Temp      Pulse 71   Resp 20   SpO2 100

## 2024-08-19 NOTE — ANESTHESIA PREPROCEDURE EVALUATION
Procedure:  EGD    Relevant Problems   CARDIO   (+) Essential hypertension      GI/HEPATIC   (+) Acute lower GI bleeding   (+) Alcoholic cirrhosis of liver with ascites (HCC)   (+) Chronic pancreatitis (HCC)   (+) Decompensated liver disease (HCC)      /RENAL   (+) CKD (chronic kidney disease) stage 3, GFR 30-59 ml/min (HCC)      HEMATOLOGY   (+) Acute blood loss anemia   (+) Coagulopathy (HCC)   (+) Pancytopenia (HCC)        Physical Exam    Airway    Mallampati score: II         Dental   No notable dental hx     Cardiovascular  Cardiovascular exam normal    Pulmonary  Pulmonary exam normal     Other Findings  post-pubertal.      Anesthesia Plan  ASA Score- 3     Anesthesia Type- IV sedation with anesthesia with ASA Monitors.         Additional Monitors:     Airway Plan:            Plan Factors-Exercise tolerance (METS): >4 METS.    Chart reviewed. EKG reviewed.  Existing labs reviewed. Patient summary reviewed.    Patient is not a current smoker.  Patient did not smoke on day of surgery.            Induction-     Postoperative Plan-     Perioperative Resuscitation Plan - Level 1 - Full Code.       Informed Consent- Anesthetic plan and risks discussed with patient.

## 2024-08-22 ENCOUNTER — OFFICE VISIT (OUTPATIENT)
Dept: GASTROENTEROLOGY | Facility: CLINIC | Age: 69
End: 2024-08-22
Payer: MEDICARE

## 2024-08-22 VITALS
SYSTOLIC BLOOD PRESSURE: 136 MMHG | HEART RATE: 112 BPM | BODY MASS INDEX: 22.78 KG/M2 | TEMPERATURE: 97 F | DIASTOLIC BLOOD PRESSURE: 70 MMHG | HEIGHT: 60 IN | OXYGEN SATURATION: 99 % | WEIGHT: 116 LBS

## 2024-08-22 DIAGNOSIS — K70.31 ALCOHOLIC CIRRHOSIS OF LIVER WITH ASCITES (HCC): Primary | ICD-10-CM

## 2024-08-22 DIAGNOSIS — K86.1 CHRONIC PANCREATITIS, UNSPECIFIED PANCREATITIS TYPE (HCC): ICD-10-CM

## 2024-08-22 DIAGNOSIS — K70.31 ASCITES DUE TO ALCOHOLIC CIRRHOSIS (HCC): ICD-10-CM

## 2024-08-22 DIAGNOSIS — M62.84 SARCOPENIA: ICD-10-CM

## 2024-08-22 PROCEDURE — G2211 COMPLEX E/M VISIT ADD ON: HCPCS | Performed by: STUDENT IN AN ORGANIZED HEALTH CARE EDUCATION/TRAINING PROGRAM

## 2024-08-22 PROCEDURE — 99214 OFFICE O/P EST MOD 30 MIN: CPT | Performed by: STUDENT IN AN ORGANIZED HEALTH CARE EDUCATION/TRAINING PROGRAM

## 2024-08-22 NOTE — PROGRESS NOTES
Progress Note -  [facility]  Cassidy Zhang 69 y.o. female MRN: [id number]  @ Encounter: [id number]    ASSESSMENT AND PLAN:      Cassidy Zhang is a 69 y.o. 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     Alcohol cirrhosis  Ascites  Chronic pancreatitis  Primary Complaint cirrhosis management.  Ascites improved on diuretics.  Sober for 8 years.  CBC, CMP, INR, AFP every 3 months.    Continue Protonix 40 mg twice daily.  Continue Creon supplementation.  Congratulated on sobriety.  Continue alcohol abstinence.  Varices: EGD 8/19/2024 with 3 cm hiatal hernia, no evidence of varices.    Repeat EGD 2025.  Monitor for signs of bleeding.  Monitor hgb, transfuse for < 7  Ascites: 4/24/2024 for 2.3 L.  Since then all paracentesis have been unsuccessful.  Renal function currently stable.  Continue Lasix 40 mg daily.  Continue Aldactone 100 mg daily.  2 g Sodium diet  Monitor volume status, creatinine, urine output.  Encephalopathy: Self discontinued lactulose and currently stable.  Restart lactulose if she develops any signs of encephalopathy in the future.  Monitor mental status  HCC Screening: MRI 7/10/2024 with no suspicious masses, known segment 7 and segment 4B hemangiomas.  Also with unchanged pancreatic ductal dilation, findings consistent with chronic pancreatitis.  Continues screening with US/AFP every 6 months.  Due January 2025.  Transplant Candidacy: Did discuss with the patient however given overall stability we will hold on referral at this time.    MELD 3.0: 15 at 5/13/2024  8:44 AM  MELD-Na: 10 at 5/13/2024  8:44 AM  Calculated from:  Serum Creatinine: 0.92 mg/dL (Using min of 1 mg/dL) at 5/13/2024  8:44 AM  Serum Sodium: 133 mmol/L at 5/13/2024  8:44 AM  Total Bilirubin: 1.71 mg/dL at 5/13/2024  8:44 AM  Serum Albumin: 3.4 g/dL at 5/13/2024  8:44 AM  INR(ratio): 1.22 at 5/13/2024  8:44 AM  Age at listing (hypothetical): 69 years  Sex: Female at  5/13/2024  8:44 AM    Follow up in 6 months.  _____________________________________________________________________    Subjective: Seen and examined.  Doing well.  Ascites is improved with increased diuretics.  Denies any confusion, constipation, bleeding.    HEALTHCARE MAINTENANCE:  Hepatitis C screening negative in 2023.  Last EGD: 8/19/2024 with 3 cm hiatal hernia, no evidence of varices.    Last Colonoscopy:  7/25/2023 showed diverticulosis, hemorrhoids.  Recommended 3-year follow-up.    REVIEW OF SYSTEMS:    Review of Systems   Constitutional:  Negative for chills and fever.   HENT:  Negative for congestion and sinus pressure.    Respiratory:  Negative for cough and shortness of breath.    Cardiovascular:  Negative for chest pain, palpitations and leg swelling.   Gastrointestinal:  Negative for abdominal pain, diarrhea, nausea and vomiting.   Genitourinary:  Negative for dysuria and hematuria.   Musculoskeletal:  Negative for arthralgias and back pain.   Skin:  Negative for color change and rash.   Neurological:  Negative for dizziness and headaches.   Psychiatric/Behavioral:  Negative for agitation and confusion.    All other systems reviewed and are negative.      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/2024     Past Surgical History:   Procedure Laterality Date    CHOLECYSTECTOMY      EYE SURGERY      IR PARACENTESIS  12/21/2023    IR PARACENTESIS  03/13/2024    IR PARACENTESIS  03/27/2024    IR PARACENTESIS  04/10/2024    IR PARACENTESIS  04/24/2024    CA OPTX FEM SHFT FX W/INSJ IMED IMPLT W/WO  SCREW Left 11/17/2018    Procedure: Intramedullary nail fixation left hip fracture;  Surgeon: Ross Herrera MD;  Location: [facility];  Service: Orthopedics     Social History   Social History     Substance and Sexual Activity   Alcohol Use Not Currently    Alcohol/week: 1.0 standard drink of alcohol    Types: 1 Cans of beer per week    Comment: 2-3 times per week. Drinks heavier before     Social History     Substance and Sexual Activity   Drug Use No     Social History     Tobacco Use   Smoking Status Former   Smokeless Tobacco Never     Family History   Problem Relation Age of Onset    Heart disease Mother     Heart disease Father        Meds/Allergies     Not in a hospital admission.  No current facility-administered medications for this visit.       Allergies   Allergen Reactions    Oxycodone-Acetaminophen Palpitations and Tachycardia    Demerol [Meperidine] Other (See Comments)     hallucinations    Diphenhydramine Other (See Comments)     hallucinations    Milk (Cow) Diarrhea    Other Other (See Comments)    Prednisone Tremor    Sulfa Antibiotics Other (See Comments)     hallucinations    Sulfasalazine Hallucinations       Objective     Blood pressure 136/70, pulse (!) 112, temperature (!) 97 °F (36.1 °C), temperature source Tympanic, height 5' (1.524 m), weight 52.6 kg (116 lb), SpO2 99%. Body mass index is 22.65 kg/m².    [unfilled]      PHYSICAL EXAM:      Physical Exam  Vitals and nursing note reviewed.   Constitutional:       General: She is not in acute distress.     Appearance: Normal appearance. She is not ill-appearing.   HENT:      Head: Normocephalic and atraumatic.      Mouth/Throat:      Mouth: Mucous membranes are moist.   Eyes:      Extraocular Movements: Extraocular movements intact.      Conjunctiva/sclera: Conjunctivae normal.   Cardiovascular:      Pulses: Normal pulses.   Pulmonary:      Effort: Pulmonary effort is normal.   Abdominal:      General: Abdomen is flat. Bowel sounds are  normal. There is no distension.      Palpations: Abdomen is soft.      Tenderness: There is no abdominal tenderness. There is no guarding.   Skin:     General: Skin is warm and dry.   Neurological:      General: No focal deficit present.      Mental Status: She is alert and oriented to person, place, and time.   Psychiatric:         Mood and Affect: Mood normal.         Behavior: Behavior normal.         Lab Results:   No visits with results within 1 Day(s) from this visit.   Latest known visit with results is:   Lab on 07/16/2024   Component Date Value    Sodium 07/16/2024 135     Potassium 07/16/2024 4.0     Chloride 07/16/2024 98     CO2 07/16/2024 26     ANION GAP 07/16/2024 11     BUN 07/16/2024 22     Creatinine 07/16/2024 1.00     Glucose 07/16/2024 126     Calcium 07/16/2024 9.9     AST 07/16/2024 25     ALT 07/16/2024 12     Alkaline Phosphatase 07/16/2024 116 (H)     Total Protein 07/16/2024 7.0     Albumin 07/16/2024 3.7     Total Bilirubin 07/16/2024 1.31 (H)     eGFR 07/16/2024 57     Calcium, Ionized 07/16/2024 1.22     Vit D, 25-Hydroxy 07/16/2024 91.6        Imaging Studies: I have personally reviewed pertinent imaging studies.    Cecil Davenport D.O.  Gastroenterology Fellow  PGY-5  Available via [employer]  8/22/2024 11:34 AM

## 2024-08-23 DIAGNOSIS — E87.70 FLUID OVERLOAD: ICD-10-CM

## 2024-08-23 RX ORDER — FUROSEMIDE 40 MG
40 TABLET ORAL DAILY
Qty: 90 TABLET | Refills: 1 | Status: SHIPPED | OUTPATIENT
Start: 2024-08-23

## 2024-09-03 ENCOUNTER — TELEPHONE (OUTPATIENT)
Dept: OTHER | Facility: OTHER | Age: 69
End: 2024-09-03

## 2024-09-03 ENCOUNTER — TELEPHONE (OUTPATIENT)
Dept: GASTROENTEROLOGY | Facility: CLINIC | Age: 69
End: 2024-09-03

## 2024-09-03 ENCOUNTER — APPOINTMENT (OUTPATIENT)
Dept: LAB | Facility: AMBULARY SURGERY CENTER | Age: 69
DRG: 638 | End: 2024-09-03
Payer: MEDICARE

## 2024-09-03 DIAGNOSIS — R80.9 PROTEINURIA, UNSPECIFIED TYPE: ICD-10-CM

## 2024-09-03 DIAGNOSIS — K70.31 ALCOHOLIC CIRRHOSIS OF LIVER WITH ASCITES (HCC): ICD-10-CM

## 2024-09-03 DIAGNOSIS — E87.20 METABOLIC ACIDOSIS: ICD-10-CM

## 2024-09-03 DIAGNOSIS — E87.1 CHRONIC HYPONATREMIA: ICD-10-CM

## 2024-09-03 DIAGNOSIS — D64.9 ANEMIA, UNSPECIFIED TYPE: ICD-10-CM

## 2024-09-03 DIAGNOSIS — N18.31 STAGE 3A CHRONIC KIDNEY DISEASE (HCC): ICD-10-CM

## 2024-09-03 DIAGNOSIS — I10 ESSENTIAL HYPERTENSION: Chronic | ICD-10-CM

## 2024-09-03 DIAGNOSIS — E83.42 HYPOMAGNESEMIA: ICD-10-CM

## 2024-09-03 DIAGNOSIS — E83.52 HYPERCALCEMIA: ICD-10-CM

## 2024-09-03 LAB
AFP-TM SERPL-MCNC: 4.24 NG/ML (ref 0–9)
ALBUMIN SERPL BCG-MCNC: 4.2 G/DL (ref 3.5–5)
ALP SERPL-CCNC: 188 U/L (ref 34–104)
ALT SERPL W P-5'-P-CCNC: 11 U/L (ref 7–52)
ANION GAP SERPL CALCULATED.3IONS-SCNC: 13 MMOL/L (ref 4–13)
AST SERPL W P-5'-P-CCNC: 19 U/L (ref 13–39)
BACTERIA UR QL AUTO: ABNORMAL /HPF
BASOPHILS # BLD AUTO: 0.02 THOUSANDS/ÂΜL (ref 0–0.1)
BASOPHILS NFR BLD AUTO: 1 % (ref 0–1)
BILIRUB SERPL-MCNC: 2.07 MG/DL (ref 0.2–1)
BILIRUB UR QL STRIP: NEGATIVE
BUN SERPL-MCNC: 24 MG/DL (ref 5–25)
CALCIUM SERPL-MCNC: 10.7 MG/DL (ref 8.4–10.2)
CHLORIDE SERPL-SCNC: 84 MMOL/L (ref 96–108)
CLARITY UR: CLEAR
CO2 SERPL-SCNC: 29 MMOL/L (ref 21–32)
COLOR UR: ABNORMAL
CREAT SERPL-MCNC: 1.23 MG/DL (ref 0.6–1.3)
CREAT UR-MCNC: 34.1 MG/DL
EOSINOPHIL # BLD AUTO: 0.09 THOUSAND/ÂΜL (ref 0–0.61)
EOSINOPHIL NFR BLD AUTO: 2 % (ref 0–6)
ERYTHROCYTE [DISTWIDTH] IN BLOOD BY AUTOMATED COUNT: 12.1 % (ref 11.6–15.1)
GFR SERPL CREATININE-BSD FRML MDRD: 44 ML/MIN/1.73SQ M
GLUCOSE P FAST SERPL-MCNC: 547 MG/DL (ref 65–99)
GLUCOSE UR STRIP-MCNC: ABNORMAL MG/DL
HCT VFR BLD AUTO: 35 % (ref 34.8–46.1)
HGB BLD-MCNC: 12.7 G/DL (ref 11.5–15.4)
HGB UR QL STRIP.AUTO: NEGATIVE
IMM GRANULOCYTES # BLD AUTO: 0.01 THOUSAND/UL (ref 0–0.2)
IMM GRANULOCYTES NFR BLD AUTO: 0 % (ref 0–2)
INR PPP: 1.12 (ref 0.85–1.19)
KETONES UR STRIP-MCNC: NEGATIVE MG/DL
LEUKOCYTE ESTERASE UR QL STRIP: ABNORMAL
LYMPHOCYTES # BLD AUTO: 1.34 THOUSANDS/ÂΜL (ref 0.6–4.47)
LYMPHOCYTES NFR BLD AUTO: 32 % (ref 14–44)
MAGNESIUM SERPL-MCNC: 1.9 MG/DL (ref 1.9–2.7)
MCH RBC QN AUTO: 31.8 PG (ref 26.8–34.3)
MCHC RBC AUTO-ENTMCNC: 36.3 G/DL (ref 31.4–37.4)
MCV RBC AUTO: 88 FL (ref 82–98)
MONOCYTES # BLD AUTO: 0.31 THOUSAND/ÂΜL (ref 0.17–1.22)
MONOCYTES NFR BLD AUTO: 7 % (ref 4–12)
NEUTROPHILS # BLD AUTO: 2.45 THOUSANDS/ÂΜL (ref 1.85–7.62)
NEUTS SEG NFR BLD AUTO: 58 % (ref 43–75)
NITRITE UR QL STRIP: NEGATIVE
NON-SQ EPI CELLS URNS QL MICRO: ABNORMAL /HPF
NRBC BLD AUTO-RTO: 0 /100 WBCS
PH UR STRIP.AUTO: 6.5 [PH]
PHOSPHATE SERPL-MCNC: 3.6 MG/DL (ref 2.3–4.1)
PLATELET # BLD AUTO: 96 THOUSANDS/UL (ref 149–390)
PLATELET BLD QL SMEAR: ABNORMAL
PMV BLD AUTO: 11.1 FL (ref 8.9–12.7)
POIKILOCYTOSIS BLD QL SMEAR: PRESENT
POTASSIUM SERPL-SCNC: 4.3 MMOL/L (ref 3.5–5.3)
PROT SERPL-MCNC: 7.8 G/DL (ref 6.4–8.4)
PROT UR STRIP-MCNC: NEGATIVE MG/DL
PROT UR-MCNC: 6.3 MG/DL
PROT/CREAT UR: 0.2 MG/G{CREAT} (ref 0–0.1)
PROTHROMBIN TIME: 14.7 SECONDS (ref 12.3–15)
RBC # BLD AUTO: 4 MILLION/UL (ref 3.81–5.12)
RBC #/AREA URNS AUTO: ABNORMAL /HPF
RBC MORPH BLD: PRESENT
SODIUM SERPL-SCNC: 126 MMOL/L (ref 135–147)
SP GR UR STRIP.AUTO: 1.02 (ref 1–1.03)
UROBILINOGEN UR STRIP-ACNC: <2 MG/DL
WBC # BLD AUTO: 4.22 THOUSAND/UL (ref 4.31–10.16)
WBC #/AREA URNS AUTO: ABNORMAL /HPF

## 2024-09-03 PROCEDURE — 84100 ASSAY OF PHOSPHORUS: CPT

## 2024-09-03 PROCEDURE — 83036 HEMOGLOBIN GLYCOSYLATED A1C: CPT | Performed by: STUDENT IN AN ORGANIZED HEALTH CARE EDUCATION/TRAINING PROGRAM

## 2024-09-03 PROCEDURE — 84156 ASSAY OF PROTEIN URINE: CPT

## 2024-09-03 PROCEDURE — 83735 ASSAY OF MAGNESIUM: CPT

## 2024-09-03 PROCEDURE — 82570 ASSAY OF URINE CREATININE: CPT

## 2024-09-03 PROCEDURE — 81001 URINALYSIS AUTO W/SCOPE: CPT

## 2024-09-04 ENCOUNTER — TELEPHONE (OUTPATIENT)
Dept: NEPHROLOGY | Facility: CLINIC | Age: 69
End: 2024-09-04

## 2024-09-04 ENCOUNTER — HOSPITAL ENCOUNTER (INPATIENT)
Facility: HOSPITAL | Age: 69
LOS: 3 days | Discharge: HOME | DRG: 638 | End: 2024-09-07
Attending: EMERGENCY MEDICINE | Admitting: STUDENT IN AN ORGANIZED HEALTH CARE EDUCATION/TRAINING PROGRAM
Payer: MEDICARE

## 2024-09-04 DIAGNOSIS — E11.00 HYPEROSMOLAR HYPERGLYCEMIC STATE (HHS) (HCC): ICD-10-CM

## 2024-09-04 DIAGNOSIS — K86.1 OTHER CHRONIC PANCREATITIS (HCC): ICD-10-CM

## 2024-09-04 DIAGNOSIS — E11.9 DIABETES MELLITUS, NEW ONSET (HCC): Primary | ICD-10-CM

## 2024-09-04 LAB
ANION GAP SERPL CALCULATED.3IONS-SCNC: 10 MMOL/L (ref 4–13)
ANION GAP SERPL CALCULATED.3IONS-SCNC: 11 MMOL/L (ref 4–13)
ANION GAP SERPL CALCULATED.3IONS-SCNC: 14 MMOL/L (ref 4–13)
BACTERIA UR QL AUTO: ABNORMAL /HPF
BASE EX.OXY STD BLDV CALC-SCNC: 82.3 % (ref 60–80)
BASE EXCESS BLDV CALC-SCNC: 0.7 MMOL/L
BILIRUB UR QL STRIP: NEGATIVE
BUN SERPL-MCNC: 27 MG/DL (ref 5–25)
BUN SERPL-MCNC: 29 MG/DL (ref 5–25)
BUN SERPL-MCNC: 30 MG/DL (ref 5–25)
CALCIUM SERPL-MCNC: 10.3 MG/DL (ref 8.4–10.2)
CALCIUM SERPL-MCNC: 10.9 MG/DL (ref 8.4–10.2)
CALCIUM SERPL-MCNC: 9.7 MG/DL (ref 8.4–10.2)
CHLORIDE SERPL-SCNC: 84 MMOL/L (ref 96–108)
CHLORIDE SERPL-SCNC: 93 MMOL/L (ref 96–108)
CHLORIDE SERPL-SCNC: 97 MMOL/L (ref 96–108)
CLARITY UR: CLEAR
CO2 SERPL-SCNC: 26 MMOL/L (ref 21–32)
CO2 SERPL-SCNC: 26 MMOL/L (ref 21–32)
CO2 SERPL-SCNC: 27 MMOL/L (ref 21–32)
COLOR UR: COLORLESS
CREAT SERPL-MCNC: 1.2 MG/DL (ref 0.6–1.3)
CREAT SERPL-MCNC: 1.4 MG/DL (ref 0.6–1.3)
CREAT SERPL-MCNC: 1.44 MG/DL (ref 0.6–1.3)
EST. AVERAGE GLUCOSE BLD GHB EST-MCNC: 280 MG/DL
GFR SERPL CREATININE-BSD FRML MDRD: 37 ML/MIN/1.73SQ M
GFR SERPL CREATININE-BSD FRML MDRD: 38 ML/MIN/1.73SQ M
GFR SERPL CREATININE-BSD FRML MDRD: 46 ML/MIN/1.73SQ M
GLUCOSE SERPL-MCNC: 126 MG/DL (ref 65–140)
GLUCOSE SERPL-MCNC: 131 MG/DL (ref 65–140)
GLUCOSE SERPL-MCNC: 140 MG/DL (ref 65–140)
GLUCOSE SERPL-MCNC: 159 MG/DL (ref 65–140)
GLUCOSE SERPL-MCNC: 215 MG/DL (ref 65–140)
GLUCOSE SERPL-MCNC: 329 MG/DL (ref 65–140)
GLUCOSE SERPL-MCNC: 581 MG/DL (ref 65–140)
GLUCOSE SERPL-MCNC: 748 MG/DL (ref 65–140)
GLUCOSE SERPL-MCNC: >600 MG/DL (ref 65–140)
GLUCOSE SERPL-MCNC: >600 MG/DL (ref 65–140)
GLUCOSE UR STRIP-MCNC: ABNORMAL MG/DL
HBA1C MFR BLD: 11.4 %
HCO3 BLDV-SCNC: 23.7 MMOL/L (ref 24–30)
HGB UR QL STRIP.AUTO: NEGATIVE
KETONES UR STRIP-MCNC: NEGATIVE MG/DL
LEUKOCYTE ESTERASE UR QL STRIP: ABNORMAL
NITRITE UR QL STRIP: NEGATIVE
NON-SQ EPI CELLS URNS QL MICRO: ABNORMAL /HPF
O2 CT BLDV-SCNC: 15.8 ML/DL
PCO2 BLDV: 33 MM HG (ref 42–50)
PH BLDV: 7.47 [PH] (ref 7.3–7.4)
PH UR STRIP.AUTO: 7 [PH]
PO2 BLDV: 45.8 MM HG (ref 35–45)
POTASSIUM SERPL-SCNC: 2.8 MMOL/L (ref 3.5–5.3)
POTASSIUM SERPL-SCNC: 3.8 MMOL/L (ref 3.5–5.3)
POTASSIUM SERPL-SCNC: 5 MMOL/L (ref 3.5–5.3)
PROT UR STRIP-MCNC: NEGATIVE MG/DL
RBC #/AREA URNS AUTO: ABNORMAL /HPF
SODIUM SERPL-SCNC: 120 MMOL/L (ref 135–147)
SODIUM SERPL-SCNC: 133 MMOL/L (ref 135–147)
SODIUM SERPL-SCNC: 135 MMOL/L (ref 135–147)
SP GR UR STRIP.AUTO: 1.01 (ref 1–1.03)
UROBILINOGEN UR STRIP-ACNC: <2 MG/DL
WBC #/AREA URNS AUTO: ABNORMAL /HPF

## 2024-09-04 PROCEDURE — 99223 1ST HOSP IP/OBS HIGH 75: CPT | Performed by: STUDENT IN AN ORGANIZED HEALTH CARE EDUCATION/TRAINING PROGRAM

## 2024-09-04 PROCEDURE — 80048 BASIC METABOLIC PNL TOTAL CA: CPT | Performed by: EMERGENCY MEDICINE

## 2024-09-04 PROCEDURE — 82948 REAGENT STRIP/BLOOD GLUCOSE: CPT

## 2024-09-04 PROCEDURE — 82805 BLOOD GASES W/O2 SATURATION: CPT | Performed by: EMERGENCY MEDICINE

## 2024-09-04 PROCEDURE — 96365 THER/PROPH/DIAG IV INF INIT: CPT

## 2024-09-04 PROCEDURE — 81001 URINALYSIS AUTO W/SCOPE: CPT | Performed by: STUDENT IN AN ORGANIZED HEALTH CARE EDUCATION/TRAINING PROGRAM

## 2024-09-04 PROCEDURE — 99285 EMERGENCY DEPT VISIT HI MDM: CPT | Performed by: EMERGENCY MEDICINE

## 2024-09-04 PROCEDURE — 93005 ELECTROCARDIOGRAM TRACING: CPT

## 2024-09-04 PROCEDURE — 80048 BASIC METABOLIC PNL TOTAL CA: CPT | Performed by: STUDENT IN AN ORGANIZED HEALTH CARE EDUCATION/TRAINING PROGRAM

## 2024-09-04 PROCEDURE — 36415 COLL VENOUS BLD VENIPUNCTURE: CPT | Performed by: EMERGENCY MEDICINE

## 2024-09-04 PROCEDURE — 99284 EMERGENCY DEPT VISIT MOD MDM: CPT

## 2024-09-04 RX ORDER — HEPARIN SODIUM 5000 [USP'U]/ML
5000 INJECTION, SOLUTION INTRAVENOUS; SUBCUTANEOUS EVERY 8 HOURS SCHEDULED
Status: DISCONTINUED | OUTPATIENT
Start: 2024-09-04 | End: 2024-09-07 | Stop reason: HOSPADM

## 2024-09-04 RX ORDER — POTASSIUM CHLORIDE 14.9 MG/ML
20 INJECTION INTRAVENOUS ONCE
Status: COMPLETED | OUTPATIENT
Start: 2024-09-04 | End: 2024-09-04

## 2024-09-04 RX ORDER — LANOLIN ALCOHOL/MO/W.PET/CERES
100 CREAM (GRAM) TOPICAL DAILY
Status: DISCONTINUED | OUTPATIENT
Start: 2024-09-04 | End: 2024-09-07 | Stop reason: HOSPADM

## 2024-09-04 RX ORDER — LANOLIN ALCOHOL/MO/W.PET/CERES
400 CREAM (GRAM) TOPICAL
Status: DISCONTINUED | OUTPATIENT
Start: 2024-09-04 | End: 2024-09-07 | Stop reason: HOSPADM

## 2024-09-04 RX ORDER — POTASSIUM CHLORIDE 1500 MG/1
40 TABLET, EXTENDED RELEASE ORAL ONCE
Status: COMPLETED | OUTPATIENT
Start: 2024-09-04 | End: 2024-09-04

## 2024-09-04 RX ORDER — SODIUM BICARBONATE 650 MG/1
650 TABLET ORAL 2 TIMES DAILY
Status: DISCONTINUED | OUTPATIENT
Start: 2024-09-04 | End: 2024-09-07 | Stop reason: HOSPADM

## 2024-09-04 RX ORDER — LANOLIN ALCOHOL/MO/W.PET/CERES
CREAM (GRAM) TOPICAL 3 TIMES DAILY PRN
Status: DISCONTINUED | OUTPATIENT
Start: 2024-09-04 | End: 2024-09-07 | Stop reason: HOSPADM

## 2024-09-04 RX ORDER — FERROUS SULFATE 325(65) MG
325 TABLET ORAL
Status: DISCONTINUED | OUTPATIENT
Start: 2024-09-05 | End: 2024-09-07 | Stop reason: HOSPADM

## 2024-09-04 RX ORDER — LACTULOSE 10 G/15ML
30 SOLUTION ORAL 2 TIMES DAILY
Status: DISCONTINUED | OUTPATIENT
Start: 2024-09-04 | End: 2024-09-07 | Stop reason: HOSPADM

## 2024-09-04 RX ORDER — PANTOPRAZOLE SODIUM 40 MG/1
40 TABLET, DELAYED RELEASE ORAL
Status: DISCONTINUED | OUTPATIENT
Start: 2024-09-04 | End: 2024-09-07 | Stop reason: HOSPADM

## 2024-09-04 RX ORDER — SODIUM CHLORIDE 9 MG/ML
100 INJECTION, SOLUTION INTRAVENOUS CONTINUOUS
Status: DISPENSED | OUTPATIENT
Start: 2024-09-04 | End: 2024-09-05

## 2024-09-04 RX ADMIN — SODIUM CHLORIDE 2 UNITS/HR: 9 INJECTION, SOLUTION INTRAVENOUS at 20:58

## 2024-09-04 RX ADMIN — PANCRELIPASE 36000 UNITS: 120000; 24000; 76000 CAPSULE, DELAYED RELEASE PELLETS ORAL at 12:19

## 2024-09-04 RX ADMIN — HEPARIN SODIUM 5000 UNITS: 5000 INJECTION INTRAVENOUS; SUBCUTANEOUS at 13:37

## 2024-09-04 RX ADMIN — SODIUM CHLORIDE 11 UNITS/HR: 9 INJECTION, SOLUTION INTRAVENOUS at 15:56

## 2024-09-04 RX ADMIN — POTASSIUM CHLORIDE 20 MEQ: 14.9 INJECTION, SOLUTION INTRAVENOUS at 18:32

## 2024-09-04 RX ADMIN — SODIUM CHLORIDE 15 UNITS/HR: 9 INJECTION, SOLUTION INTRAVENOUS at 11:51

## 2024-09-04 RX ADMIN — SODIUM BICARBONATE 650 MG: 650 TABLET ORAL at 18:19

## 2024-09-04 RX ADMIN — HEPARIN SODIUM 5000 UNITS: 5000 INJECTION INTRAVENOUS; SUBCUTANEOUS at 21:04

## 2024-09-04 RX ADMIN — SODIUM CHLORIDE, SODIUM LACTATE, POTASSIUM CHLORIDE, AND CALCIUM CHLORIDE 500 ML: .6; .31; .03; .02 INJECTION, SOLUTION INTRAVENOUS at 10:03

## 2024-09-04 RX ADMIN — POTASSIUM CHLORIDE 40 MEQ: 1500 TABLET, EXTENDED RELEASE ORAL at 18:19

## 2024-09-04 RX ADMIN — POTASSIUM CHLORIDE 20 MEQ: 14.9 INJECTION, SOLUTION INTRAVENOUS at 21:01

## 2024-09-04 RX ADMIN — PANTOPRAZOLE SODIUM 40 MG: 40 TABLET, DELAYED RELEASE ORAL at 18:18

## 2024-09-04 RX ADMIN — SODIUM CHLORIDE 2 UNITS/HR: 9 INJECTION, SOLUTION INTRAVENOUS at 20:12

## 2024-09-04 RX ADMIN — SODIUM CHLORIDE 100 ML/HR: 0.9 INJECTION, SOLUTION INTRAVENOUS at 12:01

## 2024-09-04 RX ADMIN — Medication 400 MG: at 12:18

## 2024-09-04 RX ADMIN — PANCRELIPASE 36000 UNITS: 120000; 24000; 76000 CAPSULE, DELAYED RELEASE PELLETS ORAL at 18:20

## 2024-09-04 RX ADMIN — Medication 400 MG: at 18:19

## 2024-09-04 RX ADMIN — LACTULOSE 30 G: 20 SOLUTION ORAL at 18:18

## 2024-09-04 RX ADMIN — SODIUM CHLORIDE 8 UNITS/HR: 9 INJECTION, SOLUTION INTRAVENOUS at 18:31

## 2024-09-04 NOTE — ASSESSMENT & PLAN NOTE
Lab Results   Component Value Date    HGBA1C 4.8 01/23/2024       Recent Labs     09/04/24  0944   POCGLU >600*       Blood Sugar Average: Last 72 hrs:  (P) 0    Patient was sent to the ER for abnormal outpatient labs with glucose of 547 on outpatient labs  POC glucose on arrival was 600    Last A1c on January 2024 was 4.8  We will recheck A1c    Follow-up glutamic acid decarboxylase, antiislet antibodies, and zinc transporter    Patient has a history of alcohol abuse with cirrhosis.  Currently on Creon due to pancreatic insufficiency  Suspect new diabetes diagnosis is 2/2 multiple previous episodes of pancreatitis leading to destruction of islet cells    Will place on insulin drip algorithm 3  BMP every 4 hours

## 2024-09-04 NOTE — ED NOTES
Per Dr. Ibrahim to start with algorithm 3 for insulin drip and move forward accordingly.      Emily Kat RN  09/04/24 0658

## 2024-09-04 NOTE — TELEPHONE ENCOUNTER
----- Message from Abdullahi Gao MD sent at 9/4/2024  9:46 AM EDT -----  Please inform patient that blood glucose was elevated to 547- > she should contact PCP for management of hyperglycemia or go to the ED as glucose is very high.   Due to high blood glucose level , sodium level was low but correct sodium level after correcting for high glucose would be around 134.9 meq/L.   Also calcium is high- stop vitamin D  and calcium supplement if taking.   Will discuss further further during  office visit

## 2024-09-04 NOTE — ED NOTES
Pt ambulatory to bathroom with cane. Denies pain and other complaints at this time.      Emily Kat RN  09/04/24 1123

## 2024-09-04 NOTE — PLAN OF CARE
Problem: PAIN - ADULT  Goal: Verbalizes/displays adequate comfort level or baseline comfort level  Description: Interventions:  - Encourage patient to monitor pain and request assistance  - Assess pain using appropriate pain scale  - Administer analgesics based on type and severity of pain and evaluate response  - Implement non-pharmacological measures as appropriate and evaluate response  - Consider cultural and social influences on pain and pain management  - Notify physician/advanced practitioner if interventions unsuccessful or patient reports new pain  Outcome: Progressing     Problem: INFECTION - ADULT  Goal: Absence or prevention of progression during hospitalization  Description: INTERVENTIONS:  - Assess and monitor for signs and symptoms of infection  - Monitor lab/diagnostic results  - Monitor all insertion sites, i.e. indwelling lines, tubes, and drains  - Monitor endotracheal if appropriate and nasal secretions for changes in amount and color  - Fritch appropriate cooling/warming therapies per order  - Administer medications as ordered  - Instruct and encourage patient and family to use good hand hygiene technique  - Identify and instruct in appropriate isolation precautions for identified infection/condition  Outcome: Progressing  Goal: Absence of fever/infection during neutropenic period  Description: INTERVENTIONS:  - Monitor WBC    Outcome: Progressing     Problem: SAFETY ADULT  Goal: Patient will remain free of falls  Description: INTERVENTIONS:  - Educate patient/family on patient safety including physical limitations  - Instruct patient to call for assistance with activity   - Consult OT/PT to assist with strengthening/mobility   - Keep Call bell within reach  - Keep bed low and locked with side rails adjusted as appropriate  - Keep care items and personal belongings within reach  - Initiate and maintain comfort rounds  - Make Fall Risk Sign visible to staff  - Offer Toileting every  Hours,  in advance of need  - Initiate/Maintain alarm  - Obtain necessary fall risk management equipment:   - Apply yellow socks and bracelet for high fall risk patients  - Consider moving patient to room near nurses station  Outcome: Progressing  Goal: Maintain or return to baseline ADL function  Description: INTERVENTIONS:  -  Assess patient's ability to carry out ADLs; assess patient's baseline for ADL function and identify physical deficits which impact ability to perform ADLs (bathing, care of mouth/teeth, toileting, grooming, dressing, etc.)  - Assess/evaluate cause of self-care deficits   - Assess range of motion  - Assess patient's mobility; develop plan if impaired  - Assess patient's need for assistive devices and provide as appropriate  - Encourage maximum independence but intervene and supervise when necessary  - Involve family in performance of ADLs  - Assess for home care needs following discharge   - Consider OT consult to assist with ADL evaluation and planning for discharge  - Provide patient education as appropriate  Outcome: Progressing  Goal: Maintains/Returns to pre admission functional level  Description: INTERVENTIONS:  - Perform AM-PAC 6 Click Basic Mobility/ Daily Activity assessment daily.  - Set and communicate daily mobility goal to care team and patient/family/caregiver.   - Collaborate with rehabilitation services on mobility goals if consulted  - Perform Range of Motion  times a day.  - Reposition patient every  hours.  - Dangle patient  times a day  - Stand patient  times a day  - Ambulate patient times a day  - Out of bed to chair  times a day   - Out of bed for meals  times a day  - Out of bed for toileting  - Record patient progress and toleration of activity level   Outcome: Progressing     Problem: DISCHARGE PLANNING  Goal: Discharge to home or other facility with appropriate resources  Description: INTERVENTIONS:  - Identify barriers to discharge w/patient and caregiver  - Arrange for  needed discharge resources and transportation as appropriate  - Identify discharge learning needs (meds, wound care, etc.)  - Arrange for interpretive services to assist at discharge as needed  - Refer to Case Management Department for coordinating discharge planning if the patient needs post-hospital services based on physician/advanced practitioner order or complex needs related to functional status, cognitive ability, or social support system  Outcome: Progressing     Problem: Knowledge Deficit  Goal: Patient/family/caregiver demonstrates understanding of disease process, treatment plan, medications, and discharge instructions  Description: Complete learning assessment and assess knowledge base.  Interventions:  - Provide teaching at level of understanding  - Provide teaching via preferred learning methods  Outcome: Progressing

## 2024-09-04 NOTE — H&P
[facility]  H&P  Name: Cassidy Zhang 69 y.o. female I MRN: [id number]  Unit/Bed#: ED-36 I Date of Admission: 9/4/2024   Date of Service: 9/4/2024 I Hospital Day: 0      Assessment & Plan   * Newly diagnosed diabetes (HCC)  Assessment & Plan  Lab Results   Component Value Date    HGBA1C 4.8 01/23/2024       Recent Labs     09/04/24  0944   POCGLU >600*       Blood Sugar Average: Last 72 hrs:  (P) 0    Patient was sent to the ER for abnormal outpatient labs with glucose of 547 on outpatient labs  POC glucose on arrival was 600    Last A1c on January 2024 was 4.8  We will recheck A1c    Follow-up glutamic acid decarboxylase, antiislet antibodies, and zinc transporter    Patient has a history of alcohol abuse with cirrhosis.  Currently on Creon due to pancreatic insufficiency  Suspect new diabetes diagnosis is 2/2 multiple previous episodes of pancreatitis leading to destruction of islet cells    Will place on insulin drip algorithm 3  BMP every 4 hours        Alcoholic cirrhosis of liver with ascites (HCC)  Assessment & Plan  Patient follows with GI in outpatient setting  Holding Lasix and Aldactone due to elevate Cr    CKD (chronic kidney disease) stage 3, GFR 30-59 ml/min (HCC)  Assessment & Plan  Lab Results   Component Value Date    EGFR 38 09/04/2024    EGFR 44 09/03/2024    EGFR 57 07/16/2024    CREATININE 1.40 (H) 09/04/2024    CREATININE 1.23 09/03/2024    CREATININE 1.00 07/16/2024     Creatinine baseline around 1-1.1  Admission creatinine 1.4 suspect prerenal in setting of dehydration from elevated glucose levels  Continue IVF  F/U AM Cr    Chronic pancreatitis (HCC)  Assessment & Plan  Due to history of alcohol abuse with   continue Creon    Chronic hyponatremia  Assessment & Plan  History of SIADH   Sodium 120 today corrects to 130 for glucose of 748  Monitor sodium with IV insulin                 VTE Pharmacologic Prophylaxis:   Moderate Risk (Score 3-4) - Pharmacological DVT  Prophylaxis Ordered: heparin.  Code Status: Level 1 - Full Code   Discussion with family: Patient declined call to .     Anticipated Length of Stay: Patient will be admitted on an inpatient basis with an anticipated length of stay of greater than 2 midnights secondary to new onset diabetes.    Total Time Spent on Date of Encounter in care of patient: 55 mins. This time was spent on one or more of the following: performing physical exam; counseling and coordination of care; obtaining or reviewing history; documenting in the medical record; reviewing/ordering tests, medications or procedures; communicating with other healthcare professionals and discussing with patient's family/caregivers.    Chief Complaint: Abnormal outpatient labs    History of Present Illness:  Cassidy Zhang is a 69 y.o. female with a PMH of cirrhosis, chronic pancreatitis secondary to history of alcohol abuse, hyponatremia, who presents with abnormal outpatient labs.  Patient states that yesterday she was feeling shaky and slightly dizzy yesterday so she called her doctor to obtain outpatient blood work as she assumed it was her low magnesium or potassium.  Lab work on 9/3 showed glucose of 547 and patient was referred to the ED by her doctor.  Patient admits to having a dry mouth with increased fluid intake along with increased urination.  She states that the dizziness and shakiness has resolved today.  Patient states that she has never been in diagnosed with diabetes in the past.  She also states that her mom was insulin-dependent diabetic and her father was borderline diabetic who is to take metformin.  She states that her cirrhosis is currently well-controlled and follows with GI in the outpatient setting.  She states that she stopped drinking and has been on Creon to help with digestion due to multiple episodes of pancreatitis in the past.     Review of Systems:  Review of Systems   Constitutional:  Negative for chills and  fever.   HENT:  Negative for ear pain and sore throat.    Eyes:  Negative for pain and visual disturbance.   Respiratory:  Negative for cough and shortness of breath.    Cardiovascular:  Negative for chest pain and palpitations.   Gastrointestinal:  Negative for abdominal pain and vomiting.   Endocrine: Positive for polydipsia and polyuria.   Genitourinary:  Negative for dysuria and hematuria.   Musculoskeletal:  Negative for arthralgias and back pain.   Skin:  Negative for color change and rash.   Neurological:  Positive for dizziness. Negative for seizures and syncope.   All other systems reviewed and are negative.      Past Medical and Surgical History:   Past Medical History:   Diagnosis Date    Closed comminuted fracture of hip, left, initial encounter ([facility]) 11/15/2018    Closed intertrochanteric fracture, left, initial encounter ([facility]) 11/15/2018    Added automatically from request for surgery [id number]      Colon polyp     Diarrhea 10/26/2023    Hypertension     Hyponatremia     Liver disease     cirrhosis    Syndrome of inappropriate secretion of antidiuretic hormone ([facility]) 01/31/2024       Past Surgical History:   Procedure Laterality Date    CHOLECYSTECTOMY      EYE SURGERY      IR PARACENTESIS  12/21/2023    IR PARACENTESIS  03/13/2024    IR PARACENTESIS  03/27/2024    IR PARACENTESIS  04/10/2024    IR PARACENTESIS  04/24/2024    [place] OPTX FEM SHFT FX W/INSJ IMED IMPLT W/WO SCREW Left 11/17/2018    Procedure: Intramedullary nail fixation left hip fracture;  Surgeon: Ross Herrera MD;  Location: [facility];  Service: Orthopedics       Meds/Allergies:  Prior to Admission medications    Medication Sig Start Date End Date Taking? Authorizing Provider   Cholecalciferol (VITAMIN D3) 3000 UNITS TABS Take 100 Units by mouth daily.    Historical Provider, MD   doxycycline monohydrate (MONODOX) 50 mg capsule Take 50 mg by mouth daily    Historical Provider, MD   ferrous sulfate 325 (65 Fe) mg tablet Take 325 mg by  mouth daily with breakfast    Historical Provider, MD   furosemide (LASIX) 40 mg tablet TAKE 1 TABLET BY MOUTH EVERY DAY 8/23/24   Abdullahi Gao MD   hydrocortisone (ANUSOL-HC) 2.5 % rectal cream Apply topically 2 (two) times a day for 10 days 1/2/24   Dania Quiñones MD   lactulose (CHRONULAC) 10 g/15 mL solution Take 45 mL (30 g total) by mouth 2 (two) times a day 1/18/24 1/17/25  Emerald Mahajan MD   Magnesium Oxide 400 MG CAPS Take 1 tablet (400 mg total) by mouth 3 (three) times a day with meals 3/25/24   Abdullahi Gao MD   pancrelipase, Lip-Prot-Amyl, (CREON) 24,000 units Take 1 capsule (24,000 Units total) by mouth 3 (three) times a day with meals  Patient taking differently: Take 36,000 Units by mouth 3 (three) times a day with meals 11/21/23   Juidth Shirley DO   pantoprazole (PROTONIX) 40 mg tablet Take 1 tablet (40 mg total) by mouth 2 (two) times a day before meals 1/2/24   Dania Quiñones MD   sodium bicarbonate 650 mg tablet Take 1 tablet (650 mg total) by mouth 2 (two) times a day 1/31/24   Abdullahi Gao MD   spironolactone (ALDACTONE) 50 mg tablet Take 2 tablets (100 mg total) by mouth daily 3/21/24 9/17/24  Emerald Mahajan MD   thiamine (VITAMIN B1) 100 mg tablet Take 100 mg by mouth daily    Historical Provider, MD     I have reviewed home medications with patient personally.    Allergies:   Allergies   Allergen Reactions    Oxycodone-Acetaminophen Palpitations and Tachycardia    Demerol [Meperidine] Other (See Comments)     hallucinations    Diphenhydramine Other (See Comments)     hallucinations    Milk (Cow) Diarrhea    Other Other (See Comments)    Prednisone Tremor    Sulfa Antibiotics Other (See Comments)     hallucinations    Sulfasalazine Hallucinations       Social History:  Marital Status: /Civil Union     Patient Pre-hospital Living Situation: Home  Patient Pre-hospital Level of Mobility: walks  Patient Pre-hospital Diet Restrictions: Fluid restriction 30 oz  Substance Use History:    Social History     Substance and Sexual Activity   Alcohol Use Not Currently    Alcohol/week: 1.0 standard drink of alcohol    Types: 1 Cans of beer per week    Comment: 2-3 times per week. Drinks heavier before     Social History     Tobacco Use   Smoking Status Former   Smokeless Tobacco Never     Social History     Substance and Sexual Activity   Drug Use No       Family History:  Family History   Problem Relation Age of Onset    Heart disease Mother     Heart disease Father        Physical Exam:     Vitals:   Blood Pressure: 166/83 (09/04/24 0953)  Pulse: 85 (09/04/24 0953)  Temperature: 98.1 °F (36.7 °C) (09/04/24 0953)  Temp Source: Oral (09/04/24 0953)  Respirations: 18 (09/04/24 0953)  Weight - Scale: 50.1 kg (110 lb 7.2 oz) (09/04/24 0953)  SpO2: 99 % (09/04/24 0953)    Physical Exam  Vitals and nursing note reviewed.   Constitutional:       General: She is not in acute distress.     Appearance: She is well-developed.   HENT:      Head: Normocephalic and atraumatic.   Eyes:      Conjunctiva/sclera: Conjunctivae normal.   Cardiovascular:      Rate and Rhythm: Normal rate and regular rhythm.      Heart sounds: No murmur heard.  Pulmonary:      Effort: Pulmonary effort is normal. No respiratory distress.      Breath sounds: Normal breath sounds.   Abdominal:      Palpations: Abdomen is soft.      Tenderness: There is no abdominal tenderness.   Musculoskeletal:         General: No swelling.   Skin:     General: Skin is warm and dry.   Neurological:      Mental Status: She is alert. Mental status is at baseline.   Psychiatric:         Mood and Affect: Mood normal.          Additional Data:     Lab Results:  Results from last 7 days   Lab Units 09/03/24  0846   WBC Thousand/uL 4.22*   HEMOGLOBIN g/dL 12.7   HEMATOCRIT % 35.0   PLATELETS Thousands/uL 96*   SEGS PCT % 58   LYMPHO PCT % 32   MONO PCT % 7   EOS PCT % 2     Results from last 7 days   Lab Units 09/04/24  1003 09/03/24  0846   SODIUM mmol/L 120* 126*    POTASSIUM mmol/L 5.0 4.3   CHLORIDE mmol/L 84* 84*   CO2 mmol/L 26 29   BUN mg/dL 29* 24   CREATININE mg/dL 1.40* 1.23   ANION GAP mmol/L 10 13   CALCIUM mg/dL 10.3* 10.7*   ALBUMIN g/dL  --  4.2   TOTAL BILIRUBIN mg/dL  --  2.07*   ALK PHOS U/L  --  188*   ALT U/L  --  11   AST U/L  --  19   GLUCOSE RANDOM mg/dL 748*  --      Results from last 7 days   Lab Units 09/03/24  0846   INR  1.12     Results from last 7 days   Lab Units 09/04/24  1151 09/04/24  0944   POC GLUCOSE mg/dl >600* >600*     Lab Results   Component Value Date    HGBA1C 4.8 01/23/2024    HGBA1C 4.4 11/17/2023    HGBA1C 5.4 02/24/2023           Lines/Drains:  Invasive Devices       Peripheral Intravenous Line  Duration             Peripheral IV 09/04/24 Left Forearm <1 day                        Imaging: No pertinent imaging reviewed.  No orders to display       EKG and Other Studies Reviewed on Admission:   EKG:  sinus rhythm with premature supraventricular complexes .    ** Please Note: This note has been constructed using a voice recognition system. **

## 2024-09-04 NOTE — TELEPHONE ENCOUNTER
"I called patient in follow up. She did not receive physician message last night since she was in bed. I reviewed his note. She does not have glucometer at home. She did note her head felt a \"little funny and my hands are shaky\". I advised she report to ED for evaluation at this time and she confirmed she would. She does have someone to transport her. She will report to [place].  "

## 2024-09-04 NOTE — TELEPHONE ENCOUNTER
GASTROENTEROLOGY ON-CALL PROVIDER QUICK NOTE:     Received outpatient health care page regarding critical lab value.  Outpatient blood work ordered by hepatology clinic shows CMP with elevated glucose to 547.  I attempted to call patient and spouse, however they did not answer.  I left a voicemail explaining that patient's blood sugar is significantly elevated and could lead to hyperglycemic emergency.  I explained that if patient has glucometer at home she should check her blood sugar and patient should call her PCP's office or immediately seek care in the emergency room if she is having symptoms such as nausea, vomiting, abdominal pain, fevers, chills, syncope or presyncope.    DO ARLETTE Hudson Gastroenterology Fellow, PGY-4

## 2024-09-04 NOTE — ED PROVIDER NOTES
"History  Chief Complaint   Patient presents with    Abnormal Lab     Was called to come into ED for elevated blood glucose. States she is not diabetic, but was \"borderline\" with last set of labs. Feels shaky and dehydrated.     69 yr female with hx of alcoholic liver dx-- went to see pmd several weeks of urinary frequent/ polydipsia- is on lasix- had outpt labs yesterday -- called today for elevated blood glucose- no fever- infectious comps- no cp      History provided by:  Patient   used: No        Prior to Admission Medications   Prescriptions Last Dose Informant Patient Reported? Taking?   Cholecalciferol (VITAMIN D3) 3000 UNITS TABS  Spouse/Significant Other, Self Yes No   Sig: Take 100 Units by mouth daily.   Magnesium Oxide 400 MG CAPS  Self No No   Sig: Take 1 tablet (400 mg total) by mouth 3 (three) times a day with meals   doxycycline monohydrate (MONODOX) 50 mg capsule  Spouse/Significant Other, Self Yes No   Sig: Take 50 mg by mouth daily   ferrous sulfate 325 (65 Fe) mg tablet  Spouse/Significant Other, Self Yes No   Sig: Take 325 mg by mouth daily with breakfast   furosemide (LASIX) 40 mg tablet   No No   Sig: TAKE 1 TABLET BY MOUTH EVERY DAY   hydrocortisone (ANUSOL-HC) 2.5 % rectal cream  Spouse/Significant Other, Self No No   Sig: Apply topically 2 (two) times a day for 10 days   lactulose (CHRONULAC) 10 g/15 mL solution  Spouse/Significant Other, Self No No   Sig: Take 45 mL (30 g total) by mouth 2 (two) times a day   pancrelipase, Lip-Prot-Amyl, (CREON) 24,000 units  Spouse/Significant Other, Self No No   Sig: Take 1 capsule (24,000 Units total) by mouth 3 (three) times a day with meals   Patient taking differently: Take 36,000 Units by mouth 3 (three) times a day with meals   pantoprazole (PROTONIX) 40 mg tablet  Spouse/Significant Other, Self No No   Sig: Take 1 tablet (40 mg total) by mouth 2 (two) times a day before meals   sodium bicarbonate 650 mg tablet  " Spouse/Significant Other, Self No No   Sig: Take 1 tablet (650 mg total) by mouth 2 (two) times a day   spironolactone (ALDACTONE) 50 mg tablet  Self No No   Sig: Take 2 tablets (100 mg total) by mouth daily   thiamine (VITAMIN B1) 100 mg tablet  Spouse/Significant Other, Self Yes No   Sig: Take 100 mg by mouth daily      Facility-Administered Medications: None       Past Medical History:   Diagnosis Date    Closed comminuted fracture of hip, left, initial encounter ([facility]ly [facility]) 11/15/2018    Closed intertrochanteric fracture, left, initial encounter ([facility]) 11/15/2018    Added automatically from request for surgery [id number]      Colon polyp     Diarrhea 10/26/2023    Hypertension     Hyponatremia     Liver disease     cirrhosis    Syndrome of inappropriate secretion of antidiuretic hormone (Former[facility]) 01/31/2024       Past Surgical History:   Procedure Laterality Date    CHOLECYSTECTOMY      EYE SURGERY      IR PARACENTESIS  12/21/2023    IR PARACENTESIS  03/13/2024    IR PARACENTESIS  03/27/2024    IR PARACENTESIS  04/10/2024    IR PARACENTESIS  04/24/2024    AR OPTX FEM SHFT FX W/INSJ IMED IMPLT W/WO SCREW Left 11/17/2018    Procedure: Intramedullary nail fixation left hip fracture;  Surgeon: Ross Herrera MD;  Location: [facility];  Service: Orthopedics       Family History   Problem Relation Age of Onset    Heart disease Mother     Heart disease Father      I have reviewed and agree with the history as documented.    E-Cigarette/Vaping    E-Cigarette Use Never User      E-Cigarette/Vaping Substances     Social History     Tobacco Use    Smoking status: Former    Smokeless tobacco: Never   Vaping Use    Vaping status: Never Used   Substance Use Topics    Alcohol use: Not Currently     Alcohol/week: 1.0 standard drink of alcohol     Types: 1 Cans of beer per week     Comment: 2-3 times per week. Drinks heavier before    Drug use: No       Review of Systems   Constitutional: Negative.    HENT: Negative.     Eyes: Negative.     Respiratory: Negative.     Cardiovascular: Negative.    Gastrointestinal: Negative.    Endocrine: Positive for polydipsia and polyuria. Negative for cold intolerance, heat intolerance and polyphagia.   Genitourinary: Negative.    Musculoskeletal: Negative.    Skin: Negative.    Allergic/Immunologic: Negative.    Neurological: Negative.    Hematological: Negative.    Psychiatric/Behavioral: Negative.         Physical Exam  Physical Exam  Vitals and nursing note reviewed.   Constitutional:       General: She is not in acute distress.     Appearance: Normal appearance. She is not ill-appearing, toxic-appearing or diaphoretic.      Comments: Avss-  htnsive- which resolved in er- in nad- - pulse ox 98 % on ra- interpretation is normal- no intervention    HENT:      Head: Normocephalic and atraumatic.      Nose: Nose normal.      Mouth/Throat:      Mouth: Mucous membranes are moist.   Eyes:      General: No scleral icterus.        Right eye: No discharge.         Left eye: No discharge.      Extraocular Movements: Extraocular movements intact.      Conjunctiva/sclera: Conjunctivae normal.      Pupils: Pupils are equal, round, and reactive to light.      Comments: Mm pink   Neck:      Vascular: No carotid bruit.      Comments: No pmt c/t/l/s spine   Cardiovascular:      Rate and Rhythm: Normal rate and regular rhythm.      Pulses: Normal pulses.      Heart sounds: Normal heart sounds. No murmur heard.     No friction rub. No gallop.   Pulmonary:      Effort: Pulmonary effort is normal. No respiratory distress.      Breath sounds: No stridor. No wheezing, rhonchi or rales.      Comments: Mild decreased bs at bases bilaterally   Chest:      Chest wall: No tenderness.   Abdominal:      General: Bowel sounds are normal. There is no distension.      Palpations: Abdomen is soft. There is no mass.      Tenderness: There is no abdominal tenderness. There is no right CVA tenderness, left CVA tenderness, guarding or rebound.       Hernia: No hernia is present.      Comments: Soft nt/nd- no peritoneal signs    Musculoskeletal:         General: No swelling, tenderness, deformity or signs of injury. Normal range of motion.      Cervical back: Normal range of motion and neck supple. No rigidity or tenderness.      Right lower leg: No edema.      Left lower leg: No edema.      Comments: Equal bilateral radial/dp pulses- no ble edema/calf tenderness/asym/ erythema   Lymphadenopathy:      Cervical: No cervical adenopathy.   Skin:     General: Skin is warm.      Capillary Refill: Capillary refill takes less than 2 seconds.      Coloration: Skin is not jaundiced or pale.      Findings: No bruising, erythema, lesion or rash.   Neurological:      General: No focal deficit present.      Mental Status: She is alert and oriented to person, place, and time. Mental status is at baseline.      Cranial Nerves: No cranial nerve deficit.      Sensory: No sensory deficit.      Motor: No weakness.      Coordination: Coordination normal.      Gait: Gait normal.      Comments: Normal non focal neuro exam- normal bue/ble strength/sensation    Psychiatric:         Mood and Affect: Mood normal.         Behavior: Behavior normal.         Thought Content: Thought content normal.         Judgment: Judgment normal.         Vital Signs  ED Triage Vitals   Temperature Pulse Respirations Blood Pressure SpO2   09/04/24 0953 09/04/24 0953 09/04/24 0953 09/04/24 0953 09/04/24 0953   98.1 °F (36.7 °C) 85 18 166/83 99 %      Temp Source Heart Rate Source Patient Position - Orthostatic VS BP Location FiO2 (%)   09/04/24 0953 09/04/24 0953 09/04/24 1215 09/04/24 0953 --   Oral Monitor Sitting Right arm       Pain Score       09/04/24 0953       No Pain           Vitals:    09/04/24 0953 09/04/24 1215 09/04/24 1556   BP: 166/83 148/70 117/63   Pulse: 85 76    Patient Position - Orthostatic VS:  Sitting          Visual Acuity      ED Medications  Medications   insulin regular (HumuLIN  R,NovoLIN R) 1 Units/mL in sodium chloride 0.9 % 100 mL infusion (11 Units/hr Intravenous New Bag 9/4/24 1556)   sodium chloride 0.9 % infusion (100 mL/hr Intravenous New Bag 9/4/24 1201)   Cholecalciferol (VITAMIN D3) tablet 400 Units (400 Units Oral Not Given 9/4/24 1201)   ferrous sulfate tablet 325 mg (has no administration in time range)   lactulose (CHRONULAC) oral solution 30 g (30 g Oral Not Given 9/4/24 1202)   magnesium Oxide (MAG-OX) tablet 400 mg (400 mg Oral Given 9/4/24 1218)   pancrelipase (Lip-Prot-Amyl) (CREON) delayed release capsule 36,000 Units (36,000 Units Oral Given 9/4/24 1219)   pantoprazole (PROTONIX) EC tablet 40 mg (has no administration in time range)   sodium bicarbonate tablet 650 mg (650 mg Oral Not Given 9/4/24 1202)   thiamine tablet 100 mg (100 mg Oral Not Given 9/4/24 1203)   heparin (porcine) subcutaneous injection 5,000 Units (5,000 Units Subcutaneous Given 9/4/24 1337)   lactated ringers bolus 500 mL (0 mL Intravenous Stopped 9/4/24 1127)       Diagnostic Studies  Results Reviewed       Procedure Component Value Units Date/Time    Basic metabolic panel [[id number]]     Lab Status: No result Specimen: Blood     Fingerstick Glucose (POCT) [[id number]]  (Abnormal) Collected: 09/04/24 1337    Lab Status: Final result Specimen: Blood Updated: 09/04/24 1339     POC Glucose 581 mg/dl     Hemoglobin A1C [[id number]]  (Abnormal) Collected: 09/03/24 0846    Lab Status: Final result Specimen: Blood from Arm, Left Updated: 09/04/24 1331     Hemoglobin A1C 11.4 %       mg/dl     Urinalysis with microscopic [[id number]]  (Abnormal) Collected: 09/04/24 1134    Lab Status: Final result Specimen: Urine, Clean Catch Updated: 09/04/24 1206     Color, UA Colorless     Clarity, UA Clear     Specific Gravity, UA 1.013     pH, UA 7.0     Leukocytes, UA Small     Nitrite, UA Negative     Protein, UA Negative mg/dl      Glucose, UA >=1000 (1%) mg/dl      Ketones, UA Negative mg/dl      Urobilinogen,  UA <2.0 mg/dl      Bilirubin, UA Negative     Occult Blood, UA Negative     RBC, UA 1-2 /hpf      WBC, UA 4-10 /hpf      Epithelial Cells Occasional /hpf      Bacteria, UA None Seen /hpf     Basic metabolic panel [[id number]]     Lab Status: No result Specimen: Blood     Fingerstick Glucose (POCT) [[id number]]  (Abnormal) Collected: 09/04/24 1151    Lab Status: Final result Specimen: Blood Updated: 09/04/24 1153     POC Glucose >600 mg/dl     Glutamic acid decarboxylase [[id number]]     Lab Status: No result Specimen: Blood     Anti-islet cell antibody [[id number]]     Lab Status: No result Specimen: Blood     Basic metabolic panel [[id number]]  (Abnormal) Collected: 09/04/24 1003    Lab Status: Final result Specimen: Blood from Arm, Left Updated: 09/04/24 1115     Sodium 120 mmol/L      Potassium 5.0 mmol/L      Chloride 84 mmol/L      CO2 26 mmol/L      ANION GAP 10 mmol/L      BUN 29 mg/dL      Creatinine 1.40 mg/dL      Glucose 748 mg/dL      Calcium 10.3 mg/dL      eGFR 38 ml/min/1.73sq m     Narrative:      [employer] guidelines for Chronic Kidney Disease (CKD):     Stage 1 with normal or high GFR (GFR > 90 mL/min/1.73 square meters)    Stage 2 Mild CKD (GFR = 60-89 mL/min/1.73 square meters)    Stage 3A Moderate CKD (GFR = 45-59 mL/min/1.73 square meters)    Stage 3B Moderate CKD (GFR = 30-44 mL/min/1.73 square meters)    Stage 4 Severe CKD (GFR = 15-29 mL/min/1.73 square meters)    Stage 5 End Stage CKD (GFR <15 mL/min/1.73 square meters)  Note: GFR calculation is accurate only with a steady state creatinine    Blood gas, venous [[id number]]  (Abnormal) Collected: 09/04/24 1003    Lab Status: Final result Specimen: Blood from Arm, Left Updated: 09/04/24 1011     pH, Bret 7.474     pCO2, Bret 33.0 mm Hg      pO2, Bret 45.8 mm Hg      HCO3, Bret 23.7 mmol/L      Base Excess, Bret 0.7 mmol/L      O2 Content, Bret 15.8 ml/dL      O2 HGB, VENOUS 82.3 %     Fingerstick Glucose (POCT) [[id number]]   (Abnormal) Collected: 09/04/24 0944    Lab Status: Final result Specimen: Blood Updated: 09/04/24 0946     POC Glucose >600 mg/dl                    No orders to display              Procedures  Procedures         ED Course  ED Course as of 09/04/24 1637   Wed Sep 04, 2024   1027 Er md note- tp took all of her meds this am -- new onset diabetes explained to pt  and  -aware of pending admit -- slim epic chated    1119 ER MD NOTE- SODIUM CORRECTION FOR DEGREE OF HYPERGLYCEMIA 130- 136                                 SBIRT 22yo+      Flowsheet Row Most Recent Value   Initial Alcohol Screen: US AUDIT-C     1. How often do you have a drink containing alcohol? 0 Filed at: 09/04/2024 0946   2. How many drinks containing alcohol do you have on a typical day you are drinking?  0 Filed at: 09/04/2024 0946   3a. Male UNDER 65: How often do you have five or more drinks on one occasion? 0 Filed at: 09/04/2024 0946   3b. FEMALE Any Age, or MALE 65+: How often do you have 4 or more drinks on one occassion? 0 Filed at: 09/04/2024 0946   Audit-C Score 0 Filed at: 09/04/2024 0946   HUSAM: How many times in the past year have you...    Used an illegal drug or used a prescription medication for non-medical reasons? Never Filed at: 09/04/2024 0946                      Medical Decision Making  Pt with likely new onset diabetes- doubt any  serious underlying inciting cause- ischemia/ infection- pt will need labs- hydration and admit    Problems Addressed:  Diabetes mellitus, new onset (HCC): acute illness or injury with systemic symptoms that poses a threat to life or bodily functions     Details: See chart and above   Hyperosmolar hyperglycemic state (HHS) (HCC): acute illness or injury with systemic symptoms that poses a threat to life or bodily functions     Details: See chart and above     Amount and/or Complexity of Data Reviewed  Independent Historian: spouse  External Data Reviewed: labs and notes.     Details: All r outpt  testing reviewed   Labs: ordered. Decision-making details documented in ED Course.     Details: All reviewed   ECG/medicine tests: ordered and independent interpretation performed. Decision-making details documented in ED Course.     Details: All reviewed   Discussion of management or test interpretation with external provider(s): Moderate amount of er md thought complexity and workup     Risk  Decision regarding hospitalization.                 Disposition  Final diagnoses:   Diabetes mellitus, new onset (HCC)   Hyperosmolar hyperglycemic state (HHS) (HCC)     Time reflects when diagnosis was documented in both MDM as applicable and the Disposition within this note       Time User Action Codes Description Comment    9/4/2024 10:29 AM Gregg Miranda Add [E11.9] Diabetes mellitus, new onset (HCC)     9/4/2024 11:24 AM Gregg Miranda Add [E11.00] Hyperosmolar hyperglycemic state (HHS) (HCC)           ED Disposition       ED Disposition   Admit    Condition   Stable    Date/Time   Wed Sep 4, 2024 1030    Comment   Case was discussed with dr valerio  and the patient's admission status was agreed to be Admission Status: inpatient status to the service of Dr. marrero               Follow-up Information    None         Current Discharge Medication List        CONTINUE these medications which have NOT CHANGED    Details   Cholecalciferol (VITAMIN D3) 3000 UNITS TABS Take 100 Units by mouth daily.      doxycycline monohydrate (MONODOX) 50 mg capsule Take 50 mg by mouth daily      ferrous sulfate 325 (65 Fe) mg tablet Take 325 mg by mouth daily with breakfast      furosemide (LASIX) 40 mg tablet TAKE 1 TABLET BY MOUTH EVERY DAY  Qty: 90 tablet, Refills: 1    Associated Diagnoses: Fluid overload      hydrocortisone (ANUSOL-HC) 2.5 % rectal cream Apply topically 2 (two) times a day for 10 days  Qty: 28 g, Refills: 0    Associated Diagnoses: Cirrhosis (HCC)      lactulose (CHRONULAC) 10 g/15 mL solution Take 45 mL (30 g  total) by mouth 2 (two) times a day  Qty: 8100 mL, Refills: 3    Associated Diagnoses: Cirrhosis (HCC)      Magnesium Oxide 400 MG CAPS Take 1 tablet (400 mg total) by mouth 3 (three) times a day with meals  Qty: 270 tablet, Refills: 3    Associated Diagnoses: Hypomagnesemia      pancrelipase, Lip-Prot-Amyl, (CREON) 24,000 units Take 1 capsule (24,000 Units total) by mouth 3 (three) times a day with meals  Qty: 90 capsule, Refills: 0    Associated Diagnoses: Other chronic pancreatitis (HCC)      pantoprazole (PROTONIX) 40 mg tablet Take 1 tablet (40 mg total) by mouth 2 (two) times a day before meals  Qty: 60 tablet, Refills: 0    Associated Diagnoses: Cirrhosis (HCC)      sodium bicarbonate 650 mg tablet Take 1 tablet (650 mg total) by mouth 2 (two) times a day  Qty: 180 tablet, Refills: 2    Associated Diagnoses: Metabolic acidosis      spironolactone (ALDACTONE) 50 mg tablet Take 2 tablets (100 mg total) by mouth daily  Qty: 180 tablet, Refills: 1    Associated Diagnoses: Stage 3 chronic kidney disease, unspecified whether stage 3a or 3b CKD (HCC); Hypomagnesemia; Decompensated hepatic cirrhosis (HCC); Ascites due to alcoholic cirrhosis (HCC)      thiamine (VITAMIN B1) 100 mg tablet Take 100 mg by mouth daily             No discharge procedures on file.    PDMP Review       None            ED Provider  Electronically Signed by             Gregg Miranda MD  09/04/24 1927

## 2024-09-04 NOTE — ED PROCEDURE NOTE
PROCEDURE  ECG 12 Lead Documentation Only    Date/Time: 9/4/2024 10:21 AM    Performed by: Gregg Miranda MD  Authorized by: Gregg Miranda MD    Indications / Diagnosis:  New onset dm  ECG reviewed by me, the ED Provider: yes    Patient location:  ED and bedside  Previous ECG:     Previous ECG:  Unavailable    Comparison to cardiac monitor: Yes    Interpretation:     Interpretation: non-specific    Rate:     ECG rate:  90    ECG rate assessment: normal    Rhythm:     Rhythm: sinus rhythm    Ectopy:     Ectopy: aberrant    QRS:     QRS axis:  Normal    QRS intervals:  Normal  Conduction:     Conduction: normal    ST segments:     ST segments:  Normal  T waves:     T waves: flattening      Flattening:  III and V3  Comments:      No ecg signs of ischemia/ injury        Gregg Miranda MD  09/04/24 1029

## 2024-09-04 NOTE — ASSESSMENT & PLAN NOTE
History of SIADH   Sodium 120 today corrects to 130 for glucose of 748  Monitor sodium with IV insulin

## 2024-09-04 NOTE — ASSESSMENT & PLAN NOTE
Lab Results   Component Value Date    EGFR 38 09/04/2024    EGFR 44 09/03/2024    EGFR 57 07/16/2024    CREATININE 1.40 (H) 09/04/2024    CREATININE 1.23 09/03/2024    CREATININE 1.00 07/16/2024     Creatinine baseline around 1-1.1  Admission creatinine 1.4 suspect prerenal in setting of dehydration from elevated glucose levels  Continue IVF  F/U AM Cr

## 2024-09-05 DIAGNOSIS — E11.9 INSULIN DEPENDENT TYPE 2 DIABETES MELLITUS (HCC): Primary | ICD-10-CM

## 2024-09-05 DIAGNOSIS — Z79.4 INSULIN DEPENDENT TYPE 2 DIABETES MELLITUS (HCC): Primary | ICD-10-CM

## 2024-09-05 LAB
ANION GAP SERPL CALCULATED.3IONS-SCNC: 5 MMOL/L (ref 4–13)
ANION GAP SERPL CALCULATED.3IONS-SCNC: 7 MMOL/L (ref 4–13)
ATRIAL RATE: 90 BPM
BUN SERPL-MCNC: 27 MG/DL (ref 5–25)
BUN SERPL-MCNC: 28 MG/DL (ref 5–25)
CALCIUM SERPL-MCNC: 10.2 MG/DL (ref 8.4–10.2)
CALCIUM SERPL-MCNC: 10.4 MG/DL (ref 8.4–10.2)
CHLORIDE SERPL-SCNC: 100 MMOL/L (ref 96–108)
CHLORIDE SERPL-SCNC: 98 MMOL/L (ref 96–108)
CO2 SERPL-SCNC: 27 MMOL/L (ref 21–32)
CO2 SERPL-SCNC: 29 MMOL/L (ref 21–32)
CREAT SERPL-MCNC: 1.21 MG/DL (ref 0.6–1.3)
CREAT SERPL-MCNC: 1.26 MG/DL (ref 0.6–1.3)
ERYTHROCYTE [DISTWIDTH] IN BLOOD BY AUTOMATED COUNT: 12.7 % (ref 11.6–15.1)
GFR SERPL CREATININE-BSD FRML MDRD: 43 ML/MIN/1.73SQ M
GFR SERPL CREATININE-BSD FRML MDRD: 45 ML/MIN/1.73SQ M
GLUCOSE SERPL-MCNC: 103 MG/DL (ref 65–140)
GLUCOSE SERPL-MCNC: 115 MG/DL (ref 65–140)
GLUCOSE SERPL-MCNC: 130 MG/DL (ref 65–140)
GLUCOSE SERPL-MCNC: 145 MG/DL (ref 65–140)
GLUCOSE SERPL-MCNC: 145 MG/DL (ref 65–140)
GLUCOSE SERPL-MCNC: 155 MG/DL (ref 65–140)
GLUCOSE SERPL-MCNC: 161 MG/DL (ref 65–140)
GLUCOSE SERPL-MCNC: 165 MG/DL (ref 65–140)
GLUCOSE SERPL-MCNC: 174 MG/DL (ref 65–140)
GLUCOSE SERPL-MCNC: 254 MG/DL (ref 65–140)
GLUCOSE SERPL-MCNC: 289 MG/DL (ref 65–140)
GLUCOSE SERPL-MCNC: 91 MG/DL (ref 65–140)
HCT VFR BLD AUTO: 30.9 % (ref 34.8–46.1)
HGB BLD-MCNC: 11.2 G/DL (ref 11.5–15.4)
MAGNESIUM SERPL-MCNC: 1.9 MG/DL (ref 1.9–2.7)
MCH RBC QN AUTO: 32.1 PG (ref 26.8–34.3)
MCHC RBC AUTO-ENTMCNC: 36.2 G/DL (ref 31.4–37.4)
MCV RBC AUTO: 89 FL (ref 82–98)
P AXIS: 70 DEGREES
PHOSPHATE SERPL-MCNC: 2.3 MG/DL (ref 2.3–4.1)
PLATELET # BLD AUTO: 96 THOUSANDS/UL (ref 149–390)
PMV BLD AUTO: 10.6 FL (ref 8.9–12.7)
POTASSIUM SERPL-SCNC: 4.7 MMOL/L (ref 3.5–5.3)
POTASSIUM SERPL-SCNC: 4.8 MMOL/L (ref 3.5–5.3)
PR INTERVAL: 140 MS
QRS AXIS: 51 DEGREES
QRSD INTERVAL: 78 MS
QT INTERVAL: 366 MS
QTC INTERVAL: 447 MS
RBC # BLD AUTO: 3.49 MILLION/UL (ref 3.81–5.12)
SODIUM SERPL-SCNC: 132 MMOL/L (ref 135–147)
SODIUM SERPL-SCNC: 134 MMOL/L (ref 135–147)
T WAVE AXIS: 66 DEGREES
VENTRICULAR RATE: 90 BPM
WBC # BLD AUTO: 7.01 THOUSAND/UL (ref 4.31–10.16)

## 2024-09-05 PROCEDURE — 93010 ELECTROCARDIOGRAM REPORT: CPT | Performed by: INTERNAL MEDICINE

## 2024-09-05 PROCEDURE — 84100 ASSAY OF PHOSPHORUS: CPT | Performed by: STUDENT IN AN ORGANIZED HEALTH CARE EDUCATION/TRAINING PROGRAM

## 2024-09-05 PROCEDURE — 82948 REAGENT STRIP/BLOOD GLUCOSE: CPT

## 2024-09-05 PROCEDURE — 83735 ASSAY OF MAGNESIUM: CPT | Performed by: STUDENT IN AN ORGANIZED HEALTH CARE EDUCATION/TRAINING PROGRAM

## 2024-09-05 PROCEDURE — 99232 SBSQ HOSP IP/OBS MODERATE 35: CPT

## 2024-09-05 PROCEDURE — 80048 BASIC METABOLIC PNL TOTAL CA: CPT | Performed by: STUDENT IN AN ORGANIZED HEALTH CARE EDUCATION/TRAINING PROGRAM

## 2024-09-05 PROCEDURE — 99222 1ST HOSP IP/OBS MODERATE 55: CPT | Performed by: INTERNAL MEDICINE

## 2024-09-05 PROCEDURE — 86341 ISLET CELL ANTIBODY: CPT | Performed by: STUDENT IN AN ORGANIZED HEALTH CARE EDUCATION/TRAINING PROGRAM

## 2024-09-05 PROCEDURE — 85027 COMPLETE CBC AUTOMATED: CPT | Performed by: STUDENT IN AN ORGANIZED HEALTH CARE EDUCATION/TRAINING PROGRAM

## 2024-09-05 RX ORDER — KETOROLAC TROMETHAMINE 30 MG/ML
INJECTION, SOLUTION INTRAMUSCULAR; INTRAVENOUS
Qty: 1 EACH | Refills: 0 | Status: SHIPPED | OUTPATIENT
Start: 2024-09-05

## 2024-09-05 RX ORDER — INSULIN GLARGINE 100 [IU]/ML
20 INJECTION, SOLUTION SUBCUTANEOUS
Status: DISCONTINUED | OUTPATIENT
Start: 2024-09-05 | End: 2024-09-06

## 2024-09-05 RX ORDER — INSULIN LISPRO 100 [IU]/ML
1-5 INJECTION, SOLUTION INTRAVENOUS; SUBCUTANEOUS
Status: DISCONTINUED | OUTPATIENT
Start: 2024-09-05 | End: 2024-09-07 | Stop reason: HOSPADM

## 2024-09-05 RX ORDER — BLOOD-GLUCOSE SENSOR
EACH MISCELLANEOUS
Qty: 2 EACH | Refills: 0 | Status: SHIPPED | OUTPATIENT
Start: 2024-09-05

## 2024-09-05 RX ORDER — INSULIN LISPRO 100 [IU]/ML
10 INJECTION, SOLUTION INTRAVENOUS; SUBCUTANEOUS
Status: DISCONTINUED | OUTPATIENT
Start: 2024-09-05 | End: 2024-09-06

## 2024-09-05 RX ADMIN — LACTULOSE 30 G: 20 SOLUTION ORAL at 08:46

## 2024-09-05 RX ADMIN — FERROUS SULFATE TAB 325 MG (65 MG ELEMENTAL FE) 325 MG: 325 (65 FE) TAB at 08:46

## 2024-09-05 RX ADMIN — Medication: at 08:46

## 2024-09-05 RX ADMIN — PANCRELIPASE 36000 UNITS: 120000; 24000; 76000 CAPSULE, DELAYED RELEASE PELLETS ORAL at 15:51

## 2024-09-05 RX ADMIN — CHOLECALCIFEROL (VITAMIN D3) 10 MCG (400 UNIT) TABLET 400 UNITS: at 08:46

## 2024-09-05 RX ADMIN — LACTULOSE 30 G: 20 SOLUTION ORAL at 17:34

## 2024-09-05 RX ADMIN — PANTOPRAZOLE SODIUM 40 MG: 40 TABLET, DELAYED RELEASE ORAL at 06:12

## 2024-09-05 RX ADMIN — INSULIN LISPRO 10 UNITS: 100 INJECTION, SOLUTION INTRAVENOUS; SUBCUTANEOUS at 17:34

## 2024-09-05 RX ADMIN — Medication 400 MG: at 12:36

## 2024-09-05 RX ADMIN — SODIUM BICARBONATE 650 MG: 650 TABLET ORAL at 08:46

## 2024-09-05 RX ADMIN — Medication 100 MG: at 08:46

## 2024-09-05 RX ADMIN — PANCRELIPASE 36000 UNITS: 120000; 24000; 76000 CAPSULE, DELAYED RELEASE PELLETS ORAL at 08:47

## 2024-09-05 RX ADMIN — Medication: at 04:25

## 2024-09-05 RX ADMIN — SODIUM BICARBONATE 650 MG: 650 TABLET ORAL at 17:34

## 2024-09-05 RX ADMIN — HEPARIN SODIUM 5000 UNITS: 5000 INJECTION INTRAVENOUS; SUBCUTANEOUS at 22:55

## 2024-09-05 RX ADMIN — Medication 400 MG: at 15:51

## 2024-09-05 RX ADMIN — Medication 400 MG: at 08:46

## 2024-09-05 RX ADMIN — PANCRELIPASE 36000 UNITS: 120000; 24000; 76000 CAPSULE, DELAYED RELEASE PELLETS ORAL at 12:35

## 2024-09-05 RX ADMIN — PANTOPRAZOLE SODIUM 40 MG: 40 TABLET, DELAYED RELEASE ORAL at 15:51

## 2024-09-05 RX ADMIN — HEPARIN SODIUM 5000 UNITS: 5000 INJECTION INTRAVENOUS; SUBCUTANEOUS at 06:15

## 2024-09-05 RX ADMIN — HEPARIN SODIUM 5000 UNITS: 5000 INJECTION INTRAVENOUS; SUBCUTANEOUS at 15:51

## 2024-09-05 RX ADMIN — INSULIN GLARGINE 20 UNITS: 100 INJECTION, SOLUTION SUBCUTANEOUS at 22:56

## 2024-09-05 NOTE — ASSESSMENT & PLAN NOTE
History of SIADH   Improved with hyperglycemia management   Continue to monitor on BMP daily   Follows outpatient with nephrology

## 2024-09-05 NOTE — PLAN OF CARE
Problem: PAIN - ADULT  Goal: Verbalizes/displays adequate comfort level or baseline comfort level  Description: Interventions:  - Encourage patient to monitor pain and request assistance  - Assess pain using appropriate pain scale  - Administer analgesics based on type and severity of pain and evaluate response  - Implement non-pharmacological measures as appropriate and evaluate response  - Consider cultural and social influences on pain and pain management  - Notify physician/advanced practitioner if interventions unsuccessful or patient reports new pain  9/5/2024 1044 by Cielo Wells RN  Outcome: Progressing  9/5/2024 1043 by Cielo Wells RN  Outcome: Progressing     Problem: INFECTION - ADULT  Goal: Absence or prevention of progression during hospitalization  Description: INTERVENTIONS:  - Assess and monitor for signs and symptoms of infection  - Monitor lab/diagnostic results  - Monitor all insertion sites, i.e. indwelling lines, tubes, and drains  - Monitor endotracheal if appropriate and nasal secretions for changes in amount and color  - [place] appropriate cooling/warming therapies per order  - Administer medications as ordered  - Instruct and encourage patient and family to use good hand hygiene technique  - Identify and instruct in appropriate isolation precautions for identified infection/condition  9/5/2024 1044 by Cielo Wells RN  Outcome: Progressing  9/5/2024 1043 by Cielo Wells RN  Outcome: Progressing  Goal: Absence of fever/infection during neutropenic period  Description: INTERVENTIONS:  - Monitor WBC    9/5/2024 1044 by Cielo Wells RN  Outcome: Progressing  9/5/2024 1043 by Cielo Wells RN  Outcome: Progressing     Problem: SAFETY ADULT  Goal: Patient will remain free of falls  Description: INTERVENTIONS:  - Educate patient/family on patient safety including physical limitations  - Instruct patient to call for assistance with activity   - Consult OT/PT to assist with strengthening/mobility    - Keep Call bell within reach  - Keep bed low and locked with side rails adjusted as appropriate  - Keep care items and personal belongings within reach  - Initiate and maintain comfort rounds  - Make Fall Risk Sign visible to staff  - Offer Toileting every  Hours, in advance of need  - Initiate/Maintain alarm  - Obtain necessary fall risk management equipment:  - Apply yellow socks and bracelet for high fall risk patients  - Consider moving patient to room near nurses station  9/5/2024 1044 by Cielo Wells RN  Outcome: Progressing  9/5/2024 1043 by Cielo Wells RN  Outcome: Progressing  Goal: Maintain or return to baseline ADL function  Description: INTERVENTIONS:  -  Assess patient's ability to carry out ADLs; assess patient's baseline for ADL function and identify physical deficits which impact ability to perform ADLs (bathing, care of mouth/teeth, toileting, grooming, dressing, etc.)  - Assess/evaluate cause of self-care deficits   - Assess range of motion  - Assess patient's mobility; develop plan if impaired  - Assess patient's need for assistive devices and provide as appropriate  - Encourage maximum independence but intervene and supervise when necessary  - Involve family in performance of ADLs  - Assess for home care needs following discharge   - Consider OT consult to assist with ADL evaluation and planning for discharge  - Provide patient education as appropriate  9/5/2024 1044 by Cielo Wells RN  Outcome: Progressing  9/5/2024 1043 by Cielo Wells RN  Outcome: Progressing  Goal: Maintains/Returns to pre admission functional level  Description: INTERVENTIONS:  - Perform AM-PAC 6 Click Basic Mobility/ Daily Activity assessment daily.  - Set and communicate daily mobility goal to care team and patient/family/caregiver.   - Collaborate with rehabilitation services on mobility goals if consulted  - Perform Range of Motion  times a day.  - Reposition patient every  hours.  - Dangle patient  times a day  -  Stand patient  times a day  - Ambulate patient  times a day  - Out of bed to chair  times a day   - Out of bed for meals  times a day  - Out of bed for toileting  - Record patient progress and toleration of activity level   9/5/2024 1044 by Cielo Wells RN  Outcome: Progressing  9/5/2024 1043 by Cielo Wells RN  Outcome: Progressing     Problem: DISCHARGE PLANNING  Goal: Discharge to home or other facility with appropriate resources  Description: INTERVENTIONS:  - Identify barriers to discharge w/patient and caregiver  - Arrange for needed discharge resources and transportation as appropriate  - Identify discharge learning needs (meds, wound care, etc.)  - Arrange for interpretive services to assist at discharge as needed  - Refer to Case Management Department for coordinating discharge planning if the patient needs post-hospital services based on physician/advanced practitioner order or complex needs related to functional status, cognitive ability, or social support system  9/5/2024 1044 by Cielo Wells RN  Outcome: Progressing  9/5/2024 1043 by Cielo Wells RN  Outcome: Progressing     Problem: Knowledge Deficit  Goal: Patient/family/caregiver demonstrates understanding of disease process, treatment plan, medications, and discharge instructions  Description: Complete learning assessment and assess knowledge base.  Interventions:  - Provide teaching at level of understanding  - Provide teaching via preferred learning methods  9/5/2024 1044 by Cielo Wells RN  Outcome: Progressing  9/5/2024 1043 by Cielo Wells RN  Outcome: Progressing

## 2024-09-05 NOTE — ASSESSMENT & PLAN NOTE
Lab Results   Component Value Date    EGFR 45 09/05/2024    EGFR 43 09/05/2024    EGFR 37 09/04/2024    CREATININE 1.21 09/05/2024    CREATININE 1.26 09/05/2024    CREATININE 1.44 (H) 09/04/2024     Creatinine baseline around 1-1.2  Admission creatinine 1.4 suspect prerenal in setting of dehydration from elevated glucose levels  Restarted diuretics  Monitor

## 2024-09-05 NOTE — PLAN OF CARE
Problem: PAIN - ADULT  Goal: Verbalizes/displays adequate comfort level or baseline comfort level  Description: Interventions:  - Encourage patient to monitor pain and request assistance  - Assess pain using appropriate pain scale  - Administer analgesics based on type and severity of pain and evaluate response  - Implement non-pharmacological measures as appropriate and evaluate response  - Consider cultural and social influences on pain and pain management  - Notify physician/advanced practitioner if interventions unsuccessful or patient reports new pain  Outcome: Progressing     Problem: INFECTION - ADULT  Goal: Absence or prevention of progression during hospitalization  Description: INTERVENTIONS:  - Assess and monitor for signs and symptoms of infection  - Monitor lab/diagnostic results  - Monitor all insertion sites, i.e. indwelling lines, tubes, and drains  - Monitor endotracheal if appropriate and nasal secretions for changes in amount and color  - [place] appropriate cooling/warming therapies per order  - Administer medications as ordered  - Instruct and encourage patient and family to use good hand hygiene technique  - Identify and instruct in appropriate isolation precautions for identified infection/condition  Outcome: Progressing  Goal: Absence of fever/infection during neutropenic period  Description: INTERVENTIONS:  - Monitor WBC    Outcome: Progressing     Problem: SAFETY ADULT  Goal: Patient will remain free of falls  Description: INTERVENTIONS:  - Educate patient/family on patient safety including physical limitations  - Instruct patient to call for assistance with activity   - Consult OT/PT to assist with strengthening/mobility   - Keep Call bell within reach  - Keep bed low and locked with side rails adjusted as appropriate  - Keep care items and personal belongings within reach  - Initiate and maintain comfort rounds  - Make Fall Risk Sign visible to staff  - Apply yellow socks and bracelet  for high fall risk patients  - Consider moving patient to room near nurses station  Outcome: Progressing  Goal: Maintain or return to baseline ADL function  Description: INTERVENTIONS:  -  Assess patient's ability to carry out ADLs; assess patient's baseline for ADL function and identify physical deficits which impact ability to perform ADLs (bathing, care of mouth/teeth, toileting, grooming, dressing, etc.)  - Assess/evaluate cause of self-care deficits   - Assess range of motion  - Assess patient's mobility; develop plan if impaired  - Assess patient's need for assistive devices and provide as appropriate  - Encourage maximum independence but intervene and supervise when necessary  - Involve family in performance of ADLs  - Assess for home care needs following discharge   - Consider OT consult to assist with ADL evaluation and planning for discharge  - Provide patient education as appropriate  Outcome: Progressing  Goal: Maintains/Returns to pre admission functional level  Description: INTERVENTIONS:  - Perform AM-PAC 6 Click Basic Mobility/ Daily Activity assessment daily.  - Set and communicate daily mobility goal to care team and patient/family/caregiver.   - Collaborate with rehabilitation services on mobility goals if consulted  - Out of bed for toileting  - Record patient progress and toleration of activity level   Outcome: Progressing     Problem: DISCHARGE PLANNING  Goal: Discharge to home or other facility with appropriate resources  Description: INTERVENTIONS:  - Identify barriers to discharge w/patient and caregiver  - Arrange for needed discharge resources and transportation as appropriate  - Identify discharge learning needs (meds, wound care, etc.)  - Arrange for interpretive services to assist at discharge as needed  - Refer to Case Management Department for coordinating discharge planning if the patient needs post-hospital services based on physician/advanced practitioner order or complex needs  related to functional status, cognitive ability, or social support system  Outcome: Progressing     Problem: Knowledge Deficit  Goal: Patient/family/caregiver demonstrates understanding of disease process, treatment plan, medications, and discharge instructions  Description: Complete learning assessment and assess knowledge base.  Interventions:  - Provide teaching at level of understanding  - Provide teaching via preferred learning methods  Outcome: Progressing

## 2024-09-05 NOTE — ASSESSMENT & PLAN NOTE
"Lab Results   Component Value Date    HGBA1C 11.4 (H) 09/03/2024       Recent Labs     09/05/24  0409 09/05/24  0606 09/05/24  0759 09/05/24  1022   POCGLU 103 130 155* 289*       Blood Sugar Average: Last 72 hrs:  (P) 181.3764453962340648    Patient was sent to the ER for abnormal outpatient labs with glucose of 547 on outpatient labs. Does admit to feeling \"off\" the last couple of days  POC glucose on arrival was 600    Last A1c on January 2024 was 4.8  Repeat A1c on admission: 11.4  Pt was started on insulin drip - suspect transition to basal/bolus dosing tonight    Follow-up glutamic acid decarboxylase, antiislet antibodies, and zinc transporter    Patient has a history of alcohol abuse with cirrhosis.  Currently on Creon due to pancreatic insufficiency  Suspect new diabetes diagnosis is 2/2 multiple previous episodes of pancreatitis leading to destruction of islet cells    Endo consulted, awaiting recs     "

## 2024-09-05 NOTE — PROGRESS NOTES
"[facility]  Progress Note  Name: Cassidy Zhang I  MRN: [id number]  Unit/Bed#: [facility]BARO -01 I Date of Admission: 9/4/2024   Date of Service: 9/5/2024 I Hospital Day: 1    Assessment & Plan   * Newly diagnosed diabetes (HCC)  Assessment & Plan  Lab Results   Component Value Date    HGBA1C 11.4 (H) 09/03/2024       Recent Labs     09/05/24  0409 09/05/24  0606 09/05/24  0759 09/05/24  1022   POCGLU 103 130 155* 289*         Blood Sugar Average: Last 72 hrs:  (P) 181.2384360362892771    Patient was sent to the ER for abnormal outpatient labs with glucose of 547 on outpatient labs. Does admit to feeling \"off\" the last couple of days  POC glucose on arrival was 600    Last A1c on January 2024 was 4.8  Repeat A1c on admission: 11.4  Pt was started on insulin drip - suspect transition to basal/bolus dosing tonight    Follow-up glutamic acid decarboxylase, antiislet antibodies, and zinc transporter    Patient has a history of alcohol abuse with cirrhosis.  Currently on Creon due to pancreatic insufficiency  Suspect new diabetes diagnosis is 2/2 multiple previous episodes of pancreatitis leading to destruction of islet cells    Endo consulted, awaiting recs       Hypercalcemia  Assessment & Plan  10.4 on am labs   Ionized calcium ordered for the am   Continue to monitor     Alcoholic cirrhosis of liver with ascites (HCC)  Assessment & Plan  Patient follows with GI in outpatient setting  Lasix and Aldactone previously held d/t elevated Cr, can likely resume tomorrow     CKD (chronic kidney disease) stage 3, GFR 30-59 ml/min ([facility])  Assessment & Plan  Lab Results   Component Value Date    EGFR 45 09/05/2024    EGFR 43 09/05/2024    EGFR 37 09/04/2024    CREATININE 1.21 09/05/2024    CREATININE 1.26 09/05/2024    CREATININE 1.44 (H) 09/04/2024     Creatinine baseline around 1-1.1  Admission creatinine 1.4 suspect prerenal in setting of dehydration from elevated glucose levels  Lasix and aldactone being " held, can likely be resumed tomorrow     Chronic pancreatitis (HCC)  Assessment & Plan  Due to history of alcohol abuse   Maintained on creon, continue     Chronic hyponatremia  Assessment & Plan  History of SIADH   Improved with hyperglycemia management   Continue to monitor on bmp daily   Follows outpatient with nephrology               VTE Pharmacologic Prophylaxis:   High Risk (Score >/= 5) - Pharmacological DVT Prophylaxis Ordered: heparin. Sequential Compression Devices Ordered.    Mobility:   Basic Mobility Inpatient Raw Score: 19  JH-HLM Goal: 6: Walk 10 steps or more  JH-HLM Achieved: 6: Walk 10 steps or more  JH-HLM Goal achieved. Continue to encourage appropriate mobility.    Patient Centered Rounds: I performed bedside rounds with nursing staff today.   Discussions with Specialists or Other Care Team Provider: Endo    Education and Discussions with Family / Patient: Updated  () at bedside.    Total Time Spent on Date of Encounter in care of patient:  This time was spent on one or more of the following: performing physical exam; counseling and coordination of care; obtaining or reviewing history; documenting in the medical record; reviewing/ordering tests, medications or procedures; communicating with other healthcare professionals and discussing with patient's family/caregivers.    Current Length of Stay: 1 day(s)  Current Patient Status: Inpatient   Certification Statement: The patient will continue to require additional inpatient hospital stay due to continued blood glucose monitoring, endo consultation   Discharge Plan: Anticipate discharge in 24-48 hrs to home.    Code Status: Level 1 - Full Code    Subjective:   Seen and examined.  No acute events overnight.  Patient states that she was feeling a little bit off the last couple of days, however has never had a blood sugar problem.  Does have family history of diabetes.  Discussed plan to continue IV insulin and have endocrinology  evaluate later today.    Objective:     Vitals:   Temp (24hrs), Av °F (36.7 °C), Min:97.6 °F (36.4 °C), Max:98.2 °F (36.8 °C)    Temp:  [97.6 °F (36.4 °C)-98.2 °F (36.8 °C)] 98.2 °F (36.8 °C)  HR:  [75-78] 78  Resp:  [15-18] 15  BP: (109-148)/(56-70) 109/59  SpO2:  [97 %-99 %] 99 %  Body mass index is 22.3 kg/m².     Input and Output Summary (last 24 hours):     Intake/Output Summary (Last 24 hours) at 2024 1140  Last data filed at 2024 0815  Gross per 24 hour   Intake 1408.27 ml   Output --   Net 1408.27 ml       Physical Exam:   Physical Exam  Constitutional:       General: She is not in acute distress.  HENT:      Mouth/Throat:      Mouth: Mucous membranes are moist.   Eyes:      Conjunctiva/sclera: Conjunctivae normal.   Cardiovascular:      Heart sounds: Normal heart sounds.   Pulmonary:      Breath sounds: Normal breath sounds.   Abdominal:      General: There is no distension.      Palpations: Abdomen is soft.      Tenderness: There is no abdominal tenderness.   Skin:     General: Skin is warm and dry.   Neurological:      Mental Status: Mental status is at baseline.          Additional Data:     Labs:  Results from last 7 days   Lab Units 24  0433 24  0846   WBC Thousand/uL 7.01 4.22*   HEMOGLOBIN g/dL 11.2* 12.7   HEMATOCRIT % 30.9* 35.0   PLATELETS Thousands/uL 96* 96*   SEGS PCT %  --  58   LYMPHO PCT %  --  32   MONO PCT %  --  7   EOS PCT %  --  2     Results from last 7 days   Lab Units 24  0433 24  1003 24  0846   SODIUM mmol/L 134*   < > 126*   POTASSIUM mmol/L 4.8   < > 4.3   CHLORIDE mmol/L 100   < > 84*   CO2 mmol/L 27   < > 29   BUN mg/dL 27*   < > 24   CREATININE mg/dL 1.21   < > 1.23   ANION GAP mmol/L 7   < > 13   CALCIUM mg/dL 10.4*   < > 10.7*   ALBUMIN g/dL  --   --  4.2   TOTAL BILIRUBIN mg/dL  --   --  2.07*   ALK PHOS U/L  --   --  188*   ALT U/L  --   --  11   AST U/L  --   --  19   GLUCOSE RANDOM mg/dL 91   < >  --     < > = values in this  interval not displayed.     Results from last 7 days   Lab Units 09/03/24  0846   INR  1.12     Results from last 7 days   Lab Units 09/05/24  1022 09/05/24  0759 09/05/24  0606 09/05/24  0409 09/05/24  0200 09/05/24  0015 09/04/24  2200 09/04/24  2002 09/04/24  1802 09/04/24  1553 09/04/24  1337 09/04/24  1151   POC GLUCOSE mg/dl 289* 155* 130 103 145* 161* 126 131 215* 329* 581* >600*     Results from last 7 days   Lab Units 09/03/24  0846   HEMOGLOBIN A1C % 11.4*           Lines/Drains:  Invasive Devices       Peripheral Intravenous Line  Duration             Peripheral IV 09/04/24 Left Forearm 1 day                          Imaging: No pertinent imaging reviewed.    Recent Cultures (last 7 days):         Last 24 Hours Medication List:   Current Facility-Administered Medications   Medication Dose Route Frequency Provider Last Rate    Cholecalciferol  400 Units Oral Daily Pandi Todhe, DO      ferrous sulfate  325 mg Oral Daily With Breakfast Pandi Todhe, DO      heparin (porcine)  5,000 Units Subcutaneous Q8H HAJA Pandi Todhe, DO      insulin regular (HumuLIN R,NovoLIN R) 1 Units/mL in sodium chloride 0.9 % 100 mL infusion  0.3-21 Units/hr Intravenous Titrated Pandi Todhe, DO 7 Units/hr (09/05/24 1022)    lactulose  30 g Oral BID Pandi Todhe, DO      magnesium Oxide  400 mg Oral TID With Meals Pandi Todhe, DO      pancrelipase (Lip-Prot-Amyl)  36,000 Units Oral TID With Meals Pandi Todhe, DO      pantoprazole  40 mg Oral BID AC Pandi Todhe, DO      sodium bicarbonate  650 mg Oral BID Pandi Todhe, DO      sodium chloride  100 mL/hr Intravenous Continuous Pandi Todhe,  mL/hr (09/04/24 1201)    thiamine  100 mg Oral Daily Pandi Todhe, DO      white petrolatum-mineral oil   Topical TID PRN AQUILES Wiseman          Today, Patient Was Seen By: Bernie Blair PA-C    **Please Note: This note may have been constructed using a voice recognition system.**

## 2024-09-05 NOTE — ASSESSMENT & PLAN NOTE
"Lab Results   Component Value Date    HGBA1C 11.4 (H) 09/03/2024       Recent Labs     09/05/24  0409 09/05/24  0606 09/05/24  0759 09/05/24  1022   POCGLU 103 130 155* 289*         Blood Sugar Average: Last 72 hrs:  (P) 181.1776589455261348    Patient was sent to the ER for abnormal outpatient labs with glucose of 547 on outpatient labs. Does admit to feeling \"off\" the last couple of days  POC glucose on arrival was 600  Last A1c on January 2024 was 4.8  Repeat A1c on admission: 11.4  Suspect secondary to pancreatic insufficiency therefore being managed as Type 1  Glutamic acid decarboxylase, anti-islet antibodies, and zinc transporter pending   Status post insulin drip   Now on basal-bolus dosing   Lantus 20 U QHS  Humalog 10 U TID   Level 2 SSI   On going insulin teaching   Will be on Dexcom outpatient   Appreciate Endocrinology input       "

## 2024-09-05 NOTE — ASSESSMENT & PLAN NOTE
Lab Results   Component Value Date    EGFR 43 09/05/2024    EGFR 37 09/04/2024    EGFR 46 09/04/2024    CREATININE 1.26 09/05/2024    CREATININE 1.44 (H) 09/04/2024    CREATININE 1.20 09/04/2024     Creatinine baseline around 1-1.1  Admission creatinine 1.4 suspect prerenal in setting of dehydration from elevated glucose levels  Lasix and aldactone being held, can likely be resumed tomorrow

## 2024-09-05 NOTE — ASSESSMENT & PLAN NOTE
Patient follows with GI in outpatient setting  Lasix and Aldactone previously held d/t elevated creatinine   Resume 9/6

## 2024-09-05 NOTE — ASSESSMENT & PLAN NOTE
Patient follows with GI in outpatient setting  Lasix and Aldactone previously held d/t elevated Cr, can likely resume tomorrow

## 2024-09-05 NOTE — CONSULTS
"Consultation - Cassidy Zhang 69 y.o. female MRN: [id number]    Unit/Bed#: [facility]BARO -01 Encounter: [id number]      Assessment & Plan     Assessment:  This is a 70 yo female with a PMH of Decompensated Cirrhosis secondary to chronic alcohol use disorder, complicated with Hepatic Encephalopathy and Ascites, Chronic Pancreatic insufficiency (on Creon) secondary to chronic pancreatitis, CKD Stage 3, and HTN  who presented for hyperglycemia requiring insulin gtt    Plan:  Diabetes Mellitus, with hyperglycemia, to be managed as Type 1  - likely in setting of chronic pancreatic insufficiency and liver dysfunction  A1c: 11.4%  - currently on insulin gtt; this can be discontinued  - can be transitioned to basal bolus regimen  - recommend basal glargine 20 units at bedtime and meal time lispro 10 units TID  - start correctional scale 2 with meals  - patient will require diabetic education on insulin administration while in hospital    - discussed pathophysiology of diabetes mellitus. Educated on different types of insulin and onset of action. Explained that she would require 4 insulin injections a day (3 short-acting with meals, and 1 long-acting insulin at bedtime)  - discussed importance of glycemic control and monitoring. Due to initiation of insulin and potential for hypoglycemia (in setting of liver and kidney disease) recommend being discharged with CGM (order has been provided).     History of Present Illness     HPI: Cassidy Zhang is a 69 y.o. year old female who presented on 9/4 with abnormal labs showing hyperglycemia (BG >600).   In ER glucose in BMP was 748, with Bicarb 26, and pH 7.47. Urine analysis showed glucose, but no ketones. Patient was started on insulin gtt, prompting Endocrinology consult.   She reports having increased thirst and urinary frequency for the last 2 weeks.   She was told by her nephrologist that she had \"high sugar\" at their last visit. She has never diagnosed with diabetes in the past. "   She denies abdominal pain, diarrhea, lightheadedness, syncope, and change in appetite.   She reports having pancreatic insufficiency for many years due to multiple episodes of pancreatitis (>8 episodes). She also has a history of decompensated liver cirrhosis secondary to chronic alcohol use disorder, complicated with hepatic encephalopathy and ascites last year.      FH: multiple family members had DM Type 2     Inpatient consult to Endocrinology  Consult performed by: Mikala Trevino MD  Consult ordered by: Bernie Blair PA-C          Review of Systems   Constitutional:  Positive for fatigue. Negative for appetite change, chills, diaphoresis, fever and unexpected weight change.   HENT:  Negative for ear pain and sore throat.    Eyes:  Negative for pain and visual disturbance.   Respiratory:  Negative for cough and shortness of breath.    Cardiovascular:  Negative for chest pain and palpitations.   Gastrointestinal:  Negative for abdominal pain, constipation, diarrhea, nausea and vomiting.   Endocrine: Negative for polydipsia, polyphagia and polyuria.   Genitourinary:  Negative for dysuria and hematuria.   Musculoskeletal:  Negative for arthralgias and back pain.   Skin:  Negative for color change and rash.   Neurological:  Negative for dizziness, seizures, syncope, weakness, light-headedness and headaches.   Psychiatric/Behavioral:  Negative for confusion, decreased concentration and sleep disturbance.    All other systems reviewed and are negative.      Historical Information   Past Medical History:   Diagnosis Date    Closed comminuted fracture of hip, left, initial encounter ([facility]) 11/15/2018    Closed intertrochanteric fracture, left, initial encounter ([facility]) 11/15/2018    Added automatically from request for surgery [id number]      Colon polyp     Diarrhea 10/26/2023    Hypertension     Hyponatremia     Liver disease     cirrhosis    Syndrome of inappropriate secretion of antidiuretic hormone ([facility])  01/31/2024     Past Surgical History:   Procedure Laterality Date    CHOLECYSTECTOMY      EYE SURGERY      IR PARACENTESIS  12/21/2023    IR PARACENTESIS  03/13/2024    IR PARACENTESIS  03/27/2024    IR PARACENTESIS  04/10/2024    IR PARACENTESIS  04/24/2024    NM OPTX FEM SHFT FX W/INSJ IMED IMPLT W/WO SCREW Left 11/17/2018    Procedure: Intramedullary nail fixation left hip fracture;  Surgeon: Ross Herrera MD;  Location: [facility];  Service: Orthopedics     Social History   Social History     Substance and Sexual Activity   Alcohol Use Not Currently    Alcohol/week: 1.0 standard drink of alcohol    Types: 1 Cans of beer per week    Comment: 2-3 times per week. Drinks heavier before     Social History     Substance and Sexual Activity   Drug Use No     Social History     Tobacco Use   Smoking Status Former   Smokeless Tobacco Never     E-Cigarette/Vaping    E-Cigarette Use Never User      E-Cigarette/Vaping Substances      Family History:   Family History   Problem Relation Age of Onset    Heart disease Mother     Heart disease Father        Meds/Allergies   all current active meds have been reviewed  Allergies   Allergen Reactions    Oxycodone-Acetaminophen Palpitations and Tachycardia    Demerol [Meperidine] Other (See Comments)     hallucinations    Diphenhydramine Other (See Comments)     hallucinations    Milk (Cow) Diarrhea    Other Other (See Comments)    Prednisone Tremor    Sulfa Antibiotics Other (See Comments)     hallucinations    Sulfasalazine Hallucinations       Objective   Vitals: Blood pressure 109/59, pulse 78, temperature 98.2 °F (36.8 °C), resp. rate 15, height 5' (1.524 m), weight 51.8 kg (114 lb 3.2 oz), SpO2 99%.    Intake/Output Summary (Last 24 hours) at 9/5/2024 1407  Last data filed at 9/5/2024 0815  Gross per 24 hour   Intake 1408.27 ml   Output --   Net 1408.27 ml     Invasive Devices       Peripheral Intravenous Line  Duration             Peripheral IV 09/04/24 Left Forearm 1  day                    Physical Exam  Vitals and nursing note reviewed.   Constitutional:       General: She is not in acute distress.     Appearance: Normal appearance. She is well-developed.      Comments: muscle wasting noted   HENT:      Head: Normocephalic and atraumatic.      Mouth/Throat:      Mouth: Mucous membranes are moist.      Pharynx: Oropharynx is clear.   Eyes:      Extraocular Movements: Extraocular movements intact.      Conjunctiva/sclera: Conjunctivae normal.   Cardiovascular:      Rate and Rhythm: Normal rate and regular rhythm.      Heart sounds: No murmur heard.  Pulmonary:      Effort: Pulmonary effort is normal. No respiratory distress.      Breath sounds: Normal breath sounds.   Abdominal:      General: Abdomen is flat.      Palpations: Abdomen is soft.      Tenderness: There is no abdominal tenderness.   Musculoskeletal:         General: No swelling. Normal range of motion.      Cervical back: Normal range of motion and neck supple.   Skin:     General: Skin is warm and dry.      Capillary Refill: Capillary refill takes less than 2 seconds.   Neurological:      Mental Status: She is alert and oriented to person, place, and time.   Psychiatric:         Mood and Affect: Mood normal.         Behavior: Behavior normal.         Thought Content: Thought content normal.         Judgment: Judgment normal.         Lab Results: I have personally reviewed pertinent reports.    Imaging Studies: I have personally reviewed pertinent reports.    EKG, Pathology, and Other Studies: I have personally reviewed pertinent reports.

## 2024-09-06 PROBLEM — E87.1 CHRONIC HYPONATREMIA: Status: RESOLVED | Noted: 2018-11-15 | Resolved: 2024-09-06

## 2024-09-06 PROBLEM — E83.52 HYPERCALCEMIA: Status: RESOLVED | Noted: 2024-05-16 | Resolved: 2024-09-06

## 2024-09-06 LAB
ANION GAP SERPL CALCULATED.3IONS-SCNC: 6 MMOL/L (ref 4–13)
BUN SERPL-MCNC: 20 MG/DL (ref 5–25)
CA-I BLD-SCNC: 1.24 MMOL/L (ref 1.12–1.32)
CALCIUM SERPL-MCNC: 9.8 MG/DL (ref 8.4–10.2)
CHLORIDE SERPL-SCNC: 101 MMOL/L (ref 96–108)
CO2 SERPL-SCNC: 27 MMOL/L (ref 21–32)
CREAT SERPL-MCNC: 0.97 MG/DL (ref 0.6–1.3)
ERYTHROCYTE [DISTWIDTH] IN BLOOD BY AUTOMATED COUNT: 13 % (ref 11.6–15.1)
GFR SERPL CREATININE-BSD FRML MDRD: 59 ML/MIN/1.73SQ M
GLUCOSE SERPL-MCNC: 156 MG/DL (ref 65–140)
GLUCOSE SERPL-MCNC: 206 MG/DL (ref 65–140)
GLUCOSE SERPL-MCNC: 208 MG/DL (ref 65–140)
GLUCOSE SERPL-MCNC: 217 MG/DL (ref 65–140)
GLUCOSE SERPL-MCNC: 358 MG/DL (ref 65–140)
HCT VFR BLD AUTO: 31.4 % (ref 34.8–46.1)
HGB BLD-MCNC: 11 G/DL (ref 11.5–15.4)
MCH RBC QN AUTO: 32.4 PG (ref 26.8–34.3)
MCHC RBC AUTO-ENTMCNC: 35 G/DL (ref 31.4–37.4)
MCV RBC AUTO: 92 FL (ref 82–98)
PLATELET # BLD AUTO: 93 THOUSANDS/UL (ref 149–390)
PMV BLD AUTO: 11 FL (ref 8.9–12.7)
POTASSIUM SERPL-SCNC: 4.6 MMOL/L (ref 3.5–5.3)
RBC # BLD AUTO: 3.4 MILLION/UL (ref 3.81–5.12)
SODIUM SERPL-SCNC: 134 MMOL/L (ref 135–147)
WBC # BLD AUTO: 5.18 THOUSAND/UL (ref 4.31–10.16)

## 2024-09-06 PROCEDURE — 82330 ASSAY OF CALCIUM: CPT

## 2024-09-06 PROCEDURE — 80048 BASIC METABOLIC PNL TOTAL CA: CPT

## 2024-09-06 PROCEDURE — 82948 REAGENT STRIP/BLOOD GLUCOSE: CPT

## 2024-09-06 PROCEDURE — 99232 SBSQ HOSP IP/OBS MODERATE 35: CPT | Performed by: PHYSICIAN ASSISTANT

## 2024-09-06 PROCEDURE — 99232 SBSQ HOSP IP/OBS MODERATE 35: CPT | Performed by: INTERNAL MEDICINE

## 2024-09-06 PROCEDURE — 85027 COMPLETE CBC AUTOMATED: CPT

## 2024-09-06 RX ORDER — INSULIN LISPRO 100 [IU]/ML
18 INJECTION, SOLUTION INTRAVENOUS; SUBCUTANEOUS
Status: DISCONTINUED | OUTPATIENT
Start: 2024-09-06 | End: 2024-09-07 | Stop reason: HOSPADM

## 2024-09-06 RX ORDER — INSULIN GLARGINE 100 [IU]/ML
24 INJECTION, SOLUTION SUBCUTANEOUS
Status: DISCONTINUED | OUTPATIENT
Start: 2024-09-06 | End: 2024-09-07 | Stop reason: HOSPADM

## 2024-09-06 RX ORDER — SPIRONOLACTONE 100 MG/1
100 TABLET, FILM COATED ORAL DAILY
Status: DISCONTINUED | OUTPATIENT
Start: 2024-09-06 | End: 2024-09-07 | Stop reason: HOSPADM

## 2024-09-06 RX ORDER — FUROSEMIDE 40 MG
40 TABLET ORAL DAILY
Status: DISCONTINUED | OUTPATIENT
Start: 2024-09-06 | End: 2024-09-07 | Stop reason: HOSPADM

## 2024-09-06 RX ADMIN — PANTOPRAZOLE SODIUM 40 MG: 40 TABLET, DELAYED RELEASE ORAL at 06:37

## 2024-09-06 RX ADMIN — INSULIN LISPRO 18 UNITS: 100 INJECTION, SOLUTION INTRAVENOUS; SUBCUTANEOUS at 16:37

## 2024-09-06 RX ADMIN — Medication 100 MG: at 08:04

## 2024-09-06 RX ADMIN — HEPARIN SODIUM 5000 UNITS: 5000 INJECTION INTRAVENOUS; SUBCUTANEOUS at 06:38

## 2024-09-06 RX ADMIN — Medication 400 MG: at 08:04

## 2024-09-06 RX ADMIN — LACTULOSE 30 G: 20 SOLUTION ORAL at 17:14

## 2024-09-06 RX ADMIN — CHOLECALCIFEROL (VITAMIN D3) 10 MCG (400 UNIT) TABLET 400 UNITS: at 08:04

## 2024-09-06 RX ADMIN — SODIUM BICARBONATE 650 MG: 650 TABLET ORAL at 17:14

## 2024-09-06 RX ADMIN — SODIUM BICARBONATE 650 MG: 650 TABLET ORAL at 08:04

## 2024-09-06 RX ADMIN — HEPARIN SODIUM 5000 UNITS: 5000 INJECTION INTRAVENOUS; SUBCUTANEOUS at 22:15

## 2024-09-06 RX ADMIN — PANCRELIPASE 36000 UNITS: 120000; 24000; 76000 CAPSULE, DELAYED RELEASE PELLETS ORAL at 08:05

## 2024-09-06 RX ADMIN — Medication 400 MG: at 16:04

## 2024-09-06 RX ADMIN — Medication 400 MG: at 12:16

## 2024-09-06 RX ADMIN — INSULIN LISPRO 2 UNITS: 100 INJECTION, SOLUTION INTRAVENOUS; SUBCUTANEOUS at 16:38

## 2024-09-06 RX ADMIN — PANCRELIPASE 36000 UNITS: 120000; 24000; 76000 CAPSULE, DELAYED RELEASE PELLETS ORAL at 16:44

## 2024-09-06 RX ADMIN — INSULIN LISPRO 10 UNITS: 100 INJECTION, SOLUTION INTRAVENOUS; SUBCUTANEOUS at 12:16

## 2024-09-06 RX ADMIN — HEPARIN SODIUM 5000 UNITS: 5000 INJECTION INTRAVENOUS; SUBCUTANEOUS at 16:04

## 2024-09-06 RX ADMIN — INSULIN GLARGINE 24 UNITS: 100 INJECTION, SOLUTION SUBCUTANEOUS at 22:16

## 2024-09-06 RX ADMIN — INSULIN LISPRO 4 UNITS: 100 INJECTION, SOLUTION INTRAVENOUS; SUBCUTANEOUS at 12:16

## 2024-09-06 RX ADMIN — FUROSEMIDE 40 MG: 40 TABLET ORAL at 12:16

## 2024-09-06 RX ADMIN — INSULIN LISPRO 1 UNITS: 100 INJECTION, SOLUTION INTRAVENOUS; SUBCUTANEOUS at 08:00

## 2024-09-06 RX ADMIN — PANCRELIPASE 36000 UNITS: 120000; 24000; 76000 CAPSULE, DELAYED RELEASE PELLETS ORAL at 12:16

## 2024-09-06 RX ADMIN — FERROUS SULFATE TAB 325 MG (65 MG ELEMENTAL FE) 325 MG: 325 (65 FE) TAB at 08:04

## 2024-09-06 RX ADMIN — PANTOPRAZOLE SODIUM 40 MG: 40 TABLET, DELAYED RELEASE ORAL at 16:04

## 2024-09-06 RX ADMIN — LACTULOSE 30 G: 20 SOLUTION ORAL at 08:05

## 2024-09-06 RX ADMIN — SPIRONOLACTONE 100 MG: 100 TABLET ORAL at 12:16

## 2024-09-06 RX ADMIN — INSULIN LISPRO 10 UNITS: 100 INJECTION, SOLUTION INTRAVENOUS; SUBCUTANEOUS at 08:00

## 2024-09-06 NOTE — PLAN OF CARE
Problem: PAIN - ADULT  Goal: Verbalizes/displays adequate comfort level or baseline comfort level  Description: Interventions:  - Encourage patient to monitor pain and request assistance  - Assess pain using appropriate pain scale  - Administer analgesics based on type and severity of pain and evaluate response  - Implement non-pharmacological measures as appropriate and evaluate response  - Consider cultural and social influences on pain and pain management  - Notify physician/advanced practitioner if interventions unsuccessful or patient reports new pain  Outcome: Progressing     Problem: INFECTION - ADULT  Goal: Absence or prevention of progression during hospitalization  Description: INTERVENTIONS:  - Assess and monitor for signs and symptoms of infection  - Monitor lab/diagnostic results  - Monitor all insertion sites, i.e. indwelling lines, tubes, and drains  - Monitor endotracheal if appropriate and nasal secretions for changes in amount and color  - [place] appropriate cooling/warming therapies per order  - Administer medications as ordered  - Instruct and encourage patient and family to use good hand hygiene technique  - Identify and instruct in appropriate isolation precautions for identified infection/condition  Outcome: Progressing  Goal: Absence of fever/infection during neutropenic period  Description: INTERVENTIONS:  - Monitor WBC    Outcome: Progressing     Problem: SAFETY ADULT  Goal: Patient will remain free of falls  Description: INTERVENTIONS:  - Educate patient/family on patient safety including physical limitations  - Instruct patient to call for assistance with activity   - Consult OT/PT to assist with strengthening/mobility   - Keep Call bell within reach  - Keep bed low and locked with side rails adjusted as appropriate  - Keep care items and personal belongings within reach  - Initiate and maintain comfort rounds  - Make Fall Risk Sign visible to staff  - Offer Toileting every  Hours,  in advance of need  - Initiate/Maintain alarm  - Obtain necessary fall risk management equipment:  - Apply yellow socks and bracelet for high fall risk patients  - Consider moving patient to room near nurses station  Outcome: Progressing  Goal: Maintain or return to baseline ADL function  Description: INTERVENTIONS:  -  Assess patient's ability to carry out ADLs; assess patient's baseline for ADL function and identify physical deficits which impact ability to perform ADLs (bathing, care of mouth/teeth, toileting, grooming, dressing, etc.)  - Assess/evaluate cause of self-care deficits   - Assess range of motion  - Assess patient's mobility; develop plan if impaired  - Assess patient's need for assistive devices and provide as appropriate  - Encourage maximum independence but intervene and supervise when necessary  - Involve family in performance of ADLs  - Assess for home care needs following discharge   - Consider OT consult to assist with ADL evaluation and planning for discharge  - Provide patient education as appropriate  Outcome: Progressing  Goal: Maintains/Returns to pre admission functional level  Description: INTERVENTIONS:  - Perform AM-PAC 6 Click Basic Mobility/ Daily Activity assessment daily.  - Set and communicate daily mobility goal to care team and patient/family/caregiver.   - Collaborate with rehabilitation services on mobility goals if consulted  - Perform Range of Motion  times a day.  - Reposition patient every  hours.  - Dangle patient  times a day  - Stand patient  times a day  - Ambulate patient  times a day  - Out of bed to chair  times a day   - Out of bed for meals  times a day  - Out of bed for toileting  - Record patient progress and toleration of activity level   Outcome: Progressing     Problem: DISCHARGE PLANNING  Goal: Discharge to home or other facility with appropriate resources  Description: INTERVENTIONS:  - Identify barriers to discharge w/patient and caregiver  - Arrange for  needed discharge resources and transportation as appropriate  - Identify discharge learning needs (meds, wound care, etc.)  - Arrange for interpretive services to assist at discharge as needed  - Refer to Case Management Department for coordinating discharge planning if the patient needs post-hospital services based on physician/advanced practitioner order or complex needs related to functional status, cognitive ability, or social support system  Outcome: Progressing     Problem: Knowledge Deficit  Goal: Patient/family/caregiver demonstrates understanding of disease process, treatment plan, medications, and discharge instructions  Description: Complete learning assessment and assess knowledge base.  Interventions:  - Provide teaching at level of understanding  - Provide teaching via preferred learning methods  Outcome: Progressing

## 2024-09-06 NOTE — PROGRESS NOTES
"Progress Note - Cassidy Zhagn 69 y.o. female MRN: [id number]    Unit/Bed#: [facility]BARO -01 Encounter: [id number]      Assessment:  This is a 70 yo female with a PMH of Decompensated Cirrhosis secondary to chronic alcohol use disorder, complicated with Hepatic Encephalopathy and Ascites, Chronic Pancreatic insufficiency (on Creon) secondary to chronic pancreatitis, CKD Stage 3, and HTN  who presented for hyperglycemia requiring insulin gtt     Plan:  Diabetes Mellitus, with hyperglycemia, to be managed as Type 1   A1c: 11.4%  - currently on basal bolus regimen: Lantus 20 qHS and Lispro 10 TID  - increase basal lantus to 24 units  - and increase meal-time Lispro to 18 units  -- correctional: A2    Discharge Recommendations:   - prescribe basal Lantus pen and meal-time Lispro pen with pen needles  - script for CGM-sensor has already been provided   - follow with outpatient endocrinologist in 2 months    Subjective:   She is doing much better and feeling more \"like herself\". She was able to learn how to administer insulin to herself today. She is tolerating current regimen well.     Objective:     Vitals: Blood pressure 126/69, pulse 80, temperature 98.6 °F (37 °C), resp. rate 16, height 5' (1.524 m), weight 52.4 kg (115 lb 9.6 oz), SpO2 99%.,Body mass index is 22.58 kg/m².      Intake/Output Summary (Last 24 hours) at 9/6/2024 1727  Last data filed at 9/6/2024 1716  Gross per 24 hour   Intake 1000 ml   Output 500 ml   Net 500 ml       Physical Exam:   Constitutional:       General: She is not in acute distress.     Appearance: Normal appearance. She is well-developed.      Comments: muscle wasting noted   Eyes:      Extraocular Movements: Extraocular movements intact.      Conjunctiva/sclera: Conjunctivae normal.   Cardiovascular:      Rate and Rhythm: Normal rate and regular rhythm.      Heart sounds: No murmur heard.  Pulmonary:      Effort: Pulmonary effort is normal. No respiratory distress.      Breath sounds: " Normal breath sounds.   Abdominal:      General: Abdomen is flat.      Palpations: Abdomen is soft.      Tenderness: There is no abdominal tenderness.   Musculoskeletal:         General: No swelling. Normal range of motion.      Cervical back: Normal range of motion and neck supple.   Neurological:      Mental Status: She is alert and oriented to person, place, and time.   Psychiatric:         Mood and Affect: Mood normal.         Behavior: Behavior normal.         Thought Content: Thought content normal.         Judgment: Judgment normal        Lab, Imaging and other studies: I have personally reviewed pertinent reports.

## 2024-09-06 NOTE — PLAN OF CARE
Problem: PAIN - ADULT  Goal: Verbalizes/displays adequate comfort level or baseline comfort level  Description: Interventions:  - Encourage patient to monitor pain and request assistance  - Assess pain using appropriate pain scale  - Administer analgesics based on type and severity of pain and evaluate response  - Implement non-pharmacological measures as appropriate and evaluate response  - Consider cultural and social influences on pain and pain management  - Notify physician/advanced practitioner if interventions unsuccessful or patient reports new pain  Outcome: Progressing     Problem: INFECTION - ADULT  Goal: Absence or prevention of progression during hospitalization  Description: INTERVENTIONS:  - Assess and monitor for signs and symptoms of infection  - Monitor lab/diagnostic results  - Monitor all insertion sites, i.e. indwelling lines, tubes, and drains  - Monitor endotracheal if appropriate and nasal secretions for changes in amount and color  - [place] appropriate cooling/warming therapies per order  - Administer medications as ordered  - Instruct and encourage patient and family to use good hand hygiene technique  - Identify and instruct in appropriate isolation precautions for identified infection/condition  Outcome: Progressing  Goal: Absence of fever/infection during neutropenic period  Description: INTERVENTIONS:  - Monitor WBC    Outcome: Progressing     Problem: SAFETY ADULT  Goal: Patient will remain free of falls  Description: INTERVENTIONS:  - Educate patient/family on patient safety including physical limitations  - Instruct patient to call for assistance with activity   - Consult OT/PT to assist with strengthening/mobility   - Keep Call bell within reach  - Keep bed low and locked with side rails adjusted as appropriate  - Keep care items and personal belongings within reach  - Initiate and maintain comfort rounds  - Make Fall Risk Sign visible to staff  - Offer Toileting every  Hours,  in advance of need  - Initiate/Maintain alarm  - Obtain necessary fall risk management equipment:  - Apply yellow socks and bracelet for high fall risk patients  - Consider moving patient to room near nurses station  Outcome: Progressing  Goal: Maintain or return to baseline ADL function  Description: INTERVENTIONS:  -  Assess patient's ability to carry out ADLs; assess patient's baseline for ADL function and identify physical deficits which impact ability to perform ADLs (bathing, care of mouth/teeth, toileting, grooming, dressing, etc.)  - Assess/evaluate cause of self-care deficits   - Assess range of motion  - Assess patient's mobility; develop plan if impaired  - Assess patient's need for assistive devices and provide as appropriate  - Encourage maximum independence but intervene and supervise when necessary  - Involve family in performance of ADLs  - Assess for home care needs following discharge   - Consider OT consult to assist with ADL evaluation and planning for discharge  - Provide patient education as appropriate  Outcome: Progressing  Goal: Maintains/Returns to pre admission functional level  Description: INTERVENTIONS:  - Perform AM-PAC 6 Click Basic Mobility/ Daily Activity assessment daily.  - Set and communicate daily mobility goal to care team and patient/family/caregiver.   - Collaborate with rehabilitation services on mobility goals if consulted  - Perform Range of Motion  times a day.  - Reposition patient every  hours.  - Dangle patient  times a day  - Stand patient  times a day  - Ambulate patient  times a day  - Out of bed to chair  times a day   - Out of bed for meals  times a day  - Out of bed for toileting  - Record patient progress and toleration of activity level   Outcome: Progressing     Problem: DISCHARGE PLANNING  Goal: Discharge to home or other facility with appropriate resources  Description: INTERVENTIONS:  - Identify barriers to discharge w/patient and caregiver  - Arrange for  needed discharge resources and transportation as appropriate  - Identify discharge learning needs (meds, wound care, etc.)  - Arrange for interpretive services to assist at discharge as needed  - Refer to Case Management Department for coordinating discharge planning if the patient needs post-hospital services based on physician/advanced practitioner order or complex needs related to functional status, cognitive ability, or social support system  Outcome: Progressing     Problem: Knowledge Deficit  Goal: Patient/family/caregiver demonstrates understanding of disease process, treatment plan, medications, and discharge instructions  Description: Complete learning assessment and assess knowledge base.  Interventions:  - Provide teaching at level of understanding  - Provide teaching via preferred learning methods  Outcome: Progressing

## 2024-09-06 NOTE — PROGRESS NOTES
"[facility]  Progress Note  Name: Cassidy Zhang I  MRN: [id number]  Unit/Bed#: ALBARO -01 I Date of Admission: 9/4/2024   Date of Service: 9/6/2024 I Hospital Day: 2    Assessment & Plan   * Newly diagnosed diabetes (HCC)  Assessment & Plan  Lab Results   Component Value Date    HGBA1C 11.4 (H) 09/03/2024       Recent Labs     09/05/24  0409 09/05/24  0606 09/05/24  0759 09/05/24  1022   POCGLU 103 130 155* 289*         Blood Sugar Average: Last 72 hrs:  (P) 181.8376301707903013    Patient was sent to the ER for abnormal outpatient labs with glucose of 547 on outpatient labs. Does admit to feeling \"off\" the last couple of days  POC glucose on arrival was 600  Last A1c on January 2024 was 4.8  Repeat A1c on admission: 11.4  Suspect secondary to pancreatic insufficiency therefore being managed as Type 1  Glutamic acid decarboxylase, anti-islet antibodies, and zinc transporter pending   Status post insulin drip   Now on basal-bolus dosing   Lantus 20 U QHS  Humalog 10 U TID   Level 2 SSI   On going insulin teaching   Will be on Dexcom outpatient   Appreciate Endocrinology input         Chronic hyponatremia-resolved as of 9/6/2024  Assessment & Plan  History of SIADH   Improved with hyperglycemia management   Continue to monitor on BMP daily   Follows outpatient with nephrology          Chronic pancreatitis (HCC)  Assessment & Plan  Due to history of alcohol abuse   Maintained on Creon, continue     Alcoholic cirrhosis of liver with ascites (HCC)  Assessment & Plan  Patient follows with GI in outpatient setting  Lasix and Aldactone previously held d/t elevated creatinine   Resume 9/6    CKD (chronic kidney disease) stage 3, GFR 30-59 ml/min ([facility])  Assessment & Plan  Lab Results   Component Value Date    EGFR 45 09/05/2024    EGFR 43 09/05/2024    EGFR 37 09/04/2024    CREATININE 1.21 09/05/2024    CREATININE 1.26 09/05/2024    CREATININE 1.44 (H) 09/04/2024     Creatinine baseline around " 1-1.2  Admission creatinine 1.4 suspect prerenal in setting of dehydration from elevated glucose levels  Restarted diuretics  Monitor     Hypercalcemia-resolved as of 2024  Assessment & Plan  Ionized normal  Monitor intermittently                VTE Pharmacologic Prophylaxis:   Moderate Risk (Score 3-4) - Pharmacological DVT Prophylaxis Ordered: heparin.    Mobility:   Basic Mobility Inpatient Raw Score: 19  JH-HLM Goal: 6: Walk 10 steps or more  JH-HLM Achieved: 6: Walk 10 steps or more  JH-HLM Goal achieved. Continue to encourage appropriate mobility.    Patient Centered Rounds: I performed bedside rounds with nursing staff today.   Discussions with Specialists or Other Care Team Provider: Discussed with RN, SOCORRO    Education and Discussions with Family / Patient: Patient declined call to .     Total Time Spent on Date of Encounter in care of patient: 35 mins. This time was spent on one or more of the following: performing physical exam; counseling and coordination of care; obtaining or reviewing history; documenting in the medical record; reviewing/ordering tests, medications or procedures; communicating with other healthcare professionals and discussing with patient's family/caregivers.    Current Length of Stay: 2 day(s)  Current Patient Status: Inpatient   Certification Statement: The patient will continue to require additional inpatient hospital stay due to monitoring glucose   Discharge Plan: Anticipate discharge tomorrow to home.    Code Status: Level 1 - Full Code    Subjective:   Patient reports feeling better today. She is worried about insulin, but is still going through teaching.     Objective:     Vitals:   Temp (24hrs), Av.1 °F (36.7 °C), Min:98 °F (36.7 °C), Max:98.2 °F (36.8 °C)    Temp:  [98 °F (36.7 °C)-98.2 °F (36.8 °C)] 98 °F (36.7 °C)  HR:  [70-74] 70  Resp:  [16] 16  BP: (110-118)/(57-58) 118/58  SpO2:  [97 %-100 %] 97 %  Body mass index is 22.58 kg/m².     Input and  Output Summary (last 24 hours):     Intake/Output Summary (Last 24 hours) at 9/6/2024 1157  Last data filed at 9/6/2024 0900  Gross per 24 hour   Intake 460 ml   Output --   Net 460 ml       Physical Exam:   Physical Exam  Constitutional:       General: She is not in acute distress.     Appearance: Normal appearance. She is normal weight. She is not ill-appearing or diaphoretic.   HENT:      Head: Normocephalic and atraumatic.      Mouth/Throat:      Mouth: Mucous membranes are moist.   Eyes:      General: No scleral icterus.     Pupils: Pupils are equal, round, and reactive to light.   Cardiovascular:      Rate and Rhythm: Normal rate and regular rhythm.      Pulses: Normal pulses.      Heart sounds: Normal heart sounds, S1 normal and S2 normal. No murmur heard.     No systolic murmur is present.      No diastolic murmur is present.      No gallop. No S3 or S4 sounds.   Pulmonary:      Effort: Pulmonary effort is normal. No accessory muscle usage or respiratory distress.      Breath sounds: Normal breath sounds. No stridor. No decreased breath sounds, wheezing, rhonchi or rales.   Chest:      Chest wall: No tenderness.   Abdominal:      General: Bowel sounds are normal. There is no distension.      Palpations: Abdomen is soft.      Tenderness: There is no abdominal tenderness. There is no guarding.   Musculoskeletal:      Right lower leg: No edema.      Left lower leg: No edema.   Skin:     General: Skin is warm and dry.      Coloration: Skin is not jaundiced.   Neurological:      General: No focal deficit present.      Mental Status: She is alert. Mental status is at baseline.      Motor: No tremor or seizure activity.   Psychiatric:         Behavior: Behavior is cooperative.          Additional Data:     Labs:  Results from last 7 days   Lab Units 09/06/24  0504 09/05/24  0433 09/03/24  0846   WBC Thousand/uL 5.18   < > 4.22*   HEMOGLOBIN g/dL 11.0*   < > 12.7   HEMATOCRIT % 31.4*   < > 35.0   PLATELETS  Thousands/uL 93*   < > 96*   SEGS PCT %  --   --  58   LYMPHO PCT %  --   --  32   MONO PCT %  --   --  7   EOS PCT %  --   --  2    < > = values in this interval not displayed.     Results from last 7 days   Lab Units 09/06/24  0504 09/04/24  1003 09/03/24  0846   SODIUM mmol/L 134*   < > 126*   POTASSIUM mmol/L 4.6   < > 4.3   CHLORIDE mmol/L 101   < > 84*   CO2 mmol/L 27   < > 29   BUN mg/dL 20   < > 24   CREATININE mg/dL 0.97   < > 1.23   ANION GAP mmol/L 6   < > 13   CALCIUM mg/dL 9.8   < > 10.7*   ALBUMIN g/dL  --   --  4.2   TOTAL BILIRUBIN mg/dL  --   --  2.07*   ALK PHOS U/L  --   --  188*   ALT U/L  --   --  11   AST U/L  --   --  19   GLUCOSE RANDOM mg/dL 206*   < >  --     < > = values in this interval not displayed.     Results from last 7 days   Lab Units 09/03/24  0846   INR  1.12     Results from last 7 days   Lab Units 09/06/24  1055 09/06/24  0736 09/05/24  2058 09/05/24  1659 09/05/24  1509 09/05/24  1202 09/05/24  1022 09/05/24  0759 09/05/24  0606 09/05/24  0409 09/05/24  0200 09/05/24  0015   POC GLUCOSE mg/dl 358* 208* 165* 115 174* 254* 289* 155* 130 103 145* 161*     Results from last 7 days   Lab Units 09/03/24  0846   HEMOGLOBIN A1C % 11.4*           Lines/Drains:  Invasive Devices       Peripheral Intravenous Line  Duration             Peripheral IV 09/04/24 Left Forearm 2 days                          Imaging: No pertinent imaging reviewed.    Recent Cultures (last 7 days):         Last 24 Hours Medication List:   Current Facility-Administered Medications   Medication Dose Route Frequency Provider Last Rate    Cholecalciferol  400 Units Oral Daily Vitaliyi Toalycee, DO      ferrous sulfate  325 mg Oral Daily With Breakfast Stacie Toalycee, DO      furosemide  40 mg Oral Daily Julio Rutledge PA-C      heparin (porcine)  5,000 Units Subcutaneous Q8H [facility] Stacie Ibrahim DO      insulin glargine  20 Units Subcutaneous HS Mikala Trevino MD      insulin lispro  1-5 Units Subcutaneous TID AC Mikala  MD Uriel      insulin lispro  10 Units Subcutaneous TID With Meals Mikala Trevino MD      lactulose  30 g Oral BID Pandi Todhe, DO      magnesium Oxide  400 mg Oral TID With Meals Pandi Todhe, DO      pancrelipase (Lip-Prot-Amyl)  36,000 Units Oral TID With Meals Pandi Todhe, DO      pantoprazole  40 mg Oral BID AC Pandi Todhe, DO      sodium bicarbonate  650 mg Oral BID Pandi Todhe, DO      spironolactone  100 mg Oral Daily Julio Rutledge PA-C      thiamine  100 mg Oral Daily Pandi Todhe, DO      white petrolatum-mineral oil   Topical TID PRN AQUILES Wiseman          Today, Patient Was Seen By: Julio Rutledge PA-C    **Please Note: This note may have been constructed using a voice recognition system.**

## 2024-09-07 ENCOUNTER — TELEPHONE (OUTPATIENT)
Dept: OTHER | Facility: OTHER | Age: 69
End: 2024-09-07

## 2024-09-07 VITALS
TEMPERATURE: 98.1 F | OXYGEN SATURATION: 98 % | HEART RATE: 71 BPM | HEIGHT: 60 IN | WEIGHT: 119.71 LBS | DIASTOLIC BLOOD PRESSURE: 66 MMHG | SYSTOLIC BLOOD PRESSURE: 126 MMHG | BODY MASS INDEX: 23.5 KG/M2 | RESPIRATION RATE: 18 BRPM

## 2024-09-07 LAB
ANION GAP SERPL CALCULATED.3IONS-SCNC: 7 MMOL/L (ref 4–13)
BUN SERPL-MCNC: 21 MG/DL (ref 5–25)
CALCIUM SERPL-MCNC: 9.4 MG/DL (ref 8.4–10.2)
CHLORIDE SERPL-SCNC: 100 MMOL/L (ref 96–108)
CO2 SERPL-SCNC: 27 MMOL/L (ref 21–32)
CREAT SERPL-MCNC: 1.13 MG/DL (ref 0.6–1.3)
GFR SERPL CREATININE-BSD FRML MDRD: 49 ML/MIN/1.73SQ M
GLUCOSE SERPL-MCNC: 122 MG/DL (ref 65–140)
GLUCOSE SERPL-MCNC: 145 MG/DL (ref 65–140)
GLUCOSE SERPL-MCNC: 242 MG/DL (ref 65–140)
GLUCOSE SERPL-MCNC: 256 MG/DL (ref 65–140)
POTASSIUM SERPL-SCNC: 3.8 MMOL/L (ref 3.5–5.3)
SODIUM SERPL-SCNC: 134 MMOL/L (ref 135–147)

## 2024-09-07 PROCEDURE — 99239 HOSP IP/OBS DSCHRG MGMT >30: CPT | Performed by: PHYSICIAN ASSISTANT

## 2024-09-07 PROCEDURE — 82948 REAGENT STRIP/BLOOD GLUCOSE: CPT

## 2024-09-07 PROCEDURE — 80048 BASIC METABOLIC PNL TOTAL CA: CPT | Performed by: PHYSICIAN ASSISTANT

## 2024-09-07 RX ORDER — LANCETS 33 GAUGE
EACH MISCELLANEOUS
Qty: 200 EACH | Refills: 0 | Status: SHIPPED | OUTPATIENT
Start: 2024-09-07 | End: 2024-09-07

## 2024-09-07 RX ORDER — INSULIN GLARGINE 100 [IU]/ML
24 INJECTION, SOLUTION SUBCUTANEOUS
Qty: 15 ML | Refills: 0 | Status: SHIPPED | OUTPATIENT
Start: 2024-09-07 | End: 2024-09-14

## 2024-09-07 RX ORDER — GLUCOSAMINE HCL/CHONDROITIN SU 500-400 MG
CAPSULE ORAL
Qty: 200 EACH | Refills: 0 | Status: SHIPPED | OUTPATIENT
Start: 2024-09-07

## 2024-09-07 RX ORDER — PEN NEEDLE, DIABETIC 32GX 5/32"
NEEDLE, DISPOSABLE MISCELLANEOUS
Qty: 100 EACH | Refills: 0 | Status: SHIPPED | OUTPATIENT
Start: 2024-09-07 | End: 2024-09-07

## 2024-09-07 RX ORDER — INSULIN ASPART INJECTION 100 [IU]/ML
18 INJECTION, SOLUTION SUBCUTANEOUS
Qty: 15 ML | Refills: 0 | Status: SHIPPED | OUTPATIENT
Start: 2024-09-07 | End: 2024-09-07

## 2024-09-07 RX ORDER — BLOOD SUGAR DIAGNOSTIC
STRIP MISCELLANEOUS
Qty: 200 EACH | Refills: 0 | Status: SHIPPED | OUTPATIENT
Start: 2024-09-07 | End: 2024-09-07

## 2024-09-07 RX ORDER — BLOOD-GLUCOSE METER
KIT MISCELLANEOUS
Qty: 1 KIT | Refills: 0 | Status: SHIPPED | OUTPATIENT
Start: 2024-09-07

## 2024-09-07 RX ORDER — BLOOD-GLUCOSE METER
KIT MISCELLANEOUS
Qty: 1 KIT | Refills: 0 | Status: SHIPPED | OUTPATIENT
Start: 2024-09-07 | End: 2024-09-07

## 2024-09-07 RX ORDER — PEN NEEDLE, DIABETIC 32GX 5/32"
NEEDLE, DISPOSABLE MISCELLANEOUS
Qty: 100 EACH | Refills: 0 | Status: SHIPPED | OUTPATIENT
Start: 2024-09-07

## 2024-09-07 RX ORDER — BLOOD SUGAR DIAGNOSTIC
STRIP MISCELLANEOUS
Qty: 200 EACH | Refills: 0 | Status: SHIPPED | OUTPATIENT
Start: 2024-09-07

## 2024-09-07 RX ORDER — LANCETS 33 GAUGE
EACH MISCELLANEOUS
Qty: 200 EACH | Refills: 0 | Status: SHIPPED | OUTPATIENT
Start: 2024-09-07

## 2024-09-07 RX ORDER — INSULIN ASPART INJECTION 100 [IU]/ML
18 INJECTION, SOLUTION SUBCUTANEOUS
Qty: 15 ML | Refills: 2 | Status: SHIPPED | OUTPATIENT
Start: 2024-09-07 | End: 2024-09-11 | Stop reason: SDUPTHER

## 2024-09-07 RX ADMIN — SPIRONOLACTONE 100 MG: 100 TABLET ORAL at 08:22

## 2024-09-07 RX ADMIN — INSULIN LISPRO 2 UNITS: 100 INJECTION, SOLUTION INTRAVENOUS; SUBCUTANEOUS at 12:44

## 2024-09-07 RX ADMIN — FUROSEMIDE 40 MG: 40 TABLET ORAL at 08:21

## 2024-09-07 RX ADMIN — Medication 100 MG: at 08:21

## 2024-09-07 RX ADMIN — SODIUM BICARBONATE 650 MG: 650 TABLET ORAL at 08:21

## 2024-09-07 RX ADMIN — LACTULOSE 30 G: 20 SOLUTION ORAL at 08:20

## 2024-09-07 RX ADMIN — Medication 400 MG: at 08:21

## 2024-09-07 RX ADMIN — INSULIN LISPRO 18 UNITS: 100 INJECTION, SOLUTION INTRAVENOUS; SUBCUTANEOUS at 08:25

## 2024-09-07 RX ADMIN — HEPARIN SODIUM 5000 UNITS: 5000 INJECTION INTRAVENOUS; SUBCUTANEOUS at 05:58

## 2024-09-07 RX ADMIN — PANTOPRAZOLE SODIUM 40 MG: 40 TABLET, DELAYED RELEASE ORAL at 05:59

## 2024-09-07 RX ADMIN — INSULIN LISPRO 18 UNITS: 100 INJECTION, SOLUTION INTRAVENOUS; SUBCUTANEOUS at 12:45

## 2024-09-07 RX ADMIN — PANCRELIPASE 36000 UNITS: 120000; 24000; 76000 CAPSULE, DELAYED RELEASE PELLETS ORAL at 08:24

## 2024-09-07 RX ADMIN — CHOLECALCIFEROL (VITAMIN D3) 10 MCG (400 UNIT) TABLET 400 UNITS: at 08:21

## 2024-09-07 RX ADMIN — FERROUS SULFATE TAB 325 MG (65 MG ELEMENTAL FE) 325 MG: 325 (65 FE) TAB at 08:22

## 2024-09-07 NOTE — DISCHARGE SUMMARY
"[facility]  Discharge- Cassidy Zhang 1955, 69 y.o. female MRN: [id number]  Unit/Bed#: ALBARO CHANEY 402-01 Encounter: [id number]  Primary Care Provider: Jillian Calix MD   Date and time admitted to hospital: 9/4/2024  9:36 AM    * Newly diagnosed diabetes (HCC)  Assessment & Plan  Lab Results   Component Value Date    HGBA1C 11.4 (H) 09/03/2024       Recent Labs     09/06/24  1627 09/06/24  2100 09/07/24  0716 09/07/24  1134   POCGLU 217* 156* 145* 256*         Blood Sugar Average: Last 72 hrs:  (P) 191.3250280387396297    Patient was sent to the ER for abnormal outpatient labs with glucose of 547 on outpatient labs. Does admit to feeling \"off\" the last couple of days  POC glucose on arrival was 600  Last A1c on January 2024 was 4.8  Repeat A1c on admission: 11.4  Suspect secondary to pancreatic insufficiency therefore being managed as Type 1  Glutamic acid decarboxylase, anti-islet antibodies, and zinc transporter pending   Stable for discharge   Status post insulin drip   Now on basal-bolus dosing   Lantus 24 U QHS  Humalog 18 U TID   On going insulin teaching   Will be on Dexcom outpatient   Appreciate Endocrinology input   Follow up outpatient         Chronic pancreatitis (HCC)  Assessment & Plan  Due to history of alcohol abuse   Maintained on Creon, continue     Alcoholic cirrhosis of liver with ascites (HCC)  Assessment & Plan  Patient follows with GI in outpatient setting  Lasix and Aldactone previously held d/t elevated creatinine   Resumed 9/6    CKD (chronic kidney disease) stage 3, GFR 30-59 ml/min ([facility])  Assessment & Plan  Lab Results   Component Value Date    EGFR 49 09/07/2024    EGFR 59 09/06/2024    EGFR 45 09/05/2024    CREATININE 1.13 09/07/2024    CREATININE 0.97 09/06/2024    CREATININE 1.21 09/05/2024     Creatinine baseline around 1-1.2  Admission creatinine 1.4 suspect prerenal in setting of dehydration from elevated glucose levels  Restarted diuretics  Monitor "         Medical Problems       Resolved Problems  Date Reviewed: 9/7/2024            Resolved    Chronic hyponatremia 9/6/2024     Resolved by  Julio Rutledge PA-C    Hypercalcemia 9/6/2024     Resolved by  Julio Rutledge PA-C        Discharging Physician / Practitioner: Julio Rutledge PA-C  PCP: Jillian Calix MD  Admission Date:   Admission Orders (From admission, onward)       Ordered        09/04/24 1030  INPATIENT ADMISSION  Once                          Discharge Date: 09/07/24    Consultations During Hospital Stay:  Endocrinology     Procedures Performed:   None    Significant Findings / Test Results:   None    Incidental Findings:   None     Test Results Pending at Discharge (will require follow up):   None     Outpatient Tests Requested:  None    Complications:  None    Reason for Admission: Hyperglycemia, New Diagnosed Diabetes     Hospital Course:   Cassidy Zhang is a 69 y.o. female patient who originally presented to the hospital on 9/4/2024 due to hyperglycemia. She was found to have a blood sugar of 600 on admission. She was admitted and started on an insulin drip and Endocrinology was consulted. She was transitioned from insulin drip to basal-bolus dosing and her glucose remained controlled. Once adequate control was obtained she was able to be discharged home and will follow up with Endocrinology outpatient.             Please see above list of diagnoses and related plan for additional information.     Condition at Discharge: stable    Discharge Day Visit / Exam:   Subjective:  Patient reports feeling well today. She is anxious about insulin at home.   Vitals: Blood Pressure: 126/66 (09/07/24 0716)  Pulse: 71 (09/07/24 0716)  Temperature: 98.1 °F (36.7 °C) (09/07/24 0716)  Temp Source: Oral (09/05/24 1509)  Respirations: 18 (09/06/24 1556)  Height: 5' (152.4 cm) (09/04/24 1900)  Weight - Scale: 54.3 kg (119 lb 11.4 oz) (09/07/24 0600)  SpO2: 98 % (09/07/24 0716)  Exam:   Physical  Exam  Constitutional:       General: She is not in acute distress.     Appearance: Normal appearance. She is normal weight. She is not ill-appearing or diaphoretic.   HENT:      Head: Normocephalic and atraumatic.      Mouth/Throat:      Mouth: Mucous membranes are moist.   Eyes:      General: No scleral icterus.     Pupils: Pupils are equal, round, and reactive to light.   Cardiovascular:      Rate and Rhythm: Normal rate and regular rhythm.      Pulses: Normal pulses.      Heart sounds: Normal heart sounds, S1 normal and S2 normal. No murmur heard.     No systolic murmur is present.      No diastolic murmur is present.      No gallop. No S3 or S4 sounds.   Pulmonary:      Effort: Pulmonary effort is normal. No accessory muscle usage or respiratory distress.      Breath sounds: Normal breath sounds. No stridor. No decreased breath sounds, wheezing, rhonchi or rales.   Chest:      Chest wall: No tenderness.   Abdominal:      General: Bowel sounds are normal. There is no distension.      Palpations: Abdomen is soft.      Tenderness: There is no abdominal tenderness. There is no guarding.   Musculoskeletal:      Right lower leg: No edema.      Left lower leg: No edema.   Skin:     General: Skin is warm and dry.      Coloration: Skin is not jaundiced.   Neurological:      General: No focal deficit present.      Mental Status: She is alert. Mental status is at baseline.      Motor: No tremor or seizure activity.   Psychiatric:         Behavior: Behavior is cooperative.          Discussion with Family: Patient declined call to .     Discharge instructions/Information to patient and family:   See after visit summary for information provided to patient and family.      Provisions for Follow-Up Care:  See after visit summary for information related to follow-up care and any pertinent home health orders.      Mobility at time of Discharge:   Basic Mobility Inpatient Raw Score: 23  -[facility] Goal: 7: Walk 25 feet  or more  JH-HLM Achieved: 8: Walk 250 feet ot more  HLM Goal achieved. Continue to encourage appropriate mobility.     Disposition:   Home    Planned Readmission: None     Discharge Statement:  I spent 45 minutes discharging the patient. This time was spent on the day of discharge. I had direct contact with the patient on the day of discharge. Greater than 50% of the total time was spent examining patient, answering all patient questions, arranging and discussing plan of care with patient as well as directly providing post-discharge instructions.  Additional time then spent on discharge activities.    Discharge Medications:  See after visit summary for reconciled discharge medications provided to patient and/or family.      **Please Note: This note may have been constructed using a voice recognition system**

## 2024-09-07 NOTE — ASSESSMENT & PLAN NOTE
Patient follows with GI in outpatient setting  Lasix and Aldactone previously held d/t elevated creatinine   Resumed 9/6

## 2024-09-07 NOTE — CASE MANAGEMENT
Case Management Progress Note    Patient name Cassidy Zhang  Location W /W -01 MRN [id number]  : 1955 Date 2024       LOS (days): 3  Geometric Mean LOS (GMLOS) (days): 3  Days to GMLOS:-0.1        OBJECTIVE:        Current admission status: Inpatient  Preferred Pharmacy:   [facility]/pharmacy #[id number] - [place] - [address]  [address]  [place] [address]  Phone: [phone] Fax: [phone]    [facility] - [place] - [address] [facility]  [address]  [place] [address]  Phone: [phone] Fax: [phone]    Primary Care Provider: Jillian Calix MD    Primary Insurance: MEDICARE  Secondary Insurance: [employer]    PROGRESS NOTE:    Call made to [facility] regarding diabetes medication/supplies sent to pharmacy. Pharmacist confirmed that all Rx sent over will be $160 total. CM s/w patient and spouse bedside to review, patient and spouse confirmed they are able to afford - provider made aware. Plan for OP follow-up with endo and PCP.

## 2024-09-07 NOTE — ASSESSMENT & PLAN NOTE
"Lab Results   Component Value Date    HGBA1C 11.4 (H) 09/03/2024       Recent Labs     09/06/24  1627 09/06/24  2100 09/07/24  0716 09/07/24  1134   POCGLU 217* 156* 145* 256*         Blood Sugar Average: Last 72 hrs:  (P) [id number]    Patient was sent to the ER for abnormal outpatient labs with glucose of 547 on outpatient labs. Does admit to feeling \"off\" the last couple of days  POC glucose on arrival was 600  Last A1c on January 2024 was 4.8  Repeat A1c on admission: 11.4  Suspect secondary to pancreatic insufficiency therefore being managed as Type 1  Glutamic acid decarboxylase, anti-islet antibodies, and zinc transporter pending   Stable for discharge   Status post insulin drip   Now on basal-bolus dosing   Lantus 24 U QHS  Humalog 18 U TID   On going insulin teaching   Will be on Dexcom outpatient   Appreciate Endocrinology input   Follow up outpatient       "

## 2024-09-07 NOTE — ASSESSMENT & PLAN NOTE
Lab Results   Component Value Date    EGFR 49 09/07/2024    EGFR 59 09/06/2024    EGFR 45 09/05/2024    CREATININE 1.13 09/07/2024    CREATININE 0.97 09/06/2024    CREATININE 1.21 09/05/2024     Creatinine baseline around 1-1.2  Admission creatinine 1.4 suspect prerenal in setting of dehydration from elevated glucose levels  Restarted diuretics  Monitor

## 2024-09-07 NOTE — PLAN OF CARE
Problem: PAIN - ADULT  Goal: Verbalizes/displays adequate comfort level or baseline comfort level  Description: Interventions:  - Encourage patient to monitor pain and request assistance  - Assess pain using appropriate pain scale  - Administer analgesics based on type and severity of pain and evaluate response  - Implement non-pharmacological measures as appropriate and evaluate response  - Consider cultural and social influences on pain and pain management  - Notify physician/advanced practitioner if interventions unsuccessful or patient reports new pain  Outcome: Progressing     Problem: INFECTION - ADULT  Goal: Absence or prevention of progression during hospitalization  Description: INTERVENTIONS:  - Assess and monitor for signs and symptoms of infection  - Monitor lab/diagnostic results  - Monitor all insertion sites, i.e. indwelling lines, tubes, and drains  - Monitor endotracheal if appropriate and nasal secretions for changes in amount and color  - Salters appropriate cooling/warming therapies per order  - Administer medications as ordered  - Instruct and encourage patient and family to use good hand hygiene technique  - Identify and instruct in appropriate isolation precautions for identified infection/condition  Outcome: Progressing  Goal: Absence of fever/infection during neutropenic period  Description: INTERVENTIONS:  - Monitor WBC    Outcome: Progressing     Problem: SAFETY ADULT  Goal: Patient will remain free of falls  Description: INTERVENTIONS:  - Educate patient/family on patient safety including physical limitations  - Instruct patient to call for assistance with activity   - Consult OT/PT to assist with strengthening/mobility   - Keep Call bell within reach  - Keep bed low and locked with side rails adjusted as appropriate  - Keep care items and personal belongings within reach  - Initiate and maintain comfort rounds  - Make Fall Risk Sign visible to staff  - Offer Toileting every  Hours,  in advance of need  - Initiate/Maintain alarm  - Obtain necessary fall risk management equipment:  - Apply yellow socks and bracelet for high fall risk patients  - Consider moving patient to room near nurses station  Outcome: Progressing  Goal: Maintain or return to baseline ADL function  Description: INTERVENTIONS:  -  Assess patient's ability to carry out ADLs; assess patient's baseline for ADL function and identify physical deficits which impact ability to perform ADLs (bathing, care of mouth/teeth, toileting, grooming, dressing, etc.)  - Assess/evaluate cause of self-care deficits   - Assess range of motion  - Assess patient's mobility; develop plan if impaired  - Assess patient's need for assistive devices and provide as appropriate  - Encourage maximum independence but intervene and supervise when necessary  - Involve family in performance of ADLs  - Assess for home care needs following discharge   - Consider OT consult to assist with ADL evaluation and planning for discharge  - Provide patient education as appropriate  Outcome: Progressing  Goal: Maintains/Returns to pre admission functional level  Description: INTERVENTIONS:  - Perform AM-PAC 6 Click Basic Mobility/ Daily Activity assessment daily.  - Set and communicate daily mobility goal to care team and patient/family/caregiver.   - Collaborate with rehabilitation services on mobility goals if consulted  - Perform Range of Motion  times a day.  - Reposition patient every  hours.  - Dangle patient  times a day  - Stand patient  times a day  - Ambulate patient  times a day  - Out of bed to chair  times a day   - Out of bed for meals  times a day  - Out of bed for toileting  - Record patient progress and toleration of activity level   Outcome: Progressing     Problem: DISCHARGE PLANNING  Goal: Discharge to home or other facility with appropriate resources  Description: INTERVENTIONS:  - Identify barriers to discharge w/patient and caregiver  - Arrange for  needed discharge resources and transportation as appropriate  - Identify discharge learning needs (meds, wound care, etc.)  - Arrange for interpretive services to assist at discharge as needed  - Refer to Case Management Department for coordinating discharge planning if the patient needs post-hospital services based on physician/advanced practitioner order or complex needs related to functional status, cognitive ability, or social support system  Outcome: Progressing     Problem: Knowledge Deficit  Goal: Patient/family/caregiver demonstrates understanding of disease process, treatment plan, medications, and discharge instructions  Description: Complete learning assessment and assess knowledge base.  Interventions:  - Provide teaching at level of understanding  - Provide teaching via preferred learning methods  Outcome: Progressing

## 2024-09-07 NOTE — PLAN OF CARE
Problem: PAIN - ADULT  Goal: Verbalizes/displays adequate comfort level or baseline comfort level  Description: Interventions:  - Encourage patient to monitor pain and request assistance  - Assess pain using appropriate pain scale  - Administer analgesics based on type and severity of pain and evaluate response  - Implement non-pharmacological measures as appropriate and evaluate response  - Consider cultural and social influences on pain and pain management  - Notify physician/advanced practitioner if interventions unsuccessful or patient reports new pain  Outcome: Progressing     Problem: INFECTION - ADULT  Goal: Absence or prevention of progression during hospitalization  Description: INTERVENTIONS:  - Assess and monitor for signs and symptoms of infection  - Monitor lab/diagnostic results  - Monitor all insertion sites, i.e. indwelling lines, tubes, and drains  - Monitor endotracheal if appropriate and nasal secretions for changes in amount and color  - [place] appropriate cooling/warming therapies per order  - Administer medications as ordered  - Instruct and encourage patient and family to use good hand hygiene technique  - Identify and instruct in appropriate isolation precautions for identified infection/condition  Outcome: Progressing  Goal: Absence of fever/infection during neutropenic period  Description: INTERVENTIONS:  - Monitor WBC    Outcome: Progressing     Problem: SAFETY ADULT  Goal: Patient will remain free of falls  Description: INTERVENTIONS:  - Educate patient/family on patient safety including physical limitations  - Instruct patient to call for assistance with activity   - Consult OT/PT to assist with strengthening/mobility   - Keep Call bell within reach  - Keep bed low and locked with side rails adjusted as appropriate  - Keep care items and personal belongings within reach  - Initiate and maintain comfort rounds  - Make Fall Risk Sign visible to staff  - Offer Toileting every  Hours,  in advance of need  - Initiate/Maintain alarm  - Obtain necessary fall risk management equipment:  - Apply yellow socks and bracelet for high fall risk patients  - Consider moving patient to room near nurses station  Outcome: Progressing  Goal: Maintain or return to baseline ADL function  Description: INTERVENTIONS:  -  Assess patient's ability to carry out ADLs; assess patient's baseline for ADL function and identify physical deficits which impact ability to perform ADLs (bathing, care of mouth/teeth, toileting, grooming, dressing, etc.)  - Assess/evaluate cause of self-care deficits   - Assess range of motion  - Assess patient's mobility; develop plan if impaired  - Assess patient's need for assistive devices and provide as appropriate  - Encourage maximum independence but intervene and supervise when necessary  - Involve family in performance of ADLs  - Assess for home care needs following discharge   - Consider OT consult to assist with ADL evaluation and planning for discharge  - Provide patient education as appropriate  Outcome: Progressing  Goal: Maintains/Returns to pre admission functional level  Description: INTERVENTIONS:  - Perform AM-PAC 6 Click Basic Mobility/ Daily Activity assessment daily.  - Set and communicate daily mobility goal to care team and patient/family/caregiver.   - Collaborate with rehabilitation services on mobility goals if consulted  - Perform Range of Motion  times a day.  - Reposition patient every  hours.  - Dangle patient  times a day  - Stand patient  times a day  - Ambulate patient  times a day  - Out of bed to chair  times a day   - Out of bed for meals  times a day  - Out of bed for toileting  - Record patient progress and toleration of activity level   Outcome: Progressing     Problem: DISCHARGE PLANNING  Goal: Discharge to home or other facility with appropriate resources  Description: INTERVENTIONS:  - Identify barriers to discharge w/patient and caregiver  - Arrange for  needed discharge resources and transportation as appropriate  - Identify discharge learning needs (meds, wound care, etc.)  - Arrange for interpretive services to assist at discharge as needed  - Refer to Case Management Department for coordinating discharge planning if the patient needs post-hospital services based on physician/advanced practitioner order or complex needs related to functional status, cognitive ability, or social support system  Outcome: Progressing     Problem: Knowledge Deficit  Goal: Patient/family/caregiver demonstrates understanding of disease process, treatment plan, medications, and discharge instructions  Description: Complete learning assessment and assess knowledge base.  Interventions:  - Provide teaching at level of understanding  - Provide teaching via preferred learning methods  Outcome: Progressing

## 2024-09-09 ENCOUNTER — OFFICE VISIT (OUTPATIENT)
Dept: ENDOCRINOLOGY | Facility: CLINIC | Age: 69
End: 2024-09-09
Payer: MEDICARE

## 2024-09-09 ENCOUNTER — TELEPHONE (OUTPATIENT)
Age: 69
End: 2024-09-09

## 2024-09-09 VITALS
HEART RATE: 91 BPM | OXYGEN SATURATION: 99 % | HEIGHT: 60 IN | BODY MASS INDEX: 22.62 KG/M2 | DIASTOLIC BLOOD PRESSURE: 60 MMHG | SYSTOLIC BLOOD PRESSURE: 160 MMHG | WEIGHT: 115.2 LBS

## 2024-09-09 DIAGNOSIS — K86.0 ALCOHOL-INDUCED CHRONIC PANCREATITIS (HCC): ICD-10-CM

## 2024-09-09 DIAGNOSIS — E11.9 NEWLY DIAGNOSED DIABETES (HCC): Primary | ICD-10-CM

## 2024-09-09 DIAGNOSIS — I10 ESSENTIAL HYPERTENSION: Chronic | ICD-10-CM

## 2024-09-09 PROCEDURE — 99215 OFFICE O/P EST HI 40 MIN: CPT | Performed by: PHYSICIAN ASSISTANT

## 2024-09-09 NOTE — TELEPHONE ENCOUNTER
Patient  calling stating they spoke with someone this morning who was going to get them scheduled with a [place] endocrinologist. They left the hospital with little to no knowledge on insulin. Wife's sugar was 114 this morning and they injected 18 units of Fiasp.  said they were told 18 units of fiasp with each meal, and 24 units at bedtime of long acting insulin. I told him to have her drink a soda, and eat anything high in carbs because her sugar is going to drop. I said that the insulin should have been held.  is very upset, and concerned as they don't know what they are doing. Can she be scheduled with diabetes education today at [facility], and have a f/u with a doctor after? This is concerning and needs addressed ASAP.

## 2024-09-09 NOTE — PATIENT INSTRUCTIONS
Skip dose of Fiasp if skipping meal or eating a meal with no carbohydrates.   If pre-meal glucose is less than 100 mg/dl, then hold Fiasp dose.       Hypoglycemia instructions   Cassidy Zhang  9/9/2024  [id number]    Low Blood Sugar    Steps to treat low blood sugar.    1. Test blood sugar if you have symptoms of low blood sugar:   Low Blood Sugar Symptoms:  o Sweaty  o Dizzy  o Rapid heartbeat  o Shaky  o Bad mood  o Hungry      2. Treat blood sugar less than 70 with 15 grams of fast-acting carbohydrate:   Examples of 15 grams Fast-Acting Carbohydrate:  o 4 oz juice  o 4 oz regular soda  o 3-4 glucose tablets (chew)  o 3-4 hard candies (chew)          3.  Wait 15 minutes and test your blood sugar again     4. If blood sugar is less than 100, repeat steps 2-3.    5. When your blood sugar is 100 or more, eat a snack if it will be longer than one hour until your next meal. The snack should be 15 grams of carbohydrate and a protein:   Examples of snacks:  o ½ sandwich  o 6 crackers with cheese  o Piece of fruit with cheese or peanut butter  o 6 crackers with peanut butter

## 2024-09-09 NOTE — TELEPHONE ENCOUNTER
The referral for Diabetes Education is for [place] training. Called and left a message to schedule.

## 2024-09-10 ENCOUNTER — NURSE TRIAGE (OUTPATIENT)
Dept: OTHER | Facility: OTHER | Age: 69
End: 2024-09-10

## 2024-09-10 DIAGNOSIS — E11.9 DIABETES MELLITUS, NEW ONSET (HCC): ICD-10-CM

## 2024-09-10 NOTE — TELEPHONE ENCOUNTER
"Reason for Disposition  • [1] Caller has URGENT medication or insulin device (e.g., pump, continuous monitoring) question AND [2] triager unable to answer question    Answer Assessment - Initial Assessment Questions  1. SYMPTOMS: \"What symptoms are you concerned about?\"      Low blood sugar    2. ONSET:  \"When did the symptoms start?\"      Tonight     3. BLOOD GLUCOSE: \"What is your blood glucose level?\"       59, 64, now 82 via finger stick     4. USUAL RANGE: \"What is your blood glucose level usually?\" (e.g., usual fasting morning value, usual evening value)      Less than 200    5. TYPE 1 or 2:  \"Do you know what type of diabetes you have?\"  (e.g., Type 1, Type 2, Gestational; doesn't know)   Newly diagnosed diabetic    6. INSULIN: \"Do you take insulin?\" \"What type of insulin(s) do you use? What is the mode of delivery? (syringe, pen; injection or pump) \"When did you last give yourself an insulin dose?\" (i.e., time or hours/minutes ago) \"How much did you give?\" (i.e., how many units)      Yes, Lantus 24 units at bedtime, Fiasp 18 units with meals    7. DIABETES PILLS: \"Do you take any pills for your diabetes?\" If Yes, ask: \"What is the name of the medicine(s) that you take for high blood sugar?\"      Denies    8. OTHER SYMPTOMS: \"Do you have any symptoms?\" (e.g., fever, frequent urination, difficulty breathing, vomiting)     Hands shaking when sugar was low    9. LOW BLOOD GLUCOSE TREATMENT: \"What have you done so far to treat the low blood glucose level?\"      Drinking juice and 2-3 gummy candy    10. FOOD: \"When did you last eat or drink?\"        Candy and juice now     11. ALONE: \"Are you alone right now or is someone with you?\"        Denies     wants to clarify if her blood sugar comes back up to 100 to still give the Fiasp Insulin 18 units with meals. On call provider stated can give her the 15u if she is going to eat a full dinner and is above 100. If she isn't very hungry I would skip it for " tonight and start 15u with meals tomorrow. Patient and  notified and verbalized understanding.    Protocols used: Diabetes - Low Blood Sugar-Adult-AH

## 2024-09-10 NOTE — TELEPHONE ENCOUNTER
"Regarding: Low blood sugar levels / Medication Question  ----- Message from Aster NAVARRO sent at 9/10/2024  7:11 PM EDT -----  \" My blood sugar dropped to 64 and my  and I were wondering when my levels go back to normal should I continue to take my regular medication\"    "

## 2024-09-11 RX ORDER — INSULIN ASPART INJECTION 100 [IU]/ML
15 INJECTION, SOLUTION SUBCUTANEOUS
Start: 2024-09-11 | End: 2024-09-13 | Stop reason: SDUPTHER

## 2024-09-11 NOTE — TELEPHONE ENCOUNTER
Please let patient know that I received an FYI that she called the on-call regarding a low blood sugar.  Her dose of Fiasp has been decreased to 15 units 3 times a day with meals.  She should continue with this dose and send a blood sugar log in 1 to 2 weeks.  If she skips a meal or eats a meal without any carbohydrates she should skip her dose of Fiasp.

## 2024-09-13 ENCOUNTER — TELEPHONE (OUTPATIENT)
Dept: ENDOCRINOLOGY | Facility: CLINIC | Age: 69
End: 2024-09-13

## 2024-09-13 DIAGNOSIS — N18.30 STAGE 3 CHRONIC KIDNEY DISEASE, UNSPECIFIED WHETHER STAGE 3A OR 3B CKD (HCC): ICD-10-CM

## 2024-09-13 DIAGNOSIS — E11.9 DIABETES MELLITUS, NEW ONSET (HCC): ICD-10-CM

## 2024-09-13 DIAGNOSIS — E83.42 HYPOMAGNESEMIA: ICD-10-CM

## 2024-09-13 DIAGNOSIS — K72.90 DECOMPENSATED HEPATIC CIRRHOSIS (HCC): ICD-10-CM

## 2024-09-13 DIAGNOSIS — K70.31 ASCITES DUE TO ALCOHOLIC CIRRHOSIS (HCC): ICD-10-CM

## 2024-09-13 DIAGNOSIS — K74.60 DECOMPENSATED HEPATIC CIRRHOSIS (HCC): ICD-10-CM

## 2024-09-13 LAB
DME PARACHUTE DELIVERY DATE REQUESTED: NORMAL
DME PARACHUTE ITEM DESCRIPTION: NORMAL
DME PARACHUTE ITEM DESCRIPTION: NORMAL
DME PARACHUTE ORDER STATUS: NORMAL
DME PARACHUTE SUPPLIER NAME: NORMAL
DME PARACHUTE SUPPLIER PHONE: NORMAL

## 2024-09-13 RX ORDER — INSULIN ASPART INJECTION 100 [IU]/ML
INJECTION, SOLUTION SUBCUTANEOUS
Qty: 15 ML | Refills: 1 | Status: SHIPPED | OUTPATIENT
Start: 2024-09-13 | End: 2024-09-14

## 2024-09-13 RX ORDER — SPIRONOLACTONE 50 MG/1
100 TABLET, FILM COATED ORAL DAILY
Qty: 180 TABLET | Refills: 1 | Status: SHIPPED | OUTPATIENT
Start: 2024-09-13

## 2024-09-13 NOTE — TELEPHONE ENCOUNTER
----- Message from Kyra Ortiz PA-C sent at 9/9/2024  4:53 PM EDT -----  Regarding: CGM  Patient interested in CGM.  Please send prescription to DME for johana 3 sensor and reader.

## 2024-09-13 NOTE — TELEPHONE ENCOUNTER
Spoke with pt  he states sugars are between  before breakfast and lunch and sugars tends to drop really low to the 40s before dinner they have been following the regimen pt also states for lunch she has half a sandwich

## 2024-09-14 ENCOUNTER — NURSE TRIAGE (OUTPATIENT)
Dept: OTHER | Facility: OTHER | Age: 69
End: 2024-09-14

## 2024-09-14 DIAGNOSIS — E11.9 DIABETES MELLITUS, NEW ONSET (HCC): ICD-10-CM

## 2024-09-14 RX ORDER — INSULIN GLARGINE 100 [IU]/ML
18 INJECTION, SOLUTION SUBCUTANEOUS
Start: 2024-09-14

## 2024-09-14 RX ORDER — INSULIN ASPART INJECTION 100 [IU]/ML
INJECTION, SOLUTION SUBCUTANEOUS
Start: 2024-09-14 | End: 2024-09-15 | Stop reason: SDUPTHER

## 2024-09-15 ENCOUNTER — NURSE TRIAGE (OUTPATIENT)
Dept: OTHER | Facility: OTHER | Age: 69
End: 2024-09-15

## 2024-09-15 DIAGNOSIS — E11.9 DIABETES MELLITUS, NEW ONSET (HCC): ICD-10-CM

## 2024-09-15 LAB — ZNT8 AB SERPL IA-ACNC: <15 U/ML

## 2024-09-15 RX ORDER — INSULIN ASPART INJECTION 100 [IU]/ML
INJECTION, SOLUTION SUBCUTANEOUS
Start: 2024-09-15 | End: 2024-09-18 | Stop reason: SDUPTHER

## 2024-09-15 NOTE — TELEPHONE ENCOUNTER
"Reason for Disposition   [1] Low blood glucose (70 mg/dL [3.9 mmol/L] or below)) with no other adult present AND [2] hasn't tried Care Advice    Answer Assessment - Initial Assessment Questions  1. SYMPTOMS: \"What symptoms are you concerned about?\"      132 fasting BG in the AM. 167 BG before lunch. 56 at 6pm -all via glucometer.     2. ONSET:  \"When did the symptoms start?\"      For the last 5 days BG has been lower then normal.    3. BLOOD GLUCOSE: \"What is your blood glucose level?\"       56 at 6pm    4. USUAL RANGE: \"What is your blood glucose level usually?\" (e.g., usual fasting morning value, usual evening value)      \"Always low\"    5. TYPE 1 or 2:  \"Do you know what type of diabetes you have?\"  (e.g., Type 1, Type 2, Gestational; doesn't know)       Newly Diagnosed Diabetes    6. INSULIN: \"Do you take insulin?\" \"What type of insulin(s) do you use? What is the mode of delivery? (syringe, pen; injection or pump) \"When did you last give yourself an insulin dose?\" (i.e., time or hours/minutes ago) \"How much did you give?\" (i.e., how many units)      Took Fiasp Flex touch-10 units at 0830 and 10 units at 1:43pm. Has not taken Dinner dose due to low BG.  Also takes 18 units of Lantus at bedtime.     7. DIABETES PILLS: \"Do you take any pills for your diabetes?\" If Yes, ask: \"What is the name of the medicine(s) that you take for high blood sugar?\"      No    8. OTHER SYMPTOMS: \"Do you have any symptoms?\" (e.g., fever, frequent urination, difficulty breathing, vomiting)      No symptoms      9. LOW BLOOD GLUCOSE TREATMENT: \"What have you done so far to treat the low blood glucose level?\"      Drank 4oz of orange juice right now as recommended by triage nurse.    10. FOOD: \"When did you last eat or drink?\"        Last time pt ate lunch was at 1:43pm- Chicken and rice.     11. ALONE: \"Are you alone right now or is someone with you?\"         No- w/ .    Protocols used: Diabetes - Low Blood Sugar-Adult-AH      Per " Dr. Rojas- will call pt directly.

## 2024-09-15 NOTE — TELEPHONE ENCOUNTER
"Regarding: low blood sugar, insulin question  ----- Message from Destiney CHARLTON sent at 9/14/2024  9:00 PM EDT -----  \"My wife just got diagnosed with diabetes and her units were changed and now after dinner her numbers are very low. The last reading was 85. Should I still give her the lantus shot?\"    "

## 2024-09-15 NOTE — TELEPHONE ENCOUNTER
"Answer Assessment - Initial Assessment Questions  1. NAME of MEDICINE: \"What medicine(s) are you calling about?\"      Lantus 24 units  2. QUESTION: \"What is your question?\" (e.g., double dose of medicine, side effect)      Should I give her the night time dose of lantus 24 units  3. PRESCRIBER: \"Who prescribed the medicine?\" Reason: if prescribed by specialist, call should be referred to that group.      Endocrinology   4. SYMPTOMS: \"Do you have any symptoms?\" If Yes, ask: \"What symptoms are you having?\"  \"How bad are the symptoms (e.g., mild, moderate, severe)     Was asymptomatic         Supper was chicken, rice, salad  Since then has had orange juice, soda, and candy to bring up blood sugars after checks      states that for the last 5 days he has been checking blood sugar and it has always been low. The doses were changed from 15/15/15 to 15/12/15 which is what he did today.     Today before supper reading it was 72 and she drank juice. It was then 101 and he gave her the 15 units. 45 minutes later it was 78 (7pm), then 45 (752pm) then 56, and then 85 (9pm). When she was low she did drink juice, soda, and ate candy    Protocols used: Medication Question Call-Adult-    "

## 2024-09-15 NOTE — TELEPHONE ENCOUNTER
Notified by Barbara Acosta RN that patient had again called due to low sugars before dinner.   Reached out to and spoke to Cassidy and  Berry on the phone. Pre-dinner BG was 56. Patient asymptomatic. Had received 4oz of orange juice with improvement of BG to 78 when I called. At the time advised patient to drink 4 additional oz of orange juice with improvement of BG to 99mg/dL.  We did make adjustment to patient's regimen insulin, decreasing Lantus from 24 to 18 units yesterday, and her Fiasp from 15-12-15 to 10 units tidac.   Bedtime BG last night - 154. Fasting this morning - 132. Pre-lunch - 167.   Patient reports eating chicken and 2/3 cups of rice for lunch. Plans for grilled ham and chicken sandwiches with tomato soup. Does not snack between meals.   Case discussed with Dr. Lind    Plan:  Reduce lunchtime Fiasp dose to 6 units. Continue Fiasp 10 units before breakfast and 10 units before dinner.   Continue Lantus 18 units qhs.   Reviewed management of hypoglycemia with patient.   Continue monitoring BG before meals and at bedtime.   Advised pt to call office tomorrow particularly if persistently hypoglycemic.

## 2024-09-15 NOTE — TELEPHONE ENCOUNTER
Reason for Disposition  • [1] Caller has URGENT medicine question about med that PCP or specialist prescribed AND [2] triager unable to answer question    Protocols used: Medication Question Call-Adult-

## 2024-09-15 NOTE — TELEPHONE ENCOUNTER
Notified by RN that patient's sugars have been low.   Home regimen: Lantus 24 units nightly, Fiasp 15 before breakfast, 12 before lunch and 15 before dinner.   BH before supper - 72. Drank orange juice with sugars increasing to 101.   Got her dinnertime Fiasp 15 units and ate chicken, rice and salad for supper.   At 7 pm sugars noted to be 78. At 8pm - 45mg/dL. Patient drank orange juce with improvement of sugar to 56mg/dL. Afterwards drank soda and ate candy. At 9pm BG was 85.    Patient asymptomatic through this.   Discussed case with Dr. Lind.     Recommend decreasing Lantus to 18 units  Recommend decreasing Fiasp to 10 units three times daily before meals.   Advise patient to call tomorrow if sugars persistently >200 or ongoing hypoglycemia.

## 2024-09-16 ENCOUNTER — APPOINTMENT (OUTPATIENT)
Dept: LAB | Facility: AMBULARY SURGERY CENTER | Age: 69
End: 2024-09-16
Payer: MEDICARE

## 2024-09-16 DIAGNOSIS — E11.9 NEWLY DIAGNOSED DIABETES (HCC): ICD-10-CM

## 2024-09-16 LAB
DME PARACHUTE DELIVERY DATE ACTUAL: NORMAL
DME PARACHUTE DELIVERY DATE EXPECTED: NORMAL
DME PARACHUTE DELIVERY DATE REQUESTED: NORMAL
DME PARACHUTE ITEM DESCRIPTION: NORMAL
DME PARACHUTE ITEM DESCRIPTION: NORMAL
DME PARACHUTE ORDER STATUS: NORMAL
DME PARACHUTE SUPPLIER NAME: NORMAL
DME PARACHUTE SUPPLIER PHONE: NORMAL

## 2024-09-16 PROCEDURE — 36415 COLL VENOUS BLD VENIPUNCTURE: CPT

## 2024-09-16 PROCEDURE — 83519 RIA NONANTIBODY: CPT

## 2024-09-16 PROCEDURE — 86341 ISLET CELL ANTIBODY: CPT

## 2024-09-16 PROCEDURE — 84681 ASSAY OF C-PEPTIDE: CPT

## 2024-09-16 NOTE — TELEPHONE ENCOUNTER
Patient calling with an update. States BS this morning was 111. States she will call if she has any low BS today with insulin change. Also, patient states she had labs drawn this morning. Thank you.

## 2024-09-17 ENCOUNTER — OFFICE VISIT (OUTPATIENT)
Dept: NEPHROLOGY | Facility: CLINIC | Age: 69
End: 2024-09-17
Payer: MEDICARE

## 2024-09-17 VITALS
SYSTOLIC BLOOD PRESSURE: 140 MMHG | OXYGEN SATURATION: 99 % | HEART RATE: 73 BPM | BODY MASS INDEX: 22.78 KG/M2 | WEIGHT: 116 LBS | HEIGHT: 60 IN | DIASTOLIC BLOOD PRESSURE: 60 MMHG

## 2024-09-17 DIAGNOSIS — E87.20 METABOLIC ACIDOSIS: ICD-10-CM

## 2024-09-17 DIAGNOSIS — N18.31 ANEMIA DUE TO STAGE 3A CHRONIC KIDNEY DISEASE  (HCC): ICD-10-CM

## 2024-09-17 DIAGNOSIS — D63.1 ANEMIA DUE TO STAGE 3A CHRONIC KIDNEY DISEASE  (HCC): ICD-10-CM

## 2024-09-17 DIAGNOSIS — E83.42 HYPOMAGNESEMIA: ICD-10-CM

## 2024-09-17 DIAGNOSIS — E87.1 HYPONATREMIA: Primary | ICD-10-CM

## 2024-09-17 DIAGNOSIS — I10 ESSENTIAL HYPERTENSION: Chronic | ICD-10-CM

## 2024-09-17 DIAGNOSIS — R80.9 MICROALBUMINURIA: ICD-10-CM

## 2024-09-17 DIAGNOSIS — K74.69 DECOMPENSATED LIVER DISEASE (HCC): ICD-10-CM

## 2024-09-17 DIAGNOSIS — N18.31 STAGE 3A CHRONIC KIDNEY DISEASE (HCC): ICD-10-CM

## 2024-09-17 LAB — C PEPTIDE SERPL-MCNC: 1.4 NG/ML (ref 1.1–4.4)

## 2024-09-17 PROCEDURE — 99214 OFFICE O/P EST MOD 30 MIN: CPT | Performed by: NURSE PRACTITIONER

## 2024-09-17 RX ORDER — SODIUM BICARBONATE 650 MG/1
650 TABLET ORAL 2 TIMES DAILY
Qty: 180 TABLET | Refills: 2 | Status: SHIPPED | OUTPATIENT
Start: 2024-09-17

## 2024-09-17 NOTE — PATIENT INSTRUCTIONS
All questions asked and answered.  The patient has been instructed to call office at [phone] with any questions or concerns.    Please obtain prescribed blood work and urine studies prior to next appointment  Avoid NSAID products which include: Motrin, Advil, Ibuprofen, Aleve, Naprosyn, Naproxen, Mobic or Celebrex   Low sodium diet no more then 2 gram salt daily  Return in 4 months with updated blood work and urine studies  Maintain improve glucose control  Continue with 40 oz of fluid daily  Take BP 2 times daily for one week and send in  Bring in home BP cuff with next visit  Check BMP in 2 weeks  No Changes in current treatment

## 2024-09-17 NOTE — Clinical Note
For your review.  Overall patient doing well..  Bicarbonate is 27 and sodium is 134 with stable creatinine.

## 2024-09-17 NOTE — ASSESSMENT & PLAN NOTE
Patient is examining fairly euvolemic and appears compensated  Examining euvolemic without lower extremity edema  Cait on spironolactone and Lasix as above

## 2024-09-17 NOTE — ASSESSMENT & PLAN NOTE
Current bicarb 27  On sodium bicarbonate tablets 1 tablet twice daily  No change in current treatment at this time  We will check BMP in 2 weeks if bicarb still elevated consider decreasing dose  Orders:    sodium bicarbonate 650 mg tablet; Take 1 tablet (650 mg total) by mouth 2 (two) times a day    Had previous hypercalcemia-now resolved  Current calcium 9.4  Calcium 1.24 on 9/6/2024  Continue to monitor given volume status with liver disease, fluid restriction and diuretic use  Follows with Dr. Mahajan from GI this time gets evaluation for paracentesis every 2 weeks

## 2024-09-17 NOTE — ASSESSMENT & PLAN NOTE
Blood pressure elevated on arrival but improved to 140/60 after 10 minutes  On sodium bicarbonate could be a component  Reports not taking blood pressure at home regularly  Encourage low-sodium diet  Instructed patient to take blood pressure 2 times a day for 1 week and send in for review  Patient has been instructed to bring in her home blood pressure cuff with next office visit for cuff correlation  Spironolactone and furosemide  Had Been on midodrine in the past but none currently  Blood pressure less than 130/80

## 2024-09-17 NOTE — ASSESSMENT & PLAN NOTE
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

## 2024-09-17 NOTE — ASSESSMENT & PLAN NOTE
Lab Results   Component Value Date    EGFR 49 09/07/2024    EGFR 59 09/06/2024    EGFR 45 09/05/2024    CREATININE 1.13 09/07/2024    CREATININE 0.97 09/06/2024    CREATININE 1.21 09/05/2024   Etiology: Suspect diabetic kidney disease, HRS and age-related nephron loss  Plan creatinine 0.9-1.2 dating back to November 2023; deviously 0.7-0.9  Recent creatinine 1.13 on 9/7/2024 eGFR 49  Urinalysis on 9/4/2024 reveals small leukocytes, 4-10 WBCs  PCR 0.2 on 9/3/2024  Not on ACE/ARB  Continue to monitor for now  Have patient return in 4 months with updated blood work and urine studies    Orders:    Basic metabolic panel; Future    CBC and Platelet; Future    Albumin / creatinine urine ratio; Future    PTH, intact; Future    Phosphorus; Future    Urinalysis with microscopic; Future    Vitamin D 25 hydroxy; Future    Magnesium; Future    Microalbumin, Random Urine (W/Creatinine) ([facility]); Future    Iron Panel (Includes Ferritin, Iron Sat%, Iron, and TIBC); Future    Microalbumin, Random Urine (W/Creatinine) ([facility])

## 2024-09-17 NOTE — ASSESSMENT & PLAN NOTE
Hemoglobin 11.0 and appears stable  On oral iron tablets  Recent iron stores-iron panel with next office visit  No change in current treatment    Orders:    CBC and Platelet; Future    Iron Panel (Includes Ferritin, Iron Sat%, Iron, and TIBC); Future

## 2024-09-17 NOTE — ASSESSMENT & PLAN NOTE
Etiology: In the setting of diabetes  Most recent PCR 0.2 on 9/3/2024  Will check UACR with next office visit  Commend improve glucose control    Orders:    Urinalysis with microscopic; Future    Microalbumin, Random Urine (W/Creatinine) ([facility]); Future    Microalbumin, Random Urine (W/Creatinine) ([facility])

## 2024-09-17 NOTE — ASSESSMENT & PLAN NOTE
Recent magnesium 1.9 on 9/5/2024  Currently on magnesium oxide 400 mg 3 times daily with meals  Denies Symptoms of muscle cramps  No change in current treatment  Will repeat magnesium with next office visit

## 2024-09-17 NOTE — PROGRESS NOTES
Ambulatory Visit  Name: Cassidy Zhang      : 1955      MRN: [id number]  Encounter Provider: AQUILES Pelaez  Encounter Date: 2024   Encounter department: [facility] [place]    Assessment & Plan  Hyponatremia  Etiology: SIADH with excessive ADH in the setting of alcoholic liver cirrhosis  Recently on sodium salt tablet but this was discontinued in the setting of volume overload  Current sodium 134  Actually on sodium bicarbonate for metabolic acidosis  Currently on spironolactone 100 mg daily furosemide 40 mg daily  He is on fluid restriction of 40 ounces daily  No change in current treatment  Left patient return in 4 months with updated blood work and urine studies  Orders:    Basic metabolic panel; Future    Stage 3a chronic kidney disease (HCC)  Lab Results   Component Value Date    EGFR 49 2024    EGFR 59 2024    EGFR 45 2024    CREATININE 1.13 2024    CREATININE 0.97 2024    CREATININE 1.21 2024   Etiology: Suspect diabetic kidney disease, HRS and age-related nephron loss  Plan creatinine 0.9-1.2 dating back to 2023; deviously 0.7-0.9  Recent creatinine 1.13 on 2024 eGFR 49  Urinalysis on 2024 reveals small leukocytes, 4-10 WBCs  PCR 0.2 on 9/3/2024  Not on ACE/ARB  Continue to monitor for now  Have patient return in 4 months with updated blood work and urine studies    Orders:    Basic metabolic panel; Future    CBC and Platelet; Future    Albumin / creatinine urine ratio; Future    PTH, intact; Future    Phosphorus; Future    Urinalysis with microscopic; Future    Vitamin D 25 hydroxy; Future    Magnesium; Future    Microalbumin, Random Urine (W/Creatinine) ([facility]); Future    Iron Panel (Includes Ferritin, Iron Sat%, Iron, and TIBC); Future    Microalbumin, Random Urine (W/Creatinine) ([facility])    Essential hypertension  Blood pressure elevated on arrival but improved to 140/60 after 10 minutes  On sodium bicarbonate  could be a component  Reports not taking blood pressure at home regularly  Encourage low-sodium diet  Instructed patient to take blood pressure 2 times a day for 1 week and send in for review  Patient has been instructed to bring in her home blood pressure cuff with next office visit for cuff correlation  Spironolactone and furosemide  Had Been on midodrine in the past but none currently  Blood pressure less than 130/80       Microalbuminuria  Etiology: In the setting of diabetes  Most recent PCR 0.2 on 9/3/2024  Will check UACR with next office visit  Commend improve glucose control    Orders:    Urinalysis with microscopic; Future    Microalbumin, Random Urine (W/Creatinine) ([facility]); Future    Microalbumin, Random Urine (W/Creatinine) ([facility])    Anemia due to stage 3a chronic kidney disease  (HCC)  Hemoglobin 11.0 and appears stable  On oral iron tablets  Recent iron stores-iron panel with next office visit  No change in current treatment    Orders:    CBC and Platelet; Future    Iron Panel (Includes Ferritin, Iron Sat%, Iron, and TIBC); Future    Hypomagnesemia  Recent magnesium 1.9 on 9/5/2024  Currently on magnesium oxide 400 mg 3 times daily with meals  Denies Symptoms of muscle cramps  No change in current treatment  Will repeat magnesium with next office visit       Decompensated liver disease (HCC)  Patient is examining fairly euvolemic and appears compensated  Examining euvolemic without lower extremity edema  Cait on spironolactone and Lasix as above       Metabolic acidosis  Current bicarb 27  On sodium bicarbonate tablets 1 tablet twice daily  No change in current treatment at this time  We will check BMP in 2 weeks if bicarb still elevated consider decreasing dose  Orders:    sodium bicarbonate 650 mg tablet; Take 1 tablet (650 mg total) by mouth 2 (two) times a day    Had previous hypercalcemia-now resolved  Current calcium 9.4  Calcium 1.24 on 9/6/2024  Continue to monitor given volume status with  liver disease, fluid restriction and diuretic use  Follows with Dr. Mahajan from GI this time gets evaluation for paracentesis every 2 weeks    History of Present Illness     Cassidy Zhang is a 69 y.o. female who presents for hyponatremia and CKD follow-up.  Patient normally follows with Dr. Canales and was last seen on 16 2024 and was stable from nephrology standpoint post.hospitalization.  This last visit patient has been seen in the ER for hyperglycemia and at this time glucose is being monitored and insulin adjusted per endocrinology.  Recent blood work from 9/7/2024 has been reviewed and discussed with patient.  Discussed and the patient reports feeling pretty good.  Evaluated every 2 weeks for paracentesis which she has not received in a little while.  Patient requesting refill for sodium bicarbonate which she is taking and maintaining 40 ounces of fluid restriction daily.  Overall she is feeling well without any complaints      Review of Systems   Constitutional: Negative.  Negative for activity change, appetite change, chills, diaphoresis, fatigue and fever.   HENT: Negative.  Negative for congestion and facial swelling.    Respiratory: Negative.     Cardiovascular: Negative.    Gastrointestinal: Negative.    Endocrine: Negative.    Genitourinary: Negative.    Musculoskeletal: Negative.    Skin: Negative.    Allergic/Immunologic: Negative.    Neurological: Negative.    Hematological: Negative.    Psychiatric/Behavioral: Negative.             Objective     /60 (BP Location: Right arm, Patient Position: Sitting) Comment: after 10 minutes  Pulse 73   Ht 5' (1.524 m)   Wt 52.6 kg (116 lb)   LMP  (LMP Unknown) Comment: Postmenapausal  SpO2 99%   BMI 22.65 kg/m²     Physical Exam  Vitals reviewed.   Constitutional:       Appearance: Normal appearance.   HENT:      Head: Normocephalic and atraumatic.      Nose: Nose normal.      Mouth/Throat:      Mouth: Mucous membranes are moist.      Pharynx:  Oropharynx is clear.   Eyes:      Extraocular Movements: Extraocular movements intact.      Conjunctiva/sclera: Conjunctivae normal.   Cardiovascular:      Rate and Rhythm: Normal rate and regular rhythm.      Pulses: Normal pulses.      Heart sounds: Normal heart sounds.   Pulmonary:      Effort: Pulmonary effort is normal.      Breath sounds: Normal breath sounds.   Abdominal:      General: Bowel sounds are normal.      Palpations: Abdomen is soft.   Musculoskeletal:         General: Normal range of motion.      Cervical back: Normal range of motion and neck supple.   Skin:     General: Skin is warm.   Neurological:      Mental Status: She is alert.   Psychiatric:         Mood and Affect: Mood normal.         Behavior: Behavior normal.

## 2024-09-18 ENCOUNTER — TELEPHONE (OUTPATIENT)
Dept: ENDOCRINOLOGY | Facility: CLINIC | Age: 69
End: 2024-09-18

## 2024-09-18 DIAGNOSIS — E11.9 DIABETES MELLITUS, NEW ONSET (HCC): ICD-10-CM

## 2024-09-18 LAB
GAD65 AB SER-ACNC: <5 U/ML (ref 0–5)
PANC ISLET CELL AB TITR SER: NEGATIVE {TITER}

## 2024-09-18 RX ORDER — INSULIN ASPART INJECTION 100 [IU]/ML
INJECTION, SOLUTION SUBCUTANEOUS
Qty: 15 ML | Refills: 0 | Status: SHIPPED | OUTPATIENT
Start: 2024-09-18 | End: 2024-09-26

## 2024-09-18 NOTE — TELEPHONE ENCOUNTER
Sugar logs   Patient and  stopped in office and dropped off sugar logs and  said patient is running  low at dinner time and they want a call back please  Thank you

## 2024-09-18 NOTE — TELEPHONE ENCOUNTER
Due to occasional low or low normal blood glucose levels before dinner and bedtime, decrease Fiasp at lunchtime to 5 units and at dinnertime to 8 units.

## 2024-09-20 NOTE — TELEPHONE ENCOUNTER
Patient calling regarding BS log. Made aware:    Kyra Ortiz PA-C       9/18/24 12:49 PMote     Due to occasional low or low normal blood glucose levels before dinner and bedtime, decrease Fiasp at lunchtime to 5 units and at dinnertime to 8 units.           Patient verbalized understanding. Patient asking if provider recommends getting the influenza and COVID vaccine. Please advise, thank you.

## 2024-09-23 ENCOUNTER — TELEPHONE (OUTPATIENT)
Dept: ENDOCRINOLOGY | Facility: CLINIC | Age: 69
End: 2024-09-23

## 2024-09-23 NOTE — TELEPHONE ENCOUNTER
Flu vaccine is recommended.   She should discuss Covid vaccine with PCP as it depends on when she last received it or if she has had Covid recently.

## 2024-09-23 NOTE — TELEPHONE ENCOUNTER
----- Message from Kyra Ortiz PA-C sent at 9/23/2024  1:00 PM EDT -----  Please let patient know that her workup for type 1 diabetes came back negative which means she does have type 2 diabetes.  
Spoke with patient and she expressed understanding.     
done

## 2024-09-26 ENCOUNTER — TELEPHONE (OUTPATIENT)
Dept: DIABETES SERVICES | Facility: CLINIC | Age: 69
End: 2024-09-26

## 2024-09-26 ENCOUNTER — OFFICE VISIT (OUTPATIENT)
Dept: DIABETES SERVICES | Facility: CLINIC | Age: 69
End: 2024-09-26
Payer: MEDICARE

## 2024-09-26 VITALS — BODY MASS INDEX: 22.85 KG/M2 | WEIGHT: 117 LBS

## 2024-09-26 DIAGNOSIS — Z79.4 INSULIN DEPENDENT TYPE 2 DIABETES MELLITUS (HCC): Primary | ICD-10-CM

## 2024-09-26 DIAGNOSIS — E11.9 DIABETES MELLITUS, NEW ONSET (HCC): ICD-10-CM

## 2024-09-26 DIAGNOSIS — E11.9 INSULIN DEPENDENT TYPE 2 DIABETES MELLITUS (HCC): Primary | ICD-10-CM

## 2024-09-26 PROCEDURE — 95249 CONT GLUC MNTR PT PROV EQP: CPT

## 2024-09-26 RX ORDER — INSULIN ASPART INJECTION 100 [IU]/ML
INJECTION, SOLUTION SUBCUTANEOUS
Qty: 15 ML | Refills: 0 | Status: SHIPPED | OUTPATIENT
Start: 2024-09-26

## 2024-09-26 RX ORDER — PEN NEEDLE, DIABETIC 32GX 5/32"
NEEDLE, DISPOSABLE MISCELLANEOUS
Qty: 400 EACH | Refills: 1 | Status: SHIPPED | OUTPATIENT
Start: 2024-09-26

## 2024-09-26 NOTE — TELEPHONE ENCOUNTER
Yash Rae,    I saw Cassidy today for Maciel 3 training and review of insulin injection technique. She brought her blood sugar log to the visit today and expressed concerns of hypoglycemia following her dinner meal. I scanned the log into the media tab for your review.    She also informed me that she is in need of a refill of her insulin pen needles as they are getting low. She stated [facility] [address] and would like them sent there.     Thanks in advance!

## 2024-09-26 NOTE — TELEPHONE ENCOUNTER
Clinical - please call patient and tell her to reduce her Fiasp dose at dinner to 5 units.     Jemma - thanks for the update. I sent in her pen needles.

## 2024-09-26 NOTE — PROGRESS NOTES
Personal Maciel 3 Training    Met with Cassidy Zhang today, who was accompanied by her , for a personal Maciel 3 training. Cassidy was originally scheduled for MNT today, but decided to proceed with Maciel training since Cassidy and her  brought it with them today and also reviewed insulin injection technique. Cassidy's  typically gives her insulin injections. Technique was good. Reviewed importance of injection site rotation and reviewed appropriate timing and frequency of administration. Plan is to have Cassidy return in the near future for MNT.     Cassidy brought in blood sugar log today. She is currently using a OneTouch glucometer and checking blood sugar 4 times daily; before all meals and 2-hours after dinner. Recent pre-meal blood sugars range from  mg/dL. After dinner blood sugars range from  mg/dL. Cassidy expressed concern over low blood sugars after dinner. Reviewed hypoglycemia protocol. BG log scanned and sent to Kyra Ortiz PA-C regarding same.     Cassidy brought her own supplies to the visit which included a Maciel 3 reader and sensor.    Educator reviewed the following:    -- Skin Prep  -- How to place the Maciel 3  -- Importance of site rotation  -- Maciel 3 alerts and alarms  -- How to scan for sensor warm-up  -- 60 minute warm-up  -- If reading doesn't match symptoms, do a finger stick  -- Change sensor every 14 days.    Lab Results   Component Value Date    HGBA1C 11.4 (H) 09/03/2024     Patient response to instruction    Comprehension: good  Motivation: good  Expected Compliance: good  Response to Teachback: 100%, demonstrated understanding    Thank you for referring your patient to [facility], it was a pleasure working with them today. Please feel free to call with any questions or concerns.    Start- Stop: 9:30-10:30  Total Minutes: 60 Minutes  Group or Individual Instruction: DSME-I  Other: MD Jemma Rice, MS, RD,  [facility]  [id number]  [address]  [place] [phone]

## 2024-09-26 NOTE — TELEPHONE ENCOUNTER
I called and spoke with the patient letting her know that Kyra Ortiz would like her to reduce her Fiasp dose at dinner to 5 units. Thank you

## 2024-09-27 ENCOUNTER — NEW PATIENT (OUTPATIENT)
Dept: URBAN - METROPOLITAN AREA CLINIC 6 | Facility: CLINIC | Age: 69
End: 2024-09-27

## 2024-09-27 DIAGNOSIS — E11.9: ICD-10-CM

## 2024-09-27 DIAGNOSIS — Z96.1: ICD-10-CM

## 2024-09-27 DIAGNOSIS — H17.9: ICD-10-CM

## 2024-09-27 DIAGNOSIS — Z79.4: ICD-10-CM

## 2024-09-27 PROCEDURE — 92015 DETERMINE REFRACTIVE STATE: CPT

## 2024-09-27 PROCEDURE — 92004 COMPRE OPH EXAM NEW PT 1/>: CPT

## 2024-09-27 ASSESSMENT — TONOMETRY
OD_IOP_MMHG: 16
OS_IOP_MMHG: 15

## 2024-09-27 ASSESSMENT — VISUAL ACUITY
OS_CC: 20/40+1
OD_CC: 20/40+2

## 2024-09-28 ENCOUNTER — NURSE TRIAGE (OUTPATIENT)
Dept: OTHER | Facility: OTHER | Age: 69
End: 2024-09-28

## 2024-09-28 NOTE — TELEPHONE ENCOUNTER
Patient calling with technical issue with the freestyle device, able to get a reading and confirming with finger stick. Requesting to speak to office staff on Monday. Please contact the patient to further discuss, thank you.

## 2024-09-28 NOTE — TELEPHONE ENCOUNTER
"Regarding: Glucose sensor questions  ----- Message from Guy MCGOWAN sent at 9/28/2024  4:47 PM EDT -----  \"I am having problems with my glucose sensor. \"    "

## 2024-10-02 ENCOUNTER — TELEPHONE (OUTPATIENT)
Age: 69
End: 2024-10-02

## 2024-10-02 NOTE — TELEPHONE ENCOUNTER
Patient called back again, said she will probably change tomorrow, she will do finger sticks for now.

## 2024-10-04 DIAGNOSIS — E11.9 DIABETES MELLITUS, NEW ONSET (HCC): ICD-10-CM

## 2024-10-04 RX ORDER — BLOOD SUGAR DIAGNOSTIC
STRIP MISCELLANEOUS
Qty: 400 EACH | Refills: 2 | Status: SHIPPED | OUTPATIENT
Start: 2024-10-04

## 2024-10-04 NOTE — TELEPHONE ENCOUNTER
Requested # 100         Reason for call:   [x] Refill   [] Prior Auth  [] Other:     Office:   [] PCP/Provider -   [x] Specialty/Provider - Kyra Ortiz/[facility] [place]         Medication: One touch verio test strip    Dose/Frequency:     Quantity: #100    Pharmacy: [facility] [place]    Does the patient have enough for 3 days?   [] Yes   [x] No - Send as HP to POD

## 2024-10-07 ENCOUNTER — TELEPHONE (OUTPATIENT)
Age: 69
End: 2024-10-07

## 2024-10-07 NOTE — TELEPHONE ENCOUNTER
Ed from [facility] in [place] calling in states that they need patient's last office visit note faxed to them due to medicare regulation for her diabetic supplies.  Fax # [phone].

## 2024-10-09 ENCOUNTER — OFFICE VISIT (OUTPATIENT)
Dept: DIABETES SERVICES | Facility: CLINIC | Age: 69
End: 2024-10-09
Payer: MEDICARE

## 2024-10-09 ENCOUNTER — TELEPHONE (OUTPATIENT)
Dept: DIABETES SERVICES | Facility: CLINIC | Age: 69
End: 2024-10-09

## 2024-10-09 VITALS — BODY MASS INDEX: 22.62 KG/M2 | WEIGHT: 115.8 LBS

## 2024-10-09 DIAGNOSIS — E11.9 DIABETES MELLITUS, NEW ONSET (HCC): ICD-10-CM

## 2024-10-09 DIAGNOSIS — Z79.4 INSULIN DEPENDENT TYPE 2 DIABETES MELLITUS (HCC): Primary | ICD-10-CM

## 2024-10-09 DIAGNOSIS — E11.9 INSULIN DEPENDENT TYPE 2 DIABETES MELLITUS (HCC): Primary | ICD-10-CM

## 2024-10-09 PROCEDURE — 97802 MEDICAL NUTRITION INDIV IN: CPT

## 2024-10-09 RX ORDER — INSULIN ASPART INJECTION 100 [IU]/ML
INJECTION, SOLUTION SUBCUTANEOUS
Qty: 15 ML | Refills: 2 | Status: SHIPPED | OUTPATIENT
Start: 2024-10-09 | End: 2024-10-17 | Stop reason: SDUPTHER

## 2024-10-09 RX ORDER — INSULIN DEGLUDEC 100 U/ML
INJECTION, SOLUTION SUBCUTANEOUS
Qty: 15 ML | Refills: 1 | Status: SHIPPED | OUTPATIENT
Start: 2024-10-09 | End: 2024-10-17 | Stop reason: SDUPTHER

## 2024-10-09 RX ORDER — INSULIN GLARGINE 100 [IU]/ML
18 INJECTION, SOLUTION SUBCUTANEOUS
Qty: 15 ML | Refills: 2 | Status: SHIPPED | OUTPATIENT
Start: 2024-10-09 | End: 2024-10-09

## 2024-10-09 NOTE — TELEPHONE ENCOUNTER
Patient called asking to speak with nii. She states she spoke with her insurance who told her the covered alternative to Lantus is Tresiba. She is asking for this to be sent to [facility] [place] on [address]. I informed her I will let Nii know and I will also send this to the office for the doctor to prescribe.  Please advise   Pt received on 40 L/ 35% FI02 HFNC, Pt was placed on NC 5 LPM per Physician, Saturation 100%, pt is tolerating. NIV and HFNC is on s/b at bedside.

## 2024-10-09 NOTE — PATIENT INSTRUCTIONS
Eat 3 regular meals ~4-5 hours apart with 1-2 snacks per day as needed.  Keep carbohydrate intake consistent. Aim for 30-45 grams of carbohydrate per meal and 0-15 grams of carbohydrate per snack.

## 2024-10-09 NOTE — TELEPHONE ENCOUNTER
Cassidy Miller came in today for MNT. She informed me that she is on her second to last pen of both her lantus and fiasp and will need a refill soon. She wanted to give us ample notice so she does not run out.

## 2024-10-09 NOTE — PROGRESS NOTES
Medical Nutrition Therapy    Assessment    Visit Type: Initial visit  Chief complaint/Medical Diagnosis/reason for visit: E11.9, Z79.4 (ICD-10-CM) - Insulin dependent type 2 diabetes mellitus (HCC)     HPI Cassidy Zhang was seen today accompanied by her  regarding recent diagnosis of diabetes in the beginning of September 2024. Patient is currently taking 18 units of lantus at bedtime and fiasp with meals; 10 units with breakfast and 5 units with lunch and dinner. Last HbA1c was 11.4% in September 2024. Cassidy is currently using Maciel sensor. Brought in blood sugar logs to review today. BG log placed in scanning bin. Blood sugars look to have greatly improved. She was having some hypoglycemia after dinner, but not lately since dinner fiasp dose was decreased. Current blood sugars range from  mg/dL. Conducted dietary recall. See below for details. Explained basic pathophysiology of diabetes and impact of diet on blood glucose levels. Together we discussed what foods contain carbohydrates, reading a food label, timing of meals and snacks, serving sizes, the role of fiber, the importance of consistent carbohydrate intake in the appropriate amounts. Used the portion booklet to teach Cassidy and her  more about food groups and basic carbohydrate counting. Encouraged 3 regular meals ~4-5 hours apart with 1-2 snacks per day as needed. Discussed keeping carbohydrate intake consistent. Goal is 30-45 grams of carbohydrate per meal and 0-15 grams of carbohydrate per snack. Cassidy and her  demonstrated good understanding and will call with any questions or if she would like to schedule a follow-up visit.    Ht Readings from Last 1 Encounters:   09/17/24 5' (1.524 m)     Wt Readings from Last 3 Encounters:   10/09/24 52.5 kg (115 lb 12.8 oz)   09/26/24 53.1 kg (117 lb)   09/17/24 52.6 kg (116 lb)     Weight Change: Reports usual body weight to be 130-135 lbs. Reports weight to have gradually decreased  over the summer unintentionally. Currently weighed at 115 pounds today in the office.    Barriers to Learning: no barriers    Do you follow any special diet presently?: Yes - limiting carbohydrate and sugar intake  Who shops: spouse mainly  Who cooks: patient and spouse grills    Food Log: Completed via the method of usual daily food recall    Breakfast: oatmeal made with water with fruit such as banana and raspberries and peanut butter or 2 hard boiled eggs or a piece of wheat bread with a little butter or sugar-free jelly, tea plain with lemon once in a while  Morning Snack: may have a sugar-free jello or greek yogurt once in a while, a few apple slices or walnuts and pecans   Lunch: half or whole sandwich with ham or turkey or roast beef and a thin layer of light english, most of the time a salad or tomatoes on the side  Afternoon Snack: none  Dinner: meat - pork, chicken, hamburger with a bun with ketchup or mustard or english and veggies - mixed veggies, broccoli, cauliflower, green beans, peas, brown rice or potatoes, not a lot of pasta  Evening Snack: 1 rectangle of shantanu cracker with peanut butter, sugar-free pudding or frozen yogurt with a small amount of cool whip and fruit  Beverages: hot tea with lemon sometimes, 40 oz water (fluid restriction)  Exercise: takes 20 minute walks (tries to take these walks daily), ADL's    Estimated calorie needs: 1,166 kcals/day   Carbohydrate: 30-45 g/meal, 0-15 g/snack       Fat: 3 servings/day      Protein: 5 ounces/day    Nutrition Diagnosis:  Altered Nutrition-Related Laboratory values related to endocrine dysfunction as evidenced by abnormal plasma glucose and/or HgbA1c levels.    Intervention: increased fiber intake, label reading, behavior modification strategies, carbohydrate counting, meal timing, meal planning, monitoring portion control, and exercise guidelines     Treatment Goals: Patient understands education and recommendations, Patient will consume 3 meals a  day, Patient will monitor portion control, Patient will consume snacks, Patient will count carbohydrates, Patient will exercise, and Patient will monitor blood glucose    Monitoring and evaluation:    Term code indicator  FH 4.4 Mealtime Behavior Criteria: Eat 3 regular meals ~4-5 hours apart with 1-2 snacks per day as needed.  Term code indicator  FH 1.6.3 Carbohydrate Intake Criteria: Keep carbohydrate intake consistent. Aim for 30-45 grams of carbohydrate per meal and 0-15 grams of carbohydrate per snack.    Materials Provided: Portion booklet    Patient’s Response to Instruction:  Comprehension: good  Motivation: good  Expected Compliance: good    Start- Stop: 9:45-10:45  Total Minutes: 60 Minutes  Group or Individual Instruction: MNT-I  Other: Mikala Trevino MD    Thank you for coming to the [facility] for education today. Please feel free to call with any questions or concerns.    Jemma Najera, MS, RD, LDN  [id number]  [facility] [address]  [place] [phone]

## 2024-10-11 ENCOUNTER — TELEPHONE (OUTPATIENT)
Dept: NEPHROLOGY | Facility: CLINIC | Age: 69
End: 2024-10-11

## 2024-10-11 ENCOUNTER — APPOINTMENT (OUTPATIENT)
Dept: LAB | Facility: AMBULARY SURGERY CENTER | Age: 69
End: 2024-10-11
Payer: MEDICARE

## 2024-10-11 DIAGNOSIS — N18.31 ANEMIA DUE TO STAGE 3A CHRONIC KIDNEY DISEASE  (HCC): ICD-10-CM

## 2024-10-11 DIAGNOSIS — D63.1 ANEMIA DUE TO STAGE 3A CHRONIC KIDNEY DISEASE  (HCC): ICD-10-CM

## 2024-10-11 DIAGNOSIS — E83.42 HYPOMAGNESEMIA: ICD-10-CM

## 2024-10-11 DIAGNOSIS — N18.31 STAGE 3A CHRONIC KIDNEY DISEASE (HCC): ICD-10-CM

## 2024-10-11 DIAGNOSIS — E87.1 HYPONATREMIA: ICD-10-CM

## 2024-10-11 DIAGNOSIS — E87.20 METABOLIC ACIDOSIS: ICD-10-CM

## 2024-10-11 DIAGNOSIS — I10 ESSENTIAL HYPERTENSION: Primary | Chronic | ICD-10-CM

## 2024-10-11 LAB
ANION GAP SERPL CALCULATED.3IONS-SCNC: 10 MMOL/L (ref 4–13)
BUN SERPL-MCNC: 35 MG/DL (ref 5–25)
CALCIUM SERPL-MCNC: 9.9 MG/DL (ref 8.4–10.2)
CHLORIDE SERPL-SCNC: 97 MMOL/L (ref 96–108)
CO2 SERPL-SCNC: 27 MMOL/L (ref 21–32)
CREAT SERPL-MCNC: 1.06 MG/DL (ref 0.6–1.3)
GFR SERPL CREATININE-BSD FRML MDRD: 53 ML/MIN/1.73SQ M
GLUCOSE P FAST SERPL-MCNC: 98 MG/DL (ref 65–99)
POTASSIUM SERPL-SCNC: 4.2 MMOL/L (ref 3.5–5.3)
SODIUM SERPL-SCNC: 134 MMOL/L (ref 135–147)

## 2024-10-11 PROCEDURE — 80048 BASIC METABOLIC PNL TOTAL CA: CPT

## 2024-10-11 PROCEDURE — 36415 COLL VENOUS BLD VENIPUNCTURE: CPT

## 2024-10-11 NOTE — TELEPHONE ENCOUNTER
----- Message from AQUILES Pelaez sent at 10/11/2024  1:41 PM EDT -----  Please let patient know that repeat BMP reviewed reveals sodium at 134 and stable.  No change in current treatment.  He is repeat in 1 month to ensure stability. deja

## 2024-10-11 NOTE — TELEPHONE ENCOUNTER
Called patient and went over the following information:    Please let patient know that repeat BMP reviewed reveals sodium at 134 and stable.  No change in current treatment.  He is repeat in 1 month to ensure stability.     Patient verbally understood and had no further questions for me at this time.    Labs have been added to the patients chart.

## 2024-10-17 DIAGNOSIS — E11.9 DIABETES MELLITUS, NEW ONSET (HCC): ICD-10-CM

## 2024-10-17 RX ORDER — INSULIN ASPART INJECTION 100 [IU]/ML
INJECTION, SOLUTION SUBCUTANEOUS
Qty: 15 ML | Refills: 2 | Status: SHIPPED | OUTPATIENT
Start: 2024-10-17

## 2024-10-17 RX ORDER — INSULIN DEGLUDEC 200 U/ML
INJECTION, SOLUTION SUBCUTANEOUS
Refills: 0 | OUTPATIENT
Start: 2024-10-17

## 2024-10-17 RX ORDER — INSULIN DEGLUDEC 100 U/ML
INJECTION, SOLUTION SUBCUTANEOUS
Qty: 15 ML | Refills: 1 | Status: SHIPPED | OUTPATIENT
Start: 2024-10-17

## 2024-10-17 NOTE — TELEPHONE ENCOUNTER
Not a duplicate    Patient called to request prescriptions for Fiasp and Tresiba. She was informed both meds were sent to [facility]. She said that she does not use [facility] anymore (removed from chart). Patient requested [facility] (see telephone encounter 10/09/24).    Please resend     Reason for call:   [x] Refill   [] Prior Auth  [x] Other: pharmacy change    Office:  [x] Specialty/Provider - endo / [facility]    Medication:   Fiasp 100units/mL (10 units @ breakfast, 5 units @ lunch, 5 units @ dinner)  #15mL    Tresiba 100units/mL (18 units qhs)  #15mL    [facility] [id number] [facility] [address]     Does the patient have enough for 3 days?   [x] Yes  [] No - Send as HP to POD

## 2024-10-18 ENCOUNTER — TRANSCRIBE ORDERS (OUTPATIENT)
Age: 69
End: 2024-10-18

## 2024-10-23 DIAGNOSIS — E83.42 HYPOMAGNESEMIA: ICD-10-CM

## 2024-10-23 RX ORDER — MAGNESIUM OXIDE 400 MG/1
1 TABLET ORAL
Qty: 270 TABLET | Refills: 0 | Status: SHIPPED | OUTPATIENT
Start: 2024-10-23 | End: 2024-10-23 | Stop reason: SDUPTHER

## 2024-10-23 RX ORDER — MAGNESIUM OXIDE 400 MG/1
1 TABLET ORAL
Qty: 270 TABLET | Refills: 0 | Status: SHIPPED | OUTPATIENT
Start: 2024-10-23

## 2024-10-24 ENCOUNTER — TELEPHONE (OUTPATIENT)
Age: 69
End: 2024-10-24

## 2024-10-24 NOTE — TELEPHONE ENCOUNTER
Called patient regarding form that was dropped of by patient for Dr. Mahajan to fill out for Jose. Advised patient to contact PCP regarding medication as medication is not prescribed by Dr. Mahajan.

## 2024-10-29 ENCOUNTER — TELEPHONE (OUTPATIENT)
Dept: ENDOCRINOLOGY | Facility: CLINIC | Age: 69
End: 2024-10-29

## 2024-10-29 DIAGNOSIS — E11.9 DIABETES MELLITUS, NEW ONSET (HCC): ICD-10-CM

## 2024-10-29 NOTE — TELEPHONE ENCOUNTER
Blood glucose levels are very tightly controlled.   If currently taking Tresiba 18 units then decrease to 14 units.   If currently taking Fiasp 10-5-5 units with meals then decrease to 7-3-3 units TID with meals.   Send log again in 1-2 weeks.

## 2024-11-01 ENCOUNTER — OFFICE VISIT (OUTPATIENT)
Dept: PODIATRY | Facility: CLINIC | Age: 69
End: 2024-11-01
Payer: MEDICARE

## 2024-11-01 VITALS
BODY MASS INDEX: 22.58 KG/M2 | OXYGEN SATURATION: 100 % | HEIGHT: 60 IN | WEIGHT: 115 LBS | HEART RATE: 70 BPM | SYSTOLIC BLOOD PRESSURE: 141 MMHG | DIASTOLIC BLOOD PRESSURE: 80 MMHG

## 2024-11-01 DIAGNOSIS — E11.9 ENCOUNTER FOR DIABETIC FOOT EXAM (HCC): Primary | ICD-10-CM

## 2024-11-01 DIAGNOSIS — E11.9 NEWLY DIAGNOSED DIABETES (HCC): ICD-10-CM

## 2024-11-01 DIAGNOSIS — B35.1 ONYCHOMYCOSIS: ICD-10-CM

## 2024-11-01 PROCEDURE — 99203 OFFICE O/P NEW LOW 30 MIN: CPT | Performed by: PODIATRIST

## 2024-11-01 NOTE — PROGRESS NOTES
Assessment/Plan:     The patient's clinical examination today significant for thickening and discoloration of the right hallux nail plate with subungual debris consistent with onychomycosis.  These changes are likely secondary to trauma of the nail plate.  The proximal half of the nail seems to be growing in nicely.  The remaining pedal nail plates are thickened and mildly dystrophic without any signs of mycosis.  Pedal pulses are palpable.  There are no interdigital macerations.  Epicritic and vibratory sensations are intact bilaterally as is proprioception.  The patient does note paresthesias and tingling sensations in her toes which is consistent with a chronic polyneuropathy.  In reviewing her medical history, this may be alcohol induced.    A diabetic foot examination was performed and the patient is deemed to be in a low risk category.  The thickened mycotic nail was trimmed back and the mechanically reduced with a rotary bur without incident.    Recommend at least an annual diabetic foot check.     Diagnoses and all orders for this visit:    Encounter for diabetic foot exam ([facility])    Newly diagnosed diabetes ([facility])  -     Ambulatory referral to Podiatry    Onychomycosis          Subjective:     Patient ID: Cassidy Zhang is a 69 y.o. female.    The patient presents today for her initial consultation with [facility] for diabetic foot examination.  The patient does note some diffuse tingling and paresthesias in her feet that is chronic in nature.  She does note that she was just recently diagnosed with diabetes in September.  She does note a family history of it.  She is currently on insulin therapy.  She does note thickening and elongation of her pedal nail plates but otherwise has no other acute pedal complaints.      PAST MEDICAL HISTORY:  Past Medical History:   Diagnosis Date    Closed comminuted fracture of hip, left, initial encounter ([facility]) 11/15/2018    Closed intertrochanteric fracture,  left, initial encounter ([facility]) 11/15/2018    Added automatically from request for surgery [id number]      Colon polyp     Diarrhea 10/26/2023    Femur fracture (HCC)     Hypertension     Hyponatremia     Liver disease     cirrhosis    Syndrome of inappropriate secretion of antidiuretic hormone ([facility]CC) 01/31/2024       PAST SURGICAL HISTORY:  Past Surgical History:   Procedure Laterality Date    CHOLECYSTECTOMY      EYE SURGERY      IR PARACENTESIS  12/21/2023    IR PARACENTESIS  03/13/2024    IR PARACENTESIS  03/27/2024    IR PARACENTESIS  04/10/2024    IR PARACENTESIS  04/24/2024    SC OPTX FEM SHFT FX W/INSJ IMED IMPLT W/WO SCREW Left 11/17/2018    Procedure: Intramedullary nail fixation left hip fracture;  Surgeon: oRss Herrera MD;  Location: [facility];  Service: Orthopedics        ALLERGIES:  Oxycodone-acetaminophen, Demerol [meperidine], Diphenhydramine, Milk (cow), Other, Prednisone, Sulfa antibiotics, and Sulfasalazine    MEDICATIONS:  Current Outpatient Medications   Medication Sig Dispense Refill    Alcohol Swabs 70 % PADS May substitute brand based on insurance coverage. Check glucose ACHS. 200 each 0    Blood Glucose Monitoring Suppl (OneTouch Verio Reflect) w/Device KIT May substitute brand based on insurance coverage. Check glucose ACHS. 1 kit 0    Cholecalciferol (VITAMIN D3) 3000 UNITS TABS Take 100 Units by mouth daily.      Continuous Glucose  (FreeStyle Maciel 3 [place]) CAITLIN Use as directed 1 each 0    Continuous Glucose Sensor (FreeStyle Maciel 3 Sensor) MISC Use every 14 days as directed 2 each 0    ferrous sulfate 325 (65 Fe) mg tablet Take 325 mg by mouth daily with breakfast      furosemide (LASIX) 40 mg tablet TAKE 1 TABLET BY MOUTH EVERY DAY 90 tablet 1    glucose blood (OneTouch Verio) test strip Check glucose 3-4 times daily 400 each 2    insulin aspart, w/niacinamide, (Fiasp FlexTouch) 100 Units/mL injection pen Take 10 units before breakfast, 5 units before lunch, 5 units before  dinner 15 mL 2    insulin degludec (Tresiba FlexTouch) 100 units/mL injection pen Inject 0.18 mL (18 Units total) under the skin daily at bedtime 15 mL 1    Insulin Pen Needle (BD Pen Needle Tita 2nd Gen) 32G X 4 MM MISC For use with insulin pen. Pharmacy may dispense brand covered by insurance. 100 each 0    Insulin Pen Needle (BD Pen Needle Tita 2nd Gen) 32G X 4 MM MISC For use with insulin pen 4 times a day. Pharmacy may dispense brand covered by insurance. 400 each 1    lactulose (CHRONULAC) 10 g/15 mL solution Take 45 mL (30 g total) by mouth 2 (two) times a day 8100 mL 3    magnesium oxide (MAG-OX) 400 mg tablet Take 1 tablet (400 mg total) by mouth 3 (three) times a day with meals 270 tablet 0    OneTouch Delica Lancets 33G MISC May substitute brand based on insurance coverage. Check glucose ACHS. 200 each 0    pancrelipase, Lip-Prot-Amyl, (CREON) 24,000 units Take 1 capsule (24,000 Units total) by mouth 3 (three) times a day with meals (Patient taking differently: Take 36,000 Units by mouth 3 (three) times a day with meals) 90 capsule 0    pantoprazole (PROTONIX) 40 mg tablet Take 1 tablet (40 mg total) by mouth 2 (two) times a day before meals 60 tablet 0    sodium bicarbonate 650 mg tablet Take 1 tablet (650 mg total) by mouth 2 (two) times a day 180 tablet 2    spironolactone (ALDACTONE) 50 mg tablet TAKE 2 TABLETS BY MOUTH EVERY  tablet 1    thiamine (VITAMIN B1) 100 mg tablet Take 100 mg by mouth daily      doxycycline monohydrate (MONODOX) 50 mg capsule Take 50 mg by mouth daily (Patient not taking: Reported on 11/1/2024)      hydrocortisone (ANUSOL-HC) 2.5 % rectal cream Apply topically 2 (two) times a day for 10 days (Patient not taking: Reported on 11/1/2024) 28 g 0     No current facility-administered medications for this visit.       SOCIAL HISTORY:  Social History     Socioeconomic History    Marital status: /Civil Union     Spouse name: None    Number of children: None    Years of  education: None    Highest education level: None   Occupational History    None   Tobacco Use    Smoking status: Former    Smokeless tobacco: Never   Vaping Use    Vaping status: Never Used   Substance and Sexual Activity    Alcohol use: Not Currently     Alcohol/week: 1.0 standard drink of alcohol     Types: 1 Cans of beer per week     Comment: 2-3 times per week. Drinks heavier before    Drug use: No    Sexual activity: None   Other Topics Concern    None   Social History Narrative    None     Social Determinants of Health     Financial Resource Strain: Not on file   Food Insecurity: No Food Insecurity (12/27/2023)    Nursing - Inadequate Food Risk Classification     Worried About Running Out of Food in the Last Year: Never true     Ran Out of Food in the Last Year: Never true     Ran Out of Food in the Last Year: Not on file   Transportation Needs: No Transportation Needs (12/27/2023)    PRAPARE - Transportation     Lack of Transportation (Medical): No     Lack of Transportation (Non-Medical): No   Physical Activity: Not on file   Stress: Not on file   Social Connections: Not on file   Intimate Partner Violence: Not on file   Housing Stability: Unknown (12/27/2023)    Housing Stability Vital Sign     Unable to Pay for Housing in the Last Year: No     Number of Times Moved in the Last Year: Not on file     Homeless in the Last Year: No        Review of Systems   Constitutional: Negative.    HENT: Negative.     Eyes: Negative.    Respiratory: Negative.     Cardiovascular: Negative.    Endocrine: Negative.    Musculoskeletal: Negative.    Neurological: Negative.    Hematological: Negative.    Psychiatric/Behavioral: Negative.           Objective:     Physical Exam  Constitutional:       Appearance: Normal appearance.   HENT:      Head: Normocephalic and atraumatic.      Nose: Nose normal.   Cardiovascular:      Pulses: no weak pulses.           Dorsalis pedis pulses are 2+ on the right side and 2+ on the left side.         Posterior tibial pulses are 2+ on the right side and 2+ on the left side.   Pulmonary:      Effort: Pulmonary effort is normal.   Feet:      Right foot:      Protective Sensation: 7 sites tested.  7 sites sensed.      Skin integrity: No ulcer, skin breakdown, erythema, warmth, callus or dry skin.      Toenail Condition: Right toenails are abnormally thick and long. Fungal disease present.     Left foot:      Protective Sensation: 7 sites tested.  7 sites sensed.      Skin integrity: No ulcer, skin breakdown, erythema, warmth, callus or dry skin.      Toenail Condition: Left toenails are abnormally thick and long.      Comments: The patient's clinical examination today significant for thickening and discoloration of the right hallux nail plate with subungual debris consistent with onychomycosis.  These changes are likely secondary to trauma of the nail plate.  The proximal half of the nail seems to be growing in nicely.  The remaining pedal nail plates are thickened and mildly dystrophic without any signs of mycosis.  Pedal pulses are palpable.  There are no interdigital macerations.  Epicritic and vibratory sensations are intact bilaterally as is proprioception.  The patient does note paresthesias and tingling sensations in her toes which is consistent with a chronic polyneuropathy.  In reviewing her medical history, this may be alcohol induced.  Skin:     General: Skin is warm.      Capillary Refill: Capillary refill takes less than 2 seconds.   Neurological:      General: No focal deficit present.      Mental Status: She is alert and oriented to person, place, and time.   Psychiatric:         Mood and Affect: Mood normal.         Behavior: Behavior normal.         Thought Content: Thought content normal.             Diabetic Foot Exam    Patient's shoes and socks removed.    Right Foot/Ankle   Right Foot Inspection  Skin Exam: skin normal and skin intact. No dry skin, no warmth, no callus, no erythema, no  maceration, no abnormal color, no pre-ulcer, no ulcer and no callus.     Toe Exam: ROM and strength within normal limits.     Sensory   Vibration: intact  Proprioception: intact  Monofilament testing: intact    Vascular  Capillary refills: < 3 seconds  The right DP pulse is 2+. The right PT pulse is 2+.     Left Foot/Ankle  Left Foot Inspection  Skin Exam: skin normal and skin intact. No dry skin, no warmth, no erythema, no maceration, normal color, no pre-ulcer, no ulcer and no callus.     Toe Exam: ROM and strength within normal limits.     Sensory   Vibration: intact  Proprioception: intact  Monofilament testing: intact    Vascular  Capillary refills: < 3 seconds  The left DP pulse is 2+. The left PT pulse is 2+.     Assign Risk Category  No deformity present  No loss of protective sensation  No weak pulses  Risk: 0

## 2024-11-03 RX ORDER — INSULIN ASPART INJECTION 100 [IU]/ML
INJECTION, SOLUTION SUBCUTANEOUS
Qty: 15 ML | Refills: 2 | Status: SHIPPED | OUTPATIENT
Start: 2024-11-03

## 2024-11-03 RX ORDER — INSULIN DEGLUDEC 100 U/ML
INJECTION, SOLUTION SUBCUTANEOUS
Qty: 15 ML | Refills: 1 | Status: SHIPPED | OUTPATIENT
Start: 2024-11-03

## 2024-11-11 ENCOUNTER — APPOINTMENT (OUTPATIENT)
Dept: LAB | Facility: AMBULARY SURGERY CENTER | Age: 69
End: 2024-11-11
Payer: MEDICARE

## 2024-11-11 DIAGNOSIS — N18.31 ANEMIA DUE TO STAGE 3A CHRONIC KIDNEY DISEASE  (HCC): ICD-10-CM

## 2024-11-11 DIAGNOSIS — E83.42 HYPOMAGNESEMIA: ICD-10-CM

## 2024-11-11 DIAGNOSIS — N18.31 STAGE 3A CHRONIC KIDNEY DISEASE (HCC): ICD-10-CM

## 2024-11-11 DIAGNOSIS — E87.20 METABOLIC ACIDOSIS: ICD-10-CM

## 2024-11-11 DIAGNOSIS — D63.1 ANEMIA DUE TO STAGE 3A CHRONIC KIDNEY DISEASE  (HCC): ICD-10-CM

## 2024-11-11 DIAGNOSIS — I10 ESSENTIAL HYPERTENSION: Chronic | ICD-10-CM

## 2024-11-11 LAB
ANION GAP SERPL CALCULATED.3IONS-SCNC: 10 MMOL/L (ref 4–13)
BUN SERPL-MCNC: 37 MG/DL (ref 5–25)
CALCIUM SERPL-MCNC: 10 MG/DL (ref 8.4–10.2)
CHLORIDE SERPL-SCNC: 100 MMOL/L (ref 96–108)
CO2 SERPL-SCNC: 29 MMOL/L (ref 21–32)
CREAT SERPL-MCNC: 1.02 MG/DL (ref 0.6–1.3)
GFR SERPL CREATININE-BSD FRML MDRD: 56 ML/MIN/1.73SQ M
GLUCOSE P FAST SERPL-MCNC: 103 MG/DL (ref 65–99)
POTASSIUM SERPL-SCNC: 4.3 MMOL/L (ref 3.5–5.3)
SODIUM SERPL-SCNC: 139 MMOL/L (ref 135–147)

## 2024-11-11 PROCEDURE — 80048 BASIC METABOLIC PNL TOTAL CA: CPT

## 2024-11-13 ENCOUNTER — TELEPHONE (OUTPATIENT)
Dept: ENDOCRINOLOGY | Facility: CLINIC | Age: 69
End: 2024-11-13

## 2024-11-13 ENCOUNTER — RESULTS FOLLOW-UP (OUTPATIENT)
Dept: OTHER | Facility: HOSPITAL | Age: 69
End: 2024-11-13

## 2024-11-13 DIAGNOSIS — E11.9 DIABETES MELLITUS, NEW ONSET (HCC): ICD-10-CM

## 2024-11-13 RX ORDER — INSULIN DEGLUDEC 100 U/ML
INJECTION, SOLUTION SUBCUTANEOUS
Qty: 15 ML | Refills: 1 | Status: SHIPPED | OUTPATIENT
Start: 2024-11-13

## 2024-11-13 RX ORDER — INSULIN ASPART INJECTION 100 [IU]/ML
INJECTION, SOLUTION SUBCUTANEOUS
Qty: 15 ML | Refills: 2 | Status: SHIPPED | OUTPATIENT
Start: 2024-11-13

## 2024-11-13 NOTE — TELEPHONE ENCOUNTER
Blood glucose levels remain tightly controlled.   If taking Tresiba 14 units then decrease to 12 units  If taking Fiasp 7-3-3 units TID with meals, then decrease to 5-3-3 units TID with meals.

## 2024-11-13 NOTE — TELEPHONE ENCOUNTER
Called patient and went over the following information:    Please let patient know that most recent BMP has been reviewed and sodium has improved to 139.  No change in current treatment.  Will repeat BMP in 4 weeks to ensure stability.    Patient verbally understood and had no further questions for me at this time.  Labs in system.

## 2024-11-13 NOTE — TELEPHONE ENCOUNTER
----- Message from AQUILES Pelaez sent at 11/13/2024 10:20 AM EST -----  Please let patient know that most recent BMP has been reviewed and sodium has improved to 139.  No change in current treatment.  Will repeat BMP in 4 weeks to ensure stability

## 2024-11-27 ENCOUNTER — TELEPHONE (OUTPATIENT)
Dept: ENDOCRINOLOGY | Facility: CLINIC | Age: 69
End: 2024-11-27

## 2024-11-27 DIAGNOSIS — E11.9 DIABETES MELLITUS, NEW ONSET (HCC): ICD-10-CM

## 2024-11-27 RX ORDER — INSULIN ASPART INJECTION 100 [IU]/ML
INJECTION, SOLUTION SUBCUTANEOUS
Qty: 15 ML | Refills: 2 | Status: SHIPPED | OUTPATIENT
Start: 2024-11-27

## 2024-11-27 NOTE — TELEPHONE ENCOUNTER
Blood glucose readings are well-controlled and sometimes even tightly controlled.  If she is currently taking Fiasp 5 - 3 - 3 units 3 times with meals, then she can decrease it to 3 units 3 times a day with meals

## 2024-12-03 DIAGNOSIS — Z79.4 INSULIN DEPENDENT TYPE 2 DIABETES MELLITUS (HCC): ICD-10-CM

## 2024-12-03 DIAGNOSIS — E11.9 INSULIN DEPENDENT TYPE 2 DIABETES MELLITUS (HCC): ICD-10-CM

## 2024-12-03 NOTE — TELEPHONE ENCOUNTER
Reason for call:   [x] Refill   [] Prior Auth  [] Other:     Office:   [] PCP/Provider -   [x] Specialty/Provider -     Medication: Continuous Glucose Sensor (FreeStyle Maciel 3 Sensor) MISC     Dose/Frequency:  Use every 14 days as directed, N     Quantity: 2 each     Pharmacy: [id number] - [place] - [address] [facility]     Does the patient have enough for 3 days?   [x] Yes   [] No - Send as HP to POD

## 2024-12-04 RX ORDER — ACYCLOVIR 800 MG/1
TABLET ORAL
Qty: 6 EACH | Refills: 1 | Status: SHIPPED | OUTPATIENT
Start: 2024-12-04

## 2024-12-09 DIAGNOSIS — E87.70 FLUID OVERLOAD: ICD-10-CM

## 2024-12-09 NOTE — TELEPHONE ENCOUNTER
Reason for call:   [x] Refill   [] Prior Auth  [] Other:     Office:   [] PCP/Provider -   [x] Specialty/Provider - [facility] Gao MD     Medication:  furosemide (LASIX) 40 mg tablet    Dose/Frequency: TAKE 1 TABLET BY MOUTH EVERY DAY    Quantity: 90 tablet     Pharmacy: [id number] [address] [facility] [phone]    Does the patient have enough for 3 days?   [x] Yes   [] No - Send as HP to POD

## 2024-12-10 RX ORDER — FUROSEMIDE 40 MG/1
40 TABLET ORAL DAILY
Qty: 90 TABLET | Refills: 0 | Status: SHIPPED | OUTPATIENT
Start: 2024-12-10

## 2024-12-12 ENCOUNTER — TELEPHONE (OUTPATIENT)
Dept: ENDOCRINOLOGY | Facility: CLINIC | Age: 69
End: 2024-12-12

## 2024-12-12 NOTE — TELEPHONE ENCOUNTER
Reviewed blood sugar log, blood sugars are very tightly controlled usually in the morning blood sugars are in 70-90 range, during the day blood sugars are in 90 to 120 mg per DL range    Please inform patient to reduce Tresiba to 8 units at night  Continue Fiasp 3 units with meals, if blood sugar is less than 80 mg per DL or not eating    Once that is communicated please let me know to update the med

## 2024-12-18 ENCOUNTER — APPOINTMENT (OUTPATIENT)
Dept: LAB | Facility: AMBULARY SURGERY CENTER | Age: 69
End: 2024-12-18
Payer: MEDICARE

## 2024-12-18 DIAGNOSIS — E11.9 NEWLY DIAGNOSED DIABETES (HCC): ICD-10-CM

## 2024-12-18 LAB
ANION GAP SERPL CALCULATED.3IONS-SCNC: 9 MMOL/L (ref 4–13)
BUN SERPL-MCNC: 37 MG/DL (ref 5–25)
CALCIUM SERPL-MCNC: 10.3 MG/DL (ref 8.4–10.2)
CHLORIDE SERPL-SCNC: 100 MMOL/L (ref 96–108)
CO2 SERPL-SCNC: 28 MMOL/L (ref 21–32)
CREAT SERPL-MCNC: 1.02 MG/DL (ref 0.6–1.3)
CREAT UR-MCNC: 71.7 MG/DL
EST. AVERAGE GLUCOSE BLD GHB EST-MCNC: 111 MG/DL
GFR SERPL CREATININE-BSD FRML MDRD: 56 ML/MIN/1.73SQ M
GLUCOSE P FAST SERPL-MCNC: 109 MG/DL (ref 65–99)
HBA1C MFR BLD: 5.5 %
MICROALBUMIN UR-MCNC: <7 MG/L
POTASSIUM SERPL-SCNC: 4.3 MMOL/L (ref 3.5–5.3)
SODIUM SERPL-SCNC: 137 MMOL/L (ref 135–147)

## 2024-12-18 PROCEDURE — 83036 HEMOGLOBIN GLYCOSYLATED A1C: CPT

## 2024-12-18 PROCEDURE — 82043 UR ALBUMIN QUANTITATIVE: CPT

## 2024-12-18 PROCEDURE — 82570 ASSAY OF URINE CREATININE: CPT

## 2024-12-18 PROCEDURE — 36415 COLL VENOUS BLD VENIPUNCTURE: CPT

## 2024-12-18 PROCEDURE — 80048 BASIC METABOLIC PNL TOTAL CA: CPT

## 2024-12-20 ENCOUNTER — RESULTS FOLLOW-UP (OUTPATIENT)
Age: 69
End: 2024-12-20

## 2024-12-20 DIAGNOSIS — E11.9 DIABETES MELLITUS, NEW ONSET (HCC): ICD-10-CM

## 2024-12-20 NOTE — TELEPHONE ENCOUNTER
Reason for call:   [x] Refill   [] Prior Auth  [] Other:     Office:   [] PCP/Provider -    [x] Specialty/Provider - Harish Akbar/[facility]      Medication: One Touch Delica     Dose/Frequency: 33 G    Quantity: #200    Pharmacy: [facility]    Does the patient have enough for 3 days?   [x] Yes   [] No - Send as HP to POD                Reason for call:   [x] Refill   [] Prior Auth  [] Other:     Office:   [] PCP/Provider -   [x] Specialty/Provider -     Medication: Alcohol Swabs    Dose/Frequency: 70 %    Quantity: #200    Pharmacy: [facility]    Does the patient have enough for 3 days?   [x] Yes   [] No - Send as HP to POD

## 2024-12-21 RX ORDER — LANCETS 33 GAUGE
EACH MISCELLANEOUS
Qty: 200 EACH | Refills: 0 | OUTPATIENT
Start: 2024-12-21

## 2024-12-21 RX ORDER — GLUCOSAMINE HCL/CHONDROITIN SU 500-400 MG
CAPSULE ORAL
Qty: 200 EACH | Refills: 0 | OUTPATIENT
Start: 2024-12-21

## 2024-12-23 RX ORDER — LANCETS 33 GAUGE
EACH MISCELLANEOUS
Qty: 200 EACH | Refills: 0 | Status: SHIPPED | OUTPATIENT
Start: 2024-12-23

## 2024-12-23 RX ORDER — GLUCOSAMINE HCL/CHONDROITIN SU 500-400 MG
CAPSULE ORAL
Qty: 200 EACH | Refills: 0 | Status: SHIPPED | OUTPATIENT
Start: 2024-12-23

## 2024-12-31 NOTE — TELEPHONE ENCOUNTER
Reason for call: pt asking for medication to be sent to [facility]   [x] Refill   [] Prior Auth  [] Other:     Office:   [] PCP/Provider -   [x] Specialty/Provider - neph    Medication: magnesium oxide (MAG-OX) 400 mg tablet     Dose/Frequency: TAKE 1 TABLET BY MOUTH 3 TIMES A DAY WITH MEALS     Quantity: 270 tablet     Pharmacy: [facility]    Does the patient have enough for 3 days?   [] Yes   [x] No - Send as HP to POD    
Olena Bee

## 2025-01-02 ENCOUNTER — HOSPITAL ENCOUNTER (OUTPATIENT)
Dept: ULTRASOUND IMAGING | Facility: HOSPITAL | Age: 70
Discharge: HOME | End: 2025-01-02
Payer: MEDICARE

## 2025-01-02 DIAGNOSIS — K70.31 ALCOHOLIC CIRRHOSIS OF LIVER WITH ASCITES (HCC): ICD-10-CM

## 2025-01-02 PROCEDURE — 76705 ECHO EXAM OF ABDOMEN: CPT

## 2025-01-03 DIAGNOSIS — K70.31 ASCITES DUE TO ALCOHOLIC CIRRHOSIS (HCC): ICD-10-CM

## 2025-01-03 DIAGNOSIS — E83.42 HYPOMAGNESEMIA: ICD-10-CM

## 2025-01-03 DIAGNOSIS — K74.60 DECOMPENSATED HEPATIC CIRRHOSIS (HCC): ICD-10-CM

## 2025-01-03 DIAGNOSIS — K72.90 DECOMPENSATED HEPATIC CIRRHOSIS (HCC): ICD-10-CM

## 2025-01-03 DIAGNOSIS — N18.30 STAGE 3 CHRONIC KIDNEY DISEASE, UNSPECIFIED WHETHER STAGE 3A OR 3B CKD (HCC): ICD-10-CM

## 2025-01-03 NOTE — TELEPHONE ENCOUNTER
Reason for call:   [x] Refill   [] Prior Auth  [] Other:     Office:   [] PCP/Provider -   [x] Specialty/Provider - Emerald Hernandez    Medication: Spironolactone    Dose/Frequency: 50 mg 2 tablets Daily     Quantity: 180    Pharmacy: [facility]    Does the patient have enough for 3 days?   [x] Yes   [] No - Send as HP to POD

## 2025-01-06 RX ORDER — SPIRONOLACTONE 50 MG/1
100 TABLET, FILM COATED ORAL DAILY
Qty: 180 TABLET | Refills: 1 | Status: SHIPPED | OUTPATIENT
Start: 2025-01-06

## 2025-01-07 ENCOUNTER — RESULTS FOLLOW-UP (OUTPATIENT)
Dept: GASTROENTEROLOGY | Facility: CLINIC | Age: 70
End: 2025-01-07

## 2025-01-07 ENCOUNTER — APPOINTMENT (OUTPATIENT)
Dept: LAB | Facility: AMBULARY SURGERY CENTER | Age: 70
End: 2025-01-07
Payer: MEDICARE

## 2025-01-07 ENCOUNTER — RESULTS FOLLOW-UP (OUTPATIENT)
Dept: NEPHROLOGY | Facility: CLINIC | Age: 70
End: 2025-01-07

## 2025-01-07 DIAGNOSIS — N18.31 STAGE 3A CHRONIC KIDNEY DISEASE (HCC): ICD-10-CM

## 2025-01-07 DIAGNOSIS — D89.2 PARAPROTEINEMIA: ICD-10-CM

## 2025-01-07 DIAGNOSIS — R80.9 MICROALBUMINURIA: ICD-10-CM

## 2025-01-07 DIAGNOSIS — E83.52 HYPERCALCEMIA: ICD-10-CM

## 2025-01-07 DIAGNOSIS — N18.31 ANEMIA DUE TO STAGE 3A CHRONIC KIDNEY DISEASE  (HCC): ICD-10-CM

## 2025-01-07 DIAGNOSIS — N18.31 STAGE 3A CHRONIC KIDNEY DISEASE (HCC): Primary | ICD-10-CM

## 2025-01-07 DIAGNOSIS — D63.1 ANEMIA DUE TO STAGE 3A CHRONIC KIDNEY DISEASE  (HCC): ICD-10-CM

## 2025-01-07 LAB
25(OH)D3 SERPL-MCNC: 71.5 NG/ML (ref 30–100)
ANION GAP SERPL CALCULATED.3IONS-SCNC: 9 MMOL/L (ref 4–13)
BACTERIA UR QL AUTO: ABNORMAL /HPF
BILIRUB UR QL STRIP: NEGATIVE
BUN SERPL-MCNC: 36 MG/DL (ref 5–25)
CALCIUM SERPL-MCNC: 10.6 MG/DL (ref 8.4–10.2)
CHLORIDE SERPL-SCNC: 98 MMOL/L (ref 96–108)
CLARITY UR: CLEAR
CO2 SERPL-SCNC: 29 MMOL/L (ref 21–32)
COLOR UR: ABNORMAL
CREAT SERPL-MCNC: 0.97 MG/DL (ref 0.6–1.3)
CREAT UR-MCNC: 73 MG/DL
ERYTHROCYTE [DISTWIDTH] IN BLOOD BY AUTOMATED COUNT: 12.2 % (ref 11.6–15.1)
FERRITIN SERPL-MCNC: 524 NG/ML (ref 11–307)
GFR SERPL CREATININE-BSD FRML MDRD: 59 ML/MIN/1.73SQ M
GLUCOSE P FAST SERPL-MCNC: 113 MG/DL (ref 65–99)
GLUCOSE UR STRIP-MCNC: NEGATIVE MG/DL
HCT VFR BLD AUTO: 35.9 % (ref 34.8–46.1)
HGB BLD-MCNC: 12.3 G/DL (ref 11.5–15.4)
HGB UR QL STRIP.AUTO: NEGATIVE
IRON SATN MFR SERPL: 35 % (ref 15–50)
IRON SERPL-MCNC: 118 UG/DL (ref 50–212)
KETONES UR STRIP-MCNC: NEGATIVE MG/DL
LEUKOCYTE ESTERASE UR QL STRIP: ABNORMAL
MAGNESIUM SERPL-MCNC: 2 MG/DL (ref 1.9–2.7)
MCH RBC QN AUTO: 32.2 PG (ref 26.8–34.3)
MCHC RBC AUTO-ENTMCNC: 34.3 G/DL (ref 31.4–37.4)
MCV RBC AUTO: 94 FL (ref 82–98)
MICROALBUMIN UR-MCNC: <7 MG/L
NITRITE UR QL STRIP: NEGATIVE
NON-SQ EPI CELLS URNS QL MICRO: ABNORMAL /HPF
PH UR STRIP.AUTO: 7.5 [PH]
PHOSPHATE SERPL-MCNC: 3.4 MG/DL (ref 2.3–4.1)
PLATELET # BLD AUTO: 110 THOUSANDS/UL (ref 149–390)
PMV BLD AUTO: 10.3 FL (ref 8.9–12.7)
POTASSIUM SERPL-SCNC: 4.2 MMOL/L (ref 3.5–5.3)
PROT UR STRIP-MCNC: NEGATIVE MG/DL
PTH-INTACT SERPL-MCNC: 35.3 PG/ML (ref 12–88)
RBC # BLD AUTO: 3.82 MILLION/UL (ref 3.81–5.12)
RBC #/AREA URNS AUTO: ABNORMAL /HPF
SODIUM SERPL-SCNC: 136 MMOL/L (ref 135–147)
SP GR UR STRIP.AUTO: 1.01 (ref 1–1.03)
TIBC SERPL-MCNC: 337.4 UG/DL (ref 250–450)
TRANSFERRIN SERPL-MCNC: 241 MG/DL (ref 203–362)
UIBC SERPL-MCNC: 219 UG/DL (ref 155–355)
UROBILINOGEN UR STRIP-ACNC: <2 MG/DL
WBC # BLD AUTO: 4.61 THOUSAND/UL (ref 4.31–10.16)
WBC #/AREA URNS AUTO: ABNORMAL /HPF

## 2025-01-07 PROCEDURE — 82043 UR ALBUMIN QUANTITATIVE: CPT

## 2025-01-07 PROCEDURE — 82306 VITAMIN D 25 HYDROXY: CPT

## 2025-01-07 PROCEDURE — 84100 ASSAY OF PHOSPHORUS: CPT

## 2025-01-07 PROCEDURE — 83735 ASSAY OF MAGNESIUM: CPT

## 2025-01-07 PROCEDURE — 83550 IRON BINDING TEST: CPT

## 2025-01-07 PROCEDURE — 36415 COLL VENOUS BLD VENIPUNCTURE: CPT

## 2025-01-07 PROCEDURE — 83970 ASSAY OF PARATHORMONE: CPT

## 2025-01-07 PROCEDURE — 81001 URINALYSIS AUTO W/SCOPE: CPT

## 2025-01-07 PROCEDURE — 80048 BASIC METABOLIC PNL TOTAL CA: CPT

## 2025-01-07 PROCEDURE — 85027 COMPLETE CBC AUTOMATED: CPT

## 2025-01-07 PROCEDURE — 82570 ASSAY OF URINE CREATININE: CPT

## 2025-01-07 PROCEDURE — 83540 ASSAY OF IRON: CPT

## 2025-01-07 PROCEDURE — 82728 ASSAY OF FERRITIN: CPT

## 2025-01-07 NOTE — RESULT ENCOUNTER NOTE
Please inform patient that renal function is stable.  Calcium level has trended up to 10.6.   PTH at normal range and phosphorus at normal range.  Serum immunofixation was positive for IgG lambda with kappa lambda ratio of 0.9 in December 2023.    -> not able to reach patient today..  Will discuss if she is taking any calcium supplement, we will also plan to repeat SPEP UPEP light chain ratio and refer to hematology oncology.

## 2025-01-08 NOTE — RESULT ENCOUNTER NOTE
I was not able to reach patient. Please inform patient that renal function is stable.  Calcium level has trended up to 10.6.   PTH at normal range and phosphorus at normal range.  Serum immunofixation was positive for IgG lambda with kappa lambda ratio of 0.9 in December 2023.    ->  Avoid calcium supplement. Ordered  Repeat BMP in 10 days ,  we will also plan to repeat SPEP UPEP light chain ratio, ionized calcium, PTH RP , 1,25 vitamin D

## 2025-01-09 ENCOUNTER — OFFICE VISIT (OUTPATIENT)
Dept: ENDOCRINOLOGY | Facility: CLINIC | Age: 70
End: 2025-01-09
Payer: MEDICARE

## 2025-01-09 VITALS
WEIGHT: 116 LBS | HEART RATE: 86 BPM | SYSTOLIC BLOOD PRESSURE: 130 MMHG | DIASTOLIC BLOOD PRESSURE: 78 MMHG | BODY MASS INDEX: 22.78 KG/M2 | HEIGHT: 60 IN | OXYGEN SATURATION: 98 %

## 2025-01-09 DIAGNOSIS — Z79.4 TYPE 2 DIABETES MELLITUS WITH HYPERGLYCEMIA, WITH LONG-TERM CURRENT USE OF INSULIN (HCC): Primary | ICD-10-CM

## 2025-01-09 DIAGNOSIS — E11.65 TYPE 2 DIABETES MELLITUS WITH HYPERGLYCEMIA, WITH LONG-TERM CURRENT USE OF INSULIN (HCC): Primary | ICD-10-CM

## 2025-01-09 DIAGNOSIS — N18.31 STAGE 3A CHRONIC KIDNEY DISEASE (HCC): ICD-10-CM

## 2025-01-09 DIAGNOSIS — E11.9 DIABETES MELLITUS, NEW ONSET (HCC): ICD-10-CM

## 2025-01-09 PROBLEM — E10.22 TYPE 1 DIABETES MELLITUS WITH STAGE 3B CHRONIC KIDNEY DISEASE (HCC): Status: ACTIVE | Noted: 2025-01-09

## 2025-01-09 PROBLEM — N18.32 TYPE 1 DIABETES MELLITUS WITH STAGE 3B CHRONIC KIDNEY DISEASE (HCC): Status: ACTIVE | Noted: 2025-01-09

## 2025-01-09 PROCEDURE — 95251 CONT GLUC MNTR ANALYSIS I&R: CPT | Performed by: INTERNAL MEDICINE

## 2025-01-09 PROCEDURE — 99214 OFFICE O/P EST MOD 30 MIN: CPT | Performed by: INTERNAL MEDICINE

## 2025-01-09 RX ORDER — REPAGLINIDE 0.5 MG/1
0.5 TABLET ORAL
Qty: 90 TABLET | Refills: 5 | Status: SHIPPED | OUTPATIENT
Start: 2025-01-09 | End: 2025-01-23 | Stop reason: SDUPTHER

## 2025-01-09 NOTE — PROGRESS NOTES
Cassidy Zhang 69 y.o. female MRN: [id number]    Encounter: [id number]      Assessment & Plan     Assessment:  This is a 69 y.o.-year-old female with diabetes with hyperglycemia.    Plan:  Diagnoses and all orders for this visit:    Type 2 diabetes mellitus with hyperglycemia, with long-term current use of insulin ([facility])    Lab Results   Component Value Date    HGBA1C 5.5 12/18/2024   Hba1c is 5.5%   As blood sugars are tightly controlled, C peptide is detectable   Will discontinue FIASP and start prandin 0.5 mg three times daily   Continue Tresiba 12 units at bedtime   Educated about hypoglycemia symptoms and treatment   Educated about checking blood sugars 5-6 times daily with help of maciel 3 sensor   -     Albumin / creatinine urine ratio; Future  -     Basic metabolic panel; Future  -     Hemoglobin A1C; Future  -     C-peptide; Future    Diabetes mellitus, new onset (HCC)    Lab Results   Component Value Date    HGBA1C 5.5 12/18/2024   Start prandin 0.5 mg three times daily with meals   Continue Tresiba 8 units at bedtime   Will obtain C peptide level   Continue to use Maciel 3 sensor , call If blood sugar > 350 mg/dl or less than 70 mg/dl     -     repaglinide (PRANDIN) 0.5 mg tablet; Take 1 tablet (0.5 mg total) by mouth 3 (three) times a day before meals  -     Albumin / creatinine urine ratio; Future  -     Basic metabolic panel; Future  -     Hemoglobin A1C; Future  -     C-peptide; Future    Stage 3a chronic kidney disease (HCC)  Lab Results   Component Value Date    CREATININE 0.97 01/07/2025   Stable   Will continue to monitor     Harish Akbar           CC: Diabetes    History of Present Illness     HPI:    Cassidy Zhang is 69-year-old female with medical history of diagnosed diabetes in September 2024.  At that time she was admitted to hospital for severe hyperglycemia.  Her blood sugar was over 700 and she had diabetic ketoacidosis.  Antibody workup is negative.  C-peptide was  detectable.  Current regimen includes Tresiba 12 units at bedtime and Fiasp 3 units 3 times daily with meals, at times she skips Fiasp as her blood sugar is tightly controlled.  She checks blood sugars 4  times daily, her blood sugars are in 80 to 120 mg per DL range, she uses Continous glucose monitor sensor regularly ,     2 weeks overview from December 27, 2024 to January 9, 2024 reviewed.  More than 72 hours of data reviewed average glucose 123 mg per DL, GMI 6.3%, glucose variability is 25.2% Target range is 94%, so 0% blood sugars are low, 6% blood sugars are high, 0% blood sugars are very high patient has pattern of elevated blood sugar usually after breakfast otherwise overall blood sugars are in 120 range average  Most recent A1c is 5.5%    She follows carbohydrates controlled diet   She has +Ve H/o pancreatitis       Lab Results   Component Value Date    HGBA1C 5.5 12/18/2024          Review of Systems   Constitutional:  Negative for activity change, diaphoresis, fatigue, fever and unexpected weight change.   HENT: Negative.     Eyes:  Negative for visual disturbance.   Respiratory:  Negative for cough, chest tightness and shortness of breath.    Cardiovascular:  Negative for chest pain, palpitations and leg swelling.   Gastrointestinal:  Negative for abdominal pain, blood in stool, constipation, diarrhea, nausea and vomiting.   Endocrine: Negative for cold intolerance, heat intolerance, polydipsia, polyphagia and polyuria.   Genitourinary:  Negative for dysuria, enuresis, frequency and urgency.   Musculoskeletal:  Negative for arthralgias and myalgias.   Skin:  Negative for pallor, rash and wound.   Allergic/Immunologic: Negative.    Neurological:  Negative for dizziness, tremors, weakness and numbness.   Hematological: Negative.    Psychiatric/Behavioral: Negative.         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) 01/31/2024     Past Surgical History:   Procedure Laterality Date    CHOLECYSTECTOMY      EYE SURGERY      IR PARACENTESIS  12/21/2023    IR PARACENTESIS  03/13/2024    IR PARACENTESIS  03/27/2024    IR PARACENTESIS  04/10/2024    IR PARACENTESIS  04/24/2024    MT OPTX FEM SHFT FX W/INSJ IMED IMPLT W/WO SCREW Left 11/17/2018    Procedure: Intramedullary nail fixation left hip fracture;  Surgeon: Ross Herrera MD;  Location: [facility];  Service: Orthopedics     Social History   Social History     Substance and Sexual Activity   Alcohol Use Not Currently    Alcohol/week: 1.0 standard drink of alcohol    Types: 1 Cans of beer per week    Comment: 2-3 times per week. Drinks heavier before     Social History     Substance and Sexual Activity   Drug Use No     Social History     Tobacco Use   Smoking Status Former   Smokeless Tobacco Never     Family History:   Family History   Problem Relation Age of Onset    Heart disease Mother     Heart disease Father        Meds/Allergies   Current Outpatient Medications   Medication Sig Dispense Refill    Alcohol Swabs 70 % PADS May substitute brand based on insurance coverage. Check glucose ACHS. 200 each 0    Blood Glucose Monitoring Suppl (OneTouch Verio Reflect) w/Device KIT May substitute brand based on insurance coverage. Check glucose ACHS. 1 kit 0    Cholecalciferol (VITAMIN D3) 3000 UNITS TABS Take 100 Units by mouth daily.      Continuous Glucose  (FreeStyle Maciel 3 [place]) CAITLIN Use as directed 1 each 0    Continuous Glucose Sensor (FreeStyle Maciel 3 Sensor) MISC Use every 14 days as directed 6 each 1    doxycycline  monohydrate (MONODOX) 50 mg capsule Take 50 mg by mouth daily      ferrous sulfate 325 (65 Fe) mg tablet Take 325 mg by mouth daily with breakfast      furosemide (LASIX) 40 mg tablet Take 1 tablet (40 mg total) by mouth daily 90 tablet 0    glucose blood (OneTouch Verio) test strip Check glucose 3-4 times daily 400 each 2    hydrocortisone (ANUSOL-HC) 2.5 % rectal cream Apply topically 2 (two) times a day for 10 days 28 g 0    insulin degludec (Tresiba FlexTouch) 100 units/mL injection pen Inject 0.12 mL (12 Units total) under the skin daily at bedtime 15 mL 1    Insulin Pen Needle (BD Pen Needle Tita 2nd Gen) 32G X 4 MM MISC For use with insulin pen. Pharmacy may dispense brand covered by insurance. 100 each 0    Insulin Pen Needle (BD Pen Needle Tita 2nd Gen) 32G X 4 MM MISC For use with insulin pen 4 times a day. Pharmacy may dispense brand covered by insurance. 400 each 1    lactulose (CHRONULAC) 10 g/15 mL solution Take 45 mL (30 g total) by mouth 2 (two) times a day 8100 mL 3    magnesium oxide (MAG-OX) 400 mg tablet Take 1 tablet (400 mg total) by mouth 3 (three) times a day with meals 270 tablet 0    OneTouch Delica Lancets 33G MISC May substitute brand based on insurance coverage. Check glucose ACHS. 200 each 0    pancrelipase, Lip-Prot-Amyl, (CREON) 24,000 units Take 1 capsule (24,000 Units total) by mouth 3 (three) times a day with meals (Patient taking differently: Take 36,000 Units by mouth 3 (three) times a day with meals) 90 capsule 0    pantoprazole (PROTONIX) 40 mg tablet Take 1 tablet (40 mg total) by mouth 2 (two) times a day before meals 60 tablet 0    repaglinide (PRANDIN) 0.5 mg tablet Take 1 tablet (0.5 mg total) by mouth 3 (three) times a day before meals 90 tablet 5    sodium bicarbonate 650 mg tablet Take 1 tablet (650 mg total) by mouth 2 (two) times a day 180 tablet 2    spironolactone (ALDACTONE) 50 mg tablet Take 2 tablets (100 mg total) by mouth daily 180 tablet 1    thiamine (VITAMIN  B1) 100 mg tablet Take 100 mg by mouth daily       No current facility-administered medications for this visit.     Allergies   Allergen Reactions    Oxycodone-Acetaminophen Palpitations and Tachycardia    Demerol [Meperidine] Other (See Comments)     hallucinations    Diphenhydramine Other (See Comments)     hallucinations    Milk (Cow) Diarrhea    Other Other (See Comments)    Prednisone Tremor    Sulfa Antibiotics Other (See Comments)     hallucinations    Sulfasalazine Hallucinations       Objective   Vitals: Blood pressure 130/78, pulse 86, height 5' (1.524 m), weight 52.6 kg (116 lb), SpO2 98%.    Physical Exam  Constitutional:       General: She is not in acute distress.     Appearance: Normal appearance. She is well-developed.   HENT:      Head: Normocephalic and atraumatic.   Eyes:      General: No scleral icterus.        Right eye: No discharge.         Left eye: No discharge.   Neck:      Thyroid: No thyromegaly.   Pulmonary:      Effort: No respiratory distress.   Musculoskeletal:      Cervical back: Normal range of motion.   Skin:     Findings: No rash.   Neurological:      Mental Status: She is alert and oriented to person, place, and time.   Psychiatric:         Behavior: Behavior normal.         The history was obtained from the review of the chart, patient.    Lab Results:   Lab Results   Component Value Date/Time    Hemoglobin A1C 5.5 12/18/2024 08:45 AM    Hemoglobin A1C 6.3 (H) 11/11/2024 08:18 AM    Hemoglobin A1C 11.4 (H) 09/03/2024 08:46 AM    WBC 4.61 01/07/2025 09:28 AM    WBC 4.76 11/11/2024 08:18 AM    WBC 5.18 09/06/2024 05:04 AM    Hemoglobin 12.3 01/07/2025 09:28 AM    Hemoglobin 12.5 11/11/2024 08:18 AM    Hemoglobin 11.0 (L) 09/06/2024 05:04 AM    Hematocrit 35.9 01/07/2025 09:28 AM    Hematocrit 36.8 11/11/2024 08:18 AM    Hematocrit 31.4 (L) 09/06/2024 05:04 AM    MCV 94 01/07/2025 09:28 AM    MCV 95 11/11/2024 08:18 AM    MCV 92 09/06/2024 05:04 AM    Platelets 110 (L) 01/07/2025  "09:28 AM    Platelets 102 (L) 11/11/2024 08:18 AM    Platelets 93 (L) 09/06/2024 05:04 AM    BUN 36 (H) 01/07/2025 09:28 AM    BUN 37 (H) 12/18/2024 08:45 AM    BUN 37 (H) 11/11/2024 08:18 AM    Potassium 4.2 01/07/2025 09:28 AM    Potassium 4.3 12/18/2024 08:45 AM    Potassium 4.3 11/11/2024 08:18 AM    Chloride 98 01/07/2025 09:28 AM    Chloride 100 12/18/2024 08:45 AM    Chloride 100 11/11/2024 08:18 AM    CO2 29 01/07/2025 09:28 AM    CO2 28 12/18/2024 08:45 AM    CO2 29 11/11/2024 08:18 AM    Creatinine 0.97 01/07/2025 09:28 AM    Creatinine 1.02 12/18/2024 08:45 AM    Creatinine 1.02 11/11/2024 08:18 AM    AST 19 09/03/2024 08:46 AM    AST 25 07/16/2024 07:54 AM    AST 35 05/13/2024 08:44 AM    ALT 11 09/03/2024 08:46 AM    ALT 12 07/16/2024 07:54 AM    ALT 13 05/13/2024 08:44 AM    Total Protein 7.8 09/03/2024 08:46 AM    Total Protein 7.0 07/16/2024 07:54 AM    Total Protein 6.6 05/13/2024 08:44 AM    Albumin 4.2 09/03/2024 08:46 AM    Albumin 3.7 07/16/2024 07:54 AM    Albumin 3.4 (L) 05/13/2024 08:44 AM    HDL, Direct 33 (L) 11/11/2024 08:18 AM    HDL, Direct 39 (L) 04/16/2024 09:49 AM    HDL, Direct 26 (L) 01/23/2024 09:06 AM    Triglycerides 80 11/11/2024 08:18 AM    Triglycerides 78 01/23/2024 09:06 AM           Imaging Studies: Results Review Statement: No pertinent imaging studies reviewed.    Portions of the record may have been created with voice recognition software. Occasional wrong word or \"sound a like\" substitutions may have occurred due to the inherent limitations of voice recognition software. Read the chart carefully and recognize, using context, where substitutions have occurred.    "

## 2025-01-09 NOTE — PATIENT INSTRUCTIONS
Hypoglycemia instructions   Cassidy W Vicente  1/9/2025  [id number]    Low Blood Sugar    Steps to treat low blood sugar.    1. Test blood sugar if you have symptoms of low blood sugar:   Low Blood Sugar Symptoms:  o Sweaty  o Dizzy  o Rapid heartbeat  o Shaky    o Bad mood  o Hungry      2. Treat blood sugar less than 70 with 15 grams of fast-acting carbohydrate:   Examples of 15 grams Fast-Acting Carbohydrate:  o 4 oz juice  o 4 oz regular soda  o 3-4 glucose tablets (chew)  o 3-4 hard candies (chew)              3.   Wait 15 minutes and test your blood sugar again           4. If blood sugar is less than 100, repeat steps 2-3.      5. When your blood sugar is 100 or more, eat a snack if it will be longer than one hour until your next meal. The snack should be 15 grams of carbohydrate and a protein:   Examples of snacks:  o ½ sandwich  o 6 crackers with cheese  o Piece of fruit with cheese or peanut butter  o 6 crackers with peanut butter

## 2025-01-16 ENCOUNTER — APPOINTMENT (OUTPATIENT)
Dept: LAB | Facility: AMBULARY SURGERY CENTER | Age: 70
End: 2025-01-16
Payer: MEDICARE

## 2025-01-16 DIAGNOSIS — D89.2 PARAPROTEINEMIA: ICD-10-CM

## 2025-01-16 DIAGNOSIS — E83.52 HYPERCALCEMIA: ICD-10-CM

## 2025-01-16 LAB
ANION GAP SERPL CALCULATED.3IONS-SCNC: 8 MMOL/L (ref 4–13)
BUN SERPL-MCNC: 37 MG/DL (ref 5–25)
CA-I BLD-SCNC: 1.22 MMOL/L (ref 1.12–1.32)
CALCIUM SERPL-MCNC: 9.1 MG/DL (ref 8.4–10.2)
CHLORIDE SERPL-SCNC: 98 MMOL/L (ref 96–108)
CO2 SERPL-SCNC: 30 MMOL/L (ref 21–32)
CREAT SERPL-MCNC: 1.06 MG/DL (ref 0.6–1.3)
GFR SERPL CREATININE-BSD FRML MDRD: 53 ML/MIN/1.73SQ M
GLUCOSE P FAST SERPL-MCNC: 83 MG/DL (ref 65–99)
POTASSIUM SERPL-SCNC: 4.2 MMOL/L (ref 3.5–5.3)
SODIUM SERPL-SCNC: 136 MMOL/L (ref 135–147)

## 2025-01-16 PROCEDURE — 84166 PROTEIN E-PHORESIS/URINE/CSF: CPT

## 2025-01-16 PROCEDURE — 83521 IG LIGHT CHAINS FREE EACH: CPT

## 2025-01-16 PROCEDURE — 80048 BASIC METABOLIC PNL TOTAL CA: CPT

## 2025-01-16 PROCEDURE — 84165 PROTEIN E-PHORESIS SERUM: CPT

## 2025-01-16 PROCEDURE — 82330 ASSAY OF CALCIUM: CPT

## 2025-01-16 PROCEDURE — 86335 IMMUNFIX E-PHORSIS/URINE/CSF: CPT

## 2025-01-16 PROCEDURE — 86334 IMMUNOFIX E-PHORESIS SERUM: CPT

## 2025-01-16 PROCEDURE — 82652 VIT D 1 25-DIHYDROXY: CPT

## 2025-01-16 PROCEDURE — 36415 COLL VENOUS BLD VENIPUNCTURE: CPT

## 2025-01-16 PROCEDURE — 82397 CHEMILUMINESCENT ASSAY: CPT

## 2025-01-17 ENCOUNTER — RESULTS FOLLOW-UP (OUTPATIENT)
Dept: OTHER | Facility: HOSPITAL | Age: 70
End: 2025-01-17

## 2025-01-17 DIAGNOSIS — D47.2 MONOCLONAL GAMMOPATHY PRESENT ON SERUM PROTEIN ELECTROPHORESIS: Primary | ICD-10-CM

## 2025-01-17 LAB
1,25(OH)2D SERPL-MCNC: 20.4 PG/ML (ref 5–200)
ALBUMIN SERPL ELPH-MCNC: 4.37 G/DL (ref 3.2–5.1)
ALBUMIN SERPL ELPH-MCNC: 56.7 % (ref 48–70)
ALBUMIN UR ELPH-MCNC: 100 %
ALPHA1 GLOB MFR UR ELPH: 0 %
ALPHA1 GLOB SERPL ELPH-MCNC: 0.28 G/DL (ref 0.15–0.47)
ALPHA1 GLOB SERPL ELPH-MCNC: 3.7 % (ref 1.8–7)
ALPHA2 GLOB MFR UR ELPH: 0 %
ALPHA2 GLOB SERPL ELPH-MCNC: 0.83 G/DL (ref 0.42–1.04)
ALPHA2 GLOB SERPL ELPH-MCNC: 10.8 % (ref 5.9–14.9)
B-GLOBULIN MFR UR ELPH: 0 %
BETA GLOB ABNORMAL SERPL ELPH-MCNC: 0.42 G/DL (ref 0.31–0.57)
BETA1 GLOB SERPL ELPH-MCNC: 5.5 % (ref 4.7–7.7)
BETA2 GLOB SERPL ELPH-MCNC: 4.6 % (ref 3.1–7.9)
BETA2+GAMMA GLOB SERPL ELPH-MCNC: 0.35 G/DL (ref 0.2–0.58)
GAMMA GLOB ABNORMAL SERPL ELPH-MCNC: 1.44 G/DL (ref 0.4–1.66)
GAMMA GLOB MFR UR ELPH: 0 %
GAMMA GLOB SERPL ELPH-MCNC: 18.7 % (ref 6.9–22.3)
IGG/ALB SER: 1.31 {RATIO} (ref 1.1–1.8)
INTERPRETATION UR IFE-IMP: NORMAL
INTERPRETATION UR IFE-IMP: NORMAL
KAPPA LC FREE SER-MCNC: 40.1 MG/L (ref 3.3–19.4)
KAPPA LC FREE/LAMBDA FREE SER: 1.13 {RATIO} (ref 0.26–1.65)
LAMBDA LC FREE SERPL-MCNC: 35.6 MG/L (ref 5.7–26.3)
M PROTEIN 1 MFR SERPL ELPH: 2.8 %
M PROTEIN 1 SERPL ELPH-MCNC: 0.22 G/DL
PROT PATTERN SERPL ELPH-IMP: NORMAL
PROT PATTERN UR ELPH-IMP: NORMAL
PROT SERPL-MCNC: 7.7 G/DL (ref 6.4–8.2)
PROT UR-MCNC: 5.5 MG/DL

## 2025-01-17 PROCEDURE — 86335 IMMUNFIX E-PHORSIS/URINE/CSF: CPT | Performed by: PATHOLOGY

## 2025-01-17 PROCEDURE — 86334 IMMUNOFIX E-PHORESIS SERUM: CPT | Performed by: PATHOLOGY

## 2025-01-17 PROCEDURE — 84166 PROTEIN E-PHORESIS/URINE/CSF: CPT | Performed by: PATHOLOGY

## 2025-01-17 PROCEDURE — 84165 PROTEIN E-PHORESIS SERUM: CPT | Performed by: PATHOLOGY

## 2025-01-17 NOTE — TELEPHONE ENCOUNTER
Discussed with patient on the phone and informed that    that renal function is stable, calcium level improved to normal range at 9.1 mg/dL.  Urine immunofixation negative but serum immunofixation shows monoclonal gammopathy identified as IgG lambda  PTH RP pending, 125 vitamin D is normal    -Discussed with patient about possibility of MGUS, referred to hematology and oncology

## 2025-01-20 NOTE — PROGRESS NOTES
Name: Cassidy Zhang      : 1955      MRN: [id number]  Encounter Provider: Abdullahi Gao MD  Encounter Date: 2025   Encounter department: [facility]  :  Assessment & Plan  Type 2 diabetes mellitus with stage 3a chronic kidney disease, with long-term current use of insulin (HCC)    Lab Results   Component Value Date    HGBA1C 5.5 2024   -recommend goal A1c less than 7.0 if possible  -stressed on diabetic diet and daily exercise as well as weight loss  -continue monitoring of A1c per PCP, management per PCP and follow-up with PCP  -discussed about risk of CKD progression if diabetes is not controlled well.  -currently on: Tresiba insulin and Prandin  -Last A1c: Improved to 5.5%.  Reviewed last endocrine note, continue current treatment.  Continue to follow-up with endocrine Dr. Akbar    Orders:    Basic metabolic panel; Future    Protein / creatinine ratio, urine; Future    Stage 3a chronic kidney disease (HCC)  Lab Results   Component Value Date    EGFR 53 2025    EGFR 59 2025    EGFR 56 2024    CREATININE 1.06 2025    CREATININE 0.97 2025    CREATININE 1.02 2024   Chronic kidney disease likely due to diabetic kidney disease, chronic hepatorenal syndrome and age-related nephron loss   Baseline creatinine 0.8 to 1.0 mg/dL  Renal function is currently stable at creatinine 1.06 mg/dL.  Continue to avoid nephrotoxins.  Continue to monitor renal function, continue current dose of Lasix and spironolactone    Orders:    Basic metabolic panel; Future    Protein / creatinine ratio, urine; Future    PTH, intact; Future    Magnesium; Future    Phosphorus; Future    Urinalysis with microscopic; Future    Basic metabolic panel; Future    Magnesium; Future    Hyponatremia  -Suspected to be due to excessive ADH in the setting of alcoholic liver cirrhosis.  Prior imaging with CT abdomen pelvis with contrast did not suggest any malignancy.  -  she used to be on salt tablets in the past but was stopped later on due to fluid overload.  Sodium currently stable at 136 meq/L, continue fluid restriction 1.2 L/day or 40 ounces per day, continue sodium bicarbonate tablet but decreased dose today. BMP in 3 month   Orders:    Basic metabolic panel; Future    Metabolic acidosis  -Bicarb level elevated at 30.  Decrease sodium bicarbonate to 650 mg daily and continue to monitor  Orders:    Basic metabolic panel; Future    sodium bicarbonate 650 mg tablet; Take 1 tablet (650 mg total) by mouth daily in the early morning    Hypercalcemia  -Workup for hypercalcemia was done.  Urine immunofixation was negative but serum immunofixation positive for IgG lambda.  Kappa lambda ratio was 1.1 refer to hematology oncology last ionized calcium improved to normal range at 1.22 and serum calcium also improved to normal range.  Vitamin D 125 was normal, vitamin D was 71.5.  Intact PTH was normal at 35.3  -Continue to avoid any calcium supplementation.  Follow-up hematology recommendation regarding possible IgG lambda MGUS.  Recommended decreasing vitamin D to every other day instead of taking it every day as vitamin D level was on the higher side at 71.5.  As per my discussions is not taking any calcium supplement.  Orders:    Basic metabolic panel; Future    Paraproteinemia  -Follow-up hematology oncology recommendation regarding IgG lambda monoclonal protein on serum immunofixation       Hypomagnesemia   Last magnesium was 2.0.  Continue magnesium oxide 400 mg 3 times a day with meals  Orders:    Magnesium; Future    Proteinuria, unspecified type  Albumin creatinine ratio does not indicate any proteinuria.  Previously UPC ratio was 0.2 in September 2024, continue to monitor with  Orders:    Protein / creatinine ratio, urine; Future    Elevated BP without diagnosis of hypertension  BP was slightly elevated initially but repeat blood pressure was acceptable, during endocrine visit  blood pressure was 130/78.  Decrease the dose of sodium bicarbonate today which would help, continue spironolactone and Lasix at current dose.  If blood pressure is elevated we will consider starting on ACE inhibitor       Alcoholic cirrhosis of liver with ascites (HCC)  Follows with Dr. Mahajan from GI and gets paracentesis . Last paracentesis was in April 2024 .  Currently on Lasix 40 mg daily and spironolactone 100 mg daily.  Continue same treatment  Orders:    Basic metabolic panel; Future        History of Present Illness   HPI  Cassidy Zhang is a 69 y.o. female who presents for follow-up of chronic kidney disease stage IIIa and hyponatremia.  Baseline creatinine is around 0.7 to 0.9 mg/dL.  Hyponatremia in the setting of liver cirrhosis.  -Reviewed blood work from 1/16/2025: Renal function is stable at creatinine 1.06 mg/dL.  Calcium level which was previously elevated at 10.6 improved to 9.1 mg/dLUrine immunofixation negative but serum immunofixation shows monoclonal gammopathy identified as IgG lambda-was referred to hematology oncology for further workup/MGUS    Patient denies any new complaints        Review of Systems   Constitutional:  Negative for activity change, appetite change, chills, diaphoresis, fatigue and fever.   HENT:  Negative for congestion, facial swelling and nosebleeds.    Eyes:  Negative for pain and visual disturbance.   Respiratory:  Negative for cough, chest tightness and shortness of breath.    Cardiovascular:  Negative for chest pain and palpitations.   Gastrointestinal:  Negative for abdominal distention, abdominal pain, diarrhea, nausea and vomiting.   Genitourinary:  Negative for difficulty urinating, dysuria, flank pain, frequency and hematuria.   Musculoskeletal:  Negative for arthralgias, back pain and joint swelling.   Skin:  Negative for rash.   Neurological:  Negative for dizziness, seizures, syncope, weakness and headaches.   Psychiatric/Behavioral:  Negative for agitation  and confusion. The patient is not nervous/anxious.      Pertinent Medical History   -Alcoholic liver cirrhosis, chronic alcohol use       Medical History Reviewed by provider this encounter:  Tobacco  Allergies  Meds  Problems  Med Hx  Surg Hx  Fam Hx     .  Current Outpatient Medications on File Prior to Visit   Medication Sig Dispense Refill    Alcohol Swabs 70 % PADS May substitute brand based on insurance coverage. Check glucose ACHS. 200 each 0    Blood Glucose Monitoring Suppl (OneTouch Verio Reflect) w/Device KIT May substitute brand based on insurance coverage. Check glucose ACHS. 1 kit 0    Cholecalciferol (VITAMIN D3) 3000 UNITS TABS Take 2,000 Units by mouth every other day      Continuous Glucose  (FreeStyle Maciel 3 [place]) CAITLIN Use as directed 1 each 0    Continuous Glucose Sensor (FreeStyle Maciel 3 Sensor) MISC Use every 14 days as directed 6 each 1    ferrous sulfate 325 (65 Fe) mg tablet Take 325 mg by mouth daily with breakfast      furosemide (LASIX) 40 mg tablet Take 1 tablet (40 mg total) by mouth daily 90 tablet 0    glucose blood (OneTouch Verio) test strip Check glucose 3-4 times daily 400 each 2    hydrocortisone (ANUSOL-HC) 2.5 % rectal cream Apply topically 2 (two) times a day for 10 days 28 g 0    insulin degludec (Tresiba FlexTouch) 100 units/mL injection pen Inject 0.12 mL (12 Units total) under the skin daily at bedtime 15 mL 1    Insulin Pen Needle (BD Pen Needle Tita 2nd Gen) 32G X 4 MM MISC For use with insulin pen. Pharmacy may dispense brand covered by insurance. 100 each 0    Insulin Pen Needle (BD Pen Needle Tita 2nd Gen) 32G X 4 MM MISC For use with insulin pen 4 times a day. Pharmacy may dispense brand covered by insurance. 400 each 1    lactulose (CHRONULAC) 10 g/15 mL solution Take 45 mL (30 g total) by mouth 2 (two) times a day 8100 mL 3    magnesium oxide (MAG-OX) 400 mg tablet Take 1 tablet (400 mg total) by mouth 3 (three) times a day with meals 270 tablet  0    OneTouch Delica Lancets 33G MISC May substitute brand based on insurance coverage. Check glucose ACHS. 200 each 0    pancrelipase, Lip-Prot-Amyl, (CREON) 24,000 units Take 1 capsule (24,000 Units total) by mouth 3 (three) times a day with meals 90 capsule 0    pantoprazole (PROTONIX) 40 mg tablet Take 1 tablet (40 mg total) by mouth 2 (two) times a day before meals 60 tablet 0    repaglinide (PRANDIN) 0.5 mg tablet Take 1 tablet (0.5 mg total) by mouth 3 (three) times a day before meals 90 tablet 5    spironolactone (ALDACTONE) 50 mg tablet Take 2 tablets (100 mg total) by mouth daily 180 tablet 1    thiamine (VITAMIN B1) 100 mg tablet Take 100 mg by mouth daily      [DISCONTINUED] sodium bicarbonate 650 mg tablet Take 1 tablet (650 mg total) by mouth 2 (two) times a day 180 tablet 2    doxycycline monohydrate (MONODOX) 50 mg capsule Take 50 mg by mouth daily (Patient not taking: Reported on 1/21/2025)       No current facility-administered medications on file prior to visit.      Social History     Tobacco Use    Smoking status: Former    Smokeless tobacco: Never   Vaping Use    Vaping status: Never Used   Substance and Sexual Activity    Alcohol use: Not Currently     Alcohol/week: 1.0 standard drink of alcohol     Types: 1 Cans of beer per week     Comment: 2-3 times per week. Drinks heavier before    Drug use: No    Sexual activity: Not on file        Objective   /70   Ht 5' (1.524 m)   Wt 52.2 kg (115 lb)   LMP  (LMP Unknown) Comment: Postmenapausal  BMI 22.46 kg/m²      Physical Exam  Constitutional:       General: She is not in acute distress.     Appearance: Normal appearance. She is well-developed.   HENT:      Head: Normocephalic and atraumatic.      Nose: Nose normal.      Mouth/Throat:      Mouth: Mucous membranes are moist.   Eyes:      Conjunctiva/sclera: Conjunctivae normal.      Pupils: Pupils are equal, round, and reactive to light.   Neck:      Thyroid: No thyromegaly.      Vascular:  No JVD.   Cardiovascular:      Rate and Rhythm: Normal rate and regular rhythm.      Heart sounds: Normal heart sounds. No murmur heard.     No friction rub.   Pulmonary:      Effort: Pulmonary effort is normal.      Breath sounds: Normal breath sounds. No wheezing or rales.   Abdominal:      General: Abdomen is flat. There is no distension.      Tenderness: There is no abdominal tenderness. There is no right CVA tenderness or left CVA tenderness.   Musculoskeletal:         General: No deformity.      Cervical back: Neck supple.      Right lower leg: No edema.      Left lower leg: No edema.   Skin:     General: Skin is warm and dry.      Coloration: Skin is not jaundiced.   Neurological:      Mental Status: She is alert and oriented to person, place, and time.   Psychiatric:         Mood and Affect: Mood normal.         Behavior: Behavior normal.         Thought Content: Thought content normal.

## 2025-01-20 NOTE — ASSESSMENT & PLAN NOTE
-Suspected to be due to excessive ADH in the setting of alcoholic liver cirrhosis.  Prior imaging with CT abdomen pelvis with contrast did not suggest any malignancy.  - she used to be on salt tablets in the past but was stopped later on due to fluid overload.  Sodium currently stable at 136 meq/L, continue fluid restriction 1.2 L/day or 40 ounces per day, continue sodium bicarbonate tablet but decreased dose today. BMP in 3 month   Orders:    Basic metabolic panel; Future

## 2025-01-20 NOTE — ASSESSMENT & PLAN NOTE
Lab Results   Component Value Date    EGFR 53 01/16/2025    EGFR 59 01/07/2025    EGFR 56 12/18/2024    CREATININE 1.06 01/16/2025    CREATININE 0.97 01/07/2025    CREATININE 1.02 12/18/2024   Chronic kidney disease likely due to diabetic kidney disease, chronic hepatorenal syndrome and age-related nephron loss   Baseline creatinine 0.8 to 1.0 mg/dL  Renal function is currently stable at creatinine 1.06 mg/dL.  Continue to avoid nephrotoxins.  Continue to monitor renal function, continue current dose of Lasix and spironolactone    Orders:    Basic metabolic panel; Future    Protein / creatinine ratio, urine; Future    PTH, intact; Future    Magnesium; Future    Phosphorus; Future    Urinalysis with microscopic; Future    Basic metabolic panel; Future    Magnesium; Future

## 2025-01-20 NOTE — ASSESSMENT & PLAN NOTE
-Bicarb level elevated at 30.  Decrease sodium bicarbonate to 650 mg daily and continue to monitor  Orders:    Basic metabolic panel; Future    sodium bicarbonate 650 mg tablet; Take 1 tablet (650 mg total) by mouth daily in the early morning

## 2025-01-20 NOTE — ASSESSMENT & PLAN NOTE
Last magnesium was 2.0.  Continue magnesium oxide 400 mg 3 times a day with meals  Orders:    Magnesium; Future

## 2025-01-20 NOTE — ASSESSMENT & PLAN NOTE
Follows with Dr. Mahajan from GI and gets paracentesis . Last paracentesis was in April 2024 .  Currently on Lasix 40 mg daily and spironolactone 100 mg daily.  Continue same treatment  Orders:    Basic metabolic panel; Future

## 2025-01-21 ENCOUNTER — OFFICE VISIT (OUTPATIENT)
Dept: NEPHROLOGY | Facility: CLINIC | Age: 70
End: 2025-01-21
Payer: MEDICARE

## 2025-01-21 VITALS
DIASTOLIC BLOOD PRESSURE: 70 MMHG | HEIGHT: 60 IN | SYSTOLIC BLOOD PRESSURE: 130 MMHG | BODY MASS INDEX: 22.58 KG/M2 | WEIGHT: 115 LBS

## 2025-01-21 DIAGNOSIS — Z79.4 TYPE 2 DIABETES MELLITUS WITH STAGE 3A CHRONIC KIDNEY DISEASE, WITH LONG-TERM CURRENT USE OF INSULIN (HCC): Primary | ICD-10-CM

## 2025-01-21 DIAGNOSIS — K70.31 ALCOHOLIC CIRRHOSIS OF LIVER WITH ASCITES (HCC): ICD-10-CM

## 2025-01-21 DIAGNOSIS — E83.52 HYPERCALCEMIA: ICD-10-CM

## 2025-01-21 DIAGNOSIS — N18.31 TYPE 2 DIABETES MELLITUS WITH STAGE 3A CHRONIC KIDNEY DISEASE, WITH LONG-TERM CURRENT USE OF INSULIN (HCC): Primary | ICD-10-CM

## 2025-01-21 DIAGNOSIS — E87.1 HYPONATREMIA: ICD-10-CM

## 2025-01-21 DIAGNOSIS — E83.42 HYPOMAGNESEMIA: ICD-10-CM

## 2025-01-21 DIAGNOSIS — R80.9 PROTEINURIA, UNSPECIFIED TYPE: ICD-10-CM

## 2025-01-21 DIAGNOSIS — N18.31 STAGE 3A CHRONIC KIDNEY DISEASE (HCC): ICD-10-CM

## 2025-01-21 DIAGNOSIS — E11.22 TYPE 2 DIABETES MELLITUS WITH STAGE 3A CHRONIC KIDNEY DISEASE, WITH LONG-TERM CURRENT USE OF INSULIN (HCC): Primary | ICD-10-CM

## 2025-01-21 DIAGNOSIS — R03.0 ELEVATED BP WITHOUT DIAGNOSIS OF HYPERTENSION: ICD-10-CM

## 2025-01-21 DIAGNOSIS — E87.20 METABOLIC ACIDOSIS: ICD-10-CM

## 2025-01-21 DIAGNOSIS — D89.2 PARAPROTEINEMIA: ICD-10-CM

## 2025-01-21 PROBLEM — N18.32 TYPE 1 DIABETES MELLITUS WITH STAGE 3B CHRONIC KIDNEY DISEASE (HCC): Status: RESOLVED | Noted: 2025-01-09 | Resolved: 2025-01-21

## 2025-01-21 PROBLEM — E10.22 TYPE 1 DIABETES MELLITUS WITH STAGE 3B CHRONIC KIDNEY DISEASE (HCC): Status: RESOLVED | Noted: 2025-01-09 | Resolved: 2025-01-21

## 2025-01-21 PROCEDURE — 99214 OFFICE O/P EST MOD 30 MIN: CPT | Performed by: INTERNAL MEDICINE

## 2025-01-21 RX ORDER — SODIUM BICARBONATE 650 MG/1
650 TABLET ORAL
Start: 2025-01-21

## 2025-01-21 NOTE — PATIENT INSTRUCTIONS
-Renal Function is stable   -You have Chronic Kidney Disease Stage 3   -Avoid NSAIDs like Ibuprofen/Motrin, Naproxen/Aleve, Celebrex, meloxicam/Mobic, Diclofenac and other NSAIDs.  -Okay to take Acetaminophen/Tylenol if you do not have any liver problems  -Avoid IV contrast used for CT scan and cardiac catheterization.    -If plan for any study with IV contrast, please let me know so we could hydrate with fluids before and after IV contrast  -Dosage  of certain medications may need to be adjusted depending on Kidney function.     Decrease sodium bicarbonate tablet to once daily, continue fluid restriction 40 ounces per day  Decrease vitamin D supplement to every other day    Blood work in 3 months.  Follow-up in 6 months with repeat labs before the office visit

## 2025-01-23 ENCOUNTER — TELEPHONE (OUTPATIENT)
Dept: ENDOCRINOLOGY | Facility: CLINIC | Age: 70
End: 2025-01-23

## 2025-01-23 DIAGNOSIS — E11.9 DIABETES MELLITUS, NEW ONSET (HCC): ICD-10-CM

## 2025-01-23 LAB — PTH RELATED PROT SERPL-SCNC: <2 PMOL/L

## 2025-01-23 RX ORDER — INSULIN DEGLUDEC 100 U/ML
INJECTION, SOLUTION SUBCUTANEOUS
Start: 2025-01-23

## 2025-01-23 RX ORDER — REPAGLINIDE 0.5 MG/1
TABLET ORAL
Start: 2025-01-23

## 2025-01-23 NOTE — TELEPHONE ENCOUNTER
Patient calling back, relayed the above message and she verbalized understanding.  No further action needed

## 2025-01-23 NOTE — TELEPHONE ENCOUNTER
Patient is currently taking Tresiba 8 units, Prandin 0.5 mg 3 times daily  Blood sugars are tightly controlled in 70 to 130 mg per DL range    Please inform patient that she should take Prandin, only with dinner  disContinue Prandin for breakfast and lunch  She should reduce Tresiba to 6 units  Send log again in 2 weeks    Harish Akbar

## 2025-02-10 ENCOUNTER — TELEPHONE (OUTPATIENT)
Dept: ENDOCRINOLOGY | Facility: CLINIC | Age: 70
End: 2025-02-10

## 2025-02-10 DIAGNOSIS — E11.9 DIABETES MELLITUS, NEW ONSET (HCC): ICD-10-CM

## 2025-02-11 RX ORDER — INSULIN DEGLUDEC 100 U/ML
INJECTION, SOLUTION SUBCUTANEOUS
Start: 2025-02-11

## 2025-02-11 NOTE — TELEPHONE ENCOUNTER
Will continue glucose monitor sensor download, 90% blood sugars are in target range, average glucose 122 mg per DL, GMI 6.2%  Please inform patient to take Tresiba 4 units at bedtime ( she take 6 units now)   Continue Prandin 0.5 mg only with dinner    Thanks     Harish Akbar

## 2025-02-12 ENCOUNTER — TELEPHONE (OUTPATIENT)
Age: 70
End: 2025-02-12

## 2025-02-12 NOTE — TELEPHONE ENCOUNTER
She can continue with magnesium and gabapentin.  She is on a low-dose of gabapentin so it is not be a big issue.  She should give  2 hours between gabapentin and magnesium supplement to prevent any interaction.  As per review of literature, magnesium salts may enhance the CNS depression  effect of gabapentin.  She is currently on low-dose of gabapentin so if there is a higher effect of gabapentin due to interaction with magnesium, dose of gabapentin can be decreased

## 2025-02-12 NOTE — TELEPHONE ENCOUNTER
Patient called stating she recently started gabapentin and sees in interferes with the magnesium. She wants to know how she should be taking the magnesium as she takes it 3x a day since starting gabapentin?

## 2025-02-13 NOTE — TELEPHONE ENCOUNTER
I spoke to yrn she is aware of medication being okay to take per  . Just follow directions and take 2 hours apart .     She can continue with magnesium and gabapentin.  She is on a low-dose of gabapentin so it is not be a big issue.  She should give  2 hours between gabapentin and magnesium supplement to prevent any interaction.  As per review of literature, magnesium salts may enhance the CNS depression  effect of gabapentin.  She is currently on low-dose of gabapentin so if there is a higher effect of gabapentin due to interaction with magnesium, dose of gabapentin can be decreased

## 2025-02-14 ENCOUNTER — TELEPHONE (OUTPATIENT)
Dept: NEPHROLOGY | Facility: CLINIC | Age: 70
End: 2025-02-14

## 2025-02-21 ENCOUNTER — TELEPHONE (OUTPATIENT)
Dept: ENDOCRINOLOGY | Facility: CLINIC | Age: 70
End: 2025-02-21

## 2025-02-21 NOTE — TELEPHONE ENCOUNTER
Reviewed blood sugar log, blood sugars are very well-controlled in 80 to 120 mg per DL range  Please  inform patient to continue current regimen  If she has any low blood sugar less than 70 or more than 2 times a week she should inform us

## 2025-02-27 ENCOUNTER — OFFICE VISIT (OUTPATIENT)
Age: 70
End: 2025-02-27
Payer: MEDICARE

## 2025-02-27 VITALS
WEIGHT: 120.6 LBS | SYSTOLIC BLOOD PRESSURE: 144 MMHG | HEIGHT: 60 IN | HEART RATE: 88 BPM | BODY MASS INDEX: 23.68 KG/M2 | DIASTOLIC BLOOD PRESSURE: 78 MMHG

## 2025-02-27 DIAGNOSIS — M62.84 SARCOPENIA: ICD-10-CM

## 2025-02-27 DIAGNOSIS — K70.31 ASCITES DUE TO ALCOHOLIC CIRRHOSIS (HCC): ICD-10-CM

## 2025-02-27 DIAGNOSIS — K72.90 DECOMPENSATED HEPATIC CIRRHOSIS (HCC): Primary | ICD-10-CM

## 2025-02-27 DIAGNOSIS — K74.60 DECOMPENSATED HEPATIC CIRRHOSIS (HCC): Primary | ICD-10-CM

## 2025-02-27 DIAGNOSIS — K76.82 HEPATIC ENCEPHALOPATHY (HCC): ICD-10-CM

## 2025-02-27 PROCEDURE — G2211 COMPLEX E/M VISIT ADD ON: HCPCS | Performed by: STUDENT IN AN ORGANIZED HEALTH CARE EDUCATION/TRAINING PROGRAM

## 2025-02-27 PROCEDURE — 99214 OFFICE O/P EST MOD 30 MIN: CPT | Performed by: STUDENT IN AN ORGANIZED HEALTH CARE EDUCATION/TRAINING PROGRAM

## 2025-02-27 RX ORDER — SODIUM CHLORIDE, SODIUM LACTATE, POTASSIUM CHLORIDE, CALCIUM CHLORIDE 600; 310; 30; 20 MG/100ML; MG/100ML; MG/100ML; MG/100ML
125 INJECTION, SOLUTION INTRAVENOUS CONTINUOUS
OUTPATIENT
Start: 2025-02-27

## 2025-02-27 NOTE — PROGRESS NOTES
Name: Cassidy Zhang      : 1955      MRN: [id number]  Encounter Provider: Emerald Mahajan MD  Encounter Date: 2025   Encounter department: [facility] GASTROENTEROLOGY SPECIAL[facility] [address]  :  Assessment & Plan  Decompensated hepatic cirrhosis (HCC)  Patient has a history of cirrhosis due to alcohol which was decompensated by ascites and HE     MELD 3.0: 16 at 2024  4:33 AM  MELD-Na: 15 at 2024  4:33 AM  Calculated from:  Serum Creatinine: 1.21 mg/dL at 2024  4:33 AM  Serum Sodium: 134 mmol/L at 2024  4:33 AM  Total Bilirubin: 2.07 mg/dL at 9/3/2024  8:46 AM  Serum Albumin: 4.2 g/dL (Using max of 3.5 g/dL) at 9/3/2024  8:46 AM  INR(ratio): 1.12 at 9/3/2024  8:46 AM  Age at listing (hypothetical): 69 years  Sex: Female at 2024  4:33 AM       Etiology: Alcohol  Transplant Status: Defer for now  Ascites: Has not required paracentesis since 2023 admission.  Has been maintained on Aldactone 100 mg daily and Lasix 40 mg daily.  Euvolemic on exam today  Continue current diuretics  Diet: Low-sodium (< 2 g daily)  Will order CMP, CBC, PT/INR  Hepatic Encephalopathy: Has not had encephalopathy since her 2023 admission has been maintained on lactulose since it has 1 bowel movement daily with no further encephalopathy.  Patient would like to stop lactulose at this time  Discussed with the patient she can take MiraLAX instead and to ensure that she has daily bowel movements  Can discontinue lactulose at this time  Diet: High protein/high calorie diet to prevent catabolic state w/ further ammonia production   Varices: Last EGD 2024 revealed no evidence of gastric or esophageal varices.  No history of varices.  Due for EGD 2025  Continue to monitor for signs of bleeding   Hepatoma screening: Last imaging 2025 stable hemangiomas and no suspicious showed lesions. AFP checked 2024 which was negative  Will order repeat ultrasound for 2025   Repeat AFP  Colon  cancer screening: Last colonoscopy from July 2023 extensive diverticulosis and hemorrhoids with recommended repeat in 3 years due to personal history of polyps  Due for repeat colonoscopy July 2026  Orders:    Protime-INR; Future    CBC and differential; Future    Hepatic function panel; Future    AFP tumor marker; Future    US right upper quadrant; Future    EGD; Future        History of Present Illness   Cassidy Zhang is a 69 y.o. female who presents for 6 month cirrhosis follow up  69-year-old female with past medical history of alcoholic cirrhosis decompensated by ascites and HE.  She has recompensated with no further ascites or encephalopathy since her December 2023 admission.  She reports no lower extremity swelling or abdominal distention and has been taking her Aldactone 100 mg daily and Lasix 40 mg daily with no issues.  She reports taking her lactulose daily and having 1 proper bowel movement but would like to stop her lactulose as she has had no encephalopathy recently and does not like the bloating with lactulose.  She reports that she has remained sober for many years now.  She denies any melena, hematochezia, hematemesis, bright red blood per rectum, nausea, vomiting, abdominal pain, jaundice, pruritus.         Review of Systems   Constitutional:  Negative for chills and fever.   HENT:  Negative for ear pain and sore throat.    Eyes:  Negative for pain and visual disturbance.   Respiratory:  Negative for cough and shortness of breath.    Cardiovascular:  Negative for chest pain and palpitations.   Gastrointestinal:  Negative for abdominal pain and vomiting.   Genitourinary:  Negative for dysuria and hematuria.   Musculoskeletal:  Negative for arthralgias and back pain.   Skin:  Negative for color change and rash.   Neurological:  Negative for seizures and syncope.   All other systems reviewed and are negative.   A complete review of systems is negative other than that noted above in the  HPI.      Current Outpatient Medications   Medication Sig Dispense Refill    Alcohol Swabs 70 % PADS May substitute brand based on insurance coverage. Check glucose ACHS. 200 each 0    Blood Glucose Monitoring Suppl (OneTouch Verio Reflect) w/Device KIT May substitute brand based on insurance coverage. Check glucose ACHS. 1 kit 0    Cholecalciferol (VITAMIN D3) 3000 UNITS TABS Take 2,000 Units by mouth every other day      Continuous Glucose  (FreeStyle Maciel 3 [place]) CAITLIN Use as directed 1 each 0    Continuous Glucose Sensor (FreeStyle Maciel 3 Sensor) MISC Use every 14 days as directed 6 each 1    ferrous sulfate 325 (65 Fe) mg tablet Take 325 mg by mouth daily with breakfast      furosemide (LASIX) 40 mg tablet Take 1 tablet (40 mg total) by mouth daily 90 tablet 0    gabapentin (Neurontin) 100 mg capsule Take 1 capsule (100 mg total) by mouth 3 (three) times a day 90 capsule 0    glucose blood (OneTouch Verio) test strip Check glucose 3-4 times daily 400 each 2    hydrocortisone (ANUSOL-HC) 2.5 % rectal cream Apply topically 2 (two) times a day for 10 days 28 g 0    insulin degludec (Tresiba FlexTouch) 100 units/mL injection pen Inject 4 units at bedtime      Insulin Pen Needle (BD Pen Needle Tita 2nd Gen) 32G X 4 MM MISC For use with insulin pen. Pharmacy may dispense brand covered by insurance. 100 each 0    Insulin Pen Needle (BD Pen Needle Tita 2nd Gen) 32G X 4 MM MISC For use with insulin pen 4 times a day. Pharmacy may dispense brand covered by insurance. 400 each 1    magnesium oxide (MAG-OX) 400 mg tablet Take 1 tablet (400 mg total) by mouth 3 (three) times a day with meals 270 tablet 0    OneTouch Delica Lancets 33G MISC May substitute brand based on insurance coverage. Check glucose ACHS. 200 each 0    pancrelipase, Lip-Prot-Amyl, (CREON) 24,000 units Take 1 capsule (24,000 Units total) by mouth 3 (three) times a day with meals 90 capsule 0    pantoprazole (PROTONIX) 40 mg tablet Take 1 tablet  (40 mg total) by mouth 2 (two) times a day before meals 60 tablet 0    repaglinide (PRANDIN) 0.5 mg tablet Take 1 tablet only with dinner      sodium bicarbonate 650 mg tablet Take 1 tablet (650 mg total) by mouth daily in the early morning      spironolactone (ALDACTONE) 50 mg tablet Take 2 tablets (100 mg total) by mouth daily 180 tablet 1    thiamine (VITAMIN B1) 100 mg tablet Take 100 mg by mouth daily      doxycycline monohydrate (MONODOX) 50 mg capsule Take 50 mg by mouth daily (Patient not taking: Reported on 1/21/2025)       No current facility-administered medications for this visit.     Objective   /78   Pulse 88   Ht 5' (1.524 m)   Wt 54.7 kg (120 lb 9.6 oz)   LMP  (LMP Unknown) Comment: Postmenapausal  BMI 23.55 kg/m²     Physical Exam  Constitutional:       General: She is not in acute distress.     Appearance: Normal appearance. She is not ill-appearing.   HENT:      Head: Normocephalic and atraumatic.      Nose: Nose normal.   Eyes:      Conjunctiva/sclera: Conjunctivae normal.   Cardiovascular:      Rate and Rhythm: Normal rate and regular rhythm.   Pulmonary:      Effort: Pulmonary effort is normal. No respiratory distress.      Breath sounds: Normal breath sounds.   Abdominal:      General: Abdomen is flat. There is no distension.      Palpations: Abdomen is soft.      Tenderness: There is no abdominal tenderness.   Musculoskeletal:         General: No swelling or tenderness. Normal range of motion.      Cervical back: Normal range of motion.      Right lower leg: No edema.      Left lower leg: No edema.   Skin:     General: Skin is warm and dry.   Neurological:      General: No focal deficit present.      Mental Status: She is alert.   Psychiatric:         Mood and Affect: Mood normal.            Lab Results: I personally reviewed relevant lab results.       Results for orders placed during the hospital encounter of 08/19/24    EGD    Impression  No evidence of gastric or esophageal  varices.  3 cm type I hiatal hernia  Otherwise normal exam.        RECOMMENDATION:  Repeat EGD in 1 year (2025).            Emerald Mahajan MD

## 2025-02-27 NOTE — PROGRESS NOTES
Name: Cassidy Zhang      : 1955      MRN: [id number]  Encounter Provider: Emerald Mahajan MD  Encounter Date: 2025   Encounter department: [facility] GASTROENTEROLOGY SPECIAL[facility] [address]  :  Assessment & Plan  Decompensated hepatic cirrhosis (HCC)  MELD 3.0: 16 at 2024  4:33 AM  MELD-Na: 15 at 2024  4:33 AM  Calculated from:  Serum Creatinine: 1.21 mg/dL at 2024  4:33 AM  Serum Sodium: 134 mmol/L at 2024  4:33 AM  Total Bilirubin: 2.07 mg/dL at 9/3/2024  8:46 AM  Serum Albumin: 4.2 g/dL (Using max of 3.5 g/dL) at 9/3/2024  8:46 AM  INR(ratio): 1.12 at 9/3/2024  8:46 AM  Age at listing (hypothetical): 69 years  Sex: Female at 2024  4:33 AM   - etiology: alc   - status: decompensated by ascites and HE   - EV screening status: EGD 24 no EV. Repeat EGD later this year   - Ascites: none currently. On lasix 40 and carol 100. Baseline Cr around 1   - SBP PPx: n/a   - HE: Hx of. Previously on lactulose and rifaximin but she discontinued these. No recurrence that she reports   - HCC screening: RUQ US 25 neg. Repeat 6mo with AFP   - 2g Na restriction; nutrition goal of 30cal / kg body weight and ~1.5g protein / kg body weight.   - HAV/HBV vaccination status: check HAV/HBV Ab titers, prn boosters   - transplant status: pt deferred  Orders:    Protime-INR; Future    CBC and differential; Future    Hepatic function panel; Future    AFP tumor marker; Future    US right upper quadrant; Future    EGD; Future    Health Maintenance:  Ms. Zhang was counseled to avoid alcohol and tobacco products.  Ms. Zhnag was also advised to avoid raw seafood/oysters and from swimming in unchlorinated bae, particularly from the [place], due to increased risk of infection from Vibrio Vulnificus.  Ms. Zhang was also instructed to seek immediate medical attention should she develop fevers over 101 F, evidence of GI bleeding (black or bloody stools, bloody or coffee ground emesis) or  confusion.  Ms. Zhang was advised to avoid NSAIDs, if possible, due to increase risk of renal dysfunction, and advised that if she should use acetaminophen, dose should not exceed 2 grams/day.  Ms. Zhang was recommend to remain up to date with adult vaccination, including influenza, pneumovax and meningococcus. Inactivated HSV and zoster vaccine is safe and encouraged by PCP.  Ms. Zhang was instructed to call either our office or that of her referring doctor should she develop worsening symptoms related to lower extremity edema, ascites, jaundice or excessive bruising/bleeding.    History of Present Illness {?Quick Links Encounters * My Last Note * Last Note in Specialty * Snapshot * Since Last Visit * History :03937}  HPI  Cassidy Zhang is a 69 y.o. female w/ a PMH of HTN, SIADH, decomp alc cirrhosis d/b HE and ascites who presents for 6mo f/u.    She was last seen in the office 08/22/24. She has otherwise been doing well.    Last paracentesis 04/24/24. She has had multiple subsequent paracenteses aborted due to lack of ascites.        Review of Systems   Constitutional:  Negative for appetite change, chills, fatigue and fever.   Eyes:  Negative for photophobia.   Respiratory:  Negative for cough and shortness of breath.    Cardiovascular:  Negative for chest pain.   Gastrointestinal:  Negative for abdominal pain, constipation, diarrhea, nausea and vomiting.   Endocrine: Negative.    Genitourinary:  Negative for dysuria and frequency.   Musculoskeletal:  Negative for myalgias.   Skin:  Negative for pallor.   Neurological:  Negative for weakness.   Hematological: Negative.    Psychiatric/Behavioral: Negative.       {Select to insert medical history sections (Optional):60770}     Objective {?Quick Links Trend Vitals * Enter New Vitals * Results Review * Timeline (Adult) * Labs * Imaging * Cardiology * Procedures * Lung Cancer Screening * Surgical eConsent :72426}  LMP  (LMP Unknown) Comment: Postmenapausal      Physical Exam  Constitutional:       General: She is not in acute distress.     Appearance: Normal appearance.   HENT:      Head: Normocephalic and atraumatic.      Nose: Nose normal.      Mouth/Throat:      Mouth: Mucous membranes are moist.   Eyes:      General: No scleral icterus.     Extraocular Movements: Extraocular movements intact.      Conjunctiva/sclera: Conjunctivae normal.      Pupils: Pupils are equal, round, and reactive to light.   Cardiovascular:      Rate and Rhythm: Normal rate and regular rhythm.   Pulmonary:      Effort: Pulmonary effort is normal.   Abdominal:      General: Abdomen is flat. There is no distension.      Palpations: There is no mass.      Tenderness: There is no abdominal tenderness.   Musculoskeletal:         General: No swelling. Normal range of motion.   Skin:     General: Skin is warm and dry.      Capillary Refill: Capillary refill takes less than 2 seconds.   Neurological:      General: No focal deficit present.      Mental Status: She is alert.   Psychiatric:         Mood and Affect: Mood normal.         {Administrative / Billing Section (Optional):23433}

## 2025-03-03 ENCOUNTER — CONSULT (OUTPATIENT)
Dept: HEMATOLOGY ONCOLOGY | Facility: CLINIC | Age: 70
End: 2025-03-03
Payer: MEDICARE

## 2025-03-03 VITALS
HEIGHT: 60 IN | HEART RATE: 90 BPM | TEMPERATURE: 97.8 F | BODY MASS INDEX: 23.75 KG/M2 | SYSTOLIC BLOOD PRESSURE: 148 MMHG | WEIGHT: 121 LBS | RESPIRATION RATE: 16 BRPM | DIASTOLIC BLOOD PRESSURE: 78 MMHG | OXYGEN SATURATION: 100 %

## 2025-03-03 DIAGNOSIS — D47.2 MONOCLONAL GAMMOPATHY: Primary | ICD-10-CM

## 2025-03-03 DIAGNOSIS — D61.818 OTHER PANCYTOPENIA (HCC): ICD-10-CM

## 2025-03-03 DIAGNOSIS — D69.6 THROMBOCYTOPENIA (HCC): ICD-10-CM

## 2025-03-03 DIAGNOSIS — N18.31 ANEMIA DUE TO STAGE 3A CHRONIC KIDNEY DISEASE  (HCC): ICD-10-CM

## 2025-03-03 DIAGNOSIS — D47.2 MONOCLONAL GAMMOPATHY PRESENT ON SERUM PROTEIN ELECTROPHORESIS: ICD-10-CM

## 2025-03-03 DIAGNOSIS — N18.31 STAGE 3A CHRONIC KIDNEY DISEASE (HCC): ICD-10-CM

## 2025-03-03 DIAGNOSIS — D63.1 ANEMIA DUE TO STAGE 3A CHRONIC KIDNEY DISEASE  (HCC): ICD-10-CM

## 2025-03-03 DIAGNOSIS — D68.2 HEREDITARY DEFICIENCY OF OTHER CLOTTING FACTORS (HCC): ICD-10-CM

## 2025-03-03 DIAGNOSIS — K70.31 ALCOHOLIC CIRRHOSIS OF LIVER WITH ASCITES (HCC): ICD-10-CM

## 2025-03-03 PROCEDURE — 99203 OFFICE O/P NEW LOW 30 MIN: CPT | Performed by: PHYSICIAN ASSISTANT

## 2025-03-03 NOTE — ASSESSMENT & PLAN NOTE
Lab Results   Component Value Date    EGFR 53 01/16/2025    EGFR 59 01/07/2025    EGFR 56 12/18/2024    CREATININE 1.06 01/16/2025    CREATININE 0.97 01/07/2025    CREATININE 1.02 12/18/2024       Hgb 11 - 12.7 9/2024- 1/2025.  MCV 94    Orders:  •  Iron Panel (Includes Ferritin, Iron Sat%, Iron, and TIBC); Future

## 2025-03-03 NOTE — PROGRESS NOTES
Name: Cassidy Zhang      : 1955      MRN: [id number]  Encounter Provider: Corrina Perez PA-C  Encounter Date: 3/3/2025   Encounter department: [facility]  :  Assessment & Plan  Monoclonal gammopathy  69 y.o. with multiple medical conditions as below, referred to heme-onc due to hypercalcemia and finding of IgG lambda MGUS.   Protein electrophoresis shows IgG-lambda monoclonal gammopathy.   M peak 1 - conc 0.22.   No peak identified in UPEP. Urine protein / creatinine ratio stable.   Free LT ratio stable, 1.13.   CBC - HGB - 12.3,   CMP - Calcium improved to 9.1, Cr 1.06.   No lytic lesions seen on CT A/P , CXRY 23    --check immunoglobulins IgG, IgM, IgA            SKFLC 3.3 - 19.4mg/L 97 40       SLFLC   5.7-26.3 mg/L 97 35       Ratio 0.26 - 1.65 0.99 1.13       IgG  635-1740 874 --------       IgM   1942 ---------       M protein 0.2 0.22         F/U in 6 months.   Orders:  •  Protein electrophoresis, serum; Future  •  Immunoglobulin free LT chains blood; Future  •  IgG, IgA, IgM; Future  •  Comprehensive metabolic panel; Future  •  CBC and differential; Future    Stage 3a chronic kidney disease (HCC)  Lab Results   Component Value Date    EGFR 53 2025    EGFR 59 2025    EGFR 56 2024    CREATININE 1.06 2025    CREATININE 0.97 2025    CREATININE 1.02 2024   Longstanding CKD in the setting of DMII and chronic hepatorenal syndrome.  Baseline Cr is 0.7 - 1.0 mg/dL             Alcoholic cirrhosis of liver with ascites (HCC)  Follows with GI for H/O EtOH cirrhosis, developed hepatic encephalopathy and ascites in . Diuretics limited by renal dysfunction.    Imaging shows cirrhosis without nodule.   Has chronic thrombocytopenia - plan to monitor for now.   EGD negative for esophageal varices 24.  + 3cm hiatal hernia.       Monoclonal gammopathy present on serum protein  electrophoresis    Orders:  •  Ambulatory Referral to Hematology / Oncology    Anemia due to stage 3a chronic kidney disease  (HCC)  Lab Results   Component Value Date    EGFR 53 01/16/2025    EGFR 59 01/07/2025    EGFR 56 12/18/2024    CREATININE 1.06 01/16/2025    CREATININE 0.97 01/07/2025    CREATININE 1.02 12/18/2024       Hgb 11 - 12.7 9/2024- 1/2025.  MCV 94    Orders:  •  Iron Panel (Includes Ferritin, Iron Sat%, Iron, and TIBC); Future    Thrombocytopenia (HCC)  Platelet count 60- 120 since at least 2016.      Probable splenomegaly for patient of this size with AP length of 13.0 cm. 17 mm posterior splenule on 7/10/2024 MRI.           Return in about 6 months (around 9/3/2025).    History of Present Illness {?Quick Links Encounters * My Last Note * Last Note in Specialty * Snapshot * Since Last Visit * History :85403}  Chief Complaint   Patient presents with   • Consult       Pertinent Medical History      03/03/25: Cassidy Zhang is a 68 y/o female seen 3/3/25 at the referral of Dr. Canales regarding IgG Lambda gammopathy identified on SPEP.    1/16/25 M protein = 0.22    She had been seen previously in 2013 by Dr. Vidal for pancytopenia.      Admitted 8/2013 due to syncopal episode.    She was seen in consultation on August 26, 2013 by heme/onc at [facility] because of pancytopenia in addition to that she also had hyponatremia, hypokalemia , hypocalcemia, hypomagnesemia and lung nodules. Problems were thought to be secondary to alcohol abuse.     8/26/2013 BMBX-cellular marrow with maturing trilineage hematopoiesis, megakaryocytes with cytologic atypia.  No increase in blasts.  The differential diagnosis includes immune mediated mechanisms, medication/toxin/ethanol effect, low-grade myelodysplasia, nutritional/vitamin deficiencies.  (in [facility] patient chart)      At 10/1/2013 f/u with Dr. Vidal, it was noted  “She had blood tests and bone marrow test for pancytopenia and the possibility of  "low-level myelodysplasia was entertained. She is not drinking alcohol any more.  Prior to next visit in 3 months she'll have CBC, reticulocyte and CT scan of chest for lung nodules.”  Lost to f/u.    12/2023 she was hospitalized 2nd to worsening ascites from when stated liver cirrhosis.  Reports that she was not taking her spironolactone or Lasix.  She also had 2 days of loose stools and therefore was not taking her lactulose.    During this hospitalization, SPEP was ordered.   12/27/23 IgG lambda = 0.2 g/dL identified on SPEP,  IgA 874, IgG 1942.  Normal IgM 207    Rereferred and seen in consultation 3/3/25 at the referral of Dr. Canales, nephrologist, Re: MGUS.  \"Serum electrophoresis was positive in 2023 as well as again this month and immunofixation suggestive of IgG lambda.\"    1/16/2025 IgG Lambda M protein on SPEP= 0.22    Reports she is feeling well.  Denies bleeding or bruising.  Denies bone pain.        Review of Systems   Constitutional: Negative.  Negative for activity change, appetite change, chills, diaphoresis, fatigue, fever and unexpected weight change.   HENT:  Positive for hearing loss. Negative for congestion, dental problem, ear pain, facial swelling, mouth sores, nosebleeds, rhinorrhea, trouble swallowing and voice change.    Eyes: Negative.  Negative for photophobia, pain, discharge, redness, itching and visual disturbance.   Respiratory: Negative.  Negative for cough, chest tightness, shortness of breath, wheezing and stridor.    Cardiovascular: Negative.  Negative for chest pain and palpitations.   Gastrointestinal: Negative.  Negative for abdominal distention, abdominal pain, anal bleeding, blood in stool, constipation, diarrhea, nausea, rectal pain and vomiting.   Endocrine: Negative for cold intolerance and heat intolerance.   Genitourinary: Negative.  Negative for decreased urine volume, difficulty urinating, dysuria, flank pain, frequency, hematuria and urgency.   Musculoskeletal:  " Negative for arthralgias, back pain, gait problem, joint swelling, myalgias, neck pain and neck stiffness.   Skin:  Negative for color change, pallor, rash and wound.   Neurological: Negative.  Negative for dizziness, tremors, seizures, syncope, weakness, light-headedness, numbness and headaches.   Hematological: Negative.  Negative for adenopathy. Does not bruise/bleed easily.   Psychiatric/Behavioral:  Negative for agitation, confusion, decreased concentration and sleep disturbance. The patient is not nervous/anxious.    All other systems reviewed and are negative.          Objective {?Quick Links Trend Vitals * Enter New Vitals * Results Review * Timeline (Adult) * Labs * Imaging * Cardiology * Procedures * Lung Cancer Screening * Surgical eConsent :03486}  /78 (BP Location: Left arm, Patient Position: Sitting, Cuff Size: Adult)   Pulse 90   Temp 97.8 °F (36.6 °C) (Temporal)   Resp 16   Ht 5' (1.524 m)   Wt 54.9 kg (121 lb)   LMP  (LMP Unknown) Comment: Postmenapausal  SpO2 100%   BMI 23.63 kg/m²     Pain Screening:  Pain Score: 0-No pain  ECOG   1  Physical Exam  Vitals reviewed.   Constitutional:       General: She is not in acute distress.     Appearance: She is well-developed. She is not diaphoretic.   HENT:      Head: Normocephalic and atraumatic.   Eyes:      Conjunctiva/sclera: Conjunctivae normal.   Neck:      Trachea: No tracheal deviation.   Cardiovascular:      Rate and Rhythm: Normal rate and regular rhythm.      Heart sounds: No murmur heard.     No friction rub. No gallop.   Pulmonary:      Effort: Pulmonary effort is normal. No respiratory distress.      Breath sounds: Normal breath sounds. No wheezing or rales.   Chest:      Chest wall: No tenderness.   Abdominal:      General: There is no distension.      Palpations: Abdomen is soft.      Tenderness: There is no abdominal tenderness.   Musculoskeletal:      Cervical back: Normal range of motion and neck supple.   Lymphadenopathy:       Cervical: No cervical adenopathy.   Skin:     General: Skin is warm and dry.      Coloration: Skin is not pale.      Findings: No erythema.   Neurological:      Mental Status: She is alert.   Psychiatric:         Behavior: Behavior normal.         Thought Content: Thought content normal.         Labs: I have reviewed the following labs:  Lab Results   Component Value Date/Time    WBC 4.61 01/07/2025 09:28 AM    RBC 3.82 01/07/2025 09:28 AM    Hemoglobin 12.3 01/07/2025 09:28 AM    Hematocrit 35.9 01/07/2025 09:28 AM    MCV 94 01/07/2025 09:28 AM    MCH 32.2 01/07/2025 09:28 AM    RDW 12.2 01/07/2025 09:28 AM    Platelets 110 (L) 01/07/2025 09:28 AM    Segmented % 51 11/11/2024 08:18 AM    Lymphocytes % 39 11/11/2024 08:18 AM    Monocytes % 8 11/11/2024 08:18 AM    Eosinophils Relative 2 11/11/2024 08:18 AM    Basophils Relative 0 11/11/2024 08:18 AM    Immature Grans % 0 11/11/2024 08:18 AM    Absolute Neutrophils 2.38 11/11/2024 08:18 AM     Lab Results   Component Value Date/Time    Potassium 4.2 01/16/2025 09:09 AM    Chloride 98 01/16/2025 09:09 AM    CO2 30 01/16/2025 09:09 AM    BUN 37 (H) 01/16/2025 09:09 AM    Creatinine 1.06 01/16/2025 09:09 AM    Glucose, Fasting 83 01/16/2025 09:09 AM    Calcium 9.1 01/16/2025 09:09 AM    AST 19 09/03/2024 08:46 AM    ALT 11 09/03/2024 08:46 AM    Alkaline Phosphatase 188 (H) 09/03/2024 08:46 AM    Total Protein 7.7 01/16/2025 09:09 AM    Total Protein 7.8 09/03/2024 08:46 AM    Albumin 4.2 09/03/2024 08:46 AM    Total Bilirubin 2.07 (H) 09/03/2024 08:46 AM    eGFR 53 01/16/2025 09:09 AM

## 2025-03-03 NOTE — ASSESSMENT & PLAN NOTE
Lab Results   Component Value Date    EGFR 53 01/16/2025    EGFR 59 01/07/2025    EGFR 56 12/18/2024    CREATININE 1.06 01/16/2025    CREATININE 0.97 01/07/2025    CREATININE 1.02 12/18/2024   Longstanding CKD in the setting of DMII and chronic hepatorenal syndrome.  Baseline Cr is 0.7 - 1.0 mg/dL

## 2025-03-03 NOTE — ASSESSMENT & PLAN NOTE
Follows with GI for H/O EtOH cirrhosis, developed hepatic encephalopathy and ascites in 2023. Diuretics limited by renal dysfunction.    Imaging shows cirrhosis without nodule.   Has chronic thrombocytopenia - plan to monitor for now.   EGD negative for esophageal varices 8/19/24.  + 3cm hiatal hernia.

## 2025-03-03 NOTE — ASSESSMENT & PLAN NOTE
Platelet count 60- 120 since at least 2016.      Probable splenomegaly for patient of this size with AP length of 13.0 cm. 17 mm posterior splenule on 7/10/2024 MRI.

## 2025-03-10 ENCOUNTER — TELEPHONE (OUTPATIENT)
Dept: ENDOCRINOLOGY | Facility: CLINIC | Age: 70
End: 2025-03-10

## 2025-03-10 DIAGNOSIS — E87.70 FLUID OVERLOAD: ICD-10-CM

## 2025-03-10 DIAGNOSIS — E11.9 DIABETES MELLITUS, NEW ONSET (HCC): ICD-10-CM

## 2025-03-10 NOTE — TELEPHONE ENCOUNTER
Reviewed blood sugar log, blood sugars are very tightly controll please inform patient to reduce Tresiba to 2 units, continue prandin with dinner

## 2025-03-10 NOTE — TELEPHONE ENCOUNTER
Reason for call:   [x] Refill   [] Prior Auth  [] Other:     Office:   [] PCP/Provider -   [x] Specialty/Provider - Abdullahi Gao MD     Medication: furosemide 40 mg    Dose/Frequency: 1 tab daily    Quantity: 90 tabs    Pharmacy: [id number] - [place], [place] - [address]      Local Pharmacy   Does the patient have enough for 3 days?   [x] Yes   [] No - Send as HP to POD    Mail Away Pharmacy   Does the patient have enough for 10 days?   [] Yes   [] No - Send as HP to POD

## 2025-03-11 RX ORDER — INSULIN DEGLUDEC 100 U/ML
INJECTION, SOLUTION SUBCUTANEOUS
Start: 2025-03-11

## 2025-03-11 RX ORDER — FUROSEMIDE 40 MG/1
40 TABLET ORAL DAILY
Qty: 90 TABLET | Refills: 1 | Status: SHIPPED | OUTPATIENT
Start: 2025-03-11

## 2025-03-17 ENCOUNTER — TELEPHONE (OUTPATIENT)
Age: 70
End: 2025-03-17

## 2025-03-17 NOTE — TELEPHONE ENCOUNTER
Phone call from patient to request records faxd to [facility] @ [phone] so that she can obtain her next refill. Please contact patient with any additional questions.

## 2025-03-24 ENCOUNTER — TELEPHONE (OUTPATIENT)
Dept: ENDOCRINOLOGY | Facility: CLINIC | Age: 70
End: 2025-03-24

## 2025-03-24 NOTE — TELEPHONE ENCOUNTER
Reviewed blood sugar log, blood sugars are well-controlled.  Please inform patient to continue Tresiba 2 units  She should discontinue Prandin with dinner as blood sugars are tightly controlled.  She should not complete blood work as ordered in May    Harish Akbar

## 2025-04-07 ENCOUNTER — TELEPHONE (OUTPATIENT)
Dept: ENDOCRINOLOGY | Facility: CLINIC | Age: 70
End: 2025-04-07

## 2025-04-08 ENCOUNTER — TELEPHONE (OUTPATIENT)
Age: 70
End: 2025-04-08

## 2025-04-08 NOTE — TELEPHONE ENCOUNTER
Received fax from [facility] patient is using Fiasp and it is not in patients formulary  Recommending Insulin Aspart as alternative.    However, this is no longer being prescribed by provider.    Copy of letter in media

## 2025-04-08 NOTE — TELEPHONE ENCOUNTER
Blood sugars are well controlled, Reviewed glucose log  Please inform pt to continue current regimen     Harish Akbar

## 2025-04-22 ENCOUNTER — TELEPHONE (OUTPATIENT)
Dept: ENDOCRINOLOGY | Facility: CLINIC | Age: 70
End: 2025-04-22

## 2025-04-22 ENCOUNTER — APPOINTMENT (OUTPATIENT)
Dept: LAB | Facility: AMBULARY SURGERY CENTER | Age: 70
End: 2025-04-22
Payer: MEDICARE

## 2025-04-22 DIAGNOSIS — N18.31 STAGE 3A CHRONIC KIDNEY DISEASE (HCC): ICD-10-CM

## 2025-04-22 LAB
ANION GAP SERPL CALCULATED.3IONS-SCNC: 13 MMOL/L (ref 4–13)
BUN SERPL-MCNC: 40 MG/DL (ref 5–25)
CALCIUM SERPL-MCNC: 10.4 MG/DL (ref 8.4–10.2)
CHLORIDE SERPL-SCNC: 98 MMOL/L (ref 96–108)
CO2 SERPL-SCNC: 26 MMOL/L (ref 21–32)
CREAT SERPL-MCNC: 1.19 MG/DL (ref 0.6–1.3)
GFR SERPL CREATININE-BSD FRML MDRD: 46 ML/MIN/1.73SQ M
GLUCOSE P FAST SERPL-MCNC: 118 MG/DL (ref 65–99)
MAGNESIUM SERPL-MCNC: 1.9 MG/DL (ref 1.9–2.7)
POTASSIUM SERPL-SCNC: 4 MMOL/L (ref 3.5–5.3)
SODIUM SERPL-SCNC: 137 MMOL/L (ref 135–147)

## 2025-04-22 PROCEDURE — 36415 COLL VENOUS BLD VENIPUNCTURE: CPT

## 2025-04-22 PROCEDURE — 80048 BASIC METABOLIC PNL TOTAL CA: CPT

## 2025-04-22 PROCEDURE — 83735 ASSAY OF MAGNESIUM: CPT

## 2025-04-23 ENCOUNTER — RESULTS FOLLOW-UP (OUTPATIENT)
Dept: NEPHROLOGY | Facility: CLINIC | Age: 70
End: 2025-04-23

## 2025-04-23 NOTE — RESULT ENCOUNTER NOTE
Please inform patient that renal function is stable at creatinine 1.19 mg/dL.  Calcium level is slightly elevated at 10.4.  Will continue to monitor, continue oral hydration.  Next blood work before the office visit

## 2025-04-24 NOTE — TELEPHONE ENCOUNTER
I spoke to Cassidy she is aware of her labs. No question or concerns at this time.       ----- Message from Abdullahi Gao MD sent at 4/23/2025  4:24 PM EDT -----  Please inform patient that renal function is stable at creatinine 1.19 mg/dL.  Calcium level is slightly elevated at 10.4.  Will continue to monitor, continue oral hydration.  Next blood work before the office visit

## 2025-05-07 ENCOUNTER — TELEPHONE (OUTPATIENT)
Dept: ENDOCRINOLOGY | Facility: CLINIC | Age: 70
End: 2025-05-07

## 2025-05-07 NOTE — TELEPHONE ENCOUNTER
Reviewed blood sugar log, blood sugars are very well-controlled.  Please inform patient to continue current regimen  she should get blood work ordered for C-peptide, she is due for blood work now

## 2025-05-19 ENCOUNTER — APPOINTMENT (OUTPATIENT)
Dept: LAB | Facility: AMBULARY SURGERY CENTER | Age: 70
End: 2025-05-19
Payer: MEDICARE

## 2025-05-19 ENCOUNTER — RESULTS FOLLOW-UP (OUTPATIENT)
Age: 70
End: 2025-05-19

## 2025-05-19 ENCOUNTER — RESULTS FOLLOW-UP (OUTPATIENT)
Dept: ENDOCRINOLOGY | Facility: CLINIC | Age: 70
End: 2025-05-19

## 2025-05-19 DIAGNOSIS — K72.90 DECOMPENSATED HEPATIC CIRRHOSIS (HCC): ICD-10-CM

## 2025-05-19 DIAGNOSIS — Z79.4 TYPE 2 DIABETES MELLITUS WITH HYPERGLYCEMIA, WITH LONG-TERM CURRENT USE OF INSULIN (HCC): ICD-10-CM

## 2025-05-19 DIAGNOSIS — D69.6 THROMBOCYTOPENIA, UNSPECIFIED (HCC): ICD-10-CM

## 2025-05-19 DIAGNOSIS — R25.2 CRAMP OF LIMB: ICD-10-CM

## 2025-05-19 DIAGNOSIS — E11.9 DIABETES MELLITUS, NEW ONSET (HCC): ICD-10-CM

## 2025-05-19 DIAGNOSIS — K74.60 DECOMPENSATED HEPATIC CIRRHOSIS (HCC): ICD-10-CM

## 2025-05-19 DIAGNOSIS — E11.65 TYPE 2 DIABETES MELLITUS WITH HYPERGLYCEMIA, WITH LONG-TERM CURRENT USE OF INSULIN (HCC): ICD-10-CM

## 2025-05-19 DIAGNOSIS — K70.31 ALCOHOLIC CIRRHOSIS OF LIVER WITH ASCITES (HCC): Primary | ICD-10-CM

## 2025-05-19 LAB
AFP-TM SERPL-MCNC: 2.45 NG/ML (ref 0–9)
ALBUMIN SERPL BCG-MCNC: 4.6 G/DL (ref 3.5–5)
ALP SERPL-CCNC: 111 U/L (ref 34–104)
ALT SERPL W P-5'-P-CCNC: 20 U/L (ref 7–52)
ANION GAP SERPL CALCULATED.3IONS-SCNC: 11 MMOL/L (ref 4–13)
AST SERPL W P-5'-P-CCNC: 28 U/L (ref 13–39)
BASOPHILS # BLD AUTO: 0.03 THOUSANDS/ÂΜL (ref 0–0.1)
BASOPHILS NFR BLD AUTO: 1 % (ref 0–1)
BILIRUB DIRECT SERPL-MCNC: 0.18 MG/DL (ref 0–0.2)
BILIRUB SERPL-MCNC: 0.88 MG/DL (ref 0.2–1)
BUN SERPL-MCNC: 35 MG/DL (ref 5–25)
CALCIUM SERPL-MCNC: 10.1 MG/DL (ref 8.4–10.2)
CHLORIDE SERPL-SCNC: 97 MMOL/L (ref 96–108)
CO2 SERPL-SCNC: 28 MMOL/L (ref 21–32)
CREAT SERPL-MCNC: 1.01 MG/DL (ref 0.6–1.3)
CREAT UR-MCNC: 73.4 MG/DL
EOSINOPHIL # BLD AUTO: 0.09 THOUSAND/ÂΜL (ref 0–0.61)
EOSINOPHIL NFR BLD AUTO: 2 % (ref 0–6)
ERYTHROCYTE [DISTWIDTH] IN BLOOD BY AUTOMATED COUNT: 12.5 % (ref 11.6–15.1)
EST. AVERAGE GLUCOSE BLD GHB EST-MCNC: 123 MG/DL
GFR SERPL CREATININE-BSD FRML MDRD: 56 ML/MIN/1.73SQ M
GLUCOSE P FAST SERPL-MCNC: 116 MG/DL (ref 65–99)
HBA1C MFR BLD: 5.9 %
HCT VFR BLD AUTO: 36.6 % (ref 34.8–46.1)
HGB BLD-MCNC: 12.4 G/DL (ref 11.5–15.4)
IMM GRANULOCYTES # BLD AUTO: 0.02 THOUSAND/UL (ref 0–0.2)
IMM GRANULOCYTES NFR BLD AUTO: 0 % (ref 0–2)
INR PPP: 1.1 (ref 0.85–1.19)
LYMPHOCYTES # BLD AUTO: 1.48 THOUSANDS/ÂΜL (ref 0.6–4.47)
LYMPHOCYTES NFR BLD AUTO: 32 % (ref 14–44)
MAGNESIUM SERPL-MCNC: 1.8 MG/DL (ref 1.9–2.7)
MCH RBC QN AUTO: 32 PG (ref 26.8–34.3)
MCHC RBC AUTO-ENTMCNC: 33.9 G/DL (ref 31.4–37.4)
MCV RBC AUTO: 94 FL (ref 82–98)
MICROALBUMIN UR-MCNC: 9.5 MG/L
MICROALBUMIN/CREAT 24H UR: 13 MG/G CREATININE (ref 0–30)
MONOCYTES # BLD AUTO: 0.34 THOUSAND/ÂΜL (ref 0.17–1.22)
MONOCYTES NFR BLD AUTO: 7 % (ref 4–12)
NEUTROPHILS # BLD AUTO: 2.73 THOUSANDS/ÂΜL (ref 1.85–7.62)
NEUTS SEG NFR BLD AUTO: 58 % (ref 43–75)
NRBC BLD AUTO-RTO: 0 /100 WBCS
PLATELET # BLD AUTO: 119 THOUSANDS/UL (ref 149–390)
PMV BLD AUTO: 10.4 FL (ref 8.9–12.7)
POTASSIUM SERPL-SCNC: 3.9 MMOL/L (ref 3.5–5.3)
PROT SERPL-MCNC: 7.5 G/DL (ref 6.4–8.4)
PROTHROMBIN TIME: 14.5 SECONDS (ref 12.3–15)
RBC # BLD AUTO: 3.88 MILLION/UL (ref 3.81–5.12)
SODIUM SERPL-SCNC: 136 MMOL/L (ref 135–147)
WBC # BLD AUTO: 4.69 THOUSAND/UL (ref 4.31–10.16)

## 2025-05-19 PROCEDURE — 82043 UR ALBUMIN QUANTITATIVE: CPT

## 2025-05-19 PROCEDURE — 83735 ASSAY OF MAGNESIUM: CPT

## 2025-05-19 PROCEDURE — 83036 HEMOGLOBIN GLYCOSYLATED A1C: CPT

## 2025-05-19 PROCEDURE — 85025 COMPLETE CBC W/AUTO DIFF WBC: CPT

## 2025-05-19 PROCEDURE — 82105 ALPHA-FETOPROTEIN SERUM: CPT

## 2025-05-19 PROCEDURE — 36415 COLL VENOUS BLD VENIPUNCTURE: CPT

## 2025-05-19 PROCEDURE — 85610 PROTHROMBIN TIME: CPT

## 2025-05-19 PROCEDURE — 84681 ASSAY OF C-PEPTIDE: CPT

## 2025-05-19 PROCEDURE — 80053 COMPREHEN METABOLIC PANEL: CPT

## 2025-05-19 PROCEDURE — 82248 BILIRUBIN DIRECT: CPT

## 2025-05-19 PROCEDURE — 82570 ASSAY OF URINE CREATININE: CPT

## 2025-05-20 LAB — C PEPTIDE SERPL-MCNC: 4.3 NG/ML (ref 1.1–4.4)

## 2025-05-22 ENCOUNTER — TELEPHONE (OUTPATIENT)
Dept: ENDOCRINOLOGY | Facility: CLINIC | Age: 70
End: 2025-05-22

## 2025-05-22 DIAGNOSIS — Z79.4 TYPE 2 DIABETES MELLITUS WITH HYPERGLYCEMIA, WITH LONG-TERM CURRENT USE OF INSULIN (HCC): Primary | ICD-10-CM

## 2025-05-22 DIAGNOSIS — E11.65 TYPE 2 DIABETES MELLITUS WITH HYPERGLYCEMIA, WITH LONG-TERM CURRENT USE OF INSULIN (HCC): Primary | ICD-10-CM

## 2025-05-22 NOTE — TELEPHONE ENCOUNTER
Spoke with patient and she stated she has not been taking prandin for a couple months since it was stopped. She has only been taking 2 units of tresiba at night

## 2025-05-22 NOTE — TELEPHONE ENCOUNTER
Lab Results   Component Value Date    HGBA1C 5.9 (H) 05/19/2025     PT Would like a call , NO MY CHART MESSAGE     Reviewed blood sugars , they are well controlled   Please inform pt that her her pancreatic reserve is good, we will recommend to stop tresiba , continue prandin   Continue to monitor blood sugars and send log in 2 weeks

## 2025-05-22 NOTE — TELEPHONE ENCOUNTER
Sugar logs   please see media   Patient would like a phone call (if any changes) instead of a my chart message she said due to her family is setting up her my chart with new authentication and I gave her the my chart sheet also  Thank you

## 2025-06-02 ENCOUNTER — OFFICE VISIT (OUTPATIENT)
Dept: ENDOCRINOLOGY | Facility: CLINIC | Age: 70
End: 2025-06-02
Payer: MEDICARE

## 2025-06-02 VITALS
HEIGHT: 60 IN | WEIGHT: 112 LBS | BODY MASS INDEX: 21.99 KG/M2 | OXYGEN SATURATION: 97 % | HEART RATE: 78 BPM | DIASTOLIC BLOOD PRESSURE: 74 MMHG | SYSTOLIC BLOOD PRESSURE: 120 MMHG

## 2025-06-02 DIAGNOSIS — I10 ESSENTIAL HYPERTENSION: Chronic | ICD-10-CM

## 2025-06-02 DIAGNOSIS — K86.0 ALCOHOL-INDUCED CHRONIC PANCREATITIS (HCC): ICD-10-CM

## 2025-06-02 DIAGNOSIS — E11.69 TYPE 2 DIABETES MELLITUS WITH OTHER SPECIFIED COMPLICATION, WITHOUT LONG-TERM CURRENT USE OF INSULIN (HCC): Primary | ICD-10-CM

## 2025-06-02 PROCEDURE — 95251 CONT GLUC MNTR ANALYSIS I&R: CPT | Performed by: PHYSICIAN ASSISTANT

## 2025-06-02 PROCEDURE — 99214 OFFICE O/P EST MOD 30 MIN: CPT | Performed by: PHYSICIAN ASSISTANT

## 2025-06-02 RX ORDER — CHOLECALCIFEROL (VITAMIN D3) 25 MCG
TABLET ORAL DAILY
COMMUNITY

## 2025-06-02 RX ORDER — BLOOD-GLUCOSE METER
KIT MISCELLANEOUS
COMMUNITY

## 2025-06-02 RX ORDER — FERROUS SULFATE 324(65)MG
TABLET, DELAYED RELEASE (ENTERIC COATED) ORAL DAILY
COMMUNITY

## 2025-06-02 RX ORDER — PEN NEEDLE, DIABETIC 31 GX5/16"
NEEDLE, DISPOSABLE MISCELLANEOUS
COMMUNITY

## 2025-06-02 RX ORDER — ASCORBIC ACID 125 MG
TABLET,CHEWABLE ORAL DAILY
COMMUNITY

## 2025-06-02 RX ORDER — PEN NEEDLE, DIABETIC 32GX 5/32"
NEEDLE, DISPOSABLE MISCELLANEOUS
COMMUNITY

## 2025-06-02 NOTE — PATIENT INSTRUCTIONS
Hypoglycemia instructions   Cassidy ALBARO Zhang  6/2/2025  [id number]    Low Blood Sugar    Steps to treat low blood sugar.    1. Test blood sugar if you have symptoms of low blood sugar:   Low Blood Sugar Symptoms:  o Sweaty  o Dizzy  o Rapid heartbeat  o Shaky  o Bad mood  o Hungry      2. Treat blood sugar less than 70 with 15 grams of fast-acting carbohydrate:   Examples of 15 grams Fast-Acting Carbohydrate:  o 4 oz juice  o 4 oz regular soda  o 3-4 glucose tablets (chew)  o 3-4 hard candies (chew)          3.  Wait 15 minutes and test your blood sugar again     4. If blood sugar is less than 100, repeat steps 2-3.    5. When your blood sugar is 100 or more, eat a snack if it will be longer than one hour until your next meal. The snack should be 15 grams of carbohydrate and a protein:   Examples of snacks:  o ½ sandwich  o 6 crackers with cheese  o Piece of fruit with cheese or peanut butter  o 6 crackers with peanut butter

## 2025-06-02 NOTE — PROGRESS NOTES
Name: Cassidy Zhang      : 1955      MRN: [id number]  Encounter Provider: Kyra Ortiz PA-C  Encounter Date: 2025   Encounter department: [facility] [place]    CC: DM  Assessment & Plan  Type 2 diabetes mellitus with other specified complication, without long-term current use of insulin (HCC)  HGA1C 5.9%. Worsened.  Treatment regimen: continue with dietary modifications. BG levels are adequately controlled without medication. If BG levels starts to trend up, then please send a BG log so treatment changes can be made.   Episodes of hypoglycemia can lead to permanent disability and death.  Discussed risks/complications associated with uncontrolled diabetes.    Advised to adhere to diabetic diet, and recommended staying active/exercising routinely as tolerated.  Keep carbohydrates consistent to limit blood glucose fluctuations.  Advised to call if blood sugars less than 70 mg/dl or over 250 mg/dl.   Check blood glucose 3+ times a day  Discussed symptoms and treatment of hypoglycemia.   Discussed use of CGM to collect additional blood glucose data to reveal trends and patterns that can be used to optimize treatment plan.   Recommended routine follow-up with podiatry and ophthalmology.   Ordered blood work to complete prior to next visit.    Lab Results   Component Value Date    HGBA1C 5.9 (H) 2025       Orders:    Hemoglobin A1C; Future    Albumin / creatinine urine ratio; Future    Basic metabolic panel; Future    Essential hypertension  Blood pressure well controlled, continue current treatment        Alcohol-induced chronic pancreatitis (HCC)  Avoid GLP 1 agonists and DPP4 inhibitors             History of Present Illness     Cassidy Zhang is a 69 y.o. female here for a follow-up for type 2 diabetes. Workup for type 1 diabetes was negative.  She was hospitalized in 2024 for severe hyperglycemia; blood glucose was over 700.    Denies any issues  with her current regimen. Home glucose monitoring: are performed regularly     Home blood glucose readings: 80s-130s mg/dl     Cassidy Zhang   Device used  Home use     Indication   Type 2 Diabetes    More than 72 hours of data was reviewed. Report to be scanned to chart.     Date Range: 5/20-6/2    Analysis of data:   Average Glucose: 134 mg/dl  Coefficient of Variation: 21.3%   Time in Target Range: 92%   Time Above Range: 8%   Time Below Range: 0%     Interpretation of data: most readings are in acceptable range.       Current regimen: No longer on any medications since May 27, 2025  Hypoglycemic episodes: No, rare  H/o of hypoglycemia causing hospitalization or Intervention such as glucagon injection  or ambulance call :  No  Hypoglycemia symptoms: no symptoms  Treatment of hypoglycemia: discussed treatment     Medic alert tag: recommended: Yes     Diabetes education: Yes  Diet: 3 meals per day, 1 snack per day. Timing of meals is predictable.   Diabetic diet compliance:  compliant most of the time  Activity: Daily activity is predictable: Yes. No routine exercise but she is active at home.     Not on ACE inhibitor/ARB  Not on statin   Thyroid disorders: No  History of pancreatitis: multiple episodes of pancreatitis. Chronic pancreatitis. She has not had alcohol for years.    Review of Systems   Constitutional:  Negative for activity change, appetite change, fatigue and unexpected weight change.   HENT:  Negative for trouble swallowing.    Eyes:  Negative for visual disturbance.   Respiratory:  Negative for shortness of breath.    Cardiovascular:  Negative for chest pain and palpitations.   Gastrointestinal:  Negative for constipation and diarrhea.   Endocrine: Negative for polydipsia and polyuria.   Musculoskeletal: Negative.    Skin:  Negative for wound.   Neurological:  Negative for numbness.   Psychiatric/Behavioral: Negative.      as per HPI    Medications Ordered Prior to Encounter[1]      Medical  History Reviewed by provider this encounter:     .    Objective   LMP  (LMP Unknown) Comment: Postmenapausal     There is no height or weight on file to calculate BMI.  Wt Readings from Last 3 Encounters:   03/12/25 54.9 kg (121 lb)   03/03/25 54.9 kg (121 lb)   02/28/25 54.7 kg (120 lb 9.6 oz)     Physical Exam  Vitals and nursing note reviewed.   Constitutional:       Appearance: She is well-developed.   HENT:      Head: Normocephalic.     Eyes:      General: No scleral icterus.    Neck:      Thyroid: No thyromegaly.     Cardiovascular:      Rate and Rhythm: Normal rate and regular rhythm.      Pulses:           Radial pulses are 2+ on the right side and 2+ on the left side.      Heart sounds: No murmur heard.  Pulmonary:      Effort: Pulmonary effort is normal. No respiratory distress.      Breath sounds: Normal breath sounds. No wheezing.     Musculoskeletal:      Cervical back: Neck supple.     Skin:     General: Skin is warm and dry.     Neurological:      Mental Status: She is alert.     Last Eye Exam: Not on file. States she is due in September 2025.   Last Foot Exam: 11/01/2024  Health Maintenance   Topic Date Due    Diabetic Eye Exam  09/09/2025 (Originally 3/25/1965)    Diabetic Foot Exam  11/01/2025         Labs: I have reviewed pertinent labs including:   Lab Results   Component Value Date    HGBA1C 5.9 (H) 05/19/2025    HGBA1C 5.5 12/18/2024    HGBA1C 6.3 (H) 11/11/2024      Lab Results   Component Value Date    CREATININE 1.01 05/19/2025    CREATININE 1.19 04/22/2025    CREATININE 1.06 01/16/2025    BUN 35 (H) 05/19/2025    K 3.9 05/19/2025    CL 97 05/19/2025    CO2 28 05/19/2025      GFR, Calculated   Date Value Ref Range Status   09/30/2020 95 >60 mL/min/1.73m2 Final     Comment:     mL/min per 1.73 square meters                                            Normal Function or Mild Renal    Disease (if clinically at risk):  >or=60  Moderately Decreased:                30-59  Severely Decreased:                   15-29  Renal Failure:                         <15                                            -American GFR: multiply reported GFR by 1.16    Please note that the eGFR is based on the CKD-EPI calculation, and is not intended to be used for drug dosing.                                            Note: Calculated GFR may not be an accurate indicator of renal function if the patient's renal function is not in a steady state.    Ordering Provider: ALANNA FERRARI  Report Copied to : PATIENT-PA  Report Copied to : GIULIANO VAIL     eGFRcr   Date Value Ref Range Status   11/17/2023 28 (L) >59 Final     eGFR   Date Value Ref Range Status   05/19/2025 56 ml/min/1.73sq m Final      HDL, Direct   Date Value Ref Range Status   11/11/2024 33 (L) >=50 mg/dL Final     Triglycerides   Date Value Ref Range Status   11/11/2024 80 See Comment mg/dL Final     Comment:     Triglyceride:     0-9Y            <75mg/dL     10Y-17Y         <90 mg/dL       >=18Y     Normal          <150 mg/dL     Borderline High 150-199 mg/dL     High            200-499 mg/dL        Very High       >499 mg/dL    Specimen collection should occur prior to Metamizole administration due to the potential for falsely depressed results.      Component      Latest Ref Rng 12/18/2024 4/22/2025 5/19/2025   Sodium      135 - 147 mmol/L 137  137  136    Potassium      3.5 - 5.3 mmol/L 4.3  4.0  3.9    Chloride      96 - 108 mmol/L 100  98  97    Carbon Dioxide      21 - 32 mmol/L 28  26  28    ANION GAP      4 - 13 mmol/L 9  13  11    BUN      5 - 25 mg/dL 37 (H)  40 (H)  35 (H)    Creatinine      0.60 - 1.30 mg/dL 1.02  1.19  1.01    GLUCOSE, FASTING      65 - 99 mg/dL 109 (H)  118 (H)  116 (H)    Calcium      8.4 - 10.2 mg/dL 10.3 (H)  10.4 (H)  10.1    AST      13 - 39 U/L   28    ALT      7 - 52 U/L   20    ALK PHOS      34 - 104 U/L   111 (H)    Total Protein      6.4 - 8.4 g/dL   7.5    Albumin      3.5 - 5.0 g/dL   4.6    Total Bilirubin       0.20 - 1.00 mg/dL   0.88    GFR, Calculated      ml/min/1.73sq m 56  46  56    EXT Creatinine Urine      Reference range not established. mg/dL 71.7   73.4    Albumin,U,Random      <20.0 mg/L <7.0   9.5    Albumin Creat Ratio      0 - 30 mg/g creatinine   13    Hemoglobin A1C      Normal 4.0-5.6%; PreDiabetic 5.7-6.4%; Diabetic >=6.5%; Glycemic control for adults with diabetes <7.0% % 5.5   5.9 (H)    eAG, EST AVG Glucose      mg/dl 111   123    C-PEPTIDE      1.1 - 4.4 ng/mL   4.3       Legend:  (H) High  Patient Instructions     Hypoglycemia instructions   Cassidy Zhang  6/2/2025  [id number]    Low Blood Sugar    Steps to treat low blood sugar.    1. Test blood sugar if you have symptoms of low blood sugar:   Low Blood Sugar Symptoms:  o Sweaty  o Dizzy  o Rapid heartbeat  o Shaky  o Bad mood  o Hungry      2. Treat blood sugar less than 70 with 15 grams of fast-acting carbohydrate:   Examples of 15 grams Fast-Acting Carbohydrate:  o 4 oz juice  o 4 oz regular soda  o 3-4 glucose tablets (chew)  o 3-4 hard candies (chew)          3.  Wait 15 minutes and test your blood sugar again     4. If blood sugar is less than 100, repeat steps 2-3.    5. When your blood sugar is 100 or more, eat a snack if it will be longer than one hour until your next meal. The snack should be 15 grams of carbohydrate and a protein:   Examples of snacks:  o ½ sandwich  o 6 crackers with cheese  o Piece of fruit with cheese or peanut butter  o 6 crackers with peanut butter      Discussed with the patient and all questioned fully answered. She will call me if any problems arise.    Administrative Statements   I have spent a total time of 30 minutes in caring for this patient on the day of the visit/encounter including Importance of tx compliance, Impressions, Documenting in the medical record, Reviewing/placing orders in the medical record (including tests, medications, and/or procedures), and Obtaining or reviewing history  .         [1]    Current Outpatient Medications on File Prior to Visit   Medication Sig Dispense Refill    Alcohol Swabs (Alcohol Prep) PADS MAY SUBSTITUTE BRAND BASED ON INSURANCE COVERAGE. CHECK GLUCOSE BEFORE MEALS AND BEDTIME      Alcohol Swabs 70 % PADS May substitute brand based on insurance coverage. Check glucose ACHS. 200 each 0    Blood Glucose Monitoring Suppl (OneTouch Verio Reflect) w/Device KIT May substitute brand based on insurance coverage. Check glucose ACHS. 1 kit 0    Blood Glucose Monitoring Suppl (OneTouch Verio Reflect) w/Device KIT MAY SUBSTITUTE BRAND BASED ON INSURANCE COVERAGE. CHECK GLUCOSE ACHS.      cholecalciferol (VITAMIN D3) 1,000 units tablet Take by mouth daily      Cholecalciferol (VITAMIN D3) 3000 UNITS TABS Take 2,000 Units by mouth every other day      clotrimazole 1 % external solution 5 drops to right ear twice daily for 10 days. 15 mL 0    Continuous Glucose  (FreeStyle Maciel 3 Port [place]) CAITLIN Use as directed 1 each 0    Continuous Glucose Sensor (FreeStyle Maciel 3 Sensor) MISC Use every 14 days as directed 6 each 1    ferrous sulfate 324 (65 Fe) mg Take by mouth daily      ferrous sulfate 325 (65 Fe) mg tablet Take 325 mg by mouth daily with breakfast      furosemide (LASIX) 40 mg tablet Take 1 tablet (40 mg total) by mouth daily 90 tablet 1    gabapentin (Neurontin) 100 mg capsule Take 1 capsule (100 mg total) by mouth 3 (three) times a day 90 capsule 0    glucose blood (OneTouch Verio) test strip Check glucose 3-4 times daily 400 each 2    glucose blood test strip MAY SUBSTITUTE BRAND BASED ON INSURANCE COVERAGE. CHECK GLUCOSE BEFORE MEALS AND AT BEDTIME      hydrocortisone (ANUSOL-HC) 2.5 % rectal cream Apply topically 2 (two) times a day for 10 days 28 g 0    Insulin Pen Needle (BD Pen Needle Tita 2nd Gen) 32G X 4 MM MISC For use with insulin pen. Pharmacy may dispense brand covered by insurance. 100 each 0    Insulin Pen Needle (BD Pen Needle Tita 2nd Gen) 32G X 4 MM MISC For  use with insulin pen 4 times a day. Pharmacy may dispense brand covered by insurance. 400 each 1    Insulin Pen Needle (BD Pen Needle Tita 2nd Gen) 32G X 4 MM MISC FOR USE WITH INSULIN PEN. PHARMACY MAY DISPENSE BRAND COVERED BY INSURANCE.      magnesium oxide (MAG-OX) 400 mg tablet Take 1 tablet (400 mg total) by mouth 3 (three) times a day with meals 270 tablet 0    magnesium oxide (MAG-OX) 400 mg Take 1 tablet by mouth 3 (three) times a day with meals      Magnesium Oxide, Antacid, 500 MG CAPS Take by mouth daily      OneTouch Delica Lancets 33G MISC May substitute brand based on insurance coverage. Check glucose ACHS. 200 each 0    pancrelipase, Lip-Prot-Amyl, (CREON) 24,000 units Take 1 capsule (24,000 Units total) by mouth 3 (three) times a day with meals 90 capsule 0    pantoprazole (PROTONIX) 40 mg tablet Take 1 tablet (40 mg total) by mouth 2 (two) times a day before meals 60 tablet 0    sodium bicarbonate 650 mg tablet Take 1 tablet (650 mg total) by mouth daily in the early morning      spironolactone (ALDACTONE) 50 mg tablet Take 2 tablets (100 mg total) by mouth daily 180 tablet 1    thiamine (VITAMIN B1) 100 mg tablet Take 100 mg by mouth in the morning.      doxycycline monohydrate (MONODOX) 50 mg capsule Take 50 mg by mouth daily (Patient not taking: Reported on 1/21/2025)       No current facility-administered medications on file prior to visit.

## 2025-06-17 ENCOUNTER — TELEPHONE (OUTPATIENT)
Age: 70
End: 2025-06-17

## 2025-06-17 NOTE — TELEPHONE ENCOUNTER
Called and spoke to patient to reschedule September appt due to change in provider schedule. Patient scheduled to October with Dr. Mahajan. Lab orders active in epic ordered by hepatology mailed to patient per patient request.

## 2025-06-23 ENCOUNTER — TELEPHONE (OUTPATIENT)
Dept: ENDOCRINOLOGY | Facility: CLINIC | Age: 70
End: 2025-06-23

## 2025-07-01 DIAGNOSIS — N18.30 STAGE 3 CHRONIC KIDNEY DISEASE, UNSPECIFIED WHETHER STAGE 3A OR 3B CKD (HCC): ICD-10-CM

## 2025-07-01 DIAGNOSIS — K70.31 ASCITES DUE TO ALCOHOLIC CIRRHOSIS (HCC): ICD-10-CM

## 2025-07-01 DIAGNOSIS — K72.90 DECOMPENSATED HEPATIC CIRRHOSIS (HCC): ICD-10-CM

## 2025-07-01 DIAGNOSIS — K74.60 DECOMPENSATED HEPATIC CIRRHOSIS (HCC): ICD-10-CM

## 2025-07-01 DIAGNOSIS — E83.42 HYPOMAGNESEMIA: ICD-10-CM

## 2025-07-01 NOTE — TELEPHONE ENCOUNTER
Reason for call:   [x] Refill   [] Prior Auth  [] Other:     Office:   [] PCP/Provider -   [x] Specialty/Provider - [address]     Medication: spironolactone (ALDACTONE) 50 mg tablet     Dose/Frequency: 100 mg, Daily     Quantity: 180    Pharmacy: [id number]    Local Pharmacy   Does the patient have enough for 3 days?   [x] Yes   [] No - Send as HP to POD    Mail Away Pharmacy   Does the patient have enough for 10 days?   [] Yes   [] No - Send as HP to POD

## 2025-07-02 RX ORDER — SPIRONOLACTONE 50 MG/1
100 TABLET, FILM COATED ORAL DAILY
Qty: 180 TABLET | Refills: 1 | Status: SHIPPED | OUTPATIENT
Start: 2025-07-02

## 2025-07-03 ENCOUNTER — APPOINTMENT (OUTPATIENT)
Dept: LAB | Facility: AMBULARY SURGERY CENTER | Age: 70
End: 2025-07-03
Attending: INTERNAL MEDICINE
Payer: MEDICARE

## 2025-07-03 DIAGNOSIS — E83.52 HYPERCALCEMIA: ICD-10-CM

## 2025-07-03 DIAGNOSIS — E87.1 HYPONATREMIA: ICD-10-CM

## 2025-07-03 DIAGNOSIS — N18.31 TYPE 2 DIABETES MELLITUS WITH STAGE 3A CHRONIC KIDNEY DISEASE, WITH LONG-TERM CURRENT USE OF INSULIN (HCC): ICD-10-CM

## 2025-07-03 DIAGNOSIS — Z79.4 TYPE 2 DIABETES MELLITUS WITH STAGE 3A CHRONIC KIDNEY DISEASE, WITH LONG-TERM CURRENT USE OF INSULIN (HCC): ICD-10-CM

## 2025-07-03 DIAGNOSIS — N18.31 STAGE 3A CHRONIC KIDNEY DISEASE (HCC): ICD-10-CM

## 2025-07-03 DIAGNOSIS — E87.20 METABOLIC ACIDOSIS: ICD-10-CM

## 2025-07-03 DIAGNOSIS — K70.31 ALCOHOLIC CIRRHOSIS OF LIVER WITH ASCITES (HCC): ICD-10-CM

## 2025-07-03 DIAGNOSIS — E83.42 HYPOMAGNESEMIA: ICD-10-CM

## 2025-07-03 DIAGNOSIS — R80.9 PROTEINURIA, UNSPECIFIED TYPE: ICD-10-CM

## 2025-07-03 DIAGNOSIS — E11.22 TYPE 2 DIABETES MELLITUS WITH STAGE 3A CHRONIC KIDNEY DISEASE, WITH LONG-TERM CURRENT USE OF INSULIN (HCC): ICD-10-CM

## 2025-07-03 LAB
ALBUMIN SERPL BCG-MCNC: 4.8 G/DL (ref 3.5–5)
ALP SERPL-CCNC: 114 U/L (ref 34–104)
ALT SERPL W P-5'-P-CCNC: 19 U/L (ref 7–52)
ANION GAP SERPL CALCULATED.3IONS-SCNC: 10 MMOL/L (ref 4–13)
AST SERPL W P-5'-P-CCNC: 27 U/L (ref 13–39)
BACTERIA UR QL AUTO: ABNORMAL /HPF
BASOPHILS # BLD AUTO: 0.01 THOUSANDS/ÂΜL (ref 0–0.1)
BASOPHILS NFR BLD AUTO: 0 % (ref 0–1)
BILIRUB SERPL-MCNC: 0.89 MG/DL (ref 0.2–1)
BILIRUB UR QL STRIP: NEGATIVE
BUN SERPL-MCNC: 33 MG/DL (ref 5–25)
CALCIUM SERPL-MCNC: 10.1 MG/DL (ref 8.4–10.2)
CHLORIDE SERPL-SCNC: 96 MMOL/L (ref 96–108)
CLARITY UR: CLEAR
CO2 SERPL-SCNC: 27 MMOL/L (ref 21–32)
COLOR UR: ABNORMAL
CREAT SERPL-MCNC: 1 MG/DL (ref 0.6–1.3)
CREAT UR-MCNC: 56.7 MG/DL
EOSINOPHIL # BLD AUTO: 0.07 THOUSAND/ÂΜL (ref 0–0.61)
EOSINOPHIL NFR BLD AUTO: 2 % (ref 0–6)
ERYTHROCYTE [DISTWIDTH] IN BLOOD BY AUTOMATED COUNT: 12.4 % (ref 11.6–15.1)
GFR SERPL CREATININE-BSD FRML MDRD: 57 ML/MIN/1.73SQ M
GLUCOSE P FAST SERPL-MCNC: 114 MG/DL (ref 65–99)
GLUCOSE UR STRIP-MCNC: NEGATIVE MG/DL
HCT VFR BLD AUTO: 39 % (ref 34.8–46.1)
HGB BLD-MCNC: 13.3 G/DL (ref 11.5–15.4)
HGB UR QL STRIP.AUTO: NEGATIVE
IMM GRANULOCYTES # BLD AUTO: 0.01 THOUSAND/UL (ref 0–0.2)
IMM GRANULOCYTES NFR BLD AUTO: 0 % (ref 0–2)
INR PPP: 1.05 (ref 0.85–1.19)
KETONES UR STRIP-MCNC: NEGATIVE MG/DL
LEUKOCYTE ESTERASE UR QL STRIP: ABNORMAL
LYMPHOCYTES # BLD AUTO: 1.55 THOUSANDS/ÂΜL (ref 0.6–4.47)
LYMPHOCYTES NFR BLD AUTO: 34 % (ref 14–44)
MAGNESIUM SERPL-MCNC: 1.9 MG/DL (ref 1.9–2.7)
MCH RBC QN AUTO: 32.5 PG (ref 26.8–34.3)
MCHC RBC AUTO-ENTMCNC: 34.1 G/DL (ref 31.4–37.4)
MCV RBC AUTO: 95 FL (ref 82–98)
MONOCYTES # BLD AUTO: 0.28 THOUSAND/ÂΜL (ref 0.17–1.22)
MONOCYTES NFR BLD AUTO: 6 % (ref 4–12)
NEUTROPHILS # BLD AUTO: 2.61 THOUSANDS/ÂΜL (ref 1.85–7.62)
NEUTS SEG NFR BLD AUTO: 58 % (ref 43–75)
NITRITE UR QL STRIP: NEGATIVE
NON-SQ EPI CELLS URNS QL MICRO: ABNORMAL /HPF
NRBC BLD AUTO-RTO: 0 /100 WBCS
PH UR STRIP.AUTO: 7 [PH]
PHOSPHATE SERPL-MCNC: 3.5 MG/DL (ref 2.3–4.1)
PLATELET # BLD AUTO: 109 THOUSANDS/UL (ref 149–390)
PMV BLD AUTO: 10.4 FL (ref 8.9–12.7)
POTASSIUM SERPL-SCNC: 4.3 MMOL/L (ref 3.5–5.3)
PROT SERPL-MCNC: 8 G/DL (ref 6.4–8.4)
PROT UR STRIP-MCNC: NEGATIVE MG/DL
PROT UR-MCNC: 4.2 MG/DL
PROT/CREAT UR: 0.1 MG/G{CREAT}
PROTHROMBIN TIME: 14 SECONDS (ref 12.3–15)
PTH-INTACT SERPL-MCNC: 49 PG/ML (ref 12–88)
RBC # BLD AUTO: 4.09 MILLION/UL (ref 3.81–5.12)
RBC #/AREA URNS AUTO: ABNORMAL /HPF
SODIUM SERPL-SCNC: 133 MMOL/L (ref 135–147)
SP GR UR STRIP.AUTO: 1.01 (ref 1–1.03)
UROBILINOGEN UR STRIP-ACNC: <2 MG/DL
WBC # BLD AUTO: 4.53 THOUSAND/UL (ref 4.31–10.16)
WBC #/AREA URNS AUTO: ABNORMAL /HPF

## 2025-07-03 PROCEDURE — 81001 URINALYSIS AUTO W/SCOPE: CPT

## 2025-07-03 PROCEDURE — 83735 ASSAY OF MAGNESIUM: CPT

## 2025-07-03 PROCEDURE — 82570 ASSAY OF URINE CREATININE: CPT

## 2025-07-03 PROCEDURE — 84100 ASSAY OF PHOSPHORUS: CPT

## 2025-07-03 PROCEDURE — 80053 COMPREHEN METABOLIC PANEL: CPT

## 2025-07-03 PROCEDURE — 84156 ASSAY OF PROTEIN URINE: CPT

## 2025-07-03 PROCEDURE — 85610 PROTHROMBIN TIME: CPT

## 2025-07-03 PROCEDURE — 36415 COLL VENOUS BLD VENIPUNCTURE: CPT

## 2025-07-03 PROCEDURE — 83970 ASSAY OF PARATHORMONE: CPT

## 2025-07-03 PROCEDURE — 85025 COMPLETE CBC W/AUTO DIFF WBC: CPT

## 2025-07-08 ENCOUNTER — HOSPITAL ENCOUNTER (OUTPATIENT)
Dept: ULTRASOUND IMAGING | Facility: HOSPITAL | Age: 70
Discharge: HOME | End: 2025-07-08
Payer: MEDICARE

## 2025-07-08 DIAGNOSIS — K72.90 DECOMPENSATED HEPATIC CIRRHOSIS (HCC): ICD-10-CM

## 2025-07-08 DIAGNOSIS — K74.60 DECOMPENSATED HEPATIC CIRRHOSIS (HCC): ICD-10-CM

## 2025-07-08 PROCEDURE — 76705 ECHO EXAM OF ABDOMEN: CPT

## 2025-08-16 PROBLEM — E11.22 CONTROLLED TYPE 2 DIABETES MELLITUS WITH STAGE 3 CHRONIC KIDNEY DISEASE, WITH LONG-TERM CURRENT USE OF INSULIN (HCC): Status: ACTIVE | Noted: 2025-08-16

## 2025-08-16 PROBLEM — Z79.4 CONTROLLED TYPE 2 DIABETES MELLITUS WITH STAGE 3 CHRONIC KIDNEY DISEASE, WITH LONG-TERM CURRENT USE OF INSULIN (HCC): Status: ACTIVE | Noted: 2025-08-16

## 2025-08-16 PROBLEM — N18.30 CONTROLLED TYPE 2 DIABETES MELLITUS WITH STAGE 3 CHRONIC KIDNEY DISEASE, WITH LONG-TERM CURRENT USE OF INSULIN (HCC): Status: ACTIVE | Noted: 2025-08-16

## 2025-08-20 ENCOUNTER — OFFICE VISIT (OUTPATIENT)
Dept: NEPHROLOGY | Facility: CLINIC | Age: 70
End: 2025-08-20
Payer: MEDICARE

## 2025-08-20 VITALS
WEIGHT: 104.8 LBS | HEART RATE: 81 BPM | DIASTOLIC BLOOD PRESSURE: 74 MMHG | SYSTOLIC BLOOD PRESSURE: 128 MMHG | HEIGHT: 60 IN | BODY MASS INDEX: 20.58 KG/M2

## 2025-08-20 DIAGNOSIS — N18.31 STAGE 3A CHRONIC KIDNEY DISEASE (HCC): Primary | ICD-10-CM

## 2025-08-20 DIAGNOSIS — E11.22 CONTROLLED TYPE 2 DIABETES MELLITUS WITH STAGE 3 CHRONIC KIDNEY DISEASE, WITH LONG-TERM CURRENT USE OF INSULIN (HCC): ICD-10-CM

## 2025-08-20 DIAGNOSIS — E83.42 HYPOMAGNESEMIA: ICD-10-CM

## 2025-08-20 DIAGNOSIS — R80.9 MICROALBUMINURIA: ICD-10-CM

## 2025-08-20 DIAGNOSIS — Z79.4 CONTROLLED TYPE 2 DIABETES MELLITUS WITH STAGE 3 CHRONIC KIDNEY DISEASE, WITH LONG-TERM CURRENT USE OF INSULIN (HCC): ICD-10-CM

## 2025-08-20 DIAGNOSIS — E83.52 HYPERCALCEMIA: ICD-10-CM

## 2025-08-20 DIAGNOSIS — E87.1 HYPONATREMIA: ICD-10-CM

## 2025-08-20 DIAGNOSIS — I10 ESSENTIAL HYPERTENSION: Chronic | ICD-10-CM

## 2025-08-20 DIAGNOSIS — E87.20 METABOLIC ACIDOSIS: ICD-10-CM

## 2025-08-20 DIAGNOSIS — K70.31 ALCOHOLIC CIRRHOSIS OF LIVER WITH ASCITES (HCC): ICD-10-CM

## 2025-08-20 DIAGNOSIS — N18.30 CONTROLLED TYPE 2 DIABETES MELLITUS WITH STAGE 3 CHRONIC KIDNEY DISEASE, WITH LONG-TERM CURRENT USE OF INSULIN (HCC): ICD-10-CM

## 2025-08-20 PROCEDURE — 99214 OFFICE O/P EST MOD 30 MIN: CPT | Performed by: NURSE PRACTITIONER

## 2025-08-21 ENCOUNTER — TELEPHONE (OUTPATIENT)
Age: 70
End: 2025-08-21

## (undated) DEVICE — GLOVE INDICATOR PI UNDERGLOVE SZ 8 BLUE

## (undated) DEVICE — 3M™ TEGADERM™ TRANSPARENT FILM DRESSING FRAME STYLE, 1628, 6 IN X 8 IN (15 CM X 20 CM), 10/CT 8CT/CASE: Brand: 3M™ TEGADERM™

## (undated) DEVICE — OCCLUSIVE GAUZE STRIP,3% BISMUTH TRIBROMOPHENATE IN PETROLATUM BLEND: Brand: XEROFORM

## (undated) DEVICE — SPONGE PVP SCRUB WING STERILE

## (undated) DEVICE — SUT VICRYL PLUS 2-0 CTB-1 27 IN VCPB259H

## (undated) DEVICE — STERILE ORIF HIP PACK: Brand: CARDINAL HEALTH

## (undated) DEVICE — DRAPE SURGIKIT SADDLE BAG

## (undated) DEVICE — ARTHROSCOPY FLOOR MAT

## (undated) DEVICE — CHLORAPREP HI-LITE 26ML ORANGE

## (undated) DEVICE — 2.5MM REAMING ROD WITH BALL TIP/950MM-STERILE

## (undated) DEVICE — PENCIL ELECTROSURG E-Z CLEAN -0035H

## (undated) DEVICE — INTENDED FOR TISSUE SEPARATION, AND OTHER PROCEDURES THAT REQUIRE A SHARP SURGICAL BLADE TO PUNCTURE OR CUT.: Brand: BARD-PARKER SAFETY BLADES SIZE 10, STERILE

## (undated) DEVICE — BAG URINE DRAINAGE 2000ML ANTI RFLX LF

## (undated) DEVICE — REM POLYHESIVE ADULT PATIENT RETURN ELECTRODE: Brand: VALLEYLAB

## (undated) DEVICE — DRESSING MEPILEX AG BORDER 4 X 4 IN

## (undated) DEVICE — 6617 IOBAN II PATIENT ISOLATION DRAPE 5/BX,4BX/CS: Brand: STERI-DRAPE™ IOBAN™ 2

## (undated) DEVICE — 4.0MM THREE-FLUTED DRILL BIT QC/195MM

## (undated) DEVICE — GLOVE SRG BIOGEL 7.5

## (undated) DEVICE — MEDI-VAC YANKAUER SUCTION HANDLE W/STRAIGHT TIP & CONTROL VENT: Brand: CARDINAL HEALTH

## (undated) DEVICE — SUT VICRYL PLUS 0 CTB-1 27 IN VCPB260H

## (undated) DEVICE — INTENDED FOR TISSUE SEPARATION, AND OTHER PROCEDURES THAT REQUIRE A SHARP SURGICAL BLADE TO PUNCTURE OR CUT.: Brand: BARD-PARKER SAFETY BLADES SIZE 15, STERILE

## (undated) DEVICE — 3.2MM GUIDE WIRE 400MM